# Patient Record
Sex: MALE | Race: BLACK OR AFRICAN AMERICAN | URBAN - METROPOLITAN AREA
[De-identification: names, ages, dates, MRNs, and addresses within clinical notes are randomized per-mention and may not be internally consistent; named-entity substitution may affect disease eponyms.]

---

## 2015-10-29 RX ORDER — METOPROLOL TARTRATE 50 MG
0.5 TABLET ORAL
Qty: 0 | Refills: 0 | DISCHARGE
Start: 2015-10-29

## 2015-10-29 RX ORDER — METOPROLOL TARTRATE 50 MG
1 TABLET ORAL
Qty: 0 | Refills: 0 | DISCHARGE
Start: 2015-10-29

## 2019-05-19 ENCOUNTER — ED HISTORICAL/CONVERTED ENCOUNTER (OUTPATIENT)
Dept: OTHER | Age: 79
End: 2019-05-19

## 2019-07-19 VITALS — BODY MASS INDEX: 26.22 KG/M2 | HEIGHT: 63 IN | WEIGHT: 148 LBS

## 2019-07-19 PROBLEM — Z86.73 HISTORY OF CVA (CEREBROVASCULAR ACCIDENT): Status: RESOLVED | Noted: 2019-07-19 | Resolved: 2019-07-19

## 2019-07-19 PROBLEM — Z87.891 FORMER SMOKER: Status: ACTIVE | Noted: 2019-07-19

## 2019-07-19 PROBLEM — Z86.39 HISTORY OF DIABETES MELLITUS: Status: RESOLVED | Noted: 2019-07-19 | Resolved: 2019-07-19

## 2019-07-19 PROBLEM — I25.10 CAD (CORONARY ARTERY DISEASE): Status: ACTIVE | Noted: 2019-07-19

## 2019-07-19 PROBLEM — Z86.69 HISTORY OF CATARACT: Status: RESOLVED | Noted: 2019-07-19 | Resolved: 2019-07-19

## 2019-07-19 PROBLEM — N18.3 CKD (CHRONIC KIDNEY DISEASE), STAGE III: Status: RESOLVED | Noted: 2019-07-19 | Resolved: 2019-07-19

## 2019-07-19 PROBLEM — Z86.39 HISTORY OF HYPERLIPIDEMIA: Status: RESOLVED | Noted: 2019-07-19 | Resolved: 2019-07-19

## 2019-07-19 PROBLEM — I25.2 HISTORY OF INFERIOR WALL MYOCARDIAL INFARCTION: Status: RESOLVED | Noted: 2019-07-19 | Resolved: 2019-07-19

## 2019-07-19 PROBLEM — I50.22 CHRONIC SYSTOLIC CONGESTIVE HEART FAILURE: Status: ACTIVE | Noted: 2019-07-19

## 2019-07-19 PROBLEM — Z87.438 HISTORY OF BPH: Status: RESOLVED | Noted: 2019-07-19 | Resolved: 2019-07-19

## 2019-07-19 PROBLEM — Z86.79 HISTORY OF HYPERTENSION: Status: RESOLVED | Noted: 2019-07-19 | Resolved: 2019-07-19

## 2019-07-19 RX ORDER — DORZOLAMIDE HYDROCHLORIDE AND TIMOLOL MALEATE 20; 5 MG/ML; MG/ML
22.3-6.8 SOLUTION/ DROPS OPHTHALMIC TWICE DAILY
Refills: 0 | Status: ACTIVE | COMMUNITY

## 2019-07-19 RX ORDER — OMEGA-3-ACID ETHYL ESTERS 1 G/1
1 CAPSULE, LIQUID FILLED ORAL
Qty: 360 | Refills: 0 | Status: ACTIVE | COMMUNITY

## 2019-07-19 RX ORDER — ASPIRIN 81 MG
81 TABLET, DELAYED RELEASE (ENTERIC COATED) ORAL DAILY
Qty: 1 | Refills: 5 | Status: ACTIVE | COMMUNITY

## 2019-07-19 RX ORDER — TAMSULOSIN HYDROCHLORIDE 0.4 MG/1
0.4 CAPSULE ORAL
Qty: 30 | Refills: 2 | Status: ACTIVE | COMMUNITY

## 2019-07-19 RX ORDER — CHOLECALCIFEROL (VITAMIN D3) 25 MCG
TABLET ORAL DAILY
Refills: 0 | Status: ACTIVE | COMMUNITY

## 2019-07-22 ENCOUNTER — FORM ENCOUNTER (OUTPATIENT)
Age: 79
End: 2019-07-22

## 2019-07-23 ENCOUNTER — OUTPATIENT (OUTPATIENT)
Dept: OUTPATIENT SERVICES | Facility: HOSPITAL | Age: 79
LOS: 1 days | End: 2019-07-23
Payer: MEDICARE

## 2019-07-23 ENCOUNTER — APPOINTMENT (OUTPATIENT)
Dept: CARDIOTHORACIC SURGERY | Facility: CLINIC | Age: 79
End: 2019-07-23
Payer: MEDICARE

## 2019-07-23 VITALS
HEART RATE: 72 BPM | DIASTOLIC BLOOD PRESSURE: 76 MMHG | OXYGEN SATURATION: 99 % | SYSTOLIC BLOOD PRESSURE: 170 MMHG | RESPIRATION RATE: 17 BRPM | BODY MASS INDEX: 26.93 KG/M2 | HEIGHT: 63 IN | WEIGHT: 152 LBS | TEMPERATURE: 96 F

## 2019-07-23 DIAGNOSIS — I25.10 ATHEROSCLEROTIC HEART DISEASE OF NATIVE CORONARY ARTERY W/OUT ANGINA PECTORIS: ICD-10-CM

## 2019-07-23 DIAGNOSIS — I25.10 ATHEROSCLEROTIC HEART DISEASE OF NATIVE CORONARY ARTERY WITHOUT ANGINA PECTORIS: ICD-10-CM

## 2019-07-23 DIAGNOSIS — Z86.69 PERSONAL HISTORY OF OTHER DISEASES OF THE NERVOUS SYSTEM AND SENSE ORGANS: ICD-10-CM

## 2019-07-23 DIAGNOSIS — Z86.39 PERSONAL HISTORY OF OTHER ENDOCRINE, NUTRITIONAL AND METABOLIC DISEASE: ICD-10-CM

## 2019-07-23 DIAGNOSIS — Z86.79 PERSONAL HISTORY OF OTHER DISEASES OF THE CIRCULATORY SYSTEM: ICD-10-CM

## 2019-07-23 DIAGNOSIS — Z86.73 PERSONAL HISTORY OF TRANSIENT ISCHEMIC ATTACK (TIA), AND CEREBRAL INFARCTION W/OUT RESIDUAL DEFICITS: ICD-10-CM

## 2019-07-23 DIAGNOSIS — Z87.891 PERSONAL HISTORY OF NICOTINE DEPENDENCE: ICD-10-CM

## 2019-07-23 DIAGNOSIS — I25.2 OLD MYOCARDIAL INFARCTION: ICD-10-CM

## 2019-07-23 DIAGNOSIS — Z95.818 PRESENCE OF OTHER CARDIAC IMPLANTS AND GRAFTS: Chronic | ICD-10-CM

## 2019-07-23 DIAGNOSIS — Z87.438 PERSONAL HISTORY OF OTHER DISEASES OF MALE GENITAL ORGANS: ICD-10-CM

## 2019-07-23 DIAGNOSIS — N18.3 CHRONIC KIDNEY DISEASE, STAGE 3 (MODERATE): ICD-10-CM

## 2019-07-23 DIAGNOSIS — I50.22 CHRONIC SYSTOLIC (CONGESTIVE) HEART FAILURE: ICD-10-CM

## 2019-07-23 LAB
ALBUMIN SERPL ELPH-MCNC: 4.7 G/DL — SIGNIFICANT CHANGE UP (ref 3.3–5)
ALP SERPL-CCNC: 71 U/L — SIGNIFICANT CHANGE UP (ref 40–120)
ALT FLD-CCNC: 47 U/L — HIGH (ref 10–45)
ANION GAP SERPL CALC-SCNC: 12 MMOL/L — SIGNIFICANT CHANGE UP (ref 5–17)
APPEARANCE UR: CLEAR — SIGNIFICANT CHANGE UP
APTT BLD: 30.5 SEC — SIGNIFICANT CHANGE UP (ref 27.5–36.3)
AST SERPL-CCNC: 32 U/L — SIGNIFICANT CHANGE UP (ref 10–40)
BACTERIA # UR AUTO: PRESENT /HPF
BASOPHILS # BLD AUTO: 0.04 K/UL — SIGNIFICANT CHANGE UP (ref 0–0.2)
BASOPHILS NFR BLD AUTO: 0.6 % — SIGNIFICANT CHANGE UP (ref 0–2)
BILIRUB SERPL-MCNC: 0.5 MG/DL — SIGNIFICANT CHANGE UP (ref 0.2–1.2)
BILIRUB UR-MCNC: NEGATIVE — SIGNIFICANT CHANGE UP
BLD GP AB SCN SERPL QL: NEGATIVE — SIGNIFICANT CHANGE UP
BUN SERPL-MCNC: 24 MG/DL — HIGH (ref 7–23)
CALCIUM SERPL-MCNC: 9.6 MG/DL — SIGNIFICANT CHANGE UP (ref 8.4–10.5)
CHLORIDE SERPL-SCNC: 104 MMOL/L — SIGNIFICANT CHANGE UP (ref 96–108)
CHOLEST SERPL-MCNC: 134 MG/DL — SIGNIFICANT CHANGE UP (ref 10–199)
CO2 SERPL-SCNC: 24 MMOL/L — SIGNIFICANT CHANGE UP (ref 22–31)
COLOR SPEC: YELLOW — SIGNIFICANT CHANGE UP
CREAT SERPL-MCNC: 1.74 MG/DL — HIGH (ref 0.5–1.3)
DIFF PNL FLD: ABNORMAL
EOSINOPHIL # BLD AUTO: 0.58 K/UL — HIGH (ref 0–0.5)
EOSINOPHIL NFR BLD AUTO: 9.3 % — HIGH (ref 0–6)
EPI CELLS # UR: SIGNIFICANT CHANGE UP /HPF (ref 0–5)
GLUCOSE SERPL-MCNC: 122 MG/DL — HIGH (ref 70–99)
GLUCOSE UR QL: 250
HBA1C BLD-MCNC: 8.2 % — HIGH (ref 4–5.6)
HBV SURFACE AG SER-ACNC: SIGNIFICANT CHANGE UP
HCT VFR BLD CALC: 39.9 % — SIGNIFICANT CHANGE UP (ref 39–50)
HDLC SERPL-MCNC: 65 MG/DL — SIGNIFICANT CHANGE UP
HGB BLD-MCNC: 13 G/DL — SIGNIFICANT CHANGE UP (ref 13–17)
IMM GRANULOCYTES NFR BLD AUTO: 0.3 % — SIGNIFICANT CHANGE UP (ref 0–1.5)
INR BLD: 1.01 — SIGNIFICANT CHANGE UP (ref 0.88–1.16)
KETONES UR-MCNC: NEGATIVE — SIGNIFICANT CHANGE UP
LEUKOCYTE ESTERASE UR-ACNC: NEGATIVE — SIGNIFICANT CHANGE UP
LIPID PNL WITH DIRECT LDL SERPL: 49 MG/DL — SIGNIFICANT CHANGE UP
LYMPHOCYTES # BLD AUTO: 1.61 K/UL — SIGNIFICANT CHANGE UP (ref 1–3.3)
LYMPHOCYTES # BLD AUTO: 25.9 % — SIGNIFICANT CHANGE UP (ref 13–44)
MCHC RBC-ENTMCNC: 29.8 PG — SIGNIFICANT CHANGE UP (ref 27–34)
MCHC RBC-ENTMCNC: 32.6 GM/DL — SIGNIFICANT CHANGE UP (ref 32–36)
MCV RBC AUTO: 91.5 FL — SIGNIFICANT CHANGE UP (ref 80–100)
MONOCYTES # BLD AUTO: 0.62 K/UL — SIGNIFICANT CHANGE UP (ref 0–0.9)
MONOCYTES NFR BLD AUTO: 10 % — SIGNIFICANT CHANGE UP (ref 2–14)
NEUTROPHILS # BLD AUTO: 3.34 K/UL — SIGNIFICANT CHANGE UP (ref 1.8–7.4)
NEUTROPHILS NFR BLD AUTO: 53.9 % — SIGNIFICANT CHANGE UP (ref 43–77)
NITRITE UR-MCNC: NEGATIVE — SIGNIFICANT CHANGE UP
NRBC # BLD: 0 /100 WBCS — SIGNIFICANT CHANGE UP (ref 0–0)
PH UR: 6 — SIGNIFICANT CHANGE UP (ref 5–8)
PLATELET # BLD AUTO: 208 K/UL — SIGNIFICANT CHANGE UP (ref 150–400)
POTASSIUM SERPL-MCNC: 4.4 MMOL/L — SIGNIFICANT CHANGE UP (ref 3.5–5.3)
POTASSIUM SERPL-SCNC: 4.4 MMOL/L — SIGNIFICANT CHANGE UP (ref 3.5–5.3)
PROT SERPL-MCNC: 7.8 G/DL — SIGNIFICANT CHANGE UP (ref 6–8.3)
PROT UR-MCNC: 30 MG/DL
PROTHROM AB SERPL-ACNC: 11.4 SEC — SIGNIFICANT CHANGE UP (ref 10–12.9)
RBC # BLD: 4.36 M/UL — SIGNIFICANT CHANGE UP (ref 4.2–5.8)
RBC # FLD: 12.1 % — SIGNIFICANT CHANGE UP (ref 10.3–14.5)
RBC CASTS # UR COMP ASSIST: < 5 /HPF — SIGNIFICANT CHANGE UP
RH IG SCN BLD-IMP: POSITIVE — SIGNIFICANT CHANGE UP
SODIUM SERPL-SCNC: 140 MMOL/L — SIGNIFICANT CHANGE UP (ref 135–145)
SP GR SPEC: 1.01 — SIGNIFICANT CHANGE UP (ref 1–1.03)
TOTAL CHOLESTEROL/HDL RATIO MEASUREMENT: 2.1 RATIO — LOW (ref 3.4–9.6)
TRIGL SERPL-MCNC: 99 MG/DL — SIGNIFICANT CHANGE UP (ref 10–149)
TSH SERPL-MCNC: 1.34 UIU/ML — SIGNIFICANT CHANGE UP (ref 0.35–4.94)
UROBILINOGEN FLD QL: 0.2 E.U./DL — SIGNIFICANT CHANGE UP
WBC # BLD: 6.21 K/UL — SIGNIFICANT CHANGE UP (ref 3.8–10.5)
WBC # FLD AUTO: 6.21 K/UL — SIGNIFICANT CHANGE UP (ref 3.8–10.5)
WBC UR QL: < 5 /HPF — SIGNIFICANT CHANGE UP

## 2019-07-23 PROCEDURE — 71046 X-RAY EXAM CHEST 2 VIEWS: CPT | Mod: 26

## 2019-07-23 PROCEDURE — 99205 OFFICE O/P NEW HI 60 MIN: CPT

## 2019-07-23 PROCEDURE — 71046 X-RAY EXAM CHEST 2 VIEWS: CPT

## 2019-07-23 PROCEDURE — 36415 COLL VENOUS BLD VENIPUNCTURE: CPT

## 2019-07-23 PROCEDURE — 93000 ELECTROCARDIOGRAM COMPLETE: CPT

## 2019-07-25 VITALS
WEIGHT: 152.12 LBS | HEIGHT: 63 IN | RESPIRATION RATE: 18 BRPM | HEART RATE: 72 BPM | OXYGEN SATURATION: 99 % | DIASTOLIC BLOOD PRESSURE: 76 MMHG | SYSTOLIC BLOOD PRESSURE: 170 MMHG | TEMPERATURE: 96 F

## 2019-07-25 NOTE — H&P ADULT - NSICDXPASTSURGICALHX_GEN_ALL_CORE_FT
PAST SURGICAL HISTORY:  H/O carpal tunnel repair     Status post placement of implantable loop recorder

## 2019-07-25 NOTE — H&P ADULT - NSICDXFAMILYHX_GEN_ALL_CORE_FT
FAMILY HISTORY:  Mother  Still living? Unknown  Family history of heart disease, Age at diagnosis: Age Unknown    Sibling  Still living? Unknown  Family history of uterine cancer, Age at diagnosis: Age Unknown

## 2019-07-25 NOTE — CONSULT LETTER
[Dear  ___] : Dear  [unfilled], [Please see my note below.] : Please see my note below. [Sincerely,] : Sincerely, [FreeTextEntry2] : Jasvir Faulkner M.D.\par [address] [facility]\[place]  [address] [FreeTextEntry1] : Please find attached our consultation on your patient, JOSEFINA KUMAR \par We take a multidisciplinary team approach to patient care and consider you, the referring physician, an extension of our team. We will maintain an open line of communication with you throughout your patient's treatment course.  \par It is our commitment to provide your patient with the highest quality of advanced therapeutic options. We thank you for allowing us to participate in the care of your patient.\par Please do not hesitate to contact our team with any questions or concerns at [phone].\par  [FreeTextEntry3] : Anjel Bhatti MD, FRCS\par \par [facility] \par Department of Cardiothoracic  Surgery \par Director of Robotic Cardiac Surgery\par Professor of Cardiovascular & Thoracic Surgery\par [employer] \par \par [facility]\par

## 2019-07-25 NOTE — HISTORY OF PRESENT ILLNESS
[FreeTextEntry1] : 80 yo male with a PMH of HTN, HLD, DM, inferior wall MI, CKD-stage 3, right CVA(residual left arm weakness), s/p loop recorder placement in 2015 and class II angina presents with single vessel CAD. In June 2014 while in [facility] he suffered an inferior wall MI and "small" stroke. Cath at that time revealed CAD that was treated medically. He presented to Dr. Faulkner for routine cardiology care. 6/3/19 s/p abnormal stress test. 6/3/19 Echo: EF 55%,  no significant valvular disease.  7/18/19 Cardiac cath at  revealed 2 vessel. He presents today for surgical consult accompanied by his daughter. Today he appears generally well, NAD, denies c/o chest pain, SOB, fever, lower extremity edema, syncope, dizziness, PND or palpitations. He reports dyspnea on exertion, that is relieved with rest and prn lower extremity edema. CCS II.

## 2019-07-25 NOTE — H&P ADULT - HISTORY OF PRESENT ILLNESS
80 yo male with a PMH of HTN, HLD, DM, inferior wall MI, CKD-stage 3, right CVA(residual left arm weakness), s/p loop recorder placement in 2015 and class II angina presents with single vessel CAD. In June 2014 while in [facility] he suffered an inferior wall MI and "small" stroke. Cath at that time revealed CAD that was treated medically. He presented to Dr. Faulkner for routine cardiology care. 6/3/19 s/p abnormal stress test. 6/3/19 Echo: EF 55%, no significant valvular disease. 7/18/19 Cardiac cath at  revealed 2 vessel. He appears generally well, NAD, denies c/o chest pain, SOB, fever, lower extremity edema, syncope, dizziness, PND or palpitations. He reports dyspnea on exertion, that is relieved with rest and prn lower extremity edema. He was seen in in the outpatient office by Dr. Bhatti and now presents for elective surgery. CCS II. 80 yo male with a PMH of HTN, HLD, DM, inferior wall MI, CKD-stage 3, right CVA(residual left arm weakness), s/p loop recorder placement in 2015 and class II angina presents with single vessel CAD. In June 2014 while in [facility] he suffered an inferior wall MI and "small" stroke. Cath at that time revealed CAD that was treated medically. He presented to Dr. Faulkner for routine cardiology care. 6/3/19 s/p abnormal stress test. 6/3/19 Echo: EF 55%, no significant valvular disease. 7/18/19 Cardiac cath at  revealed 2 vessel. He appears generally well, NAD, denies c/o chest pain, SOB, fever, lower extremity edema, syncope, dizziness, PND or palpitations. He reports dyspnea on exertion, that is relieved with rest and prn lower extremity edema. He was seen in in the outpatient office by Dr. Bhatti and now presents for elective surgery. CCS II.     Addendum 7/31/19 @ 9:20am: Patient seen in same day holding area; Reports no changes to PMHx or medications since last seen by our team. Denies acute or current SOB, chest pain, palpitation, N/V/D, fever/chills, recent illness, or any other concerning symptoms.

## 2019-07-25 NOTE — ASSESSMENT
[FreeTextEntry1] : 79 year old male  with a history of HTN, HLD, DM, inferior wall MI, CKD-stage 3, right CVA and class II angina presents with 2- vessel CAD. Dr. Bhatti reviewed the cardiac cath imaging and reports with the patient and  his daughter and discussed the case with Dr. Faulkner.  Dr. Bhatti discussed the risks, benefits and alternatives to surgery. Risks include but not limited to death, heart attack, bleeding, stroke, kidney problems and infection. He quoted a 1% operative mortality and complication risk.  He also discussed the various approaches, minimally invasive versus sternotomy. Dr. Bhatti feels the patient will benefit and is a candidate for a robotic assisted MIDCAB (LIMA->LAD). All questions were addressed and patient agrees to proceed with surgery. \par \par Plan: \par PST today--labs, u/a, EKG, chest xray\par SDA 7/31\par pt instructed to take metoprolol the morning of surgery\par instructions provided re antibacterial showers and pt given 3 sponges\par

## 2019-07-25 NOTE — DATA REVIEWED
[FreeTextEntry1] : 7/18/19 Cardiac Cath:  75% diffuse mid LAD, 100% RPL   diffuse with poor collateral blood supply to the distal myocardium,  mildly elevated LVEDP, LV function abnormal. \par \par 6/3/19 TTE: LVEF 55%, basal inferior wall hypokinetic, AV calcification, aortic root mildly dilated, no significant valvular disease.\par \par 6/3/19 GATED Stress Test: moderate-sized, severe, fixed myocardial perfusion defect of the inferior and inferolateral wall. LVEF 43%

## 2019-07-25 NOTE — PHYSICAL EXAM
[General Appearance - Alert] : alert [General Appearance - In No Acute Distress] : in no acute distress [Sclera] : the sclera and conjunctiva were normal [PERRL With Normal Accommodation] : pupils were equal in size, round, and reactive to light [Extraocular Movements] : extraocular movements were intact [Oropharynx] : the oropharynx was normal [Outer Ear] : the ears and nose were normal in appearance [Neck Appearance] : the appearance of the neck was normal [Neck Cervical Mass (___cm)] : no neck mass was observed [Thyroid Diffuse Enlargement] : the thyroid was not enlarged [Thyroid Nodule] : there were no palpable thyroid nodules [Jugular Venous Distention Increased] : there was no jugular-venous distention [Heart Rate And Rhythm] : heart rate was normal and rhythm regular [Auscultation Breath Sounds / Voice Sounds] : lungs were clear to auscultation bilaterally [Murmurs] : no murmurs [Heart Sounds Gallop] : no gallops [Heart Sounds] : normal S1 and S2 [Heart Sounds Pericardial Friction Rub] : no pericardial rub [Examination Of The Chest] : the chest was normal in appearance [Chest Visual Inspection Thoracic Asymmetry] : no chest asymmetry [Diminished Respiratory Excursion] : normal chest expansion [2+] : right 2+ [No Abnormalities] : the abdominal aorta was not enlarged and no bruit was heard [Abdomen Soft] : soft [Bowel Sounds] : normal bowel sounds [Abdomen Tenderness] : non-tender [Abdomen Mass (___ Cm)] : no abdominal mass palpated [No CVA Tenderness] : no ~M costovertebral angle tenderness [No Spinal Tenderness] : no spinal tenderness [Abnormal Walk] : normal gait [Nail Clubbing] : no clubbing  or cyanosis of the fingernails [Musculoskeletal - Swelling] : no joint swelling seen [Motor Tone] : muscle strength and tone were normal [Skin Turgor] : normal skin turgor [Skin Color & Pigmentation] : normal skin color and pigmentation [Sensation] : the sensory exam was normal to light touch and pinprick [Deep Tendon Reflexes (DTR)] : deep tendon reflexes were 2+ and symmetric [] : no rash [Impaired Insight] : insight and judgment were intact [No Focal Deficits] : no focal deficits [Oriented To Time, Place, And Person] : oriented to person, place, and time [Affect] : the affect was normal [Right Carotid Bruit] : no bruit heard over the right carotid [Left Carotid Bruit] : no bruit heard over the left carotid [Right Femoral Bruit] : no bruit heard over the right femoral artery [Left Femoral Bruit] : no bruit heard over the left femoral artery

## 2019-07-25 NOTE — H&P ADULT - NSICDXPASTMEDICALHX_GEN_ALL_CORE_FT
PAST MEDICAL HISTORY:  CAD (coronary artery disease)     CKD (chronic kidney disease), stage III     CVA (cerebral vascular accident)     HLD (hyperlipidemia)     HTN (hypertension)     Non-ST elevation (NSTEMI) myocardial infarction     Type 2 diabetes mellitus without complication

## 2019-07-25 NOTE — H&P ADULT - NSHPLABSRESULTS_GEN_ALL_CORE
Hgb A1C = 8.2  creat = 4.4  hct= 39.9  hgb= 13  plt= 208  WBC= 6.21  INR= 1.01  tot alb= 4.7  tot bili= 0.5    TSH= 1.337    7/23/19 Chest xray: clear lungs    7/23/19 EKG: SB w/ 1st degree AV block, 57 bpm    7/18/19 Cardiac Cath: 75% diffuse mid LAD, 100% RPL  diffuse with poor collateral blood supply to the distal myocardium, mildly elevated LVEDP, LV function abnormal.     6/3/19 TTE: LVEF 55%, basal inferior wall hypokinetic, AV calcification, aortic root mildly dilated, no significant valvular disease.    6/3/19 GATED Stress Test: moderate-sized, severe, fixed myocardial perfusion defect of the inferior and inferolateral wall. LVEF 43%.

## 2019-07-25 NOTE — H&P ADULT - ASSESSMENT
79 year old male with a history of HTN, HLD, DM, inferior wall MI, CKD-stage 3, right CVA and class II angina presents with 2- vessel CAD. Dr. Bhatti reviewed the cardiac cath imaging and reports with the patient and his daughter and discussed the case with Dr. Faulkner. Dr. Bhatti discussed the risks, benefits and alternatives to surgery. Risks include but not limited to death, heart attack, bleeding, stroke, kidney problems and infection. He quoted a 1% operative mortality and complication risk. He also discussed the various approaches, minimally invasive versus sternotomy. Dr. Bhatti feels the patient will benefit and is a candidate for a robotic assisted MIDCAB (LIMA->LAD). All questions were addressed and patient agrees to proceed with surgery.     Plan:   PST   SDA 7/31  pt instructed to take metoprolol the morning of surgery  instructions provided re antibacterial showers and pt given 3 sponges 79 year old male with a history of HTN, HLD, DM, inferior wall MI, CKD-stage 3, right CVA and class II angina presents with 2- vessel CAD. Dr. Bhatti reviewed the cardiac cath imaging and reports with the patient and his daughter and discussed the case with Dr. Faulkner. Dr. Bhatti discussed the risks, benefits and alternatives to surgery. Risks include but not limited to death, heart attack, bleeding, stroke, kidney problems and infection. He quoted a 1% operative mortality and complication risk. He also discussed the various approaches, minimally invasive versus sternotomy. Dr. Bhatti feels the patient will benefit and is a candidate for a robotic assisted MIDCAB (LIMA->LAD). All questions were addressed and patient agrees to proceed with surgery.     Plan:   PST   SDA 7/31  pt instructed to take metoprolol the morning of surgery  instructions provided re antibacterial showers and pt given 3 sponges    Addendum 7/31/19 9:20am:  Admit under Dr. Bhatti via same day surgery for MIDCAB today. Consent signed, placed on chart.  Risks/benefits reviewed, patient understands and agrees. T&S ordered and blood products placed on hold for OR.  To 9E post-op.  NPO since midnight confirmed.  Patient confirms he took his beta blocker this morning.

## 2019-07-30 NOTE — PRE-OP CHECKLIST - SELECT TESTS ORDERED
Type and Screen/Urinalysis/EKG/CXR/CMP/CBC/INR/PT/PTT CMP/Type and Cross/Type and Screen/CXR/PT/PTT/Urinalysis/CBC/INR/EKG

## 2019-07-31 ENCOUNTER — APPOINTMENT (OUTPATIENT)
Dept: CARDIOTHORACIC SURGERY | Facility: HOSPITAL | Age: 79
End: 2019-07-31

## 2019-07-31 ENCOUNTER — INPATIENT (INPATIENT)
Facility: HOSPITAL | Age: 79
LOS: 6 days | Discharge: NURSING, REHAB OR LONG-TERM CARE | DRG: 236 | End: 2019-08-07
Attending: THORACIC SURGERY (CARDIOTHORACIC VASCULAR SURGERY) | Admitting: THORACIC SURGERY (CARDIOTHORACIC VASCULAR SURGERY)
Payer: MEDICARE

## 2019-07-31 DIAGNOSIS — Z95.818 PRESENCE OF OTHER CARDIAC IMPLANTS AND GRAFTS: Chronic | ICD-10-CM

## 2019-07-31 DIAGNOSIS — Z98.890 OTHER SPECIFIED POSTPROCEDURAL STATES: Chronic | ICD-10-CM

## 2019-07-31 LAB
ALBUMIN SERPL ELPH-MCNC: 3.4 G/DL — SIGNIFICANT CHANGE UP (ref 3.3–5)
ALBUMIN SERPL ELPH-MCNC: 3.9 G/DL — SIGNIFICANT CHANGE UP (ref 3.3–5)
ALP SERPL-CCNC: 59 U/L — SIGNIFICANT CHANGE UP (ref 40–120)
ALP SERPL-CCNC: 70 U/L — SIGNIFICANT CHANGE UP (ref 40–120)
ALT FLD-CCNC: 29 U/L — SIGNIFICANT CHANGE UP (ref 10–45)
ALT FLD-CCNC: 33 U/L — SIGNIFICANT CHANGE UP (ref 10–45)
ANION GAP SERPL CALC-SCNC: 10 MMOL/L — SIGNIFICANT CHANGE UP (ref 5–17)
ANION GAP SERPL CALC-SCNC: 10 MMOL/L — SIGNIFICANT CHANGE UP (ref 5–17)
ANION GAP SERPL CALC-SCNC: 14 MMOL/L — SIGNIFICANT CHANGE UP (ref 5–17)
APTT BLD: 30.4 SEC — SIGNIFICANT CHANGE UP (ref 27.5–36.3)
APTT BLD: 39.6 SEC — HIGH (ref 27.5–36.3)
AST SERPL-CCNC: 20 U/L — SIGNIFICANT CHANGE UP (ref 10–40)
AST SERPL-CCNC: 24 U/L — SIGNIFICANT CHANGE UP (ref 10–40)
BASE EXCESS BLDA CALC-SCNC: -2.5 MMOL/L — LOW (ref -2–3)
BASE EXCESS BLDA CALC-SCNC: -3.2 MMOL/L — LOW (ref -2–3)
BILIRUB SERPL-MCNC: 0.3 MG/DL — SIGNIFICANT CHANGE UP (ref 0.2–1.2)
BILIRUB SERPL-MCNC: 0.4 MG/DL — SIGNIFICANT CHANGE UP (ref 0.2–1.2)
BLD GP AB SCN SERPL QL: NEGATIVE — SIGNIFICANT CHANGE UP
BUN SERPL-MCNC: 18 MG/DL — SIGNIFICANT CHANGE UP (ref 7–23)
BUN SERPL-MCNC: 19 MG/DL — SIGNIFICANT CHANGE UP (ref 7–23)
BUN SERPL-MCNC: 19 MG/DL — SIGNIFICANT CHANGE UP (ref 7–23)
CALCIUM SERPL-MCNC: 8.4 MG/DL — SIGNIFICANT CHANGE UP (ref 8.4–10.5)
CALCIUM SERPL-MCNC: 8.6 MG/DL — SIGNIFICANT CHANGE UP (ref 8.4–10.5)
CALCIUM SERPL-MCNC: 8.9 MG/DL — SIGNIFICANT CHANGE UP (ref 8.4–10.5)
CHLORIDE SERPL-SCNC: 103 MMOL/L — SIGNIFICANT CHANGE UP (ref 96–108)
CHLORIDE SERPL-SCNC: 105 MMOL/L — SIGNIFICANT CHANGE UP (ref 96–108)
CHLORIDE SERPL-SCNC: 108 MMOL/L — SIGNIFICANT CHANGE UP (ref 96–108)
CO2 SERPL-SCNC: 21 MMOL/L — LOW (ref 22–31)
CO2 SERPL-SCNC: 21 MMOL/L — LOW (ref 22–31)
CO2 SERPL-SCNC: 22 MMOL/L — SIGNIFICANT CHANGE UP (ref 22–31)
CREAT SERPL-MCNC: 1.4 MG/DL — HIGH (ref 0.5–1.3)
CREAT SERPL-MCNC: 1.43 MG/DL — HIGH (ref 0.5–1.3)
CREAT SERPL-MCNC: 1.48 MG/DL — HIGH (ref 0.5–1.3)
GAS PNL BLDA: SIGNIFICANT CHANGE UP
GLUCOSE BLDC GLUCOMTR-MCNC: 120 MG/DL — HIGH (ref 70–99)
GLUCOSE BLDC GLUCOMTR-MCNC: 125 MG/DL — HIGH (ref 70–99)
GLUCOSE BLDC GLUCOMTR-MCNC: 139 MG/DL — HIGH (ref 70–99)
GLUCOSE BLDC GLUCOMTR-MCNC: 154 MG/DL — HIGH (ref 70–99)
GLUCOSE BLDC GLUCOMTR-MCNC: 161 MG/DL — HIGH (ref 70–99)
GLUCOSE BLDC GLUCOMTR-MCNC: 161 MG/DL — HIGH (ref 70–99)
GLUCOSE BLDC GLUCOMTR-MCNC: 165 MG/DL — HIGH (ref 70–99)
GLUCOSE BLDC GLUCOMTR-MCNC: 166 MG/DL — HIGH (ref 70–99)
GLUCOSE BLDC GLUCOMTR-MCNC: 168 MG/DL — HIGH (ref 70–99)
GLUCOSE SERPL-MCNC: 145 MG/DL — HIGH (ref 70–99)
GLUCOSE SERPL-MCNC: 177 MG/DL — HIGH (ref 70–99)
GLUCOSE SERPL-MCNC: 181 MG/DL — HIGH (ref 70–99)
HCO3 BLDA-SCNC: 22 MMOL/L — SIGNIFICANT CHANGE UP (ref 21–28)
HCO3 BLDA-SCNC: 22 MMOL/L — SIGNIFICANT CHANGE UP (ref 21–28)
HCT VFR BLD CALC: 32.3 % — LOW (ref 39–50)
HCT VFR BLD CALC: 34.2 % — LOW (ref 39–50)
HGB BLD-MCNC: 10.9 G/DL — LOW (ref 13–17)
HGB BLD-MCNC: 11.6 G/DL — LOW (ref 13–17)
INR BLD: 1.1 — SIGNIFICANT CHANGE UP (ref 0.88–1.16)
INR BLD: 1.21 — HIGH (ref 0.88–1.16)
LACTATE SERPL-SCNC: 2.9 MMOL/L — HIGH (ref 0.5–2)
MAGNESIUM SERPL-MCNC: 1.4 MG/DL — LOW (ref 1.6–2.6)
MAGNESIUM SERPL-MCNC: 2.2 MG/DL — SIGNIFICANT CHANGE UP (ref 1.6–2.6)
MCHC RBC-ENTMCNC: 30.1 PG — SIGNIFICANT CHANGE UP (ref 27–34)
MCHC RBC-ENTMCNC: 30.1 PG — SIGNIFICANT CHANGE UP (ref 27–34)
MCHC RBC-ENTMCNC: 33.7 GM/DL — SIGNIFICANT CHANGE UP (ref 32–36)
MCHC RBC-ENTMCNC: 33.9 GM/DL — SIGNIFICANT CHANGE UP (ref 32–36)
MCV RBC AUTO: 88.6 FL — SIGNIFICANT CHANGE UP (ref 80–100)
MCV RBC AUTO: 89.2 FL — SIGNIFICANT CHANGE UP (ref 80–100)
NRBC # BLD: 0 /100 WBCS — SIGNIFICANT CHANGE UP (ref 0–0)
NRBC # BLD: 0 /100 WBCS — SIGNIFICANT CHANGE UP (ref 0–0)
PCO2 BLDA: 35 MMHG — SIGNIFICANT CHANGE UP (ref 35–48)
PCO2 BLDA: 39 MMHG — SIGNIFICANT CHANGE UP (ref 35–48)
PH BLDA: 7.36 — SIGNIFICANT CHANGE UP (ref 7.35–7.45)
PH BLDA: 7.41 — SIGNIFICANT CHANGE UP (ref 7.35–7.45)
PHOSPHATE SERPL-MCNC: 3.2 MG/DL — SIGNIFICANT CHANGE UP (ref 2.5–4.5)
PLATELET # BLD AUTO: 169 K/UL — SIGNIFICANT CHANGE UP (ref 150–400)
PLATELET # BLD AUTO: 197 K/UL — SIGNIFICANT CHANGE UP (ref 150–400)
PO2 BLDA: 105 MMHG — SIGNIFICANT CHANGE UP (ref 83–108)
PO2 BLDA: 108 MMHG — SIGNIFICANT CHANGE UP (ref 83–108)
POTASSIUM SERPL-MCNC: 4.2 MMOL/L — SIGNIFICANT CHANGE UP (ref 3.5–5.3)
POTASSIUM SERPL-MCNC: 4.7 MMOL/L — SIGNIFICANT CHANGE UP (ref 3.5–5.3)
POTASSIUM SERPL-MCNC: 5.5 MMOL/L — HIGH (ref 3.5–5.3)
POTASSIUM SERPL-SCNC: 4.2 MMOL/L — SIGNIFICANT CHANGE UP (ref 3.5–5.3)
POTASSIUM SERPL-SCNC: 4.7 MMOL/L — SIGNIFICANT CHANGE UP (ref 3.5–5.3)
POTASSIUM SERPL-SCNC: 5.5 MMOL/L — HIGH (ref 3.5–5.3)
PROT SERPL-MCNC: 5.9 G/DL — LOW (ref 6–8.3)
PROT SERPL-MCNC: 6.5 G/DL — SIGNIFICANT CHANGE UP (ref 6–8.3)
PROTHROM AB SERPL-ACNC: 12.5 SEC — SIGNIFICANT CHANGE UP (ref 10–12.9)
PROTHROM AB SERPL-ACNC: 13.7 SEC — HIGH (ref 10–12.9)
RBC # BLD: 3.62 M/UL — LOW (ref 4.2–5.8)
RBC # BLD: 3.86 M/UL — LOW (ref 4.2–5.8)
RBC # FLD: 11.9 % — SIGNIFICANT CHANGE UP (ref 10.3–14.5)
RBC # FLD: 12 % — SIGNIFICANT CHANGE UP (ref 10.3–14.5)
RH IG SCN BLD-IMP: POSITIVE — SIGNIFICANT CHANGE UP
SAO2 % BLDA: 98 % — SIGNIFICANT CHANGE UP (ref 95–100)
SAO2 % BLDA: 98 % — SIGNIFICANT CHANGE UP (ref 95–100)
SODIUM SERPL-SCNC: 136 MMOL/L — SIGNIFICANT CHANGE UP (ref 135–145)
SODIUM SERPL-SCNC: 138 MMOL/L — SIGNIFICANT CHANGE UP (ref 135–145)
SODIUM SERPL-SCNC: 140 MMOL/L — SIGNIFICANT CHANGE UP (ref 135–145)
WBC # BLD: 11.58 K/UL — HIGH (ref 3.8–10.5)
WBC # BLD: 9.66 K/UL — SIGNIFICANT CHANGE UP (ref 3.8–10.5)
WBC # FLD AUTO: 11.58 K/UL — HIGH (ref 3.8–10.5)
WBC # FLD AUTO: 9.66 K/UL — SIGNIFICANT CHANGE UP (ref 3.8–10.5)

## 2019-07-31 PROCEDURE — 80061 LIPID PANEL: CPT

## 2019-07-31 PROCEDURE — 83036 HEMOGLOBIN GLYCOSYLATED A1C: CPT

## 2019-07-31 PROCEDURE — 36415 COLL VENOUS BLD VENIPUNCTURE: CPT

## 2019-07-31 PROCEDURE — 99292 CRITICAL CARE ADDL 30 MIN: CPT

## 2019-07-31 PROCEDURE — 85025 COMPLETE CBC W/AUTO DIFF WBC: CPT

## 2019-07-31 PROCEDURE — 80053 COMPREHEN METABOLIC PANEL: CPT

## 2019-07-31 PROCEDURE — 84443 ASSAY THYROID STIM HORMONE: CPT

## 2019-07-31 PROCEDURE — 81001 URINALYSIS AUTO W/SCOPE: CPT

## 2019-07-31 PROCEDURE — 71045 X-RAY EXAM CHEST 1 VIEW: CPT | Mod: 26

## 2019-07-31 PROCEDURE — 87340 HEPATITIS B SURFACE AG IA: CPT

## 2019-07-31 PROCEDURE — 85610 PROTHROMBIN TIME: CPT

## 2019-07-31 PROCEDURE — 33533 CABG ARTERIAL SINGLE: CPT | Mod: AS

## 2019-07-31 PROCEDURE — 33533 CABG ARTERIAL SINGLE: CPT

## 2019-07-31 PROCEDURE — 76998 US GUIDE INTRAOP: CPT | Mod: 26,AS

## 2019-07-31 PROCEDURE — 86901 BLOOD TYPING SEROLOGIC RH(D): CPT

## 2019-07-31 PROCEDURE — 86900 BLOOD TYPING SEROLOGIC ABO: CPT

## 2019-07-31 PROCEDURE — 85730 THROMBOPLASTIN TIME PARTIAL: CPT

## 2019-07-31 PROCEDURE — 86850 RBC ANTIBODY SCREEN: CPT

## 2019-07-31 PROCEDURE — 99291 CRITICAL CARE FIRST HOUR: CPT

## 2019-07-31 PROCEDURE — 76998 US GUIDE INTRAOP: CPT | Mod: 26,59

## 2019-07-31 RX ORDER — DEXTROSE 50 % IN WATER 50 %
50 SYRINGE (ML) INTRAVENOUS
Refills: 0 | Status: DISCONTINUED | OUTPATIENT
Start: 2019-07-31 | End: 2019-08-07

## 2019-07-31 RX ORDER — CLOPIDOGREL BISULFATE 75 MG/1
75 TABLET, FILM COATED ORAL DAILY
Refills: 0 | Status: DISCONTINUED | OUTPATIENT
Start: 2019-07-31 | End: 2019-08-04

## 2019-07-31 RX ORDER — ALBUMIN HUMAN 25 %
250 VIAL (ML) INTRAVENOUS ONCE
Refills: 0 | Status: DISCONTINUED | OUTPATIENT
Start: 2019-07-31 | End: 2019-07-31

## 2019-07-31 RX ORDER — ASPIRIN/CALCIUM CARB/MAGNESIUM 324 MG
81 TABLET ORAL DAILY
Refills: 0 | Status: DISCONTINUED | OUTPATIENT
Start: 2019-07-31 | End: 2019-08-01

## 2019-07-31 RX ORDER — CHLORHEXIDINE GLUCONATE 213 G/1000ML
15 SOLUTION TOPICAL EVERY 12 HOURS
Refills: 0 | Status: DISCONTINUED | OUTPATIENT
Start: 2019-07-31 | End: 2019-08-01

## 2019-07-31 RX ORDER — IPRATROPIUM/ALBUTEROL SULFATE 18-103MCG
3 AEROSOL WITH ADAPTER (GRAM) INHALATION ONCE
Refills: 0 | Status: COMPLETED | OUTPATIENT
Start: 2019-07-31 | End: 2019-07-31

## 2019-07-31 RX ORDER — CEFAZOLIN SODIUM 1 G
2000 VIAL (EA) INJECTION EVERY 8 HOURS
Refills: 0 | Status: COMPLETED | OUTPATIENT
Start: 2019-07-31 | End: 2019-08-02

## 2019-07-31 RX ORDER — PANTOPRAZOLE SODIUM 20 MG/1
40 TABLET, DELAYED RELEASE ORAL DAILY
Refills: 0 | Status: DISCONTINUED | OUTPATIENT
Start: 2019-07-31 | End: 2019-08-01

## 2019-07-31 RX ORDER — BUPIVACAINE 13.3 MG/ML
20 INJECTION, SUSPENSION, LIPOSOMAL INFILTRATION ONCE
Refills: 0 | Status: DISCONTINUED | OUTPATIENT
Start: 2019-07-31 | End: 2019-08-01

## 2019-07-31 RX ORDER — ALBUMIN HUMAN 25 %
250 VIAL (ML) INTRAVENOUS ONCE
Refills: 0 | Status: COMPLETED | OUTPATIENT
Start: 2019-07-31 | End: 2019-07-31

## 2019-07-31 RX ORDER — INSULIN HUMAN 100 [IU]/ML
2 INJECTION, SOLUTION SUBCUTANEOUS
Qty: 100 | Refills: 0 | Status: DISCONTINUED | OUTPATIENT
Start: 2019-07-31 | End: 2019-08-01

## 2019-07-31 RX ORDER — NITROGLYCERIN 6.5 MG
5 CAPSULE, EXTENDED RELEASE ORAL
Qty: 50 | Refills: 0 | Status: DISCONTINUED | OUTPATIENT
Start: 2019-07-31 | End: 2019-08-01

## 2019-07-31 RX ORDER — CHLORHEXIDINE GLUCONATE 213 G/1000ML
1 SOLUTION TOPICAL DAILY
Refills: 0 | Status: DISCONTINUED | OUTPATIENT
Start: 2019-07-31 | End: 2019-08-07

## 2019-07-31 RX ORDER — ATORVASTATIN CALCIUM 80 MG/1
40 TABLET, FILM COATED ORAL AT BEDTIME
Refills: 0 | Status: DISCONTINUED | OUTPATIENT
Start: 2019-07-31 | End: 2019-08-07

## 2019-07-31 RX ORDER — ACETAMINOPHEN 500 MG
1000 TABLET ORAL ONCE
Refills: 0 | Status: COMPLETED | OUTPATIENT
Start: 2019-07-31 | End: 2019-07-31

## 2019-07-31 RX ORDER — SODIUM CHLORIDE 9 MG/ML
1000 INJECTION INTRAMUSCULAR; INTRAVENOUS; SUBCUTANEOUS
Refills: 0 | Status: DISCONTINUED | OUTPATIENT
Start: 2019-07-31 | End: 2019-08-01

## 2019-07-31 RX ORDER — HEPARIN SODIUM 5000 [USP'U]/ML
5000 INJECTION INTRAVENOUS; SUBCUTANEOUS EVERY 8 HOURS
Refills: 0 | Status: DISCONTINUED | OUTPATIENT
Start: 2019-07-31 | End: 2019-08-04

## 2019-07-31 RX ORDER — CHLORHEXIDINE GLUCONATE 213 G/1000ML
5 SOLUTION TOPICAL EVERY 4 HOURS
Refills: 0 | Status: DISCONTINUED | OUTPATIENT
Start: 2019-07-31 | End: 2019-07-31

## 2019-07-31 RX ORDER — CEFAZOLIN SODIUM 1 G
2000 VIAL (EA) INJECTION EVERY 8 HOURS
Refills: 0 | Status: DISCONTINUED | OUTPATIENT
Start: 2019-07-31 | End: 2019-07-31

## 2019-07-31 RX ORDER — MAGNESIUM SULFATE 500 MG/ML
2 VIAL (ML) INJECTION ONCE
Refills: 0 | Status: COMPLETED | OUTPATIENT
Start: 2019-07-31 | End: 2019-07-31

## 2019-07-31 RX ADMIN — Medication 50 GRAM(S): at 16:57

## 2019-07-31 RX ADMIN — CHLORHEXIDINE GLUCONATE 15 MILLILITER(S): 213 SOLUTION TOPICAL at 18:41

## 2019-07-31 RX ADMIN — Medication 3 MILLILITER(S): at 22:10

## 2019-07-31 RX ADMIN — INSULIN HUMAN 2 UNIT(S)/HR: 100 INJECTION, SOLUTION SUBCUTANEOUS at 16:59

## 2019-07-31 RX ADMIN — Medication 125 MILLILITER(S): at 23:37

## 2019-07-31 RX ADMIN — Medication 125 MILLILITER(S): at 23:04

## 2019-07-31 RX ADMIN — Medication 2000 MILLIGRAM(S): at 19:40

## 2019-07-31 RX ADMIN — Medication 400 MILLIGRAM(S): at 22:45

## 2019-07-31 RX ADMIN — Medication 1000 MILLIGRAM(S): at 23:00

## 2019-07-31 NOTE — PROGRESS NOTE ADULT - SUBJECTIVE AND OBJECTIVE BOX
INTERVAL HPI/OVERNIGHT EVENTS:    OpDay: Jamie Mid CAB  EF normal     80yo Male Hx HTN, HLD, DM, CAD/MI, CKD III, CVA (residual L arm weakness) found to have abnormal stress testing of routine cardiac followup.     NST 6/3/19  ECHO: EF 55%: no significant valvular disease  Cath 7/18/19: 2 vessel    To OR today: robotic midCAB    arrived to ICU on propofol/intubated    PMHx includes but is not limited to:   HLD  HTN   CKD III  CAD/NSTEMI  CVA (L hand residual weakness)  DM  Carpal tunnel repair    ICU Vital Signs Last 24 Hrs  T(F): -- Afebrile  HR: 54 (31 Jul 2019 15:45) (54 - 54) sinus  ABP: 170/68 (31 Jul 2019 15:45) (170/68 - 170/68)  ABP(mean): 108 (31 Jul 2019 15:45) (108 - 108)  SpO2: 100% (31 Jul 2019 15:45) (100% - 100%) Fi02 30%    Qtts: None    I&O's Summary    Vent settings: AC 12/500/30/5    Physical Exam    Heart - regular (-)rub/gallop  Lungs - BS appreciated bilaterally - no rhonchi/wheeze  Abd - (+) BS soft NTND (-)r/r/g  Ext - warm to touch; no cyanosis/clubbing  Chest - incision site clean and dry  Neuro - pupils reactive otherwise unable  Skin - no rash     LABS: pending    RADIOLOGY & ADDITIONAL STUDIES: pending    Patient with CAD now s/p Robotic MidCAB    1. CV  hypertensive on arrival   per anesthesia - given paralytic dosing at end of case - on propofol on arrival   CKD noted - nitro for hypertension - titrate to keep SBP less than 120  ASA/Plavix/statin  complete periop Abx prophylaxis    2. Pulm   serial ABG  anticipate wean to extubate in short post-op period    3. Endocrine   maintain glycemic control   insulin infusion per protocol   HgA1c 8.2    d/w anesthesia/staff and CTS    I have spent/provided stated minutes of critical care time to this patient: 60 min

## 2019-07-31 NOTE — PROGRESS NOTE ADULT - SUBJECTIVE AND OBJECTIVE BOX
CTICU  CRITICAL  CARE  attending     Hand off received 					   Pertinent clinical, laboratory, radiographic, hemodynamic, echocardiographic, respiratory data, microbiologic data and chart were reviewed and analyzed frequently throughout the course of the day and night  Patient seen and examined with CTS/ SH attending at bedside    78 yo male with a PMH of HTN, HLD, DM, inferior wall MI, CKD-stage 3, right CVA(residual left arm weakness), s/p loop recorder placement in 2015 and class II angina presents with single vessel CAD. In June 2014 while in [facility] he suffered an inferior wall MI and "small" stroke. Cath at that time revealed CAD that was treated medically. He presented to Dr. Faulkner for routine cardiology care. 6/3/19 s/p abnormal stress test. 6/3/19 Echo: EF 55%, no significant valvular disease. 7/18/19 Cardiac cath at  revealed 2 vessel. He appears generally well, NAD, denies c/o chest pain, SOB, fever, lower extremity edema, syncope, dizziness, PND or palpitations. He reports dyspnea on exertion, that is relieved with rest and prn lower extremity edema. He was seen in the outpatient office by Dr. Bhatti and now presents for elective CABG.      FAMILY HISTORY:  Family history of uterine cancer (Sibling)  Family history of heart disease (Mother)  PAST MEDICAL & SURGICAL HISTORY:  HLD (hyperlipidemia)  HTN (hypertension)  CKD (chronic kidney disease), stage III  CAD (coronary artery disease)  Non-ST elevation (NSTEMI) myocardial infarction  CVA (cerebral vascular accident)  Type 2 diabetes mellitus without complication  H/O carpal tunnel repair  Status post placement of implantable loop recorder    Patient is a 79y old  Male who presents with CAD   S/P CABG    14 system review was unremarkable  acute changes include acute respiratory failure  Vital signs, hemodynamic and respiratory parameters were reviewed from the bedside nursing flow sheet.  ICU Vital Signs Last 24 Hrs  T(C): 36.1 (31 Jul 2019 21:04), Max: 36.1 (31 Jul 2019 21:04)  T(F): 96.9 (31 Jul 2019 21:04), Max: 96.9 (31 Jul 2019 21:04)  HR: 84 (31 Jul 2019 23:00) (54 - 84)  BP: 135/70 (31 Jul 2019 23:00) (116/64 - 138/729)  BP(mean): 90 (31 Jul 2019 23:00) (81 - 90)  ABP: 147/65 (31 Jul 2019 23:00) (118/50 - 170/74)  ABP(mean): 86 (31 Jul 2019 23:00) (66 - 108)  RR: 14 (31 Jul 2019 23:00) (11 - 20)  SpO2: 98% (31 Jul 2019 23:00) (96% - 100%)    Adult Advanced Hemodynamics Last 24 Hrs  CVP(mm Hg): 3 (31 Jul 2019 23:00) (3 - 19)  CVP(cm H2O): --  CO: --  CI: --  PA: --  PA(mean): --  PCWP: --  SVR: --  SVRI: --  PVR: --  PVRI: --, ABG - ( 31 Jul 2019 22:51 )  pH, Arterial: 7.39  pH, Blood: x     /  pCO2: 38    /  pO2: 114   / HCO3: 22    / Base Excess: -2.4  /  SaO2: 98                Mode: AC/ CMV (Assist Control/ Continuous Mandatory Ventilation)  RR (machine): 12  TV (machine): 500  FiO2: 30  PEEP: 5  ITime: 1  MAP: 8  PIP: 20    Intake and output was reviewed and the fluid balance was calculated  Daily     Daily   I&O's Summary    31 Jul 2019 07:01  -  31 Jul 2019 23:33  --------------------------------------------------------  IN: 1020.5 mL / OUT: 1165 mL / NET: -144.5 mL        All lines and drain sites were assessed  Glycemic trend was reviewed CAPILLARY BLOOD GLUCOSE        POCT Blood Glucose.: 139 mg/dL (31 Jul 2019 22:11)      General: Patient is  EXTUBATED.  Neurological:  No acute change in mental status                  Cardiovascular:     RRR, S1S2, no S3, no murmur  Respiratory:  equal breath sounds, no rales, no rhonchi, no wheeze  Gastrointestinal:  soft, nontender, positive bowel sounds  Extremities:  are warm and well perfused  Vascular:    + DP/PT  SKIN: No abnormalities         labs  CBC Full  -  ( 31 Jul 2019 22:38 )  WBC Count : 11.58 K/uL  RBC Count : 3.86 M/uL  Hemoglobin : 11.6 g/dL  Hematocrit : 34.2 %  Platelet Count - Automated : 197 K/uL  Mean Cell Volume : 88.6 fl  Mean Cell Hemoglobin : 30.1 pg  Mean Cell Hemoglobin Concentration : 33.9 gm/dL  Auto Neutrophil # : x  Auto Lymphocyte # : x  Auto Monocyte # : x  Auto Eosinophil # : x  Auto Basophil # : x  Auto Neutrophil % : x  Auto Lymphocyte % : x  Auto Monocyte % : x  Auto Eosinophil % : x  Auto Basophil % : x    07-31    138  |  103  |  19  ----------------------------<  145<H>  4.2   |  21<L>  |  1.48<H>    Ca    8.9      31 Jul 2019 22:38  Phos  3.2     07-31  Mg     2.2     07-31    TPro  6.5  /  Alb  3.9  /  TBili  0.3  /  DBili  x   /  AST  24  /  ALT  33  /  AlkPhos  70  07-31    PT/INR - ( 31 Jul 2019 22:38 )   PT: 12.5 sec;   INR: 1.10          PTT - ( 31 Jul 2019 22:38 )  PTT:30.4 sec  The current medications were reviewed   MEDICATIONS  (STANDING):  albumin human  5% IVPB 250 milliLiter(s) IV Intermittent once  aspirin enteric coated 81 milliGRAM(s) Oral daily  atorvastatin 40 milliGRAM(s) Oral at bedtime  BUpivacaine liposome 1.3% Injectable (no eMAR) 20 milliLiter(s) Local Injection once  ceFAZolin  Injectable. 2000 milliGRAM(s) IV Push every 8 hours  chlorhexidine 0.12% Liquid 15 milliLiter(s) Oral Mucosa every 12 hours  chlorhexidine 2% Cloths 1 Application(s) Topical daily  clopidogrel Tablet 75 milliGRAM(s) Oral daily  dextrose 50% Injectable 50 milliLiter(s) IV Push every 15 minutes  heparin  Injectable 5000 Unit(s) SubCutaneous every 8 hours  insulin regular Infusion 2 Unit(s)/Hr (2 mL/Hr) IV Continuous <Continuous>  nitroglycerin  Infusion 5 MICROgram(s)/Min (1.5 mL/Hr) IV Continuous <Continuous>  pantoprazole  Injectable 40 milliGRAM(s) IV Push daily  sodium chloride 0.9%. 1000 milliLiter(s) (10 mL/Hr) IV Continuous <Continuous>    MEDICATIONS  (PRN):      PROBLEM LIST/ ASSESSMENT:  HEALTH ISSUES - PROBLEM Dx:        Patient is a 79y old  Male who presents with CAD   S/P CABG  Metabolic acidosis.  Low urine output.  Good oxygenation.  Stable hemodynamics.  Overall doing well.         My plan includes :  VOLUMIZE  the patient.  Close hemodynamic, ventilatory and drain monitoring and management.  Monitor for arrhythmias and monitor parameters for organ perfusion.  Beta Blocker and statin.  Monitor neurologic status  Head of the bed should remain elevated to 45 deg .   Chest PT and IS will be encouraged  Monitor adequacy of oxygenation and ventilation and attempt to wean oxygen  Nutritional goals will be met using po eventually , ensure adequate caloric intake and monitor  the same  Stress ulcer and VTE prophylaxis will be achieved    Glycemic control is satisfactory  Electrolytes have been repleted as necessary and wound care has been carried out. Pain control has been achieved.   Aggressive physical therapy and early mobility and ambulation goals will be met   The family was updated about the course and plan  CRITICAL CARE TIME SPENT in evaluation and management, reassessments, review and interpretation of labs and x-rays, ventilator and hemodynamic management, formulating a plan and coordinating care: ___30____ MIN.  Time does not include procedural time.  CTICU ATTENDING     					    Sloan Damico MD

## 2019-08-01 LAB
ALBUMIN SERPL ELPH-MCNC: 3.9 G/DL — SIGNIFICANT CHANGE UP (ref 3.3–5)
ALP SERPL-CCNC: 54 U/L — SIGNIFICANT CHANGE UP (ref 40–120)
ALT FLD-CCNC: 24 U/L — SIGNIFICANT CHANGE UP (ref 10–45)
ANION GAP SERPL CALC-SCNC: 10 MMOL/L — SIGNIFICANT CHANGE UP (ref 5–17)
ANION GAP SERPL CALC-SCNC: 12 MMOL/L — SIGNIFICANT CHANGE UP (ref 5–17)
ANION GAP SERPL CALC-SCNC: 9 MMOL/L — SIGNIFICANT CHANGE UP (ref 5–17)
APTT BLD: 28.7 SEC — SIGNIFICANT CHANGE UP (ref 27.5–36.3)
APTT BLD: 28.9 SEC — SIGNIFICANT CHANGE UP (ref 27.5–36.3)
AST SERPL-CCNC: 21 U/L — SIGNIFICANT CHANGE UP (ref 10–40)
BASOPHILS # BLD AUTO: 0.01 K/UL — SIGNIFICANT CHANGE UP (ref 0–0.2)
BASOPHILS NFR BLD AUTO: 0.1 % — SIGNIFICANT CHANGE UP (ref 0–2)
BILIRUB SERPL-MCNC: 0.4 MG/DL — SIGNIFICANT CHANGE UP (ref 0.2–1.2)
BUN SERPL-MCNC: 18 MG/DL — SIGNIFICANT CHANGE UP (ref 7–23)
BUN SERPL-MCNC: 18 MG/DL — SIGNIFICANT CHANGE UP (ref 7–23)
BUN SERPL-MCNC: 19 MG/DL — SIGNIFICANT CHANGE UP (ref 7–23)
CALCIUM SERPL-MCNC: 8.8 MG/DL — SIGNIFICANT CHANGE UP (ref 8.4–10.5)
CALCIUM SERPL-MCNC: 9.2 MG/DL — SIGNIFICANT CHANGE UP (ref 8.4–10.5)
CALCIUM SERPL-MCNC: 9.2 MG/DL — SIGNIFICANT CHANGE UP (ref 8.4–10.5)
CHLORIDE SERPL-SCNC: 102 MMOL/L — SIGNIFICANT CHANGE UP (ref 96–108)
CHLORIDE SERPL-SCNC: 103 MMOL/L — SIGNIFICANT CHANGE UP (ref 96–108)
CHLORIDE SERPL-SCNC: 104 MMOL/L — SIGNIFICANT CHANGE UP (ref 96–108)
CO2 SERPL-SCNC: 23 MMOL/L — SIGNIFICANT CHANGE UP (ref 22–31)
CO2 SERPL-SCNC: 25 MMOL/L — SIGNIFICANT CHANGE UP (ref 22–31)
CO2 SERPL-SCNC: 25 MMOL/L — SIGNIFICANT CHANGE UP (ref 22–31)
CREAT SERPL-MCNC: 1.48 MG/DL — HIGH (ref 0.5–1.3)
CREAT SERPL-MCNC: 1.62 MG/DL — HIGH (ref 0.5–1.3)
CREAT SERPL-MCNC: 1.64 MG/DL — HIGH (ref 0.5–1.3)
EOSINOPHIL # BLD AUTO: 0.01 K/UL — SIGNIFICANT CHANGE UP (ref 0–0.5)
EOSINOPHIL NFR BLD AUTO: 0.1 % — SIGNIFICANT CHANGE UP (ref 0–6)
GAS PNL BLDA: SIGNIFICANT CHANGE UP
GLUCOSE BLDC GLUCOMTR-MCNC: 116 MG/DL — HIGH (ref 70–99)
GLUCOSE BLDC GLUCOMTR-MCNC: 121 MG/DL — HIGH (ref 70–99)
GLUCOSE BLDC GLUCOMTR-MCNC: 122 MG/DL — HIGH (ref 70–99)
GLUCOSE BLDC GLUCOMTR-MCNC: 129 MG/DL — HIGH (ref 70–99)
GLUCOSE BLDC GLUCOMTR-MCNC: 164 MG/DL — HIGH (ref 70–99)
GLUCOSE BLDC GLUCOMTR-MCNC: 177 MG/DL — HIGH (ref 70–99)
GLUCOSE BLDC GLUCOMTR-MCNC: 180 MG/DL — HIGH (ref 70–99)
GLUCOSE BLDC GLUCOMTR-MCNC: 187 MG/DL — HIGH (ref 70–99)
GLUCOSE BLDC GLUCOMTR-MCNC: 93 MG/DL — SIGNIFICANT CHANGE UP (ref 70–99)
GLUCOSE BLDC GLUCOMTR-MCNC: 96 MG/DL — SIGNIFICANT CHANGE UP (ref 70–99)
GLUCOSE BLDC GLUCOMTR-MCNC: 97 MG/DL — SIGNIFICANT CHANGE UP (ref 70–99)
GLUCOSE SERPL-MCNC: 131 MG/DL — HIGH (ref 70–99)
GLUCOSE SERPL-MCNC: 179 MG/DL — HIGH (ref 70–99)
GLUCOSE SERPL-MCNC: 98 MG/DL — SIGNIFICANT CHANGE UP (ref 70–99)
HCT VFR BLD CALC: 29.6 % — LOW (ref 39–50)
HCT VFR BLD CALC: 34.1 % — LOW (ref 39–50)
HCT VFR BLD CALC: 34.1 % — LOW (ref 39–50)
HGB BLD-MCNC: 10 G/DL — LOW (ref 13–17)
HGB BLD-MCNC: 11.4 G/DL — LOW (ref 13–17)
HGB BLD-MCNC: 11.5 G/DL — LOW (ref 13–17)
IMM GRANULOCYTES NFR BLD AUTO: 0.5 % — SIGNIFICANT CHANGE UP (ref 0–1.5)
INR BLD: 1.15 — SIGNIFICANT CHANGE UP (ref 0.88–1.16)
INR BLD: 1.23 — HIGH (ref 0.88–1.16)
LACTATE SERPL-SCNC: 1 MMOL/L — SIGNIFICANT CHANGE UP (ref 0.5–2)
LACTATE SERPL-SCNC: 1.6 MMOL/L — SIGNIFICANT CHANGE UP (ref 0.5–2)
LYMPHOCYTES # BLD AUTO: 1.04 K/UL — SIGNIFICANT CHANGE UP (ref 1–3.3)
LYMPHOCYTES # BLD AUTO: 9.7 % — LOW (ref 13–44)
MAGNESIUM SERPL-MCNC: 2 MG/DL — SIGNIFICANT CHANGE UP (ref 1.6–2.6)
MCHC RBC-ENTMCNC: 29.9 PG — SIGNIFICANT CHANGE UP (ref 27–34)
MCHC RBC-ENTMCNC: 30.1 PG — SIGNIFICANT CHANGE UP (ref 27–34)
MCHC RBC-ENTMCNC: 30.2 PG — SIGNIFICANT CHANGE UP (ref 27–34)
MCHC RBC-ENTMCNC: 33.4 GM/DL — SIGNIFICANT CHANGE UP (ref 32–36)
MCHC RBC-ENTMCNC: 33.7 GM/DL — SIGNIFICANT CHANGE UP (ref 32–36)
MCHC RBC-ENTMCNC: 33.8 GM/DL — SIGNIFICANT CHANGE UP (ref 32–36)
MCV RBC AUTO: 88.6 FL — SIGNIFICANT CHANGE UP (ref 80–100)
MCV RBC AUTO: 89.5 FL — SIGNIFICANT CHANGE UP (ref 80–100)
MCV RBC AUTO: 90 FL — SIGNIFICANT CHANGE UP (ref 80–100)
MONOCYTES # BLD AUTO: 1.07 K/UL — HIGH (ref 0–0.9)
MONOCYTES NFR BLD AUTO: 10 % — SIGNIFICANT CHANGE UP (ref 2–14)
NEUTROPHILS # BLD AUTO: 8.52 K/UL — HIGH (ref 1.8–7.4)
NEUTROPHILS NFR BLD AUTO: 79.6 % — HIGH (ref 43–77)
NRBC # BLD: 0 /100 WBCS — SIGNIFICANT CHANGE UP (ref 0–0)
PHOSPHATE SERPL-MCNC: 2.8 MG/DL — SIGNIFICANT CHANGE UP (ref 2.5–4.5)
PLATELET # BLD AUTO: 167 K/UL — SIGNIFICANT CHANGE UP (ref 150–400)
PLATELET # BLD AUTO: 190 K/UL — SIGNIFICANT CHANGE UP (ref 150–400)
PLATELET # BLD AUTO: 191 K/UL — SIGNIFICANT CHANGE UP (ref 150–400)
POTASSIUM SERPL-MCNC: 4.4 MMOL/L — SIGNIFICANT CHANGE UP (ref 3.5–5.3)
POTASSIUM SERPL-MCNC: 4.4 MMOL/L — SIGNIFICANT CHANGE UP (ref 3.5–5.3)
POTASSIUM SERPL-MCNC: 4.6 MMOL/L — SIGNIFICANT CHANGE UP (ref 3.5–5.3)
POTASSIUM SERPL-SCNC: 4.4 MMOL/L — SIGNIFICANT CHANGE UP (ref 3.5–5.3)
POTASSIUM SERPL-SCNC: 4.4 MMOL/L — SIGNIFICANT CHANGE UP (ref 3.5–5.3)
POTASSIUM SERPL-SCNC: 4.6 MMOL/L — SIGNIFICANT CHANGE UP (ref 3.5–5.3)
PROT SERPL-MCNC: 6.2 G/DL — SIGNIFICANT CHANGE UP (ref 6–8.3)
PROTHROM AB SERPL-ACNC: 13 SEC — HIGH (ref 10–12.9)
PROTHROM AB SERPL-ACNC: 14 SEC — HIGH (ref 10–12.9)
RBC # BLD: 3.34 M/UL — LOW (ref 4.2–5.8)
RBC # BLD: 3.79 M/UL — LOW (ref 4.2–5.8)
RBC # BLD: 3.81 M/UL — LOW (ref 4.2–5.8)
RBC # FLD: 12 % — SIGNIFICANT CHANGE UP (ref 10.3–14.5)
RBC # FLD: 12.1 % — SIGNIFICANT CHANGE UP (ref 10.3–14.5)
RBC # FLD: 12.3 % — SIGNIFICANT CHANGE UP (ref 10.3–14.5)
SODIUM SERPL-SCNC: 137 MMOL/L — SIGNIFICANT CHANGE UP (ref 135–145)
SODIUM SERPL-SCNC: 137 MMOL/L — SIGNIFICANT CHANGE UP (ref 135–145)
SODIUM SERPL-SCNC: 139 MMOL/L — SIGNIFICANT CHANGE UP (ref 135–145)
WBC # BLD: 10.7 K/UL — HIGH (ref 3.8–10.5)
WBC # BLD: 11.99 K/UL — HIGH (ref 3.8–10.5)
WBC # BLD: 9.52 K/UL — SIGNIFICANT CHANGE UP (ref 3.8–10.5)
WBC # FLD AUTO: 10.7 K/UL — HIGH (ref 3.8–10.5)
WBC # FLD AUTO: 11.99 K/UL — HIGH (ref 3.8–10.5)
WBC # FLD AUTO: 9.52 K/UL — SIGNIFICANT CHANGE UP (ref 3.8–10.5)

## 2019-08-01 PROCEDURE — 71045 X-RAY EXAM CHEST 1 VIEW: CPT | Mod: 26,76

## 2019-08-01 PROCEDURE — 99291 CRITICAL CARE FIRST HOUR: CPT

## 2019-08-01 PROCEDURE — 99222 1ST HOSP IP/OBS MODERATE 55: CPT | Mod: GC

## 2019-08-01 RX ORDER — LIDOCAINE 4 G/100G
1 CREAM TOPICAL DAILY
Refills: 0 | Status: DISCONTINUED | OUTPATIENT
Start: 2019-08-01 | End: 2019-08-07

## 2019-08-01 RX ORDER — OXYCODONE AND ACETAMINOPHEN 5; 325 MG/1; MG/1
1 TABLET ORAL EVERY 6 HOURS
Refills: 0 | Status: DISCONTINUED | OUTPATIENT
Start: 2019-08-01 | End: 2019-08-07

## 2019-08-01 RX ORDER — METOPROLOL TARTRATE 50 MG
12.5 TABLET ORAL ONCE
Refills: 0 | Status: DISCONTINUED | OUTPATIENT
Start: 2019-08-01 | End: 2019-08-01

## 2019-08-01 RX ORDER — BRIMONIDINE TARTRATE 2 MG/MG
1 SOLUTION/ DROPS OPHTHALMIC
Refills: 0 | Status: DISCONTINUED | OUTPATIENT
Start: 2019-08-01 | End: 2019-08-07

## 2019-08-01 RX ORDER — ASPIRIN/CALCIUM CARB/MAGNESIUM 324 MG
81 TABLET ORAL DAILY
Refills: 0 | Status: DISCONTINUED | OUTPATIENT
Start: 2019-08-01 | End: 2019-08-07

## 2019-08-01 RX ORDER — INSULIN LISPRO 100/ML
4 VIAL (ML) SUBCUTANEOUS
Refills: 0 | Status: DISCONTINUED | OUTPATIENT
Start: 2019-08-01 | End: 2019-08-01

## 2019-08-01 RX ORDER — ACETAMINOPHEN 500 MG
650 TABLET ORAL EVERY 6 HOURS
Refills: 0 | Status: DISCONTINUED | OUTPATIENT
Start: 2019-08-01 | End: 2019-08-02

## 2019-08-01 RX ORDER — LISINOPRIL 2.5 MG/1
5 TABLET ORAL DAILY
Refills: 0 | Status: DISCONTINUED | OUTPATIENT
Start: 2019-08-01 | End: 2019-08-01

## 2019-08-01 RX ORDER — SENNA PLUS 8.6 MG/1
2 TABLET ORAL AT BEDTIME
Refills: 0 | Status: DISCONTINUED | OUTPATIENT
Start: 2019-08-01 | End: 2019-08-07

## 2019-08-01 RX ORDER — METOPROLOL TARTRATE 50 MG
12.5 TABLET ORAL EVERY 6 HOURS
Refills: 0 | Status: DISCONTINUED | OUTPATIENT
Start: 2019-08-01 | End: 2019-08-01

## 2019-08-01 RX ORDER — HYDRALAZINE HCL 50 MG
5 TABLET ORAL ONCE
Refills: 0 | Status: COMPLETED | OUTPATIENT
Start: 2019-08-01 | End: 2019-08-01

## 2019-08-01 RX ORDER — ACETAMINOPHEN 500 MG
1000 TABLET ORAL ONCE
Refills: 0 | Status: COMPLETED | OUTPATIENT
Start: 2019-08-01 | End: 2019-08-01

## 2019-08-01 RX ORDER — HYDRALAZINE HCL 50 MG
10 TABLET ORAL ONCE
Refills: 0 | Status: COMPLETED | OUTPATIENT
Start: 2019-08-01 | End: 2019-08-01

## 2019-08-01 RX ORDER — INSULIN GLARGINE 100 [IU]/ML
12 INJECTION, SOLUTION SUBCUTANEOUS AT BEDTIME
Refills: 0 | Status: DISCONTINUED | OUTPATIENT
Start: 2019-08-01 | End: 2019-08-05

## 2019-08-01 RX ORDER — SODIUM CHLORIDE 9 MG/ML
3 INJECTION INTRAMUSCULAR; INTRAVENOUS; SUBCUTANEOUS EVERY 8 HOURS
Refills: 0 | Status: DISCONTINUED | OUTPATIENT
Start: 2019-08-01 | End: 2019-08-07

## 2019-08-01 RX ORDER — METOPROLOL TARTRATE 50 MG
25 TABLET ORAL EVERY 6 HOURS
Refills: 0 | Status: DISCONTINUED | OUTPATIENT
Start: 2019-08-01 | End: 2019-08-06

## 2019-08-01 RX ORDER — DOCUSATE SODIUM 100 MG
100 CAPSULE ORAL THREE TIMES A DAY
Refills: 0 | Status: DISCONTINUED | OUTPATIENT
Start: 2019-08-01 | End: 2019-08-07

## 2019-08-01 RX ORDER — INSULIN LISPRO 100/ML
VIAL (ML) SUBCUTANEOUS
Refills: 0 | Status: DISCONTINUED | OUTPATIENT
Start: 2019-08-01 | End: 2019-08-07

## 2019-08-01 RX ORDER — LEVALBUTEROL 1.25 MG/.5ML
0.63 SOLUTION, CONCENTRATE RESPIRATORY (INHALATION) EVERY 6 HOURS
Refills: 0 | Status: DISCONTINUED | OUTPATIENT
Start: 2019-08-01 | End: 2019-08-07

## 2019-08-01 RX ORDER — HUMAN INSULIN 100 [IU]/ML
5 INJECTION, SUSPENSION SUBCUTANEOUS ONCE
Refills: 0 | Status: COMPLETED | OUTPATIENT
Start: 2019-08-01 | End: 2019-08-01

## 2019-08-01 RX ORDER — DEXTROSE 50 % IN WATER 50 %
15 SYRINGE (ML) INTRAVENOUS ONCE
Refills: 0 | Status: DISCONTINUED | OUTPATIENT
Start: 2019-08-01 | End: 2019-08-07

## 2019-08-01 RX ORDER — SODIUM CHLORIDE 9 MG/ML
1000 INJECTION, SOLUTION INTRAVENOUS
Refills: 0 | Status: DISCONTINUED | OUTPATIENT
Start: 2019-08-01 | End: 2019-08-07

## 2019-08-01 RX ORDER — INSULIN LISPRO 100/ML
3 VIAL (ML) SUBCUTANEOUS
Refills: 0 | Status: DISCONTINUED | OUTPATIENT
Start: 2019-08-01 | End: 2019-08-05

## 2019-08-01 RX ORDER — PANTOPRAZOLE SODIUM 20 MG/1
40 TABLET, DELAYED RELEASE ORAL
Refills: 0 | Status: DISCONTINUED | OUTPATIENT
Start: 2019-08-01 | End: 2019-08-07

## 2019-08-01 RX ORDER — GLUCAGON INJECTION, SOLUTION 0.5 MG/.1ML
1 INJECTION, SOLUTION SUBCUTANEOUS ONCE
Refills: 0 | Status: DISCONTINUED | OUTPATIENT
Start: 2019-08-01 | End: 2019-08-07

## 2019-08-01 RX ORDER — POLYETHYLENE GLYCOL 3350 17 G/17G
17 POWDER, FOR SOLUTION ORAL DAILY
Refills: 0 | Status: DISCONTINUED | OUTPATIENT
Start: 2019-08-01 | End: 2019-08-07

## 2019-08-01 RX ORDER — DONEPEZIL HYDROCHLORIDE 10 MG/1
5 TABLET, FILM COATED ORAL AT BEDTIME
Refills: 0 | Status: DISCONTINUED | OUTPATIENT
Start: 2019-08-01 | End: 2019-08-07

## 2019-08-01 RX ORDER — CLOPIDOGREL BISULFATE 75 MG/1
75 TABLET, FILM COATED ORAL DAILY
Refills: 0 | Status: DISCONTINUED | OUTPATIENT
Start: 2019-07-31 | End: 2019-08-01

## 2019-08-01 RX ADMIN — Medication 100 MILLIGRAM(S): at 21:51

## 2019-08-01 RX ADMIN — HEPARIN SODIUM 5000 UNIT(S): 5000 INJECTION INTRAVENOUS; SUBCUTANEOUS at 16:20

## 2019-08-01 RX ADMIN — ATORVASTATIN CALCIUM 40 MILLIGRAM(S): 80 TABLET, FILM COATED ORAL at 21:51

## 2019-08-01 RX ADMIN — SODIUM CHLORIDE 3 MILLILITER(S): 9 INJECTION INTRAMUSCULAR; INTRAVENOUS; SUBCUTANEOUS at 21:50

## 2019-08-01 RX ADMIN — Medication 81 MILLIGRAM(S): at 06:38

## 2019-08-01 RX ADMIN — Medication 2000 MILLIGRAM(S): at 13:38

## 2019-08-01 RX ADMIN — CLOPIDOGREL BISULFATE 75 MILLIGRAM(S): 75 TABLET, FILM COATED ORAL at 06:38

## 2019-08-01 RX ADMIN — Medication 5 MILLIGRAM(S): at 07:00

## 2019-08-01 RX ADMIN — Medication 2000 MILLIGRAM(S): at 03:00

## 2019-08-01 RX ADMIN — SENNA PLUS 2 TABLET(S): 8.6 TABLET ORAL at 21:52

## 2019-08-01 RX ADMIN — HEPARIN SODIUM 5000 UNIT(S): 5000 INJECTION INTRAVENOUS; SUBCUTANEOUS at 06:45

## 2019-08-01 RX ADMIN — Medication 2: at 15:03

## 2019-08-01 RX ADMIN — Medication 25 MILLIGRAM(S): at 13:38

## 2019-08-01 RX ADMIN — DONEPEZIL HYDROCHLORIDE 5 MILLIGRAM(S): 10 TABLET, FILM COATED ORAL at 21:52

## 2019-08-01 RX ADMIN — Medication 2: at 17:33

## 2019-08-01 RX ADMIN — Medication 2000 MILLIGRAM(S): at 19:46

## 2019-08-01 RX ADMIN — ATORVASTATIN CALCIUM 40 MILLIGRAM(S): 80 TABLET, FILM COATED ORAL at 06:37

## 2019-08-01 RX ADMIN — BRIMONIDINE TARTRATE 1 DROP(S): 2 SOLUTION/ DROPS OPHTHALMIC at 21:52

## 2019-08-01 RX ADMIN — Medication 2: at 22:31

## 2019-08-01 RX ADMIN — Medication 1.5 MICROGRAM(S)/MIN: at 01:59

## 2019-08-01 RX ADMIN — Medication 1000 MILLIGRAM(S): at 07:00

## 2019-08-01 RX ADMIN — Medication 400 MILLIGRAM(S): at 17:46

## 2019-08-01 RX ADMIN — INSULIN GLARGINE 12 UNIT(S): 100 INJECTION, SOLUTION SUBCUTANEOUS at 22:31

## 2019-08-01 RX ADMIN — Medication 1000 MILLIGRAM(S): at 19:36

## 2019-08-01 RX ADMIN — CHLORHEXIDINE GLUCONATE 15 MILLILITER(S): 213 SOLUTION TOPICAL at 06:45

## 2019-08-01 RX ADMIN — SODIUM CHLORIDE 3 MILLILITER(S): 9 INJECTION INTRAMUSCULAR; INTRAVENOUS; SUBCUTANEOUS at 16:03

## 2019-08-01 RX ADMIN — Medication 10 MILLIGRAM(S): at 04:30

## 2019-08-01 RX ADMIN — HUMAN INSULIN 5 UNIT(S): 100 INJECTION, SUSPENSION SUBCUTANEOUS at 09:09

## 2019-08-01 RX ADMIN — CHLORHEXIDINE GLUCONATE 1 APPLICATION(S): 213 SOLUTION TOPICAL at 13:54

## 2019-08-01 RX ADMIN — HEPARIN SODIUM 5000 UNIT(S): 5000 INJECTION INTRAVENOUS; SUBCUTANEOUS at 21:52

## 2019-08-01 RX ADMIN — Medication 100 MILLIGRAM(S): at 16:20

## 2019-08-01 RX ADMIN — Medication 12.5 MILLIGRAM(S): at 06:37

## 2019-08-01 RX ADMIN — Medication 400 MILLIGRAM(S): at 06:44

## 2019-08-01 RX ADMIN — Medication 25 MILLIGRAM(S): at 19:46

## 2019-08-01 NOTE — PROGRESS NOTE ADULT - ASSESSMENT
78 y/o male with a PMHx of HTN, HLD, DM, inferior wall MI, CKD-stage 3, right CVA(residual left arm weakness), s/p loop recorder placement in 2015 and class II angina presented to Dr. Anjel Bhatti, Cardiothoracic Surgery, for surgical evaluation after he originally presented to Dr. Faulkner for routine cardiology care and upon workup, on 6/3/19 had abnormal stress test and underwent Echo revealing EF 55%, no significant valvular disease. Patient presented to [facility] for Cardiac Cath on 7/18/19 which revealed 2 vessel CAD. Per Dr. Bhatti, patient deemed a good surgical candidate and presented to [facility] for elective surgery. On 7/31/19 patient underwent Minimally Invasive Coronary Artery Bypass Grafting (MIDCAB), EF normal, and patient transferred to CTICU in stable condition. On POD0 patient extubated. POD1 requiring nitro gtt which was later weaned, and patient started on a BB. Today is POD1, patient deemed stable for transfer to tele stepdown unit.     A/P:  Neurovascular: No delirium. Pain well controlled with current regimen.  -Tylenol PRN for mild pain  - Percocet PRN for mod-severe pain  - PMHx of CVA with residual left arm weakness.  - Continue home med: donepezil 5mg PO qhs    Cardiovascular: POD1 s/p MIDCAB, EF nml  - CAD s/p MIDCAB: Continue Aspirin 81mg PO qd, Plavix 75mg PO qd, atorvastatin 40mg PO qhs, and metoprolol 25mg PO q6hrs.     - Continue to titrate up BB as tolerated.     - Holding ACEi 2/2 to CKD.   - Continue to monitor HR/BP/Tele.     Respiratory: 02 Sat = 98% on RA.  -If on oxygen wean to RA from for O2 Sat > 93%.  -Encourage C+DB and Use of IS 10x / hr while awake.  -CXR this AM - no ptx s/p chest tube removal, no pleural effusions. Repeat CXR in AM.     GI: Stable.  -PPX with pantoprazole.   -PO Diet.    Renal / : PMHx of CKD   - BUN/Cr: 19/1.64. Continue to monitor on AM labs.      - Cr on admission was 1.74.  -Monitor renal function.  -Monitor I/O's.    Endocrine: PMHx of DM. Endocrine consulted, appreciate recs.  - Continue Lantus 12U qhs and Lispro 3U TID with meals, and ISS. Blood glucose goal <150.    -A1c: 8.2  -TSH: 1.337    Hematologic: H&H: 11.4/34.1  -CBC in AM    ID: afebrile  -WBC: 11.99 likely postop leukocytosis, repeat CBC in AM.   -Observe for SIRS/Sepsis Syndrome.    Prophylaxis:  -DVT prophylaxis with 5000 SubQ Heparin q8h.  -SCD's    Disposition:  - Home when medically stable. 78 y/o male with a PMHx of HTN, HLD, DM, inferior wall MI, CKD-stage 3, right CVA(residual left arm weakness), s/p loop recorder placement in 2015 and class II angina presented to Dr. Anjel Bhatti, Cardiothoracic Surgery, for surgical evaluation after he originally presented to Dr. Faulkner for routine cardiology care and upon workup, on 6/3/19 had abnormal stress test and underwent Echo revealing EF 55%, no significant valvular disease. Patient presented to [facility] for Cardiac Cath on 7/18/19 which revealed 2 vessel CAD. Per Dr. Bhatti, patient deemed a good surgical candidate and presented to [facility] for elective surgery. On 7/31/19 patient underwent Minimally Invasive Coronary Artery Bypass Grafting (MIDCAB), EF normal, and patient transferred to CTICU in stable condition. On POD0 patient extubated. POD1 requiring nitro gtt which was later weaned, and patient started on a BB. Today is POD1, patient deemed stable for transfer to tele stepdown unit.     A/P:  Neurovascular: No delirium. Pain well controlled with current regimen.  -Tylenol PRN for mild pain  - Percocet PRN for mod-severe pain  - PMHx of CVA with residual left arm weakness.  - Continue home med: donepezil 5mg PO qhs    Cardiovascular: POD1 s/p MIDCAB, EF nml  - CAD s/p MIDCAB: Continue Aspirin 81mg PO qd, Plavix 75mg PO qd, atorvastatin 40mg PO qhs, and metoprolol 25mg PO q6hrs.     - Continue to titrate up BB as tolerated.     - Secondary to CVA and CKD, ok with permissive HTN SBP 150s.    - Holding ACEi 2/2 to CKD.   - Continue to monitor HR/BP/Tele.     Respiratory: 02 Sat = 98% on RA.  -If on oxygen wean to RA from for O2 Sat > 93%.  -Encourage C+DB and Use of IS 10x / hr while awake.  -CXR this AM - no ptx s/p chest tube removal, no pleural effusions. Repeat CXR in AM.     GI: Stable.  -PPX with pantoprazole.   -PO Diet.    Renal / : PMHx of CKD   - BUN/Cr: 19/1.64. Continue to monitor on AM labs.      - Cr on admission was 1.74.  -Monitor renal function.  -Monitor I/O's.    Endocrine: PMHx of DM. Endocrine consulted, appreciate recs.  - Continue Lantus 12U qhs and Lispro 3U TID with meals, and ISS. Blood glucose goal <150.    -A1c: 8.2  -TSH: 1.337    Hematologic: H&H: 11.4/34.1  -CBC in AM    ID: afebrile  -WBC: 11.99 likely postop leukocytosis, repeat CBC in AM.   -Observe for SIRS/Sepsis Syndrome.    Prophylaxis:  -DVT prophylaxis with 5000 SubQ Heparin q8h.  -SCD's    Disposition:  - Home when medically stable.

## 2019-08-01 NOTE — PROGRESS NOTE ADULT - SUBJECTIVE AND OBJECTIVE BOX
ACCEPTANCE note from CTICU to [facility]    Operation / Date: 7/31/19 robotic MIDCAB, EF nml    SUBJECTIVE ASSESSMENT:  79y Male seen and examined bedside. Patient complains of pain at the left mini thoracotomy site. He is asking for pain medication. He also complains that it is difficult to take a deep breath and therefore refuses to use IS at this time. HOwever, patient agrees to use IS after he receives pain medication. He states he is passing flatus but no BM since surgery. Denies chest pain, SOB, palpitations, hemopytsis, N/V/D, abdominal pain, dizziness, fever or chills. Patient is ambulating and tolerating PO diet.         Vital Signs Last 24 Hrs  T(C): 37.1 (01 Aug 2019 14:00), Max: 37.1 (01 Aug 2019 14:00)  T(F): 98.7 (01 Aug 2019 14:00), Max: 98.7 (01 Aug 2019 14:00)  HR: 88 (01 Aug 2019 12:00) (54 - 94)  BP: 154/68 (01 Aug 2019 12:00) (116/64 - 156/73)  BP(mean): 103 (01 Aug 2019 12:00) (75 - 109)  RR: 19 (01 Aug 2019 09:00) (11 - 20)  SpO2: 93% (01 Aug 2019 12:00) (93% - 100%)  I&O's Detail    31 Jul 2019 07:01  -  01 Aug 2019 07:00  --------------------------------------------------------  IN:    Albumin 5%  - 250 mL: 500 mL    insulin regular Infusion: 7.5 mL    insulin regular Infusion: 18 mL    IV PiggyBack: 350 mL    nitroglycerin  Infusion: 300 mL    sodium chloride 0.9%: 160 mL  Total IN: 1335.5 mL    OUT:    Chest Tube: 160 mL    Drain: 175 mL    Indwelling Catheter - Urethral: 1555 mL  Total OUT: 1890 mL    Total NET: -554.5 mL      01 Aug 2019 07:01  -  01 Aug 2019 17:14  --------------------------------------------------------  IN:    insulin regular Infusion: 1.5 mL    nitroglycerin  Infusion: 15 mL    sodium chloride 0.9%: 10 mL  Total IN: 26.5 mL    OUT:    Chest Tube: 30 mL    Drain: 15 mL    Indwelling Catheter - Urethral: 470 mL  Total OUT: 515 mL    Total NET: -488.5 mL          CHEST TUBE:  No.   JASIEL DRAIN:  Yesx1  EPICARDIAL WIRES: No.  TIE DOWNS: Yes.  SINGH: Yes.    PHYSICAL EXAM:    General: Patient lying comfortably in bed, no acute distress     Neurological: Alert and oriented. No focal neurological deficits     Cardiovascular: S1S2, RRR, no murmurs appreciated on exam     Respiratory: Left sided wheeze heard on expiration, right lung clear, no crackles or rales.     Gastrointestinal: Abdomen soft, non tender, non distended     Extremities: Warm and well perfused. No edema or calf tenderness     Vascular: Peripheral pulses 2+ b/l.    Incision Sites: L thoracotomy site c/d/i, 1 jasiel remains in place to bulb suction. Chest tube removed, tiedown in place covered with clean dry occlusive dressing.     LABS:                        11.4   11.99 )-----------( 191      ( 01 Aug 2019 16:40 )             34.1       COUMADIN:  No.     PT/INR - ( 01 Aug 2019 09:19 )   PT: 14.0 sec;   INR: 1.23          PTT - ( 01 Aug 2019 09:19 )  PTT:28.9 sec    08-01    137  |  103  |  19  ----------------------------<  179<H>  4.4   |  25  |  1.64<H>    Ca    9.2      01 Aug 2019 16:40  Phos  2.8     08-01  Mg     2.0     08-01    TPro  6.2  /  Alb  3.9  /  TBili  0.4  /  DBili  x   /  AST  21  /  ALT  24  /  AlkPhos  54  08-01          MEDICATIONS  (STANDING):  acetaminophen  IVPB .. 1000 milliGRAM(s) IV Intermittent once  aspirin  chewable 81 milliGRAM(s) Oral daily  atorvastatin 40 milliGRAM(s) Oral at bedtime  brimonidine 0.2% Ophthalmic Solution 1 Drop(s) Left EYE two times a day  ceFAZolin  Injectable. 2000 milliGRAM(s) IV Push every 8 hours  chlorhexidine 2% Cloths 1 Application(s) Topical daily  clopidogrel Tablet 75 milliGRAM(s) Oral daily  dextrose 5%. 1000 milliLiter(s) (50 mL/Hr) IV Continuous <Continuous>  dextrose 50% Injectable 50 milliLiter(s) IV Push every 15 minutes  docusate sodium 100 milliGRAM(s) Oral three times a day  donepezil 5 milliGRAM(s) Oral at bedtime  heparin  Injectable 5000 Unit(s) SubCutaneous every 8 hours  insulin glargine Injectable (LANTUS) 12 Unit(s) SubCutaneous at bedtime  insulin lispro (HumaLOG) corrective regimen sliding scale   SubCutaneous Before meals and at bedtime  insulin lispro Injectable (HumaLOG) 3 Unit(s) SubCutaneous four times a day before meals  lidocaine   Patch 1 Patch Transdermal daily  metoprolol tartrate 25 milliGRAM(s) Oral every 6 hours  pantoprazole    Tablet 40 milliGRAM(s) Oral before breakfast  senna 2 Tablet(s) Oral at bedtime  sodium chloride 0.9% lock flush 3 milliLiter(s) IV Push every 8 hours    MEDICATIONS  (PRN):  acetaminophen   Tablet .. 650 milliGRAM(s) Oral every 6 hours PRN Mild Pain (1 - 3)  dextrose 40% Gel 15 Gram(s) Oral once PRN Blood Glucose LESS THAN 70 milliGRAM(s)/deciliter  glucagon  Injectable 1 milliGRAM(s) IntraMuscular once PRN Glucose LESS THAN 70 milligrams/deciliter  levalbuterol Inhalation 0.63 milliGRAM(s) Inhalation every 6 hours PRN SOB and/or wheezing  oxyCODONE    5 mG/acetaminophen 325 mG 1 Tablet(s) Oral every 6 hours PRN Severe Pain (7 - 10)  polyethylene glycol 3350 17 Gram(s) Oral daily PRN Constipation        RADIOLOGY & ADDITIONAL TESTS:  < from: Xray Chest 1 View-PORTABLE IMMEDIATE (08.01.19 @ 10:25) >    EXAM:  XR CHEST PORTABLE IMMED 1V                          PROCEDURE DATE:  08/01/2019          INTERPRETATION:  Portable chest    History: Chest tube removed follow-up exam    Impression:    Left chest tube has been removed since prior exam earlier same day. No   pneumothorax. No other change. No acute lung infiltrate or significant   pleural effusion.    < end of copied text >

## 2019-08-01 NOTE — CONSULT NOTE ADULT - SUBJECTIVE AND OBJECTIVE BOX
HPI: 79yMale    Age at Dx:  How dx:  Hx and duration of insulin:  Current Therapy:  Hx of hypoglycemia  Hx of DKA/HHS?    Home FSG:  Fasting  Lunch  Dinner  Bed    Hx of other regimens  Complications:  Outpatient Endo:    PMH & Surgical Hx:I25.10  Family history of uterine cancer (Sibling)  Family history of heart disease (Mother)  No pertinent family history in first degree relatives  Handoff  MEWS Score  HLD (hyperlipidemia)  HTN (hypertension)  CKD (chronic kidney disease), stage III  CAD (coronary artery disease)  Non-ST elevation (NSTEMI) myocardial infarction  CVA (cerebral vascular accident)  Type 2 diabetes mellitus without complication  CAD in native artery  CAD in native artery  CABG, with robot-assisted LIMA procurement  H/O carpal tunnel repair  Status post placement of implantable loop recorder  No significant past surgical history      FH:  DM:  Thyroid:  Autoimmune:  Other:    SH:  Smoking  Etoh:  Recreational Drugs:  Social Life:    Current Meds:  acetaminophen   Tablet .. 650 milliGRAM(s) Oral every 6 hours PRN  aspirin  chewable 81 milliGRAM(s) Oral daily  atorvastatin 40 milliGRAM(s) Oral at bedtime  brimonidine 0.2% Ophthalmic Solution 1 Drop(s) Left EYE two times a day  ceFAZolin  Injectable. 2000 milliGRAM(s) IV Push every 8 hours  chlorhexidine 2% Cloths 1 Application(s) Topical daily  clopidogrel Tablet 75 milliGRAM(s) Oral daily  dextrose 40% Gel 15 Gram(s) Oral once PRN  dextrose 5%. 1000 milliLiter(s) IV Continuous <Continuous>  dextrose 50% Injectable 50 milliLiter(s) IV Push every 15 minutes  docusate sodium 100 milliGRAM(s) Oral three times a day  donepezil 5 milliGRAM(s) Oral at bedtime  glucagon  Injectable 1 milliGRAM(s) IntraMuscular once PRN  heparin  Injectable 5000 Unit(s) SubCutaneous every 8 hours  insulin lispro (HumaLOG) corrective regimen sliding scale   SubCutaneous Before meals and at bedtime  insulin regular Infusion 2 Unit(s)/Hr IV Continuous <Continuous>  metoprolol tartrate 25 milliGRAM(s) Oral every 6 hours  nitroglycerin  Infusion 5 MICROgram(s)/Min IV Continuous <Continuous>  oxyCODONE    5 mG/acetaminophen 325 mG 1 Tablet(s) Oral every 6 hours PRN  pantoprazole    Tablet 40 milliGRAM(s) Oral before breakfast  polyethylene glycol 3350 17 Gram(s) Oral daily PRN  senna 2 Tablet(s) Oral at bedtime  sodium chloride 0.9% lock flush 3 milliLiter(s) IV Push every 8 hours      Allergies:  No Known Allergies      ROS:  Denies the following except as indicated.    General: weight loss/weight gain, decreased appetite, fatigue  Eyes: Blurry vision, double vision, visual changes  ENT: Throat pain, changes in voice,   CV: palpitations, SOB, CP, cough  GI: NVD, difficulty swallowing, abdominal pain  : polyuria, dysuria  Endo: abnormal menses, temperature intolerance, decreased libido  MSK: weakness, joint pain  Skin: rash, dryness, diaphoresis  Heme: Easy bruising,bleeding  Neuro: HA, dizziness, lightheadedness, numbness tingling  Psych: Anxiety, Depression    Vital Signs Last 24 Hrs  T(C): 36.8 (01 Aug 2019 05:00), Max: 36.8 (01 Aug 2019 05:00)  T(F): 98.3 (01 Aug 2019 05:00), Max: 98.3 (01 Aug 2019 05:00)  HR: 86 (01 Aug 2019 09:00) (54 - 94)  BP: 117/75 (01 Aug 2019 09:00) (116/64 - 156/73)  BP(mean): 103 (01 Aug 2019 09:00) (75 - 109)  RR: 19 (01 Aug 2019 09:00) (11 - 20)  SpO2: 94% (01 Aug 2019 09:00) (94% - 100%)  Height (cm): 157.48 (07-31 @ 10:39)  Weight (kg): 68.9 (07-31 @ 10:39)  BMI (kg/m2): 27.8 (07-31 @ 10:39)      Constitutional: wn/wd in NAD.   HEENT: NCAT, MMM, OP clear, EOMI, , no proptosis or lid retraction  Neck: no thyromegaly or palpable thyroid nodules   Respiratory: lungs CTAB.  Cardiovascular: regular rhythm, normal S1 and S2, no audible murmurs, no peripheral edema  GI: soft, NT/ND, no masses/HSM appreciated.  Neurology: no tremors, DTR 2+  Skin: no visible rashes/lesions  Psychiatric: AAO x 3, normal affect/mood.  Ext: radial pulses intact, DP pulses intact, extremities warm, no cyanosis, clubbing or edema.       LABS:                        11.5   10.70 )-----------( 190      ( 01 Aug 2019 09:19 )             34.1     08-01    137  |  102  |  18  ----------------------------<  131<H>  4.6   |  25  |  1.62<H>    Ca    9.2      01 Aug 2019 09:19  Phos  2.8     08-01  Mg     2.0     08-01    TPro  6.2  /  Alb  3.9  /  TBili  0.4  /  DBili  x   /  AST  21  /  ALT  24  /  AlkPhos  54  08-01    PT/INR - ( 01 Aug 2019 09:19 )   PT: 14.0 sec;   INR: 1.23          PTT - ( 01 Aug 2019 09:19 )  PTT:28.9 sec    Hemoglobin A1C, Whole Blood: 8.2 (07-23 @ 15:55)    Thyroid Stimulating Hormone, Serum: 1.337 (07-23 @ 15:54)      RADIOLOGY & ADDITIONAL STUDIES:  CAPILLARY BLOOD GLUCOSE      POCT Blood Glucose.: 122 mg/dL (01 Aug 2019 08:26)  POCT Blood Glucose.: 129 mg/dL (01 Aug 2019 07:16)  POCT Blood Glucose.: 121 mg/dL (01 Aug 2019 06:25)  POCT Blood Glucose.: 97 mg/dL (01 Aug 2019 02:52)  POCT Blood Glucose.: 93 mg/dL (01 Aug 2019 02:13)  POCT Blood Glucose.: 96 mg/dL (01 Aug 2019 01:26)  POCT Blood Glucose.: 116 mg/dL (01 Aug 2019 00:18)  POCT Blood Glucose.: 120 mg/dL (31 Jul 2019 23:22)  POCT Blood Glucose.: 139 mg/dL (31 Jul 2019 22:11)  POCT Blood Glucose.: 161 mg/dL (31 Jul 2019 21:06)  POCT Blood Glucose.: 166 mg/dL (31 Jul 2019 19:35)  POCT Blood Glucose.: 154 mg/dL (31 Jul 2019 18:50)  POCT Blood Glucose.: 165 mg/dL (31 Jul 2019 17:58)  POCT Blood Glucose.: 168 mg/dL (31 Jul 2019 17:16)  POCT Blood Glucose.: 161 mg/dL (31 Jul 2019 16:37)        A/P:79y Male    1.  DM  Please continue lantus       units at night / morning.  Please continue lispro      units before each meal.  Please continue lispro moderate / low dose sliding scale four times daily with meals and at bedtime    Pt's fingerstick glucose goal is     Will continue to monitor     For discharge, pt can continue    Pt can follow up at discharge with [facility] by calling  to make an appointment.   Will discuss case with     and update primary team HPI: 78 yo male with a PMH of HTN, HLD, DM, inferior wall MI, CKD-stage 3, right CVA(residual left arm weakness), s/p loop recorder placement in 2015 and class II angina presents with single vessel CAD. In June 2014 while in [facility] he suffered an inferior wall MI and "small" stroke. Cath at that time revealed CAD that was treated medically. He presented to Dr. Faulkner for routine cardiology care. 6/3/19 s/p abnormal stress test. 6/3/19 Echo: EF 55%, no significant valvular disease. 7/18/19 Cardiac cath at  revealed 2 vessel. He appears generally well, NAD, denies c/o chest pain, SOB, fever, lower extremity edema, syncope, dizziness, PND or palpitations. He reports dyspnea on exertion, that is relieved with rest and prn lower extremity edema. He was seen in in the outpatient office and got admitted for robotic MID CABG. The procedure was done yesterday.     Endocrinology was consulted for his DM management. During this hospital stay, e was started on insulin drip post procedure and was discontinued around midnight. He received his NPH insulin today morning. He was placed on his lispro MDSS and was started on carb consistent diet. At the time of admission, the patient was        Age at Dx:  How dx:  Hx and duration of insulin:  Current Therapy:  Hx of hypoglycemia  Hx of DKA/HHS?    Home FSG:  Fasting  Lunch  Dinner  Bed    Hx of other regimens  Complications:  Outpatient Endo:    PMH & Surgical Hx:I25.10  Family history of uterine cancer (Sibling)  Family history of heart disease (Mother)  No pertinent family history in first degree relatives  Handoff  MEWS Score  HLD (hyperlipidemia)  HTN (hypertension)  CKD (chronic kidney disease), stage III  CAD (coronary artery disease)  Non-ST elevation (NSTEMI) myocardial infarction  CVA (cerebral vascular accident)  Type 2 diabetes mellitus without complication  CAD in native artery  CAD in native artery  CABG, with robot-assisted LIMA procurement  H/O carpal tunnel repair  Status post placement of implantable loop recorder  No significant past surgical history      FH:  DM:  Thyroid:  Autoimmune:  Other:    SH:  Smoking  Etoh:  Recreational Drugs:  Social Life:    Current Meds:  acetaminophen   Tablet .. 650 milliGRAM(s) Oral every 6 hours PRN  aspirin  chewable 81 milliGRAM(s) Oral daily  atorvastatin 40 milliGRAM(s) Oral at bedtime  brimonidine 0.2% Ophthalmic Solution 1 Drop(s) Left EYE two times a day  ceFAZolin  Injectable. 2000 milliGRAM(s) IV Push every 8 hours  chlorhexidine 2% Cloths 1 Application(s) Topical daily  clopidogrel Tablet 75 milliGRAM(s) Oral daily  dextrose 40% Gel 15 Gram(s) Oral once PRN  dextrose 5%. 1000 milliLiter(s) IV Continuous <Continuous>  dextrose 50% Injectable 50 milliLiter(s) IV Push every 15 minutes  docusate sodium 100 milliGRAM(s) Oral three times a day  donepezil 5 milliGRAM(s) Oral at bedtime  glucagon  Injectable 1 milliGRAM(s) IntraMuscular once PRN  heparin  Injectable 5000 Unit(s) SubCutaneous every 8 hours  insulin lispro (HumaLOG) corrective regimen sliding scale   SubCutaneous Before meals and at bedtime  insulin regular Infusion 2 Unit(s)/Hr IV Continuous <Continuous>  metoprolol tartrate 25 milliGRAM(s) Oral every 6 hours  nitroglycerin  Infusion 5 MICROgram(s)/Min IV Continuous <Continuous>  oxyCODONE    5 mG/acetaminophen 325 mG 1 Tablet(s) Oral every 6 hours PRN  pantoprazole    Tablet 40 milliGRAM(s) Oral before breakfast  polyethylene glycol 3350 17 Gram(s) Oral daily PRN  senna 2 Tablet(s) Oral at bedtime  sodium chloride 0.9% lock flush 3 milliLiter(s) IV Push every 8 hours      Allergies:  No Known Allergies      ROS:  Denies the following except as indicated.    General: weight loss/weight gain, decreased appetite, fatigue  Eyes: Blurry vision, double vision, visual changes  ENT: Throat pain, changes in voice,   CV: palpitations, SOB, CP, cough  GI: NVD, difficulty swallowing, abdominal pain  : polyuria, dysuria  Endo: abnormal menses, temperature intolerance, decreased libido  MSK: weakness, joint pain  Skin: rash, dryness, diaphoresis  Heme: Easy bruising,bleeding  Neuro: HA, dizziness, lightheadedness, numbness tingling  Psych: Anxiety, Depression    Vital Signs Last 24 Hrs  T(C): 36.8 (01 Aug 2019 05:00), Max: 36.8 (01 Aug 2019 05:00)  T(F): 98.3 (01 Aug 2019 05:00), Max: 98.3 (01 Aug 2019 05:00)  HR: 86 (01 Aug 2019 09:00) (54 - 94)  BP: 117/75 (01 Aug 2019 09:00) (116/64 - 156/73)  BP(mean): 103 (01 Aug 2019 09:00) (75 - 109)  RR: 19 (01 Aug 2019 09:00) (11 - 20)  SpO2: 94% (01 Aug 2019 09:00) (94% - 100%)  Height (cm): 157.48 (07-31 @ 10:39)  Weight (kg): 68.9 (07-31 @ 10:39)  BMI (kg/m2): 27.8 (07-31 @ 10:39)      Constitutional: wn/wd in NAD.   HEENT: NCAT, MMM, OP clear, EOMI, , no proptosis or lid retraction  Neck: no thyromegaly or palpable thyroid nodules   Respiratory: lungs CTAB.  Cardiovascular: regular rhythm, normal S1 and S2, no audible murmurs, no peripheral edema  GI: soft, NT/ND, no masses/HSM appreciated.  Neurology: no tremors, DTR 2+  Skin: no visible rashes/lesions  Psychiatric: AAO x 3, normal affect/mood.  Ext: radial pulses intact, DP pulses intact, extremities warm, no cyanosis, clubbing or edema.       LABS:                        11.5   10.70 )-----------( 190      ( 01 Aug 2019 09:19 )             34.1     08-01    137  |  102  |  18  ----------------------------<  131<H>  4.6   |  25  |  1.62<H>    Ca    9.2      01 Aug 2019 09:19  Phos  2.8     08-01  Mg     2.0     08-01    TPro  6.2  /  Alb  3.9  /  TBili  0.4  /  DBili  x   /  AST  21  /  ALT  24  /  AlkPhos  54  08-01    PT/INR - ( 01 Aug 2019 09:19 )   PT: 14.0 sec;   INR: 1.23          PTT - ( 01 Aug 2019 09:19 )  PTT:28.9 sec    Hemoglobin A1C, Whole Blood: 8.2 (07-23 @ 15:55)    Thyroid Stimulating Hormone, Serum: 1.337 (07-23 @ 15:54)      RADIOLOGY & ADDITIONAL STUDIES:  CAPILLARY BLOOD GLUCOSE      POCT Blood Glucose.: 122 mg/dL (01 Aug 2019 08:26)  POCT Blood Glucose.: 129 mg/dL (01 Aug 2019 07:16)  POCT Blood Glucose.: 121 mg/dL (01 Aug 2019 06:25)  POCT Blood Glucose.: 97 mg/dL (01 Aug 2019 02:52)  POCT Blood Glucose.: 93 mg/dL (01 Aug 2019 02:13)  POCT Blood Glucose.: 96 mg/dL (01 Aug 2019 01:26)  POCT Blood Glucose.: 116 mg/dL (01 Aug 2019 00:18)  POCT Blood Glucose.: 120 mg/dL (31 Jul 2019 23:22)  POCT Blood Glucose.: 139 mg/dL (31 Jul 2019 22:11)  POCT Blood Glucose.: 161 mg/dL (31 Jul 2019 21:06)  POCT Blood Glucose.: 166 mg/dL (31 Jul 2019 19:35)  POCT Blood Glucose.: 154 mg/dL (31 Jul 2019 18:50)  POCT Blood Glucose.: 165 mg/dL (31 Jul 2019 17:58)  POCT Blood Glucose.: 168 mg/dL (31 Jul 2019 17:16)  POCT Blood Glucose.: 161 mg/dL (31 Jul 2019 16:37)        A/P:79y Male    1.  DM  Please continue lantus       units at night / morning.  Please continue lispro      units before each meal.  Please continue lispro moderate / low dose sliding scale four times daily with meals and at bedtime    Pt's fingerstick glucose goal is     Will continue to monitor     For discharge, pt can continue    Pt can follow up at discharge with [facility] by calling  to make an appointment.   Will discuss case with     and update primary team HPI: 78 yo male with a PMH of HTN, HLD, DM, inferior wall MI, CKD-stage 3, right CVA(residual left arm weakness), s/p loop recorder placement in 2015 and class II angina presents with single vessel CAD. In June 2014 while in [facility] he suffered an inferior wall MI and "small" stroke. Cath at that time revealed CAD that was treated medically. He presented to Dr. Faulkner for routine cardiology care. 6/3/19 s/p abnormal stress test. 6/3/19 Echo: EF 55%, no significant valvular disease. 7/18/19 Cardiac cath at  revealed 2 vessel. He appears generally well, NAD, denies c/o chest pain, SOB, fever, lower extremity edema, syncope, dizziness, PND or palpitations. He reports dyspnea on exertion, that is relieved with rest and prn lower extremity edema. He was seen in in the outpatient office and got admitted for robotic MID CABG. The procedure was done yesterday.     Endocrinology was consulted for his DM management. During this hospital stay, e was started on insulin drip post procedure and was discontinued around midnight. He received his NPH insulin today morning. He was placed on his lispro MDSS and was started on carb consistent diet. At the time of admission, blood glucose was 177, Hco3 22, K 5.5. His Hba1c was 8.2 and TSH 1.337, EF 45%.  Today, the patient was       Age at Dx:  How dx:  Hx and duration of insulin:  Current Therapy:  Hx of hypoglycemia  Hx of DKA/HHS?    Home FSG:  Fasting  Lunch  Dinner  Bed    Hx of other regimens  Complications:  Outpatient Endo:    PMH & Surgical Hx:I25.10  Family history of uterine cancer (Sibling)  Family history of heart disease (Mother)  No pertinent family history in first degree relatives  Handoff  MEWS Score  HLD (hyperlipidemia)  HTN (hypertension)  CKD (chronic kidney disease), stage III  CAD (coronary artery disease)  Non-ST elevation (NSTEMI) myocardial infarction  CVA (cerebral vascular accident)  Type 2 diabetes mellitus without complication  CAD in native artery  CAD in native artery  CABG, with robot-assisted LIMA procurement  H/O carpal tunnel repair  Status post placement of implantable loop recorder  No significant past surgical history      FH:  DM:  Thyroid:  Autoimmune:  Other:    SH:  Smoking  Etoh:  Recreational Drugs:  Social Life:    Current Meds:  acetaminophen   Tablet .. 650 milliGRAM(s) Oral every 6 hours PRN  aspirin  chewable 81 milliGRAM(s) Oral daily  atorvastatin 40 milliGRAM(s) Oral at bedtime  brimonidine 0.2% Ophthalmic Solution 1 Drop(s) Left EYE two times a day  ceFAZolin  Injectable. 2000 milliGRAM(s) IV Push every 8 hours  chlorhexidine 2% Cloths 1 Application(s) Topical daily  clopidogrel Tablet 75 milliGRAM(s) Oral daily  dextrose 40% Gel 15 Gram(s) Oral once PRN  dextrose 5%. 1000 milliLiter(s) IV Continuous <Continuous>  dextrose 50% Injectable 50 milliLiter(s) IV Push every 15 minutes  docusate sodium 100 milliGRAM(s) Oral three times a day  donepezil 5 milliGRAM(s) Oral at bedtime  glucagon  Injectable 1 milliGRAM(s) IntraMuscular once PRN  heparin  Injectable 5000 Unit(s) SubCutaneous every 8 hours  insulin lispro (HumaLOG) corrective regimen sliding scale   SubCutaneous Before meals and at bedtime  insulin regular Infusion 2 Unit(s)/Hr IV Continuous <Continuous>  metoprolol tartrate 25 milliGRAM(s) Oral every 6 hours  nitroglycerin  Infusion 5 MICROgram(s)/Min IV Continuous <Continuous>  oxyCODONE    5 mG/acetaminophen 325 mG 1 Tablet(s) Oral every 6 hours PRN  pantoprazole    Tablet 40 milliGRAM(s) Oral before breakfast  polyethylene glycol 3350 17 Gram(s) Oral daily PRN  senna 2 Tablet(s) Oral at bedtime  sodium chloride 0.9% lock flush 3 milliLiter(s) IV Push every 8 hours      Allergies:  No Known Allergies      ROS:  Denies the following except as indicated.    General: weight loss/weight gain, decreased appetite, fatigue  Eyes: Blurry vision, double vision, visual changes  ENT: Throat pain, changes in voice,   CV: palpitations, SOB, CP, cough  GI: NVD, difficulty swallowing, abdominal pain  : polyuria, dysuria  Endo: abnormal menses, temperature intolerance, decreased libido  MSK: weakness, joint pain  Skin: rash, dryness, diaphoresis  Heme: Easy bruising,bleeding  Neuro: HA, dizziness, lightheadedness, numbness tingling  Psych: Anxiety, Depression    Vital Signs Last 24 Hrs  T(C): 36.8 (01 Aug 2019 05:00), Max: 36.8 (01 Aug 2019 05:00)  T(F): 98.3 (01 Aug 2019 05:00), Max: 98.3 (01 Aug 2019 05:00)  HR: 86 (01 Aug 2019 09:00) (54 - 94)  BP: 117/75 (01 Aug 2019 09:00) (116/64 - 156/73)  BP(mean): 103 (01 Aug 2019 09:00) (75 - 109)  RR: 19 (01 Aug 2019 09:00) (11 - 20)  SpO2: 94% (01 Aug 2019 09:00) (94% - 100%)  Height (cm): 157.48 (07-31 @ 10:39)  Weight (kg): 68.9 (07-31 @ 10:39)  BMI (kg/m2): 27.8 (07-31 @ 10:39)      Constitutional: wn/wd in NAD.   HEENT: NCAT, MMM, OP clear, EOMI, , no proptosis or lid retraction  Neck: no thyromegaly or palpable thyroid nodules   Respiratory: lungs CTAB.  Cardiovascular: regular rhythm, normal S1 and S2, no audible murmurs, no peripheral edema  GI: soft, NT/ND, no masses/HSM appreciated.  Neurology: no tremors, DTR 2+  Skin: no visible rashes/lesions  Psychiatric: AAO x 3, normal affect/mood.  Ext: radial pulses intact, DP pulses intact, extremities warm, no cyanosis, clubbing or edema.       LABS:                        11.5   10.70 )-----------( 190      ( 01 Aug 2019 09:19 )             34.1     08-01    137  |  102  |  18  ----------------------------<  131<H>  4.6   |  25  |  1.62<H>    Ca    9.2      01 Aug 2019 09:19  Phos  2.8     08-01  Mg     2.0     08-01    TPro  6.2  /  Alb  3.9  /  TBili  0.4  /  DBili  x   /  AST  21  /  ALT  24  /  AlkPhos  54  08-01    PT/INR - ( 01 Aug 2019 09:19 )   PT: 14.0 sec;   INR: 1.23          PTT - ( 01 Aug 2019 09:19 )  PTT:28.9 sec    Hemoglobin A1C, Whole Blood: 8.2 (07-23 @ 15:55)    Thyroid Stimulating Hormone, Serum: 1.337 (07-23 @ 15:54)      RADIOLOGY & ADDITIONAL STUDIES:  CAPILLARY BLOOD GLUCOSE      POCT Blood Glucose.: 122 mg/dL (01 Aug 2019 08:26)  POCT Blood Glucose.: 129 mg/dL (01 Aug 2019 07:16)  POCT Blood Glucose.: 121 mg/dL (01 Aug 2019 06:25)  POCT Blood Glucose.: 97 mg/dL (01 Aug 2019 02:52)  POCT Blood Glucose.: 93 mg/dL (01 Aug 2019 02:13)  POCT Blood Glucose.: 96 mg/dL (01 Aug 2019 01:26)  POCT Blood Glucose.: 116 mg/dL (01 Aug 2019 00:18)  POCT Blood Glucose.: 120 mg/dL (31 Jul 2019 23:22)  POCT Blood Glucose.: 139 mg/dL (31 Jul 2019 22:11)  POCT Blood Glucose.: 161 mg/dL (31 Jul 2019 21:06)  POCT Blood Glucose.: 166 mg/dL (31 Jul 2019 19:35)  POCT Blood Glucose.: 154 mg/dL (31 Jul 2019 18:50)  POCT Blood Glucose.: 165 mg/dL (31 Jul 2019 17:58)  POCT Blood Glucose.: 168 mg/dL (31 Jul 2019 17:16)  POCT Blood Glucose.: 161 mg/dL (31 Jul 2019 16:37)        A/P:79y Male    1.  DM  Please continue lantus       units at night / morning.  Please continue lispro      units before each meal.  Please continue lispro moderate / low dose sliding scale four times daily with meals and at bedtime    Pt's fingerstick glucose goal is     Will continue to monitor     For discharge, pt can continue    Pt can follow up at discharge with [facility] by calling  to make an appointment.   Will discuss case with     and update primary team HPI: 78 yo male with a PMH of HTN, HLD, DM, inferior wall MI, CKD-stage 3, right CVA(residual left arm weakness), s/p loop recorder placement in 2015 and class II angina presents with single vessel CAD. In June 2014 while in [facility] he suffered an inferior wall MI and "small" stroke. Cath at that time revealed CAD that was treated medically. He presented to Dr. Faulkner for routine cardiology care. 6/3/19 s/p abnormal stress test. 6/3/19 Echo: EF 55%, no significant valvular disease. 7/18/19 Cardiac cath at  revealed 2 vessel. He appears generally well, NAD, denies c/o chest pain, SOB, fever, lower extremity edema, syncope, dizziness, PND or palpitations. He reports dyspnea on exertion, that is relieved with rest and prn lower extremity edema. He was seen in in the outpatient office and got admitted for robotic MID CABG. The procedure was done yesterday.     Endocrinology was consulted for his DM management. During this hospital stay, e was started on insulin drip post procedure and was discontinued around midnight. He received his NPH insulin today morning. He was placed on his lispro MDSS and was started on carb consistent diet. At the time of admission, blood glucose was 177, Hco3 22, K 5.5. His Hba1c was 8.2 and TSH 1.337, EF 45%.  Today, the patient was c/o pain around the drain site. Otherwise, patient denies any chest pain, SOB, nausea or vomiting. No abdominal pain. He said that he did not have dentures and that is preventing him from eating good.    Endocrine History  Age at Dx: 10 years ago  Hx and duration of insulin: Has never been on insulin. he has been on metformin 1000mg BID  Current Therapy: Metformin 1000mg BID  Hx of hypoglycemia - denies  Hx of DKA/HHS?- none    Home FSG: Patient checks his FSG once in a day  which will be a fasting FSG that will in 80-85. He said that his daughter will help him with his daily activities    Hx of other regimens  Complications: nephropathy, denies retinopathy or neuropathy  Outpatient Endo: None. f/u with PCP    PMH & Surgical Hx:I25.10  Family history of uterine cancer (Sibling)  Family history of heart disease (Mother)  No pertinent family history in first degree relatives  Handoff  MEWS Score  HLD (hyperlipidemia)  HTN (hypertension)  CKD (chronic kidney disease), stage III  CAD (coronary artery disease)  Non-ST elevation (NSTEMI) myocardial infarction  CVA (cerebral vascular accident)  Type 2 diabetes mellitus without complication  CAD in native artery  CAD in native artery  CABG, with robot-assisted LIMA procurement  H/O carpal tunnel repair  Status post placement of implantable loop recorder  No significant past surgical history      FH:  DM: denies  Thyroid: denies  Autoimmune: denies      SH:  Smoking- former smoker. quit years ago.  Etoh- denies  Recreational  Drugs:- denies      Current Meds:  acetaminophen   Tablet .. 650 milliGRAM(s) Oral every 6 hours PRN  aspirin  chewable 81 milliGRAM(s) Oral daily  atorvastatin 40 milliGRAM(s) Oral at bedtime  brimonidine 0.2% Ophthalmic Solution 1 Drop(s) Left EYE two times a day  ceFAZolin  Injectable. 2000 milliGRAM(s) IV Push every 8 hours  chlorhexidine 2% Cloths 1 Application(s) Topical daily  clopidogrel Tablet 75 milliGRAM(s) Oral daily  dextrose 40% Gel 15 Gram(s) Oral once PRN  dextrose 5%. 1000 milliLiter(s) IV Continuous <Continuous>  dextrose 50% Injectable 50 milliLiter(s) IV Push every 15 minutes  docusate sodium 100 milliGRAM(s) Oral three times a day  donepezil 5 milliGRAM(s) Oral at bedtime  glucagon  Injectable 1 milliGRAM(s) IntraMuscular once PRN  heparin  Injectable 5000 Unit(s) SubCutaneous every 8 hours  insulin lispro (HumaLOG) corrective regimen sliding scale   SubCutaneous Before meals and at bedtime  insulin regular Infusion 2 Unit(s)/Hr IV Continuous <Continuous>  metoprolol tartrate 25 milliGRAM(s) Oral every 6 hours  nitroglycerin  Infusion 5 MICROgram(s)/Min IV Continuous <Continuous>  oxyCODONE    5 mG/acetaminophen 325 mG 1 Tablet(s) Oral every 6 hours PRN  pantoprazole    Tablet 40 milliGRAM(s) Oral before breakfast  polyethylene glycol 3350 17 Gram(s) Oral daily PRN  senna 2 Tablet(s) Oral at bedtime  sodium chloride 0.9% lock flush 3 milliLiter(s) IV Push every 8 hours      Allergies:  No Known Allergies      ROS:  Denies the following except as indicated.    General: weight loss/weight gain, decreased appetite, fatigue  Eyes: Blurry vision, double vision, visual changes  ENT: Throat pain, changes in voice,   CV: palpitations, SOB, CP, cough  GI: NVD, difficulty swallowing, abdominal pain  : polyuria, dysuria  Endo: abnormal menses, temperature intolerance, decreased libido  MSK: weakness, joint pain  Skin: rash, dryness, diaphoresis  Heme: Easy bruising,bleeding  Neuro: HA, dizziness, lightheadedness, numbness tingling  Psych: Anxiety, Depression    Vital Signs Last 24 Hrs  T(C): 36.8 (01 Aug 2019 05:00), Max: 36.8 (01 Aug 2019 05:00)  T(F): 98.3 (01 Aug 2019 05:00), Max: 98.3 (01 Aug 2019 05:00)  HR: 86 (01 Aug 2019 09:00) (54 - 94)  BP: 117/75 (01 Aug 2019 09:00) (116/64 - 156/73)  BP(mean): 103 (01 Aug 2019 09:00) (75 - 109)  RR: 19 (01 Aug 2019 09:00) (11 - 20)  SpO2: 94% (01 Aug 2019 09:00) (94% - 100%)  Height (cm): 157.48 (07-31 @ 10:39)  Weight (kg): 68.9 (07-31 @ 10:39)  BMI (kg/m2): 27.8 (07-31 @ 10:39)      Constitutional: wn/wd in NAD.   HEENT: NCAT, MMM, OP clear, EOMI, , no proptosis or lid retraction  Neck: no thyromegaly or palpable thyroid nodules   Respiratory: minimal crackles heard bibasilar  Cardiovascular: regular rhythm, normal S1 and S2, systolic murmur heard in the mitral area.  drain seen - bandged  GI: soft, NT/ND, no masses/HSM appreciated.  Neurology: no tremors, no focal neurological deficits  Psychiatric: AAO x 3, normal affect/mood.  Ext: radial pulses intact, peripheral pulses in lower extremities were feeble, no cyanosis, clubbing or edema.       LABS:                        11.5   10.70 )-----------( 190      ( 01 Aug 2019 09:19 )             34.1     08-01    137  |  102  |  18  ----------------------------<  131<H>  4.6   |  25  |  1.62<H>    Ca    9.2      01 Aug 2019 09:19  Phos  2.8     08-01  Mg     2.0     08-01    TPro  6.2  /  Alb  3.9  /  TBili  0.4  /  DBili  x   /  AST  21  /  ALT  24  /  AlkPhos  54  08-01    PT/INR - ( 01 Aug 2019 09:19 )   PT: 14.0 sec;   INR: 1.23          PTT - ( 01 Aug 2019 09:19 )  PTT:28.9 sec    Hemoglobin A1C, Whole Blood: 8.2 (07-23 @ 15:55)    Thyroid Stimulating Hormone, Serum: 1.337 (07-23 @ 15:54)      RADIOLOGY & ADDITIONAL STUDIES:  CAPILLARY BLOOD GLUCOSE      POCT Blood Glucose.: 122 mg/dL (01 Aug 2019 08:26)  POCT Blood Glucose.: 129 mg/dL (01 Aug 2019 07:16)  POCT Blood Glucose.: 121 mg/dL (01 Aug 2019 06:25)  POCT Blood Glucose.: 97 mg/dL (01 Aug 2019 02:52)  POCT Blood Glucose.: 93 mg/dL (01 Aug 2019 02:13)  POCT Blood Glucose.: 96 mg/dL (01 Aug 2019 01:26)  POCT Blood Glucose.: 116 mg/dL (01 Aug 2019 00:18)  POCT Blood Glucose.: 120 mg/dL (31 Jul 2019 23:22)  POCT Blood Glucose.: 139 mg/dL (31 Jul 2019 22:11)  POCT Blood Glucose.: 161 mg/dL (31 Jul 2019 21:06)  POCT Blood Glucose.: 166 mg/dL (31 Jul 2019 19:35)  POCT Blood Glucose.: 154 mg/dL (31 Jul 2019 18:50)  POCT Blood Glucose.: 165 mg/dL (31 Jul 2019 17:58)  POCT Blood Glucose.: 168 mg/dL (31 Jul 2019 17:16)  POCT Blood Glucose.: 161 mg/dL (31 Jul 2019 16:37)        A/P: 78 yo male with a PMH of HTN, HLD, DM, inferior wall MI, CKD-stage 3, right CVA(residual left arm weakness), s/p loop recorder placement in 2015 currently is s/p robotic mid CABG POD #1. he is currently being managed for diabetes    1.  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   Please continue lispro      units before each meal.    Please continue lispro moderate / low dose sliding scale four times daily with meals and at bedtime    Pt's fingerstick glucose goal is 140-180    Will continue to monitor     For discharge, TBD    Pt can follow up at discharge with [facility] by calling  to make an appointment.   discussed case with    and updated primary team HPI: 80 yo male with a PMH of HTN, HLD, DM, inferior wall MI, CKD-stage 3, right CVA(residual left arm weakness), s/p loop recorder placement in 2015 and class II angina presents with single vessel CAD. In June 2014 while in [facility] he suffered an inferior wall MI and "small" stroke. Cath at that time revealed CAD that was treated medically. He presented to Dr. Faulkner for routine cardiology care. 6/3/19 s/p abnormal stress test. 6/3/19 Echo: EF 55%, no significant valvular disease. 7/18/19 Cardiac cath at  revealed 2 vessel. He appears generally well, NAD, denies c/o chest pain, SOB, fever, lower extremity edema, syncope, dizziness, PND or palpitations. He reports dyspnea on exertion, that is relieved with rest and prn lower extremity edema. He was seen in in the outpatient office and got admitted for robotic MID CABG. The procedure was done yesterday.     Endocrinology was consulted for his DM management. During this hospital stay, e was started on insulin drip post procedure and was discontinued around midnight. He received his NPH insulin today morning. He was placed on his lispro MDSS and was started on carb consistent diet. At the time of admission, blood glucose was 177, Hco3 22, K 5.5. His Hba1c was 8.2 and TSH 1.337, EF 45%.  Today, the patient was c/o pain around the drain site. Otherwise, patient denies any chest pain, SOB, nausea or vomiting. No abdominal pain. He said that he did not have dentures and that is preventing him from eating good.    Endocrine History  Age at Dx: 10 years ago  Hx and duration of insulin: Has never been on insulin. he has been on metformin 1000mg BID  Current Therapy: Metformin 1000mg BID  Hx of hypoglycemia - denies  Hx of DKA/HHS?- none    Home FSG: Patient checks his FSG once in a day  which will be a fasting FSG that will in 80-85. He said that his daughter will help him with his daily activities    Hx of other regimens  Complications: nephropathy, denies retinopathy or neuropathy  Outpatient Endo: None. f/u with PCP    PMH & Surgical Hx:I25.10  Family history of uterine cancer (Sibling)  Family history of heart disease (Mother)  No pertinent family history in first degree relatives  Handoff  MEWS Score  HLD (hyperlipidemia)  HTN (hypertension)  CKD (chronic kidney disease), stage III  CAD (coronary artery disease)  Non-ST elevation (NSTEMI) myocardial infarction  CVA (cerebral vascular accident)  Type 2 diabetes mellitus without complication  CAD in native artery  CAD in native artery  CABG, with robot-assisted LIMA procurement  H/O carpal tunnel repair  Status post placement of implantable loop recorder  No significant past surgical history      FH:  DM: denies  Thyroid: denies  Autoimmune: denies      SH:  Smoking- former smoker. quit years ago.  Etoh- denies  Recreational  Drugs:- denies      Current Meds:  acetaminophen   Tablet .. 650 milliGRAM(s) Oral every 6 hours PRN  aspirin  chewable 81 milliGRAM(s) Oral daily  atorvastatin 40 milliGRAM(s) Oral at bedtime  brimonidine 0.2% Ophthalmic Solution 1 Drop(s) Left EYE two times a day  ceFAZolin  Injectable. 2000 milliGRAM(s) IV Push every 8 hours  chlorhexidine 2% Cloths 1 Application(s) Topical daily  clopidogrel Tablet 75 milliGRAM(s) Oral daily  dextrose 40% Gel 15 Gram(s) Oral once PRN  dextrose 5%. 1000 milliLiter(s) IV Continuous <Continuous>  dextrose 50% Injectable 50 milliLiter(s) IV Push every 15 minutes  docusate sodium 100 milliGRAM(s) Oral three times a day  donepezil 5 milliGRAM(s) Oral at bedtime  glucagon  Injectable 1 milliGRAM(s) IntraMuscular once PRN  heparin  Injectable 5000 Unit(s) SubCutaneous every 8 hours  insulin lispro (HumaLOG) corrective regimen sliding scale   SubCutaneous Before meals and at bedtime  insulin regular Infusion 2 Unit(s)/Hr IV Continuous <Continuous>  metoprolol tartrate 25 milliGRAM(s) Oral every 6 hours  nitroglycerin  Infusion 5 MICROgram(s)/Min IV Continuous <Continuous>  oxyCODONE    5 mG/acetaminophen 325 mG 1 Tablet(s) Oral every 6 hours PRN  pantoprazole    Tablet 40 milliGRAM(s) Oral before breakfast  polyethylene glycol 3350 17 Gram(s) Oral daily PRN  senna 2 Tablet(s) Oral at bedtime  sodium chloride 0.9% lock flush 3 milliLiter(s) IV Push every 8 hours      Allergies:  No Known Allergies      ROS:  Denies the following except as indicated.    General: weight loss/weight gain, decreased appetite, fatigue  Eyes: Blurry vision, double vision, visual changes  ENT: Throat pain, changes in voice,   CV: palpitations, SOB, CP, cough  GI: NVD, difficulty swallowing, abdominal pain  : polyuria, dysuria  Endo: abnormal menses, temperature intolerance, decreased libido  MSK: weakness, joint pain  Skin: rash, dryness, diaphoresis  Heme: Easy bruising,bleeding  Neuro: HA, dizziness, lightheadedness, numbness tingling  Psych: Anxiety, Depression    Vital Signs Last 24 Hrs  T(C): 36.8 (01 Aug 2019 05:00), Max: 36.8 (01 Aug 2019 05:00)  T(F): 98.3 (01 Aug 2019 05:00), Max: 98.3 (01 Aug 2019 05:00)  HR: 86 (01 Aug 2019 09:00) (54 - 94)  BP: 117/75 (01 Aug 2019 09:00) (116/64 - 156/73)  BP(mean): 103 (01 Aug 2019 09:00) (75 - 109)  RR: 19 (01 Aug 2019 09:00) (11 - 20)  SpO2: 94% (01 Aug 2019 09:00) (94% - 100%)  Height (cm): 157.48 (07-31 @ 10:39)  Weight (kg): 68.9 (07-31 @ 10:39)  BMI (kg/m2): 27.8 (07-31 @ 10:39)      Constitutional: wn/wd in NAD.   HEENT: NCAT, MMM, OP clear, EOMI, , no proptosis or lid retraction  Neck: no thyromegaly or palpable thyroid nodules   Respiratory: minimal crackles heard bibasilar  Cardiovascular: regular rhythm, normal S1 and S2, systolic murmur heard in the mitral area.  drain seen - bandged  GI: soft, NT/ND, no masses/HSM appreciated.  Neurology: no tremors, no focal neurological deficits  Psychiatric: AAO x 3, normal affect/mood.  Ext: radial pulses intact, peripheral pulses in lower extremities were feeble, no cyanosis, clubbing or edema.       LABS:                        11.5   10.70 )-----------( 190      ( 01 Aug 2019 09:19 )             34.1     08-01    137  |  102  |  18  ----------------------------<  131<H>  4.6   |  25  |  1.62<H>    Ca    9.2      01 Aug 2019 09:19  Phos  2.8     08-01  Mg     2.0     08-01    TPro  6.2  /  Alb  3.9  /  TBili  0.4  /  DBili  x   /  AST  21  /  ALT  24  /  AlkPhos  54  08-01    PT/INR - ( 01 Aug 2019 09:19 )   PT: 14.0 sec;   INR: 1.23          PTT - ( 01 Aug 2019 09:19 )  PTT:28.9 sec    Hemoglobin A1C, Whole Blood: 8.2 (07-23 @ 15:55)    Thyroid Stimulating Hormone, Serum: 1.337 (07-23 @ 15:54)      RADIOLOGY & ADDITIONAL STUDIES:  CAPILLARY BLOOD GLUCOSE      POCT Blood Glucose.: 122 mg/dL (01 Aug 2019 08:26)  POCT Blood Glucose.: 129 mg/dL (01 Aug 2019 07:16)  POCT Blood Glucose.: 121 mg/dL (01 Aug 2019 06:25)  POCT Blood Glucose.: 97 mg/dL (01 Aug 2019 02:52)  POCT Blood Glucose.: 93 mg/dL (01 Aug 2019 02:13)  POCT Blood Glucose.: 96 mg/dL (01 Aug 2019 01:26)  POCT Blood Glucose.: 116 mg/dL (01 Aug 2019 00:18)  POCT Blood Glucose.: 120 mg/dL (31 Jul 2019 23:22)  POCT Blood Glucose.: 139 mg/dL (31 Jul 2019 22:11)  POCT Blood Glucose.: 161 mg/dL (31 Jul 2019 21:06)  POCT Blood Glucose.: 166 mg/dL (31 Jul 2019 19:35)  POCT Blood Glucose.: 154 mg/dL (31 Jul 2019 18:50)  POCT Blood Glucose.: 165 mg/dL (31 Jul 2019 17:58)  POCT Blood Glucose.: 168 mg/dL (31 Jul 2019 17:16)  POCT Blood Glucose.: 161 mg/dL (31 Jul 2019 16:37)        A/P: 80 yo male with a PMH of HTN, HLD, DM, inferior wall MI, CKD-stage 3, right CVA(residual left arm weakness), s/p loop recorder placement in 2015 currently is s/p robotic mid CABG POD #1. he is currently being managed for diabetes    1.  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   Please start on lispro   3   units before each meal.  Please hold the premeal if the patient is not eating  Please continue lispro moderate dose sliding scale four times daily with meals and at bedtime    Pt's fingerstick glucose goal is 140-180    Will continue to monitor     For discharge, TBD    Pt can follow up at discharge with [facility] by calling  to make an appointment.   discussed case with    and updated primary team

## 2019-08-01 NOTE — PHYSICAL THERAPY INITIAL EVALUATION ADULT - PHYSICAL ASSIST/NONPHYSICAL ASSIST: SIT/STAND, REHAB EVAL
Verbal cues provided to push from chair,/verbal cues verbal cues/Verbal cues provided to push from bed

## 2019-08-01 NOTE — PHYSICAL THERAPY INITIAL EVALUATION ADULT - GENERAL OBSERVATIONS, REHAB EVAL
Patient received seated in chair out of bed, No apparent distress, +tele, +reynolds, +heplock, +TLC, +bilat SCD, RN cleared patient for PT. c Patient received semi-supine in bed, No apparent distress, +tele, +reynolds, +heplock, +TLC, +bilat SCD, RN cleared patient for PT.

## 2019-08-01 NOTE — PHYSICAL THERAPY INITIAL EVALUATION ADULT - COORDINATION ASSESSED, REHAB EVAL
finger to nose finger to nose/Intact R, Unable to test r limited by pain. finger to nose/R side- 3 reps: 2 indirect, 1 direct. Unable to test L limited by pain.

## 2019-08-01 NOTE — CONSULT NOTE ADULT - ATTENDING COMMENTS
Pt seen on rounds this afternoon.   Is now one day post mid-CAB procedure.  Was in considerable pain at the time of our visit.  Has been on monotherapy with metformin for several years as outpatient.   Pre-breakfast fingersticks at home have apparently been in the  range but the admitting A1c level of 8.2% suggests frequent post-prandial hyperglycemia.  Was treated with an insulin drip post-op but doses were relatively modest and drip was stopped in the early morning.  Glucose trina only mildly (to 187) after breakfast without any pre-breakfast insulin except the bridge dose of NPH  To start 12 units Lantus qhs, pre-meal lispro of only 3 units (given poor PO intake).  Would expect that he will return to oral agents post discharge

## 2019-08-01 NOTE — PHYSICAL THERAPY INITIAL EVALUATION ADULT - PHYSICAL ASSIST/NONPHYSICAL ASSIST: SIT/SUPINE, REHAB EVAL
verbal cues/1 person assist/Patient required verbal cues to lift legs verbal cues/Patient required verbal cues to lift legs one at a time/1 person assist

## 2019-08-01 NOTE — PHYSICAL THERAPY INITIAL EVALUATION ADULT - MODALITIES TREATMENT COMMENTS
FIM- 2, Patient pulled 500 cc x1 on incentive spirometer limited by incision site pain. FIM- 2, Patient pulled 500 cc x1 on incentive spirometer limited by incision site pain. Patient eye tracking intact.

## 2019-08-01 NOTE — PHYSICAL THERAPY INITIAL EVALUATION ADULT - IMPAIRMENTS CONTRIBUTING TO GAIT DEVIATIONS, PT EVAL
narrow base of support/Patient tendency to step on back of heels secondary to severe narrow GELY./impaired balance/decreased strength

## 2019-08-01 NOTE — PROGRESS NOTE ADULT - SUBJECTIVE AND OBJECTIVE BOX
CTICU  CRITICAL  CARE  attending     Hand off received 					   Pertinent clinical, laboratory, radiographic, hemodynamic, echocardiographic, respiratory data, microbiologic data and chart were reviewed and analyzed frequently throughout the course of the day and night  Patient seen and examined with CTS/ SH attending at bedside  Pt is a 79y , Male, HEALTH ISSUES - PROBLEM Dx:      , FAMILY HISTORY:  Family history of uterine cancer (Sibling)  Family history of heart disease (Mother)  PAST MEDICAL & SURGICAL HISTORY:  HLD (hyperlipidemia)  HTN (hypertension)  CKD (chronic kidney disease), stage III  CAD (coronary artery disease)  Non-ST elevation (NSTEMI) myocardial infarction  CVA (cerebral vascular accident)  Type 2 diabetes mellitus without complication  H/O carpal tunnel repair  Status post placement of implantable loop recorder    Patient is a 79y old  Male who presents with a chief complaint of CAD (02 Aug 2019 14:00)      14 system review was unremarkable    Vital signs, hemodynamic and respiratory parameters were reviewed from the bedside nursing flowsheet.  ICU Vital Signs Last 24 Hrs  T(C): 37.1 (02 Aug 2019 14:00), Max: 37.3 (02 Aug 2019 04:00)  T(F): 98.7 (02 Aug 2019 14:00), Max: 99.2 (02 Aug 2019 04:00)  HR: 84 (02 Aug 2019 12:08) (74 - 86)  BP: 160/68 (02 Aug 2019 12:08) (140/72 - 160/74)  BP(mean): 97 (02 Aug 2019 12:08) (91 - 117)  ABP: --  ABP(mean): --  RR: 16 (02 Aug 2019 12:08) (16 - 18)  SpO2: 97% (02 Aug 2019 12:08) (96% - 99%)    Adult Advanced Hemodynamics Last 24 Hrs  CVP(mm Hg): --  CVP(cm H2O): --  CO: --  CI: --  PA: --  PA(mean): --  PCWP: --  SVR: --  SVRI: --  PVR: --  PVRI: --, ABG - ( 01 Aug 2019 09:18 )  pH, Arterial: 7.41  pH, Blood: x     /  pCO2: 39    /  pO2: 38    / HCO3: 24    / Base Excess: -0.4  /  SaO2: 73                  Intake and output was reviewed and the fluid balance was calculated  Daily     Daily   I&O's Summary    01 Aug 2019 07:01  -  02 Aug 2019 07:00  --------------------------------------------------------  IN: 376.5 mL / OUT: 2300 mL / NET: -1923.5 mL    02 Aug 2019 07:01  -  02 Aug 2019 15:35  --------------------------------------------------------  IN: 0 mL / OUT: 705 mL / NET: -705 mL        All lines and drain sites were assessed  [facility]      POCT Blood Glucose.: 90 mg/dL (02 Aug 2019 11:45)    No acute change in mental status  Auscultation of the chest reveals equal bs  Abdomen is soft  Extremities are warm and well perfused  Wounds appear clean and unremarkable  Antibiotics are periop    labs  CBC Full  -  ( 02 Aug 2019 06:25 )  WBC Count : 12.16 K/uL  RBC Count : 3.93 M/uL  Hemoglobin : 11.8 g/dL  Hematocrit : 35.2 %  Platelet Count - Automated : 171 K/uL  Mean Cell Volume : 89.6 fl  Mean Cell Hemoglobin : 30.0 pg  Mean Cell Hemoglobin Concentration : 33.5 gm/dL  Auto Neutrophil # : x  Auto Lymphocyte # : x  Auto Monocyte # : x  Auto Eosinophil # : x  Auto Basophil # : x  Auto Neutrophil % : x  Auto Lymphocyte % : x  Auto Monocyte % : x  Auto Eosinophil % : x  Auto Basophil % : x    08-02    137  |  103  |  17  ----------------------------<  139<H>  4.2   |  24  |  1.63<H>    Ca    9.4      02 Aug 2019 06:25  Phos  2.8     08-01  Mg     1.9     08-02    TPro  6.2  /  Alb  3.9  /  TBili  0.4  /  DBili  x   /  AST  21  /  ALT  24  /  AlkPhos  54  08-01    PT/INR - ( 01 Aug 2019 09:19 )   PT: 14.0 sec;   INR: 1.23          PTT - ( 01 Aug 2019 09:19 )  PTT:28.9 sec  The current medications were reviewed   MEDICATIONS  (STANDING):  aspirin  chewable 81 milliGRAM(s) Oral daily  atorvastatin 40 milliGRAM(s) Oral at bedtime  brimonidine 0.2% Ophthalmic Solution 1 Drop(s) Left EYE two times a day  chlorhexidine 2% Cloths 1 Application(s) Topical daily  clopidogrel Tablet 75 milliGRAM(s) Oral daily  dextrose 5%. 1000 milliLiter(s) (50 mL/Hr) IV Continuous <Continuous>  dextrose 50% Injectable 50 milliLiter(s) IV Push every 15 minutes  docusate sodium 100 milliGRAM(s) Oral three times a day  donepezil 5 milliGRAM(s) Oral at bedtime  heparin  Injectable 5000 Unit(s) SubCutaneous every 8 hours  insulin glargine Injectable (LANTUS) 12 Unit(s) SubCutaneous at bedtime  insulin lispro (HumaLOG) corrective regimen sliding scale   SubCutaneous Before meals and at bedtime  insulin lispro Injectable (HumaLOG) 3 Unit(s) SubCutaneous four times a day before meals  lactated ringers. 1000 milliLiter(s) (75 mL/Hr) IV Continuous <Continuous>  lidocaine   Patch 1 Patch Transdermal daily  metoprolol tartrate 25 milliGRAM(s) Oral every 6 hours  pantoprazole    Tablet 40 milliGRAM(s) Oral before breakfast  senna 2 Tablet(s) Oral at bedtime  sodium chloride 0.9% lock flush 3 milliLiter(s) IV Push every 8 hours    MEDICATIONS  (PRN):  acetaminophen   Tablet .. 650 milliGRAM(s) Oral every 6 hours PRN Mild Pain (1 - 3)  dextrose 40% Gel 15 Gram(s) Oral once PRN Blood Glucose LESS THAN 70 milliGRAM(s)/deciliter  glucagon  Injectable 1 milliGRAM(s) IntraMuscular once PRN Glucose LESS THAN 70 milligrams/deciliter  levalbuterol Inhalation 0.63 milliGRAM(s) Inhalation every 6 hours PRN SOB and/or wheezing  oxyCODONE    5 mG/acetaminophen 325 mG 1 Tablet(s) Oral every 6 hours PRN Severe Pain (7 - 10)  polyethylene glycol 3350 17 Gram(s) Oral daily PRN Constipation       PROBLEM LIST/ ASSESSMENT:  HEALTH ISSUES - PROBLEM Dx:      ,   Patient is a 79y old  Male who presents with a chief complaint of CAD (02 Aug 2019 14:00)     s/p cardiac surgery          My plan includes :  close hemodynamic, ventilatory and drain monitoring and management per post op routine    Monitor for arrhythmias and monitor parameters for organ perfusion  beta blockade not administered due to hemodynamic instability and bradycardia  monitor neurologic status  Head of the bed should remain elevated to 45 deg .   chest PT and IS will be encouraged  monitor adequacy of oxygenation and ventilation and attempt to wean oxygen  antibiotic regimen will be tailored to the clinical, laboratory and microbiologic data  Nutritional goals will be met using po eventually , ensure adequate caloric intake and montior the same  Stress ulcer and VTE prophylaxis will be achieved    Glycemic control is satisfactory  Electrolytes have been repleted as necessary and wound care has been carried out. Pain control has been achieved.   agressive physical therapy and early mobility and ambulation goals will be met   The family was updated about the course and plan  CRITICAL CARE TIME personally provided by me  in evaluation and management, reassessments, review and interpretation of labs and x-rays, ventilator and hemodynamic management, formulating a plan and coordinating care: ___90____ MIN.  Time does not include procedural time.  CTICU ATTENDING     					    Jordin Ramos MD

## 2019-08-01 NOTE — PHYSICAL THERAPY INITIAL EVALUATION ADULT - CRITERIA FOR SKILLED THERAPEUTIC INTERVENTIONS
anticipated discharge recommendation/impairments found/functional limitations in following categories/rehab potential/anticipated equipment needs at discharge/therapy frequency/predicted duration of therapy intervention

## 2019-08-01 NOTE — PHYSICAL THERAPY INITIAL EVALUATION ADULT - ADDITIONAL COMMENTS
Patient reports they live [place]  two-level home with sister, bedroom and all essential living space is on the first floor with no need to go upstairs. Patient reports 3 steps to enter home and no railing. Patient states they have cane at home but did not use it. Patient reports one fall where they state they "placed themselves on the ground" secondary to feeling dizzy. Patient reports he lives in a  two-level home with sister, bedroom and all essential living space is on the first floor with no need to go upstairs. Patient reports 3 steps to enter home and no railing. Patient states he has a cane at home but does not use it. Patient reports one fall where he states he "placed himself on the ground" secondary to feeling dizzy.

## 2019-08-01 NOTE — PHYSICAL THERAPY INITIAL EVALUATION ADULT - PERTINENT HX OF CURRENT PROBLEM, REHAB EVAL
78 yo M experiencing TERAN self-admitted to Dr. Faulkner in June 2019 resulting in revealing 2 vessel CAD and current admission for resultant elective surgical procedure.

## 2019-08-01 NOTE — PHYSICAL THERAPY INITIAL EVALUATION ADULT - PHYSICAL ASSIST/NONPHYSICAL ASSIST: GAIT, REHAB EVAL
Verbal cues provided for increased step length and pursed lip breathing./verbal cues/1 person assist

## 2019-08-02 LAB
ANION GAP SERPL CALC-SCNC: 10 MMOL/L — SIGNIFICANT CHANGE UP (ref 5–17)
ANION GAP SERPL CALC-SCNC: 14 MMOL/L — SIGNIFICANT CHANGE UP (ref 5–17)
BUN SERPL-MCNC: 17 MG/DL — SIGNIFICANT CHANGE UP (ref 7–23)
BUN SERPL-MCNC: 19 MG/DL — SIGNIFICANT CHANGE UP (ref 7–23)
CALCIUM SERPL-MCNC: 9 MG/DL — SIGNIFICANT CHANGE UP (ref 8.4–10.5)
CALCIUM SERPL-MCNC: 9.4 MG/DL — SIGNIFICANT CHANGE UP (ref 8.4–10.5)
CHLORIDE SERPL-SCNC: 103 MMOL/L — SIGNIFICANT CHANGE UP (ref 96–108)
CHLORIDE SERPL-SCNC: 103 MMOL/L — SIGNIFICANT CHANGE UP (ref 96–108)
CO2 SERPL-SCNC: 23 MMOL/L — SIGNIFICANT CHANGE UP (ref 22–31)
CO2 SERPL-SCNC: 24 MMOL/L — SIGNIFICANT CHANGE UP (ref 22–31)
CREAT SERPL-MCNC: 1.63 MG/DL — HIGH (ref 0.5–1.3)
CREAT SERPL-MCNC: 1.69 MG/DL — HIGH (ref 0.5–1.3)
GLUCOSE BLDC GLUCOMTR-MCNC: 126 MG/DL — HIGH (ref 70–99)
GLUCOSE BLDC GLUCOMTR-MCNC: 148 MG/DL — HIGH (ref 70–99)
GLUCOSE BLDC GLUCOMTR-MCNC: 157 MG/DL — HIGH (ref 70–99)
GLUCOSE BLDC GLUCOMTR-MCNC: 164 MG/DL — HIGH (ref 70–99)
GLUCOSE BLDC GLUCOMTR-MCNC: 90 MG/DL — SIGNIFICANT CHANGE UP (ref 70–99)
GLUCOSE SERPL-MCNC: 139 MG/DL — HIGH (ref 70–99)
GLUCOSE SERPL-MCNC: 156 MG/DL — HIGH (ref 70–99)
HCT VFR BLD CALC: 35.2 % — LOW (ref 39–50)
HCT VFR BLD CALC: 37.5 % — LOW (ref 39–50)
HGB BLD-MCNC: 11.8 G/DL — LOW (ref 13–17)
HGB BLD-MCNC: 12.3 G/DL — LOW (ref 13–17)
MAGNESIUM SERPL-MCNC: 1.8 MG/DL — SIGNIFICANT CHANGE UP (ref 1.6–2.6)
MAGNESIUM SERPL-MCNC: 1.9 MG/DL — SIGNIFICANT CHANGE UP (ref 1.6–2.6)
MCHC RBC-ENTMCNC: 29.5 PG — SIGNIFICANT CHANGE UP (ref 27–34)
MCHC RBC-ENTMCNC: 30 PG — SIGNIFICANT CHANGE UP (ref 27–34)
MCHC RBC-ENTMCNC: 32.8 GM/DL — SIGNIFICANT CHANGE UP (ref 32–36)
MCHC RBC-ENTMCNC: 33.5 GM/DL — SIGNIFICANT CHANGE UP (ref 32–36)
MCV RBC AUTO: 89.6 FL — SIGNIFICANT CHANGE UP (ref 80–100)
MCV RBC AUTO: 89.9 FL — SIGNIFICANT CHANGE UP (ref 80–100)
NRBC # BLD: 0 /100 WBCS — SIGNIFICANT CHANGE UP (ref 0–0)
NRBC # BLD: 0 /100 WBCS — SIGNIFICANT CHANGE UP (ref 0–0)
PLATELET # BLD AUTO: 171 K/UL — SIGNIFICANT CHANGE UP (ref 150–400)
PLATELET # BLD AUTO: 185 K/UL — SIGNIFICANT CHANGE UP (ref 150–400)
POTASSIUM SERPL-MCNC: 4.2 MMOL/L — SIGNIFICANT CHANGE UP (ref 3.5–5.3)
POTASSIUM SERPL-MCNC: 4.4 MMOL/L — SIGNIFICANT CHANGE UP (ref 3.5–5.3)
POTASSIUM SERPL-SCNC: 4.2 MMOL/L — SIGNIFICANT CHANGE UP (ref 3.5–5.3)
POTASSIUM SERPL-SCNC: 4.4 MMOL/L — SIGNIFICANT CHANGE UP (ref 3.5–5.3)
RBC # BLD: 3.93 M/UL — LOW (ref 4.2–5.8)
RBC # BLD: 4.17 M/UL — LOW (ref 4.2–5.8)
RBC # FLD: 12.4 % — SIGNIFICANT CHANGE UP (ref 10.3–14.5)
RBC # FLD: 12.5 % — SIGNIFICANT CHANGE UP (ref 10.3–14.5)
SODIUM SERPL-SCNC: 137 MMOL/L — SIGNIFICANT CHANGE UP (ref 135–145)
SODIUM SERPL-SCNC: 140 MMOL/L — SIGNIFICANT CHANGE UP (ref 135–145)
WBC # BLD: 12.16 K/UL — HIGH (ref 3.8–10.5)
WBC # BLD: 12.18 K/UL — HIGH (ref 3.8–10.5)
WBC # FLD AUTO: 12.16 K/UL — HIGH (ref 3.8–10.5)
WBC # FLD AUTO: 12.18 K/UL — HIGH (ref 3.8–10.5)

## 2019-08-02 PROCEDURE — 71045 X-RAY EXAM CHEST 1 VIEW: CPT | Mod: 26,77

## 2019-08-02 PROCEDURE — 99232 SBSQ HOSP IP/OBS MODERATE 35: CPT | Mod: GC

## 2019-08-02 PROCEDURE — 71045 X-RAY EXAM CHEST 1 VIEW: CPT | Mod: 26

## 2019-08-02 RX ORDER — AMIODARONE HYDROCHLORIDE 400 MG/1
150 TABLET ORAL ONCE
Refills: 0 | Status: COMPLETED | OUTPATIENT
Start: 2019-08-02 | End: 2019-08-02

## 2019-08-02 RX ORDER — AMIODARONE HYDROCHLORIDE 400 MG/1
150 TABLET ORAL ONCE
Refills: 0 | Status: DISCONTINUED | OUTPATIENT
Start: 2019-08-02 | End: 2019-08-02

## 2019-08-02 RX ORDER — MAGNESIUM OXIDE 400 MG ORAL TABLET 241.3 MG
800 TABLET ORAL ONCE
Refills: 0 | Status: COMPLETED | OUTPATIENT
Start: 2019-08-02 | End: 2019-08-02

## 2019-08-02 RX ORDER — SODIUM CHLORIDE 9 MG/ML
1000 INJECTION, SOLUTION INTRAVENOUS
Refills: 0 | Status: DISCONTINUED | OUTPATIENT
Start: 2019-08-02 | End: 2019-08-06

## 2019-08-02 RX ADMIN — Medication 2000 MILLIGRAM(S): at 07:00

## 2019-08-02 RX ADMIN — SODIUM CHLORIDE 75 MILLILITER(S): 9 INJECTION, SOLUTION INTRAVENOUS at 11:10

## 2019-08-02 RX ADMIN — Medication 25 MILLIGRAM(S): at 12:18

## 2019-08-02 RX ADMIN — SODIUM CHLORIDE 3 MILLILITER(S): 9 INJECTION INTRAMUSCULAR; INTRAVENOUS; SUBCUTANEOUS at 22:34

## 2019-08-02 RX ADMIN — SODIUM CHLORIDE 3 MILLILITER(S): 9 INJECTION INTRAMUSCULAR; INTRAVENOUS; SUBCUTANEOUS at 12:23

## 2019-08-02 RX ADMIN — HEPARIN SODIUM 5000 UNIT(S): 5000 INJECTION INTRAVENOUS; SUBCUTANEOUS at 22:46

## 2019-08-02 RX ADMIN — HEPARIN SODIUM 5000 UNIT(S): 5000 INJECTION INTRAVENOUS; SUBCUTANEOUS at 16:04

## 2019-08-02 RX ADMIN — Medication 81 MILLIGRAM(S): at 12:18

## 2019-08-02 RX ADMIN — BRIMONIDINE TARTRATE 1 DROP(S): 2 SOLUTION/ DROPS OPHTHALMIC at 17:40

## 2019-08-02 RX ADMIN — HEPARIN SODIUM 5000 UNIT(S): 5000 INJECTION INTRAVENOUS; SUBCUTANEOUS at 06:59

## 2019-08-02 RX ADMIN — Medication 25 MILLIGRAM(S): at 01:08

## 2019-08-02 RX ADMIN — DONEPEZIL HYDROCHLORIDE 5 MILLIGRAM(S): 10 TABLET, FILM COATED ORAL at 22:47

## 2019-08-02 RX ADMIN — AMIODARONE HYDROCHLORIDE 200 MILLIGRAM(S): 400 TABLET ORAL at 21:20

## 2019-08-02 RX ADMIN — Medication 100 MILLIGRAM(S): at 06:59

## 2019-08-02 RX ADMIN — MAGNESIUM OXIDE 400 MG ORAL TABLET 800 MILLIGRAM(S): 241.3 TABLET ORAL at 22:46

## 2019-08-02 RX ADMIN — INSULIN GLARGINE 12 UNIT(S): 100 INJECTION, SOLUTION SUBCUTANEOUS at 22:46

## 2019-08-02 RX ADMIN — Medication 25 MILLIGRAM(S): at 06:59

## 2019-08-02 RX ADMIN — LIDOCAINE 1 PATCH: 4 CREAM TOPICAL at 19:01

## 2019-08-02 RX ADMIN — LIDOCAINE 1 PATCH: 4 CREAM TOPICAL at 12:19

## 2019-08-02 RX ADMIN — Medication 25 MILLIGRAM(S): at 17:40

## 2019-08-02 RX ADMIN — Medication 3 UNIT(S): at 17:39

## 2019-08-02 RX ADMIN — AMIODARONE HYDROCHLORIDE 133.33 MILLIGRAM(S): 400 TABLET ORAL at 19:59

## 2019-08-02 RX ADMIN — CLOPIDOGREL BISULFATE 75 MILLIGRAM(S): 75 TABLET, FILM COATED ORAL at 12:18

## 2019-08-02 RX ADMIN — MAGNESIUM OXIDE 400 MG ORAL TABLET 800 MILLIGRAM(S): 241.3 TABLET ORAL at 19:00

## 2019-08-02 RX ADMIN — Medication 2: at 17:39

## 2019-08-02 RX ADMIN — Medication 3 UNIT(S): at 12:18

## 2019-08-02 RX ADMIN — PANTOPRAZOLE SODIUM 40 MILLIGRAM(S): 20 TABLET, DELAYED RELEASE ORAL at 06:59

## 2019-08-02 RX ADMIN — CHLORHEXIDINE GLUCONATE 1 APPLICATION(S): 213 SOLUTION TOPICAL at 12:25

## 2019-08-02 RX ADMIN — SODIUM CHLORIDE 3 MILLILITER(S): 9 INJECTION INTRAMUSCULAR; INTRAVENOUS; SUBCUTANEOUS at 06:46

## 2019-08-02 RX ADMIN — BRIMONIDINE TARTRATE 1 DROP(S): 2 SOLUTION/ DROPS OPHTHALMIC at 07:18

## 2019-08-02 RX ADMIN — Medication 100 MILLIGRAM(S): at 12:20

## 2019-08-02 RX ADMIN — ATORVASTATIN CALCIUM 40 MILLIGRAM(S): 80 TABLET, FILM COATED ORAL at 22:47

## 2019-08-02 RX ADMIN — Medication 3 UNIT(S): at 06:59

## 2019-08-02 NOTE — CHART NOTE - NSCHARTNOTEFT_GEN_A_CORE
As per Dr. Bhatti, Mediastinal jasiel removed at bedside without incident. U stitch tied down in place and occlusive dressing applied. Patient tolerated procedure well. Follow up CXR ordered, will follow up closely.

## 2019-08-02 NOTE — PROGRESS NOTE ADULT - ASSESSMENT
80 y/o male with a PMHx of HTN, HLD, DM, inferior wall MI, CKD-stage 3, right CVA(residual left arm weakness), s/p loop recorder placement in 2015 and class II angina presented to Dr. Anjel Bhatti, Cardiothoracic Surgery, for surgical evaluation after he originally presented to Dr. Faulkner for routine cardiology care and upon workup, on 6/3/19 had abnormal stress test and underwent Echo revealing EF 55%, no significant valvular disease. Patient presented to [facility] for Cardiac Cath on 7/18/19 which revealed 2 vessel CAD. Per Dr. Bhatti, patient deemed a good surgical candidate and presented to [facility] for elective surgery. On 7/31/19 patient underwent Minimally Invasive Coronary Artery Bypass Grafting (MIDCAB), EF normal, and patient transferred to CTICU in stable condition. On POD0 patient extubated. POD1 requiring nitro gtt which was later weaned, and patient started on a BB.  POD1, patient deemed stable for transfer to tele stepdown unit. Today is POD2, jasiel will be removed, continue pushing from a pulmonary standpoint.     A/P:  Neurovascular: No delirium. Pain well controlled with current regimen.  -Tylenol PRN for mild pain  - Percocet PRN for mod-severe pain  - PMHx of CVA with residual left arm weakness.  - Continue home med: donepezil 5mg PO qhs    Cardiovascular: POD2 s/p MIDCAB, EF nml  - CAD s/p MIDCAB: Continue Aspirin 81mg PO qd, Plavix 75mg PO qd, atorvastatin 40mg PO qhs, and metoprolol 25mg PO q6hrs.     - Continue to titrate up BB as tolerated.     - Secondary to CVA and CKD, ok with permissive HTN SBP 150s.    - Holding ACEi 2/2 to CKD.   - Continue to monitor HR/BP/Tele.     Respiratory: 02 Sat = 98% on RA.  -If on oxygen wean to RA from for O2 Sat > 93%.  -Encourage C+DB and Use of IS 10x / hr while awake.  -CXR this AM - no ptx s/p chest tube removal, no pleural effusions. Repeat CXR after jasiel removal.      GI: Stable.  -PPX with pantoprazole.   -PO Diet.    Renal / : PMHx of CKD   - BUN/Cr: 17/1.63. Giving IVF, f/u repeat BMP.      - Cr on admission was 1.74. Baseline 1.4-1.5  -Monitor renal function.  -Monitor I/O's.    Endocrine: PMHx of DM. Endocrine consulted, appreciate recs.  - Continue Lantus 12U qhs and Lispro 3U TID with meals, and ISS. Blood glucose goal <150.    -A1c: 8.2  -TSH: 1.337    Hematologic: H&H: 11.8/35.2  -CBC in AM    ID: afebrile  -WBC: 12.16 likely postop leukocytosis, repeat CBC in AM.   -Observe for SIRS/Sepsis Syndrome.    Prophylaxis:  -DVT prophylaxis with 5000 SubQ Heparin q8h.  -SCD's    Disposition:  - Home when medically stable.

## 2019-08-02 NOTE — PROGRESS NOTE ADULT - SUBJECTIVE AND OBJECTIVE BOX
Patient discussed on morning rounds with Dr. Bhatti    Operation / Date: 7/31/19 robotic MIDCAB, EF nml    SUBJECTIVE ASSESSMENT:  79y Male seen and examined bedside. Patient states, "I feel great." He is using IS, barely pulling 500cc, and has been educated on use. He states he is passing flatus but not BM yet. Denies chest pain, SOB, palpitations, hemopytsis, N/V/D, abdominal pain, dizziness, fever or chills. Patient is ambulating, tolerating PO diet, and voiding spontaneously.         Vital Signs Last 24 Hrs  T(C): 37.2 (02 Aug 2019 09:00), Max: 37.3 (02 Aug 2019 04:00)  T(F): 98.9 (02 Aug 2019 09:00), Max: 99.2 (02 Aug 2019 04:00)  HR: 84 (02 Aug 2019 12:08) (74 - 86)  BP: 160/68 (02 Aug 2019 12:08) (140/72 - 160/74)  BP(mean): 97 (02 Aug 2019 12:08) (91 - 117)  RR: 16 (02 Aug 2019 12:08) (16 - 18)  SpO2: 97% (02 Aug 2019 12:08) (96% - 99%)  I&O's Detail    01 Aug 2019 07:01  -  02 Aug 2019 07:00  --------------------------------------------------------  IN:    insulin regular Infusion: 1.5 mL    IV PiggyBack: 50 mL    nitroglycerin  Infusion: 15 mL    Oral Fluid: 300 mL    sodium chloride 0.9%: 10 mL  Total IN: 376.5 mL    OUT:    Chest Tube: 30 mL    Drain: 125 mL    Indwelling Catheter - Urethral: 2145 mL  Total OUT: 2300 mL    Total NET: -1923.5 mL      02 Aug 2019 07:01  -  02 Aug 2019 14:01  --------------------------------------------------------  IN:  Total IN: 0 mL    OUT:    Drain: 30 mL    Indwelling Catheter - Urethral: 125 mL    Voided: 300 mL  Total OUT: 455 mL    Total NET: -455 mL          CHEST TUBE:  No.   JASIEL DRAIN:  Yesx1  EPICARDIAL WIRES: No.  TIE DOWNS: Yes.  SINGH: No.     PHYSICAL EXAM:    General: Patient lying comfortably in bed, no acute distress     Neurological: Alert and oriented. No focal neurological deficits     Cardiovascular: S1S2, RRR, no murmurs appreciated on exam     Respiratory: Clear to auscultation bilaterally, no w/r/r     Gastrointestinal: Abdomen soft, non tender, non distended     Extremities: Warm and well perfused. No edema or calf tenderness     Vascular: Peripheral pulses 2+ b/l.    Incision Sites: L thoracotomy site c/d/i, 1 jasiel remains in place to bulb suction. Chest tube removed, tiedown in place covered with clean dry occlusive dressing.     LABS:                        11.8   12.16 )-----------( 171      ( 02 Aug 2019 06:25 )             35.2       COUMADIN:  No.   PT/INR - ( 01 Aug 2019 09:19 )   PT: 14.0 sec;   INR: 1.23          PTT - ( 01 Aug 2019 09:19 )  PTT:28.9 sec    08-02    137  |  103  |  17  ----------------------------<  139<H>  4.2   |  24  |  1.63<H>    Ca    9.4      02 Aug 2019 06:25  Phos  2.8     08-01  Mg     1.9     08-02    TPro  6.2  /  Alb  3.9  /  TBili  0.4  /  DBili  x   /  AST  21  /  ALT  24  /  AlkPhos  54  08-01          MEDICATIONS  (STANDING):  aspirin  chewable 81 milliGRAM(s) Oral daily  atorvastatin 40 milliGRAM(s) Oral at bedtime  brimonidine 0.2% Ophthalmic Solution 1 Drop(s) Left EYE two times a day  chlorhexidine 2% Cloths 1 Application(s) Topical daily  clopidogrel Tablet 75 milliGRAM(s) Oral daily  dextrose 5%. 1000 milliLiter(s) (50 mL/Hr) IV Continuous <Continuous>  dextrose 50% Injectable 50 milliLiter(s) IV Push every 15 minutes  docusate sodium 100 milliGRAM(s) Oral three times a day  donepezil 5 milliGRAM(s) Oral at bedtime  heparin  Injectable 5000 Unit(s) SubCutaneous every 8 hours  insulin glargine Injectable (LANTUS) 12 Unit(s) SubCutaneous at bedtime  insulin lispro (HumaLOG) corrective regimen sliding scale   SubCutaneous Before meals and at bedtime  insulin lispro Injectable (HumaLOG) 3 Unit(s) SubCutaneous four times a day before meals  lactated ringers. 1000 milliLiter(s) (75 mL/Hr) IV Continuous <Continuous>  lidocaine   Patch 1 Patch Transdermal daily  metoprolol tartrate 25 milliGRAM(s) Oral every 6 hours  pantoprazole    Tablet 40 milliGRAM(s) Oral before breakfast  senna 2 Tablet(s) Oral at bedtime  sodium chloride 0.9% lock flush 3 milliLiter(s) IV Push every 8 hours    MEDICATIONS  (PRN):  acetaminophen   Tablet .. 650 milliGRAM(s) Oral every 6 hours PRN Mild Pain (1 - 3)  dextrose 40% Gel 15 Gram(s) Oral once PRN Blood Glucose LESS THAN 70 milliGRAM(s)/deciliter  glucagon  Injectable 1 milliGRAM(s) IntraMuscular once PRN Glucose LESS THAN 70 milligrams/deciliter  levalbuterol Inhalation 0.63 milliGRAM(s) Inhalation every 6 hours PRN SOB and/or wheezing  oxyCODONE    5 mG/acetaminophen 325 mG 1 Tablet(s) Oral every 6 hours PRN Severe Pain (7 - 10)  polyethylene glycol 3350 17 Gram(s) Oral daily PRN Constipation        RADIOLOGY & ADDITIONAL TESTS:     < from: Xray Chest 1 View AP/PA (08.02.19 @ 05:42) >    EXAM:  XR CHEST AP OR PA 1V                          PROCEDURE DATE:  08/02/2019          INTERPRETATION:  Chest single frontal view exam    History: Postop cardiothoracic surgery follow-up exam    Impression:    Possible left lung base small pleural effusion and/or focal atelectasis   appearing since previous previous exam 8/1/2019. No other change.   Hypoinflation. No pneumothorax. Right venous catheter tip in region of   right atrium again noted.    < end of copied text >

## 2019-08-02 NOTE — PROGRESS NOTE ADULT - SUBJECTIVE AND OBJECTIVE BOX
INTERVAL HPI/OVERNIGHT EVENTS:    Patient is a 79y old  Male who presents with a chief complaint of CAD (01 Aug 2019 17:13)      Pt reports the following symptoms:    CONSTITUTIONAL:  Negative fever or chills, feels well, good appetite  EYES:  Negative  blurry vision or double vision  CARDIOVASCULAR:  Negative for chest pain or palpitations  RESPIRATORY:  Negative for cough, wheezing, or SOB   GASTROINTESTINAL:  Negative for nausea, vomiting, diarrhea, constipation, or abdominal pain  GENITOURINARY:  Negative frequency, urgency or dysuria  NEUROLOGIC:  No headache, confusion, dizziness, lightheadedness    MEDICATIONS  (STANDING):  aspirin  chewable 81 milliGRAM(s) Oral daily  atorvastatin 40 milliGRAM(s) Oral at bedtime  brimonidine 0.2% Ophthalmic Solution 1 Drop(s) Left EYE two times a day  chlorhexidine 2% Cloths 1 Application(s) Topical daily  clopidogrel Tablet 75 milliGRAM(s) Oral daily  dextrose 5%. 1000 milliLiter(s) (50 mL/Hr) IV Continuous <Continuous>  dextrose 50% Injectable 50 milliLiter(s) IV Push every 15 minutes  docusate sodium 100 milliGRAM(s) Oral three times a day  donepezil 5 milliGRAM(s) Oral at bedtime  heparin  Injectable 5000 Unit(s) SubCutaneous every 8 hours  insulin glargine Injectable (LANTUS) 12 Unit(s) SubCutaneous at bedtime  insulin lispro (HumaLOG) corrective regimen sliding scale   SubCutaneous Before meals and at bedtime  insulin lispro Injectable (HumaLOG) 3 Unit(s) SubCutaneous four times a day before meals  lactated ringers. 1000 milliLiter(s) (75 mL/Hr) IV Continuous <Continuous>  lidocaine   Patch 1 Patch Transdermal daily  metoprolol tartrate 25 milliGRAM(s) Oral every 6 hours  pantoprazole    Tablet 40 milliGRAM(s) Oral before breakfast  senna 2 Tablet(s) Oral at bedtime  sodium chloride 0.9% lock flush 3 milliLiter(s) IV Push every 8 hours    MEDICATIONS  (PRN):  acetaminophen   Tablet .. 650 milliGRAM(s) Oral every 6 hours PRN Mild Pain (1 - 3)  dextrose 40% Gel 15 Gram(s) Oral once PRN Blood Glucose LESS THAN 70 milliGRAM(s)/deciliter  glucagon  Injectable 1 milliGRAM(s) IntraMuscular once PRN Glucose LESS THAN 70 milligrams/deciliter  levalbuterol Inhalation 0.63 milliGRAM(s) Inhalation every 6 hours PRN SOB and/or wheezing  oxyCODONE    5 mG/acetaminophen 325 mG 1 Tablet(s) Oral every 6 hours PRN Severe Pain (7 - 10)  polyethylene glycol 3350 17 Gram(s) Oral daily PRN Constipation      PHYSICAL EXAM  Vital Signs Last 24 Hrs  T(C): 37.2 (02 Aug 2019 09:00), Max: 37.3 (02 Aug 2019 04:00)  T(F): 98.9 (02 Aug 2019 09:00), Max: 99.2 (02 Aug 2019 04:00)  HR: 86 (02 Aug 2019 05:00) (74 - 88)  BP: 160/74 (02 Aug 2019 05:00) (122/63 - 160/74)  BP(mean): 108 (02 Aug 2019 05:00) (91 - 108)  RR: 18 (02 Aug 2019 05:00) (16 - 18)  SpO2: 96% (02 Aug 2019 05:00) (93% - 99%)    Constitutional: wn/wd in NAD.   HEENT: NCAT, MMM, OP clear, EOMI, no proptosis or lid retraction  Neck: no thyromegaly or palpable thyroid nodules   Respiratory: lungs CTAB.  Cardiovascular: regular rhythm, normal S1 and S2, no audible murmurs, no peripheral edema  GI: soft, NT/ND, no masses/HSM appreciated.  Neurology: no tremors, DTR 2+  Skin: no visible rashes/lesions  Psychiatric: AAO x 3, normal affect/mood.    LABS:                        11.8   12.16 )-----------( 171      ( 02 Aug 2019 06:25 )             35.2     08-02    137  |  103  |  17  ----------------------------<  139<H>  4.2   |  24  |  1.63<H>    Ca    9.4      02 Aug 2019 06:25  Phos  2.8     08-01  Mg     1.9     08-02    TPro  6.2  /  Alb  3.9  /  TBili  0.4  /  DBili  x   /  AST  21  /  ALT  24  /  AlkPhos  54  08-01    PT/INR - ( 01 Aug 2019 09:19 )   PT: 14.0 sec;   INR: 1.23          PTT - ( 01 Aug 2019 09:19 )  PTT:28.9 sec    Thyroid Stimulating Hormone, Serum: 1.337 uIU/mL (07-23 @ 15:54)      HbA1C: 8.2 % (07-23 @ 15:55)    CAPILLARY BLOOD GLUCOSE      POCT Blood Glucose.: 126 mg/dL (02 Aug 2019 06:46)  POCT Blood Glucose.: 177 mg/dL (01 Aug 2019 22:16)  POCT Blood Glucose.: 164 mg/dL (01 Aug 2019 16:48)  POCT Blood Glucose.: 180 mg/dL (01 Aug 2019 14:16)  POCT Blood Glucose.: 187 mg/dL (01 Aug 2019 11:41)      Insulin Sliding Scale requirements X 24 Hours:    RADIOLOGY & ADDITIONAL TESTS:    A/P: 79y Male with history of DM type II presenting for       1.  DM -     Please continue           units lantus at bedtime  / in the morning and        units lispro with meals and lispro moderate / low dose sliding scale 4 times daily with meals and at bedtime.  Please continue consistent carbohydrate diet.      Goal FSG is   Will continue to monitor   For discharge, pt can continue    Pt can follow up at discharge with [facility] by calling  to make an appointment.   Will discuss case with     and update primary team INTERVAL HPI/OVERNIGHT EVENTS:    Patient seen and examined at the bedside. No acute events overnight. his appetite is little better than yesterday. He was getting physical therapy when we saw the patient. Still c/o pain over the surgery site but little better.    FSG & Insulin received:    Yesterday:  pre-dinner fs, 2 units lispro SS, did not know what he had for dinner yesterday  bedtime fs, 12 lantus   units + 2 units lispro SS    Today:  pre-breakfast fs. 3 nutritional lispro   units, does not remember but had some soup  pre-lunch fs, 3 nutritional lispro   units, had some potatoes and small fruit cup      Pt reports the following symptoms:    CONSTITUTIONAL:  Negative fever or chills, feels well, good appetite  EYES:  Negative  blurry vision or double vision  CARDIOVASCULAR:  Negative for palpitations  RESPIRATORY:  Negative for cough, wheezing,   GASTROINTESTINAL:  Negative for nausea, vomiting, diarrhea, constipation, or abdominal pain  GENITOURINARY:  Negative frequency, urgency or dysuria  NEUROLOGIC:  No headache, confusion, dizziness, lightheadedness    MEDICATIONS  (STANDING):  aspirin  chewable 81 milliGRAM(s) Oral daily  atorvastatin 40 milliGRAM(s) Oral at bedtime  brimonidine 0.2% Ophthalmic Solution 1 Drop(s) Left EYE two times a day  chlorhexidine 2% Cloths 1 Application(s) Topical daily  clopidogrel Tablet 75 milliGRAM(s) Oral daily  dextrose 5%. 1000 milliLiter(s) (50 mL/Hr) IV Continuous <Continuous>  dextrose 50% Injectable 50 milliLiter(s) IV Push every 15 minutes  docusate sodium 100 milliGRAM(s) Oral three times a day  donepezil 5 milliGRAM(s) Oral at bedtime  heparin  Injectable 5000 Unit(s) SubCutaneous every 8 hours  insulin glargine Injectable (LANTUS) 12 Unit(s) SubCutaneous at bedtime  insulin lispro (HumaLOG) corrective regimen sliding scale   SubCutaneous Before meals and at bedtime  insulin lispro Injectable (HumaLOG) 3 Unit(s) SubCutaneous four times a day before meals  lactated ringers. 1000 milliLiter(s) (75 mL/Hr) IV Continuous <Continuous>  lidocaine   Patch 1 Patch Transdermal daily  metoprolol tartrate 25 milliGRAM(s) Oral every 6 hours  pantoprazole    Tablet 40 milliGRAM(s) Oral before breakfast  senna 2 Tablet(s) Oral at bedtime  sodium chloride 0.9% lock flush 3 milliLiter(s) IV Push every 8 hours    MEDICATIONS  (PRN):  acetaminophen   Tablet .. 650 milliGRAM(s) Oral every 6 hours PRN Mild Pain (1 - 3)  dextrose 40% Gel 15 Gram(s) Oral once PRN Blood Glucose LESS THAN 70 milliGRAM(s)/deciliter  glucagon  Injectable 1 milliGRAM(s) IntraMuscular once PRN Glucose LESS THAN 70 milligrams/deciliter  levalbuterol Inhalation 0.63 milliGRAM(s) Inhalation every 6 hours PRN SOB and/or wheezing  oxyCODONE    5 mG/acetaminophen 325 mG 1 Tablet(s) Oral every 6 hours PRN Severe Pain (7 - 10)  polyethylene glycol 3350 17 Gram(s) Oral daily PRN Constipation      PHYSICAL EXAM  Vital Signs Last 24 Hrs  T(C): 37.2 (02 Aug 2019 09:00), Max: 37.3 (02 Aug 2019 04:00)  T(F): 98.9 (02 Aug 2019 09:00), Max: 99.2 (02 Aug 2019 04:00)  HR: 86 (02 Aug 2019 05:00) (74 - 88)  BP: 160/74 (02 Aug 2019 05:00) (122/63 - 160/74)  BP(mean): 108 (02 Aug 2019 05:00) (91 - 108)  RR: 18 (02 Aug 2019 05:00) (16 - 18)  SpO2: 96% (02 Aug 2019 05:00) (93% - 99%)    Constitutional: wn/wd in NAD.   Respiratory: lungs - decreased breath sounds on right basilar area  Cardiovascular: regular rhythm, normal S1 and S2, no audible murmurs, no peripheral edema  GI: soft, NT/ND, no masses/HSM appreciated.  Neurology: no tremors, follows commands.  Psychiatric: AAO, normal affect/mood.    LABS:                        11.8   12.16 )-----------( 171      ( 02 Aug 2019 06:25 )             35.2     08-02    137  |  103  |  17  ----------------------------<  139<H>  4.2   |  24  |  1.63<H>    Ca    9.4      02 Aug 2019 06:25  Phos  2.8     08  Mg     1.9     08-    TPro  6.2  /  Alb  3.9  /  TBili  0.4  /  DBili  x   /  AST  21  /  ALT  24  /  AlkPhos  54  08-    PT/INR - ( 01 Aug 2019 09:19 )   PT: 14.0 sec;   INR: 1.23          PTT - ( 01 Aug 2019 09:19 )  PTT:28.9 sec    Thyroid Stimulating Hormone, Serum: 1.337 uIU/mL ( @ 15:54)      HbA1C: 8.2 % ( @ 15:55)    CAPILLARY BLOOD GLUCOSE      POCT Blood Glucose.: 126 mg/dL (02 Aug 2019 06:46)  POCT Blood Glucose.: 177 mg/dL (01 Aug 2019 22:16)  POCT Blood Glucose.: 164 mg/dL (01 Aug 2019 16:48)  POCT Blood Glucose.: 180 mg/dL (01 Aug 2019 14:16)  POCT Blood Glucose.: 187 mg/dL (01 Aug 2019 11:41)      Insulin Sliding Scale requirements X 24 Hours:    RADIOLOGY & ADDITIONAL TESTS:    A/P: 80 yo male with a PMH of HTN, HLD, DM, inferior wall MI, CKD-stage 3, right CVA(residual left arm weakness), s/p loop recorder placement in 2015 currently is s/p robotic mid CABG POD #1. he is currently being managed for diabetes    1.  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  Please hold the premeal if the patient is not eating  Please continue lispro moderate dose sliding scale four times daily with meals and at bedtime    Pt's fingerstick glucose goal is 140-180    Will continue to monitor     For discharge, TBD    Pt can follow up at discharge with [facility] by calling  to make an appointment.   discussed case with    and updated primary team

## 2019-08-03 PROBLEM — E78.5 HYPERLIPIDEMIA, UNSPECIFIED: Chronic | Status: ACTIVE | Noted: 2019-07-25

## 2019-08-03 PROBLEM — N18.3 CHRONIC KIDNEY DISEASE, STAGE 3 (MODERATE): Chronic | Status: ACTIVE | Noted: 2019-07-25

## 2019-08-03 PROBLEM — I10 ESSENTIAL (PRIMARY) HYPERTENSION: Chronic | Status: ACTIVE | Noted: 2019-07-25

## 2019-08-03 LAB
ANION GAP SERPL CALC-SCNC: 12 MMOL/L — SIGNIFICANT CHANGE UP (ref 5–17)
BUN SERPL-MCNC: 22 MG/DL — SIGNIFICANT CHANGE UP (ref 7–23)
CALCIUM SERPL-MCNC: 9.1 MG/DL — SIGNIFICANT CHANGE UP (ref 8.4–10.5)
CHLORIDE SERPL-SCNC: 99 MMOL/L — SIGNIFICANT CHANGE UP (ref 96–108)
CO2 SERPL-SCNC: 21 MMOL/L — LOW (ref 22–31)
CREAT SERPL-MCNC: 1.72 MG/DL — HIGH (ref 0.5–1.3)
GLUCOSE BLDC GLUCOMTR-MCNC: 130 MG/DL — HIGH (ref 70–99)
GLUCOSE BLDC GLUCOMTR-MCNC: 143 MG/DL — HIGH (ref 70–99)
GLUCOSE BLDC GLUCOMTR-MCNC: 175 MG/DL — HIGH (ref 70–99)
GLUCOSE BLDC GLUCOMTR-MCNC: 178 MG/DL — HIGH (ref 70–99)
GLUCOSE SERPL-MCNC: 170 MG/DL — HIGH (ref 70–99)
MAGNESIUM SERPL-MCNC: 1.8 MG/DL — SIGNIFICANT CHANGE UP (ref 1.6–2.6)
POTASSIUM SERPL-MCNC: 3.9 MMOL/L — SIGNIFICANT CHANGE UP (ref 3.5–5.3)
POTASSIUM SERPL-SCNC: 3.9 MMOL/L — SIGNIFICANT CHANGE UP (ref 3.5–5.3)
SODIUM SERPL-SCNC: 132 MMOL/L — LOW (ref 135–145)

## 2019-08-03 PROCEDURE — 71046 X-RAY EXAM CHEST 2 VIEWS: CPT | Mod: 26

## 2019-08-03 PROCEDURE — 99232 SBSQ HOSP IP/OBS MODERATE 35: CPT | Mod: GC

## 2019-08-03 RX ORDER — ACETAMINOPHEN 500 MG
650 TABLET ORAL EVERY 6 HOURS
Refills: 0 | Status: DISCONTINUED | OUTPATIENT
Start: 2019-08-03 | End: 2019-08-03

## 2019-08-03 RX ORDER — AMIODARONE HYDROCHLORIDE 400 MG/1
TABLET ORAL
Refills: 0 | Status: DISCONTINUED | OUTPATIENT
Start: 2019-08-03 | End: 2019-08-06

## 2019-08-03 RX ORDER — SODIUM CHLORIDE 9 MG/ML
1000 INJECTION, SOLUTION INTRAVENOUS
Refills: 0 | Status: DISCONTINUED | OUTPATIENT
Start: 2019-08-03 | End: 2019-08-06

## 2019-08-03 RX ORDER — MAGNESIUM SULFATE 500 MG/ML
2 VIAL (ML) INJECTION ONCE
Refills: 0 | Status: COMPLETED | OUTPATIENT
Start: 2019-08-03 | End: 2019-08-03

## 2019-08-03 RX ORDER — AMIODARONE HYDROCHLORIDE 400 MG/1
150 TABLET ORAL ONCE
Refills: 0 | Status: COMPLETED | OUTPATIENT
Start: 2019-08-03 | End: 2019-08-03

## 2019-08-03 RX ORDER — ACETAMINOPHEN 500 MG
650 TABLET ORAL EVERY 6 HOURS
Refills: 0 | Status: DISCONTINUED | OUTPATIENT
Start: 2019-08-03 | End: 2019-08-04

## 2019-08-03 RX ORDER — AMIODARONE HYDROCHLORIDE 400 MG/1
400 TABLET ORAL EVERY 8 HOURS
Refills: 0 | Status: DISCONTINUED | OUTPATIENT
Start: 2019-08-03 | End: 2019-08-06

## 2019-08-03 RX ORDER — POTASSIUM CHLORIDE 20 MEQ
20 PACKET (EA) ORAL ONCE
Refills: 0 | Status: COMPLETED | OUTPATIENT
Start: 2019-08-03 | End: 2019-08-03

## 2019-08-03 RX ADMIN — HEPARIN SODIUM 5000 UNIT(S): 5000 INJECTION INTRAVENOUS; SUBCUTANEOUS at 14:11

## 2019-08-03 RX ADMIN — SODIUM CHLORIDE 3 MILLILITER(S): 9 INJECTION INTRAMUSCULAR; INTRAVENOUS; SUBCUTANEOUS at 05:18

## 2019-08-03 RX ADMIN — Medication 2: at 17:10

## 2019-08-03 RX ADMIN — Medication 650 MILLIGRAM(S): at 12:04

## 2019-08-03 RX ADMIN — Medication 650 MILLIGRAM(S): at 08:34

## 2019-08-03 RX ADMIN — AMIODARONE HYDROCHLORIDE 133.33 MILLIGRAM(S): 400 TABLET ORAL at 15:08

## 2019-08-03 RX ADMIN — AMIODARONE HYDROCHLORIDE 600 MILLIGRAM(S): 400 TABLET ORAL at 09:30

## 2019-08-03 RX ADMIN — BRIMONIDINE TARTRATE 1 DROP(S): 2 SOLUTION/ DROPS OPHTHALMIC at 05:17

## 2019-08-03 RX ADMIN — DONEPEZIL HYDROCHLORIDE 5 MILLIGRAM(S): 10 TABLET, FILM COATED ORAL at 22:32

## 2019-08-03 RX ADMIN — Medication 3 UNIT(S): at 08:02

## 2019-08-03 RX ADMIN — PANTOPRAZOLE SODIUM 40 MILLIGRAM(S): 20 TABLET, DELAYED RELEASE ORAL at 05:18

## 2019-08-03 RX ADMIN — Medication 650 MILLIGRAM(S): at 20:05

## 2019-08-03 RX ADMIN — AMIODARONE HYDROCHLORIDE 400 MILLIGRAM(S): 400 TABLET ORAL at 20:05

## 2019-08-03 RX ADMIN — LIDOCAINE 1 PATCH: 4 CREAM TOPICAL at 20:05

## 2019-08-03 RX ADMIN — SODIUM CHLORIDE 3 MILLILITER(S): 9 INJECTION INTRAMUSCULAR; INTRAVENOUS; SUBCUTANEOUS at 14:00

## 2019-08-03 RX ADMIN — Medication 25 MILLIGRAM(S): at 00:57

## 2019-08-03 RX ADMIN — HEPARIN SODIUM 5000 UNIT(S): 5000 INJECTION INTRAVENOUS; SUBCUTANEOUS at 22:30

## 2019-08-03 RX ADMIN — LIDOCAINE 1 PATCH: 4 CREAM TOPICAL at 00:57

## 2019-08-03 RX ADMIN — SODIUM CHLORIDE 3 MILLILITER(S): 9 INJECTION INTRAMUSCULAR; INTRAVENOUS; SUBCUTANEOUS at 21:15

## 2019-08-03 RX ADMIN — Medication 650 MILLIGRAM(S): at 19:03

## 2019-08-03 RX ADMIN — Medication 4 UNIT(S): at 16:30

## 2019-08-03 RX ADMIN — Medication 2: at 22:31

## 2019-08-03 RX ADMIN — CLOPIDOGREL BISULFATE 75 MILLIGRAM(S): 75 TABLET, FILM COATED ORAL at 12:04

## 2019-08-03 RX ADMIN — Medication 3 UNIT(S): at 17:10

## 2019-08-03 RX ADMIN — Medication 100 MILLIGRAM(S): at 22:30

## 2019-08-03 RX ADMIN — ATORVASTATIN CALCIUM 40 MILLIGRAM(S): 80 TABLET, FILM COATED ORAL at 22:30

## 2019-08-03 RX ADMIN — Medication 20 MILLIEQUIVALENT(S): at 17:10

## 2019-08-03 RX ADMIN — Medication 650 MILLIGRAM(S): at 14:09

## 2019-08-03 RX ADMIN — Medication 25 MILLIGRAM(S): at 19:04

## 2019-08-03 RX ADMIN — LIDOCAINE 1 PATCH: 4 CREAM TOPICAL at 12:05

## 2019-08-03 RX ADMIN — HEPARIN SODIUM 5000 UNIT(S): 5000 INJECTION INTRAVENOUS; SUBCUTANEOUS at 05:17

## 2019-08-03 RX ADMIN — SODIUM CHLORIDE 75 MILLILITER(S): 9 INJECTION, SOLUTION INTRAVENOUS at 13:48

## 2019-08-03 RX ADMIN — INSULIN GLARGINE 12 UNIT(S): 100 INJECTION, SOLUTION SUBCUTANEOUS at 22:29

## 2019-08-03 RX ADMIN — Medication 25 MILLIGRAM(S): at 12:05

## 2019-08-03 RX ADMIN — Medication 81 MILLIGRAM(S): at 12:04

## 2019-08-03 RX ADMIN — BRIMONIDINE TARTRATE 1 DROP(S): 2 SOLUTION/ DROPS OPHTHALMIC at 19:04

## 2019-08-03 RX ADMIN — Medication 50 GRAM(S): at 17:10

## 2019-08-03 RX ADMIN — Medication 100 MILLIGRAM(S): at 14:11

## 2019-08-03 RX ADMIN — Medication 25 MILLIGRAM(S): at 05:17

## 2019-08-03 RX ADMIN — Medication 3 UNIT(S): at 12:03

## 2019-08-03 RX ADMIN — Medication 650 MILLIGRAM(S): at 08:04

## 2019-08-03 NOTE — PROGRESS NOTE ADULT - ASSESSMENT
80 y/o male with a PMHx of HTN, HLD, DM, inferior wall MI, CKD-stage 3, right CVA(residual left arm weakness), s/p loop recorder placement in 2015 and class II angina presented to Dr. Anjel Bhatti, Cardiothoracic Surgery, for surgical evaluation after he originally presented to Dr. Faulkner for routine cardiology care and upon workup, on 6/3/19 had abnormal stress test and underwent Echo revealing EF 55%, no significant valvular disease. Patient presented to [facility] for Cardiac Cath on 7/18/19 which revealed 2 vessel CAD. Per Dr. Bhatti, patient deemed a good surgical candidate and presented to [facility] for elective surgery. On 7/31/19 patient underwent Minimally Invasive Coronary Artery Bypass Grafting (MIDCAB), EF normal, and patient transferred to CTICU in stable condition. On POD0 patient extubated. POD1 requiring nitro gtt which was later weaned, and patient started on a BB.  POD1, patient deemed stable for transfer to tele stepdown unit. POD2, jasiel removed, f/u CXR without ptx. Today is POD3, overnight and this AM, postop paroxysmal afib/flutter rate controlled, Amio bolus x3 and lytes repleted, now in NSR. Also febrile OVN, blood cx sent. Continue pushing from a pulmonary standpoint and continue to monitor Tele/BP/HR.     A/P:  Neurovascular: No delirium. Pain well controlled with current regimen.  -Tylenol PRN for mild pain  - Percocet PRN for mod-severe pain  - PMHx of CVA with residual left arm weakness.  - Continue home med: donepezil 5mg PO qhs    Cardiovascular: POD3 s/p MIDCAB, EF nml  - CAD s/p MIDCAB: Continue Aspirin 81mg PO qd, Plavix 75mg PO qd, atorvastatin 40mg PO qhs, and metoprolol 25mg PO q6hrs.     - Continue to titrate up BB as tolerated.     - Secondary to CVA and CKD, ok with permissive HTN SBP 150s.    - Holding ACEi 2/2 to CKD.   - Postop pAfib: continue BB. Patient now in NSR. Continue to monitor tele, if Afib again, start PO amio load. Dr. Bhatti aware.   - Continue to monitor HR/BP/Tele.     Respiratory: 02 Sat = 98% on RA.  -If on oxygen wean to RA from for O2 Sat > 93%.  -Encourage C+DB and Use of IS 10x / hr while awake.  -CXR PA/Lat revealed small left pleural effusion and atelectasis. Repeat CXR PA/Lat in AM. Continuing to push from a pulmonary standpoint, practiced cough and deep breathing exercises, and continuing to educate on IS.      GI: Stable.  -PPX with pantoprazole.   -PO Diet.    Renal / : PMHx of CKD   - BUN/Cr: 22/1.72. Hydrated with IVF, however continues to trend up. Monitor closely.       - Cr on admission was 1.74. Baseline 1.4-1.5.  - Patient is incontinent, unable to measure UOP, however, per RN, patient is urinating.   -Monitor renal function.    Endocrine: PMHx of DM. Endocrine consulted, appreciate recs.  - Continue Lantus 12U qhs and Lispro 3U TID with meals, and ISS. Blood glucose goal <150.    -A1c: 8.2  -TSH: 1.337    Hematologic: H&H: 12.3/37.5  -CBC in AM    ID: afebrile today. Patient was febrile OVN. Blood cultures were sent, unable to obtain UA 2/2 incontinent.  - Above discussed with Dr. Bhatti, no need for UA at this time, patient denies dysuria or urinary complaints.   - Continue tylenol for fever.   -WBC: 12.18, repeat CBC in AM.   -Observe for SIRS/Sepsis Syndrome.    Prophylaxis:  -DVT prophylaxis with 5000 SubQ Heparin q8h.  -SCD's    Disposition:  - Home when medically stable.

## 2019-08-03 NOTE — PROGRESS NOTE ADULT - SUBJECTIVE AND OBJECTIVE BOX
Patient discussed on morning rounds with Dr. Bhatti    Operation / Date: 7/31/19 robotic MIDCAB, EF nml    SUBJECTIVE ASSESSMENT:  79y Male seen and examined bedside. Patient states, "I feel ok." He states he had a BM yesterday, and is passing flatus. He states he ambulated 2x in the halls yesterday. He is using IS, pulling less than 500cc. Denies chest pain, SOB, palpitations, hemopytsis, N/V/D, abdominal pain, dizziness, fever or chills. Patient is ambulating, tolerating PO diet, and voiding spontaneously.         Vital Signs Last 24 Hrs  T(C): 37.1 (03 Aug 2019 10:12), Max: 38.7 (02 Aug 2019 21:34)  T(F): 98.7 (03 Aug 2019 10:12), Max: 101.6 (02 Aug 2019 21:34)  HR: 80 (03 Aug 2019 09:37) (76 - 102)  BP: 134/58 (03 Aug 2019 09:37) (112/64 - 164/71)  BP(mean): 87 (03 Aug 2019 09:37) (75 - 101)  RR: 16 (03 Aug 2019 08:55) (15 - 16)  SpO2: 95% (03 Aug 2019 09:37) (91% - 97%)  I&O's Detail    02 Aug 2019 07:01  -  03 Aug 2019 07:00  --------------------------------------------------------  IN:    IV PiggyBack: 200 mL    lactated ringers.: 450 mL    Oral Fluid: 120 mL  Total IN: 770 mL    OUT:    Drain: 30 mL    Indwelling Catheter - Urethral: 125 mL    Voided: 750 mL  Total OUT: 905 mL    Total NET: -135 mL      03 Aug 2019 07:01  -  03 Aug 2019 12:31  --------------------------------------------------------  IN:    IV PiggyBack: 100 mL  Total IN: 100 mL    OUT:  Total OUT: 0 mL    Total NET: 100 mL        CHEST TUBE:  No.   HENRY DRAIN:  No.  EPICARDIAL WIRES: No.  TIE DOWNS: Yes.  SINGH: No.     PHYSICAL EXAM:    General: Patient OOBTC, no acute distress     Neurological: Alert and oriented. No focal neurological deficits     Cardiovascular: S1S2, RRR, no murmurs appreciated on exam     Respiratory: Clear to auscultation bilaterally, no w/r/r, very poor inspiratory effort.     Gastrointestinal: Abdomen soft, non tender, non distended     Extremities: Warm and well perfused. No edema or calf tenderness     Vascular: Peripheral pulses 2+ b/l.    Incision Sites: L thoracotomy site c/d/i, chest tube site, tiedown in place covered with clean dry occlusive dressing. Henry site with tiedown and covered with clean occlusive dressing.       LABS:                        12.3   12.18 )-----------( 185      ( 02 Aug 2019 16:56 )             37.5       COUMADIN:  No.       08-03    132<L>  |  99  |  22  ----------------------------<  170<H>  3.9   |  21<L>  |  1.72<H>    Ca    9.1      03 Aug 2019 11:07  Mg     1.8     08-03            MEDICATIONS  (STANDING):  acetaminophen   Tablet .. 650 milliGRAM(s) Oral every 6 hours  aspirin  chewable 81 milliGRAM(s) Oral daily  atorvastatin 40 milliGRAM(s) Oral at bedtime  brimonidine 0.2% Ophthalmic Solution 1 Drop(s) Left EYE two times a day  chlorhexidine 2% Cloths 1 Application(s) Topical daily  clopidogrel Tablet 75 milliGRAM(s) Oral daily  dextrose 5%. 1000 milliLiter(s) (50 mL/Hr) IV Continuous <Continuous>  dextrose 50% Injectable 50 milliLiter(s) IV Push every 15 minutes  docusate sodium 100 milliGRAM(s) Oral three times a day  donepezil 5 milliGRAM(s) Oral at bedtime  heparin  Injectable 5000 Unit(s) SubCutaneous every 8 hours  insulin glargine Injectable (LANTUS) 12 Unit(s) SubCutaneous at bedtime  insulin lispro (HumaLOG) corrective regimen sliding scale   SubCutaneous Before meals and at bedtime  insulin lispro Injectable (HumaLOG) 3 Unit(s) SubCutaneous four times a day before meals  lactated ringers. 1000 milliLiter(s) (75 mL/Hr) IV Continuous <Continuous>  lidocaine   Patch 1 Patch Transdermal daily  metoprolol tartrate 25 milliGRAM(s) Oral every 6 hours  pantoprazole    Tablet 40 milliGRAM(s) Oral before breakfast  senna 2 Tablet(s) Oral at bedtime  sodium chloride 0.9% lock flush 3 milliLiter(s) IV Push every 8 hours    MEDICATIONS  (PRN):  dextrose 40% Gel 15 Gram(s) Oral once PRN Blood Glucose LESS THAN 70 milliGRAM(s)/deciliter  glucagon  Injectable 1 milliGRAM(s) IntraMuscular once PRN Glucose LESS THAN 70 milligrams/deciliter  levalbuterol Inhalation 0.63 milliGRAM(s) Inhalation every 6 hours PRN SOB and/or wheezing  oxyCODONE    5 mG/acetaminophen 325 mG 1 Tablet(s) Oral every 6 hours PRN Severe Pain (7 - 10)  polyethylene glycol 3350 17 Gram(s) Oral daily PRN Constipation        RADIOLOGY & ADDITIONAL TESTS:  < from: Xray Chest 1 View AP/PA (08.02.19 @ 05:42) >    EXAM:  XR CHEST AP OR PA 1V                          PROCEDURE DATE:  08/02/2019          INTERPRETATION:  Chest single frontal view exam    History: Postop cardiothoracic surgery follow-up exam    Impression:    Possible left lung base small pleural effusion and/or focal atelectasis   appearing since previous previous exam 8/1/2019. No other change.   Hypoinflation. No pneumothorax. Right venous catheter tip in region of   right atrium again noted.    < end of copied text >

## 2019-08-03 NOTE — PROGRESS NOTE ADULT - SUBJECTIVE AND OBJECTIVE BOX
INTERVAL HPI/OVERNIGHT EVENTS:    Patient seen and examined at the bedside. He cannot recall what he eats. He did have soup for lunch but was not hungry for more food.   FSG & Insulin received:    Yesterday:  pre-dinner fs  nutritional lispro  3 units +   units lispro SS  bedtime fs  lantus 12  units + 0   units lispro SS    Today:  pre-breakfast fs  nutritional lispro 3  units+  0  units lispro SS  pre-lunch fs  nutritional lispro 3  units+ 0  units lispro SS      Pt reports the following symptoms:    CONSTITUTIONAL:  Negative fever or chills, feels well, good appetite  EYES:  Negative  blurry vision or double vision  CARDIOVASCULAR:  Negative for chest pain or palpitations  RESPIRATORY:  Negative for cough, wheezing, or SOB   GASTROINTESTINAL:  Negative for nausea, vomiting, diarrhea, constipation, or abdominal pain  GENITOURINARY:  Negative frequency, urgency or dysuria  NEUROLOGIC:  No headache, confusion, dizziness, lightheadedness    MEDICATIONS  (STANDING):  acetaminophen   Tablet .. 650 milliGRAM(s) Oral every 6 hours  amiodarone    Tablet   Oral   amiodarone    Tablet 400 milliGRAM(s) Oral every 8 hours  aspirin  chewable 81 milliGRAM(s) Oral daily  atorvastatin 40 milliGRAM(s) Oral at bedtime  brimonidine 0.2% Ophthalmic Solution 1 Drop(s) Left EYE two times a day  chlorhexidine 2% Cloths 1 Application(s) Topical daily  clopidogrel Tablet 75 milliGRAM(s) Oral daily  dextrose 5%. 1000 milliLiter(s) (50 mL/Hr) IV Continuous <Continuous>  dextrose 50% Injectable 50 milliLiter(s) IV Push every 15 minutes  docusate sodium 100 milliGRAM(s) Oral three times a day  donepezil 5 milliGRAM(s) Oral at bedtime  heparin  Injectable 5000 Unit(s) SubCutaneous every 8 hours  insulin glargine Injectable (LANTUS) 12 Unit(s) SubCutaneous at bedtime  insulin lispro (HumaLOG) corrective regimen sliding scale   SubCutaneous Before meals and at bedtime  insulin lispro Injectable (HumaLOG) 3 Unit(s) SubCutaneous four times a day before meals  lactated ringers. 1000 milliLiter(s) (75 mL/Hr) IV Continuous <Continuous>  lactated ringers. 1000 milliLiter(s) (75 mL/Hr) IV Continuous <Continuous>  lidocaine   Patch 1 Patch Transdermal daily  metoprolol tartrate 25 milliGRAM(s) Oral every 6 hours  pantoprazole    Tablet 40 milliGRAM(s) Oral before breakfast  senna 2 Tablet(s) Oral at bedtime  sodium chloride 0.9% lock flush 3 milliLiter(s) IV Push every 8 hours    MEDICATIONS  (PRN):  dextrose 40% Gel 15 Gram(s) Oral once PRN Blood Glucose LESS THAN 70 milliGRAM(s)/deciliter  glucagon  Injectable 1 milliGRAM(s) IntraMuscular once PRN Glucose LESS THAN 70 milligrams/deciliter  levalbuterol Inhalation 0.63 milliGRAM(s) Inhalation every 6 hours PRN SOB and/or wheezing  oxyCODONE    5 mG/acetaminophen 325 mG 1 Tablet(s) Oral every 6 hours PRN Severe Pain (7 - 10)  polyethylene glycol 3350 17 Gram(s) Oral daily PRN Constipation      PHYSICAL EXAM  Vital Signs Last 24 Hrs  T(C): 36.8 (03 Aug 2019 14:11), Max: 38.7 (02 Aug 2019 21:34)  T(F): 98.3 (03 Aug 2019 14:11), Max: 101.6 (02 Aug 2019 21:34)  HR: 76 (03 Aug 2019 17:24) (76 - 92)  BP: 141/61 (03 Aug 2019 17:24) (112/64 - 153/70)  BP(mean): 90 (03 Aug 2019 17:24) (75 - 101)  RR: 16 (03 Aug 2019 17:24) (15 - 16)  SpO2: 95% (03 Aug 2019 17:24) (91% - 96%)    Constitutional: wn/wd in NAD.   HEENT: NCAT, MMM, OP clear, EOMI, no proptosis or lid retraction  Neck: no thyromegaly or palpable thyroid nodules   Respiratory: lungs CTAB.  Cardiovascular: regular rhythm, normal S1 and S2, no audible murmurs, no peripheral edema  GI: soft, NT/ND, no masses/HSM appreciated.  Neurology: no tremors, DTR 2+  Skin: no visible rashes/lesions  Psychiatric: AAO x 3, normal affect/mood.    LABS:                        12.3   12.18 )-----------( 185      ( 02 Aug 2019 16:56 )             37.5     08-03    132<L>  |  99  |  22  ----------------------------<  170<H>  3.9   |  21<L>  |  1.72<H>    Ca    9.1      03 Aug 2019 11:07  Mg     1.8               Thyroid Stimulating Hormone, Serum: 1.337 uIU/mL ( @ 15:54)      HbA1C: 8.2 % ( @ 15:55)    CAPILLARY BLOOD GLUCOSE      POCT Blood Glucose.: 178 mg/dL (03 Aug 2019 16:28)  POCT Blood Glucose.: 143 mg/dL (03 Aug 2019 11:26)  POCT Blood Glucose.: 130 mg/dL (03 Aug 2019 07:08)  POCT Blood Glucose.: 148 mg/dL (02 Aug 2019 22:42)  POCT Blood Glucose.: 157 mg/dL (02 Aug 2019 21:29)      A/P: 78 yo male with a PMH of HTN, HLD, DM, inferior wall MI, CKD-stage 3, right CVA(residual left arm weakness), s/p loop recorder placement in  currently is s/p robotic mid CABG . he is currently being managed for diabetes    1.  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  Please hold the premeal if the patient is not eating  Please continue lispro moderate dose sliding scale four times daily with meals and at bedtime    Pt's fingerstick glucose goal is 140-180    Will continue to monitor     For discharge, TBD    Pt can follow up at discharge with [facility] by calling  to make an appointment.   discussed case with    and updated primary team

## 2019-08-04 LAB
ANION GAP SERPL CALC-SCNC: 11 MMOL/L — SIGNIFICANT CHANGE UP (ref 5–17)
ANION GAP SERPL CALC-SCNC: 14 MMOL/L — SIGNIFICANT CHANGE UP (ref 5–17)
APPEARANCE UR: CLEAR — SIGNIFICANT CHANGE UP
APTT BLD: 107.5 SEC — HIGH (ref 27.5–36.3)
BACTERIA # UR AUTO: PRESENT /HPF
BASOPHILS # BLD AUTO: 0.03 K/UL — SIGNIFICANT CHANGE UP (ref 0–0.2)
BASOPHILS NFR BLD AUTO: 0.3 % — SIGNIFICANT CHANGE UP (ref 0–2)
BILIRUB UR-MCNC: NEGATIVE — SIGNIFICANT CHANGE UP
BUN SERPL-MCNC: 25 MG/DL — HIGH (ref 7–23)
BUN SERPL-MCNC: 28 MG/DL — HIGH (ref 7–23)
CALCIUM SERPL-MCNC: 8.8 MG/DL — SIGNIFICANT CHANGE UP (ref 8.4–10.5)
CALCIUM SERPL-MCNC: 9.1 MG/DL — SIGNIFICANT CHANGE UP (ref 8.4–10.5)
CHLORIDE SERPL-SCNC: 103 MMOL/L — SIGNIFICANT CHANGE UP (ref 96–108)
CHLORIDE SERPL-SCNC: 98 MMOL/L — SIGNIFICANT CHANGE UP (ref 96–108)
CO2 SERPL-SCNC: 20 MMOL/L — LOW (ref 22–31)
CO2 SERPL-SCNC: 24 MMOL/L — SIGNIFICANT CHANGE UP (ref 22–31)
COLOR SPEC: YELLOW — SIGNIFICANT CHANGE UP
CREAT SERPL-MCNC: 1.9 MG/DL — HIGH (ref 0.5–1.3)
CREAT SERPL-MCNC: 1.99 MG/DL — HIGH (ref 0.5–1.3)
DIFF PNL FLD: ABNORMAL
EOSINOPHIL # BLD AUTO: 0.16 K/UL — SIGNIFICANT CHANGE UP (ref 0–0.5)
EOSINOPHIL NFR BLD AUTO: 1.4 % — SIGNIFICANT CHANGE UP (ref 0–6)
EPI CELLS # UR: SIGNIFICANT CHANGE UP /HPF (ref 0–5)
GLUCOSE BLDC GLUCOMTR-MCNC: 189 MG/DL — HIGH (ref 70–99)
GLUCOSE BLDC GLUCOMTR-MCNC: 214 MG/DL — HIGH (ref 70–99)
GLUCOSE BLDC GLUCOMTR-MCNC: 218 MG/DL — HIGH (ref 70–99)
GLUCOSE BLDC GLUCOMTR-MCNC: 242 MG/DL — HIGH (ref 70–99)
GLUCOSE BLDC GLUCOMTR-MCNC: 67 MG/DL — LOW (ref 70–99)
GLUCOSE BLDC GLUCOMTR-MCNC: 86 MG/DL — SIGNIFICANT CHANGE UP (ref 70–99)
GLUCOSE SERPL-MCNC: 191 MG/DL — HIGH (ref 70–99)
GLUCOSE SERPL-MCNC: 86 MG/DL — SIGNIFICANT CHANGE UP (ref 70–99)
GLUCOSE UR QL: 250
GRAN CASTS # UR COMP ASSIST: ABNORMAL /LPF
HCT VFR BLD CALC: 35.3 % — LOW (ref 39–50)
HGB BLD-MCNC: 11.7 G/DL — LOW (ref 13–17)
HYALINE CASTS # UR AUTO: ABNORMAL /LPF (ref 0–2)
IMM GRANULOCYTES NFR BLD AUTO: 0.5 % — SIGNIFICANT CHANGE UP (ref 0–1.5)
KETONES UR-MCNC: NEGATIVE — SIGNIFICANT CHANGE UP
LEUKOCYTE ESTERASE UR-ACNC: NEGATIVE — SIGNIFICANT CHANGE UP
LYMPHOCYTES # BLD AUTO: 1.63 K/UL — SIGNIFICANT CHANGE UP (ref 1–3.3)
LYMPHOCYTES # BLD AUTO: 14.5 % — SIGNIFICANT CHANGE UP (ref 13–44)
MAGNESIUM SERPL-MCNC: 2.2 MG/DL — SIGNIFICANT CHANGE UP (ref 1.6–2.6)
MCHC RBC-ENTMCNC: 29.3 PG — SIGNIFICANT CHANGE UP (ref 27–34)
MCHC RBC-ENTMCNC: 33.1 GM/DL — SIGNIFICANT CHANGE UP (ref 32–36)
MCV RBC AUTO: 88.5 FL — SIGNIFICANT CHANGE UP (ref 80–100)
MONOCYTES # BLD AUTO: 1.26 K/UL — HIGH (ref 0–0.9)
MONOCYTES NFR BLD AUTO: 11.2 % — SIGNIFICANT CHANGE UP (ref 2–14)
NEUTROPHILS # BLD AUTO: 8.13 K/UL — HIGH (ref 1.8–7.4)
NEUTROPHILS NFR BLD AUTO: 72.1 % — SIGNIFICANT CHANGE UP (ref 43–77)
NITRITE UR-MCNC: NEGATIVE — SIGNIFICANT CHANGE UP
NRBC # BLD: 0 /100 WBCS — SIGNIFICANT CHANGE UP (ref 0–0)
PH UR: 5.5 — SIGNIFICANT CHANGE UP (ref 5–8)
PLATELET # BLD AUTO: 200 K/UL — SIGNIFICANT CHANGE UP (ref 150–400)
POTASSIUM SERPL-MCNC: 3.8 MMOL/L — SIGNIFICANT CHANGE UP (ref 3.5–5.3)
POTASSIUM SERPL-MCNC: 4.5 MMOL/L — SIGNIFICANT CHANGE UP (ref 3.5–5.3)
POTASSIUM SERPL-SCNC: 3.8 MMOL/L — SIGNIFICANT CHANGE UP (ref 3.5–5.3)
POTASSIUM SERPL-SCNC: 4.5 MMOL/L — SIGNIFICANT CHANGE UP (ref 3.5–5.3)
PROT UR-MCNC: 100 MG/DL
RBC # BLD: 3.99 M/UL — LOW (ref 4.2–5.8)
RBC # FLD: 12.1 % — SIGNIFICANT CHANGE UP (ref 10.3–14.5)
RBC CASTS # UR COMP ASSIST: < 5 /HPF — SIGNIFICANT CHANGE UP
SODIUM SERPL-SCNC: 132 MMOL/L — LOW (ref 135–145)
SODIUM SERPL-SCNC: 138 MMOL/L — SIGNIFICANT CHANGE UP (ref 135–145)
SP GR SPEC: 1.02 — SIGNIFICANT CHANGE UP (ref 1–1.03)
UROBILINOGEN FLD QL: 0.2 E.U./DL — SIGNIFICANT CHANGE UP
WBC # BLD: 11.27 K/UL — HIGH (ref 3.8–10.5)
WBC # FLD AUTO: 11.27 K/UL — HIGH (ref 3.8–10.5)
WBC UR QL: < 5 /HPF — SIGNIFICANT CHANGE UP

## 2019-08-04 PROCEDURE — 71045 X-RAY EXAM CHEST 1 VIEW: CPT | Mod: 26

## 2019-08-04 PROCEDURE — 99232 SBSQ HOSP IP/OBS MODERATE 35: CPT | Mod: GC

## 2019-08-04 RX ORDER — ALBUMIN HUMAN 25 %
250 VIAL (ML) INTRAVENOUS ONCE
Refills: 0 | Status: COMPLETED | OUTPATIENT
Start: 2019-08-04 | End: 2019-08-04

## 2019-08-04 RX ORDER — TAMSULOSIN HYDROCHLORIDE 0.4 MG/1
0.4 CAPSULE ORAL AT BEDTIME
Refills: 0 | Status: DISCONTINUED | OUTPATIENT
Start: 2019-08-04 | End: 2019-08-04

## 2019-08-04 RX ORDER — SODIUM CHLORIDE 9 MG/ML
1000 INJECTION INTRAMUSCULAR; INTRAVENOUS; SUBCUTANEOUS
Refills: 0 | Status: DISCONTINUED | OUTPATIENT
Start: 2019-08-04 | End: 2019-08-06

## 2019-08-04 RX ORDER — HEPARIN SODIUM 5000 [USP'U]/ML
900 INJECTION INTRAVENOUS; SUBCUTANEOUS
Qty: 25000 | Refills: 0 | Status: DISCONTINUED | OUTPATIENT
Start: 2019-08-04 | End: 2019-08-04

## 2019-08-04 RX ORDER — POTASSIUM CHLORIDE 20 MEQ
20 PACKET (EA) ORAL ONCE
Refills: 0 | Status: COMPLETED | OUTPATIENT
Start: 2019-08-04 | End: 2019-08-04

## 2019-08-04 RX ORDER — HEPARIN SODIUM 5000 [USP'U]/ML
900 INJECTION INTRAVENOUS; SUBCUTANEOUS
Qty: 25000 | Refills: 0 | Status: DISCONTINUED | OUTPATIENT
Start: 2019-08-04 | End: 2019-08-05

## 2019-08-04 RX ORDER — ACETAMINOPHEN 500 MG
650 TABLET ORAL EVERY 6 HOURS
Refills: 0 | Status: DISCONTINUED | OUTPATIENT
Start: 2019-08-04 | End: 2019-08-07

## 2019-08-04 RX ORDER — AMIODARONE HYDROCHLORIDE 400 MG/1
150 TABLET ORAL ONCE
Refills: 0 | Status: COMPLETED | OUTPATIENT
Start: 2019-08-04 | End: 2019-08-04

## 2019-08-04 RX ADMIN — LIDOCAINE 1 PATCH: 4 CREAM TOPICAL at 11:29

## 2019-08-04 RX ADMIN — DONEPEZIL HYDROCHLORIDE 5 MILLIGRAM(S): 10 TABLET, FILM COATED ORAL at 22:31

## 2019-08-04 RX ADMIN — BRIMONIDINE TARTRATE 1 DROP(S): 2 SOLUTION/ DROPS OPHTHALMIC at 06:06

## 2019-08-04 RX ADMIN — SODIUM CHLORIDE 3 MILLILITER(S): 9 INJECTION INTRAMUSCULAR; INTRAVENOUS; SUBCUTANEOUS at 14:29

## 2019-08-04 RX ADMIN — Medication 650 MILLIGRAM(S): at 00:30

## 2019-08-04 RX ADMIN — Medication 25 MILLIGRAM(S): at 00:30

## 2019-08-04 RX ADMIN — OXYCODONE AND ACETAMINOPHEN 1 TABLET(S): 5; 325 TABLET ORAL at 11:18

## 2019-08-04 RX ADMIN — SODIUM CHLORIDE 3 MILLILITER(S): 9 INJECTION INTRAMUSCULAR; INTRAVENOUS; SUBCUTANEOUS at 21:11

## 2019-08-04 RX ADMIN — Medication 3 UNIT(S): at 17:11

## 2019-08-04 RX ADMIN — INSULIN GLARGINE 12 UNIT(S): 100 INJECTION, SOLUTION SUBCUTANEOUS at 22:23

## 2019-08-04 RX ADMIN — Medication 25 MILLIGRAM(S): at 06:06

## 2019-08-04 RX ADMIN — Medication 3 UNIT(S): at 08:41

## 2019-08-04 RX ADMIN — Medication 125 MILLILITER(S): at 08:49

## 2019-08-04 RX ADMIN — Medication 4: at 12:30

## 2019-08-04 RX ADMIN — OXYCODONE AND ACETAMINOPHEN 1 TABLET(S): 5; 325 TABLET ORAL at 23:31

## 2019-08-04 RX ADMIN — HEPARIN SODIUM 5000 UNIT(S): 5000 INJECTION INTRAVENOUS; SUBCUTANEOUS at 06:06

## 2019-08-04 RX ADMIN — PANTOPRAZOLE SODIUM 40 MILLIGRAM(S): 20 TABLET, DELAYED RELEASE ORAL at 06:06

## 2019-08-04 RX ADMIN — Medication 81 MILLIGRAM(S): at 11:29

## 2019-08-04 RX ADMIN — OXYCODONE AND ACETAMINOPHEN 1 TABLET(S): 5; 325 TABLET ORAL at 18:15

## 2019-08-04 RX ADMIN — AMIODARONE HYDROCHLORIDE 200 MILLIGRAM(S): 400 TABLET ORAL at 12:30

## 2019-08-04 RX ADMIN — AMIODARONE HYDROCHLORIDE 400 MILLIGRAM(S): 400 TABLET ORAL at 22:31

## 2019-08-04 RX ADMIN — Medication 2: at 17:11

## 2019-08-04 RX ADMIN — LIDOCAINE 1 PATCH: 4 CREAM TOPICAL at 00:23

## 2019-08-04 RX ADMIN — CLOPIDOGREL BISULFATE 75 MILLIGRAM(S): 75 TABLET, FILM COATED ORAL at 11:29

## 2019-08-04 RX ADMIN — Medication 4: at 23:05

## 2019-08-04 RX ADMIN — SODIUM CHLORIDE 3 MILLILITER(S): 9 INJECTION INTRAMUSCULAR; INTRAVENOUS; SUBCUTANEOUS at 05:16

## 2019-08-04 RX ADMIN — ATORVASTATIN CALCIUM 40 MILLIGRAM(S): 80 TABLET, FILM COATED ORAL at 22:31

## 2019-08-04 RX ADMIN — Medication 25 MILLIGRAM(S): at 11:29

## 2019-08-04 RX ADMIN — OXYCODONE AND ACETAMINOPHEN 1 TABLET(S): 5; 325 TABLET ORAL at 12:15

## 2019-08-04 RX ADMIN — Medication 650 MILLIGRAM(S): at 01:00

## 2019-08-04 RX ADMIN — LIDOCAINE 1 PATCH: 4 CREAM TOPICAL at 22:29

## 2019-08-04 RX ADMIN — LIDOCAINE 1 PATCH: 4 CREAM TOPICAL at 20:32

## 2019-08-04 RX ADMIN — BRIMONIDINE TARTRATE 1 DROP(S): 2 SOLUTION/ DROPS OPHTHALMIC at 17:48

## 2019-08-04 RX ADMIN — Medication 25 MILLIGRAM(S): at 23:31

## 2019-08-04 RX ADMIN — HEPARIN SODIUM 5000 UNIT(S): 5000 INJECTION INTRAVENOUS; SUBCUTANEOUS at 14:47

## 2019-08-04 RX ADMIN — OXYCODONE AND ACETAMINOPHEN 1 TABLET(S): 5; 325 TABLET ORAL at 17:16

## 2019-08-04 RX ADMIN — Medication 20 MILLIEQUIVALENT(S): at 12:23

## 2019-08-04 RX ADMIN — Medication 3 UNIT(S): at 12:30

## 2019-08-04 RX ADMIN — SODIUM CHLORIDE 50 MILLILITER(S): 9 INJECTION INTRAMUSCULAR; INTRAVENOUS; SUBCUTANEOUS at 09:00

## 2019-08-04 RX ADMIN — HEPARIN SODIUM 9 UNIT(S)/HR: 5000 INJECTION INTRAVENOUS; SUBCUTANEOUS at 16:00

## 2019-08-04 RX ADMIN — AMIODARONE HYDROCHLORIDE 400 MILLIGRAM(S): 400 TABLET ORAL at 14:47

## 2019-08-04 RX ADMIN — AMIODARONE HYDROCHLORIDE 400 MILLIGRAM(S): 400 TABLET ORAL at 06:06

## 2019-08-04 NOTE — PROGRESS NOTE ADULT - ASSESSMENT
80 y/o male with a PMHx of HTN, HLD, DM, inferior wall MI, CKD-stage 3, right CVA(residual left arm weakness), s/p loop recorder placement in 2015 and class II angina presented to Dr. Anjel Bhatti, Cardiothoracic Surgery, for surgical evaluation after he originally presented to Dr. Faulkner for routine cardiology care and upon workup, on 6/3/19 had abnormal stress test and underwent Echo revealing EF 55%, no significant valvular disease. Patient presented to [facility] for Cardiac Cath on 7/18/19 which revealed 2 vessel CAD. Per Dr. Bhatti, patient deemed a good surgical candidate and presented to [facility] for elective surgery. On 7/31/19 patient underwent Minimally Invasive Coronary Artery Bypass Grafting (MIDCAB), EF normal, and patient transferred to CTICU in stable condition. On POD0 patient extubated. POD1 requiring nitro gtt which was later weaned, and patient started on a BB.  POD1, patient deemed stable for transfer to tele stepdown unit. POD2, jasiel removed, f/u CXR without ptx. Febrile, started on standing tylenol. POD3, postop paroxysmal afib/flutter rate controlled, Amio bolus x3 and lytes repleted, to NSR. Today POD4, NSR, afebrile x 24 hours.    A/P:  Neurovascular: No delirium. Pain well controlled with current regimen.  -Tylenol PRN for mild pain  - Percocet PRN for mod-severe pain  - PMHx of CVA - monitor neuro status  - Continue home med: donepezil 5mg PO qhs    Cardiovascular: POD4 s/p MIDCAB, EF nml  - CAD s/p MIDCAB: Continue Aspirin 81mg PO qd, Plavix 75mg PO qd, atorvastatin 40mg PO qhs, and metoprolol 25mg PO q6hrs.     - Continue to titrate up BB as tolerated.     - Secondary to CVA and CKD, ok with permissive HTN SBP 150s.    - Holding ACEi 2/2 to CKD.   - Postop pAfib: continue BB. Patient now in NSR. Continue BB and amio PO load, no AC at this time per Dr. Bhatti.  -monitor hr/bp/tele    Respiratory: 02 Sat = 98% on RA.  -Encourage C+DB and Use of IS 10x / hr while awake.  -CXR b/l atelecatsis, f/u PA/lAT in AM.      GI: Stable.  -PPX with pantoprazole.   -Continue bowel regimen  -PO Diet.    Renal / : PMHx of CKD   - BUN/Cr: 25/1.90, trending up. Cr on admission was 1.74. Baseline 1.4-1.5.  -IVF 50cc/hr, f/u BMP 4pm  - Patient is incontinent, unable to measure UOP, however, per RN, patient is urinating.   -Monitor renal function.    Endocrine: PMHx of DM. Endocrine consulted, appreciate recs.  - Continue Lantus 12U qhs and Lispro 3U TID with meals, and ISS. Blood glucose goal <150.    -A1c: 8.2  -TSH: 1.337    Hematologic: H&H: 11/35  -CBC in AM    ID: afebrile for 24 hours.   - Tylenol PRN  -WBC: 11, trending down    -Observe for SIRS/Sepsis Syndrome.    Prophylaxis:  -DVT prophylaxis with 5000 SubQ Heparin q8h.  -SCD's    Disposition:  - Home when medically stable.

## 2019-08-04 NOTE — PROGRESS NOTE ADULT - SUBJECTIVE AND OBJECTIVE BOX
Patient discussed on morning rounds with Dr. Bhatti    Operation / Date: 19 robotic MIDCAB, EF nml    SUBJECTIVE ASSESSMENT:  79y Male seen and exmained. Feels well, denies fever, chest pain, palpitations, SOB, abdominal pain, n/v, dizziness. No acute complaints. reports did not ambulate in hallway yesterday, using IS pulling 500cc ,tolerating PO diet +BM yesterday. Knows he has to work on IS and ambulate 3-4x today.       Vital Signs Last 24 Hrs  T(C): 36.7 (04 Aug 2019 09:26), Max: 37.1 (03 Aug 2019 10:12)  T(F): 98 (04 Aug 2019 09:26), Max: 98.7 (03 Aug 2019 10:12)  HR: 74 (04 Aug 2019 04:30) (68 - 80)  BP: 161/78 (04 Aug 2019 04:30) (115/61 - 161/78)  BP(mean): 84 (04 Aug 2019 00:30) (76 - 90)  RR: 16 (04 Aug 2019 04:30) (16 - 17)  SpO2: 96% (04 Aug 2019 04:30) (95% - 96%)  I&O's Detail    03 Aug 2019 07:01  -  04 Aug 2019 07:00  --------------------------------------------------------  IN:    IV PiggyBack: 100 mL    lactated ringers.: 450 mL    Oral Fluid: 450 mL  Total IN: 1000 mL    OUT:    Voided: 320 mL  Total OUT: 320 mL    Total NET: 680 mL      04 Aug 2019 07:01  -  04 Aug 2019 09:45  --------------------------------------------------------  IN:    Oral Fluid: 298 mL  Total IN: 298 mL    OUT:  Total OUT: 0 mL    Total NET: 298 mL    CHEST TUBE:  No.   JASIEL DRAIN:  No.  EPICARDIAL WIRES: No.  TIE DOWNS: Yes.  SINGH: No.     HYSICAL EXAM:    General: Patient lying comfortably in bed, no acute distress     Neurological: Alert and oriented. No focal neurological deficits     Cardiovascular: S1S2, RRR, no murmurs appreciated on exam     Respiratory: Clear to ausculation bilaterally, no wheeze/rhonchi/rales    Gastrointestinal: + BS, soft, non tender, non distended     Extremities: Warm and well perfused. No edema, no calf tenderness     Vascular: 2+ Peripheral pulses b/l     Incision Sites: L thoracotomy: CDI, no signs of infection/dehiscence/drainage. CT/jasiel site: + tie down, CDI.     LABS:                        11.7   11.27 )-----------( 200      ( 04 Aug 2019 06:32 )             35.3       COUMADIN:  NO          138  |  103  |  25<H>  ----------------------------<  86  3.8   |  24  |  1.90<H>    Ca    9.1      04 Aug 2019 06:32  Mg     2.2     08        Urinalysis Basic - ( 04 Aug 2019 01:15 )    Color: Yellow / Appearance: Clear / S.020 / pH: x  Gluc: x / Ketone: NEGATIVE  / Bili: Negative / Urobili: 0.2 E.U./dL   Blood: x / Protein: 100 mg/dL / Nitrite: NEGATIVE   Leuk Esterase: NEGATIVE / RBC: < 5 /HPF / WBC < 5 /HPF   Sq Epi: x / Non Sq Epi: 0-5 /HPF / Bacteria: Present /HPF        MEDICATIONS  (STANDING):  amiodarone    Tablet   Oral   amiodarone    Tablet 400 milliGRAM(s) Oral every 8 hours  aspirin  chewable 81 milliGRAM(s) Oral daily  atorvastatin 40 milliGRAM(s) Oral at bedtime  brimonidine 0.2% Ophthalmic Solution 1 Drop(s) Left EYE two times a day  chlorhexidine 2% Cloths 1 Application(s) Topical daily  clopidogrel Tablet 75 milliGRAM(s) Oral daily  dextrose 5%. 1000 milliLiter(s) (50 mL/Hr) IV Continuous <Continuous>  dextrose 50% Injectable 50 milliLiter(s) IV Push every 15 minutes  docusate sodium 100 milliGRAM(s) Oral three times a day  donepezil 5 milliGRAM(s) Oral at bedtime  heparin  Injectable 5000 Unit(s) SubCutaneous every 8 hours  insulin glargine Injectable (LANTUS) 12 Unit(s) SubCutaneous at bedtime  insulin lispro (HumaLOG) corrective regimen sliding scale   SubCutaneous Before meals and at bedtime  insulin lispro Injectable (HumaLOG) 3 Unit(s) SubCutaneous four times a day before meals  lactated ringers. 1000 milliLiter(s) (75 mL/Hr) IV Continuous <Continuous>  lactated ringers. 1000 milliLiter(s) (75 mL/Hr) IV Continuous <Continuous>  lidocaine   Patch 1 Patch Transdermal daily  metoprolol tartrate 25 milliGRAM(s) Oral every 6 hours  pantoprazole    Tablet 40 milliGRAM(s) Oral before breakfast  senna 2 Tablet(s) Oral at bedtime  sodium chloride 0.9% lock flush 3 milliLiter(s) IV Push every 8 hours  sodium chloride 0.9%. 1000 milliLiter(s) (50 mL/Hr) IV Continuous <Continuous>    MEDICATIONS  (PRN):  acetaminophen   Tablet .. 650 milliGRAM(s) Oral every 6 hours PRN Temp greater or equal to 38C (100.4F)  dextrose 40% Gel 15 Gram(s) Oral once PRN Blood Glucose LESS THAN 70 milliGRAM(s)/deciliter  glucagon  Injectable 1 milliGRAM(s) IntraMuscular once PRN Glucose LESS THAN 70 milligrams/deciliter  levalbuterol Inhalation 0.63 milliGRAM(s) Inhalation every 6 hours PRN SOB and/or wheezing  oxyCODONE    5 mG/acetaminophen 325 mG 1 Tablet(s) Oral every 6 hours PRN Severe Pain (7 - 10)  polyethylene glycol 3350 17 Gram(s) Oral daily PRN Constipation        RADIOLOGY & ADDITIONAL TESTS:

## 2019-08-04 NOTE — PROGRESS NOTE ADULT - SUBJECTIVE AND OBJECTIVE BOX
INTERVAL HPI/OVERNIGHT EVENTS:    Patient seen and examined at the bedside. Patient eating better today. He was hypoglycemic this morning.    FSG & Insulin received:    Yesterday:  pre-dinner fs  nutritional lispro  3 units +   units lispro SS  bedtime fs  lantus 12  units + 2   units lispro SS    Today:  pre-breakfast fs, 86  nutritional lispro 3  units+  0  units lispro SS  pre-lunch fs  nutritional lispro 3  units+ 4  units lispro SS      Pt reports the following symptoms:    CONSTITUTIONAL:  Negative fever or chills, feels well, good appetite  EYES:  Negative  blurry vision or double vision  CARDIOVASCULAR:  Negative for chest pain or palpitations  RESPIRATORY:  Negative for cough, wheezing, or SOB   GASTROINTESTINAL:  Negative for nausea, vomiting, diarrhea, constipation, or abdominal pain  GENITOURINARY:  Negative frequency, urgency or dysuria  NEUROLOGIC:  No headache, confusion, dizziness, lightheadedness    MEDICATIONS  (STANDING):  amiodarone    Tablet   Oral   amiodarone    Tablet 400 milliGRAM(s) Oral every 8 hours  aspirin  chewable 81 milliGRAM(s) Oral daily  atorvastatin 40 milliGRAM(s) Oral at bedtime  brimonidine 0.2% Ophthalmic Solution 1 Drop(s) Left EYE two times a day  chlorhexidine 2% Cloths 1 Application(s) Topical daily  dextrose 5%. 1000 milliLiter(s) (50 mL/Hr) IV Continuous <Continuous>  dextrose 50% Injectable 50 milliLiter(s) IV Push every 15 minutes  docusate sodium 100 milliGRAM(s) Oral three times a day  donepezil 5 milliGRAM(s) Oral at bedtime  heparin  Infusion 900 Unit(s)/Hr (9 mL/Hr) IV Continuous <Continuous>  insulin glargine Injectable (LANTUS) 12 Unit(s) SubCutaneous at bedtime  insulin lispro (HumaLOG) corrective regimen sliding scale   SubCutaneous Before meals and at bedtime  insulin lispro Injectable (HumaLOG) 3 Unit(s) SubCutaneous four times a day before meals  lactated ringers. 1000 milliLiter(s) (75 mL/Hr) IV Continuous <Continuous>  lactated ringers. 1000 milliLiter(s) (75 mL/Hr) IV Continuous <Continuous>  lidocaine   Patch 1 Patch Transdermal daily  metoprolol tartrate 25 milliGRAM(s) Oral every 6 hours  pantoprazole    Tablet 40 milliGRAM(s) Oral before breakfast  senna 2 Tablet(s) Oral at bedtime  sodium chloride 0.9% lock flush 3 milliLiter(s) IV Push every 8 hours  sodium chloride 0.9%. 1000 milliLiter(s) (50 mL/Hr) IV Continuous <Continuous>    MEDICATIONS  (PRN):  acetaminophen   Tablet .. 650 milliGRAM(s) Oral every 6 hours PRN Temp greater or equal to 38C (100.4F)  dextrose 40% Gel 15 Gram(s) Oral once PRN Blood Glucose LESS THAN 70 milliGRAM(s)/deciliter  glucagon  Injectable 1 milliGRAM(s) IntraMuscular once PRN Glucose LESS THAN 70 milligrams/deciliter  levalbuterol Inhalation 0.63 milliGRAM(s) Inhalation every 6 hours PRN SOB and/or wheezing  oxyCODONE    5 mG/acetaminophen 325 mG 1 Tablet(s) Oral every 6 hours PRN Severe Pain (7 - 10)  polyethylene glycol 3350 17 Gram(s) Oral daily PRN Constipation      PHYSICAL EXAM  Vital Signs Last 24 Hrs  T(C): 37.2 (04 Aug 2019 13:46), Max: 37.2 (04 Aug 2019 13:46)  T(F): 98.9 (04 Aug 2019 13:46), Max: 98.9 (04 Aug 2019 13:46)  HR: 66 (04 Aug 2019 13:00) (66 - 79)  BP: 103/58 (04 Aug 2019 13:00) (103/58 - 161/78)  BP(mean): 70 (04 Aug 2019 13:00) (70 - 90)  RR: 17 (04 Aug 2019 13:00) (16 - 20)  SpO2: 96% (04 Aug 2019 13:00) (95% - 97%)    Constitutional: wn/wd in NAD.   HEENT: NCAT, MMM, OP clear, EOMI, no proptosis or lid retraction  Neck: no thyromegaly or palpable thyroid nodules   Respiratory: lungs CTAB.  Cardiovascular: regular rhythm, normal S1 and S2, no audible murmurs, no peripheral edema  GI: soft, NT/ND, no masses/HSM appreciated.  Neurology: no tremors, DTR 2+  Skin: no visible rashes/lesions  Psychiatric: AAO x 3, normal affect/mood.    LABS:                        11.7   11.27 )-----------( 200      ( 04 Aug 2019 06:32 )             35.3     08-04    138  |  103  |  25<H>  ----------------------------<  86  3.8   |  24  |  1.90<H>    Ca    9.1      04 Aug 2019 06:32  Mg     2.2     08-04        Urinalysis Basic - ( 04 Aug 2019 01:15 )    Color: Yellow / Appearance: Clear / S.020 / pH: x  Gluc: x / Ketone: NEGATIVE  / Bili: Negative / Urobili: 0.2 E.U./dL   Blood: x / Protein: 100 mg/dL / Nitrite: NEGATIVE   Leuk Esterase: NEGATIVE / RBC: < 5 /HPF / WBC < 5 /HPF   Sq Epi: x / Non Sq Epi: 0-5 /HPF / Bacteria: Present /HPF      Thyroid Stimulating Hormone, Serum: 1.337 uIU/mL ( @ 15:54)      HbA1C: 8.2 % ( @ 15:55)    CAPILLARY BLOOD GLUCOSE      POCT Blood Glucose.: 242 mg/dL (04 Aug 2019 11:13)  POCT Blood Glucose.: 86 mg/dL (04 Aug 2019 06:31)  POCT Blood Glucose.: 67 mg/dL (04 Aug 2019 05:47)  POCT Blood Glucose.: 175 mg/dL (03 Aug 2019 21:38)  POCT Blood Glucose.: 178 mg/dL (03 Aug 2019 16:28)      A/P: 80 yo male with a PMH of HTN, HLD, DM, inferior wall MI, CKD-stage 3, right CVA(residual left arm weakness), s/p loop recorder placement in  currently is s/p robotic mid CABG . he is currently being managed for diabetes    1.  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  Please hold the premeal if the patient is not eating  Please continue lispro moderate dose sliding scale four times daily with meals and at bedtime    Pt's fingerstick glucose goal is 140-180    Will continue to monitor     For discharge, TBD    Pt can follow up at discharge with [facility] by calling  to make an appointment.   discussed case with    and updated primary team

## 2019-08-05 LAB
ANION GAP SERPL CALC-SCNC: 10 MMOL/L — SIGNIFICANT CHANGE UP (ref 5–17)
ANION GAP SERPL CALC-SCNC: 12 MMOL/L — SIGNIFICANT CHANGE UP (ref 5–17)
APTT BLD: 99.5 SEC — HIGH (ref 27.5–36.3)
BUN SERPL-MCNC: 36 MG/DL — HIGH (ref 7–23)
BUN SERPL-MCNC: 37 MG/DL — HIGH (ref 7–23)
CALCIUM SERPL-MCNC: 8.8 MG/DL — SIGNIFICANT CHANGE UP (ref 8.4–10.5)
CALCIUM SERPL-MCNC: 8.8 MG/DL — SIGNIFICANT CHANGE UP (ref 8.4–10.5)
CHLORIDE SERPL-SCNC: 104 MMOL/L — SIGNIFICANT CHANGE UP (ref 96–108)
CHLORIDE SERPL-SCNC: 105 MMOL/L — SIGNIFICANT CHANGE UP (ref 96–108)
CO2 SERPL-SCNC: 20 MMOL/L — LOW (ref 22–31)
CO2 SERPL-SCNC: 22 MMOL/L — SIGNIFICANT CHANGE UP (ref 22–31)
CREAT SERPL-MCNC: 2.37 MG/DL — HIGH (ref 0.5–1.3)
CREAT SERPL-MCNC: 2.44 MG/DL — HIGH (ref 0.5–1.3)
GLUCOSE BLDC GLUCOMTR-MCNC: 126 MG/DL — HIGH (ref 70–99)
GLUCOSE BLDC GLUCOMTR-MCNC: 172 MG/DL — HIGH (ref 70–99)
GLUCOSE BLDC GLUCOMTR-MCNC: 206 MG/DL — HIGH (ref 70–99)
GLUCOSE BLDC GLUCOMTR-MCNC: 96 MG/DL — SIGNIFICANT CHANGE UP (ref 70–99)
GLUCOSE SERPL-MCNC: 122 MG/DL — HIGH (ref 70–99)
GLUCOSE SERPL-MCNC: 199 MG/DL — HIGH (ref 70–99)
HCT VFR BLD CALC: 31.4 % — LOW (ref 39–50)
HGB BLD-MCNC: 10.4 G/DL — LOW (ref 13–17)
INR BLD: 1.15 — SIGNIFICANT CHANGE UP (ref 0.88–1.16)
MAGNESIUM SERPL-MCNC: 1.9 MG/DL — SIGNIFICANT CHANGE UP (ref 1.6–2.6)
MCHC RBC-ENTMCNC: 29.5 PG — SIGNIFICANT CHANGE UP (ref 27–34)
MCHC RBC-ENTMCNC: 33.1 GM/DL — SIGNIFICANT CHANGE UP (ref 32–36)
MCV RBC AUTO: 89.2 FL — SIGNIFICANT CHANGE UP (ref 80–100)
NRBC # BLD: 0 /100 WBCS — SIGNIFICANT CHANGE UP (ref 0–0)
PLATELET # BLD AUTO: 220 K/UL — SIGNIFICANT CHANGE UP (ref 150–400)
POTASSIUM SERPL-MCNC: 4.1 MMOL/L — SIGNIFICANT CHANGE UP (ref 3.5–5.3)
POTASSIUM SERPL-MCNC: 4.2 MMOL/L — SIGNIFICANT CHANGE UP (ref 3.5–5.3)
POTASSIUM SERPL-SCNC: 4.1 MMOL/L — SIGNIFICANT CHANGE UP (ref 3.5–5.3)
POTASSIUM SERPL-SCNC: 4.2 MMOL/L — SIGNIFICANT CHANGE UP (ref 3.5–5.3)
PROTHROM AB SERPL-ACNC: 13 SEC — HIGH (ref 10–12.9)
RBC # BLD: 3.52 M/UL — LOW (ref 4.2–5.8)
RBC # FLD: 12.3 % — SIGNIFICANT CHANGE UP (ref 10.3–14.5)
SODIUM SERPL-SCNC: 136 MMOL/L — SIGNIFICANT CHANGE UP (ref 135–145)
SODIUM SERPL-SCNC: 137 MMOL/L — SIGNIFICANT CHANGE UP (ref 135–145)
WBC # BLD: 9.38 K/UL — SIGNIFICANT CHANGE UP (ref 3.8–10.5)
WBC # FLD AUTO: 9.38 K/UL — SIGNIFICANT CHANGE UP (ref 3.8–10.5)

## 2019-08-05 PROCEDURE — 71046 X-RAY EXAM CHEST 2 VIEWS: CPT | Mod: 26

## 2019-08-05 PROCEDURE — 99232 SBSQ HOSP IP/OBS MODERATE 35: CPT | Mod: GC

## 2019-08-05 RX ORDER — SODIUM CHLORIDE 9 MG/ML
500 INJECTION INTRAMUSCULAR; INTRAVENOUS; SUBCUTANEOUS ONCE
Refills: 0 | Status: COMPLETED | OUTPATIENT
Start: 2019-08-05 | End: 2019-08-05

## 2019-08-05 RX ORDER — APIXABAN 2.5 MG/1
2.5 TABLET, FILM COATED ORAL EVERY 12 HOURS
Refills: 0 | Status: DISCONTINUED | OUTPATIENT
Start: 2019-08-05 | End: 2019-08-06

## 2019-08-05 RX ORDER — INSULIN LISPRO 100/ML
5 VIAL (ML) SUBCUTANEOUS
Refills: 0 | Status: DISCONTINUED | OUTPATIENT
Start: 2019-08-05 | End: 2019-08-07

## 2019-08-05 RX ORDER — INSULIN GLARGINE 100 [IU]/ML
10 INJECTION, SOLUTION SUBCUTANEOUS AT BEDTIME
Refills: 0 | Status: DISCONTINUED | OUTPATIENT
Start: 2019-08-05 | End: 2019-08-07

## 2019-08-05 RX ORDER — INSULIN GLARGINE 100 [IU]/ML
8 INJECTION, SOLUTION SUBCUTANEOUS AT BEDTIME
Refills: 0 | Status: DISCONTINUED | OUTPATIENT
Start: 2019-08-05 | End: 2019-08-05

## 2019-08-05 RX ADMIN — Medication 81 MILLIGRAM(S): at 11:25

## 2019-08-05 RX ADMIN — OXYCODONE AND ACETAMINOPHEN 1 TABLET(S): 5; 325 TABLET ORAL at 23:44

## 2019-08-05 RX ADMIN — AMIODARONE HYDROCHLORIDE 400 MILLIGRAM(S): 400 TABLET ORAL at 05:41

## 2019-08-05 RX ADMIN — ATORVASTATIN CALCIUM 40 MILLIGRAM(S): 80 TABLET, FILM COATED ORAL at 22:31

## 2019-08-05 RX ADMIN — SODIUM CHLORIDE 3 MILLILITER(S): 9 INJECTION INTRAMUSCULAR; INTRAVENOUS; SUBCUTANEOUS at 14:00

## 2019-08-05 RX ADMIN — Medication 100 MILLIGRAM(S): at 05:41

## 2019-08-05 RX ADMIN — OXYCODONE AND ACETAMINOPHEN 1 TABLET(S): 5; 325 TABLET ORAL at 06:52

## 2019-08-05 RX ADMIN — APIXABAN 2.5 MILLIGRAM(S): 2.5 TABLET, FILM COATED ORAL at 22:31

## 2019-08-05 RX ADMIN — SENNA PLUS 2 TABLET(S): 8.6 TABLET ORAL at 22:34

## 2019-08-05 RX ADMIN — PANTOPRAZOLE SODIUM 40 MILLIGRAM(S): 20 TABLET, DELAYED RELEASE ORAL at 07:02

## 2019-08-05 RX ADMIN — Medication 5 UNIT(S): at 11:22

## 2019-08-05 RX ADMIN — SODIUM CHLORIDE 3 MILLILITER(S): 9 INJECTION INTRAMUSCULAR; INTRAVENOUS; SUBCUTANEOUS at 22:34

## 2019-08-05 RX ADMIN — Medication 25 MILLIGRAM(S): at 19:15

## 2019-08-05 RX ADMIN — Medication 25 MILLIGRAM(S): at 11:23

## 2019-08-05 RX ADMIN — BRIMONIDINE TARTRATE 1 DROP(S): 2 SOLUTION/ DROPS OPHTHALMIC at 18:00

## 2019-08-05 RX ADMIN — SODIUM CHLORIDE 3 MILLILITER(S): 9 INJECTION INTRAMUSCULAR; INTRAVENOUS; SUBCUTANEOUS at 05:13

## 2019-08-05 RX ADMIN — OXYCODONE AND ACETAMINOPHEN 1 TABLET(S): 5; 325 TABLET ORAL at 05:41

## 2019-08-05 RX ADMIN — OXYCODONE AND ACETAMINOPHEN 1 TABLET(S): 5; 325 TABLET ORAL at 10:00

## 2019-08-05 RX ADMIN — Medication 4: at 11:20

## 2019-08-05 RX ADMIN — AMIODARONE HYDROCHLORIDE 400 MILLIGRAM(S): 400 TABLET ORAL at 22:31

## 2019-08-05 RX ADMIN — LEVALBUTEROL 0.63 MILLIGRAM(S): 1.25 SOLUTION, CONCENTRATE RESPIRATORY (INHALATION) at 11:26

## 2019-08-05 RX ADMIN — SODIUM CHLORIDE 250 MILLILITER(S): 9 INJECTION INTRAMUSCULAR; INTRAVENOUS; SUBCUTANEOUS at 10:04

## 2019-08-05 RX ADMIN — Medication 5 UNIT(S): at 16:00

## 2019-08-05 RX ADMIN — LIDOCAINE 1 PATCH: 4 CREAM TOPICAL at 23:00

## 2019-08-05 RX ADMIN — AMIODARONE HYDROCHLORIDE 400 MILLIGRAM(S): 400 TABLET ORAL at 14:00

## 2019-08-05 RX ADMIN — LIDOCAINE 1 PATCH: 4 CREAM TOPICAL at 19:07

## 2019-08-05 RX ADMIN — Medication 2: at 22:32

## 2019-08-05 RX ADMIN — DONEPEZIL HYDROCHLORIDE 5 MILLIGRAM(S): 10 TABLET, FILM COATED ORAL at 22:32

## 2019-08-05 RX ADMIN — BRIMONIDINE TARTRATE 1 DROP(S): 2 SOLUTION/ DROPS OPHTHALMIC at 05:40

## 2019-08-05 RX ADMIN — INSULIN GLARGINE 10 UNIT(S): 100 INJECTION, SOLUTION SUBCUTANEOUS at 22:32

## 2019-08-05 RX ADMIN — OXYCODONE AND ACETAMINOPHEN 1 TABLET(S): 5; 325 TABLET ORAL at 11:02

## 2019-08-05 RX ADMIN — Medication 100 MILLIGRAM(S): at 22:32

## 2019-08-05 RX ADMIN — OXYCODONE AND ACETAMINOPHEN 1 TABLET(S): 5; 325 TABLET ORAL at 22:44

## 2019-08-05 RX ADMIN — Medication 3 UNIT(S): at 07:00

## 2019-08-05 RX ADMIN — Medication 100 MILLIGRAM(S): at 14:00

## 2019-08-05 RX ADMIN — Medication 25 MILLIGRAM(S): at 23:41

## 2019-08-05 RX ADMIN — LIDOCAINE 1 PATCH: 4 CREAM TOPICAL at 11:22

## 2019-08-05 RX ADMIN — LEVALBUTEROL 0.63 MILLIGRAM(S): 1.25 SOLUTION, CONCENTRATE RESPIRATORY (INHALATION) at 05:40

## 2019-08-05 RX ADMIN — OXYCODONE AND ACETAMINOPHEN 1 TABLET(S): 5; 325 TABLET ORAL at 00:30

## 2019-08-05 NOTE — DIETITIAN INITIAL EVALUATION ADULT. - ENERGY NEEDS
Height: 5'2" Weight: 68.9 kg, IBW 54 kg+/-10%, %%, BMI 27.8,  IBW used to calculate EER as per pt's current body weight >120% of IBW   Estimated nutrient needs based on [facility] SOC for maintenance in older adults adjusted for post-op MIDCAB

## 2019-08-05 NOTE — PROGRESS NOTE ADULT - SUBJECTIVE AND OBJECTIVE BOX
Patient discussed on morning rounds with Dr. Bhatti    Operation / Date: 19 robotic MIDCAB, EF nml    SUBJECTIVE ASSESSMENT:  79y Male seen and examined bedside. Patient states he feels well. He is not offering any acute complaints. He states he walked one time yesterday and is preparing to walk with physical therapy this morning. He is using IS, pulling 750cc. He is moving his bowels postoperatively and he is tolerating PO diet. Denies chest pain, SOB, palpitations, hemopytsis, N/V/D, abdominal pain, dizziness, fever or chills.      Vital Signs Last 24 Hrs  T(C): 37.2 (05 Aug 2019 10:00), Max: 37.6 (04 Aug 2019 22:00)  T(F): 99 (05 Aug 2019 10:00), Max: 99.6 (04 Aug 2019 22:00)  HR: 66 (05 Aug 2019 12:00) (66 - 80)  BP: 151/70 (05 Aug 2019 12:00) (97/42 - 152/64)  BP(mean): 110 (05 Aug 2019 12:00) (61 - 112)  RR: 19 (05 Aug 2019 12:00) (15 - 19)  SpO2: 95% (05 Aug 2019 12:00) (94% - 98%)  I&O's Detail    04 Aug 2019 07:01  -  05 Aug 2019 07:00  --------------------------------------------------------  IN:    Albumin 5%  - 250 mL: 250 mL    heparin Infusion: 119 mL    IV PiggyBack: 100 mL    Oral Fluid: 976 mL    sodium chloride 0.9%.: 450 mL  Total IN: 1895 mL    OUT:    Voided: 420 mL  Total OUT: 420 mL    Total NET: 1475 mL      05 Aug 2019 07:01  -  05 Aug 2019 14:44  --------------------------------------------------------  IN:    Oral Fluid: 515 mL    Sodium Chloride 0.9% IV Bolus: 500 mL  Total IN: 1015 mL    OUT:  Total OUT: 0 mL    Total NET: 1015 mL          CHEST TUBE:  No.   HENRY DRAIN:  No.  EPICARDIAL WIRES: No.  TIE DOWNS: Yesx2.  SINGH: No.     PHYSICAL EXAM:    General: Patient OOBTC, no acute distress     Neurological: Alert and oriented. No focal neurological deficits     Cardiovascular: S1S2, RRR, no murmurs appreciated on exam     Respiratory: Clear to auscultation bilaterally, no w/r/r.    Gastrointestinal: Abdomen soft, non tender, non distended     Extremities: Warm and well perfused. No peripheral edema or calf tenderness     Vascular: Peripheral pulses 2+ b/l.    Incision Sites: L thoracotomy site c/d/i, chest tube site, tiedown in place. Henyr site with tiedown.          LABS:                        10.4   9.38  )-----------( 220      ( 05 Aug 2019 06:18 )             31.4       COUMADIN:  No.   PT/INR - ( 05 Aug 2019 06:18 )   PT: 13.0 sec;   INR: 1.15          PTT - ( 05 Aug 2019 06:18 )  PTT:99.5 sec        137  |  105  |  36<H>  ----------------------------<  199<H>  4.1   |  20<L>  |  2.37<H>    Ca    8.8      05 Aug 2019 12:28  Mg     1.9     08-        Urinalysis Basic - ( 04 Aug 2019 01:15 )    Color: Yellow / Appearance: Clear / S.020 / pH: x  Gluc: x / Ketone: NEGATIVE  / Bili: Negative / Urobili: 0.2 E.U./dL   Blood: x / Protein: 100 mg/dL / Nitrite: NEGATIVE   Leuk Esterase: NEGATIVE / RBC: < 5 /HPF / WBC < 5 /HPF   Sq Epi: x / Non Sq Epi: 0-5 /HPF / Bacteria: Present /HPF        MEDICATIONS  (STANDING):  amiodarone    Tablet   Oral   amiodarone    Tablet 400 milliGRAM(s) Oral every 8 hours  apixaban 2.5 milliGRAM(s) Oral every 12 hours  aspirin  chewable 81 milliGRAM(s) Oral daily  atorvastatin 40 milliGRAM(s) Oral at bedtime  brimonidine 0.2% Ophthalmic Solution 1 Drop(s) Left EYE two times a day  chlorhexidine 2% Cloths 1 Application(s) Topical daily  dextrose 5%. 1000 milliLiter(s) (50 mL/Hr) IV Continuous <Continuous>  dextrose 50% Injectable 50 milliLiter(s) IV Push every 15 minutes  docusate sodium 100 milliGRAM(s) Oral three times a day  donepezil 5 milliGRAM(s) Oral at bedtime  insulin glargine Injectable (LANTUS) 8 Unit(s) SubCutaneous at bedtime  insulin lispro (HumaLOG) corrective regimen sliding scale   SubCutaneous Before meals and at bedtime  insulin lispro Injectable (HumaLOG) 5 Unit(s) SubCutaneous three times a day before meals  lactated ringers. 1000 milliLiter(s) (75 mL/Hr) IV Continuous <Continuous>  lactated ringers. 1000 milliLiter(s) (75 mL/Hr) IV Continuous <Continuous>  lidocaine   Patch 1 Patch Transdermal daily  metoprolol tartrate 25 milliGRAM(s) Oral every 6 hours  pantoprazole    Tablet 40 milliGRAM(s) Oral before breakfast  senna 2 Tablet(s) Oral at bedtime  sodium chloride 0.9% lock flush 3 milliLiter(s) IV Push every 8 hours  sodium chloride 0.9%. 1000 milliLiter(s) (50 mL/Hr) IV Continuous <Continuous>    MEDICATIONS  (PRN):  acetaminophen   Tablet .. 650 milliGRAM(s) Oral every 6 hours PRN Temp greater or equal to 38C (100.4F)  dextrose 40% Gel 15 Gram(s) Oral once PRN Blood Glucose LESS THAN 70 milliGRAM(s)/deciliter  glucagon  Injectable 1 milliGRAM(s) IntraMuscular once PRN Glucose LESS THAN 70 milligrams/deciliter  levalbuterol Inhalation 0.63 milliGRAM(s) Inhalation every 6 hours PRN SOB and/or wheezing  oxyCODONE    5 mG/acetaminophen 325 mG 1 Tablet(s) Oral every 6 hours PRN Severe Pain (7 - 10)  polyethylene glycol 3350 17 Gram(s) Oral daily PRN Constipation        RADIOLOGY & ADDITIONAL TESTS:    < from: Xray Chest 2 Views PA/Lat (19 @ 09:36) >    EXAM:  XR CHEST PA LAT 2V                          PROCEDURE DATE:  2019          INTERPRETATION:  Clinical history/reason for exam: Postop ohs.    PA and lateral.    Comparison: 2019.    Findings/  impression: Left basilar opacity/pleural effusion, decreased. Right   basilar opacity/pleural effusion, stable. Stable heart size. Stable bony   structures.    < end of copied text >

## 2019-08-05 NOTE — DIETITIAN INITIAL EVALUATION ADULT. - OTHER INFO
79 y.o M from home with PMHx of HTN, HLD, DM, interior wall MI, CKD stage 3, R VA with R residual deficits, s/p loop recorder placement 2015 and class II angina. Pt underwent cardiac cath 7/18/19 revealed 2V. Pt admitted for CAD, s/p MIDCAB with robot-assisted LIMA procurement 7/31, stepped down to [facility], hospital course c/b PAF/Aflutter now NSR. Daughter cooks at home, follows regular diet without any dietary restrictions, good appetite, NKFA. Pt reports self monitor BG 2x/d at home. Pt unsure UBW but denies weight loss. Pt takes Metformin, Atorvastatin per home meds. Insulin regimen per Endo. Pt tolerating DASH/TLC, CSTCHO diet well with good >75% PO intake of meals. Denies N/V/D/C. Pt c/o pain, 8 out of 10, RN is aware. +BM 8/4, on bowel regimen (Colace, Senna, Miralax). Skin intact with L anterior chest surgical incisions. Diet edu provided to pt on DASH/TLC, T2DM diet management with handouts. Need further teaching and reinforcement, pt verbalized understanding, fair compliance expected. RD will f/u per moderate risk protocol.

## 2019-08-05 NOTE — DIETITIAN INITIAL EVALUATION ADULT. - PERTINENT MEDS FT
Aspirin, NS IVF, LR IVF, Glargine, Lispro, Amiodarone, Acetaminophen, Amiodarone, Docusate sodium, Metoprolol tartrate, Oxycodone, Pantoprazole, Miralax, Senna, Atorvastatin

## 2019-08-05 NOTE — PROGRESS NOTE ADULT - SUBJECTIVE AND OBJECTIVE BOX
INTERVAL HPI/OVERNIGHT EVENTS:    Patient is a 79y old  Male who presents with a chief complaint of CAD (04 Aug 2019 15:47)      Pt reports the following symptoms:    CONSTITUTIONAL:  Negative fever or chills, feels well, good appetite  EYES:  Negative  blurry vision or double vision  CARDIOVASCULAR:  Negative for chest pain or palpitations  RESPIRATORY:  Negative for cough, wheezing, or SOB   GASTROINTESTINAL:  Negative for nausea, vomiting, diarrhea, constipation, or abdominal pain  GENITOURINARY:  Negative frequency, urgency or dysuria  NEUROLOGIC:  No headache, confusion, dizziness, lightheadedness    MEDICATIONS  (STANDING):  amiodarone    Tablet   Oral   amiodarone    Tablet 400 milliGRAM(s) Oral every 8 hours  apixaban 2.5 milliGRAM(s) Oral every 12 hours  aspirin  chewable 81 milliGRAM(s) Oral daily  atorvastatin 40 milliGRAM(s) Oral at bedtime  brimonidine 0.2% Ophthalmic Solution 1 Drop(s) Left EYE two times a day  chlorhexidine 2% Cloths 1 Application(s) Topical daily  dextrose 5%. 1000 milliLiter(s) (50 mL/Hr) IV Continuous <Continuous>  dextrose 50% Injectable 50 milliLiter(s) IV Push every 15 minutes  docusate sodium 100 milliGRAM(s) Oral three times a day  donepezil 5 milliGRAM(s) Oral at bedtime  insulin glargine Injectable (LANTUS) 8 Unit(s) SubCutaneous at bedtime  insulin lispro (HumaLOG) corrective regimen sliding scale   SubCutaneous Before meals and at bedtime  insulin lispro Injectable (HumaLOG) 5 Unit(s) SubCutaneous three times a day before meals  lactated ringers. 1000 milliLiter(s) (75 mL/Hr) IV Continuous <Continuous>  lactated ringers. 1000 milliLiter(s) (75 mL/Hr) IV Continuous <Continuous>  lidocaine   Patch 1 Patch Transdermal daily  metoprolol tartrate 25 milliGRAM(s) Oral every 6 hours  pantoprazole    Tablet 40 milliGRAM(s) Oral before breakfast  senna 2 Tablet(s) Oral at bedtime  sodium chloride 0.9% lock flush 3 milliLiter(s) IV Push every 8 hours  sodium chloride 0.9%. 1000 milliLiter(s) (50 mL/Hr) IV Continuous <Continuous>    MEDICATIONS  (PRN):  acetaminophen   Tablet .. 650 milliGRAM(s) Oral every 6 hours PRN Temp greater or equal to 38C (100.4F)  dextrose 40% Gel 15 Gram(s) Oral once PRN Blood Glucose LESS THAN 70 milliGRAM(s)/deciliter  glucagon  Injectable 1 milliGRAM(s) IntraMuscular once PRN Glucose LESS THAN 70 milligrams/deciliter  levalbuterol Inhalation 0.63 milliGRAM(s) Inhalation every 6 hours PRN SOB and/or wheezing  oxyCODONE    5 mG/acetaminophen 325 mG 1 Tablet(s) Oral every 6 hours PRN Severe Pain (7 - 10)  polyethylene glycol 3350 17 Gram(s) Oral daily PRN Constipation      PHYSICAL EXAM  Vital Signs Last 24 Hrs  T(C): 37.2 (05 Aug 2019 10:00), Max: 37.6 (04 Aug 2019 22:00)  T(F): 99 (05 Aug 2019 10:00), Max: 99.6 (04 Aug 2019 22:00)  HR: 66 (05 Aug 2019 08:32) (66 - 80)  BP: 122/55 (05 Aug 2019 08:32) (97/42 - 152/64)  BP(mean): 77 (05 Aug 2019 08:32) (61 - 83)  RR: 18 (05 Aug 2019 08:32) (15 - 18)  SpO2: 94% (05 Aug 2019 08:32) (94% - 98%)    Constitutional: wn/wd in NAD.   HEENT: NCAT, MMM, OP clear, EOMI, no proptosis or lid retraction  Neck: no thyromegaly or palpable thyroid nodules   Respiratory: lungs CTAB.  Cardiovascular: regular rhythm, normal S1 and S2, no audible murmurs, no peripheral edema  GI: soft, NT/ND, no masses/HSM appreciated.  Neurology: no tremors, DTR 2+  Skin: no visible rashes/lesions  Psychiatric: AAO x 3, normal affect/mood.    LABS:                        10.4   9.38  )-----------( 220      ( 05 Aug 2019 06:18 )             31.4     08-05    136  |  104  |  37<H>  ----------------------------<  122<H>  4.2   |  22  |  2.44<H>    Ca    8.8      05 Aug 2019 06:18  Mg     1.9     08-05      PT/INR - ( 05 Aug 2019 06:18 )   PT: 13.0 sec;   INR: 1.15          PTT - ( 05 Aug 2019 06:18 )  PTT:99.5 sec  Urinalysis Basic - ( 04 Aug 2019 01:15 )    Color: Yellow / Appearance: Clear / S.020 / pH: x  Gluc: x / Ketone: NEGATIVE  / Bili: Negative / Urobili: 0.2 E.U./dL   Blood: x / Protein: 100 mg/dL / Nitrite: NEGATIVE   Leuk Esterase: NEGATIVE / RBC: < 5 /HPF / WBC < 5 /HPF   Sq Epi: x / Non Sq Epi: 0-5 /HPF / Bacteria: Present /HPF      Thyroid Stimulating Hormone, Serum: 1.337 uIU/mL ( @ 15:54)      HbA1C: 8.2 % ( @ 15:55)    CAPILLARY BLOOD GLUCOSE      POCT Blood Glucose.: 206 mg/dL (05 Aug 2019 11:16)  POCT Blood Glucose.: 126 mg/dL (05 Aug 2019 06:34)  POCT Blood Glucose.: 218 mg/dL (04 Aug 2019 23:00)  POCT Blood Glucose.: 214 mg/dL (04 Aug 2019 21:22)  POCT Blood Glucose.: 189 mg/dL (04 Aug 2019 16:43)      Insulin Sliding Scale requirements X 24 Hours:    RADIOLOGY & ADDITIONAL TESTS:    A/P: 79y Male with history of DM type II presenting for       1.  DM -     Please continue           units lantus at bedtime  / in the morning and        units lispro with meals and lispro moderate / low dose sliding scale 4 times daily with meals and at bedtime.  Please continue consistent carbohydrate diet.      Goal FSG is   Will continue to monitor   For discharge, pt can continue    Pt can follow up at discharge with [facility] by calling  to make an appointment.   Will discuss case with     and update primary team INTERVAL HPI/OVERNIGHT EVENTS:    Patient is a 79y old  Male who presents with a chief complaint of CAD (04 Aug 2019 15:47)      Pt reports the following symptoms:    CONSTITUTIONAL:  Negative fever or chills, feels well, good appetite  EYES:  Negative  blurry vision or double vision  CARDIOVASCULAR:  Negative for chest pain or palpitations  RESPIRATORY:  Negative for cough, wheezing, or SOB   GASTROINTESTINAL:  Negative for nausea, vomiting, diarrhea, constipation, or abdominal pain  GENITOURINARY:  Negative frequency, urgency or dysuria  NEUROLOGIC:  No headache, confusion, dizziness, lightheadedness    MEDICATIONS  (STANDING):  amiodarone    Tablet   Oral   amiodarone    Tablet 400 milliGRAM(s) Oral every 8 hours  apixaban 2.5 milliGRAM(s) Oral every 12 hours  aspirin  chewable 81 milliGRAM(s) Oral daily  atorvastatin 40 milliGRAM(s) Oral at bedtime  brimonidine 0.2% Ophthalmic Solution 1 Drop(s) Left EYE two times a day  chlorhexidine 2% Cloths 1 Application(s) Topical daily  dextrose 5%. 1000 milliLiter(s) (50 mL/Hr) IV Continuous <Continuous>  dextrose 50% Injectable 50 milliLiter(s) IV Push every 15 minutes  docusate sodium 100 milliGRAM(s) Oral three times a day  donepezil 5 milliGRAM(s) Oral at bedtime  insulin glargine Injectable (LANTUS) 8 Unit(s) SubCutaneous at bedtime  insulin lispro (HumaLOG) corrective regimen sliding scale   SubCutaneous Before meals and at bedtime  insulin lispro Injectable (HumaLOG) 5 Unit(s) SubCutaneous three times a day before meals  lactated ringers. 1000 milliLiter(s) (75 mL/Hr) IV Continuous <Continuous>  lactated ringers. 1000 milliLiter(s) (75 mL/Hr) IV Continuous <Continuous>  lidocaine   Patch 1 Patch Transdermal daily  metoprolol tartrate 25 milliGRAM(s) Oral every 6 hours  pantoprazole    Tablet 40 milliGRAM(s) Oral before breakfast  senna 2 Tablet(s) Oral at bedtime  sodium chloride 0.9% lock flush 3 milliLiter(s) IV Push every 8 hours  sodium chloride 0.9%. 1000 milliLiter(s) (50 mL/Hr) IV Continuous <Continuous>    MEDICATIONS  (PRN):  acetaminophen   Tablet .. 650 milliGRAM(s) Oral every 6 hours PRN Temp greater or equal to 38C (100.4F)  dextrose 40% Gel 15 Gram(s) Oral once PRN Blood Glucose LESS THAN 70 milliGRAM(s)/deciliter  glucagon  Injectable 1 milliGRAM(s) IntraMuscular once PRN Glucose LESS THAN 70 milligrams/deciliter  levalbuterol Inhalation 0.63 milliGRAM(s) Inhalation every 6 hours PRN SOB and/or wheezing  oxyCODONE    5 mG/acetaminophen 325 mG 1 Tablet(s) Oral every 6 hours PRN Severe Pain (7 - 10)  polyethylene glycol 3350 17 Gram(s) Oral daily PRN Constipation      PHYSICAL EXAM  Vital Signs Last 24 Hrs  T(C): 37.2 (05 Aug 2019 10:00), Max: 37.6 (04 Aug 2019 22:00)  T(F): 99 (05 Aug 2019 10:00), Max: 99.6 (04 Aug 2019 22:00)  HR: 66 (05 Aug 2019 08:32) (66 - 80)  BP: 122/55 (05 Aug 2019 08:32) (97/42 - 152/64)  BP(mean): 77 (05 Aug 2019 08:32) (61 - 83)  RR: 18 (05 Aug 2019 08:32) (15 - 18)  SpO2: 94% (05 Aug 2019 08:32) (94% - 98%)    Constitutional: wn/wd in NAD.   HEENT: NCAT, MMM, OP clear, EOMI, no proptosis or lid retraction  Neck: no thyromegaly or palpable thyroid nodules   Respiratory: lungs CTAB.  Cardiovascular: regular rhythm, normal S1 and S2, no audible murmurs, no peripheral edema  GI: soft, NT/ND, no masses/HSM appreciated.  Neurology: no tremors, DTR 2+  Skin: no visible rashes/lesions  Psychiatric: AAO x 3, normal affect/mood.    LABS:                        10.4   9.38  )-----------( 220      ( 05 Aug 2019 06:18 )             31.4     08-05    136  |  104  |  37<H>  ----------------------------<  122<H>  4.2   |  22  |  2.44<H>    Ca    8.8      05 Aug 2019 06:18  Mg     1.9     08-05      PT/INR - ( 05 Aug 2019 06:18 )   PT: 13.0 sec;   INR: 1.15          PTT - ( 05 Aug 2019 06:18 )  PTT:99.5 sec  Urinalysis Basic - ( 04 Aug 2019 01:15 )    Color: Yellow / Appearance: Clear / S.020 / pH: x  Gluc: x / Ketone: NEGATIVE  / Bili: Negative / Urobili: 0.2 E.U./dL   Blood: x / Protein: 100 mg/dL / Nitrite: NEGATIVE   Leuk Esterase: NEGATIVE / RBC: < 5 /HPF / WBC < 5 /HPF   Sq Epi: x / Non Sq Epi: 0-5 /HPF / Bacteria: Present /HPF      Thyroid Stimulating Hormone, Serum: 1.337 uIU/mL ( @ 15:54)      HbA1C: 8.2 % ( @ 15:55)    CAPILLARY BLOOD GLUCOSE      POCT Blood Glucose.: 206 mg/dL (05 Aug 2019 11:16)  POCT Blood Glucose.: 126 mg/dL (05 Aug 2019 06:34)  POCT Blood Glucose.: 218 mg/dL (04 Aug 2019 23:00)  POCT Blood Glucose.: 214 mg/dL (04 Aug 2019 21:22)  POCT Blood Glucose.: 189 mg/dL (04 Aug 2019 16:43)      Insulin Sliding Scale requirements X 24 Hours:    RADIOLOGY & ADDITIONAL TESTS:    A/P: 78 yo male with a PMH of HTN, HLD, DM, inferior wall MI, CKD-stage 3, right CVA(residual left arm weakness), s/p loop recorder placement in  currently is s/p robotic mid CABG . he is currently being managed for diabetes    1.  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  Please hold the premeal if the patient is not eating  Please continue lispro moderate dose sliding scale four times daily with meals and at bedtime    Pt's fingerstick glucose goal is 140-180    Will continue to monitor     For discharge, TBD    Pt can follow up at discharge with [facility] by calling  to make an appointment.   discussed case with    and updated primary team INTERVAL HPI/OVERNIGHT EVENTS:    Patient seen and examined at the bedside. No acute events overnight. he still c/o pain around the surgical site but is getting better. He had some TAYLOR today with creatinine bump from 1.9 to 2.4 today. He is receiving 0.9% nacl @ 50cc/hr for now. He is being planned for discharge to [facility] tomorrow. His appetite is little better today.    FSG & Insulin received:    Yesterday:  pre-dinner fs, 3 nutritional lispro   units + 2  units lispro SS. does not remember - may be some pasta  bedtime fs, 12 lantus   units +   4 units lispro SS    Today:  pre-breakfast fs, 3 nutritional lispro   units, pancakes, coffee  pre-lunch fs, 5 nutritional lispro   units+ 4  units lispro SS, beans, chicken, potatoes      Pt reports the following symptoms:    CONSTITUTIONAL:  Negative fever or chills, feels well, good appetite  EYES:  Negative  blurry vision or double vision  CARDIOVASCULAR:  Negative for chest pain or palpitations  RESPIRATORY:  Negative for cough, wheezing, or SOB   GASTROINTESTINAL:  Negative for nausea, vomiting, diarrhea, constipation, or abdominal pain  GENITOURINARY:  Negative frequency, urgency or dysuria  NEUROLOGIC:  No headache, dizziness, lightheadedness    MEDICATIONS  (STANDING):  amiodarone    Tablet   Oral   amiodarone    Tablet 400 milliGRAM(s) Oral every 8 hours  apixaban 2.5 milliGRAM(s) Oral every 12 hours  aspirin  chewable 81 milliGRAM(s) Oral daily  atorvastatin 40 milliGRAM(s) Oral at bedtime  brimonidine 0.2% Ophthalmic Solution 1 Drop(s) Left EYE two times a day  chlorhexidine 2% Cloths 1 Application(s) Topical daily  dextrose 5%. 1000 milliLiter(s) (50 mL/Hr) IV Continuous <Continuous>  dextrose 50% Injectable 50 milliLiter(s) IV Push every 15 minutes  docusate sodium 100 milliGRAM(s) Oral three times a day  donepezil 5 milliGRAM(s) Oral at bedtime  insulin glargine Injectable (LANTUS) 8 Unit(s) SubCutaneous at bedtime  insulin lispro (HumaLOG) corrective regimen sliding scale   SubCutaneous Before meals and at bedtime  insulin lispro Injectable (HumaLOG) 5 Unit(s) SubCutaneous three times a day before meals  lactated ringers. 1000 milliLiter(s) (75 mL/Hr) IV Continuous <Continuous>  lactated ringers. 1000 milliLiter(s) (75 mL/Hr) IV Continuous <Continuous>  lidocaine   Patch 1 Patch Transdermal daily  metoprolol tartrate 25 milliGRAM(s) Oral every 6 hours  pantoprazole    Tablet 40 milliGRAM(s) Oral before breakfast  senna 2 Tablet(s) Oral at bedtime  sodium chloride 0.9% lock flush 3 milliLiter(s) IV Push every 8 hours  sodium chloride 0.9%. 1000 milliLiter(s) (50 mL/Hr) IV Continuous <Continuous>    MEDICATIONS  (PRN):  acetaminophen   Tablet .. 650 milliGRAM(s) Oral every 6 hours PRN Temp greater or equal to 38C (100.4F)  dextrose 40% Gel 15 Gram(s) Oral once PRN Blood Glucose LESS THAN 70 milliGRAM(s)/deciliter  glucagon  Injectable 1 milliGRAM(s) IntraMuscular once PRN Glucose LESS THAN 70 milligrams/deciliter  levalbuterol Inhalation 0.63 milliGRAM(s) Inhalation every 6 hours PRN SOB and/or wheezing  oxyCODONE    5 mG/acetaminophen 325 mG 1 Tablet(s) Oral every 6 hours PRN Severe Pain (7 - 10)  polyethylene glycol 3350 17 Gram(s) Oral daily PRN Constipation      PHYSICAL EXAM  Vital Signs Last 24 Hrs  T(C): 37.2 (05 Aug 2019 10:00), Max: 37.6 (04 Aug 2019 22:00)  T(F): 99 (05 Aug 2019 10:00), Max: 99.6 (04 Aug 2019 22:00)  HR: 66 (05 Aug 2019 08:32) (66 - 80)  BP: 122/55 (05 Aug 2019 08:32) (97/42 - 152/64)  BP(mean): 77 (05 Aug 2019 08:32) (61 - 83)  RR: 18 (05 Aug 2019 08:32) (15 - 18)  SpO2: 94% (05 Aug 2019 08:32) (94% - 98%)    Constitutional: wn/wd in NAD.   Respiratory: lungs CTAB.  Cardiovascular: regular rhythm, normal S1 and S2, no peripheral edema  GI: soft, NT/ND, bowel sounds heard  Neurology: no tremors, DTR 2+, follows commands. cranial nerves intact      LABS:                        10.4   9.38  )-----------( 220      ( 05 Aug 2019 06:18 )             31.4     08-    136  |  104  |  37<H>  ----------------------------<  122<H>  4.2   |  22  |  2.44<H>    Ca    8.8      05 Aug 2019 06:18  Mg     1.9     08-05      PT/INR - ( 05 Aug 2019 06:18 )   PT: 13.0 sec;   INR: 1.15          PTT - ( 05 Aug 2019 06:18 )  PTT:99.5 sec  Urinalysis Basic - ( 04 Aug 2019 01:15 )    Color: Yellow / Appearance: Clear / S.020 / pH: x  Gluc: x / Ketone: NEGATIVE  / Bili: Negative / Urobili: 0.2 E.U./dL   Blood: x / Protein: 100 mg/dL / Nitrite: NEGATIVE   Leuk Esterase: NEGATIVE / RBC: < 5 /HPF / WBC < 5 /HPF   Sq Epi: x / Non Sq Epi: 0-5 /HPF / Bacteria: Present /HPF      Thyroid Stimulating Hormone, Serum: 1.337 uIU/mL ( @ 15:54)      HbA1C: 8.2 % ( @ 15:55)    CAPILLARY BLOOD GLUCOSE      POCT Blood Glucose.: 206 mg/dL (05 Aug 2019 11:16)  POCT Blood Glucose.: 126 mg/dL (05 Aug 2019 06:34)  POCT Blood Glucose.: 218 mg/dL (04 Aug 2019 23:00)  POCT Blood Glucose.: 214 mg/dL (04 Aug 2019 21:22)  POCT Blood Glucose.: 189 mg/dL (04 Aug 2019 16:43)      Insulin Sliding Scale requirements X 24 Hours:    RADIOLOGY & ADDITIONAL TESTS:    A/P: 78 yo male with a PMH of HTN, HLD, DM, inferior wall MI, CKD-stage 3, right CVA(residual left arm weakness), s/p loop recorder placement in  currently is s/p robotic mid CABG . he is currently being managed for diabetes    1.  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  Please hold the premeal if the patient is not eating  Please continue lispro moderate dose sliding scale four times daily with meals and at bedtime  Monitor his TAYLOR - This will determine his discharge plan.     Pt's fingerstick glucose goal is 140-180    Will continue to monitor     For discharge, TBD    Pt can follow up at discharge with [facility] by calling  to make an appointment.   discussed case with   and updated primary team

## 2019-08-05 NOTE — PROGRESS NOTE ADULT - ASSESSMENT
80 y/o male with a PMHx of HTN, HLD, DM, inferior wall MI, CKD-stage 3, right CVA(residual left arm weakness), s/p loop recorder placement in 2015 and class II angina presented to Dr. Anjel Bhatti, Cardiothoracic Surgery, for surgical evaluation after he originally presented to Dr. Faulkner for routine cardiology care and upon workup, on 6/3/19 had abnormal stress test and underwent Echo revealing EF 55%, no significant valvular disease. Patient presented to [facility] for Cardiac Cath on 7/18/19 which revealed 2 vessel CAD. Per Dr. Bhatti, patient deemed a good surgical candidate and presented to [facility] for elective surgery. On 7/31/19 patient underwent Minimally Invasive Coronary Artery Bypass Grafting (MIDCAB), EF normal, and patient transferred to CTICU in stable condition. On POD0 patient extubated. POD1 requiring nitro gtt which was later weaned, and patient started on a BB.  POD1, patient deemed stable for transfer to tele stepdown unit. POD2, jasiel removed, f/u CXR without ptx. POD3, postop paroxysmal afib/flutter rate controlled, Amio bolus x3 and lytes repleted, back in NSR. Also febrile OVN, blood cx sent, NGTD. POD4-5 continued pAfib/Flutter, continue Amiodarone PO load and started on anticoagulation with Eliquis. Patient with TAYLOR on CKD, hydrating, repeat Cr appears to be improving today.     A/P:  Neurovascular: No delirium. Pain well controlled with current regimen.  -Tylenol PRN for mild pain  - Percocet PRN for mod-severe pain  - PMHx of CVA with residual left arm weakness.  - Continue home med: donepezil 5mg PO qhs    Cardiovascular: POD6 s/p MIDCAB, EF nml  - CAD s/p MIDCAB: Continue Aspirin 81mg PO qd, Plavix 75mg PO qd, atorvastatin 40mg PO qhs, and metoprolol 25mg PO q6hrs.     - Continue to titrate up BB as tolerated.     - Secondary to CVA and CKD, ok with permissive HTN SBP 150s.    - Holding ACEi 2/2 to CKD.   - Postop pAfib: continue BB, Amiodarone PO load and Eliquis PO BID - currently renally dosed, consider adjusting dose as TAYLOR improves.  - Continue to monitor HR/BP/Tele.     Respiratory: 02 Sat = 98% on RA.  -If on oxygen wean to RA from for O2 Sat > 93%.  -Encourage C+DB and Use of IS 10x / hr while awake.  -CXR PA/Lat results above, atelectasis at the left bases with possible effusion, decreased in size. Continue to push from a pulmonary standpoint.   - Pulling 750cc on IS, which is improved from 2 days ago which was 250cc.     GI: Stable.  -PPX with pantoprazole.   -PO Diet.    Renal / : PMHx of CKD. TAYLOR on CKD.   - BUN/Cr: 37/2.44. Hydrated with IVF bolus, as discussed with Dr. Hobbs. Also, placed condom cath for accurate UOP.       - Repeat BUN/Cr improving, now 36/2.36. Continue to trend.      - Cr on admission was 1.74. Baseline 1.4-1.5.  - Avoid nephrotoxic agents.   -Monitor renal function.    Endocrine: PMHx of DM. Endocrine consulted, appreciate recs.  - Continue Lantus 8U qhs and Lispro 5U TID with meals, and ISS. Blood glucose goal <150.    -A1c: 8.2  -TSH: 1.337    Hematologic: H&H: 10.4/31.4  -CBC in AM    ID: afebrile  -WBC: 9.38.  - Blood cx on 8/3/19 NGTD  - repeat CBC in AM.   -Observe for SIRS/Sepsis Syndrome.    Prophylaxis:  -DVT prophylaxis with eliquis  -SCD's    Disposition:  - Home when medically stable.

## 2019-08-06 LAB
ANION GAP SERPL CALC-SCNC: 10 MMOL/L — SIGNIFICANT CHANGE UP (ref 5–17)
ANION GAP SERPL CALC-SCNC: 11 MMOL/L — SIGNIFICANT CHANGE UP (ref 5–17)
APTT BLD: 34 SEC — SIGNIFICANT CHANGE UP (ref 27.5–36.3)
BUN SERPL-MCNC: 37 MG/DL — HIGH (ref 7–23)
BUN SERPL-MCNC: 40 MG/DL — HIGH (ref 7–23)
CALCIUM SERPL-MCNC: 8.8 MG/DL — SIGNIFICANT CHANGE UP (ref 8.4–10.5)
CALCIUM SERPL-MCNC: 9 MG/DL — SIGNIFICANT CHANGE UP (ref 8.4–10.5)
CHLORIDE SERPL-SCNC: 104 MMOL/L — SIGNIFICANT CHANGE UP (ref 96–108)
CHLORIDE SERPL-SCNC: 106 MMOL/L — SIGNIFICANT CHANGE UP (ref 96–108)
CO2 SERPL-SCNC: 22 MMOL/L — SIGNIFICANT CHANGE UP (ref 22–31)
CO2 SERPL-SCNC: 22 MMOL/L — SIGNIFICANT CHANGE UP (ref 22–31)
CREAT SERPL-MCNC: 2.21 MG/DL — HIGH (ref 0.5–1.3)
CREAT SERPL-MCNC: 2.42 MG/DL — HIGH (ref 0.5–1.3)
GLUCOSE BLDC GLUCOMTR-MCNC: 134 MG/DL — HIGH (ref 70–99)
GLUCOSE BLDC GLUCOMTR-MCNC: 140 MG/DL — HIGH (ref 70–99)
GLUCOSE BLDC GLUCOMTR-MCNC: 169 MG/DL — HIGH (ref 70–99)
GLUCOSE BLDC GLUCOMTR-MCNC: 54 MG/DL — LOW (ref 70–99)
GLUCOSE BLDC GLUCOMTR-MCNC: 93 MG/DL — SIGNIFICANT CHANGE UP (ref 70–99)
GLUCOSE SERPL-MCNC: 139 MG/DL — HIGH (ref 70–99)
GLUCOSE SERPL-MCNC: 90 MG/DL — SIGNIFICANT CHANGE UP (ref 70–99)
HCT VFR BLD CALC: 31 % — LOW (ref 39–50)
HGB BLD-MCNC: 10.2 G/DL — LOW (ref 13–17)
INR BLD: 1.39 — HIGH (ref 0.88–1.16)
MAGNESIUM SERPL-MCNC: 1.9 MG/DL — SIGNIFICANT CHANGE UP (ref 1.6–2.6)
MCHC RBC-ENTMCNC: 30 PG — SIGNIFICANT CHANGE UP (ref 27–34)
MCHC RBC-ENTMCNC: 32.9 GM/DL — SIGNIFICANT CHANGE UP (ref 32–36)
MCV RBC AUTO: 91.2 FL — SIGNIFICANT CHANGE UP (ref 80–100)
NRBC # BLD: 0 /100 WBCS — SIGNIFICANT CHANGE UP (ref 0–0)
PLATELET # BLD AUTO: 238 K/UL — SIGNIFICANT CHANGE UP (ref 150–400)
POTASSIUM SERPL-MCNC: 4.4 MMOL/L — SIGNIFICANT CHANGE UP (ref 3.5–5.3)
POTASSIUM SERPL-MCNC: 4.6 MMOL/L — SIGNIFICANT CHANGE UP (ref 3.5–5.3)
POTASSIUM SERPL-SCNC: 4.4 MMOL/L — SIGNIFICANT CHANGE UP (ref 3.5–5.3)
POTASSIUM SERPL-SCNC: 4.6 MMOL/L — SIGNIFICANT CHANGE UP (ref 3.5–5.3)
PROTHROM AB SERPL-ACNC: 15.9 SEC — HIGH (ref 10–12.9)
RBC # BLD: 3.4 M/UL — LOW (ref 4.2–5.8)
RBC # FLD: 12.3 % — SIGNIFICANT CHANGE UP (ref 10.3–14.5)
SODIUM SERPL-SCNC: 136 MMOL/L — SIGNIFICANT CHANGE UP (ref 135–145)
SODIUM SERPL-SCNC: 139 MMOL/L — SIGNIFICANT CHANGE UP (ref 135–145)
WBC # BLD: 9.76 K/UL — SIGNIFICANT CHANGE UP (ref 3.8–10.5)
WBC # FLD AUTO: 9.76 K/UL — SIGNIFICANT CHANGE UP (ref 3.8–10.5)

## 2019-08-06 PROCEDURE — 99232 SBSQ HOSP IP/OBS MODERATE 35: CPT

## 2019-08-06 PROCEDURE — 71045 X-RAY EXAM CHEST 1 VIEW: CPT | Mod: 26

## 2019-08-06 PROCEDURE — 99232 SBSQ HOSP IP/OBS MODERATE 35: CPT | Mod: GC

## 2019-08-06 RX ORDER — HYDRALAZINE HCL 50 MG
5 TABLET ORAL ONCE
Refills: 0 | Status: COMPLETED | OUTPATIENT
Start: 2019-08-06 | End: 2019-08-06

## 2019-08-06 RX ORDER — AMIODARONE HYDROCHLORIDE 400 MG/1
200 TABLET ORAL DAILY
Refills: 0 | Status: DISCONTINUED | OUTPATIENT
Start: 2019-08-07 | End: 2019-08-07

## 2019-08-06 RX ORDER — AMLODIPINE BESYLATE 2.5 MG/1
2.5 TABLET ORAL DAILY
Refills: 0 | Status: DISCONTINUED | OUTPATIENT
Start: 2019-08-06 | End: 2019-08-07

## 2019-08-06 RX ORDER — APIXABAN 2.5 MG/1
5 TABLET, FILM COATED ORAL EVERY 12 HOURS
Refills: 0 | Status: DISCONTINUED | OUTPATIENT
Start: 2019-08-06 | End: 2019-08-07

## 2019-08-06 RX ORDER — SODIUM CHLORIDE 9 MG/ML
500 INJECTION INTRAMUSCULAR; INTRAVENOUS; SUBCUTANEOUS ONCE
Refills: 0 | Status: COMPLETED | OUTPATIENT
Start: 2019-08-06 | End: 2019-08-06

## 2019-08-06 RX ORDER — MAGNESIUM OXIDE 400 MG ORAL TABLET 241.3 MG
800 TABLET ORAL ONCE
Refills: 0 | Status: COMPLETED | OUTPATIENT
Start: 2019-08-06 | End: 2019-08-06

## 2019-08-06 RX ADMIN — LIDOCAINE 1 PATCH: 4 CREAM TOPICAL at 23:00

## 2019-08-06 RX ADMIN — AMIODARONE HYDROCHLORIDE 400 MILLIGRAM(S): 400 TABLET ORAL at 09:32

## 2019-08-06 RX ADMIN — MAGNESIUM OXIDE 400 MG ORAL TABLET 800 MILLIGRAM(S): 241.3 TABLET ORAL at 09:32

## 2019-08-06 RX ADMIN — AMLODIPINE BESYLATE 2.5 MILLIGRAM(S): 2.5 TABLET ORAL at 18:11

## 2019-08-06 RX ADMIN — Medication 2: at 11:31

## 2019-08-06 RX ADMIN — LIDOCAINE 1 PATCH: 4 CREAM TOPICAL at 18:12

## 2019-08-06 RX ADMIN — ATORVASTATIN CALCIUM 40 MILLIGRAM(S): 80 TABLET, FILM COATED ORAL at 21:55

## 2019-08-06 RX ADMIN — SODIUM CHLORIDE 3 MILLILITER(S): 9 INJECTION INTRAMUSCULAR; INTRAVENOUS; SUBCUTANEOUS at 06:10

## 2019-08-06 RX ADMIN — SODIUM CHLORIDE 3 MILLILITER(S): 9 INJECTION INTRAMUSCULAR; INTRAVENOUS; SUBCUTANEOUS at 14:15

## 2019-08-06 RX ADMIN — Medication 100 MILLIGRAM(S): at 14:15

## 2019-08-06 RX ADMIN — SODIUM CHLORIDE 250 MILLILITER(S): 9 INJECTION INTRAMUSCULAR; INTRAVENOUS; SUBCUTANEOUS at 20:44

## 2019-08-06 RX ADMIN — OXYCODONE AND ACETAMINOPHEN 1 TABLET(S): 5; 325 TABLET ORAL at 18:15

## 2019-08-06 RX ADMIN — LIDOCAINE 1 PATCH: 4 CREAM TOPICAL at 11:29

## 2019-08-06 RX ADMIN — Medication 81 MILLIGRAM(S): at 11:29

## 2019-08-06 RX ADMIN — PANTOPRAZOLE SODIUM 40 MILLIGRAM(S): 20 TABLET, DELAYED RELEASE ORAL at 06:21

## 2019-08-06 RX ADMIN — OXYCODONE AND ACETAMINOPHEN 1 TABLET(S): 5; 325 TABLET ORAL at 19:34

## 2019-08-06 RX ADMIN — APIXABAN 2.5 MILLIGRAM(S): 2.5 TABLET, FILM COATED ORAL at 06:20

## 2019-08-06 RX ADMIN — SODIUM CHLORIDE 4.17 MILLILITER(S): 9 INJECTION INTRAMUSCULAR; INTRAVENOUS; SUBCUTANEOUS at 11:34

## 2019-08-06 RX ADMIN — SODIUM CHLORIDE 250 MILLILITER(S): 9 INJECTION INTRAMUSCULAR; INTRAVENOUS; SUBCUTANEOUS at 08:29

## 2019-08-06 RX ADMIN — Medication 25 MILLIGRAM(S): at 11:29

## 2019-08-06 RX ADMIN — CHLORHEXIDINE GLUCONATE 1 APPLICATION(S): 213 SOLUTION TOPICAL at 06:21

## 2019-08-06 RX ADMIN — SODIUM CHLORIDE 3 MILLILITER(S): 9 INJECTION INTRAMUSCULAR; INTRAVENOUS; SUBCUTANEOUS at 21:43

## 2019-08-06 RX ADMIN — Medication 5 UNIT(S): at 11:34

## 2019-08-06 RX ADMIN — APIXABAN 5 MILLIGRAM(S): 2.5 TABLET, FILM COATED ORAL at 18:11

## 2019-08-06 RX ADMIN — Medication 5 UNIT(S): at 07:39

## 2019-08-06 RX ADMIN — DONEPEZIL HYDROCHLORIDE 5 MILLIGRAM(S): 10 TABLET, FILM COATED ORAL at 21:55

## 2019-08-06 RX ADMIN — INSULIN GLARGINE 10 UNIT(S): 100 INJECTION, SOLUTION SUBCUTANEOUS at 21:55

## 2019-08-06 RX ADMIN — OXYCODONE AND ACETAMINOPHEN 1 TABLET(S): 5; 325 TABLET ORAL at 11:42

## 2019-08-06 RX ADMIN — BRIMONIDINE TARTRATE 1 DROP(S): 2 SOLUTION/ DROPS OPHTHALMIC at 06:20

## 2019-08-06 RX ADMIN — Medication 25 MILLIGRAM(S): at 06:20

## 2019-08-06 RX ADMIN — Medication 100 MILLIGRAM(S): at 06:20

## 2019-08-06 RX ADMIN — BRIMONIDINE TARTRATE 1 DROP(S): 2 SOLUTION/ DROPS OPHTHALMIC at 18:12

## 2019-08-06 RX ADMIN — OXYCODONE AND ACETAMINOPHEN 1 TABLET(S): 5; 325 TABLET ORAL at 12:00

## 2019-08-06 NOTE — CONSULT NOTE ADULT - SUBJECTIVE AND OBJECTIVE BOX
Electrophysiology Consult Note:     CHIEF COMPLAINT:  Patient is a 79y old  Male who presents with a chief complaint of CAD (06 Aug 2019 10:18)        HISTORY OF PRESENT ILLNESS:   HPI:  80 y/o male with history  of HTN, HLD, DM, inferior wall MI, CKD-stage 3, right CVA(residual left arm weakness), s/p loop recorder placement in 2015 and class II angina presented to Dr. Anjel Bhatti, Cardiothoracic Surgery, for surgical evaluation after he originally presented to Dr. Faulkner for routine cardiology care and upon workup, on 6/3/19 had abnormal stress test and underwent Echo revealing EF 55%, no significant valvular disease. Patient presented to [facility] for Cardiac Cath on 7/18/19 which revealed 2 vessel CAD.  On 7/31/19 patient underwent Minimally Invasive Coronary Artery Bypass Grafting (MIDCAB), EF normal, and patient transferred to CTICU in stable condition. On POD0 patient extubated. POD1 requiring nitro gtt which was later weaned, and patient started on a BB.  POD1, patient deemed stable for transfer to tele stepdown unit. POD3, postop paroxysmal afib/flutter rate controlled, Amio bolus x3 and lytes repleted, back in NSR. Also febrile OVN, blood cx sent, NGTD. POD4-5 continued pAfib/Flutter, on Amiodarone PO load and started on anticoagulation with Eliquis.   Patient was noted to be bradycardic with HR in mid 30s, EPS called to evaluate.                        PAST MEDICAL & SURGICAL HISTORY:  HLD (hyperlipidemia)  HTN (hypertension)  CKD (chronic kidney disease), stage III  CAD (coronary artery disease)  Non-ST elevation (NSTEMI) myocardial infarction  CVA (cerebral vascular accident)  Type 2 diabetes mellitus without complication  H/O carpal tunnel repair  Status post placement of implantable loop recorder      FAMILY HISTORY:  Family history of uterine cancer (Sibling)  Family history of heart disease (Mother)      SOCIAL HISTORY:    [x ] Non-smoker      Allergies    No Known Allergies    Intolerances    	    MEDICATIONS:  MEDICATIONS  (STANDING):  apixaban 2.5 milliGRAM(s) Oral every 12 hours  aspirin  chewable 81 milliGRAM(s) Oral daily  atorvastatin 40 milliGRAM(s) Oral at bedtime  brimonidine 0.2% Ophthalmic Solution 1 Drop(s) Left EYE two times a day  chlorhexidine 2% Cloths 1 Application(s) Topical daily  dextrose 5%. 1000 milliLiter(s) (50 mL/Hr) IV Continuous <Continuous>  dextrose 50% Injectable 50 milliLiter(s) IV Push every 15 minutes  docusate sodium 100 milliGRAM(s) Oral three times a day  donepezil 5 milliGRAM(s) Oral at bedtime  insulin glargine Injectable (LANTUS) 10 Unit(s) SubCutaneous at bedtime  insulin lispro (HumaLOG) corrective regimen sliding scale   SubCutaneous Before meals and at bedtime  insulin lispro Injectable (HumaLOG) 5 Unit(s) SubCutaneous three times a day before meals  lactated ringers. 1000 milliLiter(s) (75 mL/Hr) IV Continuous <Continuous>  lactated ringers. 1000 milliLiter(s) (75 mL/Hr) IV Continuous <Continuous>  lidocaine   Patch 1 Patch Transdermal daily  pantoprazole    Tablet 40 milliGRAM(s) Oral before breakfast  senna 2 Tablet(s) Oral at bedtime  sodium chloride 0.9% lock flush 3 milliLiter(s) IV Push every 8 hours  sodium chloride 0.9%. 1000 milliLiter(s) (50 mL/Hr) IV Continuous <Continuous>    MEDICATIONS  (PRN):  acetaminophen   Tablet .. 650 milliGRAM(s) Oral every 6 hours PRN Temp greater or equal to 38C (100.4F)  dextrose 40% Gel 15 Gram(s) Oral once PRN Blood Glucose LESS THAN 70 milliGRAM(s)/deciliter  glucagon  Injectable 1 milliGRAM(s) IntraMuscular once PRN Glucose LESS THAN 70 milligrams/deciliter  levalbuterol Inhalation 0.63 milliGRAM(s) Inhalation every 6 hours PRN SOB and/or wheezing  oxyCODONE    5 mG/acetaminophen 325 mG 1 Tablet(s) Oral every 6 hours PRN Severe Pain (7 - 10)  polyethylene glycol 3350 17 Gram(s) Oral daily PRN Constipation        REVIEW OF SYSTEMS:  CONSTITUTIONAL: No fever, weight loss, or fatigue  EYES: No eye pain, visual disturbances, or discharge  ENMT:  No difficulty hearing, tinnitus, vertigo; No sinus or throat pain  NECK: No pain or stiffness  RESPIRATORY: No cough, wheezing, chills or hemoptysis; No Shortness of Breath  CARDIOVASCULAR: No chest pain, palpitations, dizziness, or leg swelling  GASTROINTESTINAL: No abdominal or epigastric pain. No nausea, vomiting, or hematemesis; No diarrhea or constipation. No melena or hematochezia.  GENITOURINARY: No dysuria, frequency, hematuria, or incontinence  NEUROLOGICAL: No headaches, memory loss, loss of strength, numbness, or tremors  SKIN: No itching, burning, rashes, or lesions   LYMPH Nodes: No enlarged glands  ENDOCRINE: No heat or cold intolerance; No hair loss  MUSCULOSKELETAL: No joint pain or swelling; No muscle, back, or extremity pain    PHYSICAL EXAM:  Vital Signs Last 24 Hrs  T(C): 36.8 (06 Aug 2019 14:00), Max: 37.5 (05 Aug 2019 22:38)  T(F): 98.3 (06 Aug 2019 14:00), Max: 99.5 (05 Aug 2019 22:38)  HR: 70 (06 Aug 2019 13:29) (54 - 82)  BP: 161/73 (06 Aug 2019 13:29) (105/53 - 161/73)  BP(mean): 96 (06 Aug 2019 13:29) (73 - 113)  RR: 17 (06 Aug 2019 13:29) (17 - 20)  SpO2: 93% (06 Aug 2019 13:29) (93% - 96%)  Daily     Daily     HEENT:  PERRL, EOMI	  CVS:  S1 S2, No JVD, No edema  Pulm: Lungs clear to auscultation	  GI:  Soft, Non-tender, + BS	  Ext: No LE edema  Vascular: Peripheral pulses palpable 2+ bilaterally  MS: full range of motion in all joints  Neurologic: A&O x 3  Skin: No rash or lesion       	  LABS:	                         10.2   9.76  )-----------( 238      ( 06 Aug 2019 07:55 )             31.0     08-06    136  |  104  |  40<H>  ----------------------------<  139<H>  4.4   |  22  |  2.42<H>    Ca    9.0      06 Aug 2019 07:55  Mg     1.9     08-06      proBNP:   Lipid Profile:   HgA1c:   TSH: 	    echo: EF 55% as per chart.  Telemetry: sinus rhythm

## 2019-08-06 NOTE — PROGRESS NOTE ADULT - SUBJECTIVE AND OBJECTIVE BOX
Patient discussed on morning rounds with Dr. Bhatti    Operation / Date:  7/31/19 robotic MIDCAB, EF nml    SUBJECTIVE ASSESSMENT:  79y Male seen and examined bedside. Patient states, "I feel terrific." He states his last BM was 2 days ago, he is using IS, pulling close to 1000cc (improved from yesterday's 750cc). He states he ambulated 1 time yesterday and will try to ambulate 2-3x today. Denies chest pain, SOB, palpitations, hemopytsis, N/V/D, abdominal pain, dizziness, fever or chills.     Of note, patient refuses to have condom cath placed for accurate UOP.       Vital Signs Last 24 Hrs  T(C): 36.8 (06 Aug 2019 05:02), Max: 37.5 (05 Aug 2019 22:38)  T(F): 98.2 (06 Aug 2019 05:02), Max: 99.5 (05 Aug 2019 22:38)  HR: 54 (06 Aug 2019 05:01) (54 - 82)  BP: 105/53 (06 Aug 2019 05:01) (105/53 - 158/76)  BP(mean): 73 (06 Aug 2019 05:01) (73 - 112)  RR: 18 (06 Aug 2019 05:01) (18 - 20)  SpO2: 96% (06 Aug 2019 05:01) (95% - 96%)  I&O's Detail    05 Aug 2019 07:01  -  06 Aug 2019 07:00  --------------------------------------------------------  IN:    Oral Fluid: 1495 mL    Sodium Chloride 0.9% IV Bolus: 500 mL  Total IN: 1995 mL    OUT:    Voided: 900 mL  Total OUT: 900 mL    Total NET: 1095 mL      06 Aug 2019 07:01  -  06 Aug 2019 08:56  --------------------------------------------------------  IN:    Sodium Chloride 0.9% IV Bolus: 250 mL  Total IN: 250 mL    OUT:    Voided: 250 mL  Total OUT: 250 mL    Total NET: 0 mL        CHEST TUBE:  No.   HENRY DRAIN:  No.  EPICARDIAL WIRES: No.  TIE DOWNS: Yesx2.  SINGH: No.     PHYSICAL EXAM:    General: Patient OOBTC, no acute distress     Neurological: Alert and oriented. No focal neurological deficits     Cardiovascular: S1S2, RRR, no murmurs appreciated on exam     Respiratory: Clear to auscultation bilaterally, no w/r/r, improved inspiratory effort.     Gastrointestinal: Abdomen soft, non tender, non distended     Extremities: Warm and well perfused. No peripheral edema or calf tenderness     Vascular: Peripheral pulses 2+ b/l.    Incision Sites: L thoracotomy site c/d/i, chest tube site, tiedown in place. Henry site with tiedown.            LABS:                        10.2   9.76  )-----------( 238      ( 06 Aug 2019 07:55 )             31.0       COUMADIN:  No.     PT/INR - ( 06 Aug 2019 07:55 )   PT: 15.9 sec;   INR: 1.39          PTT - ( 06 Aug 2019 07:55 )  PTT:34.0 sec    08-06    136  |  104  |  40<H>  ----------------------------<  139<H>  4.4   |  22  |  2.42<H>    Ca    9.0      06 Aug 2019 07:55  Mg     1.9     08-06            MEDICATIONS  (STANDING):  amiodarone    Tablet   Oral   amiodarone    Tablet 400 milliGRAM(s) Oral every 8 hours  apixaban 2.5 milliGRAM(s) Oral every 12 hours  aspirin  chewable 81 milliGRAM(s) Oral daily  atorvastatin 40 milliGRAM(s) Oral at bedtime  brimonidine 0.2% Ophthalmic Solution 1 Drop(s) Left EYE two times a day  chlorhexidine 2% Cloths 1 Application(s) Topical daily  dextrose 5%. 1000 milliLiter(s) (50 mL/Hr) IV Continuous <Continuous>  dextrose 50% Injectable 50 milliLiter(s) IV Push every 15 minutes  docusate sodium 100 milliGRAM(s) Oral three times a day  donepezil 5 milliGRAM(s) Oral at bedtime  insulin glargine Injectable (LANTUS) 10 Unit(s) SubCutaneous at bedtime  insulin lispro (HumaLOG) corrective regimen sliding scale   SubCutaneous Before meals and at bedtime  insulin lispro Injectable (HumaLOG) 5 Unit(s) SubCutaneous three times a day before meals  lactated ringers. 1000 milliLiter(s) (75 mL/Hr) IV Continuous <Continuous>  lactated ringers. 1000 milliLiter(s) (75 mL/Hr) IV Continuous <Continuous>  lidocaine   Patch 1 Patch Transdermal daily  magnesium oxide 800 milliGRAM(s) Oral once  metoprolol tartrate 25 milliGRAM(s) Oral every 6 hours  pantoprazole    Tablet 40 milliGRAM(s) Oral before breakfast  senna 2 Tablet(s) Oral at bedtime  sodium chloride 0.9% lock flush 3 milliLiter(s) IV Push every 8 hours  sodium chloride 0.9%. 1000 milliLiter(s) (50 mL/Hr) IV Continuous <Continuous>    MEDICATIONS  (PRN):  acetaminophen   Tablet .. 650 milliGRAM(s) Oral every 6 hours PRN Temp greater or equal to 38C (100.4F)  dextrose 40% Gel 15 Gram(s) Oral once PRN Blood Glucose LESS THAN 70 milliGRAM(s)/deciliter  glucagon  Injectable 1 milliGRAM(s) IntraMuscular once PRN Glucose LESS THAN 70 milligrams/deciliter  levalbuterol Inhalation 0.63 milliGRAM(s) Inhalation every 6 hours PRN SOB and/or wheezing  oxyCODONE    5 mG/acetaminophen 325 mG 1 Tablet(s) Oral every 6 hours PRN Severe Pain (7 - 10)  polyethylene glycol 3350 17 Gram(s) Oral daily PRN Constipation        RADIOLOGY & ADDITIONAL TESTS:  < from: Xray Chest 1 View- PORTABLE-Routine (08.06.19 @ 06:18) >    EXAM:  XR CHEST PORTABLE ROUTINE 1V                          PROCEDURE DATE:  08/06/2019          INTERPRETATION:  Clinical history/reason for exam: Postop.    Single frontal view.    Comparison: August 5, 2019.    Findings/  impression: Left basilar opacity/pleural effusion, stable. Right basilar   opacity/pleural effusion, decreased.. Stable heart size. Left loop   recorder. Stable bony structures.    < end of copied text >

## 2019-08-06 NOTE — PROGRESS NOTE ADULT - ASSESSMENT
80 y/o male with a PMHx of HTN, HLD, DM, inferior wall MI, CKD-stage 3, right CVA(residual left arm weakness), s/p loop recorder placement in 2015 and class II angina presented to Dr. Anjel Bhatti, Cardiothoracic Surgery, for surgical evaluation after he originally presented to Dr. Faulkner for routine cardiology care and upon workup, on 6/3/19 had abnormal stress test and underwent Echo revealing EF 55%, no significant valvular disease. Patient presented to [facility] for Cardiac Cath on 7/18/19 which revealed 2 vessel CAD. Per Dr. Bhatti, patient deemed a good surgical candidate and presented to [facility] for elective surgery. On 7/31/19 patient underwent Minimally Invasive Coronary Artery Bypass Grafting (MIDCAB), EF normal, and patient transferred to CTICU in stable condition. On POD0 patient extubated. POD1 requiring nitro gtt which was later weaned, and patient started on a BB.  POD1, patient deemed stable for transfer to tele stepdown unit. POD2, jasiel removed, f/u CXR without ptx. POD3, postop paroxysmal afib/flutter rate controlled, Amio bolus x3 and lytes repleted, back in NSR. Also febrile OVN, blood cx sent, NGTD. POD4-5 continued pAfib/Flutter, on Amiodarone PO load and started on anticoagulation with Eliquis. Patient with TAYLOR on CKD, hydrating, will repeat Cr this PM.     A/P:  Neurovascular: No delirium. Pain well controlled with current regimen.  -Tylenol PRN for mild pain  - Percocet PRN for mod-severe pain  - PMHx of CVA with residual left arm weakness.  - Continue home med: donepezil 5mg PO qhs    Cardiovascular: POD7 s/p MIDCAB, EF nml  - CAD s/p MIDCAB: Continue Aspirin 81mg PO qd, Plavix 75mg PO qd, atorvastatin 40mg PO qhs, and metoprolol 25mg PO q6hrs.     - Continue to titrate up BB as tolerated.     - Secondary to CVA and CKD, ok with permissive HTN SBP 150s.    - Holding ACEi 2/2 to CKD.   - Postop pAfib: continue BB, Amiodarone PO load and Eliquis PO BID - currently renally dosed, consider adjusting dose as TAYLOR improves.  - Continue to monitor HR/BP/Tele.     Respiratory: 02 Sat = 98% on RA.  -If on oxygen wean to RA from for O2 Sat > 93%.  -Encourage C+DB and Use of IS 10x / hr while awake.  -CXR PA/Lat results above, atelectasis at the left bases with possible effusion, decreased in size. Continue to push from a pulmonary standpoint.   - Pulling close to 1000cc on IS, which is improved from yesterday which was 750cc.     GI: Stable.  -PPX with pantoprazole.   -PO Diet.    Renal / : PMHx of CKD. TAYLOR on CKD.   - BUN/Cr: 40/2.42. Per Dr. Bhatti, will keep patient +1L today. Giving IVF bolus now.       - Repeat BUN/Cr this PM. Continue to trend.      - Cr on admission was 1.74. Baseline 1.4-1.5.  - Avoid nephrotoxic agents.   -Monitor renal function.    Endocrine: PMHx of DM. Endocrine consulted, appreciate recs.  - Continue Lantus 10U qhs and Lispro 5U TID with meals, and ISS. Blood glucose goal <150.    -A1c: 8.2  -TSH: 1.337    Hematologic: H&H: 10.2/31.0  -CBC in AM    ID: afebrile  -WBC: 9.76.  - Blood cx on 8/3/19 NGTD  - repeat CBC in AM.   -Observe for SIRS/Sepsis Syndrome.    Prophylaxis:  -DVT prophylaxis with eliquis  -SCD's    Disposition:  - Home when medically stable.

## 2019-08-06 NOTE — CONSULT NOTE ADULT - ASSESSMENT
78 y/o male with history  of HTN, HLD, DM, inferior wall MI, CKD-stage 3, right CVA(residual left arm weakness), s/p loop recorder placement in 2015 and class II angina , 2 vessel CAD, s/p MIDCAB on 7/31/19 post op atrial fibrillation, treated with amiodarone, noted to be bradycardic with HR in mid 30s. Patient remained asymptomatic and denied any complains. Agree with holding metoprolol, would continue amiodarone 200 mg daily, would change Eliquis to 5 mg BID (Wt > 60 kg, age 78 yo). Will continue to monitor telemetry.

## 2019-08-06 NOTE — PROGRESS NOTE ADULT - SUBJECTIVE AND OBJECTIVE BOX
INTERVAL HPI/OVERNIGHT EVENTS:    Patient is a 79y old  Male who presents with a chief complaint of CAD (06 Aug 2019 08:56)      Pt reports the following symptoms:    CONSTITUTIONAL:  Negative fever or chills, feels well, good appetite  EYES:  Negative  blurry vision or double vision  CARDIOVASCULAR:  Negative for chest pain or palpitations  RESPIRATORY:  Negative for cough, wheezing, or SOB   GASTROINTESTINAL:  Negative for nausea, vomiting, diarrhea, constipation, or abdominal pain  GENITOURINARY:  Negative frequency, urgency or dysuria  NEUROLOGIC:  No headache, confusion, dizziness, lightheadedness    MEDICATIONS  (STANDING):  amiodarone    Tablet   Oral   amiodarone    Tablet 400 milliGRAM(s) Oral every 8 hours  apixaban 2.5 milliGRAM(s) Oral every 12 hours  aspirin  chewable 81 milliGRAM(s) Oral daily  atorvastatin 40 milliGRAM(s) Oral at bedtime  brimonidine 0.2% Ophthalmic Solution 1 Drop(s) Left EYE two times a day  chlorhexidine 2% Cloths 1 Application(s) Topical daily  dextrose 5%. 1000 milliLiter(s) (50 mL/Hr) IV Continuous <Continuous>  dextrose 50% Injectable 50 milliLiter(s) IV Push every 15 minutes  docusate sodium 100 milliGRAM(s) Oral three times a day  donepezil 5 milliGRAM(s) Oral at bedtime  insulin glargine Injectable (LANTUS) 10 Unit(s) SubCutaneous at bedtime  insulin lispro (HumaLOG) corrective regimen sliding scale   SubCutaneous Before meals and at bedtime  insulin lispro Injectable (HumaLOG) 5 Unit(s) SubCutaneous three times a day before meals  lactated ringers. 1000 milliLiter(s) (75 mL/Hr) IV Continuous <Continuous>  lactated ringers. 1000 milliLiter(s) (75 mL/Hr) IV Continuous <Continuous>  lidocaine   Patch 1 Patch Transdermal daily  metoprolol tartrate 25 milliGRAM(s) Oral every 6 hours  pantoprazole    Tablet 40 milliGRAM(s) Oral before breakfast  senna 2 Tablet(s) Oral at bedtime  sodium chloride 0.9% Bolus 500 milliLiter(s) IV Bolus once  sodium chloride 0.9% lock flush 3 milliLiter(s) IV Push every 8 hours  sodium chloride 0.9%. 1000 milliLiter(s) (50 mL/Hr) IV Continuous <Continuous>    MEDICATIONS  (PRN):  acetaminophen   Tablet .. 650 milliGRAM(s) Oral every 6 hours PRN Temp greater or equal to 38C (100.4F)  dextrose 40% Gel 15 Gram(s) Oral once PRN Blood Glucose LESS THAN 70 milliGRAM(s)/deciliter  glucagon  Injectable 1 milliGRAM(s) IntraMuscular once PRN Glucose LESS THAN 70 milligrams/deciliter  levalbuterol Inhalation 0.63 milliGRAM(s) Inhalation every 6 hours PRN SOB and/or wheezing  oxyCODONE    5 mG/acetaminophen 325 mG 1 Tablet(s) Oral every 6 hours PRN Severe Pain (7 - 10)  polyethylene glycol 3350 17 Gram(s) Oral daily PRN Constipation      PHYSICAL EXAM  Vital Signs Last 24 Hrs  T(C): 36.8 (06 Aug 2019 05:02), Max: 37.5 (05 Aug 2019 22:38)  T(F): 98.2 (06 Aug 2019 05:02), Max: 99.5 (05 Aug 2019 22:38)  HR: 64 (06 Aug 2019 09:00) (54 - 82)  BP: 138/60 (06 Aug 2019 09:00) (105/53 - 158/76)  BP(mean): 94 (06 Aug 2019 09:00) (73 - 112)  RR: 17 (06 Aug 2019 09:00) (17 - 20)  SpO2: 93% (06 Aug 2019 09:00) (93% - 96%)    Constitutional: wn/wd in NAD.   HEENT: NCAT, MMM, OP clear, EOMI, no proptosis or lid retraction  Neck: no thyromegaly or palpable thyroid nodules   Respiratory: lungs CTAB.  Cardiovascular: regular rhythm, normal S1 and S2, no audible murmurs, no peripheral edema  GI: soft, NT/ND, no masses/HSM appreciated.  Neurology: no tremors, DTR 2+  Skin: no visible rashes/lesions  Psychiatric: AAO x 3, normal affect/mood.    LABS:                        10.2   9.76  )-----------( 238      ( 06 Aug 2019 07:55 )             31.0     08-06    136  |  104  |  40<H>  ----------------------------<  139<H>  4.4   |  22  |  2.42<H>    Ca    9.0      06 Aug 2019 07:55  Mg     1.9     08-06      PT/INR - ( 06 Aug 2019 07:55 )   PT: 15.9 sec;   INR: 1.39          PTT - ( 06 Aug 2019 07:55 )  PTT:34.0 sec    Thyroid Stimulating Hormone, Serum: 1.337 uIU/mL (07-23 @ 15:54)      HbA1C: 8.2 % (07-23 @ 15:55)    CAPILLARY BLOOD GLUCOSE      POCT Blood Glucose.: 140 mg/dL (06 Aug 2019 07:01)  POCT Blood Glucose.: 172 mg/dL (05 Aug 2019 21:38)  POCT Blood Glucose.: 96 mg/dL (05 Aug 2019 16:54)  POCT Blood Glucose.: 206 mg/dL (05 Aug 2019 11:16)      Insulin Sliding Scale requirements X 24 Hours:    RADIOLOGY & ADDITIONAL TESTS:    A/P: 79y Male with history of DM type II presenting for       1.  DM -     Please continue           units lantus at bedtime  / in the morning and        units lispro with meals and lispro moderate / low dose sliding scale 4 times daily with meals and at bedtime.  Please continue consistent carbohydrate diet.      Goal FSG is   Will continue to monitor   For discharge, pt can continue    Pt can follow up at discharge with [facility] by calling  to make an appointment.   Will discuss case with     and update primary team INTERVAL HPI/OVERNIGHT EVENTS:    Patient is a 79y old  Male who presents with a chief complaint of CAD (06 Aug 2019 08:56)      Pt reports the following symptoms:    CONSTITUTIONAL:  Negative fever or chills, feels well, good appetite  EYES:  Negative  blurry vision or double vision  CARDIOVASCULAR:  Negative for chest pain or palpitations  RESPIRATORY:  Negative for cough, wheezing, or SOB   GASTROINTESTINAL:  Negative for nausea, vomiting, diarrhea, constipation, or abdominal pain  GENITOURINARY:  Negative frequency, urgency or dysuria  NEUROLOGIC:  No headache, confusion, dizziness, lightheadedness    MEDICATIONS  (STANDING):  amiodarone    Tablet   Oral   amiodarone    Tablet 400 milliGRAM(s) Oral every 8 hours  apixaban 2.5 milliGRAM(s) Oral every 12 hours  aspirin  chewable 81 milliGRAM(s) Oral daily  atorvastatin 40 milliGRAM(s) Oral at bedtime  brimonidine 0.2% Ophthalmic Solution 1 Drop(s) Left EYE two times a day  chlorhexidine 2% Cloths 1 Application(s) Topical daily  dextrose 5%. 1000 milliLiter(s) (50 mL/Hr) IV Continuous <Continuous>  dextrose 50% Injectable 50 milliLiter(s) IV Push every 15 minutes  docusate sodium 100 milliGRAM(s) Oral three times a day  donepezil 5 milliGRAM(s) Oral at bedtime  insulin glargine Injectable (LANTUS) 10 Unit(s) SubCutaneous at bedtime  insulin lispro (HumaLOG) corrective regimen sliding scale   SubCutaneous Before meals and at bedtime  insulin lispro Injectable (HumaLOG) 5 Unit(s) SubCutaneous three times a day before meals  lactated ringers. 1000 milliLiter(s) (75 mL/Hr) IV Continuous <Continuous>  lactated ringers. 1000 milliLiter(s) (75 mL/Hr) IV Continuous <Continuous>  lidocaine   Patch 1 Patch Transdermal daily  metoprolol tartrate 25 milliGRAM(s) Oral every 6 hours  pantoprazole    Tablet 40 milliGRAM(s) Oral before breakfast  senna 2 Tablet(s) Oral at bedtime  sodium chloride 0.9% Bolus 500 milliLiter(s) IV Bolus once  sodium chloride 0.9% lock flush 3 milliLiter(s) IV Push every 8 hours  sodium chloride 0.9%. 1000 milliLiter(s) (50 mL/Hr) IV Continuous <Continuous>    MEDICATIONS  (PRN):  acetaminophen   Tablet .. 650 milliGRAM(s) Oral every 6 hours PRN Temp greater or equal to 38C (100.4F)  dextrose 40% Gel 15 Gram(s) Oral once PRN Blood Glucose LESS THAN 70 milliGRAM(s)/deciliter  glucagon  Injectable 1 milliGRAM(s) IntraMuscular once PRN Glucose LESS THAN 70 milligrams/deciliter  levalbuterol Inhalation 0.63 milliGRAM(s) Inhalation every 6 hours PRN SOB and/or wheezing  oxyCODONE    5 mG/acetaminophen 325 mG 1 Tablet(s) Oral every 6 hours PRN Severe Pain (7 - 10)  polyethylene glycol 3350 17 Gram(s) Oral daily PRN Constipation      PHYSICAL EXAM  Vital Signs Last 24 Hrs  T(C): 36.8 (06 Aug 2019 05:02), Max: 37.5 (05 Aug 2019 22:38)  T(F): 98.2 (06 Aug 2019 05:02), Max: 99.5 (05 Aug 2019 22:38)  HR: 64 (06 Aug 2019 09:00) (54 - 82)  BP: 138/60 (06 Aug 2019 09:00) (105/53 - 158/76)  BP(mean): 94 (06 Aug 2019 09:00) (73 - 112)  RR: 17 (06 Aug 2019 09:00) (17 - 20)  SpO2: 93% (06 Aug 2019 09:00) (93% - 96%)    Constitutional: wn/wd in NAD.   HEENT: NCAT, MMM, OP clear, EOMI, no proptosis or lid retraction  Neck: no thyromegaly or palpable thyroid nodules   Respiratory: lungs CTAB.  Cardiovascular: regular rhythm, normal S1 and S2, no audible murmurs, no peripheral edema  GI: soft, NT/ND, no masses/HSM appreciated.  Neurology: no tremors, DTR 2+  Skin: no visible rashes/lesions  Psychiatric: AAO x 3, normal affect/mood.    LABS:                        10.2   9.76  )-----------( 238      ( 06 Aug 2019 07:55 )             31.0     08-06    136  |  104  |  40<H>  ----------------------------<  139<H>  4.4   |  22  |  2.42<H>    Ca    9.0      06 Aug 2019 07:55  Mg     1.9     08-06      PT/INR - ( 06 Aug 2019 07:55 )   PT: 15.9 sec;   INR: 1.39          PTT - ( 06 Aug 2019 07:55 )  PTT:34.0 sec    Thyroid Stimulating Hormone, Serum: 1.337 uIU/mL (07-23 @ 15:54)      HbA1C: 8.2 % (07-23 @ 15:55)    CAPILLARY BLOOD GLUCOSE      POCT Blood Glucose.: 140 mg/dL (06 Aug 2019 07:01)  POCT Blood Glucose.: 172 mg/dL (05 Aug 2019 21:38)  POCT Blood Glucose.: 96 mg/dL (05 Aug 2019 16:54)  POCT Blood Glucose.: 206 mg/dL (05 Aug 2019 11:16)      Insulin Sliding Scale requirements X 24 Hours:    RADIOLOGY & ADDITIONAL TESTS:    A/P: 78 yo male with a PMH of HTN, HLD, DM, inferior wall MI, CKD-stage 3, right CVA(residual left arm weakness), s/p loop recorder placement in 2015 currently is s/p robotic mid CABG . he is currently being managed for diabetes    1.  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  Please hold the premeal if the patient is not eating  Please continue lispro moderate dose sliding scale four times daily with meals and at bedtime  Monitor his TAYLOR - This will determine his discharge plan.     Pt's fingerstick glucose goal is 140-180    Will continue to monitor     For discharge, TBD    Pt can follow up at discharge with [facility] by calling  to make an appointment.   discussed case with   and updated primary team INTERVAL HPI/OVERNIGHT EVENTS:    Patient seen and examined at the bedside. His TAYLOR is still persisting,. He did receive 0.9% nacl @ 50cc/hr for 12 hours yesterday. His creatinine was still in 2.4 today morning. He did receive another 1 liter bolus today in the morning. His repeat creatinine is still pending. He did not have a good appetite today. Still the patient is eating less.         Pt reports the following symptoms:    CONSTITUTIONAL:  Negative fever or chills, feels well, good appetite  EYES:  Negative  blurry vision or double vision  CARDIOVASCULAR:  Negative for chest pain or palpitations  RESPIRATORY:  Negative for cough, wheezing, or SOB   GASTROINTESTINAL:  Negative for nausea, vomiting, diarrhea, constipation, or abdominal pain  GENITOURINARY:  Negative frequency, urgency or dysuria  NEUROLOGIC:  No headache, confusion, dizziness, lightheadedness    MEDICATIONS  (STANDING):  amiodarone    Tablet   Oral   amiodarone    Tablet 400 milliGRAM(s) Oral every 8 hours  apixaban 2.5 milliGRAM(s) Oral every 12 hours  aspirin  chewable 81 milliGRAM(s) Oral daily  atorvastatin 40 milliGRAM(s) Oral at bedtime  brimonidine 0.2% Ophthalmic Solution 1 Drop(s) Left EYE two times a day  chlorhexidine 2% Cloths 1 Application(s) Topical daily  dextrose 5%. 1000 milliLiter(s) (50 mL/Hr) IV Continuous <Continuous>  dextrose 50% Injectable 50 milliLiter(s) IV Push every 15 minutes  docusate sodium 100 milliGRAM(s) Oral three times a day  donepezil 5 milliGRAM(s) Oral at bedtime  insulin glargine Injectable (LANTUS) 10 Unit(s) SubCutaneous at bedtime  insulin lispro (HumaLOG) corrective regimen sliding scale   SubCutaneous Before meals and at bedtime  insulin lispro Injectable (HumaLOG) 5 Unit(s) SubCutaneous three times a day before meals  lactated ringers. 1000 milliLiter(s) (75 mL/Hr) IV Continuous <Continuous>  lactated ringers. 1000 milliLiter(s) (75 mL/Hr) IV Continuous <Continuous>  lidocaine   Patch 1 Patch Transdermal daily  metoprolol tartrate 25 milliGRAM(s) Oral every 6 hours  pantoprazole    Tablet 40 milliGRAM(s) Oral before breakfast  senna 2 Tablet(s) Oral at bedtime  sodium chloride 0.9% Bolus 500 milliLiter(s) IV Bolus once  sodium chloride 0.9% lock flush 3 milliLiter(s) IV Push every 8 hours  sodium chloride 0.9%. 1000 milliLiter(s) (50 mL/Hr) IV Continuous <Continuous>    MEDICATIONS  (PRN):  acetaminophen   Tablet .. 650 milliGRAM(s) Oral every 6 hours PRN Temp greater or equal to 38C (100.4F)  dextrose 40% Gel 15 Gram(s) Oral once PRN Blood Glucose LESS THAN 70 milliGRAM(s)/deciliter  glucagon  Injectable 1 milliGRAM(s) IntraMuscular once PRN Glucose LESS THAN 70 milligrams/deciliter  levalbuterol Inhalation 0.63 milliGRAM(s) Inhalation every 6 hours PRN SOB and/or wheezing  oxyCODONE    5 mG/acetaminophen 325 mG 1 Tablet(s) Oral every 6 hours PRN Severe Pain (7 - 10)  polyethylene glycol 3350 17 Gram(s) Oral daily PRN Constipation      PHYSICAL EXAM  Vital Signs Last 24 Hrs  T(C): 36.8 (06 Aug 2019 05:02), Max: 37.5 (05 Aug 2019 22:38)  T(F): 98.2 (06 Aug 2019 05:02), Max: 99.5 (05 Aug 2019 22:38)  HR: 64 (06 Aug 2019 09:00) (54 - 82)  BP: 138/60 (06 Aug 2019 09:00) (105/53 - 158/76)  BP(mean): 94 (06 Aug 2019 09:00) (73 - 112)  RR: 17 (06 Aug 2019 09:00) (17 - 20)  SpO2: 93% (06 Aug 2019 09:00) (93% - 96%)    Constitutional: wn/wd in NAD.   HEENT: NCAT, MMM, OP clear, EOMI, no proptosis or lid retraction  Neck: no thyromegaly or palpable thyroid nodules   Respiratory: lungs CTAB.  Cardiovascular: regular rhythm, normal S1 and S2, no audible murmurs, no peripheral edema  GI: soft, NT/ND, no masses/HSM appreciated.  Neurology: no tremors, DTR 2+  Skin: no visible rashes/lesions  Psychiatric: AAO x 3, normal affect/mood.    LABS:                        10.2   9.76  )-----------( 238      ( 06 Aug 2019 07:55 )             31.0     08-06    136  |  104  |  40<H>  ----------------------------<  139<H>  4.4   |  22  |  2.42<H>    Ca    9.0      06 Aug 2019 07:55  Mg     1.9     08-06      PT/INR - ( 06 Aug 2019 07:55 )   PT: 15.9 sec;   INR: 1.39          PTT - ( 06 Aug 2019 07:55 )  PTT:34.0 sec    Thyroid Stimulating Hormone, Serum: 1.337 uIU/mL (07-23 @ 15:54)      HbA1C: 8.2 % (07-23 @ 15:55)    CAPILLARY BLOOD GLUCOSE      POCT Blood Glucose.: 140 mg/dL (06 Aug 2019 07:01)  POCT Blood Glucose.: 172 mg/dL (05 Aug 2019 21:38)  POCT Blood Glucose.: 96 mg/dL (05 Aug 2019 16:54)  POCT Blood Glucose.: 206 mg/dL (05 Aug 2019 11:16)      Insulin Sliding Scale requirements X 24 Hours:    RADIOLOGY & ADDITIONAL TESTS:    A/P: 78 yo male with a PMH of HTN, HLD, DM, inferior wall MI, CKD-stage 3, right CVA(residual left arm weakness), s/p loop recorder placement in 2015 currently is s/p robotic mid CABG . he is currently being managed for diabetes    1.  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  Please hold the premeal if the patient is not eating  Please continue lispro moderate dose sliding scale four times daily with meals and at bedtime  Monitor his TAYLOR - This will determine his discharge plan.     Pt's fingerstick glucose goal is 140-180    Will continue to monitor     For discharge, TBD    Pt can follow up at discharge with [facility] by calling  to make an appointment.   discussed case with   and updated primary team INTERVAL HPI/OVERNIGHT EVENTS:    Patient seen and examined at the bedside. His TAYLOR is still persisting,. He did receive 0.9% nacl @ 50cc/hr for 12 hours yesterday. His creatinine was still in 2.4 today morning. He did receive another 1 liter bolus today in the morning. His repeat creatinine is still pending. He did not have a good appetite today. Still the patient is eating less. He is urinating well and denies any dysuria or increased frequency    FSG & Insulin received:    Yesterday:  pre-dinner fs, 5 nutritional lispro   units, had some wrap  bedtime fs, 10 lantus   units + 2   units lispro SS    Today:  pre-breakfast fs, 5 nutritional lispro   units  pre-lunch fs, 5 nutritional lispro   units+ 2  units lispro SS        Pt reports the following symptoms:    CONSTITUTIONAL:  Negative fever or chills, feels well, good appetite  EYES:  Negative  blurry vision or double vision  CARDIOVASCULAR:  Negative for chest pain or palpitations  RESPIRATORY:  Negative for cough, wheezing, or SOB   GASTROINTESTINAL:  Negative for nausea, vomiting, diarrhea, constipation, or abdominal pain  GENITOURINARY:  Negative frequency, urgency or dysuria  NEUROLOGIC:  No headache, confusion, dizziness, lightheadedness    MEDICATIONS  (STANDING):  amiodarone    Tablet   Oral   amiodarone    Tablet 400 milliGRAM(s) Oral every 8 hours  apixaban 2.5 milliGRAM(s) Oral every 12 hours  aspirin  chewable 81 milliGRAM(s) Oral daily  atorvastatin 40 milliGRAM(s) Oral at bedtime  brimonidine 0.2% Ophthalmic Solution 1 Drop(s) Left EYE two times a day  chlorhexidine 2% Cloths 1 Application(s) Topical daily  dextrose 5%. 1000 milliLiter(s) (50 mL/Hr) IV Continuous <Continuous>  dextrose 50% Injectable 50 milliLiter(s) IV Push every 15 minutes  docusate sodium 100 milliGRAM(s) Oral three times a day  donepezil 5 milliGRAM(s) Oral at bedtime  insulin glargine Injectable (LANTUS) 10 Unit(s) SubCutaneous at bedtime  insulin lispro (HumaLOG) corrective regimen sliding scale   SubCutaneous Before meals and at bedtime  insulin lispro Injectable (HumaLOG) 5 Unit(s) SubCutaneous three times a day before meals  lactated ringers. 1000 milliLiter(s) (75 mL/Hr) IV Continuous <Continuous>  lactated ringers. 1000 milliLiter(s) (75 mL/Hr) IV Continuous <Continuous>  lidocaine   Patch 1 Patch Transdermal daily  metoprolol tartrate 25 milliGRAM(s) Oral every 6 hours  pantoprazole    Tablet 40 milliGRAM(s) Oral before breakfast  senna 2 Tablet(s) Oral at bedtime  sodium chloride 0.9% Bolus 500 milliLiter(s) IV Bolus once  sodium chloride 0.9% lock flush 3 milliLiter(s) IV Push every 8 hours  sodium chloride 0.9%. 1000 milliLiter(s) (50 mL/Hr) IV Continuous <Continuous>    MEDICATIONS  (PRN):  acetaminophen   Tablet .. 650 milliGRAM(s) Oral every 6 hours PRN Temp greater or equal to 38C (100.4F)  dextrose 40% Gel 15 Gram(s) Oral once PRN Blood Glucose LESS THAN 70 milliGRAM(s)/deciliter  glucagon  Injectable 1 milliGRAM(s) IntraMuscular once PRN Glucose LESS THAN 70 milligrams/deciliter  levalbuterol Inhalation 0.63 milliGRAM(s) Inhalation every 6 hours PRN SOB and/or wheezing  oxyCODONE    5 mG/acetaminophen 325 mG 1 Tablet(s) Oral every 6 hours PRN Severe Pain (7 - 10)  polyethylene glycol 3350 17 Gram(s) Oral daily PRN Constipation      PHYSICAL EXAM  Vital Signs Last 24 Hrs  T(C): 36.8 (06 Aug 2019 05:02), Max: 37.5 (05 Aug 2019 22:38)  T(F): 98.2 (06 Aug 2019 05:02), Max: 99.5 (05 Aug 2019 22:38)  HR: 64 (06 Aug 2019 09:00) (54 - 82)  BP: 138/60 (06 Aug 2019 09:00) (105/53 - 158/76)  BP(mean): 94 (06 Aug 2019 09:00) (73 - 112)  RR: 17 (06 Aug 2019 09:00) (17 - 20)  SpO2: 93% (06 Aug 2019 09:00) (93% - 96%)    Constitutional: wn/wd in NAD.   Respiratory: lungs CTAB.  Cardiovascular: regular rhythm, normal S1 and S2, mild peripheral edema  GI: soft, NT, mild suprapubic pressure +. possible palpable bladder with dullness on percussion  Neurology: no tremors, DTR 2+, no focal neurological deficits    LABS:                        10.2   9.76  )-----------( 238      ( 06 Aug 2019 07:55 )             31.0     08-    136  |  104  |  40<H>  ----------------------------<  139<H>  4.4   |  22  |  2.42<H>    Ca    9.0      06 Aug 2019 07:55  Mg     1.9     08-      PT/INR - ( 06 Aug 2019 07:55 )   PT: 15.9 sec;   INR: 1.39          PTT - ( 06 Aug 2019 07:55 )  PTT:34.0 sec    Thyroid Stimulating Hormone, Serum: 1.337 uIU/mL ( @ 15:54)      HbA1C: 8.2 % ( @ 15:55)    CAPILLARY BLOOD GLUCOSE      POCT Blood Glucose.: 140 mg/dL (06 Aug 2019 07:01)  POCT Blood Glucose.: 172 mg/dL (05 Aug 2019 21:38)  POCT Blood Glucose.: 96 mg/dL (05 Aug 2019 16:54)  POCT Blood Glucose.: 206 mg/dL (05 Aug 2019 11:16)      Insulin Sliding Scale requirements X 24 Hours:    RADIOLOGY & ADDITIONAL TESTS:    A/P: 80 yo male with a PMH of HTN, HLD, DM, inferior wall MI, CKD-stage 3, right CVA(residual left arm weakness), s/p loop recorder placement in  currently is s/p robotic mid CABG . he is currently being managed for diabetes    1.  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  Please hold the premeal if the patient is not eating  Please continue lispro moderate dose sliding scale four times daily with meals and at bedtime  Monitor his TAYLOR - This will determine his discharge plan. Consider obtaining bladder scan to rule out any obstruction. Albumin level was 3.9    Pt's fingerstick glucose goal is 140-180    Will continue to monitor     For discharge, TBD    [facility] by calling  to make an appointment.   discussed case with   and updated primary team

## 2019-08-07 ENCOUNTER — TRANSCRIPTION ENCOUNTER (OUTPATIENT)
Age: 79
End: 2019-08-07

## 2019-08-07 VITALS — TEMPERATURE: 99 F

## 2019-08-07 LAB
ALBUMIN SERPL ELPH-MCNC: 3.2 G/DL — LOW (ref 3.3–5)
ALP SERPL-CCNC: 135 U/L — HIGH (ref 40–120)
ALT FLD-CCNC: 92 U/L — HIGH (ref 10–45)
ANION GAP SERPL CALC-SCNC: 11 MMOL/L — SIGNIFICANT CHANGE UP (ref 5–17)
ANION GAP SERPL CALC-SCNC: 14 MMOL/L — SIGNIFICANT CHANGE UP (ref 5–17)
AST SERPL-CCNC: 99 U/L — HIGH (ref 10–40)
BASOPHILS # BLD AUTO: 0.05 K/UL — SIGNIFICANT CHANGE UP (ref 0–0.2)
BASOPHILS NFR BLD AUTO: 0.5 % — SIGNIFICANT CHANGE UP (ref 0–2)
BILIRUB SERPL-MCNC: 0.6 MG/DL — SIGNIFICANT CHANGE UP (ref 0.2–1.2)
BUN SERPL-MCNC: 26 MG/DL — HIGH (ref 7–23)
BUN SERPL-MCNC: 29 MG/DL — HIGH (ref 7–23)
CALCIUM SERPL-MCNC: 9.4 MG/DL — SIGNIFICANT CHANGE UP (ref 8.4–10.5)
CALCIUM SERPL-MCNC: 9.7 MG/DL — SIGNIFICANT CHANGE UP (ref 8.4–10.5)
CHLORIDE SERPL-SCNC: 105 MMOL/L — SIGNIFICANT CHANGE UP (ref 96–108)
CHLORIDE SERPL-SCNC: 106 MMOL/L — SIGNIFICANT CHANGE UP (ref 96–108)
CO2 SERPL-SCNC: 23 MMOL/L — SIGNIFICANT CHANGE UP (ref 22–31)
CO2 SERPL-SCNC: 24 MMOL/L — SIGNIFICANT CHANGE UP (ref 22–31)
CREAT SERPL-MCNC: 1.83 MG/DL — HIGH (ref 0.5–1.3)
CREAT SERPL-MCNC: 1.86 MG/DL — HIGH (ref 0.5–1.3)
EOSINOPHIL # BLD AUTO: 0.39 K/UL — SIGNIFICANT CHANGE UP (ref 0–0.5)
EOSINOPHIL NFR BLD AUTO: 3.6 % — SIGNIFICANT CHANGE UP (ref 0–6)
GLUCOSE BLDC GLUCOMTR-MCNC: 116 MG/DL — HIGH (ref 70–99)
GLUCOSE BLDC GLUCOMTR-MCNC: 150 MG/DL — HIGH (ref 70–99)
GLUCOSE BLDC GLUCOMTR-MCNC: 157 MG/DL — HIGH (ref 70–99)
GLUCOSE SERPL-MCNC: 112 MG/DL — HIGH (ref 70–99)
GLUCOSE SERPL-MCNC: 146 MG/DL — HIGH (ref 70–99)
HCT VFR BLD CALC: 33.5 % — LOW (ref 39–50)
HGB BLD-MCNC: 11 G/DL — LOW (ref 13–17)
IMM GRANULOCYTES NFR BLD AUTO: 1.1 % — SIGNIFICANT CHANGE UP (ref 0–1.5)
LYMPHOCYTES # BLD AUTO: 1.7 K/UL — SIGNIFICANT CHANGE UP (ref 1–3.3)
LYMPHOCYTES # BLD AUTO: 15.9 % — SIGNIFICANT CHANGE UP (ref 13–44)
MAGNESIUM SERPL-MCNC: 1.7 MG/DL — SIGNIFICANT CHANGE UP (ref 1.6–2.6)
MCHC RBC-ENTMCNC: 30 PG — SIGNIFICANT CHANGE UP (ref 27–34)
MCHC RBC-ENTMCNC: 32.8 GM/DL — SIGNIFICANT CHANGE UP (ref 32–36)
MCV RBC AUTO: 91.3 FL — SIGNIFICANT CHANGE UP (ref 80–100)
MONOCYTES # BLD AUTO: 1.34 K/UL — HIGH (ref 0–0.9)
MONOCYTES NFR BLD AUTO: 12.5 % — SIGNIFICANT CHANGE UP (ref 2–14)
NEUTROPHILS # BLD AUTO: 7.1 K/UL — SIGNIFICANT CHANGE UP (ref 1.8–7.4)
NEUTROPHILS NFR BLD AUTO: 66.4 % — SIGNIFICANT CHANGE UP (ref 43–77)
NRBC # BLD: 0 /100 WBCS — SIGNIFICANT CHANGE UP (ref 0–0)
PLATELET # BLD AUTO: 295 K/UL — SIGNIFICANT CHANGE UP (ref 150–400)
POTASSIUM SERPL-MCNC: 4 MMOL/L — SIGNIFICANT CHANGE UP (ref 3.5–5.3)
POTASSIUM SERPL-MCNC: 5.3 MMOL/L — SIGNIFICANT CHANGE UP (ref 3.5–5.3)
POTASSIUM SERPL-SCNC: 4 MMOL/L — SIGNIFICANT CHANGE UP (ref 3.5–5.3)
POTASSIUM SERPL-SCNC: 5.3 MMOL/L — SIGNIFICANT CHANGE UP (ref 3.5–5.3)
PROT SERPL-MCNC: 6.7 G/DL — SIGNIFICANT CHANGE UP (ref 6–8.3)
RBC # BLD: 3.67 M/UL — LOW (ref 4.2–5.8)
RBC # FLD: 12.4 % — SIGNIFICANT CHANGE UP (ref 10.3–14.5)
SODIUM SERPL-SCNC: 140 MMOL/L — SIGNIFICANT CHANGE UP (ref 135–145)
SODIUM SERPL-SCNC: 143 MMOL/L — SIGNIFICANT CHANGE UP (ref 135–145)
WBC # BLD: 10.7 K/UL — HIGH (ref 3.8–10.5)
WBC # FLD AUTO: 10.7 K/UL — HIGH (ref 3.8–10.5)

## 2019-08-07 PROCEDURE — 97530 THERAPEUTIC ACTIVITIES: CPT

## 2019-08-07 PROCEDURE — 83735 ASSAY OF MAGNESIUM: CPT

## 2019-08-07 PROCEDURE — 94002 VENT MGMT INPAT INIT DAY: CPT

## 2019-08-07 PROCEDURE — 86850 RBC ANTIBODY SCREEN: CPT

## 2019-08-07 PROCEDURE — 86900 BLOOD TYPING SEROLOGIC ABO: CPT

## 2019-08-07 PROCEDURE — P9045: CPT

## 2019-08-07 PROCEDURE — 71045 X-RAY EXAM CHEST 1 VIEW: CPT

## 2019-08-07 PROCEDURE — 81001 URINALYSIS AUTO W/SCOPE: CPT

## 2019-08-07 PROCEDURE — 87040 BLOOD CULTURE FOR BACTERIA: CPT

## 2019-08-07 PROCEDURE — 80048 BASIC METABOLIC PNL TOTAL CA: CPT

## 2019-08-07 PROCEDURE — 71046 X-RAY EXAM CHEST 2 VIEWS: CPT | Mod: 26

## 2019-08-07 PROCEDURE — 86923 COMPATIBILITY TEST ELECTRIC: CPT

## 2019-08-07 PROCEDURE — 71045 X-RAY EXAM CHEST 1 VIEW: CPT | Mod: 26,59

## 2019-08-07 PROCEDURE — 97116 GAIT TRAINING THERAPY: CPT

## 2019-08-07 PROCEDURE — 71046 X-RAY EXAM CHEST 2 VIEWS: CPT

## 2019-08-07 PROCEDURE — 99231 SBSQ HOSP IP/OBS SF/LOW 25: CPT | Mod: GC

## 2019-08-07 PROCEDURE — 36415 COLL VENOUS BLD VENIPUNCTURE: CPT

## 2019-08-07 PROCEDURE — 82803 BLOOD GASES ANY COMBINATION: CPT

## 2019-08-07 PROCEDURE — 83605 ASSAY OF LACTIC ACID: CPT

## 2019-08-07 PROCEDURE — 84295 ASSAY OF SERUM SODIUM: CPT

## 2019-08-07 PROCEDURE — 82962 GLUCOSE BLOOD TEST: CPT

## 2019-08-07 PROCEDURE — C1889: CPT

## 2019-08-07 PROCEDURE — 94640 AIRWAY INHALATION TREATMENT: CPT

## 2019-08-07 PROCEDURE — 82330 ASSAY OF CALCIUM: CPT

## 2019-08-07 PROCEDURE — 85610 PROTHROMBIN TIME: CPT

## 2019-08-07 PROCEDURE — 84132 ASSAY OF SERUM POTASSIUM: CPT

## 2019-08-07 PROCEDURE — 85730 THROMBOPLASTIN TIME PARTIAL: CPT

## 2019-08-07 PROCEDURE — 84100 ASSAY OF PHOSPHORUS: CPT

## 2019-08-07 PROCEDURE — S2900: CPT

## 2019-08-07 PROCEDURE — 80053 COMPREHEN METABOLIC PANEL: CPT

## 2019-08-07 PROCEDURE — 85025 COMPLETE CBC W/AUTO DIFF WBC: CPT

## 2019-08-07 PROCEDURE — 86901 BLOOD TYPING SEROLOGIC RH(D): CPT

## 2019-08-07 PROCEDURE — 85027 COMPLETE CBC AUTOMATED: CPT

## 2019-08-07 RX ORDER — APIXABAN 2.5 MG/1
2.5 TABLET, FILM COATED ORAL EVERY 12 HOURS
Refills: 0 | Status: DISCONTINUED | OUTPATIENT
Start: 2019-08-07 | End: 2019-08-07

## 2019-08-07 RX ORDER — PANTOPRAZOLE SODIUM 20 MG/1
1 TABLET, DELAYED RELEASE ORAL
Qty: 0 | Refills: 0 | DISCHARGE
Start: 2019-08-07

## 2019-08-07 RX ORDER — AMIODARONE HYDROCHLORIDE 400 MG/1
1 TABLET ORAL
Qty: 0 | Refills: 0 | DISCHARGE
Start: 2019-08-07

## 2019-08-07 RX ORDER — AMLODIPINE BESYLATE 2.5 MG/1
1 TABLET ORAL
Qty: 0 | Refills: 0 | DISCHARGE
Start: 2019-08-07

## 2019-08-07 RX ORDER — APIXABAN 2.5 MG/1
1 TABLET, FILM COATED ORAL
Qty: 0 | Refills: 0 | DISCHARGE
Start: 2019-08-07

## 2019-08-07 RX ORDER — AMLODIPINE BESYLATE 2.5 MG/1
5 TABLET ORAL DAILY
Refills: 0 | Status: DISCONTINUED | OUTPATIENT
Start: 2019-08-08 | End: 2019-08-07

## 2019-08-07 RX ORDER — OXYCODONE HYDROCHLORIDE 5 MG/1
5 TABLET ORAL EVERY 6 HOURS
Refills: 0 | Status: DISCONTINUED | OUTPATIENT
Start: 2019-08-07 | End: 2019-08-07

## 2019-08-07 RX ORDER — MAGNESIUM OXIDE 400 MG ORAL TABLET 241.3 MG
800 TABLET ORAL ONCE
Refills: 0 | Status: COMPLETED | OUTPATIENT
Start: 2019-08-07 | End: 2019-08-07

## 2019-08-07 RX ORDER — BRIMONIDINE TARTRATE 2 MG/MG
1 SOLUTION/ DROPS OPHTHALMIC
Qty: 0 | Refills: 0 | DISCHARGE
Start: 2019-08-07

## 2019-08-07 RX ORDER — AMLODIPINE BESYLATE 2.5 MG/1
2.5 TABLET ORAL ONCE
Refills: 0 | Status: COMPLETED | OUTPATIENT
Start: 2019-08-07 | End: 2019-08-07

## 2019-08-07 RX ORDER — OXYCODONE HYDROCHLORIDE 5 MG/1
1 TABLET ORAL
Qty: 0 | Refills: 0 | DISCHARGE
Start: 2019-08-07

## 2019-08-07 RX ORDER — METOPROLOL TARTRATE 50 MG
12.5 TABLET ORAL EVERY 12 HOURS
Refills: 0 | Status: DISCONTINUED | OUTPATIENT
Start: 2019-08-07 | End: 2019-08-07

## 2019-08-07 RX ORDER — ATORVASTATIN CALCIUM 80 MG/1
1 TABLET, FILM COATED ORAL
Qty: 0 | Refills: 0 | DISCHARGE
Start: 2019-08-07

## 2019-08-07 RX ORDER — DONEPEZIL HYDROCHLORIDE 10 MG/1
1 TABLET, FILM COATED ORAL
Qty: 0 | Refills: 0 | DISCHARGE
Start: 2019-08-07

## 2019-08-07 RX ORDER — ASPIRIN/CALCIUM CARB/MAGNESIUM 324 MG
1 TABLET ORAL
Qty: 0 | Refills: 0 | DISCHARGE
Start: 2019-08-07

## 2019-08-07 RX ORDER — DOCUSATE SODIUM 100 MG
1 CAPSULE ORAL
Qty: 0 | Refills: 0 | DISCHARGE
Start: 2019-08-07

## 2019-08-07 RX ORDER — SODIUM POLYSTYRENE SULFONATE 4.1 MEQ/G
30 POWDER, FOR SUSPENSION ORAL ONCE
Refills: 0 | Status: COMPLETED | OUTPATIENT
Start: 2019-08-07 | End: 2019-08-07

## 2019-08-07 RX ORDER — SENNA PLUS 8.6 MG/1
2 TABLET ORAL
Qty: 0 | Refills: 0 | DISCHARGE
Start: 2019-08-07

## 2019-08-07 RX ADMIN — AMIODARONE HYDROCHLORIDE 200 MILLIGRAM(S): 400 TABLET ORAL at 05:51

## 2019-08-07 RX ADMIN — MAGNESIUM OXIDE 400 MG ORAL TABLET 800 MILLIGRAM(S): 241.3 TABLET ORAL at 12:14

## 2019-08-07 RX ADMIN — APIXABAN 5 MILLIGRAM(S): 2.5 TABLET, FILM COATED ORAL at 05:51

## 2019-08-07 RX ADMIN — SODIUM CHLORIDE 3 MILLILITER(S): 9 INJECTION INTRAMUSCULAR; INTRAVENOUS; SUBCUTANEOUS at 05:52

## 2019-08-07 RX ADMIN — Medication 81 MILLIGRAM(S): at 12:14

## 2019-08-07 RX ADMIN — OXYCODONE HYDROCHLORIDE 5 MILLIGRAM(S): 5 TABLET ORAL at 10:51

## 2019-08-07 RX ADMIN — Medication 2: at 12:13

## 2019-08-07 RX ADMIN — CHLORHEXIDINE GLUCONATE 1 APPLICATION(S): 213 SOLUTION TOPICAL at 05:51

## 2019-08-07 RX ADMIN — Medication 100 MILLIGRAM(S): at 05:51

## 2019-08-07 RX ADMIN — SODIUM POLYSTYRENE SULFONATE 30 GRAM(S): 4.1 POWDER, FOR SUSPENSION ORAL at 12:14

## 2019-08-07 RX ADMIN — BRIMONIDINE TARTRATE 1 DROP(S): 2 SOLUTION/ DROPS OPHTHALMIC at 05:51

## 2019-08-07 RX ADMIN — Medication 5 UNIT(S): at 12:13

## 2019-08-07 RX ADMIN — LIDOCAINE 1 PATCH: 4 CREAM TOPICAL at 17:51

## 2019-08-07 RX ADMIN — Medication 12.5 MILLIGRAM(S): at 16:50

## 2019-08-07 RX ADMIN — Medication 5 UNIT(S): at 17:01

## 2019-08-07 RX ADMIN — Medication 5 UNIT(S): at 07:38

## 2019-08-07 RX ADMIN — APIXABAN 2.5 MILLIGRAM(S): 2.5 TABLET, FILM COATED ORAL at 17:01

## 2019-08-07 RX ADMIN — AMLODIPINE BESYLATE 2.5 MILLIGRAM(S): 2.5 TABLET ORAL at 08:45

## 2019-08-07 RX ADMIN — SODIUM CHLORIDE 3 MILLILITER(S): 9 INJECTION INTRAMUSCULAR; INTRAVENOUS; SUBCUTANEOUS at 16:31

## 2019-08-07 RX ADMIN — AMLODIPINE BESYLATE 2.5 MILLIGRAM(S): 2.5 TABLET ORAL at 05:51

## 2019-08-07 RX ADMIN — PANTOPRAZOLE SODIUM 40 MILLIGRAM(S): 20 TABLET, DELAYED RELEASE ORAL at 07:38

## 2019-08-07 RX ADMIN — BRIMONIDINE TARTRATE 1 DROP(S): 2 SOLUTION/ DROPS OPHTHALMIC at 17:01

## 2019-08-07 RX ADMIN — OXYCODONE HYDROCHLORIDE 5 MILLIGRAM(S): 5 TABLET ORAL at 09:53

## 2019-08-07 RX ADMIN — LIDOCAINE 1 PATCH: 4 CREAM TOPICAL at 11:31

## 2019-08-07 NOTE — PROGRESS NOTE ADULT - ATTENDING COMMENTS
Pt seen on rounds this afternoon.  Reports a decrease in his pain.  PO intake is slightly better.  Glucoses are in target range. likely "helped" by his limited PO intake.  Cognitive deficits are evident--he could not remember what he ate for any of his meals.  Will continue the current insulin regimen, but still plan to have him resume oral agents post discharge
Pt seen on rounds this afternoon.  Was OOB in a chair, looked distinctly better, and his PO intake has improved.  Glucoses were in the 170s late yesterday, but dropped to 67 this morning with the 12 unit dose of Lantus.  Will decrease the Lantus to 8 units as a precaution, but increase the pre-meal Humalog to 5 units.  Plan is still to discharge on oral agents--prob metformin plus Januvia
Pt seen on rounds this afternoon.  Glucoses satisfactorily controlled on modest doses of insulin, and will follow through with plan to transfer to [facility] on oral medication.  (His cognitive function is significantly impaired, and he clearly could not manage insulin at home).   His creatinine level has decreased to 1.8, so can use metformin, but will limit dose to 500 mg BID given his excellent AM fingerstick with only 10 units of Lantus.  Will add Januvia, with dose limited to 50 mg/day given his CKD
Pt seen on rounds this afternoon.  PO intake only slightly improved.  Glucoses remain mostly in target range, with somewhat lower values of 126/90 today.  Will continue the current regimen, and still plan to have him return to oral agents post discharge
Pt seen on rounds this afternoon.  PO intake continues to improve.  Glucose higher, but suggestions regarding increased insulin were not implemented until lunchtime today.    Although he did not become hypoglycemic once again with the 12 units of Lantus, would still decrease to 10 units as a precaution.  Cannot yet assess the response to the increased pre-meal Humalog of 5 units  (just started at lunch today).  His creatinine has risen sharply in one day.  We have ordered a repeat for tomorrow.  Etiology Is unclear, but at the current level we may not be able to use metformin
Pt seen on rounds this afternoon.  Creatinine level slightly lower, likely reflects the IV hydration.   Glucoses 100-170 on his current insulin regimen, but PO intake remains inconsistent--seems to eat breakfast well, but very little lunch.  Etiology of renal insufficiency unclear, but need to rule out urinary retention.  Pt clearly is voiding, but cannot tell in what amounts.  His abdomen is somewhat distended overall, but he reports an urge to urinate with pressure over the suprapubic area, and he may have a distended bladder to percussion.  Would check a PVR and/or bladder scan

## 2019-08-07 NOTE — DISCHARGE NOTE NURSING/CASE MANAGEMENT/SOCIAL WORK - NSDCPEEMAIL_GEN_ALL_CORE
[facility] for Tobacco Control email [email or web address]

## 2019-08-07 NOTE — DISCHARGE NOTE PROVIDER - NSDCACTIVITY_GEN_ALL_CORE
Do not drive or operate machinery/Stairs allowed/Showering allowed/No heavy lifting/straining/Walking - Outdoors allowed/Walking - Indoors allowed

## 2019-08-07 NOTE — DISCHARGE NOTE PROVIDER - CARE PROVIDER_API CALL
Anjel Bhatti)  Cardiovascular Surgery  [address], [address] [place] [address]  Phone: [phone]  Fax: [phone]  Follow Up Time:     Jasvir Faulkner)  Cardiovascular Disease; Interventional Cardiology  [address], [address] [place] [address]  Phone: [phone]  Fax: [phone]  Follow Up Time:

## 2019-08-07 NOTE — PROGRESS NOTE ADULT - SUBJECTIVE AND OBJECTIVE BOX
INTERVAL HPI/OVERNIGHT EVENTS:    Patient is a 79y old  Male who presents with a chief complaint of CAD (06 Aug 2019 15:52)      Pt reports the following symptoms:    CONSTITUTIONAL:  Negative fever or chills, feels well, good appetite  EYES:  Negative  blurry vision or double vision  CARDIOVASCULAR:  Negative for chest pain or palpitations  RESPIRATORY:  Negative for cough, wheezing, or SOB   GASTROINTESTINAL:  Negative for nausea, vomiting, diarrhea, constipation, or abdominal pain  GENITOURINARY:  Negative frequency, urgency or dysuria  NEUROLOGIC:  No headache, confusion, dizziness, lightheadedness    MEDICATIONS  (STANDING):  amiodarone    Tablet 200 milliGRAM(s) Oral daily  apixaban 5 milliGRAM(s) Oral every 12 hours  aspirin  chewable 81 milliGRAM(s) Oral daily  atorvastatin 40 milliGRAM(s) Oral at bedtime  brimonidine 0.2% Ophthalmic Solution 1 Drop(s) Left EYE two times a day  chlorhexidine 2% Cloths 1 Application(s) Topical daily  dextrose 5%. 1000 milliLiter(s) (50 mL/Hr) IV Continuous <Continuous>  dextrose 50% Injectable 50 milliLiter(s) IV Push every 15 minutes  docusate sodium 100 milliGRAM(s) Oral three times a day  donepezil 5 milliGRAM(s) Oral at bedtime  insulin glargine Injectable (LANTUS) 10 Unit(s) SubCutaneous at bedtime  insulin lispro (HumaLOG) corrective regimen sliding scale   SubCutaneous Before meals and at bedtime  insulin lispro Injectable (HumaLOG) 5 Unit(s) SubCutaneous three times a day before meals  lidocaine   Patch 1 Patch Transdermal daily  pantoprazole    Tablet 40 milliGRAM(s) Oral before breakfast  senna 2 Tablet(s) Oral at bedtime  sodium chloride 0.9% lock flush 3 milliLiter(s) IV Push every 8 hours    MEDICATIONS  (PRN):  dextrose 40% Gel 15 Gram(s) Oral once PRN Blood Glucose LESS THAN 70 milliGRAM(s)/deciliter  glucagon  Injectable 1 milliGRAM(s) IntraMuscular once PRN Glucose LESS THAN 70 milligrams/deciliter  levalbuterol Inhalation 0.63 milliGRAM(s) Inhalation every 6 hours PRN SOB and/or wheezing  oxyCODONE    IR 5 milliGRAM(s) Oral every 6 hours PRN Severe Pain (7 - 10)  polyethylene glycol 3350 17 Gram(s) Oral daily PRN Constipation      PHYSICAL EXAM  Vital Signs Last 24 Hrs  T(C): 36.7 (07 Aug 2019 05:01), Max: 37.2 (06 Aug 2019 17:48)  T(F): 98 (07 Aug 2019 05:01), Max: 99 (06 Aug 2019 17:48)  HR: 88 (07 Aug 2019 08:26) (64 - 88)  BP: 172/73 (07 Aug 2019 08:26) (151/64 - 172/73)  BP(mean): 108 (07 Aug 2019 08:26) (89 - 109)  RR: 19 (07 Aug 2019 08:26) (16 - 19)  SpO2: 99% (07 Aug 2019 08:26) (93% - 99%)    Constitutional: wn/wd in NAD.   HEENT: NCAT, MMM, OP clear, EOMI, no proptosis or lid retraction  Neck: no thyromegaly or palpable thyroid nodules   Respiratory: lungs CTAB.  Cardiovascular: regular rhythm, normal S1 and S2, no audible murmurs, no peripheral edema  GI: soft, NT/ND, no masses/HSM appreciated.  Neurology: no tremors, DTR 2+  Skin: no visible rashes/lesions  Psychiatric: AAO x 3, normal affect/mood.    LABS:                        11.0   10.70 )-----------( 295      ( 07 Aug 2019 05:50 )             33.5     08-07    140  |  106  |  29<H>  ----------------------------<  112<H>  5.3   |  23  |  1.86<H>    Ca    9.4      07 Aug 2019 05:50  Mg     1.7     08-07    TPro  6.7  /  Alb  3.2<L>  /  TBili  0.6  /  DBili  x   /  AST  99<H>  /  ALT  92<H>  /  AlkPhos  135<H>  08-07    PT/INR - ( 06 Aug 2019 07:55 )   PT: 15.9 sec;   INR: 1.39          PTT - ( 06 Aug 2019 07:55 )  PTT:34.0 sec    Thyroid Stimulating Hormone, Serum: 1.337 uIU/mL (07-23 @ 15:54)      HbA1C: 8.2 % (07-23 @ 15:55)    CAPILLARY BLOOD GLUCOSE      POCT Blood Glucose.: 116 mg/dL (07 Aug 2019 06:51)  POCT Blood Glucose.: 134 mg/dL (06 Aug 2019 21:44)  POCT Blood Glucose.: 93 mg/dL (06 Aug 2019 17:36)  POCT Blood Glucose.: 54 mg/dL (06 Aug 2019 16:58)  POCT Blood Glucose.: 169 mg/dL (06 Aug 2019 11:19)      Insulin Sliding Scale requirements X 24 Hours:    RADIOLOGY & ADDITIONAL TESTS:    A/P: 79y Male with history of DM type II presenting for       1.  DM -     Please continue           units lantus at bedtime  / in the morning and        units lispro with meals and lispro moderate / low dose sliding scale 4 times daily with meals and at bedtime.  Please continue consistent carbohydrate diet.      Goal FSG is   Will continue to monitor   For discharge, pt can continue    Pt can follow up at discharge with [facility] by calling  to make an appointment.   Will discuss case with     and update primary team INTERVAL HPI/OVERNIGHT EVENTS:    Patient seen and examined at the bedside. No acute events overnight. His creatinine decreased to 1.86 from 2.21 after he received after 1.6 liter fluids today. His appetite is low for his lunch. His pre-dinner dropped to 54 since he did not have his lunch but got his premeal insulin. He is being planned for discharge to [facility].    FSG & Insulin received:    Yesterday:  pre-dinner fs, does not remember what he had for dinner  bedtime fs, 10 lantus   units    Today:  pre-breakfast fs, 5 nutritional lispro   units, does not remember  pre-lunch fs, 5 nutritional lispro   units+ 2  units lispro SS, ate very little potatoes      Pt reports the following symptoms:    CONSTITUTIONAL:  Negative fever or chills, feels well, good appetite  EYES:  Negative  blurry vision or double vision  CARDIOVASCULAR:  Negative for chest pain or palpitations  RESPIRATORY:  Negative for cough, wheezing, or SOB   GASTROINTESTINAL:  Negative for nausea, vomiting, diarrhea, constipation, or abdominal pain  GENITOURINARY:  Negative frequency, urgency or dysuria  NEUROLOGIC:  No headache, confusion, dizziness, lightheadedness    MEDICATIONS  (STANDING):  amiodarone    Tablet 200 milliGRAM(s) Oral daily  apixaban 5 milliGRAM(s) Oral every 12 hours  aspirin  chewable 81 milliGRAM(s) Oral daily  atorvastatin 40 milliGRAM(s) Oral at bedtime  brimonidine 0.2% Ophthalmic Solution 1 Drop(s) Left EYE two times a day  chlorhexidine 2% Cloths 1 Application(s) Topical daily  dextrose 5%. 1000 milliLiter(s) (50 mL/Hr) IV Continuous <Continuous>  dextrose 50% Injectable 50 milliLiter(s) IV Push every 15 minutes  docusate sodium 100 milliGRAM(s) Oral three times a day  donepezil 5 milliGRAM(s) Oral at bedtime  insulin glargine Injectable (LANTUS) 10 Unit(s) SubCutaneous at bedtime  insulin lispro (HumaLOG) corrective regimen sliding scale   SubCutaneous Before meals and at bedtime  insulin lispro Injectable (HumaLOG) 5 Unit(s) SubCutaneous three times a day before meals  lidocaine   Patch 1 Patch Transdermal daily  pantoprazole    Tablet 40 milliGRAM(s) Oral before breakfast  senna 2 Tablet(s) Oral at bedtime  sodium chloride 0.9% lock flush 3 milliLiter(s) IV Push every 8 hours    MEDICATIONS  (PRN):  dextrose 40% Gel 15 Gram(s) Oral once PRN Blood Glucose LESS THAN 70 milliGRAM(s)/deciliter  glucagon  Injectable 1 milliGRAM(s) IntraMuscular once PRN Glucose LESS THAN 70 milligrams/deciliter  levalbuterol Inhalation 0.63 milliGRAM(s) Inhalation every 6 hours PRN SOB and/or wheezing  oxyCODONE    IR 5 milliGRAM(s) Oral every 6 hours PRN Severe Pain (7 - 10)  polyethylene glycol 3350 17 Gram(s) Oral daily PRN Constipation      PHYSICAL EXAM  Vital Signs Last 24 Hrs  T(C): 36.7 (07 Aug 2019 05:01), Max: 37.2 (06 Aug 2019 17:48)  T(F): 98 (07 Aug 2019 05:01), Max: 99 (06 Aug 2019 17:48)  HR: 88 (07 Aug 2019 08:26) (64 - 88)  BP: 172/73 (07 Aug 2019 08:26) (151/64 - 172/73)  BP(mean): 108 (07 Aug 2019 08:26) (89 - 109)  RR: 19 (07 Aug 2019 08:26) (16 - 19)  SpO2: 99% (07 Aug 2019 08:26) (93% - 99%)    Constitutional: wn/wd in NAD.   Respiratory: lungs CTAB.  Cardiovascular: regular rhythm, normal S1 and S2,   GI: soft, NT/ND, bowel sounds heard  Neurology: no tremors, no focal neurological deficits      LABS:                        11.0   10.70 )-----------( 295      ( 07 Aug 2019 05:50 )             33.5         140  |  106  |  29<H>  ----------------------------<  112<H>  5.3   |  23  |  1.86<H>    Ca    9.4      07 Aug 2019 05:50  Mg     1.7         TPro  6.7  /  Alb  3.2<L>  /  TBili  0.6  /  DBili  x   /  AST  99<H>  /  ALT  92<H>  /  AlkPhos  135<H>      PT/INR - ( 06 Aug 2019 07:55 )   PT: 15.9 sec;   INR: 1.39          PTT - ( 06 Aug 2019 07:55 )  PTT:34.0 sec    Thyroid Stimulating Hormone, Serum: 1.337 uIU/mL ( @ 15:54)      HbA1C: 8.2 % ( @ 15:55)    CAPILLARY BLOOD GLUCOSE      POCT Blood Glucose.: 116 mg/dL (07 Aug 2019 06:51)  POCT Blood Glucose.: 134 mg/dL (06 Aug 2019 21:44)  POCT Blood Glucose.: 93 mg/dL (06 Aug 2019 17:36)  POCT Blood Glucose.: 54 mg/dL (06 Aug 2019 16:58)  POCT Blood Glucose.: 169 mg/dL (06 Aug 2019 11:19)      Insulin Sliding Scale requirements X 24 Hours:    RADIOLOGY & ADDITIONAL TESTS:    A/P: 78 yo male with a PMH of HTN, HLD, DM, inferior wall MI, CKD-stage 3, right CVA(residual left arm weakness), s/p loop recorder placement in  currently is s/p robotic mid CABG . he is currently being managed for diabetes    1.  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  Please hold the premeal if the patient is not eating  Please continue lispro moderate dose sliding scale four times daily with meals and at bedtime  His TAYLOR has improved - Cr decreased to 1.8, GFR 40, EF 45%.  Patient is being planned for discharge to [facility]  Pt's fingerstick glucose goal is 140-180    Will continue to monitor     For discharge, Please discharge the patient on metformin 500mg BID and Januvia 50mg once daily.    Pt can follow up at discharge with [facility] by calling  to make an appointment.   discussed case with   and updated primary team

## 2019-08-07 NOTE — DISCHARGE NOTE PROVIDER - NSDCHHNEEDSERVICE_GEN_ALL_CORE
Rehabilitation services/Medication teaching and assessment/Teaching and training/Wound care and assessment/Observation and assessment

## 2019-08-07 NOTE — DISCHARGE NOTE PROVIDER - NSDCFUADDINST_GEN_ALL_CORE_FT
-Walk daily as tolerated and use your incentive spirometer every hour.    -No driving or strenuous activity/exercise for 6 weeks, or until cleared by your surgeon.    -You may shower.  Be sure to gently clean your incisions with anti-bacterial soap and water daily, pat dry.  You may leave them open to air.    -Call your doctor if you have shortness of breath, chest pain not relieved by pain medication, dizziness, fever >101.5, or increased redness or drainage from incisions.

## 2019-08-07 NOTE — DISCHARGE NOTE PROVIDER - CARE PROVIDERS DIRECT ADDRESSES
[email or web address]

## 2019-08-07 NOTE — PROGRESS NOTE ADULT - SUBJECTIVE AND OBJECTIVE BOX
Patient discussed on morning rounds with Dr. Bhatti    Operation / Date: 7/31/19 robotic MIDCAB, EF nml    Surgeon: Dr. Bhatti     Referring Physician: Dr. Faulkner    SUBJECTIVE ASSESSMENT:  79y Male seen and examined. Feels well, offers no acute complaints. IS ambulating on RA with walker, using IS pulling 750cc, tolerating PO Diet, + BM. Denies fever, chest pain, palpitations, SBO, abdominal pain, n/v.     Hospital Course:    Vital Signs Last 24 Hrs  T(C): 36.8 (07 Aug 2019 13:47), Max: 37.2 (06 Aug 2019 17:48)  T(F): 98.2 (07 Aug 2019 13:47), Max: 99 (06 Aug 2019 17:48)  HR: 94 (07 Aug 2019 12:22) (70 - 94)  BP: 158/75 (07 Aug 2019 12:22) (151/64 - 172/73)  BP(mean): 107 (07 Aug 2019 12:22) (89 - 109)  RR: 18 (07 Aug 2019 12:22) (16 - 19)  SpO2: 96% (07 Aug 2019 12:22) (94% - 99%)  I&O's Detail    06 Aug 2019 07:01  -  07 Aug 2019 07:00  --------------------------------------------------------  IN:    IV PiggyBack: 500 mL    Oral Fluid: 600 mL    Sodium Chloride 0.9% IV Bolus: 1000 mL  Total IN: 2100 mL    OUT:    Voided: 2450 mL  Total OUT: 2450 mL    Total NET: -350 mL      TIE DOWNS REMOVED: Yes    PHYSICAL EXAM:     General: Patient lying comfortably in bed, no acute distress     Neurological: Alert and oriented. No focal neurological deficits     Cardiovascular: S1S2, RRR, no murmurs appreciated on exam     Respiratory: Clear to ausculation bilaterally, no wheeze/rhonchi/rales    Gastrointestinal: + BS, soft, non tender, non distended     Extremities: Warm and well perfused. No edema, no calf tenderness     Vascular: 2+ Peripheral pulses b/l     Incision Sites:  L thoracotomy: CDI. Henry site: CDI.     LABS:                        11.0   10.70 )-----------( 295      ( 07 Aug 2019 05:50 )             33.5       COUMADIN:  No    PT/INR - ( 06 Aug 2019 07:55 )   PT: 15.9 sec;   INR: 1.39          PTT - ( 06 Aug 2019 07:55 )  PTT:34.0 sec    08-07    143  |  105  |  26<H>  ----------------------------<  146<H>  4.0   |  24  |  1.83<H>    Ca    9.7      07 Aug 2019 15:25  Mg     1.7     08-07    TPro  6.7  /  Alb  3.2<L>  /  TBili  0.6  /  DBili  x   /  AST  99<H>  /  ALT  92<H>  /  AlkPhos  135<H>  08-07          MEDICATIONS  (STANDING):  SEE MED REC      Discharge CXR:  CXR PA/LAT pending official read: left lower atelectasis, small pleural effusion Patient discussed on morning rounds with Dr. Bhatti    Operation / Date: 7/31/19 robotic MIDCAB, EF nml    Surgeon: Dr. Bhatti     Referring Physician: Dr. Faulkner    SUBJECTIVE ASSESSMENT:  79y Male seen and examined. Feels well, offers no acute complaints. IS ambulating on RA with walker, using IS pulling 750cc, tolerating PO Diet, + BM. Denies fever, chest pain, palpitations, SBO, abdominal pain, n/v.     Hospital Course:  80 y/o male with a PMHx of HTN, HLD, DM, inferior wall MI, CKD-stage 3, right CVA(residual left arm weakness), s/p loop recorder placement in 2015 and class II angina presented to Dr. Anjel Bhatti, Cardiothoracic Surgery, for surgical evaluation after he originally presented to Dr. Faulkner for routine cardiology care and upon workup, on 6/3/19 had abnormal stress test and underwent Echo revealing EF 55%, no significant valvular disease. Patient presented to [facility] for Cardiac Cath on 7/18/19 which revealed 2 vessel CAD. Per Dr. Bhatti, patient deemed a good surgical candidate and presented to [facility] for elective surgery. On 7/31/19 patient underwent Minimally Invasive Coronary Artery Bypass Grafting (MIDCAB), EF normal, and patient transferred to CTICU in stable condition. On POD0 patient extubated. POD1 requiring nitro gtt which was later weaned, and patient started on a BB.  POD1, patient deemed stable for transfer to tele stepdown unit. POD2, jasiel removed, f/u CXR without ptx. POD3, postop paroxysmal afib/flutter rate controlled, Amio bolus x3 and lytes repleted, back in NSR. Also febrile OVN, blood cx sent, NGTD. POD4-5 continued pAfib/Flutter, on Amiodarone PO load and started on anticoagulation with Eliquis. Patient with TAYLOR on CKD, hydrating. POD6, Cr improved, transient bradycardia to 30s (on BB 25mg q6), EP consulted, BB held, modified amio to 200mg QD.  Today POD7, no bradycardia overnight, discussed with Dr. Bhatti and Dr. Ramos, OK to start low dose BB 12.5mg q12 and continue amio 200mg qd. In light of TAYLOR on CKD, d/c on eliquis 2.5mg BID . Patient is ambulating on RA with walker, using IS pulling 750cc, tolerating PO diet, + BMs.  Per Dr. Bhatti, patient is ready for dischare to [facility].      35 minutes was spent with the patient reviewing the discharge material including medications, follow up appointments, recovery, concerning symptoms, and how to contact their health care providers if they have questions.     CABG  Aspirin               [ X ] Yes  [  ] Contraindicated, Reason_______________________________  Beta-Blocker     [  X] Yes  [  ]Contraindicated, Reason_______________________________  Statin                 [ X ] Yes  [  ] Contraindicated, Reason_______________________________         Vital Signs Last 24 Hrs  T(C): 36.8 (07 Aug 2019 13:47), Max: 37.2 (06 Aug 2019 17:48)  T(F): 98.2 (07 Aug 2019 13:47), Max: 99 (06 Aug 2019 17:48)  HR: 94 (07 Aug 2019 12:22) (70 - 94)  BP: 158/75 (07 Aug 2019 12:22) (151/64 - 172/73)  BP(mean): 107 (07 Aug 2019 12:22) (89 - 109)  RR: 18 (07 Aug 2019 12:22) (16 - 19)  SpO2: 96% (07 Aug 2019 12:22) (94% - 99%)  I&O's Detail    06 Aug 2019 07:01  -  07 Aug 2019 07:00  --------------------------------------------------------  IN:    IV PiggyBack: 500 mL    Oral Fluid: 600 mL    Sodium Chloride 0.9% IV Bolus: 1000 mL  Total IN: 2100 mL    OUT:    Voided: 2450 mL  Total OUT: 2450 mL    Total NET: -350 mL      TIE DOWNS REMOVED: Yes    PHYSICAL EXAM:     General: Patient lying comfortably in bed, no acute distress     Neurological: Alert and oriented. No focal neurological deficits     Cardiovascular: S1S2, RRR, no murmurs appreciated on exam     Respiratory: Clear to ausculation bilaterally, no wheeze/rhonchi/rales    Gastrointestinal: + BS, soft, non tender, non distended     Extremities: Warm and well perfused. No edema, no calf tenderness     Vascular: 2+ Peripheral pulses b/l     Incision Sites:  L thoracotomy: CDI. Jasiel site: CDI.     LABS:                        11.0   10.70 )-----------( 295      ( 07 Aug 2019 05:50 )             33.5       COUMADIN:  No    PT/INR - ( 06 Aug 2019 07:55 )   PT: 15.9 sec;   INR: 1.39          PTT - ( 06 Aug 2019 07:55 )  PTT:34.0 sec    08-07    143  |  105  |  26<H>  ----------------------------<  146<H>  4.0   |  24  |  1.83<H>    Ca    9.7      07 Aug 2019 15:25  Mg     1.7     08-07    TPro  6.7  /  Alb  3.2<L>  /  TBili  0.6  /  DBili  x   /  AST  99<H>  /  ALT  92<H>  /  AlkPhos  135<H>  08-07          MEDICATIONS  (STANDING):  SEE MED REC      Discharge CXR:  CXR PA/LAT pending official read: left lower atelectasis, small pleural effusion

## 2019-08-07 NOTE — DISCHARGE NOTE NURSING/CASE MANAGEMENT/SOCIAL WORK - NSDCPEWEB_GEN_ALL_CORE
[facility] for Tobacco Control website --- [email or web address] website --- [email or web address]

## 2019-08-07 NOTE — PROGRESS NOTE ADULT - NSHPATTENDINGPLANDISCUSS_GEN_ALL_CORE
DR. Melendez and CT Surgery
Dr. Melendez and CT Surgery
Dr. Perez and [facility] staff
Dr. Perez, Ms. Veloz and [facility] staff
Dr. Perez and [facility] staff
Dr. Perez and CT Surgery

## 2019-08-07 NOTE — DISCHARGE NOTE NURSING/CASE MANAGEMENT/SOCIAL WORK - NSDCDPATPORTLINK_GEN_ALL_CORE
You can access the [facility] Patient Portal, offered by [facility], by registering with the following website: [email or web address]

## 2019-08-07 NOTE — DISCHARGE NOTE PROVIDER - HOSPITAL COURSE
78 y/o male with a PMHx of HTN, HLD, DM, inferior wall MI, CKD-stage 3, right CVA(residual left arm weakness), s/p loop recorder placement in 2015 and class II angina presented to Dr. Anjel Bhatti, Cardiothoracic Surgery, for surgical evaluation after he originally presented to Dr. Faulkner for routine cardiology care and upon workup, on 6/3/19 had abnormal stress test and underwent Echo revealing EF 55%, no significant valvular disease. Patient presented to [facility] for Cardiac Cath on 7/18/19 which revealed 2 vessel CAD. Per Dr. Bhatti, patient deemed a good surgical candidate and presented to [facility] for elective surgery. On 7/31/19 patient underwent Minimally Invasive Coronary Artery Bypass Grafting (MIDCAB), EF normal, and patient transferred to CTICU in stable condition. On POD0 patient extubated. POD1 requiring nitro gtt which was later weaned, and patient started on a BB.  POD1, patient deemed stable for transfer to tele stepdown unit. POD2, jasiel removed, f/u CXR without ptx. POD3, postop paroxysmal afib/flutter rate controlled, Amio bolus x3 and lytes repleted, back in NSR. Also febrile OVN, blood cx sent, NGTD. POD4-5 continued pAfib/Flutter, on Amiodarone PO load and started on anticoagulation with Eliquis. Patient with TAYLOR on CKD, hydrating. POD6, Cr improved, transient bradycardia to 30s, EP consulted, BB held, modified amio to 200mg QD.  Today POD7, no bradycardia overnight, per EP OK to continue _________________________. Patient is ambulating on RA with walker, using IS pulling 750cc, tolerating PO diet, + BMs.  Per Dr. Bhatti, patient is ready for dischare to [facility]. 80 y/o male with a PMHx of HTN, HLD, DM, inferior wall MI, CKD-stage 3, right CVA(residual left arm weakness), s/p loop recorder placement in 2015 and class II angina presented to Dr. Anjel Bhatti, Cardiothoracic Surgery, for surgical evaluation after he originally presented to Dr. Faulkner for routine cardiology care and upon workup, on 6/3/19 had abnormal stress test and underwent Echo revealing EF 55%, no significant valvular disease. Patient presented to [facility] for Cardiac Cath on 7/18/19 which revealed 2 vessel CAD. Per Dr. Bhatti, patient deemed a good surgical candidate and presented to [facility] for elective surgery. On 7/31/19 patient underwent Minimally Invasive Coronary Artery Bypass Grafting (MIDCAB), EF normal, and patient transferred to CTICU in stable condition. On POD0 patient extubated. POD1 requiring nitro gtt which was later weaned, and patient started on a BB.  POD1, patient deemed stable for transfer to tele stepdown unit. POD2, jasiel removed, f/u CXR without ptx. POD3, postop paroxysmal afib/flutter rate controlled, Amio bolus x3 and lytes repleted, back in NSR. Also febrile OVN, blood cx sent, NGTD. POD4-5 continued pAfib/Flutter, on Amiodarone PO load and started on anticoagulation with Eliquis. Patient with TAYLOR on CKD, hydrating. POD6, Cr improved, transient bradycardia to 30s, EP consulted, BB held, modified amio to 200mg QD.  Today POD7, no bradycardia overnight, per EP OK to continue _________________________. Patient is ambulating on RA with walker, using IS pulling 750cc, tolerating PO diet, + BMs.  Per Dr. Bhatti, patient is ready for dischare to [facility].          35 minutes was spent with the patient reviewing the discharge material including medications, follow up appointments, recovery, concerning symptoms, and how to contact their health care providers if they have questions.         CABG    Aspirin               [ X ] Yes  [  ] Contraindicated, Reason_______________________________    Beta-Blocker     [  ] Yes  [  ]Contraindicated, Reason_______________________________    Statin                 [ X ] Yes  [  ] Contraindicated, Reason_______________________________ 80 y/o male with a PMHx of HTN, HLD, DM, inferior wall MI, CKD-stage 3, right CVA(residual left arm weakness), s/p loop recorder placement in 2015 and class II angina presented to Dr. Anjel Bhatti, Cardiothoracic Surgery, for surgical evaluation after he originally presented to Dr. Faulkner for routine cardiology care and upon workup, on 6/3/19 had abnormal stress test and underwent Echo revealing EF 55%, no significant valvular disease. Patient presented to [facility] for Cardiac Cath on 7/18/19 which revealed 2 vessel CAD. Per Dr. Bhatti, patient deemed a good surgical candidate and presented to [facility] for elective surgery. On 7/31/19 patient underwent Minimally Invasive Coronary Artery Bypass Grafting (MIDCAB), EF normal, and patient transferred to CTICU in stable condition. On POD0 patient extubated. POD1 requiring nitro gtt which was later weaned, and patient started on a BB.  POD1, patient deemed stable for transfer to tele stepdown unit. POD2, jasiel removed, f/u CXR without ptx. POD3, postop paroxysmal afib/flutter rate controlled, Amio bolus x3 and lytes repleted, back in NSR. Also febrile OVN, blood cx sent, NGTD. POD4-5 continued pAfib/Flutter, on Amiodarone PO load and started on anticoagulation with Eliquis. Patient with TAYLOR on CKD, hydrating. POD6, Cr improved, transient bradycardia to 30s (on BB 25mg q6), EP consulted, BB held, modified amio to 200mg QD.  Today POD7, no bradycardia overnight, discussed with Dr. Bhatti and Dr. Ramos, OK to start low dose BB 12.5mg q12 and continue amio 200mg qd. In light of TAYLOR on CKD, d/c on eliquis 2.5mg BID . Patient is ambulating on RA with walker, using IS pulling 750cc, tolerating PO diet, + BMs.  Per Dr. Bhatti, patient is ready for dischare to [facility].          35 minutes was spent with the patient reviewing the discharge material including medications, follow up appointments, recovery, concerning symptoms, and how to contact their health care providers if they have questions.         CABG    Aspirin               [ X ] Yes  [  ] Contraindicated, Reason_______________________________    Beta-Blocker     [  X] Yes  [  ]Contraindicated, Reason_______________________________    Statin                 [ X ] Yes  [  ] Contraindicated, Reason_______________________________

## 2019-08-07 NOTE — PROGRESS NOTE ADULT - ASSESSMENT
-Please follow up with Dr. Bhatti on 8/27/19 at 11:15am.  The office is located at [facility], [facility], 4th floor. Call us with any questions, #[phone].  -Please follow up with Dr. Jasvir Faulkner (referring MD) on 8/26/19 at 10:00am.   -Please follow up with our [facility] for your diabetes, within 1 week of you leaving [facility]. You can schedule an appointment by calling [phone]. Address: [address] [place] [address]. Phone: [phone].   -Walk daily as tolerated and use your incentive spirometer every hour.  -No driving or strenuous activity/exercise for 6 weeks, or until cleared by your surgeon.  -You may shower.  Be sure to gently clean your incisions with anti-bacterial soap and water daily, pat dry.  You may leave them open to air.  -Call your doctor if you have shortness of breath, chest pain not relieved by pain medication, dizziness, fever >101.5, or increased redness or drainage from incisions.

## 2019-08-08 LAB
CULTURE RESULTS: SIGNIFICANT CHANGE UP
SPECIMEN SOURCE: SIGNIFICANT CHANGE UP

## 2019-08-13 RX ORDER — PANTOPRAZOLE 40 MG/1
40 TABLET, DELAYED RELEASE ORAL DAILY
Refills: 5 | Status: ACTIVE | COMMUNITY

## 2019-08-13 RX ORDER — DONEPEZIL HYDROCHLORIDE 5 MG/1
5 TABLET ORAL
Refills: 0 | Status: ACTIVE | COMMUNITY

## 2019-08-13 RX ORDER — LISINOPRIL 2.5 MG/1
2.5 TABLET ORAL DAILY
Qty: 90 | Refills: 1 | Status: COMPLETED | COMMUNITY
End: 2019-08-13

## 2019-08-13 RX ORDER — APIXABAN 2.5 MG/1
2.5 TABLET, FILM COATED ORAL
Qty: 60 | Refills: 0 | Status: ACTIVE | COMMUNITY

## 2019-08-13 RX ORDER — AMLODIPINE BESYLATE 5 MG/1
5 TABLET ORAL DAILY
Qty: 30 | Refills: 2 | Status: ACTIVE | COMMUNITY

## 2019-08-13 RX ORDER — ATORVASTATIN CALCIUM 40 MG/1
40 TABLET, FILM COATED ORAL
Qty: 30 | Refills: 6 | Status: ACTIVE | COMMUNITY

## 2019-08-13 RX ORDER — AMIODARONE HYDROCHLORIDE 200 MG/1
200 TABLET ORAL DAILY
Qty: 30 | Refills: 0 | Status: ACTIVE | COMMUNITY

## 2019-08-15 ENCOUNTER — APPOINTMENT (OUTPATIENT)
Dept: CARE COORDINATION | Facility: HOME HEALTH | Age: 79
End: 2019-08-15

## 2019-08-16 DIAGNOSIS — I25.10 ATHEROSCLEROTIC HEART DISEASE OF NATIVE CORONARY ARTERY WITHOUT ANGINA PECTORIS: ICD-10-CM

## 2019-08-16 DIAGNOSIS — Z87.891 PERSONAL HISTORY OF NICOTINE DEPENDENCE: ICD-10-CM

## 2019-08-16 DIAGNOSIS — I12.9 HYPERTENSIVE CHRONIC KIDNEY DISEASE WITH STAGE 1 THROUGH STAGE 4 CHRONIC KIDNEY DISEASE, OR UNSPECIFIED CHRONIC KIDNEY DISEASE: ICD-10-CM

## 2019-08-16 DIAGNOSIS — Z79.82 LONG TERM (CURRENT) USE OF ASPIRIN: ICD-10-CM

## 2019-08-16 DIAGNOSIS — Z98.890 OTHER SPECIFIED POSTPROCEDURAL STATES: ICD-10-CM

## 2019-08-16 DIAGNOSIS — D72.829 ELEVATED WHITE BLOOD CELL COUNT, UNSPECIFIED: ICD-10-CM

## 2019-08-16 DIAGNOSIS — E78.5 HYPERLIPIDEMIA, UNSPECIFIED: ICD-10-CM

## 2019-08-16 DIAGNOSIS — N18.3 CHRONIC KIDNEY DISEASE, STAGE 3 (MODERATE): ICD-10-CM

## 2019-08-16 DIAGNOSIS — I97.89 OTHER POSTPROCEDURAL COMPLICATIONS AND DISORDERS OF THE CIRCULATORY SYSTEM, NOT ELSEWHERE CLASSIFIED: ICD-10-CM

## 2019-08-16 DIAGNOSIS — E11.65 TYPE 2 DIABETES MELLITUS WITH HYPERGLYCEMIA: ICD-10-CM

## 2019-08-16 DIAGNOSIS — Z79.84 LONG TERM (CURRENT) USE OF ORAL HYPOGLYCEMIC DRUGS: ICD-10-CM

## 2019-08-16 DIAGNOSIS — R50.9 FEVER, UNSPECIFIED: ICD-10-CM

## 2019-08-16 DIAGNOSIS — I25.2 OLD MYOCARDIAL INFARCTION: ICD-10-CM

## 2019-08-16 DIAGNOSIS — I48.91 UNSPECIFIED ATRIAL FIBRILLATION: ICD-10-CM

## 2019-08-16 DIAGNOSIS — E87.2 ACIDOSIS: ICD-10-CM

## 2019-08-16 DIAGNOSIS — I48.92 UNSPECIFIED ATRIAL FLUTTER: ICD-10-CM

## 2019-08-16 DIAGNOSIS — J98.11 ATELECTASIS: ICD-10-CM

## 2019-08-16 DIAGNOSIS — I69.334 MONOPLEGIA OF UPPER LIMB FOLLOWING CEREBRAL INFARCTION AFFECTING LEFT NON-DOMINANT SIDE: ICD-10-CM

## 2019-08-16 DIAGNOSIS — I25.82 CHRONIC TOTAL OCCLUSION OF CORONARY ARTERY: ICD-10-CM

## 2019-08-16 DIAGNOSIS — N17.9 ACUTE KIDNEY FAILURE, UNSPECIFIED: ICD-10-CM

## 2019-08-16 DIAGNOSIS — E11.21 TYPE 2 DIABETES MELLITUS WITH DIABETIC NEPHROPATHY: ICD-10-CM

## 2019-08-16 DIAGNOSIS — E11.22 TYPE 2 DIABETES MELLITUS WITH DIABETIC CHRONIC KIDNEY DISEASE: ICD-10-CM

## 2019-08-16 DIAGNOSIS — R00.1 BRADYCARDIA, UNSPECIFIED: ICD-10-CM

## 2019-08-26 ENCOUNTER — FORM ENCOUNTER (OUTPATIENT)
Age: 79
End: 2019-08-26

## 2019-08-27 ENCOUNTER — APPOINTMENT (OUTPATIENT)
Dept: CARDIOTHORACIC SURGERY | Facility: CLINIC | Age: 79
End: 2019-08-27
Payer: MEDICARE

## 2019-08-27 ENCOUNTER — OUTPATIENT (OUTPATIENT)
Dept: OUTPATIENT SERVICES | Facility: HOSPITAL | Age: 79
LOS: 1 days | End: 2019-08-27
Payer: MEDICARE

## 2019-08-27 VITALS
BODY MASS INDEX: 25.87 KG/M2 | WEIGHT: 146 LBS | HEIGHT: 63 IN | OXYGEN SATURATION: 98 % | RESPIRATION RATE: 19 BRPM | DIASTOLIC BLOOD PRESSURE: 69 MMHG | SYSTOLIC BLOOD PRESSURE: 153 MMHG | HEART RATE: 60 BPM

## 2019-08-27 DIAGNOSIS — Z00.00 ENCOUNTER FOR GENERAL ADULT MEDICAL EXAMINATION W/OUT ABNORMAL FINDINGS: ICD-10-CM

## 2019-08-27 DIAGNOSIS — Z98.890 OTHER SPECIFIED POSTPROCEDURAL STATES: Chronic | ICD-10-CM

## 2019-08-27 DIAGNOSIS — Z09 ENCOUNTER FOR FOLLOW-UP EXAMINATION AFTER COMPLETED TREATMENT FOR CONDITIONS OTHER THAN MALIGNANT NEOPLASM: ICD-10-CM

## 2019-08-27 DIAGNOSIS — Z95.1 PRESENCE OF AORTOCORONARY BYPASS GRAFT: ICD-10-CM

## 2019-08-27 DIAGNOSIS — Z95.818 PRESENCE OF OTHER CARDIAC IMPLANTS AND GRAFTS: Chronic | ICD-10-CM

## 2019-08-27 PROCEDURE — 71046 X-RAY EXAM CHEST 2 VIEWS: CPT

## 2019-08-27 PROCEDURE — 99024 POSTOP FOLLOW-UP VISIT: CPT

## 2019-08-27 PROCEDURE — 71046 X-RAY EXAM CHEST 2 VIEWS: CPT | Mod: 26

## 2019-08-27 RX ORDER — METOPROLOL TARTRATE 25 MG/1
25 TABLET, FILM COATED ORAL
Qty: 90 | Refills: 0 | Status: ACTIVE | COMMUNITY

## 2019-08-27 RX ORDER — METFORMIN HYDROCHLORIDE 500 MG/1
500 TABLET, FILM COATED, EXTENDED RELEASE ORAL TWICE DAILY
Refills: 0 | Status: DISCONTINUED | COMMUNITY
End: 2019-08-27

## 2019-08-27 RX ORDER — OXYCODONE 5 MG/1
5 TABLET ORAL
Refills: 0 | Status: DISCONTINUED | COMMUNITY
End: 2019-08-27

## 2019-08-27 RX ORDER — GLIPIZIDE 5 MG/1
5 TABLET ORAL
Refills: 0 | Status: ACTIVE | COMMUNITY
Start: 2019-08-27

## 2019-08-27 RX ORDER — SITAGLIPTIN 50 MG/1
50 TABLET, FILM COATED ORAL DAILY
Qty: 30 | Refills: 5 | Status: DISCONTINUED | COMMUNITY
End: 2019-08-27

## 2019-09-17 ENCOUNTER — INPATIENT (INPATIENT)
Facility: HOSPITAL | Age: 79
LOS: 1 days | Discharge: HOME | End: 2019-09-19
Attending: HOSPITALIST | Admitting: HOSPITALIST
Payer: MEDICARE

## 2019-09-17 VITALS
HEART RATE: 94 BPM | DIASTOLIC BLOOD PRESSURE: 71 MMHG | OXYGEN SATURATION: 99 % | TEMPERATURE: 100 F | SYSTOLIC BLOOD PRESSURE: 150 MMHG | RESPIRATION RATE: 18 BRPM

## 2019-09-17 DIAGNOSIS — R74.0 NONSPECIFIC ELEVATION OF LEVELS OF TRANSAMINASE AND LACTIC ACID DEHYDROGENASE [LDH]: ICD-10-CM

## 2019-09-17 DIAGNOSIS — Z98.890 OTHER SPECIFIED POSTPROCEDURAL STATES: Chronic | ICD-10-CM

## 2019-09-17 DIAGNOSIS — Z29.9 ENCOUNTER FOR PROPHYLACTIC MEASURES, UNSPECIFIED: ICD-10-CM

## 2019-09-17 DIAGNOSIS — Z95.818 PRESENCE OF OTHER CARDIAC IMPLANTS AND GRAFTS: Chronic | ICD-10-CM

## 2019-09-17 DIAGNOSIS — Z95.1 PRESENCE OF AORTOCORONARY BYPASS GRAFT: Chronic | ICD-10-CM

## 2019-09-17 DIAGNOSIS — I25.10 ATHEROSCLEROTIC HEART DISEASE OF NATIVE CORONARY ARTERY WITHOUT ANGINA PECTORIS: ICD-10-CM

## 2019-09-17 DIAGNOSIS — N30.00 ACUTE CYSTITIS WITHOUT HEMATURIA: ICD-10-CM

## 2019-09-17 DIAGNOSIS — A41.9 SEPSIS, UNSPECIFIED ORGANISM: ICD-10-CM

## 2019-09-17 DIAGNOSIS — N17.9 ACUTE KIDNEY FAILURE, UNSPECIFIED: ICD-10-CM

## 2019-09-17 DIAGNOSIS — E11.9 TYPE 2 DIABETES MELLITUS WITHOUT COMPLICATIONS: ICD-10-CM
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
## 2019-09-17 DIAGNOSIS — I10 ESSENTIAL (PRIMARY) HYPERTENSION: ICD-10-CM

## 2019-09-17 DIAGNOSIS — Z79.899 OTHER LONG TERM (CURRENT) DRUG THERAPY: ICD-10-CM

## 2019-09-17 LAB
ALBUMIN SERPL ELPH-MCNC: 4.2 G/DL — SIGNIFICANT CHANGE UP (ref 3.3–5)
ALP SERPL-CCNC: 93 U/L — SIGNIFICANT CHANGE UP (ref 40–120)
ALT FLD-CCNC: 54 U/L — HIGH (ref 4–41)
ANION GAP SERPL CALC-SCNC: 13 MMO/L — SIGNIFICANT CHANGE UP (ref 7–14)
ANISOCYTOSIS BLD QL: SLIGHT — SIGNIFICANT CHANGE UP
APPEARANCE UR: CLEAR — SIGNIFICANT CHANGE UP
AST SERPL-CCNC: 65 U/L — HIGH (ref 4–40)
B PERT DNA SPEC QL NAA+PROBE: NOT DETECTED — SIGNIFICANT CHANGE UP
BACTERIA # UR AUTO: HIGH
BASE EXCESS BLDV CALC-SCNC: 0.6 MMOL/L — SIGNIFICANT CHANGE UP
BASE EXCESS BLDV CALC-SCNC: 1.1 MMOL/L — SIGNIFICANT CHANGE UP
BASOPHILS # BLD AUTO: 0.06 K/UL — SIGNIFICANT CHANGE UP (ref 0–0.2)
BASOPHILS NFR BLD AUTO: 0.3 % — SIGNIFICANT CHANGE UP (ref 0–2)
BASOPHILS NFR SPEC: 0 % — SIGNIFICANT CHANGE UP (ref 0–2)
BILIRUB SERPL-MCNC: 0.9 MG/DL — SIGNIFICANT CHANGE UP (ref 0.2–1.2)
BILIRUB UR-MCNC: NEGATIVE — SIGNIFICANT CHANGE UP
BLASTS # FLD: 0 % — SIGNIFICANT CHANGE UP (ref 0–0)
BLOOD GAS VENOUS - CREATININE: 2.1 MG/DL — HIGH (ref 0.5–1.3)
BLOOD GAS VENOUS - CREATININE: 2.58 MG/DL — HIGH (ref 0.5–1.3)
BLOOD GAS VENOUS - FIO2: 21 — SIGNIFICANT CHANGE UP
BLOOD GAS VENOUS - FIO2: 21 — SIGNIFICANT CHANGE UP
BLOOD UR QL VISUAL: HIGH
BUN SERPL-MCNC: 32 MG/DL — HIGH (ref 7–23)
C PNEUM DNA SPEC QL NAA+PROBE: NOT DETECTED — SIGNIFICANT CHANGE UP
CALCIUM SERPL-MCNC: 10.2 MG/DL — SIGNIFICANT CHANGE UP (ref 8.4–10.5)
CHLORIDE BLDV-SCNC: 103 MMOL/L — SIGNIFICANT CHANGE UP (ref 96–108)
CHLORIDE BLDV-SCNC: 106 MMOL/L — SIGNIFICANT CHANGE UP (ref 96–108)
CHLORIDE SERPL-SCNC: 101 MMOL/L — SIGNIFICANT CHANGE UP (ref 98–107)
CO2 SERPL-SCNC: 26 MMOL/L — SIGNIFICANT CHANGE UP (ref 22–31)
COLOR SPEC: SIGNIFICANT CHANGE UP
CREAT SERPL-MCNC: 2.45 MG/DL — HIGH (ref 0.5–1.3)
EOSINOPHIL # BLD AUTO: 0.15 K/UL — SIGNIFICANT CHANGE UP (ref 0–0.5)
EOSINOPHIL NFR BLD AUTO: 0.8 % — SIGNIFICANT CHANGE UP (ref 0–6)
EOSINOPHIL NFR FLD: 0 % — SIGNIFICANT CHANGE UP (ref 0–6)
EPI CELLS # UR: SIGNIFICANT CHANGE UP
FLUAV H1 2009 PAND RNA SPEC QL NAA+PROBE: NOT DETECTED — SIGNIFICANT CHANGE UP
FLUAV H1 RNA SPEC QL NAA+PROBE: NOT DETECTED — SIGNIFICANT CHANGE UP
FLUAV H3 RNA SPEC QL NAA+PROBE: NOT DETECTED — SIGNIFICANT CHANGE UP
FLUAV SUBTYP SPEC NAA+PROBE: NOT DETECTED — SIGNIFICANT CHANGE UP
FLUBV RNA SPEC QL NAA+PROBE: NOT DETECTED — SIGNIFICANT CHANGE UP
GAS PNL BLDV: 134 MMOL/L — LOW (ref 136–146)
GAS PNL BLDV: 138 MMOL/L — SIGNIFICANT CHANGE UP (ref 136–146)
GLUCOSE BLDV-MCNC: 226 MG/DL — HIGH (ref 70–99)
GLUCOSE BLDV-MCNC: 235 MG/DL — HIGH (ref 70–99)
GLUCOSE SERPL-MCNC: 239 MG/DL — HIGH (ref 70–99)
GLUCOSE UR-MCNC: 200 — HIGH
HADV DNA SPEC QL NAA+PROBE: NOT DETECTED — SIGNIFICANT CHANGE UP
HCO3 BLDV-SCNC: 23 MMOL/L — SIGNIFICANT CHANGE UP (ref 20–27)
HCO3 BLDV-SCNC: 24 MMOL/L — SIGNIFICANT CHANGE UP (ref 20–27)
HCOV PNL SPEC NAA+PROBE: SIGNIFICANT CHANGE UP
HCT VFR BLD CALC: 35.9 % — LOW (ref 39–50)
HCT VFR BLDV CALC: 35.8 % — LOW (ref 39–51)
HCT VFR BLDV CALC: 39.4 % — SIGNIFICANT CHANGE UP (ref 39–51)
HGB BLD-MCNC: 12 G/DL — LOW (ref 13–17)
HGB BLDV-MCNC: 11.6 G/DL — LOW (ref 13–17)
HGB BLDV-MCNC: 12.8 G/DL — LOW (ref 13–17)
HMPV RNA SPEC QL NAA+PROBE: NOT DETECTED — SIGNIFICANT CHANGE UP
HPIV1 RNA SPEC QL NAA+PROBE: NOT DETECTED — SIGNIFICANT CHANGE UP
HPIV2 RNA SPEC QL NAA+PROBE: NOT DETECTED — SIGNIFICANT CHANGE UP
HPIV3 RNA SPEC QL NAA+PROBE: NOT DETECTED — SIGNIFICANT CHANGE UP
HPIV4 RNA SPEC QL NAA+PROBE: NOT DETECTED — SIGNIFICANT CHANGE UP
HYPOCHROMIA BLD QL: SLIGHT — SIGNIFICANT CHANGE UP
IMM GRANULOCYTES NFR BLD AUTO: 2.3 % — HIGH (ref 0–1.5)
KETONES UR-MCNC: NEGATIVE — SIGNIFICANT CHANGE UP
LACTATE BLDV-MCNC: 2 MMOL/L — SIGNIFICANT CHANGE UP (ref 0.5–2)
LACTATE BLDV-MCNC: 2.7 MMOL/L — HIGH (ref 0.5–2)
LEUKOCYTE ESTERASE UR-ACNC: HIGH
LYMPHOCYTES # BLD AUTO: 1.55 K/UL — SIGNIFICANT CHANGE UP (ref 1–3.3)
LYMPHOCYTES # BLD AUTO: 7.8 % — LOW (ref 13–44)
LYMPHOCYTES NFR SPEC AUTO: 4.4 % — LOW (ref 13–44)
MCHC RBC-ENTMCNC: 29.3 PG — SIGNIFICANT CHANGE UP (ref 27–34)
MCHC RBC-ENTMCNC: 33.4 % — SIGNIFICANT CHANGE UP (ref 32–36)
MCV RBC AUTO: 87.8 FL — SIGNIFICANT CHANGE UP (ref 80–100)
METAMYELOCYTES # FLD: 0 % — SIGNIFICANT CHANGE UP (ref 0–1)
MONOCYTES # BLD AUTO: 2.08 K/UL — HIGH (ref 0–0.9)
MONOCYTES NFR BLD AUTO: 10.4 % — SIGNIFICANT CHANGE UP (ref 2–14)
MONOCYTES NFR BLD: 6.1 % — SIGNIFICANT CHANGE UP (ref 2–9)
MYELOCYTES NFR BLD: 0 % — SIGNIFICANT CHANGE UP (ref 0–0)
NEUTROPHIL AB SER-ACNC: 85.2 % — HIGH (ref 43–77)
NEUTROPHILS # BLD AUTO: 15.64 K/UL — HIGH (ref 1.8–7.4)
NEUTROPHILS NFR BLD AUTO: 78.4 % — HIGH (ref 43–77)
NEUTS BAND # BLD: 2.6 % — SIGNIFICANT CHANGE UP (ref 0–6)
NITRITE UR-MCNC: NEGATIVE — SIGNIFICANT CHANGE UP
NRBC # FLD: 0 K/UL — SIGNIFICANT CHANGE UP (ref 0–0)
OTHER - HEMATOLOGY %: 0 — SIGNIFICANT CHANGE UP
PCO2 BLDV: 43 MMHG — SIGNIFICANT CHANGE UP (ref 41–51)
PCO2 BLDV: 46 MMHG — SIGNIFICANT CHANGE UP (ref 41–51)
PH BLDV: 7.36 PH — SIGNIFICANT CHANGE UP (ref 7.32–7.43)
PH BLDV: 7.39 PH — SIGNIFICANT CHANGE UP (ref 7.32–7.43)
PH UR: 6.5 — SIGNIFICANT CHANGE UP (ref 5–8)
PLATELET # BLD AUTO: 225 K/UL — SIGNIFICANT CHANGE UP (ref 150–400)
PLATELET COUNT - ESTIMATE: NORMAL — SIGNIFICANT CHANGE UP
PMV BLD: 11.2 FL — SIGNIFICANT CHANGE UP (ref 7–13)
PO2 BLDV: 23 MMHG — LOW (ref 35–40)
PO2 BLDV: 29 MMHG — LOW (ref 35–40)
POLYCHROMASIA BLD QL SMEAR: SLIGHT — SIGNIFICANT CHANGE UP
POTASSIUM BLDV-SCNC: 3.9 MMOL/L — SIGNIFICANT CHANGE UP (ref 3.4–4.5)
POTASSIUM BLDV-SCNC: 4.2 MMOL/L — SIGNIFICANT CHANGE UP (ref 3.4–4.5)
POTASSIUM SERPL-MCNC: 4.4 MMOL/L — SIGNIFICANT CHANGE UP (ref 3.5–5.3)
POTASSIUM SERPL-SCNC: 4.4 MMOL/L — SIGNIFICANT CHANGE UP (ref 3.5–5.3)
PROMYELOCYTES # FLD: 0 % — SIGNIFICANT CHANGE UP (ref 0–0)
PROT SERPL-MCNC: 7.8 G/DL — SIGNIFICANT CHANGE UP (ref 6–8.3)
PROT UR-MCNC: 200 — HIGH
RBC # BLD: 4.09 M/UL — LOW (ref 4.2–5.8)
RBC # FLD: 13 % — SIGNIFICANT CHANGE UP (ref 10.3–14.5)
RBC CASTS # UR COMP ASSIST: SIGNIFICANT CHANGE UP (ref 0–?)
REVIEW TO FOLLOW: YES — SIGNIFICANT CHANGE UP
RSV RNA SPEC QL NAA+PROBE: NOT DETECTED — SIGNIFICANT CHANGE UP
RV+EV RNA SPEC QL NAA+PROBE: NOT DETECTED — SIGNIFICANT CHANGE UP
SAO2 % BLDV: 28 % — LOW (ref 60–85)
SAO2 % BLDV: 47.1 % — LOW (ref 60–85)
SODIUM SERPL-SCNC: 140 MMOL/L — SIGNIFICANT CHANGE UP (ref 135–145)
SP GR SPEC: 1.01 — SIGNIFICANT CHANGE UP (ref 1–1.04)
TROPONIN T, HIGH SENSITIVITY: 38 NG/L — SIGNIFICANT CHANGE UP (ref ?–14)
TSH SERPL-MCNC: 0.75 UIU/ML — SIGNIFICANT CHANGE UP (ref 0.27–4.2)
UROBILINOGEN FLD QL: NORMAL — SIGNIFICANT CHANGE UP
VARIANT LYMPHS # BLD: 1.7 % — SIGNIFICANT CHANGE UP
WBC # BLD: 19.93 K/UL — HIGH (ref 3.8–10.5)
WBC # FLD AUTO: 19.93 K/UL — HIGH (ref 3.8–10.5)
WBC UR QL: >50 — HIGH (ref 0–?)

## 2019-09-17 PROCEDURE — 70450 CT HEAD/BRAIN W/O DYE: CPT | Mod: 26

## 2019-09-17 PROCEDURE — 99223 1ST HOSP IP/OBS HIGH 75: CPT | Mod: GC

## 2019-09-17 PROCEDURE — 71046 X-RAY EXAM CHEST 2 VIEWS: CPT | Mod: 26

## 2019-09-17 RX ORDER — APIXABAN 2.5 MG/1
2.5 TABLET, FILM COATED ORAL EVERY 12 HOURS
Refills: 0 | Status: DISCONTINUED | OUTPATIENT
Start: 2019-09-17 | End: 2019-09-19

## 2019-09-17 RX ORDER — SODIUM CHLORIDE 9 MG/ML
1000 INJECTION INTRAMUSCULAR; INTRAVENOUS; SUBCUTANEOUS ONCE
Refills: 0 | Status: COMPLETED | OUTPATIENT
Start: 2019-09-17 | End: 2019-09-17

## 2019-09-17 RX ORDER — AMIODARONE HYDROCHLORIDE 400 MG/1
200 TABLET ORAL DAILY
Refills: 0 | Status: DISCONTINUED | OUTPATIENT
Start: 2019-09-17 | End: 2019-09-19

## 2019-09-17 RX ORDER — SODIUM CHLORIDE 9 MG/ML
1000 INJECTION, SOLUTION INTRAVENOUS
Refills: 0 | Status: DISCONTINUED | OUTPATIENT
Start: 2019-09-17 | End: 2019-09-19

## 2019-09-17 RX ORDER — PANTOPRAZOLE SODIUM 20 MG/1
40 TABLET, DELAYED RELEASE ORAL
Refills: 0 | Status: DISCONTINUED | OUTPATIENT
Start: 2019-09-17 | End: 2019-09-19

## 2019-09-17 RX ORDER — BRIMONIDINE TARTRATE 2 MG/MG
1 SOLUTION/ DROPS OPHTHALMIC
Refills: 0 | Status: DISCONTINUED | OUTPATIENT
Start: 2019-09-17 | End: 2019-09-19

## 2019-09-17 RX ORDER — SITAGLIPTIN 50 MG/1
1 TABLET, FILM COATED ORAL
Qty: 0 | Refills: 0 | DISCHARGE

## 2019-09-17 RX ORDER — DEXTROSE 50 % IN WATER 50 %
15 SYRINGE (ML) INTRAVENOUS ONCE
Refills: 0 | Status: DISCONTINUED | OUTPATIENT
Start: 2019-09-17 | End: 2019-09-19

## 2019-09-17 RX ORDER — METFORMIN HYDROCHLORIDE 850 MG/1
1 TABLET ORAL
Qty: 0 | Refills: 0 | DISCHARGE

## 2019-09-17 RX ORDER — DEXTROSE 50 % IN WATER 50 %
25 SYRINGE (ML) INTRAVENOUS ONCE
Refills: 0 | Status: DISCONTINUED | OUTPATIENT
Start: 2019-09-17 | End: 2019-09-19

## 2019-09-17 RX ORDER — GLUCAGON INJECTION, SOLUTION 0.5 MG/.1ML
1 INJECTION, SOLUTION SUBCUTANEOUS ONCE
Refills: 0 | Status: DISCONTINUED | OUTPATIENT
Start: 2019-09-17 | End: 2019-09-19

## 2019-09-17 RX ORDER — INFLUENZA VIRUS VACCINE 15; 15; 15; 15 UG/.5ML; UG/.5ML; UG/.5ML; UG/.5ML
0.5 SUSPENSION INTRAMUSCULAR ONCE
Refills: 0 | Status: COMPLETED | OUTPATIENT
Start: 2019-09-17 | End: 2019-09-17

## 2019-09-17 RX ORDER — DEXTROSE 50 % IN WATER 50 %
12.5 SYRINGE (ML) INTRAVENOUS ONCE
Refills: 0 | Status: DISCONTINUED | OUTPATIENT
Start: 2019-09-17 | End: 2019-09-19

## 2019-09-17 RX ORDER — INSULIN LISPRO 100/ML
VIAL (ML) SUBCUTANEOUS
Refills: 0 | Status: DISCONTINUED | OUTPATIENT
Start: 2019-09-17 | End: 2019-09-19

## 2019-09-17 RX ORDER — SODIUM CHLORIDE 9 MG/ML
1000 INJECTION INTRAMUSCULAR; INTRAVENOUS; SUBCUTANEOUS
Refills: 0 | Status: DISCONTINUED | OUTPATIENT
Start: 2019-09-17 | End: 2019-09-18

## 2019-09-17 RX ORDER — CEFTRIAXONE 500 MG/1
1000 INJECTION, POWDER, FOR SOLUTION INTRAMUSCULAR; INTRAVENOUS EVERY 24 HOURS
Refills: 0 | Status: DISCONTINUED | OUTPATIENT
Start: 2019-09-18 | End: 2019-09-19

## 2019-09-17 RX ORDER — ATORVASTATIN CALCIUM 80 MG/1
40 TABLET, FILM COATED ORAL AT BEDTIME
Refills: 0 | Status: DISCONTINUED | OUTPATIENT
Start: 2019-09-17 | End: 2019-09-19

## 2019-09-17 RX ORDER — ASPIRIN/CALCIUM CARB/MAGNESIUM 324 MG
81 TABLET ORAL DAILY
Refills: 0 | Status: DISCONTINUED | OUTPATIENT
Start: 2019-09-17 | End: 2019-09-19

## 2019-09-17 RX ORDER — CEFTRIAXONE 500 MG/1
1 INJECTION, POWDER, FOR SOLUTION INTRAMUSCULAR; INTRAVENOUS ONCE
Refills: 0 | Status: COMPLETED | OUTPATIENT
Start: 2019-09-17 | End: 2019-09-17

## 2019-09-17 RX ORDER — ACETAMINOPHEN 500 MG
975 TABLET ORAL ONCE
Refills: 0 | Status: COMPLETED | OUTPATIENT
Start: 2019-09-17 | End: 2019-09-17

## 2019-09-17 RX ORDER — INSULIN LISPRO 100/ML
VIAL (ML) SUBCUTANEOUS AT BEDTIME
Refills: 0 | Status: DISCONTINUED | OUTPATIENT
Start: 2019-09-17 | End: 2019-09-19

## 2019-09-17 RX ORDER — DONEPEZIL HYDROCHLORIDE 10 MG/1
5 TABLET, FILM COATED ORAL AT BEDTIME
Refills: 0 | Status: DISCONTINUED | OUTPATIENT
Start: 2019-09-17 | End: 2019-09-19

## 2019-09-17 RX ADMIN — CEFTRIAXONE 1 MILLIGRAM(S): 500 INJECTION, POWDER, FOR SOLUTION INTRAMUSCULAR; INTRAVENOUS at 19:45

## 2019-09-17 RX ADMIN — Medication 975 MILLIGRAM(S): at 19:45

## 2019-09-17 RX ADMIN — CEFTRIAXONE 100 MILLIGRAM(S): 500 INJECTION, POWDER, FOR SOLUTION INTRAMUSCULAR; INTRAVENOUS at 15:31

## 2019-09-17 RX ADMIN — SODIUM CHLORIDE 75 MILLILITER(S): 9 INJECTION INTRAMUSCULAR; INTRAVENOUS; SUBCUTANEOUS at 23:01

## 2019-09-17 RX ADMIN — SODIUM CHLORIDE 1000 MILLILITER(S): 9 INJECTION INTRAMUSCULAR; INTRAVENOUS; SUBCUTANEOUS at 19:45

## 2019-09-17 RX ADMIN — ATORVASTATIN CALCIUM 40 MILLIGRAM(S): 80 TABLET, FILM COATED ORAL at 23:06

## 2019-09-17 RX ADMIN — Medication 975 MILLIGRAM(S): at 15:44

## 2019-09-17 RX ADMIN — SODIUM CHLORIDE 1000 MILLILITER(S): 9 INJECTION INTRAMUSCULAR; INTRAVENOUS; SUBCUTANEOUS at 15:31

## 2019-09-17 RX ADMIN — DONEPEZIL HYDROCHLORIDE 5 MILLIGRAM(S): 10 TABLET, FILM COATED ORAL at 23:01

## 2019-09-17 NOTE — ED PROVIDER NOTE - PHYSICAL EXAMINATION
Sana Montenegro, .:   GENERAL: Patient awake alert NAD.  HEENT: NC/AT, Moist mucous membranes, PERRL, EOMI.  LUNGS: CTAB, no wheezes or crackles.   CARDIAC: RRR, no m/r/g.    ABDOMEN: Soft, NT, ND, No rebound, guarding. No CVA tenderness.   EXT: No edema. No calf tenderness.  MSK: No spinal tenderness, no pain with movement, no deformities.  NEURO: A&Ox3. Moving all extremities. No pronator drift. 5/5 strength in all extremities.  SKIN: Warm and dry. No rash.  PSYCH: Normal affect.

## 2019-09-17 NOTE — ED PROVIDER NOTE - ATTENDING CONTRIBUTION TO CARE
I have personally performed a face to face bedside history and physical examination of this patient. I have discussed the history, examination, review of systems, assessment and plan of management with the resident. I have reviewed the electronic medical record and amended it to reflect my history, review of systems, physical exam, assessment and plan.    79M pmhx of CAD (s/p CABG 7/2019), CKD presents with family for weakness. Patient felt fatigued for last several days, one day ago had mechanical fall in shower (denies syncope).  Daughter found patient in shower, states he may have been on floor for 20 mins or so.  Unsure if he hit is head.  No complaints, though daughter noted that he had a fever at home this morning to 101, no N/V/D, abd pain.  Patient has been ambulatory since fall.  VSS on arrival, spiked fever in ED.  Exam non-toxic appearing, alert, lungs clear, heart sounds nl, abdomen soft and nt, no cvat, non-focal neuro exam.  EKG nsr with new TWI since 2017.  Unclear etiology of sx, possibly infectious given fever.  Will check labs, ua, cxr.  Dispo pending.

## 2019-09-17 NOTE — ED ADULT NURSE NOTE - NSIMPLEMENTINTERV_GEN_ALL_ED
Implemented All Fall Risk Interventions:  [place] to call system. Call bell, personal items and telephone within reach. Instruct patient to call for assistance. Room bathroom lighting operational. Non-slip footwear when patient is off stretcher. Physically safe environment: no spills, clutter or unnecessary equipment. Stretcher in lowest position, wheels locked, appropriate side rails in place. Provide visual cue, wrist band, yellow gown, etc. Monitor gait and stability. Monitor for mental status changes and reorient to person, place, and time. Review medications for side effects contributing to fall risk. Reinforce activity limits and safety measures with patient and family.

## 2019-09-17 NOTE — H&P ADULT - PROBLEM SELECTOR PLAN 8
Patient is on metformin and januvia at home.  - HbA1C was 8.2% in July 2019  - monitor FSG  - ISS - blood pressure currently stable  - holding antihypertensives in the setting of sepsis  - may resume home medications once patient improves clinically

## 2019-09-17 NOTE — H&P ADULT - PROBLEM SELECTOR PLAN 3
Admitted with AST 65 and ALT 54.  - likely 2/2 sepsis  - hepatitis panel ordered  - trend liver enzymes daily  - if liver enzymes do not downtrend, will order abdominal ultrasound

## 2019-09-17 NOTE — H&P ADULT - PROBLEM SELECTOR PROBLEM 6
Coronary artery disease involving native coronary artery of native heart without angina pectoris Medication management

## 2019-09-17 NOTE — ED ADULT NURSE NOTE - NSFALLRSKASSESASSIST_ED_ALL_ED
yes Patient is a 42yo female reporting she dropped a 45lb weight on her foot while exercising. Patient was wearing a sneaker at the time. Able to bear weight, but with some pain. No deformity noted. Reports mild numbness up right calf. Pedal pulses 2+ bilaterally.

## 2019-09-17 NOTE — H&P ADULT - PROBLEM SELECTOR PLAN 7
- blood pressure currently stable  - holding antihypertensives in the setting of sepsis  - may resume home medications once patient improves clinically - continue with ASA and lipitor 40 mg daily.

## 2019-09-17 NOTE — H&P ADULT - PROBLEM SELECTOR PLAN 1
Patient was admitted with T 100.4F and WBC 19.93 with 78.4% neutrophils and 2.6 bands. CXR clear. s/p 1L NS bolus in ED which normalized lactate from 2.7 to 2.0.  - RVP, BCX and UCX ordered; follow up on results  - continue with IV antibiotics  - continue with IV fluids  - trend CBC daily  - monitor vitals Patient was admitted with T 100.4F and WBC 19.93 with 78.4% neutrophils and 2.6 bands. CXR clear. s/p 1L NS bolus in ED which normalized lactate from 2.7 to 2.0.  - RVP, BCX and UCX ordered; follow up on results.  - continue with IV antibiotics  - continue with IV fluids  - trend CBC daily  - monitor vitals

## 2019-09-17 NOTE — H&P ADULT - NSHPLABSRESULTS_GEN_ALL_CORE
LABS:                          12.0   19.93 )-----------( 225      ( 17 Sep 2019 14:12 )             35.9     Hb Trend: 12.0<--  WBC Trend: 19.93<--  Plt Trend: <--              140  |  101  |  32<H>  ----------------------------<  239<H>  4.4   |  26  |  2.45<H>    Ca    10.2      17 Sep 2019 14:12    TPro  7.8  /  Alb  4.2  /  TBili  0.9  /  DBili  x   /  AST  65<H>  /  ALT  54<H>  /  AlkPhos  93          Urinalysis Basic - ( 17 Sep 2019 14:12 )    Color: LT. YELLOW / Appearance: CLEAR / S.014 / pH: 6.5  Gluc: 200 / Ketone: NEGATIVE  / Bili: NEGATIVE / Urobili: NORMAL   Blood: LARGE / Protein: 200 / Nitrite: NEGATIVE   Leuk Esterase: LARGE / RBC: 5-10 / WBC >50   Sq Epi: x / Non Sq Epi: OCC / Bacteria: MODERATE      CAPILLARY BLOOD GLUCOSE              IMAGING:    < from: Xray Chest 2 Views PA/Lat (19 @ 13:31) >      EXAM:  XR CHEST PA LAT 2V        PROCEDURE DATE:  Sep 17 2019         INTERPRETATION:  CLINICAL INDICATION: chest pain    EXAM:  Frontal and lateral chest from 2019 at 1331. Compared to prior study   from 2019.    IMPRESSION:  Stable previously seen implanted loop recorder over lower medial anterior   left hemithorax.    Clear lungs. No pleural effusions or pneumothorax.    Cardiac and mediastinal silhouettes stable and within normal limits.    Trachea midline.    Mild spinal degenerative change. Unremarkable remaining visualized   osseous structures.     < end of copied text >    EKG: NSR, HR 90 bpm,  ms

## 2019-09-17 NOTE — ED ADULT NURSE NOTE - OBJECTIVE STATEMENT
Pt rec'd AOX3 in Rm 1 c/o recent chest pain yesterday, and fever this a.m.; pt also fell in bathroom last night, states it was a trip and fall; daughter at bedside states pt had seemed weaker than normal past 2 days; pt is recovering from bypass surgery in July, was d/c'd from cardiac rehab to home 2 weeks ago; pt afebrile at this time; labs drawn and sent; awaiting further MD orders.

## 2019-09-17 NOTE — ED ADULT TRIAGE NOTE - CHIEF COMPLAINT QUOTE
Patient brought to ER from home by EMS for c/o fever, unsteady gait and weakness for about a week. Pt also had an unwitnessed fall in bathtub last night. (Pt denies hitting head but it was unwitnessed). Patient brought to ER from home by EMS for c/o fever, unsteady gait and weakness for about a week. Pt also had an unwitnessed fall in bathtub last night. (Pt denies hitting head but it was unwitnessed). .

## 2019-09-17 NOTE — H&P ADULT - ATTENDING COMMENTS
79M with CAD CKD presented after fall at home in setting of sepsis secondary to UTI. Etiology of fall is likely mechanical in setting of infection. Clinically asymptomatic for UTI, but with +UA, fever and leukocytosis, will treat empirically. f/u cultures, and adjust abx based on C&S. TAYLOR on CKD, no signs of obstruction, likely pre-renal secondary to infection. c/w IVF overnight, f/u repeat Cr. Need to clarify home meds as patient was found to have afib s/p CABG requiring AC and Amiodarone. restart home med when possible. monitor hemodynamics.

## 2019-09-17 NOTE — H&P ADULT - HISTORY OF PRESENT ILLNESS
79 year old male with history of CAD, CKD, HTN, HLD, and T2DM who presents after a fall in bathroom. Today, the patient was getting out of the shower and fell. He does not remember how he fell. He is uncertain if he hit his head, but denies losing consciousness. He denies dizziness, lightheadedness or a history of unsteady gait. He does use a walker at baseline. His daughter insisted that he go to the ED. He denies headache, vision changes, chest pain, shortness of breath, nausea, vomiting, dysuria, urinary frequency, urinary urgency 79 year old male with history of CAD, CKD, HTN, HLD, and T2DM who presents after a fall in bathroom. Today, the patient was getting out of the shower and fell. He does not remember how he fell. He is uncertain if he hit his head, but denies losing consciousness. He denies dizziness, lightheadedness or a history of unsteady gait. He does use a walker at baseline. His daughter insisted that he go to the ED. He denies fever, headache, vision changes, chest pain, shortness of breath, nausea, vomiting, dysuria, urinary frequency, urinary urgency    In the ED, vital signs were Tmax 100.4F, -174/63-71 mm Hg, HR 79-95 bpm, RR 16-18, 97-99% RA.  He received 1g ceftriaxone x1, 1L NS bolus x1, and acetaminophen 975 mg x1. 79 year old male with history of CAD s/p CABG, CKD, HTN, HLD, and T2DM who presents after a fall in bathroom. Today, the patient was getting out of the shower and fell. He does not remember how he fell. He is uncertain if he hit his head, but denies losing consciousness. He denies dizziness, lightheadedness or a history of unsteady gait. He does use a walker at baseline. His daughter insisted that he go to the ED. He denies fever, headache, vision changes, chest pain, shortness of breath, nausea, vomiting, dysuria, urinary frequency, urinary urgency    In the ED, vital signs were Tmax 100.4F, -174/63-71 mm Hg, HR 79-95 bpm, RR 16-18, 97-99% RA.  He received 1g ceftriaxone x1, 1L NS bolus x1, and acetaminophen 975 mg x1. 79 year old male with history of CAD s/p CABG (in August 2019 with post-op pAfib/AFlutter now on amiodarone and eliquis), CKD, HTN, HLD, T2DM, s/p loop recorder placement in 2015  who presents after a fall in bathroom. Today, the patient was getting out of the shower and fell. He does not remember how he fell. He is uncertain if he hit his head, but denies losing consciousness. He denies dizziness, lightheadedness or a history of unsteady gait. He does use a walker at baseline. His daughter insisted that he go to the ED. He denies fever, headache, vision changes, chest pain, shortness of breath, nausea, vomiting, dysuria, urinary frequency, urinary urgency    In the ED, vital signs were Tmax 100.4F, -174/63-71 mm Hg, HR 79-95 bpm, RR 16-18, 97-99% RA.  He received 1g ceftriaxone x1, 1L NS bolus x1, and acetaminophen 975 mg x1. 79 year old male with history of CAD s/p CABG (in August 2019 with post-op pAfib/AFlutter now on amiodarone and eliquis), CKD stage III, HTN, HLD, T2DM, s/p loop recorder placement in 2015  who presents after a fall in bathroom. Today, the patient was getting out of the shower and fell. He does not remember how he fell. He is uncertain if he hit his head, but denies losing consciousness. He denies dizziness, lightheadedness or a history of unsteady gait. He does use a walker at baseline. His daughter insisted that he go to the ED. He denies fever, headache, vision changes, chest pain, shortness of breath, nausea, vomiting, dysuria, urinary frequency, urinary urgency    In the ED, vital signs were Tmax 100.4F, -174/63-71 mm Hg, HR 79-95 bpm, RR 16-18, 97-99% RA.  He received 1g ceftriaxone x1, 1L NS bolus x1, and acetaminophen 975 mg x1.

## 2019-09-17 NOTE — ED PROCEDURE NOTE - PROCEDURE ADDITIONAL DETAILS
ED focused limited [place] [address]    Focused ED ultrasound of the lungs and pleura:  indic syncope, fever    Findings: Normal lung sliding bilaterally  No signs of interstitial syndrome  No pleural effusions  No pneumonia visualized    Impression: Normal focused lung ultrasound    Procedure note and images placed in chart

## 2019-09-17 NOTE — ED PROVIDER NOTE - PMH
CAD (coronary artery disease)    CKD (chronic kidney disease), stage III    CVA (cerebral vascular accident)    HLD (hyperlipidemia)    HTN (hypertension)    Non-ST elevation (NSTEMI) myocardial infarction    Type 2 diabetes mellitus without complication

## 2019-09-17 NOTE — ED PROCEDURE NOTE - PROCEDURE ADDITIONAL DETAILS
Focused ED Transthoracic echo 48067 - indication (  syncope    )    Grey scale imaging obtained in four views ( parasternal long, parasternal short, apical and subxiphoid)    No pericardial effusion noted.  No evidence of cardiac tamponade  LV contractility - normal.  EPSS 7.5mm  RV normal size.  IVC 1.1 -> 0.79cm with sniff    Impression: no pericardial effusion, normal contractility, RV normal size.  No MR or AI identified.  Dexeus.

## 2019-09-17 NOTE — H&P ADULT - PROBLEM SELECTOR PLAN 6
- continue with ASA and lipitor 40 mg daily. Patient is not certain of medications or complete medical history  - unclear why patient is on eliquis and amiodarone  - will contact daughter to complete medication reconciliation Patient is not certain of medications or complete medical history. Patient does not know which pharmacy he uses.   - will contact daughter to complete medication reconciliation Patient is not certain of medications or complete medical history. Patient does not know which pharmacy he uses. He appears to have cognitive impairment. Patient was A&Ox1 and only knew his name.  - contact daughter to complete medication reconciliation

## 2019-09-17 NOTE — H&P ADULT - NSHPPHYSICALEXAM_GEN_ALL_CORE
Vital Signs Last 24 Hrs  T(C): 37.4 (17 Sep 2019 17:15), Max: 38 (17 Sep 2019 15:35)  T(F): 99.4 (17 Sep 2019 17:15), Max: 100.4 (17 Sep 2019 15:35)  HR: 79 (17 Sep 2019 17:15) (79 - 95)  BP: 128/63 (17 Sep 2019 17:15) (128/63 - 174/63)  BP(mean): --  RR: 16 (17 Sep 2019 17:15) (16 - 18)  SpO2: 97% (17 Sep 2019 17:15) (97% - 99%)    General: Elderly, Not in acute distress  Head: Normocephalic, Atraumatic  Eyes: PERRLA, EOMI, normal sclera  Throat: Moist mucous membranes  Respiratory: CTAB, normal respiratory effort, no wheezes, crackles, rales, left chest wall tenderness to palpation  CV: RRR, S1/S2, no murmurs, rubs or gallops  Abdominal: Soft, bowel sounds present, NT, ND  Extremities: No edema, 2+ DP pulses  Neurological: A&Ox4, MAEx4, non-focal  Skin: No rashes Vital Signs Last 24 Hrs  T(C): 37.4 (17 Sep 2019 17:15), Max: 38 (17 Sep 2019 15:35)  T(F): 99.4 (17 Sep 2019 17:15), Max: 100.4 (17 Sep 2019 15:35)  HR: 79 (17 Sep 2019 17:15) (79 - 95)  BP: 128/63 (17 Sep 2019 17:15) (128/63 - 174/63)  BP(mean): --  RR: 16 (17 Sep 2019 17:15) (16 - 18)  SpO2: 97% (17 Sep 2019 17:15) (97% - 99%)    General: Elderly, Not in acute distress  Head: Normocephalic, Atraumatic  Eyes: PERRLA, EOMI, normal sclera  Throat: Moist mucous membranes  Respiratory: CTAB, normal respiratory effort, no wheezes, crackles, rales, left chest wall tenderness to palpation  CV: RRR, S1/S2, no murmurs, rubs or gallops  Abdominal: Soft, bowel sounds present, NT, ND  Extremities: No edema, 2+ DP pulses  Neurological: A&Ox1, MAEx4, non-focal  Skin: No rashes

## 2019-09-17 NOTE — H&P ADULT - ASSESSMENT
79 year old male with history of CAD, CKD, HTN, HLD, and T2DM who presents after a fall in bathroom, found with positive urinalysis, admitted for sepsis 2/2 UTI. 79 year old male with history of CAD s/p CABG, CKD, HTN, HLD, and T2DM who presents after a fall in bathroom, found with positive urinalysis, admitted for sepsis 2/2 UTI. 79 year old male with history of CAD s/p CABG, CKD stage III, HTN, HLD, and T2DM who presents after a fall in bathroom, found with positive urinalysis, admitted for sepsis 2/2 UTI.

## 2019-09-17 NOTE — H&P ADULT - NSICDXFAMILYHX_GEN_ALL_CORE_FT
FAMILY HISTORY:  Family history of heart disease    Sibling  Still living? Unknown  Family history of uterine cancer, Age at diagnosis: Age Unknown

## 2019-09-17 NOTE — H&P ADULT - PROBLEM SELECTOR PLAN 4
Admitted with Scr 2.45. Scr was 1.43 in July 2019.  - likely prerenal azotemia 2/2 sepsis  - trend Scr daily  - avoid nephrotoxin and renally dose medications Admitted with Scr 2.45. Scr was 1.43 in July 2019.  - likely prerenal azotemia 2/2 sepsis  - continue with IVFs and encourage po intake  - trend Scr daily  - avoid nephrotoxin and renally dose medications

## 2019-09-17 NOTE — ED ADULT NURSE NOTE - CHIEF COMPLAINT QUOTE
Patient brought to ER from home by EMS for c/o fever, unsteady gait and weakness for about a week. Pt also had an unwitnessed fall in bathtub last night. (Pt denies hitting head but it was unwitnessed). .

## 2019-09-17 NOTE — H&P ADULT - NSHPREVIEWOFSYSTEMS_GEN_ALL_CORE
REVIEW OF SYSTEMS:  CONSTITUTIONAL: SEE HPI  EYES: No eye pain, visual disturbances, or discharge  ENT:  No difficulty hearing, tinnitus, vertigo; No sinus or throat pain  NECK: No pain or stiffness  RESPIRATORY: No cough, wheezing, or hemoptysis; No shortness of breath  CARDIOVASCULAR: No chest pain, palpitations, dizziness, or leg swelling  GASTROINTESTINAL: No abdominal or epigastric pain. No nausea, vomiting, or hematemesis  NEUROLOGICAL: No headaches, memory loss, loss of strength, numbness, or tremors  SKIN: No itching, burning, rashes, or lesions   LYMPH NODES: No enlarged glands  ENDOCRINE: No hot or cold intolerance; No hair loss  MUSCULOSKELETAL: No joint pain or swelling; No muscle, back, or extremity pain  PSYCHIATRIC: No depression, anxiety, mood swings, or difficulty sleeping  HEME/LYMPH: No easy bruising, or bleeding gums  ALLERGY AND IMMUNOLOGIC: No hives or eczema

## 2019-09-17 NOTE — H&P ADULT - PROBLEM SELECTOR PLAN 2
Urinalysis with large leukocyte esterase, negative nitrite, moderate bacteria, and >50 WBCs  - continue with IV ceftriaxone  - if patient worsens clinically, will order CT abdomen and pelvis  - trend CBC daily  - monitor vitals

## 2019-09-17 NOTE — ED PROVIDER NOTE - NS ED ROS FT
CONST: +fevers, no chills  EYES: no pain, no vision changes  ENT: no sore throat, no ear pain, no change in hearing  CV: no chest pain, no leg swelling  RESP: no shortness of breath, no cough  ABD: no abdominal pain, no nausea, no vomiting, no diarrhea  : no dysuria, no flank pain, no hematuria  MSK: no back pain, no extremity pain  NEURO: no headache or additional neurologic complaints  HEME: no easy bleeding  SKIN:  no rash

## 2019-09-17 NOTE — ED PROVIDER NOTE - CLINICAL SUMMARY MEDICAL DECISION MAKING FREE TEXT BOX
79M p/w ?syncope vs. mechanical fall. Will get labs, r/o infection vs. cardiogenic. CTH to r/o bleed. DC if all neg workup.

## 2019-09-17 NOTE — ED PROVIDER NOTE - OBJECTIVE STATEMENT
79M pmhx of CAD (s/p CABG), CKD presents with fall in bathroom, unwitnessed, possible head trauma, does not remember. 79M pmhx of CAD (s/p CABG), CKD presents with fall in bathroom, unwitnessed, possible head trauma, does not remember why he fell. No complaints, though daughter noted that he had a fever at home this morning, no N/V/D, abd pain.

## 2019-09-17 NOTE — H&P ADULT - NSICDXPASTSURGICALHX_GEN_ALL_CORE_FT
PAST SURGICAL HISTORY:  H/O carpal tunnel repair     Status post placement of implantable loop recorder PAST SURGICAL HISTORY:  H/O carpal tunnel repair     S/P CABG (coronary artery bypass graft)     Status post placement of implantable loop recorder

## 2019-09-17 NOTE — H&P ADULT - PROBLEM SELECTOR PLAN 5
Patient is not certain of medications or complete medical history  - unclear why patient is on eliquis and amiodarone  - will contact daughter to complete medication reconciliation CT head with chronic changes, but no acute pathology.  - PT evaluation ordered

## 2019-09-17 NOTE — H&P ADULT - PROBLEM SELECTOR PLAN 9
- DVT: Low improve score, SCDs  - Diet: DASH/TLC consistent carbohydrates  - Dispo: PT evaluation Patient is on metformin and januvia at home.  - HbA1C was 8.2% in July 2019  - monitor FSG  - ISS

## 2019-09-18 LAB
ALBUMIN SERPL ELPH-MCNC: 3.8 G/DL — SIGNIFICANT CHANGE UP (ref 3.3–5)
ALP SERPL-CCNC: 89 U/L — SIGNIFICANT CHANGE UP (ref 40–120)
ALT FLD-CCNC: 47 U/L — HIGH (ref 4–41)
ANION GAP SERPL CALC-SCNC: 15 MMO/L — HIGH (ref 7–14)
AST SERPL-CCNC: 47 U/L — HIGH (ref 4–40)
BASOPHILS # BLD AUTO: 0.04 K/UL — SIGNIFICANT CHANGE UP (ref 0–0.2)
BASOPHILS NFR BLD AUTO: 0.3 % — SIGNIFICANT CHANGE UP (ref 0–2)
BILIRUB SERPL-MCNC: 0.5 MG/DL — SIGNIFICANT CHANGE UP (ref 0.2–1.2)
BUN SERPL-MCNC: 24 MG/DL — HIGH (ref 7–23)
CALCIUM SERPL-MCNC: 9.4 MG/DL — SIGNIFICANT CHANGE UP (ref 8.4–10.5)
CHLORIDE SERPL-SCNC: 105 MMOL/L — SIGNIFICANT CHANGE UP (ref 98–107)
CO2 SERPL-SCNC: 21 MMOL/L — LOW (ref 22–31)
CREAT SERPL-MCNC: 1.83 MG/DL — HIGH (ref 0.5–1.3)
EOSINOPHIL # BLD AUTO: 0.39 K/UL — SIGNIFICANT CHANGE UP (ref 0–0.5)
EOSINOPHIL NFR BLD AUTO: 3 % — SIGNIFICANT CHANGE UP (ref 0–6)
GLUCOSE BLDC GLUCOMTR-MCNC: 100 MG/DL — HIGH (ref 70–99)
GLUCOSE BLDC GLUCOMTR-MCNC: 125 MG/DL — HIGH (ref 70–99)
GLUCOSE BLDC GLUCOMTR-MCNC: 133 MG/DL — HIGH (ref 70–99)
GLUCOSE BLDC GLUCOMTR-MCNC: 177 MG/DL — HIGH (ref 70–99)
GLUCOSE SERPL-MCNC: 144 MG/DL — HIGH (ref 70–99)
HCT VFR BLD CALC: 38.7 % — LOW (ref 39–50)
HGB BLD-MCNC: 12.7 G/DL — LOW (ref 13–17)
IMM GRANULOCYTES NFR BLD AUTO: 0.5 % — SIGNIFICANT CHANGE UP (ref 0–1.5)
LYMPHOCYTES # BLD AUTO: 1.93 K/UL — SIGNIFICANT CHANGE UP (ref 1–3.3)
LYMPHOCYTES # BLD AUTO: 14.8 % — SIGNIFICANT CHANGE UP (ref 13–44)
MAGNESIUM SERPL-MCNC: 1.7 MG/DL — SIGNIFICANT CHANGE UP (ref 1.6–2.6)
MCHC RBC-ENTMCNC: 29 PG — SIGNIFICANT CHANGE UP (ref 27–34)
MCHC RBC-ENTMCNC: 32.8 % — SIGNIFICANT CHANGE UP (ref 32–36)
MCV RBC AUTO: 88.4 FL — SIGNIFICANT CHANGE UP (ref 80–100)
MONOCYTES # BLD AUTO: 1.12 K/UL — HIGH (ref 0–0.9)
MONOCYTES NFR BLD AUTO: 8.6 % — SIGNIFICANT CHANGE UP (ref 2–14)
NEUTROPHILS # BLD AUTO: 9.48 K/UL — HIGH (ref 1.8–7.4)
NEUTROPHILS NFR BLD AUTO: 72.8 % — SIGNIFICANT CHANGE UP (ref 43–77)
NRBC # FLD: 0 K/UL — SIGNIFICANT CHANGE UP (ref 0–0)
PHOSPHATE SERPL-MCNC: 2.8 MG/DL — SIGNIFICANT CHANGE UP (ref 2.5–4.5)
PLATELET # BLD AUTO: 235 K/UL — SIGNIFICANT CHANGE UP (ref 150–400)
PMV BLD: 11.5 FL — SIGNIFICANT CHANGE UP (ref 7–13)
POTASSIUM SERPL-MCNC: 4.2 MMOL/L — SIGNIFICANT CHANGE UP (ref 3.5–5.3)
POTASSIUM SERPL-SCNC: 4.2 MMOL/L — SIGNIFICANT CHANGE UP (ref 3.5–5.3)
PROT SERPL-MCNC: 7.8 G/DL — SIGNIFICANT CHANGE UP (ref 6–8.3)
RBC # BLD: 4.38 M/UL — SIGNIFICANT CHANGE UP (ref 4.2–5.8)
RBC # FLD: 13.1 % — SIGNIFICANT CHANGE UP (ref 10.3–14.5)
SODIUM SERPL-SCNC: 141 MMOL/L — SIGNIFICANT CHANGE UP (ref 135–145)
SPECIMEN SOURCE: SIGNIFICANT CHANGE UP
WBC # BLD: 13.02 K/UL — HIGH (ref 3.8–10.5)
WBC # FLD AUTO: 13.02 K/UL — HIGH (ref 3.8–10.5)

## 2019-09-18 PROCEDURE — 99233 SBSQ HOSP IP/OBS HIGH 50: CPT | Mod: GC

## 2019-09-18 RX ORDER — SODIUM CHLORIDE 9 MG/ML
1000 INJECTION INTRAMUSCULAR; INTRAVENOUS; SUBCUTANEOUS
Refills: 0 | Status: DISCONTINUED | OUTPATIENT
Start: 2019-09-18 | End: 2019-09-19

## 2019-09-18 RX ADMIN — CEFTRIAXONE 100 MILLIGRAM(S): 500 INJECTION, POWDER, FOR SOLUTION INTRAMUSCULAR; INTRAVENOUS at 17:20

## 2019-09-18 RX ADMIN — SODIUM CHLORIDE 75 MILLILITER(S): 9 INJECTION INTRAMUSCULAR; INTRAVENOUS; SUBCUTANEOUS at 17:19

## 2019-09-18 RX ADMIN — PANTOPRAZOLE SODIUM 40 MILLIGRAM(S): 20 TABLET, DELAYED RELEASE ORAL at 07:14

## 2019-09-18 RX ADMIN — BRIMONIDINE TARTRATE 1 DROP(S): 2 SOLUTION/ DROPS OPHTHALMIC at 17:20

## 2019-09-18 RX ADMIN — AMIODARONE HYDROCHLORIDE 200 MILLIGRAM(S): 400 TABLET ORAL at 07:12

## 2019-09-18 RX ADMIN — BRIMONIDINE TARTRATE 1 DROP(S): 2 SOLUTION/ DROPS OPHTHALMIC at 07:13

## 2019-09-18 RX ADMIN — ATORVASTATIN CALCIUM 40 MILLIGRAM(S): 80 TABLET, FILM COATED ORAL at 22:54

## 2019-09-18 RX ADMIN — APIXABAN 2.5 MILLIGRAM(S): 2.5 TABLET, FILM COATED ORAL at 17:20

## 2019-09-18 RX ADMIN — Medication 81 MILLIGRAM(S): at 12:51

## 2019-09-18 RX ADMIN — APIXABAN 2.5 MILLIGRAM(S): 2.5 TABLET, FILM COATED ORAL at 07:12

## 2019-09-18 RX ADMIN — DONEPEZIL HYDROCHLORIDE 5 MILLIGRAM(S): 10 TABLET, FILM COATED ORAL at 22:54

## 2019-09-18 NOTE — PROGRESS NOTE ADULT - PROBLEM SELECTOR PLAN 6
Patient is not certain of medications or complete medical history. Patient does not know which pharmacy he uses. He appears to have cognitive impairment. Patient was A&Ox1 and only knew his name.  - contact daughter to complete medication reconciliation - Daughter contacted, meds obtained

## 2019-09-18 NOTE — PROGRESS NOTE ADULT - PROBLEM SELECTOR PLAN 2
Urinalysis with large leukocyte esterase, negative nitrite, moderate bacteria, and >50 WBCs  - continue with IV ceftriaxone  - if patient worsens clinically, will order CT abdomen and pelvis  - trend CBC daily  - monitor vitals - UA+ for LE, bacteria, WBCs; Ucx + for acinetobacter baumanii  - C/w IV ceftriaxone  - Monitor CBC, vital signs

## 2019-09-18 NOTE — PROGRESS NOTE ADULT - ASSESSMENT
79 year old male with history of CAD s/p CABG, CKD stage III, HTN, HLD, and T2DM who presents after a fall in bathroom, found with positive urinalysis, admitted for sepsis 2/2 UTI.

## 2019-09-18 NOTE — PROGRESS NOTE ADULT - PROBLEM SELECTOR PLAN 3
Admitted with AST 65 and ALT 54.  - likely 2/2 sepsis  - hepatitis panel ordered  - trend liver enzymes daily  - if liver enzymes do not downtrend, will order abdominal ultrasound - Admitted with AST 65, ALT 54; elevation likely 2/2 sepsis  - F/u hepatitides panel  - Trend CMP; ABD US if transaminases remain elevated

## 2019-09-18 NOTE — PROGRESS NOTE ADULT - PROBLEM SELECTOR PLAN 1
Patient was admitted with T 100.4F and WBC 19.93 with 78.4% neutrophils and 2.6 bands. CXR clear. s/p 1L NS bolus in ED which normalized lactate from 2.7 to 2.0.  - RVP, BCX and UCX ordered; follow up on results.  - continue with IV antibiotics  - continue with IV fluids  - trend CBC daily  - monitor vitals - On admission, patient meets sepsis criteria (febrile to 100.4F, WBC 19.93 w/neutrophil predominance and 2.6% bands; elevated lactate  - RVP, Bcx negative  - UA+, Ucx growing acinetobacter baumanii  - C/w IV ceftriaxone  - C/w IV fluids  - Trend CBC, vital signs

## 2019-09-18 NOTE — PROGRESS NOTE ADULT - SUBJECTIVE AND OBJECTIVE BOX
Patient is a 79y old  Male who presents with a chief complaint of Fall, Sepsis (17 Sep 2019 18:51)      SUBJECTIVE / OVERNIGHT EVENTS:    MEDICATIONS  (STANDING):  amiodarone    Tablet 200 milliGRAM(s) Oral daily  apixaban 2.5 milliGRAM(s) Oral every 12 hours  aspirin enteric coated 81 milliGRAM(s) Oral daily  atorvastatin 40 milliGRAM(s) Oral at bedtime  brimonidine 0.2% Ophthalmic Solution 1 Drop(s) Both EYES two times a day  cefTRIAXone   IVPB 1000 milliGRAM(s) IV Intermittent every 24 hours  dextrose 5%. 1000 milliLiter(s) (50 mL/Hr) IV Continuous <Continuous>  dextrose 50% Injectable 12.5 Gram(s) IV Push once  dextrose 50% Injectable 25 Gram(s) IV Push once  dextrose 50% Injectable 25 Gram(s) IV Push once  donepezil 5 milliGRAM(s) Oral at bedtime  influenza   Vaccine 0.5 milliLiter(s) IntraMuscular once  insulin lispro (HumaLOG) corrective regimen sliding scale   SubCutaneous three times a day before meals  insulin lispro (HumaLOG) corrective regimen sliding scale   SubCutaneous at bedtime  pantoprazole    Tablet 40 milliGRAM(s) Oral before breakfast  sodium chloride 0.9%. 1000 milliLiter(s) (75 mL/Hr) IV Continuous <Continuous>    MEDICATIONS  (PRN):  dextrose 40% Gel 15 Gram(s) Oral once PRN Blood Glucose LESS THAN 70 milliGRAM(s)/deciliter  glucagon  Injectable 1 milliGRAM(s) IntraMuscular once PRN Glucose LESS THAN 70 milligrams/deciliter      Vital Signs Last 24 Hrs  T(C): 37.1 (18 Sep 2019 06:03), Max: 38 (17 Sep 2019 15:35)  T(F): 98.7 (18 Sep 2019 06:03), Max: 100.4 (17 Sep 2019 15:35)  HR: 84 (18 Sep 2019 06:03) (79 - 95)  BP: 136/68 (18 Sep 2019 06:03) (128/63 - 174/63)  BP(mean): --  RR: 18 (18 Sep 2019 06:03) (16 - 18)  SpO2: 99% (18 Sep 2019 06:03) (97% - 99%)  CAPILLARY BLOOD GLUCOSE      POCT Blood Glucose.: 146 mg/dL (17 Sep 2019 22:29)    I&O's Summary    17 Sep 2019 07:01  -  18 Sep 2019 07:00  --------------------------------------------------------  IN: 0 mL / OUT: 300 mL / NET: -300 mL        PHYSICAL EXAM:  Vital Signs Last 24 Hrs  T(C): 37.1 (19 @ 06:03)  T(F): 98.7 (19 @ 06:03), Max: 100.4 (19 @ 15:35)  HR: 84 (19 @ 06:03) (79 - 95)  BP: 136/68 (19 @ 06:03)  BP(mean): --  RR: 18 (19 @ 06:03) (16 - 18)  SpO2: 99% (19 @ 06:03) (97% - 99%)  Wt(kg): --     @ 07:01  -   @ 07:00  --------------------------------------------------------  IN: 0 mL / OUT: 300 mL / NET: -300 mL      Constitutional: NAD, awake and alert  EYES: EOMI  ENT:  Normal Hearing, no tonsillar exudates   Neck: Soft and supple , no thyromegaly   Respiratory: Breath sounds are clear bilaterally, No wheezing, rales or rhonchi  Cardiovascular: S1 and S2, regular rate and rhythm, no Murmurs, gallops or rubs, no JVD,    Gastrointestinal: Bowel Sounds present, soft, nontender, nondistended, no guarding, no rebound  Extremities: No cyanosis or clubbing; warm to touch  Vascular: 2+ peripheral pulses lower ex  Neurological: No focal deficits, CN II-XII intact bilaterally, sensation to light touch intact in all extremities, gait intact. Pupils are equally reactive to light and symmetrical in size.   Musculoskeletal: 5/5 strength b/l upper and lower extremities; no joint swelling.  Skin: No rashes  Psych: no depression or anhedonia, AAOx3  HEME: no bruises, no nose bleeds      LABS:                        12.0   19.93 )-----------( 225      ( 17 Sep 2019 14:12 )             35.9         140  |  101  |  32<H>  ----------------------------<  239<H>  4.4   |  26  |  2.45<H>    Ca    10.2      17 Sep 2019 14:12    TPro  7.8  /  Alb  4.2  /  TBili  0.9  /  DBili  x   /  AST  65<H>  /  ALT  54<H>  /  AlkPhos  93            Urinalysis Basic - ( 17 Sep 2019 14:12 )    Color: LT. YELLOW / Appearance: CLEAR / S.014 / pH: 6.5  Gluc: 200 / Ketone: NEGATIVE  / Bili: NEGATIVE / Urobili: NORMAL   Blood: LARGE / Protein: 200 / Nitrite: NEGATIVE   Leuk Esterase: LARGE / RBC: 5-10 / WBC >50   Sq Epi: x / Non Sq Epi: OCC / Bacteria: MODERATE        RADIOLOGY & ADDITIONAL TESTS:    Imaging Personally Reviewed:    Consultant(s) Notes Reviewed:      Care Discussed with Consultants/Other Providers: Patient is a 79y old  Male who presents with a chief complaint of Fall, Sepsis (17 Sep 2019 18:51)      SUBJECTIVE / OVERNIGHT EVENTS: Patient admitted for fall. Patient evaluated at bedside. VIOLA. Patient states that he feels fine; denies any pain, N/V, SOB, fever, lightheadedness, dizziness. No other complaints or concerns demonstrated at time of interview.    CONSTITUTIONAL: No weakness, fevers or chills  EYES/ENT: No visual changes;  No vertigo or throat pain   NECK: No pain or stiffness  RESPIRATORY: No cough, wheezing, hemoptysis; No shortness of breath  CARDIOVASCULAR: No chest pain or palpitations  GASTROINTESTINAL: No abdominal or epigastric pain. No nausea, vomiting, or hematemesis; No diarrhea or constipation. No melena or hematochezia.  GENITOURINARY: No dysuria, frequency or hematuria  NEUROLOGICAL: No numbness or weakness  SKIN: No itching, burning, rashes, or lesions   All other review of systems is negative unless indicated above.    MEDICATIONS  (STANDING):  amiodarone    Tablet 200 milliGRAM(s) Oral daily  apixaban 2.5 milliGRAM(s) Oral every 12 hours  aspirin enteric coated 81 milliGRAM(s) Oral daily  atorvastatin 40 milliGRAM(s) Oral at bedtime  brimonidine 0.2% Ophthalmic Solution 1 Drop(s) Both EYES two times a day  cefTRIAXone   IVPB 1000 milliGRAM(s) IV Intermittent every 24 hours  dextrose 5%. 1000 milliLiter(s) (50 mL/Hr) IV Continuous <Continuous>  dextrose 50% Injectable 12.5 Gram(s) IV Push once  dextrose 50% Injectable 25 Gram(s) IV Push once  dextrose 50% Injectable 25 Gram(s) IV Push once  donepezil 5 milliGRAM(s) Oral at bedtime  influenza   Vaccine 0.5 milliLiter(s) IntraMuscular once  insulin lispro (HumaLOG) corrective regimen sliding scale   SubCutaneous three times a day before meals  insulin lispro (HumaLOG) corrective regimen sliding scale   SubCutaneous at bedtime  pantoprazole    Tablet 40 milliGRAM(s) Oral before breakfast  sodium chloride 0.9%. 1000 milliLiter(s) (75 mL/Hr) IV Continuous <Continuous>    MEDICATIONS  (PRN):  dextrose 40% Gel 15 Gram(s) Oral once PRN Blood Glucose LESS THAN 70 milliGRAM(s)/deciliter  glucagon  Injectable 1 milliGRAM(s) IntraMuscular once PRN Glucose LESS THAN 70 milligrams/deciliter      Vital Signs Last 24 Hrs  T(C): 37.1 (18 Sep 2019 06:03), Max: 38 (17 Sep 2019 15:35)  T(F): 98.7 (18 Sep 2019 06:03), Max: 100.4 (17 Sep 2019 15:35)  HR: 84 (18 Sep 2019 06:03) (79 - 95)  BP: 136/68 (18 Sep 2019 06:03) (128/63 - 174/63)  BP(mean): --  RR: 18 (18 Sep 2019 06:03) (16 - 18)  SpO2: 99% (18 Sep 2019 06:03) (97% - 99%)  CAPILLARY BLOOD GLUCOSE      POCT Blood Glucose.: 146 mg/dL (17 Sep 2019 22:29)    I&O's Summary    17 Sep 2019 07:01  -  18 Sep 2019 07:00  --------------------------------------------------------  IN: 0 mL / OUT: 300 mL / NET: -300 mL        PHYSICAL EXAM:  Vital Signs Last 24 Hrs  T(C): 37.1 (19 @ 06:03)  T(F): 98.7 (19 @ 06:03), Max: 100.4 (19 @ 15:35)  HR: 84 (19 @ 06:03) (79 - 95)  BP: 136/68 (19 @ 06:03)  BP(mean): --  RR: 18 (19 @ 06:03) (16 - 18)  SpO2: 99% (19 @ 06:03) (97% - 99%)  Wt(kg): --     @ 07:01  -   @ 07:00  --------------------------------------------------------  IN: 0 mL / OUT: 300 mL / NET: -300 mL      Constitutional: NAD, awake and alert  EYES: +PERRL, +EOMI, no scleral icterus  Respiratory: CTAB; no wheezing rales, rhochi  Cardiovascular: +S1/S2, RRR, no murmurs, rubs, gallops    Gastrointestinal: Soft, nontender, nondistended; +BS  Extremities: No LE edema  Neurological: No focal deficits, CN II-XII intact bilaterally, sensation to light touch intact in all extremities. A&Ox3   Musculoskeletal: 5/5 strength b/l upper and lower extremities; no joint swelling.  Skin: No rashes      LABS:                        12.0   19.93 )-----------( 225      ( 17 Sep 2019 14:12 )             35.9         140  |  101  |  32<H>  ----------------------------<  239<H>  4.4   |  26  |  2.45<H>    Ca    10.2      17 Sep 2019 14:12    TPro  7.8  /  Alb  4.2  /  TBili  0.9  /  DBili  x   /  AST  65<H>  /  ALT  54<H>  /  AlkPhos  93            Urinalysis Basic - ( 17 Sep 2019 14:12 )    Color: LT. YELLOW / Appearance: CLEAR / S.014 / pH: 6.5  Gluc: 200 / Ketone: NEGATIVE  / Bili: NEGATIVE / Urobili: NORMAL   Blood: LARGE / Protein: 200 / Nitrite: NEGATIVE   Leuk Esterase: LARGE / RBC: 5-10 / WBC >50   Sq Epi: x / Non Sq Epi: OCC / Bacteria: MODERATE        RADIOLOGY & ADDITIONAL TESTS:    Imaging Personally Reviewed:    Consultant(s) Notes Reviewed:      Care Discussed with Consultants/Other Providers:

## 2019-09-18 NOTE — PROGRESS NOTE ADULT - PROBLEM SELECTOR PLAN 8
- blood pressure currently stable  - holding antihypertensives in the setting of sepsis  - may resume home medications once patient improves clinically - Home regimen is metoprolol and amlodipine  - BP stable  - Holding antihy

## 2019-09-18 NOTE — PROGRESS NOTE ADULT - PROBLEM SELECTOR PLAN 4
Admitted with Scr 2.45. Scr was 1.43 in July 2019.  - likely prerenal azotemia 2/2 sepsis  - continue with IVFs and encourage po intake  - trend Scr daily  - avoid nephrotoxin and renally dose medications Admitted with Scr 2.45. Scr was 1.43 in July 2019, likely prerenal  - Fluid resuscitation and PO intake  - Trend creatinine  - Avoid nephrotoxins  - Renally dose medications

## 2019-09-18 NOTE — PHYSICAL THERAPY INITIAL EVALUATION ADULT - PERTINENT HX OF CURRENT PROBLEM, REHAB EVAL
This is a 79 year old male who presents after a fall in bathroom, admitted for sepsis 2/2 UTI. CT head with chronic changes, but no acute pathology.

## 2019-09-18 NOTE — PROGRESS NOTE ADULT - PROBLEM SELECTOR PLAN 5
CT head with chronic changes, but no acute pathology.  - PT evaluation ordered - CTH demonstrates no acute findings  - UA+, on Abx  - To undergo PT

## 2019-09-19 ENCOUNTER — TRANSCRIPTION ENCOUNTER (OUTPATIENT)
Age: 79
End: 2019-09-19

## 2019-09-19 VITALS
RESPIRATION RATE: 18 BRPM | HEART RATE: 73 BPM | OXYGEN SATURATION: 100 % | TEMPERATURE: 97 F | SYSTOLIC BLOOD PRESSURE: 132 MMHG | DIASTOLIC BLOOD PRESSURE: 65 MMHG

## 2019-09-19 LAB
-  AMIKACIN: SIGNIFICANT CHANGE UP
-  AMPICILLIN/SULBACTAM: SIGNIFICANT CHANGE UP
-  CEFEPIME: SIGNIFICANT CHANGE UP
-  CEFTAZIDIME: SIGNIFICANT CHANGE UP
-  CIPROFLOXACIN: SIGNIFICANT CHANGE UP
-  GENTAMICIN: SIGNIFICANT CHANGE UP
-  LEVOFLOXACIN: SIGNIFICANT CHANGE UP
-  MEROPENEM: SIGNIFICANT CHANGE UP
-  TOBRAMYCIN: SIGNIFICANT CHANGE UP
ALBUMIN SERPL ELPH-MCNC: 3.6 G/DL — SIGNIFICANT CHANGE UP (ref 3.3–5)
ALP SERPL-CCNC: 82 U/L — SIGNIFICANT CHANGE UP (ref 40–120)
ALT FLD-CCNC: 42 U/L — HIGH (ref 4–41)
ANION GAP SERPL CALC-SCNC: 12 MMO/L — SIGNIFICANT CHANGE UP (ref 7–14)
AST SERPL-CCNC: 40 U/L — SIGNIFICANT CHANGE UP (ref 4–40)
BACTERIA UR CULT: SIGNIFICANT CHANGE UP
BILIRUB SERPL-MCNC: 0.5 MG/DL — SIGNIFICANT CHANGE UP (ref 0.2–1.2)
BUN SERPL-MCNC: 21 MG/DL — SIGNIFICANT CHANGE UP (ref 7–23)
CALCIUM SERPL-MCNC: 9.1 MG/DL — SIGNIFICANT CHANGE UP (ref 8.4–10.5)
CHLORIDE SERPL-SCNC: 104 MMOL/L — SIGNIFICANT CHANGE UP (ref 98–107)
CO2 SERPL-SCNC: 22 MMOL/L — SIGNIFICANT CHANGE UP (ref 22–31)
CREAT SERPL-MCNC: 1.63 MG/DL — HIGH (ref 0.5–1.3)
GLUCOSE BLDC GLUCOMTR-MCNC: 126 MG/DL — HIGH (ref 70–99)
GLUCOSE BLDC GLUCOMTR-MCNC: 158 MG/DL — HIGH (ref 70–99)
GLUCOSE SERPL-MCNC: 130 MG/DL — HIGH (ref 70–99)
HAV IGM SER-ACNC: NONREACTIVE — SIGNIFICANT CHANGE UP
HBV CORE IGM SER-ACNC: NONREACTIVE — SIGNIFICANT CHANGE UP
HBV SURFACE AG SER-ACNC: NONREACTIVE — SIGNIFICANT CHANGE UP
HCT VFR BLD CALC: 38.8 % — LOW (ref 39–50)
HCV AB S/CO SERPL IA: 0.08 S/CO — SIGNIFICANT CHANGE UP (ref 0–0.99)
HCV AB SERPL-IMP: SIGNIFICANT CHANGE UP
HGB BLD-MCNC: 12.2 G/DL — LOW (ref 13–17)
MAGNESIUM SERPL-MCNC: 1.7 MG/DL — SIGNIFICANT CHANGE UP (ref 1.6–2.6)
MCHC RBC-ENTMCNC: 28.5 PG — SIGNIFICANT CHANGE UP (ref 27–34)
MCHC RBC-ENTMCNC: 31.4 % — LOW (ref 32–36)
MCV RBC AUTO: 90.7 FL — SIGNIFICANT CHANGE UP (ref 80–100)
METHOD TYPE: SIGNIFICANT CHANGE UP
NRBC # FLD: 0 K/UL — SIGNIFICANT CHANGE UP (ref 0–0)
ORGANISM # SPEC MICROSCOPIC CNT: SIGNIFICANT CHANGE UP
ORGANISM # SPEC MICROSCOPIC CNT: SIGNIFICANT CHANGE UP
PHOSPHATE SERPL-MCNC: 3 MG/DL — SIGNIFICANT CHANGE UP (ref 2.5–4.5)
PLATELET # BLD AUTO: 241 K/UL — SIGNIFICANT CHANGE UP (ref 150–400)
PMV BLD: 11.8 FL — SIGNIFICANT CHANGE UP (ref 7–13)
POTASSIUM SERPL-MCNC: 3.6 MMOL/L — SIGNIFICANT CHANGE UP (ref 3.5–5.3)
POTASSIUM SERPL-SCNC: 3.6 MMOL/L — SIGNIFICANT CHANGE UP (ref 3.5–5.3)
PROT SERPL-MCNC: 7.4 G/DL — SIGNIFICANT CHANGE UP (ref 6–8.3)
RBC # BLD: 4.28 M/UL — SIGNIFICANT CHANGE UP (ref 4.2–5.8)
RBC # FLD: 12.9 % — SIGNIFICANT CHANGE UP (ref 10.3–14.5)
SODIUM SERPL-SCNC: 138 MMOL/L — SIGNIFICANT CHANGE UP (ref 135–145)
WBC # BLD: 10.32 K/UL — SIGNIFICANT CHANGE UP (ref 3.8–10.5)
WBC # FLD AUTO: 10.32 K/UL — SIGNIFICANT CHANGE UP (ref 3.8–10.5)

## 2019-09-19 PROCEDURE — 99239 HOSP IP/OBS DSCHRG MGMT >30: CPT | Mod: GC

## 2019-09-19 RX ORDER — CEFPODOXIME PROXETIL 100 MG
1 TABLET ORAL
Qty: 14 | Refills: 0
Start: 2019-09-19 | End: 2019-09-25

## 2019-09-19 RX ORDER — CEFPODOXIME PROXETIL 100 MG
1 TABLET ORAL
Qty: 10 | Refills: 0
Start: 2019-09-19 | End: 2019-09-23

## 2019-09-19 RX ADMIN — CEFTRIAXONE 100 MILLIGRAM(S): 500 INJECTION, POWDER, FOR SOLUTION INTRAMUSCULAR; INTRAVENOUS at 18:08

## 2019-09-19 RX ADMIN — Medication 1: at 13:11

## 2019-09-19 RX ADMIN — BRIMONIDINE TARTRATE 1 DROP(S): 2 SOLUTION/ DROPS OPHTHALMIC at 06:09

## 2019-09-19 RX ADMIN — BRIMONIDINE TARTRATE 1 DROP(S): 2 SOLUTION/ DROPS OPHTHALMIC at 18:08

## 2019-09-19 RX ADMIN — Medication 81 MILLIGRAM(S): at 12:05

## 2019-09-19 RX ADMIN — APIXABAN 2.5 MILLIGRAM(S): 2.5 TABLET, FILM COATED ORAL at 06:09

## 2019-09-19 RX ADMIN — APIXABAN 2.5 MILLIGRAM(S): 2.5 TABLET, FILM COATED ORAL at 18:08

## 2019-09-19 RX ADMIN — PANTOPRAZOLE SODIUM 40 MILLIGRAM(S): 20 TABLET, DELAYED RELEASE ORAL at 06:09

## 2019-09-19 RX ADMIN — AMIODARONE HYDROCHLORIDE 200 MILLIGRAM(S): 400 TABLET ORAL at 06:09

## 2019-09-19 NOTE — PROGRESS NOTE ADULT - PROBLEM SELECTOR PLAN 2
- UA+ for LE, bacteria, WBCs; Ucx + for acinetobacter baumanii  - C/w IV ceftriaxone  - Monitor CBC, vital signs - UA+ for LE, bacteria, WBCs; Ucx + for acinetobacter baumanii  - Ceftriaxone to be transitioned to cefpodoxime, will undergo 7 day course as outpatient

## 2019-09-19 NOTE — DISCHARGE NOTE PROVIDER - NSDCCPCAREPLAN_GEN_ALL_CORE_FT
PRINCIPAL DISCHARGE DIAGNOSIS  Diagnosis: Sepsis  Assessment and Plan of Treatment: You were admitted to the hospital because of a fall. When you were evaluated at the hospital, you were noted to have an increased WBC count (indicating infection), a fever, and a urinary tract infection. Your urine was cultured and found to be growing bacteria. You were given intravenous fluids and were treated with antibiotics until your condition improved. You no longer had a fever and your white blood cell count went down to normal limits. You are being discharged with an oral course of antibiotics to cure the urinary tract infection.      SECONDARY DISCHARGE DIAGNOSES  Diagnosis: Acute cystitis without hematuria  Assessment and Plan of Treatment: You were admitted because of a fall. On assessment, your urine was tested and you were found to have an infection. Your urine was cultured and found to be growing bacteria. You were given antibiotics and your condition improved. You are being discharged with an oral regimen of antibiotics to finish fighting the infection.    Diagnosis: Fall, initial encounter  Assessment and Plan of Treatment: You presented to the hospital because of a fall. You were assessed to have a urinary tract infection and treated with antibiotics. You were given a physical therapy consult and were recommended to go to a rehabilitatioan center. However, given your preference to go home, you are to be discharged to your residence with home physical therapy services. You were made aware of the risks of home physical therapy, including the increased risk of fall, bleeding and death. You and your family expressed understanding in this regard.    Diagnosis: Transaminitis  Assessment and Plan of Treatment: On initial assessment for your fall, you were noted to have elevated liver enzymes (which indicates liver injury). Your liver enzymes were trended and they were noted to come back down to within normal limits. It is likely that the liver injury is related to your initial infection and resolved once the infection resolved.

## 2019-09-19 NOTE — PROGRESS NOTE ADULT - PROBLEM SELECTOR PLAN 9
Patient is on metformin and januvia at home.  - HbA1C was 8.2% in July 2019  - monitor FSG  - ISS - DVT: Low improve score, SCDs  - Diet: DASH/TLC consistent carbohydrates  - Dispo: Home PT as per patient request

## 2019-09-19 NOTE — CHART NOTE - NSCHARTNOTEFT_GEN_A_CORE
Discussed with patient and daughter (present over the phone) regarding Apixaban. Patient has hx of afib/atrial flutter s/p CABG. She reports at last appointment with CTS patient was taken off of Apixaban. He understands he was on the medication to prevent thrombus, strokes, and clots. The daughter stated it was one incident of atrial fibrillation after the procedure and he has not has it since. Patient is no longer taking the medication at home. Patient understands he will need to follow up with his CTS upon discharge for further recommendations.     Rosalind Ott, PGY3.

## 2019-09-19 NOTE — PROGRESS NOTE ADULT - SUBJECTIVE AND OBJECTIVE BOX
Patient is a 79y old  Male who presents with a chief complaint of Fall, Sepsis (18 Sep 2019 08:14)      SUBJECTIVE / OVERNIGHT EVENTS:    MEDICATIONS  (STANDING):  amiodarone    Tablet 200 milliGRAM(s) Oral daily  apixaban 2.5 milliGRAM(s) Oral every 12 hours  aspirin enteric coated 81 milliGRAM(s) Oral daily  atorvastatin 40 milliGRAM(s) Oral at bedtime  brimonidine 0.2% Ophthalmic Solution 1 Drop(s) Both EYES two times a day  cefTRIAXone   IVPB 1000 milliGRAM(s) IV Intermittent every 24 hours  dextrose 5%. 1000 milliLiter(s) (50 mL/Hr) IV Continuous <Continuous>  dextrose 50% Injectable 12.5 Gram(s) IV Push once  dextrose 50% Injectable 25 Gram(s) IV Push once  dextrose 50% Injectable 25 Gram(s) IV Push once  donepezil 5 milliGRAM(s) Oral at bedtime  influenza   Vaccine 0.5 milliLiter(s) IntraMuscular once  insulin lispro (HumaLOG) corrective regimen sliding scale   SubCutaneous three times a day before meals  insulin lispro (HumaLOG) corrective regimen sliding scale   SubCutaneous at bedtime  pantoprazole    Tablet 40 milliGRAM(s) Oral before breakfast  sodium chloride 0.9%. 1000 milliLiter(s) (75 mL/Hr) IV Continuous <Continuous>    MEDICATIONS  (PRN):  dextrose 40% Gel 15 Gram(s) Oral once PRN Blood Glucose LESS THAN 70 milliGRAM(s)/deciliter  glucagon  Injectable 1 milliGRAM(s) IntraMuscular once PRN Glucose LESS THAN 70 milligrams/deciliter      Vital Signs Last 24 Hrs  T(C): 36.6 (19 Sep 2019 06:05), Max: 36.9 (18 Sep 2019 13:59)  T(F): 97.9 (19 Sep 2019 06:05), Max: 98.5 (18 Sep 2019 13:59)  HR: 77 (19 Sep 2019 06:05) (77 - 94)  BP: 130/68 (19 Sep 2019 06:05) (130/68 - 139/70)  BP(mean): --  RR: 18 (19 Sep 2019 06:05) (16 - 18)  SpO2: 100% (19 Sep 2019 06:05) (98% - 100%)  CAPILLARY BLOOD GLUCOSE      POCT Blood Glucose.: 177 mg/dL (18 Sep 2019 22:24)  POCT Blood Glucose.: 133 mg/dL (18 Sep 2019 17:55)  POCT Blood Glucose.: 125 mg/dL (18 Sep 2019 13:23)  POCT Blood Glucose.: 100 mg/dL (18 Sep 2019 09:06)    I&O's Summary    18 Sep 2019 07:01  -  19 Sep 2019 07:00  --------------------------------------------------------  IN: 0 mL / OUT: 850 mL / NET: -850 mL        PHYSICAL EXAM:  Vital Signs Last 24 Hrs  T(C): 36.6 (19 @ 06:05)  T(F): 97.9 (19 @ 06:05), Max: 98.5 (19 @ 13:59)  HR: 77 (19 @ 06:05) (77 - 94)  BP: 130/68 (19 @ 06:05)  BP(mean): --  RR: 18 (19 @ 06:05) (16 - 18)  SpO2: 100% (19 @ 06:05) (98% - 100%)  Wt(kg): --     @ 07:01  -   @ 07:00  --------------------------------------------------------  IN: 0 mL / OUT: 850 mL / NET: -850 mL      Constitutional: NAD, awake and alert  EYES: EOMI  ENT:  Normal Hearing, no tonsillar exudates   Neck: Soft and supple , no thyromegaly   Respiratory: Breath sounds are clear bilaterally, No wheezing, rales or rhonchi  Cardiovascular: S1 and S2, regular rate and rhythm, no Murmurs, gallops or rubs, no JVD,    Gastrointestinal: Bowel Sounds present, soft, nontender, nondistended, no guarding, no rebound  Extremities: No cyanosis or clubbing; warm to touch  Vascular: 2+ peripheral pulses lower ex  Neurological: No focal deficits, CN II-XII intact bilaterally, sensation to light touch intact in all extremities, gait intact. Pupils are equally reactive to light and symmetrical in size.   Musculoskeletal: 5/5 strength b/l upper and lower extremities; no joint swelling.  Skin: No rashes  Psych: no depression or anhedonia, AAOx3  HEME: no bruises, no nose bleeds      LABS:                        12.7   13.02 )-----------( 235      ( 18 Sep 2019 18:31 )             38.7         141  |  105  |  24<H>  ----------------------------<  144<H>  4.2   |  21<L>  |  1.83<H>    Ca    9.4      18 Sep 2019 18:31  Phos  2.8       Mg     1.7         TPro  7.8  /  Alb  3.8  /  TBili  0.5  /  DBili  x   /  AST  47<H>  /  ALT  47<H>  /  AlkPhos  89            Urinalysis Basic - ( 17 Sep 2019 14:12 )    Color: LT. YELLOW / Appearance: CLEAR / S.014 / pH: 6.5  Gluc: 200 / Ketone: NEGATIVE  / Bili: NEGATIVE / Urobili: NORMAL   Blood: LARGE / Protein: 200 / Nitrite: NEGATIVE   Leuk Esterase: LARGE / RBC: 5-10 / WBC >50   Sq Epi: x / Non Sq Epi: OCC / Bacteria: MODERATE        RADIOLOGY & ADDITIONAL TESTS:    Imaging Personally Reviewed:    Consultant(s) Notes Reviewed:      Care Discussed with Consultants/Other Providers: Patient is a 79y old  Male who presents with a chief complaint of Fall, Sepsis (18 Sep 2019 08:14)      SUBJECTIVE / OVERNIGHT EVENTS: Patient evaluated at bedside. VIOLA. Patient states that he feels terrific and wants to go home. He denies fever, N/V, SOB, cough, lightheadedness, dizziness. No other complaints or concerns were introduced at time of interview.    CONSTITUTIONAL: No weakness, fevers or chills  EYES/ENT: No visual changes;  No vertigo or throat pain   NECK: No pain or stiffness  RESPIRATORY: No cough, wheezing, hemoptysis; No shortness of breath  CARDIOVASCULAR: No chest pain or palpitations  GASTROINTESTINAL: No abdominal or epigastric pain. No nausea, vomiting, or hematemesis; No diarrhea or constipation. No melena or hematochezia.  GENITOURINARY: No dysuria, frequency or hematuria  NEUROLOGICAL: No numbness or weakness  SKIN: No itching, burning, rashes, or lesions   All other review of systems is negative unless indicated above.    MEDICATIONS  (STANDING):  amiodarone    Tablet 200 milliGRAM(s) Oral daily  apixaban 2.5 milliGRAM(s) Oral every 12 hours  aspirin enteric coated 81 milliGRAM(s) Oral daily  atorvastatin 40 milliGRAM(s) Oral at bedtime  brimonidine 0.2% Ophthalmic Solution 1 Drop(s) Both EYES two times a day  cefTRIAXone   IVPB 1000 milliGRAM(s) IV Intermittent every 24 hours  dextrose 5%. 1000 milliLiter(s) (50 mL/Hr) IV Continuous <Continuous>  dextrose 50% Injectable 12.5 Gram(s) IV Push once  dextrose 50% Injectable 25 Gram(s) IV Push once  dextrose 50% Injectable 25 Gram(s) IV Push once  donepezil 5 milliGRAM(s) Oral at bedtime  influenza   Vaccine 0.5 milliLiter(s) IntraMuscular once  insulin lispro (HumaLOG) corrective regimen sliding scale   SubCutaneous three times a day before meals  insulin lispro (HumaLOG) corrective regimen sliding scale   SubCutaneous at bedtime  pantoprazole    Tablet 40 milliGRAM(s) Oral before breakfast  sodium chloride 0.9%. 1000 milliLiter(s) (75 mL/Hr) IV Continuous <Continuous>    MEDICATIONS  (PRN):  dextrose 40% Gel 15 Gram(s) Oral once PRN Blood Glucose LESS THAN 70 milliGRAM(s)/deciliter  glucagon  Injectable 1 milliGRAM(s) IntraMuscular once PRN Glucose LESS THAN 70 milligrams/deciliter      Vital Signs Last 24 Hrs  T(C): 36.6 (19 Sep 2019 06:05), Max: 36.9 (18 Sep 2019 13:59)  T(F): 97.9 (19 Sep 2019 06:05), Max: 98.5 (18 Sep 2019 13:59)  HR: 77 (19 Sep 2019 06:05) (77 - 94)  BP: 130/68 (19 Sep 2019 06:05) (130/68 - 139/70)  BP(mean): --  RR: 18 (19 Sep 2019 06:05) (16 - 18)  SpO2: 100% (19 Sep 2019 06:05) (98% - 100%)  CAPILLARY BLOOD GLUCOSE      POCT Blood Glucose.: 177 mg/dL (18 Sep 2019 22:24)  POCT Blood Glucose.: 133 mg/dL (18 Sep 2019 17:55)  POCT Blood Glucose.: 125 mg/dL (18 Sep 2019 13:23)  POCT Blood Glucose.: 100 mg/dL (18 Sep 2019 09:06)    I&O's Summary    18 Sep 2019 07:01  -  19 Sep 2019 07:00  --------------------------------------------------------  IN: 0 mL / OUT: 850 mL / NET: -850 mL        PHYSICAL EXAM:  Vital Signs Last 24 Hrs  T(C): 36.6 (19 @ 06:05)  T(F): 97.9 (19 @ 06:05), Max: 98.5 (19 @ 13:59)  HR: 77 (19 @ 06:05) (77 - 94)  BP: 130/68 (19 @ 06:05)  BP(mean): --  RR: 18 (19 @ 06:05) (16 - 18)  SpO2: 100% (19 @ 06:05) (98% - 100%)  Wt(kg): --     @ 07:01  -  09-19 @ 07:00  --------------------------------------------------------  IN: 0 mL / OUT: 850 mL / NET: -850 mL      Constitutional: NAD, awake and alert  EYES: +PERRL, +EOMI, no scleral icterus  Respiratory: CTAB; no wheezing rales, rhochi  Cardiovascular: +S1/S2, RRR, no murmurs, rubs, gallops    Gastrointestinal: Soft, nontender, nondistended; +BS  Extremities: No LE edema  Neurological: No focal deficits, CN II-XII intact bilaterally, sensation to light touch intact in all extremities. A&Ox3   Musculoskeletal: 5/5 strength b/l upper and lower extremities; no joint swelling.  Skin: No rashes      LABS:                        12.7   13.02 )-----------( 235      ( 18 Sep 2019 18:31 )             38.7         141  |  105  |  24<H>  ----------------------------<  144<H>  4.2   |  21<L>  |  1.83<H>    Ca    9.4      18 Sep 2019 18:31  Phos  2.8       Mg     1.7         TPro  7.8  /  Alb  3.8  /  TBili  0.5  /  DBili  x   /  AST  47<H>  /  ALT  47<H>  /  AlkPhos  89            Urinalysis Basic - ( 17 Sep 2019 14:12 )    Color: LT. YELLOW / Appearance: CLEAR / S.014 / pH: 6.5  Gluc: 200 / Ketone: NEGATIVE  / Bili: NEGATIVE / Urobili: NORMAL   Blood: LARGE / Protein: 200 / Nitrite: NEGATIVE   Leuk Esterase: LARGE / RBC: 5-10 / WBC >50   Sq Epi: x / Non Sq Epi: OCC / Bacteria: MODERATE        RADIOLOGY & ADDITIONAL TESTS:    Imaging Personally Reviewed:    Consultant(s) Notes Reviewed:      Care Discussed with Consultants/Other Providers:

## 2019-09-19 NOTE — DISCHARGE NOTE NURSING/CASE MANAGEMENT/SOCIAL WORK - NSDCPNINST_GEN_ALL_CORE
This Patient is stable for discharge, alert and oriented x 4, partial assistance for daily care is provided; vital signs are stable  and he is being treated for the UTI with Ceftriaxonediet is tolerated well and all Instructions for discharge are discussed and provided.

## 2019-09-19 NOTE — PROGRESS NOTE ADULT - PROBLEM SELECTOR PLAN 5
- CTH demonstrates no acute findings  - UA+, on Abx  - To undergo PT - CTH demonstrates no acute findings  - UA+, to be transitioned from ceftriaxone to cefpodoxime  - PT recommends disposition to rehab, but patient wants to go home, will go home with home PT services

## 2019-09-19 NOTE — PROGRESS NOTE ADULT - PROBLEM SELECTOR PLAN 1
- On admission, patient meets sepsis criteria (febrile to 100.4F, WBC 19.93 w/neutrophil predominance and 2.6% bands; elevated lactate  - RVP, Bcx negative  - UA+, Ucx growing acinetobacter baumanii  - C/w IV ceftriaxone  - C/w IV fluids  - Trend CBC, vital signs - Septic on presentation (febrile to 100.4F, WBC 19.93), w/neutrophil predominance and 2.6% bands; elevated lactate  - RVP, Bcx negative  - UA+, Ucx growing acinetobacter baumanii  - Ceftriaxone to be transitioned to cefpodoxime, will undergo 7 day course as outpatient

## 2019-09-19 NOTE — DISCHARGE NOTE PROVIDER - PROVIDER TOKENS
FREE:[LAST:[Petey],FIRST:[Mable],PHONE:[phone]],FAX:[(   )    -],ADDRESS:[[address] [place] [address]],FOLLOWUP:[1 week]]

## 2019-09-19 NOTE — PROGRESS NOTE ADULT - PROBLEM SELECTOR PLAN 8
- Home regimen is metoprolol and amlodipine  - BP stable  - Holding antihy Patient is on metformin and Januvia at home.  - HbA1C was 8.2% in July 2019  - -177

## 2019-09-19 NOTE — DISCHARGE NOTE PROVIDER - CARE PROVIDER_API CALL
Mable Angelo  [address] [place], [place] [id number]  Phone: [phone]  Fax: (   )    -  Follow Up Time: 1 week

## 2019-09-19 NOTE — DISCHARGE NOTE PROVIDER - HOSPITAL COURSE
History of Present Illness    79 year old male with history of CAD s/p CABG (in August 2019 with post-op pAfib/AFlutter now on amiodarone and eliquis), CKD stage III, HTN, HLD, T2DM, s/p loop recorder placement in 2015  who presents after a fall in bathroom. Today, the patient was getting out of the shower and fell. He does not remember how he fell. He is uncertain if he hit his head, but denies losing consciousness. He denies dizziness, lightheadedness or a history of unsteady gait. He does use a walker at baseline. His daughter insisted that he go to the ED. He denies fever, headache, vision changes, chest pain, shortness of breath, nausea, vomiting, dysuria, urinary frequency, urinary urgency        Emergency Department    In the ED, vital signs were Tmax 100.4F, -174/63-71 mm Hg, HR 79-95 bpm, RR 16-18, 97-99% RA. He received 1g ceftriaxone x1, 1L NS bolus x1, and acetaminophen 975 mg x1.        Hospital Course    Initial labs were significant for leukocytosis (WBC 19.93), TAYLOR on CKD (Cr 2.45, baseline 1.43), transaminitis AST 65, ALT 54), and elevated lactate. UA + for large blood, large LE, moderate bacteria, and WBCs>50. Blood and urine cultures sent, patient started on ceftriaxone. Blood cultures were negative but urine cultures grew Acinetobacter baumanii. Patient remained on ceftriaxone until patient was clinically stable. Patient was transitioned to cefpodoxime to be taken 7 days post-discharge. History of Present Illness    79 year old male with history of CAD s/p CABG (in August 2019 with post-op pAfib/AFlutter now on amiodarone and eliquis), CKD stage III, HTN, HLD, T2DM, s/p loop recorder placement in 2015  who presents after a fall in bathroom. Today, the patient was getting out of the shower and fell. He does not remember how he fell. He is uncertain if he hit his head, but denies losing consciousness. He denies dizziness, lightheadedness or a history of unsteady gait. He does use a walker at baseline. His daughter insisted that he go to the ED. He denies fever, headache, vision changes, chest pain, shortness of breath, nausea, vomiting, dysuria, urinary frequency, urinary urgency        Emergency Department    In the ED, vital signs were Tmax 100.4F, -174/63-71 mm Hg, HR 79-95 bpm, RR 16-18, 97-99% RA. He received 1g ceftriaxone x1, 1L NS bolus x1, and acetaminophen 975 mg x1.        Hospital Course    Initial labs were significant for leukocytosis (WBC 19.93), TAYLOR on CKD (Cr 2.45, baseline 1.43), transaminitis AST 65, ALT 54), and elevated lactate. UA + for large blood, large LE, moderate bacteria, and WBCs>50. Blood and urine cultures sent; ceftriaxone was continued. Blood cultures were negative but urine cultures grew Acinetobacter baumanii. Patient remained on ceftriaxone until patient was clinically stable (resolution of leukocytosis, normalized lactate, absence of fever). The patient's liver enzymes and creatinine were also noted to trend downward towards baseline. Patient was eventually transitioned to cefpodoxime to be taken 7 days post-discharge.        On the day of discharge, the patient was evaluated and determined to be clinically stable. He was assessed to be clinically stable for discharge. History of Present Illness    79 year old male with history of CAD s/p CABG (in August 2019 with post-op pAfib/AFlutter now on amiodarone and eliquis), CKD stage III, HTN, HLD, T2DM, s/p loop recorder placement in 2015  who presents after a fall in bathroom. Today, the patient was getting out of the shower and fell. He does not remember how he fell. He is uncertain if he hit his head, but denies losing consciousness. He denies dizziness, lightheadedness or a history of unsteady gait. He does use a walker at baseline. His daughter insisted that he go to the ED. He denies fever, headache, vision changes, chest pain, shortness of breath, nausea, vomiting, dysuria, urinary frequency, urinary urgency        Emergency Department    In the ED, vital signs were Tmax 100.4F, -174/63-71 mm Hg, HR 79-95 bpm, RR 16-18, 97-99% RA. He received 1g ceftriaxone x1, 1L NS bolus x1, and acetaminophen 975 mg x1.        Hospital Course    Initial labs were significant for leukocytosis (WBC 19.93), TAYLOR on CKD (Cr 2.45, baseline 1.43), transaminitis AST 65, ALT 54), and elevated lactate. UA + for large blood, large LE, moderate bacteria, and WBCs>50. Blood and urine cultures sent; ceftriaxone was continued. Blood cultures were negative but urine cultures grew Acinetobacter baumanii. Patient remained on ceftriaxone until patient was clinically stable (resolution of leukocytosis, normalized lactate, absence of fever). The patient's liver enzymes and creatinine were also noted to trend downward towards baseline. Patient was eventually transitioned to cefpodoxime to be taken 5 days post-discharge for a total of 7 days of therapy. Patient was evaluated by physical therapy and was recommended to go to a rehabilitation facility; however, the patient was insistent on going home and will be discharged to his home with physical therapy services. He and the family were informed of the risks of home physical therapy, specifically increased risk of fall, bleeding, and death. The family expressed understanding in this regard.        On the day of discharge, the patient was evaluated and determined to be clinically stable. He was assessed to be clinically stable for discharge.

## 2019-09-19 NOTE — PROGRESS NOTE ADULT - PROBLEM SELECTOR PLAN 10
- DVT: Low improve score, SCDs  - Diet: DASH/TLC consistent carbohydrates  - Dispo: PT evaluation
- DVT: Low improve score, SCDs  - Diet: DASH/TLC consistent carbohydrates  - Dispo: PT evaluation

## 2019-09-19 NOTE — PROGRESS NOTE ADULT - ATTENDING COMMENTS
Patient seen and examined by myself , case discussed  with resident ,agree with the above finding and plan  79yr old male  with PMHx of  CAD CKD presented after fall at home in setting of sepsis secondary to UTI. Etiology of fall is likely mechanical in setting of infection. Clinically asymptomatic for UTI, but with +UA, fever and leukocytosis, will treat empirically.  - f/u cultures, and adjust abx based on C&S.  -TAYLOR on CKD, no signs of obstruction, likely pre-renal secondary to infection. s/p  IVF overnight, f/u repeat Cr.  - Afib s/p CABG requiring AC and Amiodarone.  c/w  home med   -PT eval noted , recommend Rehab, but pt wants to go home with home PT
Patient seen and examined by myself , case discussed  with resident ,agree with the above finding and plan  79yr old male  with PMHx of  CAD CKD presented after fall at home in setting of sepsis secondary to UTI. Etiology of fall is likely mechanical in setting of infection. Clinically asymptomatic for UTI, but with +UA, fever and leukocytosis, started on  empiric treatment , clinically improved   - Ucx  cultures noted, Pansensitive Acinetobacter Baumannii  , will transition to PO Abx   -TAYLOR on CKD, no signs of obstruction, likely pre-renal secondary to infection. s/p  IVF , Cr tending down, closer to baseline , Pt advised to avoid nephrotoxics like NSAIDs and f/u with PMD in 1  week for repeat BMP   - Afib s/p CABG requiring AC and Amiodarone.  c/w  home med , case was d/w dtr ,Eliquis was stopped by CT Sx as Afib was likely Post op   -PT eval noted , recommend Rehab, but pt wants to go home with home PT  - Patient hemodynamically stable for discharge home  - Time spent in discharge process is 50 min

## 2019-09-19 NOTE — DISCHARGE NOTE NURSING/CASE MANAGEMENT/SOCIAL WORK - PATIENT PORTAL LINK FT
You can access the FollowMyHealth Patient Portal offered by [facility] by registering at the following website: [email or web address]. By joining [facility]’s FollowMyHealth portal, you will also be able to view your health information using other applications (apps) compatible with our system.

## 2019-09-19 NOTE — PROGRESS NOTE ADULT - ASSESSMENT
79 year old male with history of CAD s/p CABG, CKD stage III, HTN, HLD, and T2DM who presents after a fall in bathroom, found with positive urinalysis, admitted for sepsis 2/2 UTI. 79 year old male with history of CAD s/p CABG, CKD stage III, HTN, HLD, and T2DM who presents after a fall in bathroom, found with positive urinalysis, admitted for sepsis 2/2 UTI. Patient is hemodynamically stable with no urinary complaints. Tentative discharge today on oral antibiotics.

## 2019-09-19 NOTE — PROGRESS NOTE ADULT - PROBLEM SELECTOR PLAN 4
Admitted with Scr 2.45. Scr was 1.43 in July 2019, likely prerenal  - Fluid resuscitation and PO intake  - Trend creatinine  - Avoid nephrotoxins  - Renally dose medications Admitted with Scr 2.45. Scr was 1.43 in July 2019, likely prerenal  - Currently 1.63  - Patient advised to have his creatinine monitored by his outpatient doctor

## 2019-09-19 NOTE — DISCHARGE NOTE PROVIDER - NSDCFUADDAPPT_GEN_ALL_CORE_FT
Please follow-up with your primary care physician, Dr. Mable Angelo, within 1 week. Please call [phone] to set up an appointment.

## 2019-09-19 NOTE — PROGRESS NOTE ADULT - PROBLEM SELECTOR PROBLEM 3
Transaminitis
We hope that we have addressed all of your medical concerns. The examination and treatment you received in the Emergency Department were for an emergent problem and were not intended as complete care. It is important that you follow up with your healthcare provider(s) for ongoing care. If your symptoms worsen or do not improve as expected, and you are unable to reach your usual health care provider(s), you should return to the Emergency Department. Today's healthcare is undergoing tremendous change, and patient satisfaction surveys are one of the many tools to assess the quality of medical care. You may receive a survey from the [employer] regarding your experience in the Emergency Department. I hope that your experience has been completely positive, particularly the medical care that I provided. As such, please participate in the survey; anything less than excellent does not meet my expectations or intentions. [facility] and [address] [place] [address] participate in nationally recognized quality of care measures. If your blood pressure is greater than 120/80, as reported below, we urge that you seek medical care to address the potential of high blood pressure, commonly known as hypertension. Hypertension can be hereditary or can be caused by certain medical conditions, pain, stress, or \"white coat syndrome. \"       Please make an appointment with your health care provider(s) for follow up of your Emergency Department visit. VITALS:   Patient Vitals for the past 8 hrs:   Temp Pulse Resp BP SpO2   01/04/19 1437 98.4 °F (36.9 °C) 91 15 (!) 158/94 99 %   01/04/19 1420 -- 95 -- -- 100 %          Thank you for allowing us to provide you with medical care today. We realize that you have many choices for your emergency care needs. Please choose us in the future for any continued health care needs. Minus Paulie Dowd
RAYA [facility]bar[facility]: [phone]            Recent Results (from the past 24 hour(s))   URINALYSIS W/MICROSCOPIC    Collection Time: 01/04/19  2:22 PM   Result Value Ref Range    Color YELLOW/STRAW      Appearance CLEAR CLEAR      Specific gravity 1.010 1.003 - 1.030      pH (UA) 6.5 5.0 - 8.0      Protein NEGATIVE  NEG mg/dL    Glucose NEGATIVE  NEG mg/dL    Ketone NEGATIVE  NEG mg/dL    Bilirubin NEGATIVE  NEG      Blood MODERATE (A) NEG      Urobilinogen 0.2 0.2 - 1.0 EU/dL    Nitrites NEGATIVE  NEG      Leukocyte Esterase NEGATIVE  NEG      WBC 0-4 0 - 4 /hpf    RBC 5-10 0 - 5 /hpf    Epithelial cells FEW FEW /lpf    Bacteria NEGATIVE  NEG /hpf   CBC WITH AUTOMATED DIFF    Collection Time: 01/04/19  2:49 PM   Result Value Ref Range    WBC 8.8 4.1 - 11.1 K/uL    RBC 5.00 4. 10 - 5.70 M/uL    HGB 14.1 12.1 - 17.0 g/dL    HCT 43.4 36.6 - 50.3 %    MCV 86.8 80.0 - 99.0 FL    MCH 28.2 26.0 - 34.0 PG    MCHC 32.5 30.0 - 36.5 g/dL    RDW 13.1 11.5 - 14.5 %    PLATELET 476 859 - 963 K/uL    MPV 12.1 8.9 - 12.9 FL    NRBC 0.0 0  WBC    ABSOLUTE NRBC 0.00 0.00 - 0.01 K/uL    NEUTROPHILS 68 32 - 75 %    LYMPHOCYTES 22 12 - 49 %    MONOCYTES 8 5 - 13 %    EOSINOPHILS 2 0 - 7 %    BASOPHILS 1 0 - 1 %    IMMATURE GRANULOCYTES 0 0.0 - 0.5 %    ABS. NEUTROPHILS 6.0 1.8 - 8.0 K/UL    ABS. LYMPHOCYTES 1.9 0.8 - 3.5 K/UL    ABS. MONOCYTES 0.7 0.0 - 1.0 K/UL    ABS. EOSINOPHILS 0.2 0.0 - 0.4 K/UL    ABS. BASOPHILS 0.1 0.0 - 0.1 K/UL    ABS. IMM.  GRANS. 0.0 0.00 - 0.04 K/UL    DF AUTOMATED     METABOLIC PANEL, COMPREHENSIVE    Collection Time: 01/04/19  2:49 PM   Result Value Ref Range    Sodium 138 136 - 145 mmol/L    Potassium 3.8 3.5 - 5.1 mmol/L    Chloride 106 97 - 108 mmol/L    CO2 24 21 - 32 mmol/L    Anion gap 8 5 - 15 mmol/L    Glucose 88 65 - 100 mg/dL    BUN 13 6 - 20 MG/DL    Creatinine 1.07 0.70 - 1.30 MG/DL    BUN/Creatinine ratio 12 12 - 20      GFR est AA >60 >60 ml/min/1.73m2
GFR est non-AA >60 >60 ml/min/1.73m2    Calcium 9.3 8.5 - 10.1 MG/DL    Bilirubin, total 1.0 0.2 - 1.0 MG/DL    ALT (SGPT) 25 12 - 78 U/L    AST (SGOT) 24 15 - 37 U/L    Alk. phosphatase 83 45 - 117 U/L    Protein, total 7.7 6.4 - 8.2 g/dL    Albumin 4.2 3.5 - 5.0 g/dL    Globulin 3.5 2.0 - 4.0 g/dL    A-G Ratio 1.2 1.1 - 2.2     URINALYSIS W/ RFLX MICROSCOPIC    Collection Time: 01/04/19  2:49 PM   Result Value Ref Range    Color YELLOW/STRAW      Appearance CLEAR CLEAR      Specific gravity 1.010 1.003 - 1.030      pH (UA) 6.0 5.0 - 8.0      Protein NEGATIVE  NEG mg/dL    Glucose NEGATIVE  NEG mg/dL    Ketone NEGATIVE  NEG mg/dL    Bilirubin NEGATIVE  NEG      Blood MODERATE (A) NEG      Urobilinogen 0.2 0.2 - 1.0 EU/dL    Nitrites NEGATIVE  NEG      Leukocyte Esterase NEGATIVE  NEG     URINE MICROSCOPIC ONLY    Collection Time: 01/04/19  2:49 PM   Result Value Ref Range    WBC 0-4 0 - 4 /hpf    RBC 0-5 0 - 5 /hpf    Epithelial cells FEW FEW /lpf    Bacteria NEGATIVE  NEG /hpf       Ct Abd Pelv Wo Cont    Result Date: 1/4/2019  EXAM: CT ABD PELV WO CONT INDICATION: R flank pain COMPARISON: none CONTRAST:  None. TECHNIQUE: Thin axial images were obtained through the abdomen and pelvis. Coronal and sagittal reconstructions were generated. Oral contrast was not administered. CT dose reduction was achieved through use of a standardized protocol tailored for this examination and automatic exposure control for dose modulation. The absence of intravenous contrast material reduces the sensitivity for evaluation of the solid parenchymal organs of the abdomen. FINDINGS: LUNG BASES: Clear. INCIDENTALLY IMAGED HEART AND MEDIASTINUM: Unremarkable. LIVER: No mass or biliary dilatation. GALLBLADDER: Unremarkable. SPLEEN: No mass. PANCREAS: No mass or ductal dilatation. ADRENALS: Unremarkable. KIDNEYS/URETERS: right hydronephrosis secondary to a distal ureteral 2 mm calculus.  Additional 2 mm nonobstructing right renal calculi
are seen. STOMACH: Unremarkable. SMALL BOWEL: No dilatation or wall thickening. COLON: No dilatation or wall thickening. APPENDIX: Unremarkable. PERITONEUM: No ascites or pneumoperitoneum. RETROPERITONEUM: No lymphadenopathy or aortic aneurysm. REPRODUCTIVE ORGANS: URINARY BLADDER: No mass or calculus. BONES: No destructive bone lesion. ADDITIONAL COMMENTS: N/A     IMPRESSION: Right hydronephrosis and hydroureter secondary to a distal ureteral calculus. Nonobstructing additional right renal calculi        Patient Education        Kidney Stone: Care Instructions  Your Care Instructions    Kidney stones are formed when salts, minerals, and other substances normally found in the urine clump together. They can be as small as grains of sand or, rarely, as large as golf balls. While the stone is traveling through the ureter, which is the tube that carries urine from the kidney to the bladder, you will probably feel pain. The pain may be mild or very severe. You may also have some blood in your urine. As soon as the stone reaches the bladder, any intense pain should go away. If a stone is too large to pass on its own, you may need a medical procedure to help you pass the stone. The doctor has checked you carefully, but problems can develop later. If you notice any problems or new symptoms, get medical treatment right away. Follow-up care is a key part of your treatment and safety. Be sure to make and go to all appointments, and call your doctor if you are having problems. It's also a good idea to know your test results and keep a list of the medicines you take. How can you care for yourself at home? · Drink plenty of fluids, enough so that your urine is light yellow or clear like water. If you have kidney, heart, or liver disease and have to limit fluids, talk with your doctor before you increase the amount of fluids you drink. · Take pain medicines exactly as directed.  Call your doctor if you think you are having
a problem with your medicine. ? If the doctor gave you a prescription medicine for pain, take it as prescribed. ? If you are not taking a prescription pain medicine, ask your doctor if you can take an over-the-counter medicine. Read and follow all instructions on the label. · Your doctor may ask you to strain your urine so that you can collect your kidney stone when it passes. You can use a kitchen strainer or a tea strainer to catch the stone. Store it in a plastic bag until you see your doctor again. Preventing future kidney stones  Some changes in your diet may help prevent kidney stones. Depending on the cause of your stones, your doctor may recommend that you:  · Drink plenty of fluids, enough so that your urine is light yellow or clear like water. If you have kidney, heart, or liver disease and have to limit fluids, talk with your doctor before you increase the amount of fluids you drink. · Limit coffee, tea, and alcohol. Also avoid grapefruit juice. · Do not take more than the recommended daily dose of vitamins C and D.  · Avoid antacids such as Gaviscon, Maalox, Mylanta, or Tums. · Limit the amount of salt (sodium) in your diet. · Eat a balanced diet that is not too high in protein. · Limit foods that are high in a substance called oxalate, which can cause kidney stones. These foods include dark green vegetables, rhubarb, chocolate, wheat bran, nuts, cranberries, and beans. When should you call for help? Call your doctor now or seek immediate medical care if:    · You cannot keep down fluids.     · Your pain gets worse.     · You have a fever or chills.     · You have new or worse pain in your back just below your rib cage (the flank area).     · You have new or more blood in your urine.    Watch closely for changes in your health, and be sure to contact your doctor if:    · You do not get better as expected. Where can you learn more? Go to http://lyssa-laverne.info/.   Enter
F415 in the search box to learn more about \"Kidney Stone: Care Instructions. \"  Current as of: March 15, 2018  Content Version: 11.8  © 1992-9164 [employer]. Care instructions adapted under license by [employer] (which disclaims liability or warranty for this information). If you have questions about a medical condition or this instruction, always ask your healthcare professional. [address] any warranty or liability for your use of this information. Patient Education        Nausea and Vomiting: Care Instructions  Your Care Instructions    When you are nauseated, you may feel weak and sweaty and notice a lot of saliva in your mouth. Nausea often leads to vomiting. Most of the time you do not need to worry about nausea and vomiting, but they can be signs of other illnesses. Two common causes of nausea and vomiting are stomach flu and food poisoning. Nausea and vomiting from viral stomach flu will usually start to improve within 24 hours. Nausea and vomiting from food poisoning may last from 12 to 48 hours. The doctor has checked you carefully, but problems can develop later. If you notice any problems or new symptoms, get medical treatment right away. Follow-up care is a key part of your treatment and safety. Be sure to make and go to all appointments, and call your doctor if you are having problems. It's also a good idea to know your test results and keep a list of the medicines you take. How can you care for yourself at home? · To prevent dehydration, drink plenty of fluids, enough so that your urine is light yellow or clear like water. Choose water and other caffeine-free clear liquids until you feel better. If you have kidney, heart, or liver disease and have to limit fluids, talk with your doctor before you increase the amount of fluids you drink. · Rest in bed until you feel better.   · When you are able to eat, try clear soups, mild foods, and
liquids until all symptoms are gone for 12 to 48 hours. Other good choices include dry toast, crackers, cooked cereal, and gelatin dessert, such as Jell-O. When should you call for help? Call 911 anytime you think you may need emergency care. For example, call if:    · You passed out (lost consciousness).    Call your doctor now or seek immediate medical care if:    · You have symptoms of dehydration, such as:  ? Dry eyes and a dry mouth. ? Passing only a little dark urine. ? Feeling thirstier than usual.     · You have new or worsening belly pain.     · You have a new or higher fever.     · You vomit blood or what looks like coffee grounds.    Watch closely for changes in your health, and be sure to contact your doctor if:    · You have ongoing nausea and vomiting.     · Your vomiting is getting worse.     · Your vomiting lasts longer than 2 days.     · You are not getting better as expected. Where can you learn more? Go to http://lyssa-laverne.info/. Enter 25 [id number] in the search box to learn more about \"Nausea and Vomiting: Care Instructions. \"  Current as of: November 20, 2017  Content Version: 11.8  © 0109-6071 [employer]. Care instructions adapted under license by [employer] (which disclaims liability or warranty for this information). If you have questions about a medical condition or this instruction, always ask your healthcare professional. [place] [address] any warranty or liability for your use of this information. Patient Education        Learning About Diet for Kidney Stone Prevention  What are kidney stones? Kidney stones are made of salts and minerals in the urine that form small \"savita. \" Stones can form in the kidneys and the ureters (the tubes that lead from the kidneys to the bladder). They can also form in the bladder. Stones may not cause a problem as long as they stay in the kidneys.  But they can cause sudden, severe
pain. Pain is most likely when the stones travel from the kidneys to the bladder. Kidney stones can cause bloody urine. Kidney stones often run in families. You are more likely to get them if you don't drink enough fluids, mainly water. Certain foods and drinks and some dietary supplements may also increase your risk for kidney stones if you consume too much of them. What can you do to prevent kidney stones? Changing what you eat may not prevent all types of kidney stones. But for people who have a history of certain kinds of kidney stones, some changes in diet may help. A dietitian can help you set up a meal plan that includes healthy, low-oxalate choices. Here are some general guidelines to get you started. Plan your meals and snacks around foods that are low in oxalate. These foods include:  · Corn, kale, parsnips, and squash,. · Beef, chicken, pork, turkey, and fish. · Milk, butter, cheese, and yogurt. You can eat certain foods that are medium-high in oxalate, but eat them only once in a while. These foods include:  · Bread. · Brown rice. · English muffins. · Figs. · Popcorn. · String beans. · Tomatoes. Limit very high-oxalate foods, including:  · Black tea. · Coffee. · Chocolate. · Dark green vegetables. · Nuts. Here are some other things you can do to help prevent kidney stones. · Drink plenty of fluids. If you have kidney, heart, or liver disease and have to limit fluids, talk with your doctor before you increase the amount of fluids you drink. · Do not take more than the recommended daily dose of vitamins C and D.  · Limit the salt in your diet. · Eat a balanced diet that is not too high in protein. Follow-up care is a key part of your treatment and safety. Be sure to make and go to all appointments, and call your doctor if you are having problems. It's also a good idea to know your test results and keep a list of the medicines you take. Where can you learn more?   Go to
http://lyssa-laverne.info/. Enter C138 in the search box to learn more about \"Learning About Diet for Kidney Stone Prevention. \"  Current as of: March 29, 2018  Content Version: 11.8  © 7134-8488 [employer]. Care instructions adapted under license by [employer] (which disclaims liability or warranty for this information). If you have questions about a medical condition or this instruction, always ask your healthcare professional. [place] [address] any warranty or liability for your use of this information.
Transaminitis

## 2019-09-19 NOTE — PROGRESS NOTE ADULT - PROBLEM SELECTOR PLAN 3
- Admitted with AST 65, ALT 54; elevation likely 2/2 sepsis  - F/u hepatitides panel  - Trend CMP; ABD US if transaminases remain elevated - Admitted with AST 65, ALT 54; elevation likely 2/2 sepsis  - Hepatitides panel negative  - AST/ALT 40/42 - Admitted with AST 65, ALT 54; elevation likely 2/2 sepsis  - Hepatitis  panel negative  - AST/ALT 40/42

## 2019-09-19 NOTE — DISCHARGE NOTE PROVIDER - NSDCFUADDINST_GEN_ALL_CORE_FT
You are advised to follow-up with your primary care physician, Dr. Angelo, to follow-up your creatinine to ensure that it has returned to its baseline level of 1.4. You are advised to not take any medications toxic to the kidney, including, Motrin, Alleve, or Advil.    You were recommended to undergo physical therapy at a rehabilitation center, but as per your preference, you are to be discharged to your home with physical therapy services. Please note that physical therapy services at home create increased risk for falls, bleeding, and death. You were made aware of these risks and expressed understanding regarding the aforementioned potential outcomes.

## 2019-09-22 LAB
BACTERIA BLD CULT: SIGNIFICANT CHANGE UP
BACTERIA BLD CULT: SIGNIFICANT CHANGE UP

## 2020-02-18 NOTE — H&P ADULT - CARDIOVASCULAR SYMPTOMS
"Pt requesting PA for Trintellix be completed and faxed to [employer].     Attempted PA. Received message "Your selection "TRINTELLIX" is not covered by this Members prescription drug formulary".    Alternative Drug: Trazodone Hydrochloride   " dyspnea on exertion

## 2020-02-25 ENCOUNTER — APPOINTMENT (OUTPATIENT)
Dept: CARDIOTHORACIC SURGERY | Facility: CLINIC | Age: 80
End: 2020-02-25

## 2020-09-02 NOTE — PHYSICAL THERAPY INITIAL EVALUATION ADULT - DISCHARGE DISPOSITION, PT EVAL
INTERVAL HPI/OVERNIGHT EVENTS:  pt seen and examined  nicole diet     MEDICATIONS  (STANDING):  dextrose 5%. 1000 milliLiter(s) (50 mL/Hr) IV Continuous <Continuous>  enoxaparin Injectable 40 milliGRAM(s) SubCutaneous daily  folic acid 1 milliGRAM(s) Oral daily  multivitamin 1 Tablet(s) Oral daily  pantoprazole  Injectable 40 milliGRAM(s) IV Push daily  thiamine 100 milliGRAM(s) Oral daily  ursodiol Capsule 300 milliGRAM(s) Oral three times a day    MEDICATIONS  (PRN):  LORazepam   Injectable 2 milliGRAM(s) IV Push every 4 hours PRN CIWA > 8      Allergies    No Known Allergies    Intolerances        General:  No wt loss, fevers, chills, night sweats, fatigue,   Eyes:  Good vision, no reported pain  ENT:  No sore throat, pain, runny nose, dysphagia  CV:  No pain, palpitations, hypo/hypertension  Resp:  No dyspnea, cough, tachypnea, wheezing  GI: see above  :  No pain, bleeding, incontinence, nocturia  Muscle:  No pain, weakness  Neuro:  No weakness, tingling, memory problems  Psych:  No fatigue, insomnia, mood problems, depression  Endocrine:  No polyuria, polydipsia, cold/heat intolerance  Heme:  No petechiae, ecchymosis, easy bruisability  Skin:  No rash, tattoos, scars, edema    PHYSICAL EXAM:     Vital Signs Last 24 Hrs  T(C): 37 (30 Aug 2020 04:00), Max: 37.2 (30 Aug 2020 00:00)  T(F): 98.6 (30 Aug 2020 04:00), Max: 99 (30 Aug 2020 00:00)  HR: 127 (30 Aug 2020 09:00) (109 - 149)  BP: 118/86 (30 Aug 2020 09:00) (109/69 - 142/80)  BP(mean): 98 (30 Aug 2020 09:00) (83 - 104)  RR: 30 (30 Aug 2020 09:00) (18 - 37)  SpO2: 96% (30 Aug 2020 09:00) (93% - 97%)      27 Aug 2020 07:01  -  28 Aug 2020 07:00  --------------------------------------------------------  IN: 7750.9 mL / OUT: 7547 mL / NET: 203.9 mL    28 Aug 2020 07:01  -  28 Aug 2020 11:46  --------------------------------------------------------  IN: 1380 mL / OUT: 1550 mL / NET: -170 mL        GENERAL: lying in bed  HEENT:  NC/AT  ABDOMEN:  Soft, non-tender, non-distended  EXTREMITIES:  no  edema  SKIN:  jaundice  NEURO:  Awake alert appropriate               LABS:                        13.1   3.72  )-----------( 101      ( 30 Aug 2020 06:20 )             38.0     08-30    137  |  102  |  6<L>  ----------------------------<  97  3.9   |  28  |  1.10    Ca    8.3<L>      30 Aug 2020 06:20  Phos  2.8     08-30  Mg     2.3     08-30    TPro  6.1  /  Alb  2.4<L>  /  TBili  19.8<HH>  /  DBili  15.60<H>  /  AST  247<H>  /  ALT  189<H>  /  AlkPhos  283<H>  08-30    PT/INR - ( 29 Aug 2020 07:34 )   PT: 12.1 sec;   INR: 1.04 ratio         PTT - ( 29 Aug 2020 07:34 )  PTT:31.8 sec      08-29-20 @ 07:01  -  08-30-20 @ 07:00  --------------------------------------------------------  IN: 2620 mL / OUT: 7500 mL / NET: -4880 mL    08-30-20 @ 07:01 - 08-30-20 @ 10:22  --------------------------------------------------------  IN: 175 mL / OUT: 0 mL / NET: 175 mL      RADIOLOGY & ADDITIONAL TESTS: rehabilitation facility/however patient wants to go home with PT services upon discharge.

## 2020-11-04 NOTE — PATIENT PROFILE ADULT - HEALTH LITERACY
no Prednisone Counseling:  I discussed with the patient the risks of prolonged use of prednisone including but not limited to weight gain, insomnia, osteoporosis, mood changes, diabetes, susceptibility to infection, glaucoma and high blood pressure.  In cases where prednisone use is prolonged, patients should be monitored with blood pressure checks, serum glucose levels and an eye exam.  Additionally, the patient may need to be placed on GI prophylaxis, PCP prophylaxis, and calcium and vitamin D supplementation and/or a bisphosphonate.  The patient verbalized understanding of the proper use and the possible adverse effects of prednisone.  All of the patient's questions and concerns were addressed.

## 2020-12-02 NOTE — ED ADULT NURSE NOTE - TEMPLATE
12/02/20 0220   Events/Summary/Plan   Events/Summary/Plan placed on room air with Sp02 decreasing to 78% over next five minutes   Vital Signs   Pulse (!) 103   Respiration 16   Pulse Oximetry 88 %   Respiratory Assessment   Respiratory Pattern Within Normal Limits   Level of Consciousness   (asleep)   Chest Exam   Work Of Breathing / Effort Within Normal Limits   Oxygen   O2 (LPM) 0   FiO2% 21 %   O2 Delivery Device Room air w/o2 available      General

## 2021-02-11 NOTE — ED PROCEDURE NOTE - NS ED PROC PERFORMED BY1 FT
Kandace [Visual inspection, sensory exam] : Foot exam, including visual inspection, sensory exam with mono filament, and pulse exam, was performed within the last 12 months

## 2022-06-24 NOTE — PROGRESS NOTE ADULT - PROBLEM SELECTOR PROBLEM 6
Medication management Coronary artery disease involving native coronary artery of native heart without angina pectoris Prednisone Counseling:  I discussed with the patient the risks of prolonged use of prednisone including but not limited to weight gain, insomnia, osteoporosis, mood changes, diabetes, susceptibility to infection, glaucoma and high blood pressure.  In cases where prednisone use is prolonged, patients should be monitored with blood pressure checks, serum glucose levels and an eye exam.  Additionally, the patient may need to be placed on GI prophylaxis, PCP prophylaxis, and calcium and vitamin D supplementation and/or a bisphosphonate.  The patient verbalized understanding of the proper use and the possible adverse effects of prednisone.  All of the patient's questions and concerns were addressed.

## 2023-03-28 NOTE — ED PROVIDER NOTE - RE-EVALUATION DATE/TIME:
Quality 431: Preventive Care And Screening: Unhealthy Alcohol Use - Screening: Patient not identified as an unhealthy alcohol user when screened for unhealthy alcohol use using a systematic screening method Detail Level: Detailed Quality 226: Preventive Care And Screening: Tobacco Use: Screening And Cessation Intervention: Patient screened for tobacco use and is an ex/non-smoker 17-Sep-2019 15:20

## 2023-10-19 NOTE — PATIENT PROFILE ADULT - BRADEN SENSORY
Latoya Henriquez presents to Urgent Care Patient arriving with: alone with complaint of cloudy urine, odor, itching, burning with urination. Has tried monistat which did not help.     Onset of symptoms: 4 days    Patient and visitor wearing mask? Yes    Myself mask    Can leave detailed message on   mobile phone:    Mobile [phone]      Tori Barnett) (4) no impairment

## 2023-11-14 NOTE — PRE-OP CHECKLIST - HEIGHT IN CM
157.48
Patient presenting with HANNAH, Cr 4.1 from baseline 0.6  - likely prerenal in the setting of poor po intake and intractable nausea and vomiting  - improved to 2.56 with IVF  - monitor Cr  - avoid nephrotoxic agents

## 2024-02-28 NOTE — PROVIDER CONTACT NOTE (OTHER) - SITUATION
Pt has oral temp of 101.6

## 2025-04-25 ENCOUNTER — INPATIENT (INPATIENT)
Facility: HOSPITAL | Age: 85
LOS: 11 days | Discharge: NURSING, REHAB OR LONG-TERM CARE | End: 2025-05-07
Attending: STUDENT IN AN ORGANIZED HEALTH CARE EDUCATION/TRAINING PROGRAM | Admitting: STUDENT IN AN ORGANIZED HEALTH CARE EDUCATION/TRAINING PROGRAM
Payer: MEDICARE

## 2025-04-25 VITALS
RESPIRATION RATE: 16 BRPM | WEIGHT: 132.94 LBS | SYSTOLIC BLOOD PRESSURE: 116 MMHG | DIASTOLIC BLOOD PRESSURE: 56 MMHG | HEIGHT: 62 IN | HEART RATE: 40 BPM

## 2025-04-25 DIAGNOSIS — Z98.890 OTHER SPECIFIED POSTPROCEDURAL STATES: Chronic | ICD-10-CM

## 2025-04-25 DIAGNOSIS — Z95.818 PRESENCE OF OTHER CARDIAC IMPLANTS AND GRAFTS: Chronic | ICD-10-CM

## 2025-04-25 DIAGNOSIS — Z95.1 PRESENCE OF AORTOCORONARY BYPASS GRAFT: Chronic | ICD-10-CM

## 2025-04-25 LAB
ALBUMIN SERPL ELPH-MCNC: 3.7 G/DL — SIGNIFICANT CHANGE UP (ref 3.3–5)
ALP SERPL-CCNC: 117 U/L — SIGNIFICANT CHANGE UP (ref 40–120)
ALT FLD-CCNC: 89 U/L — HIGH (ref 4–41)
ANION GAP SERPL CALC-SCNC: 13 MMOL/L — SIGNIFICANT CHANGE UP (ref 7–14)
APTT BLD: 37.2 SEC — HIGH (ref 26.1–36.8)
AST SERPL-CCNC: 54 U/L — HIGH (ref 4–40)
B-OH-BUTYR SERPL-SCNC: <0 MMOL/L — SIGNIFICANT CHANGE UP (ref 0–0.4)
BASOPHILS # BLD AUTO: 0.02 K/UL — SIGNIFICANT CHANGE UP (ref 0–0.2)
BASOPHILS NFR BLD AUTO: 0.3 % — SIGNIFICANT CHANGE UP (ref 0–2)
BILIRUB SERPL-MCNC: <0.2 MG/DL — SIGNIFICANT CHANGE UP (ref 0.2–1.2)
BLOOD GAS VENOUS COMPREHENSIVE RESULT: SIGNIFICANT CHANGE UP
BUN SERPL-MCNC: 72 MG/DL — HIGH (ref 7–23)
CALCIUM SERPL-MCNC: 9.6 MG/DL — SIGNIFICANT CHANGE UP (ref 8.4–10.5)
CHLORIDE SERPL-SCNC: 104 MMOL/L — SIGNIFICANT CHANGE UP (ref 98–107)
CO2 SERPL-SCNC: 17 MMOL/L — LOW (ref 22–31)
CREAT SERPL-MCNC: 2.76 MG/DL — HIGH (ref 0.5–1.3)
EGFR: 22 ML/MIN/1.73M2 — LOW
EGFR: 22 ML/MIN/1.73M2 — LOW
EOSINOPHIL # BLD AUTO: 0.47 K/UL — SIGNIFICANT CHANGE UP (ref 0–0.5)
EOSINOPHIL NFR BLD AUTO: 6.6 % — HIGH (ref 0–6)
FLUAV AG NPH QL: SIGNIFICANT CHANGE UP
FLUBV AG NPH QL: SIGNIFICANT CHANGE UP
GAS PNL BLDV: SIGNIFICANT CHANGE UP
GLUCOSE SERPL-MCNC: 324 MG/DL — HIGH (ref 70–99)
HCT VFR BLD CALC: 29.6 % — LOW (ref 39–50)
HGB BLD-MCNC: 10.3 G/DL — LOW (ref 13–17)
IANC: 4.41 K/UL — SIGNIFICANT CHANGE UP (ref 1.8–7.4)
IMM GRANULOCYTES NFR BLD AUTO: 0.3 % — SIGNIFICANT CHANGE UP (ref 0–0.9)
INR BLD: 0.98 RATIO — SIGNIFICANT CHANGE UP (ref 0.85–1.16)
LACTATE SERPL-SCNC: 1.2 MMOL/L — SIGNIFICANT CHANGE UP (ref 0.5–2)
LYMPHOCYTES # BLD AUTO: 1.49 K/UL — SIGNIFICANT CHANGE UP (ref 1–3.3)
LYMPHOCYTES # BLD AUTO: 21 % — SIGNIFICANT CHANGE UP (ref 13–44)
MCHC RBC-ENTMCNC: 30.3 PG — SIGNIFICANT CHANGE UP (ref 27–34)
MCHC RBC-ENTMCNC: 34.8 G/DL — SIGNIFICANT CHANGE UP (ref 32–36)
MCV RBC AUTO: 87.1 FL — SIGNIFICANT CHANGE UP (ref 80–100)
MONOCYTES # BLD AUTO: 0.67 K/UL — SIGNIFICANT CHANGE UP (ref 0–0.9)
MONOCYTES NFR BLD AUTO: 9.5 % — SIGNIFICANT CHANGE UP (ref 2–14)
NEUTROPHILS # BLD AUTO: 4.41 K/UL — SIGNIFICANT CHANGE UP (ref 1.8–7.4)
NEUTROPHILS NFR BLD AUTO: 62.3 % — SIGNIFICANT CHANGE UP (ref 43–77)
NRBC # BLD AUTO: 0 K/UL — SIGNIFICANT CHANGE UP (ref 0–0)
NRBC # FLD: 0 K/UL — SIGNIFICANT CHANGE UP (ref 0–0)
NRBC BLD AUTO-RTO: 0 /100 WBCS — SIGNIFICANT CHANGE UP (ref 0–0)
PLATELET # BLD AUTO: 139 K/UL — LOW (ref 150–400)
POTASSIUM SERPL-MCNC: 5.8 MMOL/L — HIGH (ref 3.5–5.3)
POTASSIUM SERPL-SCNC: 5.8 MMOL/L — HIGH (ref 3.5–5.3)
PROCALCITONIN SERPL-MCNC: 0.09 NG/ML — SIGNIFICANT CHANGE UP (ref 0.02–0.1)
PROT SERPL-MCNC: 7.1 G/DL — SIGNIFICANT CHANGE UP (ref 6–8.3)
PROTHROM AB SERPL-ACNC: 11.4 SEC — SIGNIFICANT CHANGE UP (ref 9.9–13.4)
RBC # BLD: 3.4 M/UL — LOW (ref 4.2–5.8)
RBC # FLD: 12.8 % — SIGNIFICANT CHANGE UP (ref 10.3–14.5)
RSV RNA NPH QL NAA+NON-PROBE: SIGNIFICANT CHANGE UP
SARS-COV-2 RNA SPEC QL NAA+PROBE: SIGNIFICANT CHANGE UP
SODIUM SERPL-SCNC: 134 MMOL/L — LOW (ref 135–145)
SOURCE RESPIRATORY: SIGNIFICANT CHANGE UP
TROPONIN T, HIGH SENSITIVITY RESULT: 45 NG/L — SIGNIFICANT CHANGE UP
TSH SERPL-MCNC: 3.31 UIU/ML — SIGNIFICANT CHANGE UP (ref 0.27–4.2)
WBC # BLD: 7.08 K/UL — SIGNIFICANT CHANGE UP (ref 3.8–10.5)
WBC # FLD AUTO: 7.08 K/UL — SIGNIFICANT CHANGE UP (ref 3.8–10.5)

## 2025-04-25 PROCEDURE — 99291 CRITICAL CARE FIRST HOUR: CPT

## 2025-04-25 PROCEDURE — 70450 CT HEAD/BRAIN W/O DYE: CPT | Mod: 26

## 2025-04-25 PROCEDURE — 71045 X-RAY EXAM CHEST 1 VIEW: CPT | Mod: 26

## 2025-04-25 PROCEDURE — 70496 CT ANGIOGRAPHY HEAD: CPT | Mod: 26

## 2025-04-25 PROCEDURE — 70498 CT ANGIOGRAPHY NECK: CPT | Mod: 26

## 2025-04-25 RX ORDER — PIPERACILLIN-TAZO-DEXTROSE,ISO 2.25G/50ML
3.38 IV SOLUTION, PIGGYBACK PREMIX FROZEN(ML) INTRAVENOUS ONCE
Refills: 0 | Status: COMPLETED | OUTPATIENT
Start: 2025-04-25 | End: 2025-04-25

## 2025-04-25 RX ORDER — VANCOMYCIN HCL IN 5 % DEXTROSE 1.5G/250ML
1000 PLASTIC BAG, INJECTION (ML) INTRAVENOUS ONCE
Refills: 0 | Status: COMPLETED | OUTPATIENT
Start: 2025-04-25 | End: 2025-04-25

## 2025-04-25 RX ORDER — CALCIUM GLUCONATE 20 MG/ML
2 INJECTION, SOLUTION INTRAVENOUS ONCE
Refills: 0 | Status: COMPLETED | OUTPATIENT
Start: 2025-04-25 | End: 2025-04-25

## 2025-04-25 RX ORDER — DEXTROSE 50 % IN WATER 50 %
25 SYRINGE (ML) INTRAVENOUS ONCE
Refills: 0 | Status: COMPLETED | OUTPATIENT
Start: 2025-04-25 | End: 2025-04-25

## 2025-04-25 RX ORDER — DEXTROSE 50 % IN WATER 50 %
50 SYRINGE (ML) INTRAVENOUS ONCE
Refills: 0 | Status: DISCONTINUED | OUTPATIENT
Start: 2025-04-25 | End: 2025-04-25

## 2025-04-25 RX ADMIN — Medication 200 GRAM(S): at 23:51

## 2025-04-25 RX ADMIN — Medication 5 UNIT(S): at 21:33

## 2025-04-25 RX ADMIN — Medication 500 MILLILITER(S): at 23:51

## 2025-04-25 RX ADMIN — Medication 500 MILLILITER(S): at 20:41

## 2025-04-25 RX ADMIN — Medication 25 MILLILITER(S): at 21:32

## 2025-04-25 RX ADMIN — CALCIUM GLUCONATE 200 GRAM(S): 20 INJECTION, SOLUTION INTRAVENOUS at 21:32

## 2025-04-25 NOTE — ED ADULT TRIAGE NOTE - CHIEF COMPLAINT QUOTE
pt coming in with daughter c/o slurred speech x3 days. hx. HTN. DM2. CKD. CVA. MI. pt denies numbness/weakness, dizziness, chest pain, sob. pt bradycardiac, unable to obtain temp in triage.

## 2025-04-25 NOTE — ED PROVIDER NOTE - NS ED ROS FT
General: denies fever,  HENT: denies nasal congestion  CV: denies chest pain, palpitations  Resp: denies difficulty breathing, cough  Abdominal: denies nausea, vomiting, diarrhea, abdominal pain  : denies urinary pain  Neuro: denies  dizziness,

## 2025-04-25 NOTE — ED PROVIDER NOTE - CLINICAL SUMMARY MEDICAL DECISION MAKING FREE TEXT BOX
85ym CAD s/p CABG (in August 2019 with post-op pAfib/AFlutter now on amiodarone and eliquis), CKD stage III, HTN, HLD, T2DM, presnets for slurred speechx 3d. lives w daughter, ambulatory w walker, no falls, and no infectious sx,  states slurred speech but able to make sentences w comprehensible words. was recently in last week put on nifedipine and 3 other meds farxiga, iron and calcitriol, but the daughter states she did not give the nifedipine today given his BP was low systolics in the 80s. otherwise has been taking his lisinopril metoprolol statin lasix flomax and aspirin as instructed. on arrival pt denies any active complaints A&Ox3. HR 40s EKG frederick rate 40s w 1st degree block, normotensive MAP 75, rectally 92 . no other neuro deficits on exam strength 5/5 bl ue le smile symmetric A&Ox3, no m/r/g, lungs clear, abdomen soft non tender, no peripheral edema, will get labs to eval for sepsis and dka 2/2 to sepsis as etiology for hyperglycemia and hypothermia, hypothermia likely explains pt bradycardia but will check labs including K mg and TSH to eval for other sources in addition to ischemic eval although lower suspicion given normal stress/echo last week,  and cta for slurred speech although likely in setting of toxic metabolic encephalopathy vs hypoperfusion vs CVA. do not think this is ccb toxicity although still on ddx. pending work up will be adm.

## 2025-04-25 NOTE — ED PROVIDER NOTE - PHYSICAL EXAMINATION
GENERAL: well appearing in no acute distress, non-toxic appearing  HEAD: normocephalic, atraumatic  CARDIAC: slow regular, normal S1S2, no appreciable murmurs, 2+ pulses in UE/LE b/l  PULM: normal breath sounds, clear to ascultation bilaterally, no rales, rhonchi, wheezing  GI: abdomen nondistended, soft, nontender, no guarding, rebound tenderness  : no madison blood on anorectal exam  NEURO:slurred speech, Moves all extremities spontaneously. symmetric  strength b/l UE, elbow flexion 5/5 bilaterally, elbow extension 5/5 bilaterally, patient can straight leg raise bilaterally and resist symmetrically, symmetric smile, EOM intact b/l  MSK: no peripheral edema, no calf tenderness b/l  SKIN:  no visible rashes

## 2025-04-25 NOTE — ED ADULT NURSE REASSESSMENT NOTE - NS ED NURSE REASSESS COMMENT FT1
pt taken to CT on zoll. Pt returned back to room on zoll. No signs of reactions.   patient laying in semi fowlers position on the stretcher. patient alert and oriented times four. patient denies shortness of breath, chest pain, nausea, vomiting, chill, fever. Patient frederick sinus on the monitor. Respirations equal and adequate. Mosher in place. Patients IV patent, no signs of infiltration. Safety measures in place, call bell within reach. Patient stable upon assessment. Kristin CHEATHAM  pt taken to CT on zoll. Pt returned back to room on zoll. No signs of reactions.   patient laying in semi fowlers position on the stretcher. patient alert and oriented times four. patient denies shortness of breath, chest pain, nausea, vomiting, chill, fever. Patient frederick sinus on the monitor. Respirations equal and adequate. Mosher in place. Patients IV patent, no signs of infiltration. Safety measures in place, call bell within reach. Patient stable upon assessment.

## 2025-04-25 NOTE — ED ADULT NURSE REASSESSMENT NOTE - NS ED NURSE REASSESS COMMENT FT1
report received from KHARI Plummer. pt remains at baseline mental status A&OX3, RR even unlabored completing full clear sentences. pt resting in stretcher comfortably at this time, no new complaints offered. rpt vs per flowsheet, pt noted to be hypothermic, pt remains on hypothermia warming blanket per orders. provider aware of pt HR, pt remains on Zoll. IVF running as per EMAR. stretcher lowest position siderails up safety measures in place.

## 2025-04-25 NOTE — ED ADULT NURSE REASSESSMENT NOTE - NS ED NURSE REASSESS COMMENT FT1
16fr reynolds catheter placed per MD orders using aseptic technique, pt tolerated well. yellow output noted in drainage collection bag. provider aware

## 2025-04-25 NOTE — ED PROVIDER NOTE - ATTENDING CONTRIBUTION TO CARE
85M h/o CAD s/p CABG c/b post-op pAFib/AFlutter on Eliquis, CKD III, HTN, HLD, type 2 DM brought by daughter for slurred speech x3 days. Able to express thoughts but sound is slurred. No other focal neuro deficits reported. On arrival patient found frederick to 40s and hypothermic to 92. Per daughter, has recently been started on nifedipine though she held it the past 2 days due to hypotension, Denies cough, fever/chills, abd pain, n/v/d, dizziness/lightheadedness, palpitations, sob or other associated symptoms.  GEN: AOx3, nad  CV: frederick but regular  PULM: clear lungs b/l  ABD: soft, ntnd  EXT: no edema/swelling  SKIN: no rash/petechiae/ecchymosis  NEURO: AOx3, motor 5/5 all extremities, sensation grossly intact, +slurred speech but expressing thoughts clearly  MDM: 85M h/o CAD s/p CABG c/b post-op pAFib/AFlutter on Eliquis, CKD III, HTN, HLD, type 2 DM brought by daughter for slurred speech x3 days, also found to be hypothermic and bradycardic though normotensive. DDx includes causes of acidosis including sepsis and renal failure. Will perform full sepsis workup with blood and urine cultures, CXR. Check VBG for pH and lactate. DKA also in differnetial - will also check BHB. CHeck electrolytes, renal function, TSH. CTH and CTA head and neck to assess for CVA, though slurred speech may be metabolic origin. Less concern for Ca channel blocker toxicity as pt only recently started on this and has not taken for at least the last 2 days. Zol placed due to bradycardia. Warming blanket in place. To be admitted once ED workup completed.    Upon my evaluation, this patient is at high risk for imminent or life threatening deterioration due to profound breadycardia and hypothermia  and other active medical issues which require my direct attention, intervention, and personal management.  I have personally spent   63    discontinuous minutes  of critical care time exclusive of time spent on separate billing procedures. This includes review of laboratory data, radiology results, discussion with primary team, and monitoring for potential decompensation. Interventions were performed as documented above.

## 2025-04-26 DIAGNOSIS — E11.9 TYPE 2 DIABETES MELLITUS WITHOUT COMPLICATIONS: ICD-10-CM

## 2025-04-26 DIAGNOSIS — E87.5 HYPERKALEMIA: ICD-10-CM

## 2025-04-26 DIAGNOSIS — I25.10 ATHEROSCLEROTIC HEART DISEASE OF NATIVE CORONARY ARTERY WITHOUT ANGINA PECTORIS: ICD-10-CM

## 2025-04-26 DIAGNOSIS — N17.9 ACUTE KIDNEY FAILURE, UNSPECIFIED: ICD-10-CM

## 2025-04-26 DIAGNOSIS — N39.0 URINARY TRACT INFECTION, SITE NOT SPECIFIED: ICD-10-CM

## 2025-04-26 DIAGNOSIS — A41.9 SEPSIS, UNSPECIFIED ORGANISM: ICD-10-CM

## 2025-04-26 DIAGNOSIS — I10 ESSENTIAL (PRIMARY) HYPERTENSION: ICD-10-CM

## 2025-04-26 DIAGNOSIS — Z29.9 ENCOUNTER FOR PROPHYLACTIC MEASURES, UNSPECIFIED: ICD-10-CM

## 2025-04-26 DIAGNOSIS — G92.8 OTHER TOXIC ENCEPHALOPATHY: ICD-10-CM

## 2025-04-26 DIAGNOSIS — I48.91 UNSPECIFIED ATRIAL FIBRILLATION: ICD-10-CM

## 2025-04-26 DIAGNOSIS — R00.1 BRADYCARDIA, UNSPECIFIED: ICD-10-CM

## 2025-04-26 DIAGNOSIS — N18.9 CHRONIC KIDNEY DISEASE, UNSPECIFIED: ICD-10-CM

## 2025-04-26 DIAGNOSIS — E87.20 ACIDOSIS, UNSPECIFIED: ICD-10-CM

## 2025-04-26 LAB
ALBUMIN SERPL ELPH-MCNC: 3.3 G/DL — SIGNIFICANT CHANGE UP (ref 3.3–5)
ALP SERPL-CCNC: 105 U/L — SIGNIFICANT CHANGE UP (ref 40–120)
ALT FLD-CCNC: 84 U/L — HIGH (ref 4–41)
ANION GAP SERPL CALC-SCNC: 10 MMOL/L — SIGNIFICANT CHANGE UP (ref 7–14)
ANION GAP SERPL CALC-SCNC: 11 MMOL/L — SIGNIFICANT CHANGE UP (ref 7–14)
ANION GAP SERPL CALC-SCNC: 11 MMOL/L — SIGNIFICANT CHANGE UP (ref 7–14)
APPEARANCE UR: CLEAR — SIGNIFICANT CHANGE UP
APPEARANCE UR: CLEAR — SIGNIFICANT CHANGE UP
AST SERPL-CCNC: 50 U/L — HIGH (ref 4–40)
BACTERIA # UR AUTO: ABNORMAL /HPF
BACTERIA # UR AUTO: ABNORMAL /HPF
BASOPHILS # BLD AUTO: 0.03 K/UL — SIGNIFICANT CHANGE UP (ref 0–0.2)
BASOPHILS NFR BLD AUTO: 0.5 % — SIGNIFICANT CHANGE UP (ref 0–2)
BILIRUB SERPL-MCNC: 0.3 MG/DL — SIGNIFICANT CHANGE UP (ref 0.2–1.2)
BILIRUB UR-MCNC: NEGATIVE — SIGNIFICANT CHANGE UP
BILIRUB UR-MCNC: NEGATIVE — SIGNIFICANT CHANGE UP
BLOOD GAS VENOUS COMPREHENSIVE RESULT: SIGNIFICANT CHANGE UP
BUN SERPL-MCNC: 62 MG/DL — HIGH (ref 7–23)
BUN SERPL-MCNC: 67 MG/DL — HIGH (ref 7–23)
BUN SERPL-MCNC: 69 MG/DL — HIGH (ref 7–23)
CALCIUM SERPL-MCNC: 9.3 MG/DL — SIGNIFICANT CHANGE UP (ref 8.4–10.5)
CALCIUM SERPL-MCNC: 9.4 MG/DL — SIGNIFICANT CHANGE UP (ref 8.4–10.5)
CALCIUM SERPL-MCNC: 9.4 MG/DL — SIGNIFICANT CHANGE UP (ref 8.4–10.5)
CAST: 2 /LPF — SIGNIFICANT CHANGE UP (ref 0–4)
CAST: 2 /LPF — SIGNIFICANT CHANGE UP (ref 0–4)
CHLORIDE SERPL-SCNC: 107 MMOL/L — SIGNIFICANT CHANGE UP (ref 98–107)
CHLORIDE SERPL-SCNC: 108 MMOL/L — HIGH (ref 98–107)
CHLORIDE SERPL-SCNC: 108 MMOL/L — HIGH (ref 98–107)
CO2 SERPL-SCNC: 15 MMOL/L — LOW (ref 22–31)
CO2 SERPL-SCNC: 16 MMOL/L — LOW (ref 22–31)
CO2 SERPL-SCNC: 16 MMOL/L — LOW (ref 22–31)
COLOR SPEC: YELLOW — SIGNIFICANT CHANGE UP
COLOR SPEC: YELLOW — SIGNIFICANT CHANGE UP
CORTIS AM PEAK SERPL-MCNC: 10.9 UG/DL — SIGNIFICANT CHANGE UP (ref 6–18.4)
CREAT ?TM UR-MCNC: 49 MG/DL — SIGNIFICANT CHANGE UP
CREAT SERPL-MCNC: 2.64 MG/DL — HIGH (ref 0.5–1.3)
CREAT SERPL-MCNC: 2.72 MG/DL — HIGH (ref 0.5–1.3)
CREAT SERPL-MCNC: 2.76 MG/DL — HIGH (ref 0.5–1.3)
DIFF PNL FLD: ABNORMAL
DIFF PNL FLD: ABNORMAL
EGFR: 22 ML/MIN/1.73M2 — LOW
EGFR: 23 ML/MIN/1.73M2 — LOW
EGFR: 23 ML/MIN/1.73M2 — LOW
EOSINOPHIL # BLD AUTO: 0.47 K/UL — SIGNIFICANT CHANGE UP (ref 0–0.5)
EOSINOPHIL NFR BLD AUTO: 8 % — HIGH (ref 0–6)
GAS PNL BLDV: SIGNIFICANT CHANGE UP
GLUCOSE BLDC GLUCOMTR-MCNC: 109 MG/DL — HIGH (ref 70–99)
GLUCOSE BLDC GLUCOMTR-MCNC: 128 MG/DL — HIGH (ref 70–99)
GLUCOSE BLDC GLUCOMTR-MCNC: 146 MG/DL — HIGH (ref 70–99)
GLUCOSE BLDC GLUCOMTR-MCNC: 160 MG/DL — HIGH (ref 70–99)
GLUCOSE BLDC GLUCOMTR-MCNC: 208 MG/DL — HIGH (ref 70–99)
GLUCOSE BLDC GLUCOMTR-MCNC: 74 MG/DL — SIGNIFICANT CHANGE UP (ref 70–99)
GLUCOSE BLDC GLUCOMTR-MCNC: 83 MG/DL — SIGNIFICANT CHANGE UP (ref 70–99)
GLUCOSE BLDC GLUCOMTR-MCNC: 92 MG/DL — SIGNIFICANT CHANGE UP (ref 70–99)
GLUCOSE SERPL-MCNC: 224 MG/DL — HIGH (ref 70–99)
GLUCOSE SERPL-MCNC: 76 MG/DL — SIGNIFICANT CHANGE UP (ref 70–99)
GLUCOSE SERPL-MCNC: 94 MG/DL — SIGNIFICANT CHANGE UP (ref 70–99)
GLUCOSE UR QL: 250 MG/DL
GLUCOSE UR QL: >=1000 MG/DL
HCT VFR BLD CALC: 26.2 % — LOW (ref 39–50)
HGB BLD-MCNC: 9.3 G/DL — LOW (ref 13–17)
IANC: 3.58 K/UL — SIGNIFICANT CHANGE UP (ref 1.8–7.4)
IMM GRANULOCYTES NFR BLD AUTO: 0.3 % — SIGNIFICANT CHANGE UP (ref 0–0.9)
KETONES UR-MCNC: NEGATIVE MG/DL — SIGNIFICANT CHANGE UP
KETONES UR-MCNC: NEGATIVE MG/DL — SIGNIFICANT CHANGE UP
LEUKOCYTE ESTERASE UR-ACNC: ABNORMAL
LEUKOCYTE ESTERASE UR-ACNC: ABNORMAL
LYMPHOCYTES # BLD AUTO: 1.12 K/UL — SIGNIFICANT CHANGE UP (ref 1–3.3)
LYMPHOCYTES # BLD AUTO: 19 % — SIGNIFICANT CHANGE UP (ref 13–44)
MAGNESIUM SERPL-MCNC: 1.8 MG/DL — SIGNIFICANT CHANGE UP (ref 1.6–2.6)
MAGNESIUM SERPL-MCNC: 1.8 MG/DL — SIGNIFICANT CHANGE UP (ref 1.6–2.6)
MAGNESIUM SERPL-MCNC: 1.9 MG/DL — SIGNIFICANT CHANGE UP (ref 1.6–2.6)
MCHC RBC-ENTMCNC: 30 PG — SIGNIFICANT CHANGE UP (ref 27–34)
MCHC RBC-ENTMCNC: 35.5 G/DL — SIGNIFICANT CHANGE UP (ref 32–36)
MCV RBC AUTO: 84.5 FL — SIGNIFICANT CHANGE UP (ref 80–100)
MONOCYTES # BLD AUTO: 0.66 K/UL — SIGNIFICANT CHANGE UP (ref 0–0.9)
MONOCYTES NFR BLD AUTO: 11.2 % — SIGNIFICANT CHANGE UP (ref 2–14)
NEUTROPHILS # BLD AUTO: 3.58 K/UL — SIGNIFICANT CHANGE UP (ref 1.8–7.4)
NEUTROPHILS NFR BLD AUTO: 61 % — SIGNIFICANT CHANGE UP (ref 43–77)
NITRITE UR-MCNC: NEGATIVE — SIGNIFICANT CHANGE UP
NITRITE UR-MCNC: POSITIVE
NRBC # BLD AUTO: 0.03 K/UL — HIGH (ref 0–0)
NRBC # FLD: 0.03 K/UL — HIGH (ref 0–0)
NRBC BLD AUTO-RTO: 0 /100 WBCS — SIGNIFICANT CHANGE UP (ref 0–0)
OSMOLALITY UR: 356 MOSM/KG — SIGNIFICANT CHANGE UP (ref 50–1200)
PH UR: 6 — SIGNIFICANT CHANGE UP (ref 5–8)
PH UR: 6 — SIGNIFICANT CHANGE UP (ref 5–8)
PHOSPHATE SERPL-MCNC: 3.2 MG/DL — SIGNIFICANT CHANGE UP (ref 2.5–4.5)
PHOSPHATE SERPL-MCNC: 3.6 MG/DL — SIGNIFICANT CHANGE UP (ref 2.5–4.5)
PHOSPHATE SERPL-MCNC: 3.8 MG/DL — SIGNIFICANT CHANGE UP (ref 2.5–4.5)
PLATELET # BLD AUTO: 133 K/UL — LOW (ref 150–400)
POTASSIUM SERPL-MCNC: 5 MMOL/L — SIGNIFICANT CHANGE UP (ref 3.5–5.3)
POTASSIUM SERPL-MCNC: 5.4 MMOL/L — HIGH (ref 3.5–5.3)
POTASSIUM SERPL-MCNC: 5.4 MMOL/L — HIGH (ref 3.5–5.3)
POTASSIUM SERPL-SCNC: 5 MMOL/L — SIGNIFICANT CHANGE UP (ref 3.5–5.3)
POTASSIUM SERPL-SCNC: 5.4 MMOL/L — HIGH (ref 3.5–5.3)
POTASSIUM SERPL-SCNC: 5.4 MMOL/L — HIGH (ref 3.5–5.3)
POTASSIUM UR-SCNC: 21.2 MMOL/L — SIGNIFICANT CHANGE UP
PROT ?TM UR-MCNC: 31 MG/DL — SIGNIFICANT CHANGE UP
PROT SERPL-MCNC: 6.2 G/DL — SIGNIFICANT CHANGE UP (ref 6–8.3)
PROT UR-MCNC: 30 MG/DL
PROT UR-MCNC: 30 MG/DL
PROT/CREAT UR-RTO: 0.6 RATIO — HIGH (ref 0–0.2)
RBC # BLD: 3.1 M/UL — LOW (ref 4.2–5.8)
RBC # FLD: 12.6 % — SIGNIFICANT CHANGE UP (ref 10.3–14.5)
RBC CASTS # UR COMP ASSIST: 148 /HPF — HIGH (ref 0–4)
RBC CASTS # UR COMP ASSIST: 92 /HPF — HIGH (ref 0–4)
SODIUM SERPL-SCNC: 134 MMOL/L — LOW (ref 135–145)
SODIUM UR-SCNC: 45 MMOL/L — SIGNIFICANT CHANGE UP
SP GR SPEC: 1.02 — SIGNIFICANT CHANGE UP (ref 1–1.03)
SP GR SPEC: 1.02 — SIGNIFICANT CHANGE UP (ref 1–1.03)
SQUAMOUS # UR AUTO: 2 /HPF — SIGNIFICANT CHANGE UP (ref 0–5)
SQUAMOUS # UR AUTO: 4 /HPF — SIGNIFICANT CHANGE UP (ref 0–5)
TROPONIN T, HIGH SENSITIVITY RESULT: 46 NG/L — SIGNIFICANT CHANGE UP
UROBILINOGEN FLD QL: 0.2 MG/DL — SIGNIFICANT CHANGE UP (ref 0.2–1)
UROBILINOGEN FLD QL: 0.2 MG/DL — SIGNIFICANT CHANGE UP (ref 0.2–1)
UUN UR-MCNC: 390.1 MG/DL — SIGNIFICANT CHANGE UP
WBC # BLD: 5.88 K/UL — SIGNIFICANT CHANGE UP (ref 3.8–10.5)
WBC # FLD AUTO: 5.88 K/UL — SIGNIFICANT CHANGE UP (ref 3.8–10.5)
WBC UR QL: 18 /HPF — HIGH (ref 0–5)
WBC UR QL: 39 /HPF — HIGH (ref 0–5)

## 2025-04-26 PROCEDURE — 76770 US EXAM ABDO BACK WALL COMP: CPT | Mod: 26

## 2025-04-26 PROCEDURE — 93010 ELECTROCARDIOGRAM REPORT: CPT

## 2025-04-26 PROCEDURE — 99222 1ST HOSP IP/OBS MODERATE 55: CPT | Mod: GC

## 2025-04-26 PROCEDURE — 99223 1ST HOSP IP/OBS HIGH 75: CPT

## 2025-04-26 RX ORDER — SODIUM BICARBONATE 1 MEQ/ML
0.19 SYRINGE (ML) INTRAVENOUS
Qty: 150 | Refills: 0 | Status: DISCONTINUED | OUTPATIENT
Start: 2025-04-26 | End: 2025-04-26

## 2025-04-26 RX ORDER — SODIUM BICARBONATE 1 MEQ/ML
1300 SYRINGE (ML) INTRAVENOUS EVERY 8 HOURS
Refills: 0 | Status: DISCONTINUED | OUTPATIENT
Start: 2025-04-26 | End: 2025-05-07

## 2025-04-26 RX ORDER — DEXTROSE 50 % IN WATER 50 %
15 SYRINGE (ML) INTRAVENOUS ONCE
Refills: 0 | Status: DISCONTINUED | OUTPATIENT
Start: 2025-04-26 | End: 2025-05-07

## 2025-04-26 RX ORDER — MELATONIN 5 MG
3 TABLET ORAL AT BEDTIME
Refills: 0 | Status: DISCONTINUED | OUTPATIENT
Start: 2025-04-26 | End: 2025-05-07

## 2025-04-26 RX ORDER — TAMSULOSIN HYDROCHLORIDE 0.4 MG/1
0.4 CAPSULE ORAL AT BEDTIME
Refills: 0 | Status: DISCONTINUED | OUTPATIENT
Start: 2025-04-26 | End: 2025-05-07

## 2025-04-26 RX ORDER — SODIUM ZIRCONIUM CYCLOSILICATE 5 G/5G
10 POWDER, FOR SUSPENSION ORAL ONCE
Refills: 0 | Status: COMPLETED | OUTPATIENT
Start: 2025-04-26 | End: 2025-04-26

## 2025-04-26 RX ORDER — SODIUM ZIRCONIUM CYCLOSILICATE 5 G/5G
10 POWDER, FOR SUSPENSION ORAL
Refills: 0 | Status: DISCONTINUED | OUTPATIENT
Start: 2025-04-26 | End: 2025-04-26

## 2025-04-26 RX ORDER — METOPROLOL SUCCINATE 50 MG/1
1 TABLET, EXTENDED RELEASE ORAL
Refills: 0 | DISCHARGE

## 2025-04-26 RX ORDER — ASPIRIN 325 MG
81 TABLET ORAL DAILY
Refills: 0 | Status: DISCONTINUED | OUTPATIENT
Start: 2025-04-26 | End: 2025-04-29

## 2025-04-26 RX ORDER — ACETAMINOPHEN 500 MG/5ML
650 LIQUID (ML) ORAL EVERY 6 HOURS
Refills: 0 | Status: DISCONTINUED | OUTPATIENT
Start: 2025-04-26 | End: 2025-05-07

## 2025-04-26 RX ORDER — SODIUM BICARBONATE 1 MEQ/ML
650 SYRINGE (ML) INTRAVENOUS
Refills: 0 | Status: DISCONTINUED | OUTPATIENT
Start: 2025-04-26 | End: 2025-04-26

## 2025-04-26 RX ORDER — SODIUM CHLORIDE 9 G/1000ML
1000 INJECTION, SOLUTION INTRAVENOUS
Refills: 0 | Status: DISCONTINUED | OUTPATIENT
Start: 2025-04-26 | End: 2025-05-07

## 2025-04-26 RX ORDER — INSULIN LISPRO 100 U/ML
INJECTION, SOLUTION INTRAVENOUS; SUBCUTANEOUS AT BEDTIME
Refills: 0 | Status: DISCONTINUED | OUTPATIENT
Start: 2025-04-26 | End: 2025-05-07

## 2025-04-26 RX ORDER — CALCITRIOL 0.5 UG/1
0.25 CAPSULE, GELATIN COATED ORAL DAILY
Refills: 0 | Status: DISCONTINUED | OUTPATIENT
Start: 2025-04-26 | End: 2025-05-07

## 2025-04-26 RX ORDER — PIPERACILLIN-TAZO-DEXTROSE,ISO 2.25G/50ML
3.38 IV SOLUTION, PIGGYBACK PREMIX FROZEN(ML) INTRAVENOUS EVERY 12 HOURS
Refills: 0 | Status: DISCONTINUED | OUTPATIENT
Start: 2025-04-26 | End: 2025-04-29

## 2025-04-26 RX ORDER — HEPARIN SODIUM 1000 [USP'U]/ML
5000 INJECTION INTRAVENOUS; SUBCUTANEOUS EVERY 8 HOURS
Refills: 0 | Status: DISCONTINUED | OUTPATIENT
Start: 2025-04-26 | End: 2025-04-29

## 2025-04-26 RX ORDER — INSULIN LISPRO 100 U/ML
INJECTION, SOLUTION INTRAVENOUS; SUBCUTANEOUS
Refills: 0 | Status: DISCONTINUED | OUTPATIENT
Start: 2025-04-26 | End: 2025-05-07

## 2025-04-26 RX ORDER — DEXTROSE 50 % IN WATER 50 %
25 SYRINGE (ML) INTRAVENOUS ONCE
Refills: 0 | Status: DISCONTINUED | OUTPATIENT
Start: 2025-04-26 | End: 2025-05-07

## 2025-04-26 RX ORDER — ATORVASTATIN CALCIUM 80 MG/1
40 TABLET, FILM COATED ORAL AT BEDTIME
Refills: 0 | Status: DISCONTINUED | OUTPATIENT
Start: 2025-04-26 | End: 2025-05-07

## 2025-04-26 RX ORDER — SODIUM ZIRCONIUM CYCLOSILICATE 5 G/5G
10 POWDER, FOR SUSPENSION ORAL THREE TIMES A DAY
Refills: 0 | Status: DISCONTINUED | OUTPATIENT
Start: 2025-04-27 | End: 2025-04-27

## 2025-04-26 RX ORDER — SODIUM ZIRCONIUM CYCLOSILICATE 5 G/5G
10 POWDER, FOR SUSPENSION ORAL THREE TIMES A DAY
Refills: 0 | Status: DISCONTINUED | OUTPATIENT
Start: 2025-04-26 | End: 2025-04-26

## 2025-04-26 RX ORDER — GLUCAGON 3 MG/1
1 POWDER NASAL ONCE
Refills: 0 | Status: DISCONTINUED | OUTPATIENT
Start: 2025-04-26 | End: 2025-05-07

## 2025-04-26 RX ORDER — DEXTROSE 50 % IN WATER 50 %
12.5 SYRINGE (ML) INTRAVENOUS ONCE
Refills: 0 | Status: DISCONTINUED | OUTPATIENT
Start: 2025-04-26 | End: 2025-05-07

## 2025-04-26 RX ORDER — INFLUENZA A VIRUS A/IDAHO/07/2018 (H1N1) ANTIGEN (MDCK CELL DERIVED, PROPIOLACTONE INACTIVATED, INFLUENZA A VIRUS A/INDIANA/08/2018 (H3N2) ANTIGEN (MDCK CELL DERIVED, PROPIOLACTONE INACTIVATED), INFLUENZA B VIRUS B/SINGAPORE/INFTT-16-0610/2016 ANTIGEN (MDCK CELL DERIVED, PROPIOLACTONE INACTIVATED), INFLUENZA B VIRUS B/IOWA/06/2017 ANTIGEN (MDCK CELL DERIVED, PROPIOLACTONE INACTIVATED) 15; 15; 15; 15 UG/.5ML; UG/.5ML; UG/.5ML; UG/.5ML
0.5 INJECTION, SUSPENSION INTRAMUSCULAR ONCE
Refills: 0 | Status: DISCONTINUED | OUTPATIENT
Start: 2025-04-26 | End: 2025-05-07

## 2025-04-26 RX ORDER — CALCIUM GLUCONATE 20 MG/ML
2 INJECTION, SOLUTION INTRAVENOUS ONCE
Refills: 0 | Status: DISCONTINUED | OUTPATIENT
Start: 2025-04-26 | End: 2025-04-26

## 2025-04-26 RX ORDER — DEXTROSE 50 % IN WATER 50 %
50 SYRINGE (ML) INTRAVENOUS ONCE
Refills: 0 | Status: COMPLETED | OUTPATIENT
Start: 2025-04-26 | End: 2025-04-26

## 2025-04-26 RX ADMIN — Medication 1300 MILLIGRAM(S): at 21:04

## 2025-04-26 RX ADMIN — INSULIN LISPRO 2: 100 INJECTION, SOLUTION INTRAVENOUS; SUBCUTANEOUS at 11:17

## 2025-04-26 RX ADMIN — SODIUM ZIRCONIUM CYCLOSILICATE 10 GRAM(S): 5 POWDER, FOR SUSPENSION ORAL at 12:44

## 2025-04-26 RX ADMIN — HEPARIN SODIUM 5000 UNIT(S): 1000 INJECTION INTRAVENOUS; SUBCUTANEOUS at 21:03

## 2025-04-26 RX ADMIN — Medication 75 MEQ/KG/HR: at 07:39

## 2025-04-26 RX ADMIN — CALCITRIOL 0.25 MICROGRAM(S): 0.5 CAPSULE, GELATIN COATED ORAL at 12:44

## 2025-04-26 RX ADMIN — Medication 250 MILLIGRAM(S): at 00:41

## 2025-04-26 RX ADMIN — Medication 81 MILLIGRAM(S): at 12:44

## 2025-04-26 RX ADMIN — SODIUM ZIRCONIUM CYCLOSILICATE 10 GRAM(S): 5 POWDER, FOR SUSPENSION ORAL at 06:47

## 2025-04-26 RX ADMIN — Medication 25 GRAM(S): at 20:57

## 2025-04-26 RX ADMIN — ATORVASTATIN CALCIUM 40 MILLIGRAM(S): 80 TABLET, FILM COATED ORAL at 21:04

## 2025-04-26 RX ADMIN — SODIUM ZIRCONIUM CYCLOSILICATE 10 GRAM(S): 5 POWDER, FOR SUSPENSION ORAL at 17:18

## 2025-04-26 RX ADMIN — INSULIN LISPRO 1: 100 INJECTION, SOLUTION INTRAVENOUS; SUBCUTANEOUS at 16:47

## 2025-04-26 RX ADMIN — Medication 25 GRAM(S): at 09:44

## 2025-04-26 RX ADMIN — HEPARIN SODIUM 5000 UNIT(S): 1000 INJECTION INTRAVENOUS; SUBCUTANEOUS at 14:38

## 2025-04-26 RX ADMIN — Medication 50 MILLILITER(S): at 03:36

## 2025-04-26 RX ADMIN — TAMSULOSIN HYDROCHLORIDE 0.4 MILLIGRAM(S): 0.4 CAPSULE ORAL at 21:04

## 2025-04-26 NOTE — CONSULT NOTE ADULT - ATTENDING COMMENTS
Pt. with CKD, hyperkalemia, and metabolic acidosis currently admitted for management of sepsis/UTI. Jennie LEE reviewed, unclear baseline as no labs since 2019. Scr trend 1.4-2.4 during multiple hospitalizations in 2019. On admission Scr elevated at 2.76 (4/25) and remains stable at 2.72 today. Pt. is non-oliguric, documented urine output is ~1 L over the past 24 hours. Hyperkalemia and acidosis improved. Recommend to discontinue IV sodium bicarbonate infusion and start PO sodium bicarbonate. Serum potassium remains mildly elevated at 5.4, continue medical management of hyperkalemia including Lokelma 10 gm BID for today. Rest of plan, as outlined above. Monitor labs and urine output. Avoid nephrotoxins. Dose medications as per eGFR.     If you have any questions, please feel free to contact me  Celeste Barron  Nephrology  [phone] / [employer] Teams(Preferred)  (After 4 pm or on weekends please page the on-call fellow)

## 2025-04-26 NOTE — PROVIDER CONTACT NOTE (HYPOGLYCEMIA EVENT) - NS PROVIDER CONTACT BACKGROUND-HYPO
Age: 85y    Gender: Male    POCT Blood Glucose:  65 mg/dL (04-26-25 @ 03:28)  59 mg/dL (04-26-25 @ 03:27)  160 mg/dL (04-25-25 @ 22:23)  263 mg/dL (04-25-25 @ 21:20)  381 mg/dL (04-25-25 @ 18:54)      eMAR:  dextrose 50% Injectable   25 milliLiter(s) IV Push (04-25-25 @ 21:32)    dextrose 50% Injectable   50 milliLiter(s) IV Push (04-26-25 @ 03:36)    insulin regular  human recombinant   5 Unit(s) IV Push (04-25-25 @ 21:33)

## 2025-04-26 NOTE — CONSULT NOTE ADULT - PROBLEM SELECTOR RECOMMENDATION 3
Pt w/ metabolic acidosis iso renal failure. SCO2 low 16, BHB neg and venous pH WNL 7.35. Currently on sodium bicarb ggt, however CXR suggesting degree of congestion with edema. d/c bicarb ggt. Start sodium bicarb 1300 tid. Monitor labs.     If you have any questions, please feel free to contact me  Fabiola Hamilton  Nephrology Fellow  [employer]/Page [phone]  (After 5pm or on weekends please page the on-call fellow)

## 2025-04-26 NOTE — CONSULT NOTE ADULT - PROBLEM SELECTOR RECOMMENDATION 2
Pt w/ hyperkalemia iso medication use (Lisinopril and recently started on farxiga) and obstructive uropathy (~1L UOP w/ reynolds insertion). On admission Serum K elevated at 5.8 (4/25), imp to 5.4 w/ medical management. Recommend Lokelma 10g tid. Continue medical management. Monitor labs q12. HOLD Lisinopril and farxiga. Ensure low potassium/renal diet.

## 2025-04-26 NOTE — PROGRESS NOTE ADULT - PROBLEM SELECTOR PLAN 10
- Nutrition: Dysphagia screen --> soft, s/s eval   - Activity: as tolerated   - DVT Prophylaxis: subq hep  - Disposition: Pt pending, ambulates with walker at home - Nutrition: Soft bite size after passing dysphagia screen  - Activity: as tolerated   - DVT Prophylaxis: subq hep  - Disposition: Pt pending, ambulates with walker at home

## 2025-04-26 NOTE — PROGRESS NOTE ADULT - PROBLEM SELECTOR PLAN 9
Hyperglycemic 300 on admission --> hypoglycemic after hyperkalemia cocktail- BHB = 0 not DKA   - Home regimen: Glipizide 5mg AM and 10mg in the PM - hold   - fs q1h for 2h  - check a1c - ISS for now

## 2025-04-26 NOTE — PROGRESS NOTE ADULT - SUBJECTIVE AND OBJECTIVE BOX
Subjective/Overnight Events: No acute events overnight.    Objective:  Vital Signs Last 24 Hrs  T(C): 36.6 (26 Apr 2025 04:29), Max: 36.6 (26 Apr 2025 04:29)  T(F): 97.9 (26 Apr 2025 04:29), Max: 97.9 (26 Apr 2025 04:29)  HR: 60 (26 Apr 2025 04:29) (39 - 60)  BP: 122/52 (26 Apr 2025 04:29) (102/52 - 122/52)  BP(mean): 67 (25 Apr 2025 23:44) (67 - 67)  RR: 15 (26 Apr 2025 04:29) (13 - 16)  SpO2: 95% (26 Apr 2025 04:29) (95% - 98%)    Parameters below as of 26 Apr 2025 04:29  Patient On (Oxygen Delivery Method): room air      MEDICATIONS  (STANDING):  aspirin  chewable 81 milliGRAM(s) Oral daily  atorvastatin 40 milliGRAM(s) Oral at bedtime  calcitriol   Capsule 0.25 MICROGram(s) Oral daily  dextrose 5%. 1000 milliLiter(s) (100 mL/Hr) IV Continuous <Continuous>  dextrose 5%. 1000 milliLiter(s) (50 mL/Hr) IV Continuous <Continuous>  dextrose 50% Injectable 25 Gram(s) IV Push once  dextrose 50% Injectable 12.5 Gram(s) IV Push once  dextrose 50% Injectable 25 Gram(s) IV Push once  glucagon  Injectable 1 milliGRAM(s) IntraMuscular once  heparin   Injectable 5000 Unit(s) SubCutaneous every 8 hours  insulin lispro (ADMELOG) corrective regimen sliding scale   SubCutaneous three times a day before meals  insulin lispro (ADMELOG) corrective regimen sliding scale   SubCutaneous at bedtime  piperacillin/tazobactam IVPB.. 3.375 Gram(s) IV Intermittent every 12 hours  sodium bicarbonate  Infusion 0.187 mEq/kG/Hr (75 mL/Hr) IV Continuous <Continuous>  sodium zirconium cyclosilicate 10 Gram(s) Oral three times a day  tamsulosin 0.4 milliGRAM(s) Oral at bedtime    MEDICATIONS  (PRN):  acetaminophen     Tablet .. 650 milliGRAM(s) Oral every 6 hours PRN Temp greater or equal to 38C (100.4F), Mild Pain (1 - 3)  dextrose Oral Gel 15 Gram(s) Oral once PRN Blood Glucose LESS THAN 70 milliGRAM(s)/deciliter  melatonin 3 milliGRAM(s) Oral at bedtime PRN Insomnia    Allergies    No Known Allergies    Intolerances    Labs:                          9.3    5.88  )-----------( 133      ( 26 Apr 2025 05:33 )             26.2       04-26    134[L]  |  108[H]  |  67[H]  ----------------------------<  94  5.4[H]   |  15[L]  |  2.76[H]    Ca    9.4      26 Apr 2025 05:33  Phos  3.6     04-26  Mg     1.80     04-26    TPro  6.2  /  Alb  3.3  /  TBili  0.3  /  DBili  x   /  AST  50[H]  /  ALT  84[H]  /  AlkPhos  105  04-26    PT/INR - ( 25 Apr 2025 20:03 )   PT: 11.4 sec;   INR: 0.98 ratio         PTT - ( 25 Apr 2025 20:03 )  PTT:37.2 sec    VBG:  pH: 7.19  pCO2: 50    VBG: (most recent)  pH: 7.28  pCO2: 36    Imaging:    FINDINGS:    CT BRAIN:    VENTRICLES AND SULCI: Age appropriate involutional changes.  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 No mass effect, acute   hemorrhage, or midline shift.  There are periventricular and subcortical   white matter hypodensities, consistent with microvascular type changes.  EXTRA-AXIAL: No mass or fluid collection. Basal cisterns are normal in   appearance.  POST CONTRAST: No abnormal enhancing lesion.    VISUALIZED SINUSES:  Scattered mucosal thickening. Nonpneumatization   right frontal sinus, anatomic variant. Near complete opacification left   maxillary sinus. Moderate mucosal thickening ethmoid air cells with near   complete opacification extending into the frontal ethmoidal recess with   near complete opacification of the left frontal sinus. Moderate mucosal   thickening of the right frontal ethmoidal recess.  TYMPANOMASTOID CAVITIES:  Mastoid air cells clear. Nonspecific soft   tissue density left external auditory canal.  VISUALIZED ORBITS: Bilateral lens replacement.  CALVARIUM: Generalized osteopenia.      CTA NECK:    AORTIC ARCH AND VISUALIZED GREAT VESSELS: Atherosclerotic changes. Bovine   aortic arch.    RIGHT:  COMMON CAROTID ARTERY: Minimal atherosclerotic changes at the bifurcation   without significant stenosis.  INTERNAL CAROTID ARTERY: No significant stenosis based on NASCET criteria.  VERTEBRAL ARTERY: Hypoplastic.    LEFT:  COMMON CAROTID ARTERY: Minimal atherosclerotic changes at the bifurcation   without significant stenosis.  INTERNAL CAROTID ARTERY: Atherosclerotic changes at the origin with   resultant mild to moderate stenosis based on NASCET criteria.  VERTEBRAL ARTERY: Normal in course and caliber to the intracranial   circulation. Vertebral artery dominance.    VISUALIZED LUNGS: Bilateral dependent atelectasis. Right greater than   left apical emphysematous changes. Perifissural nodular opacities along   the right major and minor fissure measuring up to 1.0 cm (3-63),   indeterminate.    MISCELLANEOUS: None.    CAROTID STENOSIS REFERENCE: Percent (%) stenosis is expressed in terms of   NASCET Criteria. (NASCET = 100x1-(N/D)). N=greatest narrowing. D=normal   distal diameter - MILD = <50% stenosis. - MODERATE = 50-69% stenosis. -   SEVERE = 70-89% stenosis. - HAIRLINE/CRITICAL = 90-99% stenosis. -   OCCLUDED = 100%stenosis.      CTA HEAD:    INTERNAL CAROTID ARTERIES: Atherosclerotic changes of the bilateral   petrous through cavernous segments with mild stenosis of the right   cavernous segment. 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  MIDDLE CEREBRAL ARTERIES: No significant stenosis or occlusion.  POSTERIOR CEREBRAL ARTERIES: Fetal origin right PCA    DISTAL VERTEBRAL / BASILAR ARTERIES: Trace atherosclerotic changes of the   proximal V4 segments without significant stenosis.    VENOUS STRUCTURES: Inadequate venous phase opacification.    MISCELLANEOUS: No other vascular abnormality is seen.      IMPRESSION:    CT HEAD:  No evidence of acute intracranial pathology.  Chronic age involutional and microangiopathic ischemic changes.  Moderate to marked paranasal sinus disease as described.  Recommend ENT evaluation, treatment and follow-up.  Nonspecific soft tissue density left external auditory canal, correlate   otoscopy.    CTA NECK:  Mild to moderate left proximal internal carotid artery stenosis.  Left vertebral artery artery dominance, hypoplastic right vertebral   artery.    Indeterminate perifissural lung nodules as described.  Additional follow-up/follow-up as per Fleischner criteria.    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  Recommend neurosurgical consultation/follow-up.  Anatomic variants as described.    --- End of Report ---           Subjective/Overnight Events: No acute events overnight. Patient seen and examined by me this morning. Reports his speech has improved, denies nausea, vomiting, diarrhea, constipation, cough, fever, chest pain, and cough.    Objective:  Vital Signs Last 24 Hrs  T(C): 36.6 (26 Apr 2025 04:29), Max: 36.6 (26 Apr 2025 04:29)  T(F): 97.9 (26 Apr 2025 04:29), Max: 97.9 (26 Apr 2025 04:29)  HR: 60 (26 Apr 2025 04:29) (39 - 60)  BP: 122/52 (26 Apr 2025 04:29) (102/52 - 122/52)  BP(mean): 67 (25 Apr 2025 23:44) (67 - 67)  RR: 15 (26 Apr 2025 04:29) (13 - 16)  SpO2: 95% (26 Apr 2025 04:29) (95% - 98%)    Parameters below as of 26 Apr 2025 04:29  Patient On (Oxygen Delivery Method): room air      MEDICATIONS  (STANDING):  aspirin  chewable 81 milliGRAM(s) Oral daily  atorvastatin 40 milliGRAM(s) Oral at bedtime  calcitriol   Capsule 0.25 MICROGram(s) Oral daily  dextrose 5%. 1000 milliLiter(s) (100 mL/Hr) IV Continuous <Continuous>  dextrose 5%. 1000 milliLiter(s) (50 mL/Hr) IV Continuous <Continuous>  dextrose 50% Injectable 25 Gram(s) IV Push once  dextrose 50% Injectable 12.5 Gram(s) IV Push once  dextrose 50% Injectable 25 Gram(s) IV Push once  glucagon  Injectable 1 milliGRAM(s) IntraMuscular once  heparin   Injectable 5000 Unit(s) SubCutaneous every 8 hours  insulin lispro (ADMELOG) corrective regimen sliding scale   SubCutaneous three times a day before meals  insulin lispro (ADMELOG) corrective regimen sliding scale   SubCutaneous at bedtime  piperacillin/tazobactam IVPB.. 3.375 Gram(s) IV Intermittent every 12 hours  sodium bicarbonate  Infusion 0.187 mEq/kG/Hr (75 mL/Hr) IV Continuous <Continuous>  sodium zirconium cyclosilicate 10 Gram(s) Oral three times a day  tamsulosin 0.4 milliGRAM(s) Oral at bedtime    MEDICATIONS  (PRN):  acetaminophen     Tablet .. 650 milliGRAM(s) Oral every 6 hours PRN Temp greater or equal to 38C (100.4F), Mild Pain (1 - 3)  dextrose Oral Gel 15 Gram(s) Oral once PRN Blood Glucose LESS THAN 70 milliGRAM(s)/deciliter  melatonin 3 milliGRAM(s) Oral at bedtime PRN Insomnia    Allergies    No Known Allergies    Intolerances    Labs:                          9.3    5.88  )-----------( 133      ( 26 Apr 2025 05:33 )             26.2       04-26    134[L]  |  108[H]  |  67[H]  ----------------------------<  94  5.4[H]   |  15[L]  |  2.76[H]    Ca    9.4      26 Apr 2025 05:33  Phos  3.6     04-26  Mg     1.80     04-26    TPro  6.2  /  Alb  3.3  /  TBili  0.3  /  DBili  x   /  AST  50[H]  /  ALT  84[H]  /  AlkPhos  105  04-26    PT/INR - ( 25 Apr 2025 20:03 )   PT: 11.4 sec;   INR: 0.98 ratio         PTT - ( 25 Apr 2025 20:03 )  PTT:37.2 sec    VBG:  pH: 7.19  pCO2: 50    VBG: (most recent)  pH: 7.28  pCO2: 36    Imaging:    FINDINGS:    CT BRAIN:    VENTRICLES AND SULCI: Age appropriate involutional changes.  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 No mass effect, acute   hemorrhage, or midline shift.  There are periventricular and subcortical   white matter hypodensities, consistent with microvascular type changes.  EXTRA-AXIAL: No mass or fluid collection. Basal cisterns are normal in   appearance.  POST CONTRAST: No abnormal enhancing lesion.    VISUALIZED SINUSES:  Scattered mucosal thickening. Nonpneumatization   right frontal sinus, anatomic variant. Near complete opacification left   maxillary sinus. Moderate mucosal thickening ethmoid air cells with near   complete opacification extending into the frontal ethmoidal recess with   near complete opacification of the left frontal sinus. Moderate mucosal   thickening of the right frontal ethmoidal recess.  TYMPANOMASTOID CAVITIES:  Mastoid air cells clear. Nonspecific soft   tissue density left external auditory canal.  VISUALIZED ORBITS: Bilateral lens replacement.  CALVARIUM: Generalized osteopenia.      CTA NECK:    AORTIC ARCH AND VISUALIZED GREAT VESSELS: Atherosclerotic changes. Bovine   aortic arch.    RIGHT:  COMMON CAROTID ARTERY: Minimal atherosclerotic changes at the bifurcation   without significant stenosis.  INTERNAL CAROTID ARTERY: No significant stenosis based on NASCET criteria.  VERTEBRAL ARTERY: Hypoplastic.    LEFT:  COMMON CAROTID ARTERY: Minimal atherosclerotic changes at the bifurcation   without significant stenosis.  INTERNAL CAROTID ARTERY: Atherosclerotic changes at the origin with   resultant mild to moderate stenosis based on NASCET criteria.  VERTEBRAL ARTERY: Normal in course and caliber to the intracranial   circulation. Vertebral artery dominance.    VISUALIZED LUNGS: Bilateral dependent atelectasis. Right greater than   left apical emphysematous changes. Perifissural nodular opacities along   the right major and minor fissure measuring up to 1.0 cm (3-63),   indeterminate.    MISCELLANEOUS: None.    CAROTID STENOSIS REFERENCE: Percent (%) stenosis is expressed in terms of   NASCET Criteria. (NASCET = 100x1-(N/D)). N=greatest narrowing. D=normal   distal diameter - MILD = <50% stenosis. - MODERATE = 50-69% stenosis. -   SEVERE = 70-89% stenosis. - HAIRLINE/CRITICAL = 90-99% stenosis. -   OCCLUDED = 100%stenosis.      CTA HEAD:    INTERNAL CAROTID ARTERIES: Atherosclerotic changes of the bilateral   petrous through cavernous segments with mild stenosis of the right   cavernous segment. 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  MIDDLE CEREBRAL ARTERIES: No significant stenosis or occlusion.  POSTERIOR CEREBRAL ARTERIES: Fetal origin right PCA    DISTAL VERTEBRAL / BASILAR ARTERIES: Trace atherosclerotic changes of the   proximal V4 segments without significant stenosis.    VENOUS STRUCTURES: Inadequate venous phase opacification.    MISCELLANEOUS: No other vascular abnormality is seen.      IMPRESSION:    CT HEAD:  No evidence of acute intracranial pathology.  Chronic age involutional and microangiopathic ischemic changes.  Moderate to marked paranasal sinus disease as described.  Recommend ENT evaluation, treatment and follow-up.  Nonspecific soft tissue density left external auditory canal, correlate   otoscopy.    CTA NECK:  Mild to moderate left proximal internal carotid artery stenosis.  Left vertebral artery artery dominance, hypoplastic right vertebral   artery.    Indeterminate perifissural lung nodules as described.  Additional follow-up/follow-up as per Fleischner criteria.    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  Recommend neurosurgical consultation/follow-up.  Anatomic variants as described.    --- End of Report ---           Subjective/Overnight Events: No acute events overnight. Patient seen and examined by me this morning. Reports his speech has improved, denies nausea, vomiting, diarrhea, constipation, cough, fever, chest pain, and cough.    Objective:  Vital Signs Last 24 Hrs  T(C): 36.6 (26 Apr 2025 04:29), Max: 36.6 (26 Apr 2025 04:29)  T(F): 97.9 (26 Apr 2025 04:29), Max: 97.9 (26 Apr 2025 04:29)  HR: 60 (26 Apr 2025 04:29) (39 - 60)  BP: 122/52 (26 Apr 2025 04:29) (102/52 - 122/52)  BP(mean): 67 (25 Apr 2025 23:44) (67 - 67)  RR: 15 (26 Apr 2025 04:29) (13 - 16)  SpO2: 95% (26 Apr 2025 04:29) (95% - 98%)    Parameters below as of 26 Apr 2025 04:29  Patient On (Oxygen Delivery Method): room air      MEDICATIONS  (STANDING):  aspirin  chewable 81 milliGRAM(s) Oral daily  atorvastatin 40 milliGRAM(s) Oral at bedtime  calcitriol   Capsule 0.25 MICROGram(s) Oral daily  dextrose 5%. 1000 milliLiter(s) (100 mL/Hr) IV Continuous <Continuous>  dextrose 5%. 1000 milliLiter(s) (50 mL/Hr) IV Continuous <Continuous>  dextrose 50% Injectable 25 Gram(s) IV Push once  dextrose 50% Injectable 12.5 Gram(s) IV Push once  dextrose 50% Injectable 25 Gram(s) IV Push once  glucagon  Injectable 1 milliGRAM(s) IntraMuscular once  heparin   Injectable 5000 Unit(s) SubCutaneous every 8 hours  insulin lispro (ADMELOG) corrective regimen sliding scale   SubCutaneous three times a day before meals  insulin lispro (ADMELOG) corrective regimen sliding scale   SubCutaneous at bedtime  piperacillin/tazobactam IVPB.. 3.375 Gram(s) IV Intermittent every 12 hours  sodium bicarbonate  Infusion 0.187 mEq/kG/Hr (75 mL/Hr) IV Continuous <Continuous>  sodium zirconium cyclosilicate 10 Gram(s) Oral three times a day  tamsulosin 0.4 milliGRAM(s) Oral at bedtime    MEDICATIONS  (PRN):  acetaminophen     Tablet .. 650 milliGRAM(s) Oral every 6 hours PRN Temp greater or equal to 38C (100.4F), Mild Pain (1 - 3)  dextrose Oral Gel 15 Gram(s) Oral once PRN Blood Glucose LESS THAN 70 milliGRAM(s)/deciliter  melatonin 3 milliGRAM(s) Oral at bedtime PRN Insomnia    Allergies    No Known Allergies    Intolerances    Labs:                          9.3    5.88  )-----------( 133      ( 26 Apr 2025 05:33 )             26.2       04-26    134[L]  |  108[H]  |  67[H]  ----------------------------<  94  5.4[H]   |  15[L]  |  2.76[H]    Ca    9.4      26 Apr 2025 05:33  Phos  3.6     04-26  Mg     1.80     04-26    TPro  6.2  /  Alb  3.3  /  TBili  0.3  /  DBili  x   /  AST  50[H]  /  ALT  84[H]  /  AlkPhos  105  04-26    PT/INR - ( 25 Apr 2025 20:03 )   PT: 11.4 sec;   INR: 0.98 ratio         PTT - ( 25 Apr 2025 20:03 )  PTT:37.2 sec    VBG:  pH: 7.19  pCO2: 50    VBG: (most recent)  pH: 7.28  pCO2: 36    Imaging:    FINDINGS:    CT BRAIN:    VENTRICLES AND SULCI: Age appropriate involutional changes.  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 No mass effect, acute   hemorrhage, or midline shift.  There are periventricular and subcortical   white matter hypodensities, consistent with microvascular type changes.  EXTRA-AXIAL: No mass or fluid collection. Basal cisterns are normal in   appearance.  POST CONTRAST: No abnormal enhancing lesion.    VISUALIZED SINUSES:  Scattered mucosal thickening. Nonpneumatization   right frontal sinus, anatomic variant. Near complete opacification left   maxillary sinus. Moderate mucosal thickening ethmoid air cells with near   complete opacification extending into the frontal ethmoidal recess with   near complete opacification of the left frontal sinus. Moderate mucosal   thickening of the right frontal ethmoidal recess.  TYMPANOMASTOID CAVITIES:  Mastoid air cells clear. Nonspecific soft   tissue density left external auditory canal.  VISUALIZED ORBITS: Bilateral lens replacement.  CALVARIUM: Generalized osteopenia.      CTA NECK:    AORTIC ARCH AND VISUALIZED GREAT VESSELS: Atherosclerotic changes. Bovine   aortic arch.    RIGHT:  COMMON CAROTID ARTERY: Minimal atherosclerotic changes at the bifurcation   without significant stenosis.  INTERNAL CAROTID ARTERY: No significant stenosis based on NASCET criteria.  VERTEBRAL ARTERY: Hypoplastic.    LEFT:  COMMON CAROTID ARTERY: Minimal atherosclerotic changes at the bifurcation   without significant stenosis.  INTERNAL CAROTID ARTERY: Atherosclerotic changes at the origin with   resultant mild to moderate stenosis based on NASCET criteria.  VERTEBRAL ARTERY: Normal in course and caliber to the intracranial   circulation. Vertebral artery dominance.    VISUALIZED LUNGS: Bilateral dependent atelectasis. Right greater than   left apical emphysematous changes. Perifissural nodular opacities along   the right major and minor fissure measuring up to 1.0 cm (3-63),   indeterminate.    MISCELLANEOUS: None.    CAROTID STENOSIS REFERENCE: Percent (%) stenosis is expressed in terms of   NASCET Criteria. (NASCET = 100x1-(N/D)). N=greatest narrowing. D=normal   distal diameter - MILD = <50% stenosis. - MODERATE = 50-69% stenosis. -   SEVERE = 70-89% stenosis. - HAIRLINE/CRITICAL = 90-99% stenosis. -   OCCLUDED = 100%stenosis.      CTA HEAD:    INTERNAL CAROTID ARTERIES: Atherosclerotic changes of the bilateral   petrous through cavernous segments with mild stenosis of the right   cavernous segment. 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  MIDDLE CEREBRAL ARTERIES: No significant stenosis or occlusion.  POSTERIOR CEREBRAL ARTERIES: Fetal origin right PCA    DISTAL VERTEBRAL / BASILAR ARTERIES: Trace atherosclerotic changes of the   proximal V4 segments without significant stenosis.    VENOUS STRUCTURES: Inadequate venous phase opacification.    MISCELLANEOUS: No other vascular abnormality is seen.      IMPRESSION:    CT HEAD:  No evidence of acute intracranial pathology.  Chronic age involutional and microangiopathic ischemic changes.  Moderate to marked paranasal sinus disease as described.  Recommend ENT evaluation, treatment and follow-up.  Nonspecific soft tissue density left external auditory canal, correlate   otoscopy.    CTA NECK:  Mild to moderate left proximal internal carotid artery stenosis.  Left vertebral artery artery dominance, hypoplastic right vertebral   artery.    Indeterminate perifissural lung nodules as described.  Additional follow-up/follow-up as per Fleischner criteria.    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  Recommend neurosurgical consultation/follow-up.  Anatomic variants as described.    --- End of Report ---        Consultant notes reviewed: Renal

## 2025-04-26 NOTE — H&P ADULT - NSHPPHYSICALEXAM_GEN_ALL_CORE
GENERAL: NAD, lying in bed comfortably  HEAD: Atraumatic, normocephalic  EYES: EOMI, PERRL, conjunctiva and sclera clear  ENT: moist mucous membranes  NECK: Supple, no JVD, no thyroidmegaly   HEART: S1, S2, Regular rate and rhythm, no murmurs, rubs, or gallops, extremities wwp   LUNGS: Unlabored respirations, clear to auscultation bilaterally, no crackles, wheezing, or rhonchi  ABDOMEN: Soft, nontender, nondistended, +BS, no CVA tenderness, no suprapubic tenderness   EXTREMITIES: 2+ peripheral pulses bilaterally. No clubbing, cyanosis, or edema  NERVOUS SYSTEM:  A&Ox3, symmetric smile, midline tongue, 5/5 strength UE / LE   SKIN: No rashes or lesions

## 2025-04-26 NOTE — H&P ADULT - PROBLEM SELECTOR PLAN 6
K of 5.8 on admission possibly 2/2 to TAYLOR/CKD. Given insulin / D50 / calcium   > f/u repeat K   > if persistently high can consider a dose of IV diuretic   > add Lokelma if patient passes dysphagia   > monitor on tele

## 2025-04-26 NOTE — PATIENT PROFILE ADULT - FUNCTIONAL ASSESSMENT - BASIC MOBILITY 3.
[facility] called with Dr. Allison Shelby on line to speak with Dr. Veronica Eddy.      Mehdi HOOKER Reser  03/07/20 2059 3 = A little assistance

## 2025-04-26 NOTE — H&P ADULT - PROBLEM SELECTOR PLAN 8
Hyperglycemic 300 on admission --> hypoglycemic after hyperkalemia cocktail- BHB = 0 not DKA   > Home regimen: Glipizide 5mg AM and 10mg in the PM - hold   > fs q1h for 2h  > check a1c - ISS for now

## 2025-04-26 NOTE — PROGRESS NOTE ADULT - PROBLEM SELECTOR PLAN 4
Slurred speech for 3-4d, neuro exam unremarkable other than slurred speech. Likely toxic metabolic encephalopathy from infection  CTH and neck with angio - age related changes, paranasal sinus disease, mild to mod left proximal ICA stenosis, hypoplastic R vertebral artery, 3mm anterior communicating artery saccular aneurysm   - aspiration precautions   - passed dysphagia screen

## 2025-04-26 NOTE — H&P ADULT - NSHPSOCIALHISTORY_GEN_ALL_CORE
Patient baseline AOx3 and ambulates with a walker. He lives with daughter. Former smoker. No alcohol use.

## 2025-04-26 NOTE — PROGRESS NOTE ADULT - PROBLEM SELECTOR PLAN 1
Slurred speech at home, urinary retention w/o dysuria, Hypothermic 92F on admission. likely urinary source   - u/a - protein, blood, LE, pyuria, rvp negative   - CXR - w/o consolidations   - c/w zoysn (renally dosed), hold off vanc likely urinary   - f/u Ucx and Bcx Slurred speech at home, urinary retention w/o dysuria, Hypothermic 92F on admission. likely urinary source   - u/a - protein, blood, LE, pyuria, rvp negative   - CXR - w/o consolidations   - c/w zoysn (renally dosed), hold off vanc given likely urinary source   - f/u Ucx and Bcx

## 2025-04-26 NOTE — H&P ADULT - PROBLEM SELECTOR PLAN 9
- Nutrition: Dysphagia screen --> soft, s/s eval   - Activity: as tolerated   - DVT Prophylaxis: Lovenox    - Disposition: Pt pending, ambulates with walker at home - Nutrition: Dysphagia screen --> soft, s/s eval   - Activity: as tolerated   - DVT Prophylaxis: subq hep  - Disposition: Pt pending, ambulates with walker at home

## 2025-04-26 NOTE — PROGRESS NOTE ADULT - ATTENDING COMMENTS
Patient seen and examined, d/w Dr. Carpenter, agree w/ above.     84 yo M w/ CAD s/p CABG c/b post-op afib, CKD3, HTN, HLD, DM2, presenting for slurred speech (imaging negative for acute infarct), in the setting of hypothermia and bradycardia, with potential TAYLOR on CKD with hyperkalemia and metabolic acidosis, course c/b hypoglycemia likely secondary to temporizing measures for hyperkalemia.     Currently patient's temperature has improved (no longer hypothermic) and his HR has improved (to the 70s). Patient's speech is understandable, will need to obtain collateral from family re: how it compares to his baseline. On my exam, he is orientated to self and hospital, but not to year. Discussed concern for urinary tract infection and plan to followup cultures, on antibiotics. Renal input appreciated, U/S obtained, without hydronephrosis, with bladder wall thickening that could correlate with cystitis.     # Hypothermia (improved)  - ?related to infection or metabolic acidosis impairing thermoregulation vs other  - AM cortisol, TSH wnl, CT without acute infarcts  - continue to monitor    # Bradycardia  - ?related to hypothermia, electrolyte abnormalities, medications?   - holding b-blocker  - monitor on tele, HR improving     # UTI  # metabolic encephalopathy  - +U/A, U/S w/ bladder wall thickening  - on zosyn, f/u cultures, tailor abx as per speciation/sensitivities  - currently AOx2 for me, f/u family re: collateral on baseline  - passed dysphagia screen    # possible TAYLOR on CKD  # metabolic acidosis  # hyperkalemia  - hold farxiga, lisinopril etc, avoid nephrotoxins  - monitor renal function and UOP  - c/w lokelma as per renal  - s/p sodium bicarb gtt, transition to oral bicarb supplements as per renal    # CAD s/p CABG  # HLD  # hx of afib, not on anticoagulation  - c/w asa/statin    # DM2  - hold home oral medications  - c/b hypoglycemia likely secondary to temporizing measure for hyperkalemia  - monitor FS, on sliding scale coverage  - f/u A1C

## 2025-04-26 NOTE — PHYSICAL THERAPY INITIAL EVALUATION ADULT - PERTINENT HX OF CURRENT PROBLEM, REHAB EVAL
As per chart review, " 85M CAD s/p CABG (in August 2019 with post-op pAfib/AFlutter previously on amio/eliquis now discontinued), CKD stage III, HTN, HLD, T2DM,  presenting from home with daughter with slurred speech starting 4/23, hypothermic, bradycardic, hyperkalemia and hyperglycemia, admitted for sepsis 2/2 likely UTI. "

## 2025-04-26 NOTE — PROGRESS NOTE ADULT - PROBLEM SELECTOR PLAN 3
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 Will continue to monitor since this has improved, however, if the patient becomes bradycardic again, will reach out to EP for interrogation of loop recorder

## 2025-04-26 NOTE — PROGRESS NOTE ADULT - ASSESSMENT
85M CAD s/p CABG (in August 2019 with post-op pAfib/AFlutter previously on amio/eliquis now discontinued), CKD stage III, HTN, HLD, T2DM,  presenting from home with daughter with slurred speech starting 4/23, hypothermic, bradycardic, hyperkalemia and hyperglycemia, admitted for sepsis 2/2 likely UTI.  85M CAD s/p CABG (in August 2019 with post-op pAfib/AFlutter previously on amio/eliquis now discontinued), CKD stage III, HTN, HLD, T2DM,  presenting from home with daughter with slurred speech starting 4/23, hypothermic, bradycardic, hyperkalemia and hyperglycemia, admitted for management of UTI, bradycardia, and electrolyte abnormalities.

## 2025-04-26 NOTE — ED ADULT NURSE REASSESSMENT NOTE - NS ED NURSE REASSESS COMMENT FT1
Angel Vernon made aware of downtrending fingerstick glucose values. per MD will come to see pt at bedside soon. pt remains at baseline mental status, RR even unlabored completing full sentences. denies any acute complaints on assessment. sodium bicarb drip initiated as ordered per Nephrology recommendations.

## 2025-04-26 NOTE — PROGRESS NOTE ADULT - PROBLEM SELECTOR PLAN 7
K of 5.8 on admission possibly 2/2 to TAYLOR/CKD. Given insulin / D50 / calcium   - CTM K   - if persistently high can consider a dose of IV diuretic   - add Lokelma if patient passes dysphagia   - monitor on tele K of 5.8 on admission possibly 2/2 to TAYLOR/CKD. Given insulin / D50 / calcium   - CTM K   - if persistently high can consider a dose of IV diuretic   - add Lokelma if patient passes dysphagia screen  - monitor on tele

## 2025-04-26 NOTE — H&P ADULT - PROBLEM SELECTOR PLAN 1
Slurred speech at home, urinary retention w/o dysuria, Hypothermic 92F on admission. likely urinary source   - u/a - protein, blood, LE, pyuria, rvp negative   - CXR - w/o consolidations   > c/w vanc by level and zoysn (renally dosed)   > f/u Ucx and Bcx Slurred speech at home, urinary retention w/o dysuria, Hypothermic 92F on admission. likely urinary source   - u/a - protein, blood, LE, pyuria, rvp negative   - CXR - w/o consolidations   > c/w zoysn (renally dosed), hold off vanc likely urinary   > f/u Ucx and Bcx

## 2025-04-26 NOTE — ED ADULT NURSE REASSESSMENT NOTE - NS ED NURSE REASSESS COMMENT FT1
Pt  received from primary RN stable. Respirations even and unlabored. Pt expresses no discomfort at this time. Labs obtained. Safety precautions in place.

## 2025-04-26 NOTE — H&P ADULT - PROBLEM SELECTOR PLAN 3
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

## 2025-04-26 NOTE — H&P ADULT - HISTORY OF PRESENT ILLNESS
85M CAD s/p CABG (in August 2019 with post-op pAfib/AFlutter previously on amio/eliquis now discontinued), CKD stage III, HTN, HLD, T2DM,  presenting from home with daughter with slurred speech starting 4/23. As per the patient, he denies any acute symptoms including fever, chills, weakness, numbness, dyspnea, chest pain, dysuria, diarrhea, constipation, abdominal pain. Collateral obtained from the daughter (Gloria) who reports that since 4/23 she noted slurred speech without associated confusion. Daughter confirmed that patient doesn't have any other related symptoms.   Of note, patient did start Farxiga last week, and nifedipine 2 weeks prior. He has not started any new medications, no new OTC meds or NSAIDS use. Patient denies recent travel or sick contacts.   Patient baseline AOx3 and ambulates with a walker. He lives with daughter. Former smoker. No alcohol use.     In the ED: Temp 92F, /50, [id number], RR 18 on RA  Patient was given Vancomcyin 1g and Zosyn, 1L NS bolus.   Tele reviewed with several sinus pauses, longest being ~ 2.8s.   Patient received insulin 5u w/ D25 for hyperkalemia of 5.8, but was subsequently hypoglycemic and was given an addition D50 push.   Mosher was placed in the ED for urinary retention, w/ > 1L urine returned

## 2025-04-26 NOTE — PHYSICAL THERAPY INITIAL EVALUATION ADULT - LEVEL OF INDEPENDENCE: SIT/STAND, REHAB EVAL
Unable to perform due to limited by lines, RN able to disconnect from bed at this time and pt deferring

## 2025-04-26 NOTE — H&P ADULT - ASSESSMENT
85M CAD s/p CABG (in August 2019 with post-op pAfib/AFlutter previously on amio/eliquis now discontinued), CKD stage III, HTN, HLD, T2DM,  presenting from home with daughter with slurred speech starting 4/23, hypothermic, bradycardic, hyperkalemia and hyperglycemia, admitted for sepsis 2/2 likely UTI.

## 2025-04-26 NOTE — H&P ADULT - NSHPLABSRESULTS_GEN_ALL_CORE
LABS:                        10.3   7.08  )-----------( 139      ( 25 Apr 2025 20:03 )             29.6     04-26    134[L]  |  108[H]  |  69[H]  ----------------------------<  76  5.0   |  16[L]  |  2.64[H]    Ca    9.4      26 Apr 2025 02:00  Phos  3.8     04-26  Mg     1.90     04-26    TPro  7.1  /  Alb  3.7  /  TBili  <0.2  /  DBili  x   /  AST  54[H]  /  ALT  89[H]  /  AlkPhos  117  04-25    PT/INR - ( 25 Apr 2025 20:03 )   PT: 11.4 sec;   INR: 0.98 ratio         PTT - ( 25 Apr 2025 20:03 )  PTT:37.2 sec      Urinalysis Basic - ( 26 Apr 2025 02:00 )    Color: x / Appearance: x / SG: x / pH: x  Gluc: 76 mg/dL / Ketone: x  / Bili: x / Urobili: x   Blood: x / Protein: x / Nitrite: x   Leuk Esterase: x / RBC: x / WBC x   Sq Epi: x / Non Sq Epi: x / Bacteria: x        Urinalysis with Rflx Culture (collected 25 Apr 2025 23:56)    < from: CT Angio Neck w/ IV Cont (04.25.25 @ 23:30) >    < from: Xray Chest 1 View- PORTABLE-Urgent (04.25.25 @ 23:12) >      FINDINGS:  The heart is normal in size.  The lungs are clear.  There is no pneumothorax or pleural effusion.  No acute bony abnormality.    IMPRESSION:  No focal consolidations.        ******PRELIMINARY REPORT******      ******PRELIMINARY REPORT******     < end of copied text >        IMPRESSION:    CT HEAD:  No evidence of acute intracranial pathology.  Chronic age involutional and microangiopathic ischemic changes.  Moderate to marked paranasal sinus disease as described.  Recommend ENT evaluation, treatment and follow-up.  Nonspecific soft tissue density left external auditory canal, correlate   otoscopy.    CTA NECK:  Mild to moderate left proximal internal carotid artery stenosis.  Left vertebral artery artery dominance, hypoplastic right vertebral   artery.    Indeterminate perifissural lung nodules as described.  Additional follow-up/follow-up as per Fleischner criteria.    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  Recommend neurosurgical consultation/follow-up.  Anatomic variants as described.    < end of copied text >    [X] EKG reviewed: HR 42, 1st degree av block, , no dropped beat on the admission EKG

## 2025-04-26 NOTE — H&P ADULT - PROBLEM SELECTOR PLAN 4
Slurred speech for 3-4d, neuro exam unremarkable other than slurred speech. Likely toxic metabolic encephalopathy from infection  CTH and neck with angio - age related changes, paranasal sinus disease, mild to mod left proximal ICA stenosis, hypoplastic R vertebral artery, 3mm anterior communicating artery saccular aneurysm   > neurology consult in the AM   > aspiration precautions   > dysphagia screen

## 2025-04-26 NOTE — PROGRESS NOTE ADULT - PROBLEM SELECTOR PLAN 2
Admitted Creatine 2.76, unclear baseline per chart review 2019 Cr 1.63, non-oliguric. Likely hypovolemic vs ATN from infection   - urinary retention- Mosher placed in the ED, w/ 1L output   - u/a - protein, blood, LE, pyuria, rvp negative   #NAGMA - 7.28/36/17 likely 2/2, BHB 0 not DKA  - c/w home tamsulosin 0.1mcg  - Monitor labs and urine output. Avoid NSAIDs, ACEI/ARBS, RCA and nephrotoxins. Dose medications as per eGFR  - Hold home meds : lisinopril 2.5mg, Farxiga 5mg, furosemide 20mg BID   - c/w home calcitriol for BMD  - Start bicarb drip  - Start Lokelma 10mg BID  - consider bicarb ggt if need for additional volume resuscitation  - f/u renal bladder u/s to r/o obstruction   - f/u Nephrology consult Admitted Creatine 2.76, unclear baseline per chart review 2019 Cr 1.63, non-oliguric. Likely hypovolemic vs ATN from infection   - urinary retention- Mosher placed in the ED, w/ 1L output   - u/a - protein, blood, LE, pyuria, rvp negative   #NAGMA - 7.28/36/17  - c/w home tamsulosin 0.1mcg  - Monitor labs and urine output. Avoid NSAIDs, ACEI/ARBS, RCA and nephrotoxins. Dose medications as per eGFR  - Hold home meds : lisinopril 2.5mg, Farxiga 5mg, furosemide 20mg BID   - c/w home calcitriol for BMD  - Start bicarb drip  - Start Lokelma 10mg BID  - f/u renal bladder u/s to r/o obstruction   - f/u Nephrology consult Admitted Creatine 2.76, unclear baseline per chart review 2019 Cr 1.63, non-oliguric. Likely hypovolemic vs ATN from infection   - urinary retention- Mosher placed in the ED, w/ 1L output   - u/a - protein, blood, LE, pyuria, rvp negative   #NAGMA - 7.28/36/17  - c/w home tamsulosin 0.1mcg  - Monitor labs and urine output. Avoid NSAIDs, ACEI/ARBS, RCA and nephrotoxins. Dose medications as per eGFR  - Hold home meds : lisinopril 2.5mg, Farxiga 5mg, furosemide 20mg BID   - c/w home calcitriol for BMD  - Start bicarb drip  - f/u renal bladder u/s to r/o obstruction   - f/u Nephrology consult Admitted Creatine 2.76, unclear baseline per chart review 2019 Cr 1.63, non-oliguric. Likely hypovolemic vs ATN from infection   - urinary retention- Mosher placed in the ED, w/ 1L output   - u/a - protein, blood, LE, pyuria, rvp negative   #NAGMA - 7.28/36/17  - c/w home tamsulosin 0.1mcg  - Monitor labs and urine output. Avoid NSAIDs, ACEI/ARBS, RCA and nephrotoxins. Dose medications as per eGFR  - Hold home meds : lisinopril 2.5mg, Farxiga 5mg, furosemide 20mg BID   - c/w home calcitriol for BMD  - Continue with sodium bicarb 1300mg TID  - f/u renal bladder u/s to r/o obstruction   - f/u Nephrology consult

## 2025-04-26 NOTE — CONSULT NOTE ADULT - PROBLEM SELECTOR RECOMMENDATION 9
Pt w/ CKD, likely iso longstanding DM/HTN. Jennie LEE reviewed, unclear baseline as no labs since 2019. Scr trend 1.4-2.4 during multiple hospitalization in 2019. On admission Scr elevated at 2.76 (4/25) and remains stable at 2.72 today. Non oliguric, ~1L UOP w/ reynolds insertion. UA w/ proteinuria, hematuria (likely iso reynolds insertion) and positive for UTI. UPCR 0.6g. Kidney US w/o hydronephrosis. Obtain outpt records. Monitor labs and urine output. Avoid nephrotoxins. Dose medications as per eGFR. Pt w/ CKD, likely iso longstanding DM/HTN. Jennie LEE reviewed, unclear baseline as no labs since 2019. Scr trend 1.4-2.4 during multiple hospitalizations in 2019. On admission Scr elevated at 2.76 (4/25) and remains stable at 2.72 today. Non oliguric, ~1L UOP w/ reynolds insertion. UA w/ proteinuria, hematuria (likely iso reynolds insertion) and positive for UTI. UPCR 0.6g. Kidney US w/o hydronephrosis. Obtain outpt records. Monitor labs and urine output. Avoid nephrotoxins. Dose medications as per eGFR.

## 2025-04-26 NOTE — ED ADULT NURSE REASSESSMENT NOTE - NS ED NURSE REASSESS COMMENT FT1
provider rony nelson paged regarding pt downtrending fingerstick glucose values. pt remains A&oX3 RR even unlabored completing full clear sentences. denies any acute complaints on assessment. safety measures maintained throughout care of pt.

## 2025-04-26 NOTE — H&P ADULT - NSICDXPASTSURGICALHX_GEN_ALL_CORE_FT
PAST SURGICAL HISTORY:  H/O carpal tunnel repair     S/P CABG (coronary artery bypass graft)     Status post placement of implantable loop recorder

## 2025-04-26 NOTE — H&P ADULT - PROBLEM SELECTOR PLAN 2
Admitted Creatine 2.76, unclear baseline per chart review 2019 Cr 1.63, non-oliguric. Likely hypovolemic vs ATN from infection   - urinary retention- Mosher placed in the ED   - u/a - protein, blood, LE, pyuria, rvp negative   #NAGMA - 7.28/36/17 likely 2/2, BHB 0 not DKA  > c/w home tamsulosin 0.1mcg   > Monitor labs and urine output. Avoid NSAIDs, ACEI/ARBS, RCA and nephrotoxins. Dose medications as per eGFR  > Hold home meds : lisinopril 2.5mg, Farxiga 5mg, furosemide 20mg BID   > c/w home calcitriol for BMD  > consider bicarb ggt if need for additional volume resuscitation  > AM Nephrology consult Admitted Creatine 2.76, unclear baseline per chart review 2019 Cr 1.63, non-oliguric. Likely hypovolemic vs ATN from infection   - urinary retention- Mosher placed in the ED, w/ 1L output   - u/a - protein, blood, LE, pyuria, rvp negative   #NAGMA - 7.28/36/17 likely 2/2, BHB 0 not DKA  > c/w home tamsulosin 0.1mcg   > Monitor labs and urine output. Avoid NSAIDs, ACEI/ARBS, RCA and nephrotoxins. Dose medications as per eGFR  > Hold home meds : lisinopril 2.5mg, Farxiga 5mg, furosemide 20mg BID   > c/w home calcitriol for BMD  > Start oral bicarb 650 BID   > Lokelma 10mg BID stop after 2 doses   > consider bicarb ggt if need for additional volume resuscitation  > f/u renal bladder u/s to r/o obstruction   > AM Nephrology consult

## 2025-04-26 NOTE — ED ADULT NURSE REASSESSMENT NOTE - NS ED NURSE REASSESS COMMENT FT1
Report received from night shift RN, pt had 3 second pause on cardiac monitor, resident at bedside and aware, pt blood glucose trending downwards, resident Angel aware, repeat dysphagia performed-pt passed , per MD Ortiz "can give patient juice/food." Pt given apple juice. Will continue to monitor.

## 2025-04-26 NOTE — ED ADULT NURSE REASSESSMENT NOTE - NS ED NURSE REASSESS COMMENT FT1
tele contacted regarding pt going into 2nd degree type 1 heart block on cardiac monitor. Angel Vernon at bedside with patient at this time. pt denies any acute complaints at this time.

## 2025-04-26 NOTE — PROGRESS NOTE ADULT - PROBLEM SELECTOR PLAN 6
Patient with hx of Afib s/p CABG. As per daughter, the patient is no longer in Afib and is no on AC. There is a chart note from 2019 related to this.   - CTM on tele for signs of Afib  - [id number] score 4

## 2025-04-26 NOTE — PHYSICAL THERAPY INITIAL EVALUATION ADULT - ADDITIONAL COMMENTS
Unable to obtain accurate social history secondary to pt. presenting with confusion during subjective history, limited social history obtained through past medical documents, please refer to social work for more attainable social history. Pt states he lives with his daughter in a house and uses a cane to ambulate.   Post PT evaluation, pt left semi-supine in stretcher, all precautions maintained, NAD. RN aware.

## 2025-04-27 LAB
ALBUMIN SERPL ELPH-MCNC: 3.3 G/DL — SIGNIFICANT CHANGE UP (ref 3.3–5)
ALP SERPL-CCNC: 107 U/L — SIGNIFICANT CHANGE UP (ref 40–120)
ALT FLD-CCNC: 80 U/L — HIGH (ref 4–41)
ANION GAP SERPL CALC-SCNC: 12 MMOL/L — SIGNIFICANT CHANGE UP (ref 7–14)
AST SERPL-CCNC: 43 U/L — HIGH (ref 4–40)
BILIRUB SERPL-MCNC: 0.5 MG/DL — SIGNIFICANT CHANGE UP (ref 0.2–1.2)
BUN SERPL-MCNC: 49 MG/DL — HIGH (ref 7–23)
CALCIUM SERPL-MCNC: 9.3 MG/DL — SIGNIFICANT CHANGE UP (ref 8.4–10.5)
CHLORIDE SERPL-SCNC: 104 MMOL/L — SIGNIFICANT CHANGE UP (ref 98–107)
CO2 SERPL-SCNC: 22 MMOL/L — SIGNIFICANT CHANGE UP (ref 22–31)
CREAT SERPL-MCNC: 2.37 MG/DL — HIGH (ref 0.5–1.3)
EGFR: 27 ML/MIN/1.73M2 — LOW
EGFR: 27 ML/MIN/1.73M2 — LOW
GAS PNL BLDV: SIGNIFICANT CHANGE UP
GLUCOSE BLDC GLUCOMTR-MCNC: 124 MG/DL — HIGH (ref 70–99)
GLUCOSE BLDC GLUCOMTR-MCNC: 126 MG/DL — HIGH (ref 70–99)
GLUCOSE BLDC GLUCOMTR-MCNC: 179 MG/DL — HIGH (ref 70–99)
GLUCOSE BLDC GLUCOMTR-MCNC: 81 MG/DL — SIGNIFICANT CHANGE UP (ref 70–99)
GLUCOSE SERPL-MCNC: 91 MG/DL — SIGNIFICANT CHANGE UP (ref 70–99)
HCT VFR BLD CALC: 29.9 % — LOW (ref 39–50)
HGB BLD-MCNC: 10.5 G/DL — LOW (ref 13–17)
MAGNESIUM SERPL-MCNC: 1.6 MG/DL — SIGNIFICANT CHANGE UP (ref 1.6–2.6)
MCHC RBC-ENTMCNC: 29.7 PG — SIGNIFICANT CHANGE UP (ref 27–34)
MCHC RBC-ENTMCNC: 35.1 G/DL — SIGNIFICANT CHANGE UP (ref 32–36)
MCV RBC AUTO: 84.7 FL — SIGNIFICANT CHANGE UP (ref 80–100)
MRSA PCR RESULT.: SIGNIFICANT CHANGE UP
NRBC # BLD AUTO: 0 K/UL — SIGNIFICANT CHANGE UP (ref 0–0)
NRBC # FLD: 0 K/UL — SIGNIFICANT CHANGE UP (ref 0–0)
NRBC BLD AUTO-RTO: 0 /100 WBCS — SIGNIFICANT CHANGE UP (ref 0–0)
PHOSPHATE SERPL-MCNC: 2.8 MG/DL — SIGNIFICANT CHANGE UP (ref 2.5–4.5)
PLATELET # BLD AUTO: 131 K/UL — LOW (ref 150–400)
POTASSIUM SERPL-MCNC: 4.2 MMOL/L — SIGNIFICANT CHANGE UP (ref 3.5–5.3)
POTASSIUM SERPL-SCNC: 4.2 MMOL/L — SIGNIFICANT CHANGE UP (ref 3.5–5.3)
PROT SERPL-MCNC: 6.4 G/DL — SIGNIFICANT CHANGE UP (ref 6–8.3)
RBC # BLD: 3.53 M/UL — LOW (ref 4.2–5.8)
RBC # FLD: 13 % — SIGNIFICANT CHANGE UP (ref 10.3–14.5)
S AUREUS DNA NOSE QL NAA+PROBE: DETECTED
SODIUM SERPL-SCNC: 138 MMOL/L — SIGNIFICANT CHANGE UP (ref 135–145)
WBC # BLD: 6.62 K/UL — SIGNIFICANT CHANGE UP (ref 3.8–10.5)
WBC # FLD AUTO: 6.62 K/UL — SIGNIFICANT CHANGE UP (ref 3.8–10.5)

## 2025-04-27 PROCEDURE — 99232 SBSQ HOSP IP/OBS MODERATE 35: CPT | Mod: GC

## 2025-04-27 PROCEDURE — 99233 SBSQ HOSP IP/OBS HIGH 50: CPT | Mod: GC

## 2025-04-27 RX ORDER — METOPROLOL SUCCINATE 50 MG/1
25 TABLET, EXTENDED RELEASE ORAL DAILY
Refills: 0 | Status: DISCONTINUED | OUTPATIENT
Start: 2025-04-27 | End: 2025-04-28

## 2025-04-27 RX ORDER — MUPIROCIN CALCIUM 20 MG/G
1 CREAM TOPICAL EVERY 12 HOURS
Refills: 0 | Status: DISCONTINUED | OUTPATIENT
Start: 2025-04-27 | End: 2025-05-07

## 2025-04-27 RX ADMIN — TAMSULOSIN HYDROCHLORIDE 0.4 MILLIGRAM(S): 0.4 CAPSULE ORAL at 22:12

## 2025-04-27 RX ADMIN — Medication 81 MILLIGRAM(S): at 11:44

## 2025-04-27 RX ADMIN — SODIUM ZIRCONIUM CYCLOSILICATE 10 GRAM(S): 5 POWDER, FOR SUSPENSION ORAL at 05:27

## 2025-04-27 RX ADMIN — Medication 1300 MILLIGRAM(S): at 14:05

## 2025-04-27 RX ADMIN — Medication 25 GRAM(S): at 09:22

## 2025-04-27 RX ADMIN — Medication 1 APPLICATION(S): at 05:28

## 2025-04-27 RX ADMIN — INSULIN LISPRO 1: 100 INJECTION, SOLUTION INTRAVENOUS; SUBCUTANEOUS at 12:47

## 2025-04-27 RX ADMIN — METOPROLOL SUCCINATE 25 MILLIGRAM(S): 50 TABLET, EXTENDED RELEASE ORAL at 09:23

## 2025-04-27 RX ADMIN — Medication 1300 MILLIGRAM(S): at 05:27

## 2025-04-27 RX ADMIN — ATORVASTATIN CALCIUM 40 MILLIGRAM(S): 80 TABLET, FILM COATED ORAL at 22:12

## 2025-04-27 RX ADMIN — Medication 1300 MILLIGRAM(S): at 22:12

## 2025-04-27 RX ADMIN — HEPARIN SODIUM 5000 UNIT(S): 1000 INJECTION INTRAVENOUS; SUBCUTANEOUS at 14:05

## 2025-04-27 RX ADMIN — CALCITRIOL 0.25 MICROGRAM(S): 0.5 CAPSULE, GELATIN COATED ORAL at 11:44

## 2025-04-27 RX ADMIN — HEPARIN SODIUM 5000 UNIT(S): 1000 INJECTION INTRAVENOUS; SUBCUTANEOUS at 05:27

## 2025-04-27 RX ADMIN — MUPIROCIN CALCIUM 1 APPLICATION(S): 20 CREAM TOPICAL at 18:08

## 2025-04-27 RX ADMIN — HEPARIN SODIUM 5000 UNIT(S): 1000 INJECTION INTRAVENOUS; SUBCUTANEOUS at 22:13

## 2025-04-27 RX ADMIN — Medication 25 GRAM(S): at 22:07

## 2025-04-27 NOTE — PROGRESS NOTE ADULT - SUBJECTIVE AND OBJECTIVE BOX
[facility] Division of Kidney Diseases & Hypertension  FOLLOW UP NOTE  [phone]--------------------------------------------------------------------------------  Chief Complaint: TAYLOR on CKD, hyperkalemia, acidosis     24 hour events/subjective: Pt seen and evaluated at bedside this morning. Reports feeling well, endorses no complaints. Denies any headaches, nausea, vomiting, fevers/chills, chest pain, palpitations, SOB, abdominal pain, and leg swelling.    PAST HISTORY  --------------------------------------------------------------------------------  No significant changes to PMH, PSH, FHx, SHx, unless otherwise noted    ALLERGIES & MEDICATIONS  --------------------------------------------------------------------------------  Allergies    No Known Allergies    Intolerances    Standing Inpatient Medications  aspirin  chewable 81 milliGRAM(s) Oral daily  atorvastatin 40 milliGRAM(s) Oral at bedtime  calcitriol   Capsule 0.25 MICROGram(s) Oral daily  chlorhexidine 2% Cloths 1 Application(s) Topical <User Schedule>  dextrose 5%. 1000 milliLiter(s) IV Continuous <Continuous>  dextrose 5%. 1000 milliLiter(s) IV Continuous <Continuous>  dextrose 50% Injectable 25 Gram(s) IV Push once  dextrose 50% Injectable 12.5 Gram(s) IV Push once  dextrose 50% Injectable 25 Gram(s) IV Push once  glucagon  Injectable 1 milliGRAM(s) IntraMuscular once  heparin   Injectable 5000 Unit(s) SubCutaneous every 8 hours  influenza  Vaccine (HIGH DOSE) 0.5 milliLiter(s) IntraMuscular once  insulin lispro (ADMELOG) corrective regimen sliding scale   SubCutaneous three times a day before meals  insulin lispro (ADMELOG) corrective regimen sliding scale   SubCutaneous at bedtime  metoprolol succinate ER 25 milliGRAM(s) Oral daily  piperacillin/tazobactam IVPB.. 3.375 Gram(s) IV Intermittent every 12 hours  sodium bicarbonate 1300 milliGRAM(s) Oral every 8 hours  sodium zirconium cyclosilicate 10 Gram(s) Oral three times a day  tamsulosin 0.4 milliGRAM(s) Oral at bedtime    PRN Inpatient Medications  acetaminophen     Tablet .. 650 milliGRAM(s) Oral every 6 hours PRN  dextrose Oral Gel 15 Gram(s) Oral once PRN  melatonin 3 milliGRAM(s) Oral at bedtime PRN    REVIEW OF SYSTEMS  --------------------------------------------------------------------------------  Gen: No fevers/chills  Skin: No rashes  Head/Eyes/Ears: No HA  Respiratory: No SOB, cough  CV: No CP  GI: No abdominal pain, diarrhea, N/V  : No dysuria, hematuria  MSK: No edema  Neuro: slurred speech   Heme: No easy bruising or bleeding  Psych: No significant depression    All other systems were reviewed and are negative, except as noted.    VITALS/PHYSICAL EXAM  --------------------------------------------------------------------------------  T(C): 36.4 (04-27-25 @ 09:00), Max: 36.6 (04-26-25 @ 21:00)  HR: 80 (04-27-25 @ 09:00) (58 - 140)  BP: 124/60 (04-27-25 @ 09:00) (124/60 - 148/67)  RR: 18 (04-27-25 @ 09:00) (15 - 18)  SpO2: 98% (04-27-25 @ 09:00) (96% - 100%)  Wt(kg): --  Height (cm): 157.5 (04-25-25 @ 18:55)  Weight (kg): 60.3 (04-25-25 @ 18:55)  BMI (kg/m2): 24.3 (04-25-25 @ 18:55)  BSA (m2): 1.61 (04-25-25 @ 18:55)    04-26-25 @ 07:01  -  04-27-25 @ 07:00  --------------------------------------------------------  IN: 0 mL / OUT: 3300 mL / NET: -3300 mL    Physical Exam:  	Gen: NAD  	HEENT: MMM  	Pulm: CTA B/L  	CV: S1S2  	Abd: Soft, +BS   	Ext: No LE edema B/L  	Neuro: Awake  	Skin: Warm and dry    LABS/STUDIES  --------------------------------------------------------------------------------              10.5   6.62  >-----------<  131      [04-27-25 @ 05:59]              29.9     138  |  104  |  49  ----------------------------<  91      [04-27-25 @ 05:59]  4.2   |  22  |  2.37        Ca     9.3     [04-27-25 @ 05:59]      Mg     1.60     [04-27-25 @ 05:59]      Phos  2.8     [04-27-25 @ 05:59]    TPro  6.4  /  Alb  3.3  /  TBili  0.5  /  DBili  x   /  AST  43  /  ALT  80  /  AlkPhos  107  [04-27-25 @ 05:59]    PT/INR: PT 11.4 , INR 0.98       [04-25-25 @ 20:03]  PTT: 37.2       [04-25-25 @ 20:03]    Creatinine Trend:  SCr 2.37 [04-27 @ 05:59]  SCr 2.72 [04-26 @ 11:27]  SCr 2.76 [04-26 @ 05:33]  SCr 2.64 [04-26 @ 02:00]  SCr 2.76 [04-25 @ 20:03]    Urine Creatinine 49      [04-26-25 @ 04:12]  Urine Protein 31      [04-26-25 @ 04:12]  Urine Sodium 45      [04-26-25 @ 04:12]  Urine Urea Nitrogen 390.1      [04-26-25 @ 04:12]  Urine Potassium 21.2      [04-26-25 @ 04:12]  Urine Osmolality 356      [04-26-25 @ 04:12]    TSH 3.31      [04-25-25 @ 20:24]

## 2025-04-27 NOTE — SWALLOW BEDSIDE ASSESSMENT ADULT - ADDITIONAL RECOMMENDATIONS
5.) This service to follow up as schedule permits.  6.) Medical Team advised to reconsult this service should there be a change in patient status.

## 2025-04-27 NOTE — SPEECH LANGUAGE PATHOLOGY EVALUATION - COMMENTS
Mild word retrieval deficits in divergent naming task 1.) This service will follow up as schedule permits.   2.) Medical Team advised to reconsult this service should there be a change in patient status.  3.) Patient education on speech strategies to maximize intelligibility (slow rate, overarticulation); understanding expressed.   4.) Patient may benefit from Comprehensive Speech/Language Evaluation/Treatment pending discharge plans (Homecare vs. Rehab vs. Outpatient at [facility]- [phone]). 4/26 Internal Medicine Note: "85M CAD s/p CABG (in August 2019 with post-op pAfib/AFlutter previously on amio/eliquis now discontinued), CKD stage III, HTN, HLD, T2DM,  presenting from home with daughter with slurred speech starting 4/23, hypothermic, bradycardic, hyperkalemia and hyperglycemia, admitted for management of UTI, bradycardia, and electrolyte abnormalities."    Order received for Swallow Evaluation with indication "slurred speech"; spoke with medical team (Dr. Pinzon) who provided clarification and requested completion of both Swallow Evaluation and Speech/Language Evaluation. Additional order then received for Speech/Language Evaluation.     4/25 CT Head: "CT HEAD: No evidence of acute intracranial pathology. Chronic age involutional and microangiopathic ischemic changes. Moderate to marked paranasal sinus disease as described. Recommend ENT evaluation, treatment and follow-up. Nonspecific soft tissue density left external auditory canal, correlate otoscopy."    Patient received sitting upright in bed, awake and alert. Patient reports feeling "terrific". Patient reports his speech has improved. 4/26 Internal Medicine Note: "85M CAD s/p CABG (in August 2019 with post-op pAfib/AFlutter previously on amio/eliquis now discontinued), CKD stage III, HTN, HLD, T2DM,  presenting from home with daughter with slurred speech starting 4/23, hypothermic, bradycardic, hyperkalemia and hyperglycemia, admitted for management of UTI, bradycardia, and electrolyte abnormalities."    4/25 CT Head: "CT HEAD: No evidence of acute intracranial pathology. Chronic age involutional and microangiopathic ischemic changes. Moderate to marked paranasal sinus disease as described. Recommend ENT evaluation, treatment and follow-up. Nonspecific soft tissue density left external auditory canal, correlate otoscopy."    Order received for Swallow Evaluation with indication "slurred speech"; spoke with medical team (Dr. Pinzon) who provided clarification and requested completion of both Swallow Evaluation and Speech/Language Evaluation. Additional order then received for Speech/Language Evaluation.     Patient received sitting upright in bed, awake and alert. Patient reports feeling "terrific". Patient reports his speech has improved.

## 2025-04-27 NOTE — PROGRESS NOTE ADULT - PROBLEM SELECTOR PLAN 7
K of 5.8 on admission possibly 2/2 to TAYLOR/CKD. Given insulin / D50 / calcium   - CTM K   - if persistently high can consider a dose of IV diuretic   - add Lokelma if patient passes dysphagia screen  - monitor on tele K of 5.8 on admission possibly 2/2 to TAYLOR/CKD. Given insulin / D50 / calcium   - CTM K (wnl today)  - if persistently high can consider a dose of IV diuretic   - monitor on tele K of 5.8 on admission possibly 2/2 to TAYLOR/CKD. Given insulin / D50 / calcium   - CTM K (wnl today)  - if persistently high can consider a dose of IV diuretic   - monitor on tele  - can stop lokelma as per renal

## 2025-04-27 NOTE — PROVIDER CONTACT NOTE (OTHER) - SITUATION
patient noted with pink-tinged urine in reynolds catheter bag. as per day RN, patient possibly tugged catheter when changing positions. patient noted with pink blood-tinged urine in reynolds catheter bag. as per day RN, patient possibly tugged catheter when changing positions.

## 2025-04-27 NOTE — PROGRESS NOTE ADULT - PROBLEM SELECTOR PLAN 1
Pt w/ CKD, likely iso longstanding DM/HTN. Jennie LEE reviewed, unclear baseline as no labs since 2019. Scr trend 1.4-2.4 during multiple hospitalizations in 2019. On admission Scr elevated at 2.76 (4/25), elevated/imp 2.37 today. Non oliguric, 24hr UOP ~3.3L UOP. UA w/ proteinuria, hematuria (likely iso reynolds insertion) and positive for UTI. UPCR 0.6g. Kidney US w/o hydronephrosis. Obtain outpt records. Monitor labs and urine output. Avoid nephrotoxins. Dose medications as per eGFR.

## 2025-04-27 NOTE — PROGRESS NOTE ADULT - PROBLEM SELECTOR PLAN 6
Patient with hx of Afib s/p CABG. As per daughter, the patient is no longer in Afib and is no on AC. There is a chart note from 2019 related to this.   - CTM on tele for signs of Afib  - [id number] score 4 Patient with hx of Afib s/p CABG. As per daughter, the patient is no longer in Afib and is no on AC. There is a chart note from 2019 related to this.   - CTM on tele for signs of Afib, consider lower dose b-blocker if HR above goal  - [id number] score 4  - f/u further EP recs

## 2025-04-27 NOTE — PROGRESS NOTE ADULT - ATTENDING COMMENTS
Patient seen and examined, d/w Dr. Carpenter, agree w/ above.     84 yo M w/ CAD s/p CABG c/b post-op afib, CKD3, HTN, HLD, DM2, presenting for slurred speech (imaging negative for acute infarct), in the setting of hypothermia and bradycardia, with potential TAYLOR on CKD with hyperkalemia and metabolic acidosis, course c/b hypoglycemia likely secondary to temporizing measures for hyperkalemia.     Patient returning to bed after working with PT, no acute complaints at this time. Reynolds in place, reddish output noted. No further hypothermia noted, not currently bradycardic (is afib 80s on tele at time of my exam), patient is awake/alert and conversant. Verbalized understanding of hospital course, plan for treatment infection/pending cultures, continued monitoring. Asks when he can get out of here, reviewed medical issues that need to be addressed prior to safe discharge. No other questions at this time.     Will f/u PT recommendations (d/w PT at bedside, they felt patient was weak).     # Hypothermia (improved)  - ?related to infection or metabolic acidosis impairing thermoregulation vs other  - AM cortisol, TSH wnl, CT without acute infarcts  - continue to monitor, no further hypothermia noted    # Bradycardia  - ?related to hypothermia, electrolyte abnormalities, medications?   - monitor on tele, no longer bradycardic (currently in afib)  - given Afib (also was tachycardic to 140s overnight) will consider resuming home B-blocker at lower dose w/ hold parameters, team will reach out to EP in AM for further eval/recommendations    # UTI  # metabolic encephalopathy  - +U/A, U/S w/ bladder wall thickening  - on zosyn, f/u cultures, tailor abx as per speciation/sensitivities  - BCx NG  - f/u family re: collateral on baseline  - passed dysphagia screen  - speech therapy eval noted, they did find some deficiencies   - to f/u family re: collateral/baseline  - reynolds was placed for retention in ED, to determine timing of TOV, hematuria could be setting of infection vs reynolds placement?    # possible TAYLOR on CKD3  # metabolic acidosis  # hyperkalemia  - hold farxiga, lisinopril etc, avoid nephrotoxins  - monitor renal function and UOP  - Cr downtrending 2.37 (from 2.72)  - hyperkalemia improved, can d/c lokelma as per renal  - s/p sodium bicarb gtt, c/w oral bicarb supplements as per renal for met acidosis    # CAD s/p CABG  # HLD  # hx of afib, not on anticoagulation  - c/w asa/statin  - cautious resumption of b-blocker as above  - should rediscuss risk/benefits re: A/C with patient/family    # DM2  - hold home oral medications  - c/b hypoglycemia likely secondary to temporizing measure for hyperkalemia  - monitor FS, on sliding scale coverage  - f/u A1C (ordered)

## 2025-04-27 NOTE — PROGRESS NOTE ADULT - PROBLEM SELECTOR PLAN 3
Pt w/ metabolic acidosis iso renal failure. Now improved, SCO2 wnl 22 today. Continue sodium bicarb 1300 tid. Monitor labs.     If you have any questions, please feel free to contact me  Fabiola Hamilton  Nephrology Fellow  [place]/[address] [phone]  (After 5pm or on weekends please page the on-call fellow).

## 2025-04-27 NOTE — SWALLOW BEDSIDE ASSESSMENT ADULT - SWALLOW EVAL: RECOMMENDED FEEDING/EATING TECHNIQUES
3.) Swallowing Guidelines: Upright position with PO, slow rate, small bites/sips, alternate solids/liquids, ensure oral clearance post swallow. Patient expressed understanding.

## 2025-04-27 NOTE — PROGRESS NOTE ADULT - ASSESSMENT
85M CAD s/p CABG (in August 2019 with post-op pAfib/AFlutter previously on amio/eliquis now discontinued), CKD stage III, HTN, HLD, T2DM,  presenting from home with daughter with slurred speech starting 4/23, hypothermic, bradycardic, hyperkalemia and hyperglycemia, admitted for management of UTI, bradycardia, and electrolyte abnormalities.

## 2025-04-27 NOTE — PROGRESS NOTE ADULT - PROBLEM SELECTOR PLAN 2
Admitted Creatine 2.76, unclear baseline per chart review 2019 Cr 1.63, non-oliguric. Likely hypovolemic vs ATN from infection   - urinary retention- Mosher placed in the ED, w/ 1L output   - u/a - protein, blood, LE, pyuria, rvp negative   #NAGMA - 7.28/36/17  - c/w home tamsulosin 0.1mcg  - Monitor labs and urine output. Avoid NSAIDs, ACEI/ARBS, RCA and nephrotoxins. Dose medications as per eGFR  - Hold home meds : lisinopril 2.5mg, Farxiga 5mg, furosemide 20mg BID   - c/w home calcitriol for BMD  - Continue with sodium bicarb 1300mg TID  - Renal bladder US showing signs of renal disease and thickening of urinary bladder wall  - Nephrology consulted, recs appreciated

## 2025-04-27 NOTE — SWALLOW BEDSIDE ASSESSMENT ADULT - SWALLOW EVAL: DIAGNOSIS
Patient presents with Mild Oral dysphagia. Oral stage is marked by adequate oral containment, slowed mastication for Soft and Bite Sized solids, and slowed anterior posterior transfer. Mild oral clearance deficits noted, with mild Soft and Bite Sized residue observed in Left buccal cavity post swallow. Patient reports independent awareness and ability to clear oral residue with liquid wash and lingual sweep. Pharyngeal stage was unremarkable for trials provided, characterized by swallow initiation with hyolaryngeal elevation/excursion evidenced via digital palpation. No overt s/s penetration/aspiration observed for any trials provided.

## 2025-04-27 NOTE — PROGRESS NOTE ADULT - PROBLEM SELECTOR PLAN 2
Now resolved. Can d/c lokelma and monitor. HOLD Lisinopril and farxiga. Ensure low potassium/renal diet.

## 2025-04-27 NOTE — PROGRESS NOTE ADULT - PROBLEM SELECTOR PLAN 9
Hyperglycemic 300 on admission --> hypoglycemic after hyperkalemia cocktail- BHB = 0 not DKA   - Home regimen: Glipizide 5mg AM and 10mg in the PM - hold   - fs q1h for 2h  - check a1c - ISS for now Hyperglycemic 300 on admission --> hypoglycemic after hyperkalemia cocktail- BHB = 0 not DKA   - Home regimen: Glipizide 5mg AM and 10mg in the PM - hold   - check a1c (ordered)  - ISS for now  - monitor FS, goal 110-180, most recent , acceptable

## 2025-04-27 NOTE — PROGRESS NOTE ADULT - PROBLEM SELECTOR PLAN 10
- Nutrition: Soft bite size after passing dysphagia screen  - Activity: as tolerated   - DVT Prophylaxis: subq hep  - Disposition: Pt pending, ambulates with walker at home

## 2025-04-27 NOTE — SPEECH LANGUAGE PATHOLOGY EVALUATION - SLP DIAGNOSIS
Patient presents with Mild Dysarthria, Mild Expressive Language Deficits, and Mild Cognitive-Linguistic Deficits. Receptively, patient is able to ID objects, respond to y/n and wh- questions, as well as follow multi-step commands without difficulty. Expressively, patient is able to participate in basic conversational exchange; mild word retrieval deficits noted within structured tasks (i.e., divergent naming). Speech intelligibility is judged to be mildly decreased, marked by reduced articulatory precision. Mild cognitive-linguistic deficits noted, though unsure of patient's baseline. Patient is A&Ox3 though decreased insight into situation/deficits noted. Additionally, occasional verbal perseverations and decreased short term memory observed (patient recalled 0/3 words after a brief delay, improving to 3/3 with minimal semantic cues).

## 2025-04-27 NOTE — PROGRESS NOTE ADULT - ATTENDING COMMENTS
Pt. with CKD, hyperkalemia, and metabolic acidosis currently admitted for management of sepsis/UTI. Jennie LEE reviewed, unclear baseline as no labs since 2019. Scr trend 1.4-2.4 during multiple hospitalizations in 2019. On admission Scr elevated at 2.76 (4/25) and remains elevated but decreased to 2.37 today (4/27). Pt. is non-oliguric, documented urine output is 3.3L over the past 24 hours. Hyperkalemia and acidosis resolved. Continue with PO sodium bicarbonate. Rest of plan, as outlined above. Monitor labs and urine output. Avoid nephrotoxins. Dose medications as per eGFR.     If you have any questions, please feel free to contact me  Celeste Barron  Nephrology  [phone] / [employer] Teams(Preferred)  (After 4 pm or on weekends please page the on-call fellow)

## 2025-04-27 NOTE — PROGRESS NOTE ADULT - SUBJECTIVE AND OBJECTIVE BOX
Subjective/Overnight Events: Overnight the patient went into Aflutter and had an episode of bradycardia (low as 33). Also went into second degree type 2 and type 1. Patient was in bed and was in no distress. Denies nausea, vomiting, diarrhea, constipation, cough, fever, chest pain, and cough.    Objective:  Vital Signs Last 24 Hrs  T(C): 36.3 (27 Apr 2025 05:00), Max: 36.6 (26 Apr 2025 21:00)  T(F): 97.4 (27 Apr 2025 05:00), Max: 97.9 (26 Apr 2025 21:00)  HR: 100 (27 Apr 2025 05:00) (58 - 140)  BP: 143/65 (27 Apr 2025 05:00) (127/62 - 148/67)  BP(mean): --  RR: 18 (27 Apr 2025 05:00) (15 - 18)  SpO2: 97% (27 Apr 2025 05:00) (96% - 100%)    Parameters below as of 27 Apr 2025 05:00  Patient On (Oxygen Delivery Method): room air      MEDICATIONS  (STANDING):  aspirin  chewable 81 milliGRAM(s) Oral daily  atorvastatin 40 milliGRAM(s) Oral at bedtime  calcitriol   Capsule 0.25 MICROGram(s) Oral daily  chlorhexidine 2% Cloths 1 Application(s) Topical <User Schedule>  dextrose 5%. 1000 milliLiter(s) (100 mL/Hr) IV Continuous <Continuous>  dextrose 5%. 1000 milliLiter(s) (50 mL/Hr) IV Continuous <Continuous>  dextrose 50% Injectable 25 Gram(s) IV Push once  dextrose 50% Injectable 12.5 Gram(s) IV Push once  dextrose 50% Injectable 25 Gram(s) IV Push once  glucagon  Injectable 1 milliGRAM(s) IntraMuscular once  heparin   Injectable 5000 Unit(s) SubCutaneous every 8 hours  influenza  Vaccine (HIGH DOSE) 0.5 milliLiter(s) IntraMuscular once  insulin lispro (ADMELOG) corrective regimen sliding scale   SubCutaneous three times a day before meals  insulin lispro (ADMELOG) corrective regimen sliding scale   SubCutaneous at bedtime  piperacillin/tazobactam IVPB.. 3.375 Gram(s) IV Intermittent every 12 hours  sodium bicarbonate 1300 milliGRAM(s) Oral every 8 hours  sodium zirconium cyclosilicate 10 Gram(s) Oral three times a day  tamsulosin 0.4 milliGRAM(s) Oral at bedtime    MEDICATIONS  (PRN):  acetaminophen     Tablet .. 650 milliGRAM(s) Oral every 6 hours PRN Temp greater or equal to 38C (100.4F), Mild Pain (1 - 3)  dextrose Oral Gel 15 Gram(s) Oral once PRN Blood Glucose LESS THAN 70 milliGRAM(s)/deciliter  melatonin 3 milliGRAM(s) Oral at bedtime PRN Insomnia      Allergies    No Known Allergies    Intolerances    Labs:                          10.5   6.62  )-----------( 131      ( 27 Apr 2025 05:59 )             29.9                           9.3    5.88  )-----------( 133      ( 26 Apr 2025 05:33 )             26.2         04-26    134[L]  |  107  |  62[H]  ----------------------------<  224[H]  5.4[H]   |  16[L]  |  2.72[H]    Ca    9.3      26 Apr 2025 11:27  Phos  3.2     04-26  Mg     1.80     04-26    TPro  6.2  /  Alb  3.3  /  TBili  0.3  /  DBili  x   /  AST  50[H]  /  ALT  84[H]  /  AlkPhos  105  04-26      04-26    134[L]  |  108[H]  |  67[H]  ----------------------------<  94  5.4[H]   |  15[L]  |  2.76[H]    Ca    9.4      26 Apr 2025 05:33  Phos  3.6     04-26  Mg     1.80     04-26    TPro  6.2  /  Alb  3.3  /  TBili  0.3  /  DBili  x   /  AST  50[H]  /  ALT  84[H]  /  AlkPhos  105  04-26    PT/INR - ( 25 Apr 2025 20:03 )   PT: 11.4 sec;   INR: 0.98 ratio         PTT - ( 25 Apr 2025 20:03 )  PTT:37.2 sec    VBG:  pH: 7.19  pCO2: 50    VBG: (most recent)  pH: 7.28  pCO2: 36    Imaging:    FINDINGS:    CT BRAIN:    VENTRICLES AND SULCI: Age appropriate involutional changes.  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 No mass effect, acute   hemorrhage, or midline shift.  There are periventricular and subcortical   white matter hypodensities, consistent with microvascular type changes.  EXTRA-AXIAL: No mass or fluid collection. Basal cisterns are normal in   appearance.  POST CONTRAST: No abnormal enhancing lesion.    VISUALIZED SINUSES:  Scattered mucosal thickening. Nonpneumatization   right frontal sinus, anatomic variant. Near complete opacification left   maxillary sinus. Moderate mucosal thickening ethmoid air cells with near   complete opacification extending into the frontal ethmoidal recess with   near complete opacification of the left frontal sinus. Moderate mucosal   thickening of the right frontal ethmoidal recess.  TYMPANOMASTOID CAVITIES:  Mastoid air cells clear. Nonspecific soft   tissue density left external auditory canal.  VISUALIZED ORBITS: Bilateral lens replacement.  CALVARIUM: Generalized osteopenia.      CTA NECK:    AORTIC ARCH AND VISUALIZED GREAT VESSELS: Atherosclerotic changes. Bovine   aortic arch.    RIGHT:  COMMON CAROTID ARTERY: Minimal atherosclerotic changes at the bifurcation   without significant stenosis.  INTERNAL CAROTID ARTERY: No significant stenosis based on NASCET criteria.  VERTEBRAL ARTERY: Hypoplastic.    LEFT:  COMMON CAROTID ARTERY: Minimal atherosclerotic changes at the bifurcation   without significant stenosis.  INTERNAL CAROTID ARTERY: Atherosclerotic changes at the origin with   resultant mild to moderate stenosis based on NASCET criteria.  VERTEBRAL ARTERY: Normal in course and caliber to the intracranial   circulation. Vertebral artery dominance.    VISUALIZED LUNGS: Bilateral dependent atelectasis. Right greater than   left apical emphysematous changes. Perifissural nodular opacities along   the right major and minor fissure measuring up to 1.0 cm (3-63),   indeterminate.    MISCELLANEOUS: None.    CAROTID STENOSIS REFERENCE: Percent (%) stenosis is expressed in terms of   NASCET Criteria. (NASCET = 100x1-(N/D)). N=greatest narrowing. D=normal   distal diameter - MILD = <50% stenosis. - MODERATE = 50-69% stenosis. -   SEVERE = 70-89% stenosis. - HAIRLINE/CRITICAL = 90-99% stenosis. -   OCCLUDED = 100%stenosis.      CTA HEAD:    INTERNAL CAROTID ARTERIES: Atherosclerotic changes of the bilateral   petrous through cavernous segments with mild stenosis of the right   cavernous segment. 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  MIDDLE CEREBRAL ARTERIES: No significant stenosis or occlusion.  POSTERIOR CEREBRAL ARTERIES: Fetal origin right PCA    DISTAL VERTEBRAL / BASILAR ARTERIES: Trace atherosclerotic changes of the   proximal V4 segments without significant stenosis.    VENOUS STRUCTURES: Inadequate venous phase opacification.    MISCELLANEOUS: No other vascular abnormality is seen.      IMPRESSION:    CT HEAD:  No evidence of acute intracranial pathology.  Chronic age involutional and microangiopathic ischemic changes.  Moderate to marked paranasal sinus disease as described.  Recommend ENT evaluation, treatment and follow-up.  Nonspecific soft tissue density left external auditory canal, correlate   otoscopy.    CTA NECK:  Mild to moderate left proximal internal carotid artery stenosis.  Left vertebral artery artery dominance, hypoplastic right vertebral   artery.    Indeterminate perifissural lung nodules as described.  Additional follow-up/follow-up as per Fleischner criteria.    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  Recommend neurosurgical consultation/follow-up.  Anatomic variants as described.    --- End of Report ---      Renal Bladder US:    IMPRESSION:  Mild bilateral increased renal echotexture suggesting medical renal   disease.    Marked urinary bladder wall thickening versus underdistention. Correlate   with urinalysis due to cystitis, other bladder pathology.    --- End of Report ---      Consultant notes reviewed: Renal   Subjective/Overnight Events: Overnight the patient went into Aflutter and had an episode of bradycardia (low as 33). Also went into second degree block type 1. Patient was in bed and was in no distress. Denies nausea, vomiting, diarrhea, constipation, cough, fever, chest pain, and cough.    Objective:  Vital Signs Last 24 Hrs  T(C): 36.3 (27 Apr 2025 05:00), Max: 36.6 (26 Apr 2025 21:00)  T(F): 97.4 (27 Apr 2025 05:00), Max: 97.9 (26 Apr 2025 21:00)  HR: 100 (27 Apr 2025 05:00) (58 - 140)  BP: 143/65 (27 Apr 2025 05:00) (127/62 - 148/67)  BP(mean): --  RR: 18 (27 Apr 2025 05:00) (15 - 18)  SpO2: 97% (27 Apr 2025 05:00) (96% - 100%)    Parameters below as of 27 Apr 2025 05:00  Patient On (Oxygen Delivery Method): room air    GEN: NAD, lying in bed  HEENT: NC/AT, clear sclera/conjunctiva, EOMI, MMM, hearing intact to voice  RESPIRATORY: Comfortable on RA, speaking in full sentences   : reynolds in place, with reddish output  NEURO: ambulated with PT, moving all extremities, speech understandable, Awake/conversant    MEDICATIONS  (STANDING):  aspirin  chewable 81 milliGRAM(s) Oral daily  atorvastatin 40 milliGRAM(s) Oral at bedtime  calcitriol   Capsule 0.25 MICROGram(s) Oral daily  chlorhexidine 2% Cloths 1 Application(s) Topical <User Schedule>  dextrose 5%. 1000 milliLiter(s) (100 mL/Hr) IV Continuous <Continuous>  dextrose 5%. 1000 milliLiter(s) (50 mL/Hr) IV Continuous <Continuous>  dextrose 50% Injectable 25 Gram(s) IV Push once  dextrose 50% Injectable 12.5 Gram(s) IV Push once  dextrose 50% Injectable 25 Gram(s) IV Push once  glucagon  Injectable 1 milliGRAM(s) IntraMuscular once  heparin   Injectable 5000 Unit(s) SubCutaneous every 8 hours  influenza  Vaccine (HIGH DOSE) 0.5 milliLiter(s) IntraMuscular once  insulin lispro (ADMELOG) corrective regimen sliding scale   SubCutaneous three times a day before meals  insulin lispro (ADMELOG) corrective regimen sliding scale   SubCutaneous at bedtime  piperacillin/tazobactam IVPB.. 3.375 Gram(s) IV Intermittent every 12 hours  sodium bicarbonate 1300 milliGRAM(s) Oral every 8 hours  sodium zirconium cyclosilicate 10 Gram(s) Oral three times a day  tamsulosin 0.4 milliGRAM(s) Oral at bedtime    MEDICATIONS  (PRN):  acetaminophen     Tablet .. 650 milliGRAM(s) Oral every 6 hours PRN Temp greater or equal to 38C (100.4F), Mild Pain (1 - 3)  dextrose Oral Gel 15 Gram(s) Oral once PRN Blood Glucose LESS THAN 70 milliGRAM(s)/deciliter  melatonin 3 milliGRAM(s) Oral at bedtime PRN Insomnia      Allergies  No Known Allergies    Intolerances    Labs:                          10.5   6.62  )-----------( 131      ( 27 Apr 2025 05:59 )             29.9                           9.3    5.88  )-----------( 133      ( 26 Apr 2025 05:33 )             26.2       04-27    138  |  104  |  49[H]  ----------------------------<  91  4.2   |  22  |  2.37[H]    Ca    9.3      27 Apr 2025 05:59  Phos  2.8     04-27  Mg     1.60     04-27    TPro  6.4  /  Alb  3.3  /  TBili  0.5  /  DBili  x   /  AST  43[H]  /  ALT  80[H]  /  AlkPhos  107  04-27 04-26    134[L]  |  107  |  62[H]  ----------------------------<  224[H]  5.4[H]   |  16[L]  |  2.72[H]    Ca    9.3      26 Apr 2025 11:27  Phos  3.2     04-26  Mg     1.80     04-26    TPro  6.2  /  Alb  3.3  /  TBili  0.3  /  DBili  x   /  AST  50[H]  /  ALT  84[H]  /  AlkPhos  105  04-26      04-26    134[L]  |  108[H]  |  67[H]  ----------------------------<  94  5.4[H]   |  15[L]  |  2.76[H]    Ca    9.4      26 Apr 2025 05:33  Phos  3.6     04-26  Mg     1.80     04-26    TPro  6.2  /  Alb  3.3  /  TBili  0.3  /  DBili  x   /  AST  50[H]  /  ALT  84[H]  /  AlkPhos  105  04-26    PT/INR - ( 25 Apr 2025 20:03 )   PT: 11.4 sec;   INR: 0.98 ratio         PTT - ( 25 Apr 2025 20:03 )  PTT:37.2 sec    VBG:  pH: 7.19  pCO2: 50    VBG: (most recent)  pH: 7.28  pCO2: 36    Imaging:    FINDINGS:    CT BRAIN:    VENTRICLES AND SULCI: Age appropriate involutional changes.  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 No mass effect, acute   hemorrhage, or midline shift.  There are periventricular and subcortical   white matter hypodensities, consistent with microvascular type changes.  EXTRA-AXIAL: No mass or fluid collection. Basal cisterns are normal in   appearance.  POST CONTRAST: No abnormal enhancing lesion.    VISUALIZED SINUSES:  Scattered mucosal thickening. Nonpneumatization   right frontal sinus, anatomic variant. Near complete opacification left   maxillary sinus. Moderate mucosal thickening ethmoid air cells with near   complete opacification extending into the frontal ethmoidal recess with   near complete opacification of the left frontal sinus. Moderate mucosal   thickening of the right frontal ethmoidal recess.  TYMPANOMASTOID CAVITIES:  Mastoid air cells clear. Nonspecific soft   tissue density left external auditory canal.  VISUALIZED ORBITS: Bilateral lens replacement.  CALVARIUM: Generalized osteopenia.      CTA NECK:    AORTIC ARCH AND VISUALIZED GREAT VESSELS: Atherosclerotic changes. Bovine   aortic arch.    RIGHT:  COMMON CAROTID ARTERY: Minimal atherosclerotic changes at the bifurcation   without significant stenosis.  INTERNAL CAROTID ARTERY: No significant stenosis based on NASCET criteria.  VERTEBRAL ARTERY: Hypoplastic.    LEFT:  COMMON CAROTID ARTERY: Minimal atherosclerotic changes at the bifurcation   without significant stenosis.  INTERNAL CAROTID ARTERY: Atherosclerotic changes at the origin with   resultant mild to moderate stenosis based on NASCET criteria.  VERTEBRAL ARTERY: Normal in course and caliber to the intracranial   circulation. Vertebral artery dominance.    VISUALIZED LUNGS: Bilateral dependent atelectasis. Right greater than   left apical emphysematous changes. Perifissural nodular opacities along   the right major and minor fissure measuring up to 1.0 cm (3-63),   indeterminate.    MISCELLANEOUS: None.    CAROTID STENOSIS REFERENCE: Percent (%) stenosis is expressed in terms of   NASCET Criteria. (NASCET = 100x1-(N/D)). N=greatest narrowing. D=normal   distal diameter - MILD = <50% stenosis. - MODERATE = 50-69% stenosis. -   SEVERE = 70-89% stenosis. - HAIRLINE/CRITICAL = 90-99% stenosis. -   OCCLUDED = 100%stenosis.      CTA HEAD:    INTERNAL CAROTID ARTERIES: Atherosclerotic changes of the bilateral   petrous through cavernous segments with mild stenosis of the right   cavernous segment. 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  MIDDLE CEREBRAL ARTERIES: No significant stenosis or occlusion.  POSTERIOR CEREBRAL ARTERIES: Fetal origin right PCA    DISTAL VERTEBRAL / BASILAR ARTERIES: Trace atherosclerotic changes of the   proximal V4 segments without significant stenosis.    VENOUS STRUCTURES: Inadequate venous phase opacification.    MISCELLANEOUS: No other vascular abnormality is seen.      IMPRESSION:    CT HEAD:  No evidence of acute intracranial pathology.  Chronic age involutional and microangiopathic ischemic changes.  Moderate to marked paranasal sinus disease as described.  Recommend ENT evaluation, treatment and follow-up.  Nonspecific soft tissue density left external auditory canal, correlate   otoscopy.    CTA NECK:  Mild to moderate left proximal internal carotid artery stenosis.  Left vertebral artery artery dominance, hypoplastic right vertebral   artery.    Indeterminate perifissural lung nodules as described.  Additional follow-up/follow-up as per Fleischner criteria.    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  Recommend neurosurgical consultation/follow-up.  Anatomic variants as described.    --- End of Report ---      Renal Bladder US:    IMPRESSION:  Mild bilateral increased renal echotexture suggesting medical renal   disease.    Marked urinary bladder wall thickening versus underdistention. Correlate   with urinalysis due to cystitis, other bladder pathology.    --- End of Report ---      Consultant notes reviewed: Renal  Team d/w renal acidosis improved, can c/w sodium bicarb, hyperK improved, can d/c lokelma

## 2025-04-27 NOTE — PROGRESS NOTE ADULT - PROBLEM SELECTOR PLAN 1
Slurred speech at home, urinary retention w/o dysuria, Hypothermic 92F on admission. likely urinary source   - u/a - protein, blood, LE, pyuria, rvp negative   - CXR - w/o consolidations   - c/w zoysn (renally dosed), hold off vanc given likely urinary source   - f/u Ucx and Bcx Slurred speech at home, urinary retention w/o dysuria, Hypothermic 92F on admission. likely urinary source   - u/a - protein, blood, LE, pyuria, rvp negative   - CXR - w/o consolidations   - c/w zoysn (renally dosed), hold off vanc given likely urinary source   - f/u Ucx (pending) and Bcx ([id number])

## 2025-04-27 NOTE — PROGRESS NOTE ADULT - PROBLEM SELECTOR PLAN 3
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 And overnight had repeated episodes of bradycardia, now with episodes of Aflutter and second degree block. 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 had repeated episodes of bradycardia, now with episodes of Aflutter and second degree block. Will reach out to EP, ?consider lower dose of metoprolol?

## 2025-04-27 NOTE — SWALLOW BEDSIDE ASSESSMENT ADULT - COMMENTS
4/26 Internal Medicine Note: "85M CAD s/p CABG (in August 2019 with post-op pAfib/AFlutter previously on amio/eliquis now discontinued), CKD stage III, HTN, HLD, T2DM,  presenting from home with daughter with slurred speech starting 4/23, hypothermic, bradycardic, hyperkalemia and hyperglycemia, admitted for management of UTI, bradycardia, and electrolyte abnormalities."    4/25 CXR: "IMPRESSION: Loop recorder and superimposed external pacer pads overlie lower left hemithorax. Hazy increased bilateral lung markings suggesting degree of congestion with edema. No focal consolidations pleural effusions or pneumothorax. Cardiac and mediastinal silhouettes grossly within normal limits for projection. Midline normal caliber trachea. No acute bony abnormality."    Order received for Swallow Evaluation with indication "slurred speech"; spoke with medical team (Dr. Pinzon) who provided clarification and requested completion of both Swallow Evaluation and Speech/Language Evaluation. Additonal order then received for Speech/Language Evaluation.     Patient received sitting upright in bed, awake and alert. Patient reports feeling "terrific". Patient denies difficulty chewing/swallowing and reports his speech has improved. Soft and Bite Sized breakfast tray present.

## 2025-04-28 DIAGNOSIS — R31.9 HEMATURIA, UNSPECIFIED: ICD-10-CM

## 2025-04-28 LAB
A1C WITH ESTIMATED AVERAGE GLUCOSE RESULT: 7.8 % — HIGH (ref 4–5.6)
ALBUMIN SERPL ELPH-MCNC: 3.5 G/DL — SIGNIFICANT CHANGE UP (ref 3.3–5)
ALP SERPL-CCNC: 107 U/L — SIGNIFICANT CHANGE UP (ref 40–120)
ALT FLD-CCNC: 85 U/L — HIGH (ref 4–41)
ANION GAP SERPL CALC-SCNC: 13 MMOL/L — SIGNIFICANT CHANGE UP (ref 7–14)
AST SERPL-CCNC: 47 U/L — HIGH (ref 4–40)
BILIRUB SERPL-MCNC: 0.4 MG/DL — SIGNIFICANT CHANGE UP (ref 0.2–1.2)
BUN SERPL-MCNC: 45 MG/DL — HIGH (ref 7–23)
CALCIUM SERPL-MCNC: 9.6 MG/DL — SIGNIFICANT CHANGE UP (ref 8.4–10.5)
CHLORIDE SERPL-SCNC: 101 MMOL/L — SIGNIFICANT CHANGE UP (ref 98–107)
CO2 SERPL-SCNC: 22 MMOL/L — SIGNIFICANT CHANGE UP (ref 22–31)
CREAT SERPL-MCNC: 2.26 MG/DL — HIGH (ref 0.5–1.3)
EGFR: 28 ML/MIN/1.73M2 — LOW
EGFR: 28 ML/MIN/1.73M2 — LOW
ESTIMATED AVERAGE GLUCOSE: 177 — SIGNIFICANT CHANGE UP
GLUCOSE BLDC GLUCOMTR-MCNC: 175 MG/DL — HIGH (ref 70–99)
GLUCOSE BLDC GLUCOMTR-MCNC: 183 MG/DL — HIGH (ref 70–99)
GLUCOSE BLDC GLUCOMTR-MCNC: 197 MG/DL — HIGH (ref 70–99)
GLUCOSE BLDC GLUCOMTR-MCNC: 99 MG/DL — SIGNIFICANT CHANGE UP (ref 70–99)
GLUCOSE SERPL-MCNC: 85 MG/DL — SIGNIFICANT CHANGE UP (ref 70–99)
HCT VFR BLD CALC: 33.8 % — LOW (ref 39–50)
HGB BLD-MCNC: 11.6 G/DL — LOW (ref 13–17)
MAGNESIUM SERPL-MCNC: 1.7 MG/DL — SIGNIFICANT CHANGE UP (ref 1.6–2.6)
MCHC RBC-ENTMCNC: 29.8 PG — SIGNIFICANT CHANGE UP (ref 27–34)
MCHC RBC-ENTMCNC: 34.3 G/DL — SIGNIFICANT CHANGE UP (ref 32–36)
MCV RBC AUTO: 86.9 FL — SIGNIFICANT CHANGE UP (ref 80–100)
NRBC # BLD AUTO: 0 K/UL — SIGNIFICANT CHANGE UP (ref 0–0)
NRBC # FLD: 0 K/UL — SIGNIFICANT CHANGE UP (ref 0–0)
NRBC BLD AUTO-RTO: 0 /100 WBCS — SIGNIFICANT CHANGE UP (ref 0–0)
PHOSPHATE SERPL-MCNC: 3.9 MG/DL — SIGNIFICANT CHANGE UP (ref 2.5–4.5)
PLATELET # BLD AUTO: 145 K/UL — LOW (ref 150–400)
POTASSIUM SERPL-MCNC: 4.6 MMOL/L — SIGNIFICANT CHANGE UP (ref 3.5–5.3)
POTASSIUM SERPL-SCNC: 4.6 MMOL/L — SIGNIFICANT CHANGE UP (ref 3.5–5.3)
PROT SERPL-MCNC: 6.8 G/DL — SIGNIFICANT CHANGE UP (ref 6–8.3)
RBC # BLD: 3.89 M/UL — LOW (ref 4.2–5.8)
RBC # FLD: 13.3 % — SIGNIFICANT CHANGE UP (ref 10.3–14.5)
SODIUM SERPL-SCNC: 136 MMOL/L — SIGNIFICANT CHANGE UP (ref 135–145)
WBC # BLD: 6.36 K/UL — SIGNIFICANT CHANGE UP (ref 3.8–10.5)
WBC # FLD AUTO: 6.36 K/UL — SIGNIFICANT CHANGE UP (ref 3.8–10.5)

## 2025-04-28 PROCEDURE — 99233 SBSQ HOSP IP/OBS HIGH 50: CPT

## 2025-04-28 PROCEDURE — 99232 SBSQ HOSP IP/OBS MODERATE 35: CPT

## 2025-04-28 RX ORDER — METOPROLOL SUCCINATE 50 MG/1
50 TABLET, EXTENDED RELEASE ORAL DAILY
Refills: 0 | Status: DISCONTINUED | OUTPATIENT
Start: 2025-04-28 | End: 2025-04-29

## 2025-04-28 RX ADMIN — METOPROLOL SUCCINATE 25 MILLIGRAM(S): 50 TABLET, EXTENDED RELEASE ORAL at 07:48

## 2025-04-28 RX ADMIN — TAMSULOSIN HYDROCHLORIDE 0.4 MILLIGRAM(S): 0.4 CAPSULE ORAL at 22:15

## 2025-04-28 RX ADMIN — Medication 25 GRAM(S): at 07:48

## 2025-04-28 RX ADMIN — CALCITRIOL 0.25 MICROGRAM(S): 0.5 CAPSULE, GELATIN COATED ORAL at 14:01

## 2025-04-28 RX ADMIN — MUPIROCIN CALCIUM 1 APPLICATION(S): 20 CREAM TOPICAL at 17:09

## 2025-04-28 RX ADMIN — HEPARIN SODIUM 5000 UNIT(S): 1000 INJECTION INTRAVENOUS; SUBCUTANEOUS at 22:15

## 2025-04-28 RX ADMIN — HEPARIN SODIUM 5000 UNIT(S): 1000 INJECTION INTRAVENOUS; SUBCUTANEOUS at 14:02

## 2025-04-28 RX ADMIN — Medication 81 MILLIGRAM(S): at 14:02

## 2025-04-28 RX ADMIN — INSULIN LISPRO 1: 100 INJECTION, SOLUTION INTRAVENOUS; SUBCUTANEOUS at 16:44

## 2025-04-28 RX ADMIN — Medication 25 GRAM(S): at 22:18

## 2025-04-28 RX ADMIN — Medication 1300 MILLIGRAM(S): at 06:24

## 2025-04-28 RX ADMIN — Medication 1300 MILLIGRAM(S): at 22:14

## 2025-04-28 RX ADMIN — MUPIROCIN CALCIUM 1 APPLICATION(S): 20 CREAM TOPICAL at 06:24

## 2025-04-28 RX ADMIN — METOPROLOL SUCCINATE 50 MILLIGRAM(S): 50 TABLET, EXTENDED RELEASE ORAL at 22:21

## 2025-04-28 RX ADMIN — INSULIN LISPRO 1: 100 INJECTION, SOLUTION INTRAVENOUS; SUBCUTANEOUS at 11:23

## 2025-04-28 RX ADMIN — Medication 1 APPLICATION(S): at 06:34

## 2025-04-28 RX ADMIN — Medication 1300 MILLIGRAM(S): at 14:01

## 2025-04-28 RX ADMIN — ATORVASTATIN CALCIUM 40 MILLIGRAM(S): 80 TABLET, FILM COATED ORAL at 22:15

## 2025-04-28 NOTE — PROGRESS NOTE ADULT - SUBJECTIVE AND OBJECTIVE BOX
[facility] Division of Kidney Diseases & Hypertension  FOLLOW UP NOTE  [phone]--------------------------------------------------------------------------------    Chief Complaint: TAYLOR on CKD, hyperkalemia, acidosis     24 hour events/subjective: Pt seen and evaluated at bedside this morning. Reports feeling well, endorses no complaints. Denies any headaches, nausea, vomiting, fevers/chills, chest pain, palpitations, SOB, abdominal pain, and leg swelling.      PAST HISTORY  --------------------------------------------------------------------------------  No significant changes to PMH, PSH, FHx, SHx, unless otherwise noted    ALLERGIES & MEDICATIONS  --------------------------------------------------------------------------------  Allergies    No Known Allergies    Intolerances      Standing Inpatient Medications  aspirin  chewable 81 milliGRAM(s) Oral daily  atorvastatin 40 milliGRAM(s) Oral at bedtime  calcitriol   Capsule 0.25 MICROGram(s) Oral daily  chlorhexidine 2% Cloths 1 Application(s) Topical <User Schedule>  dextrose 5%. 1000 milliLiter(s) IV Continuous <Continuous>  dextrose 5%. 1000 milliLiter(s) IV Continuous <Continuous>  dextrose 50% Injectable 25 Gram(s) IV Push once  dextrose 50% Injectable 12.5 Gram(s) IV Push once  dextrose 50% Injectable 25 Gram(s) IV Push once  glucagon  Injectable 1 milliGRAM(s) IntraMuscular once  heparin   Injectable 5000 Unit(s) SubCutaneous every 8 hours  influenza  Vaccine (HIGH DOSE) 0.5 milliLiter(s) IntraMuscular once  insulin lispro (ADMELOG) corrective regimen sliding scale   SubCutaneous three times a day before meals  insulin lispro (ADMELOG) corrective regimen sliding scale   SubCutaneous at bedtime  metoprolol succinate ER 25 milliGRAM(s) Oral daily  mupirocin 2% Nasal 1 Application(s) Both Nostrils every 12 hours  piperacillin/tazobactam IVPB.. 3.375 Gram(s) IV Intermittent every 12 hours  sodium bicarbonate 1300 milliGRAM(s) Oral every 8 hours  tamsulosin 0.4 milliGRAM(s) Oral at bedtime    PRN Inpatient Medications  acetaminophen     Tablet .. 650 milliGRAM(s) Oral every 6 hours PRN  dextrose Oral Gel 15 Gram(s) Oral once PRN  melatonin 3 milliGRAM(s) Oral at bedtime PRN      REVIEW OF SYSTEMS  --------------------------------------------------------------------------------  Gen: No fevers/chills  Skin: No rashes  Head/Eyes/Ears: No HA  Respiratory: No SOB, cough  CV: No CP  GI: No abdominal pain, diarrhea, N/V  : No dysuria, hematuria  MSK: No edema  Neuro: slurred speech   Heme: No easy bruising or bleeding  Psych: No significant depression    All other systems were reviewed and are negative, except as noted.    VITALS/PHYSICAL EXAM  --------------------------------------------------------------------------------  T(C): 36.2 (04-28-25 @ 11:45), Max: 36.8 (04-27-25 @ 17:30)  HR: 72 (04-28-25 @ 11:45) (50 - 78)  BP: 141/70 (04-28-25 @ 11:45) (124/57 - 155/79)  RR: 18 (04-28-25 @ 11:45) (16 - 18)  SpO2: 100% (04-28-25 @ 11:45) (97% - 100%)  Wt(kg): --    04-27-25 @ 07:01  -  04-28-25 @ 07:00  --------------------------------------------------------  IN: 360 mL / OUT: 3100 mL / NET: -2740 mL    04-28-25 @ 07:01  -  04-28-25 @ 12:47  --------------------------------------------------------  IN: 100 mL / OUT: 700 mL / NET: -600 mL    Physical Exam:  Gen: NAD  HEENT: MMM  Pulm: CTA B/L  CV: S1S2  Abd: Soft, +BS   Ext: No LE edema B/L  Neuro: Awake  Skin: Warm and dry    LABS/STUDIES  --------------------------------------------------------------------------------              11.6   6.36  >-----------<  145      [04-28-25 @ 06:47]              33.8     136  |  101  |  45  ----------------------------<  85      [04-28-25 @ 06:47]  4.6   |  22  |  2.26        Ca     9.6     [04-28-25 @ 06:47]      Mg     1.70     [04-28-25 @ 06:47]      Phos  3.9     [04-28-25 @ 06:47]    TPro  6.8  /  Alb  3.5  /  TBili  0.4  /  DBili  x   /  AST  47  /  ALT  85  /  AlkPhos  107  [04-28-25 @ 06:47]    Creatinine Trend:  SCr 2.26 [04-28 @ 06:47]  SCr 2.37 [04-27 @ 05:59]  SCr 2.72 [04-26 @ 11:27]  SCr 2.76 [04-26 @ 05:33]  SCr 2.64 [04-26 @ 02:00]    Urine Creatinine 49      [04-26-25 @ 04:12]  Urine Protein 31      [04-26-25 @ 04:12]  Urine Sodium 45      [04-26-25 @ 04:12]  Urine Urea Nitrogen 390.1      [04-26-25 @ 04:12]  Urine Potassium 21.2      [04-26-25 @ 04:12]  Urine Osmolality 356      [04-26-25 @ 04:12]    TSH 3.31      [04-25-25 @ 20:24]

## 2025-04-28 NOTE — PROVIDER CONTACT NOTE (OTHER) - SITUATION
patient noted with bright red blood urine in reynolds catheter bag. patient also noted with blood around penis at reynolds insertion site.

## 2025-04-28 NOTE — PROGRESS NOTE ADULT - PROBLEM SELECTOR PLAN 4
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 had repeated episodes of bradycardia, now with episodes of Aflutter and second degree block.  - Currently restarted Metoprolol Succinate at lower dose 25mg

## 2025-04-28 NOTE — PROGRESS NOTE ADULT - SUBJECTIVE AND OBJECTIVE BOX
Subjective/Overnight Events: Overnight the patient's reynolds bad was noticed to be pink/red. There was blood around the tip of the patient's penis. Subq heparin was held. Also it was noticed that the patient's left pupil was smaller than his right. Patient was in bed and was in no distress. Denies nausea, vomiting, diarrhea, constipation, cough, fever, chest pain, and cough.    MEDICATIONS  (STANDING):  aspirin  chewable 81 milliGRAM(s) Oral daily  atorvastatin 40 milliGRAM(s) Oral at bedtime  calcitriol   Capsule 0.25 MICROGram(s) Oral daily  chlorhexidine 2% Cloths 1 Application(s) Topical <User Schedule>  dextrose 5%. 1000 milliLiter(s) (100 mL/Hr) IV Continuous <Continuous>  dextrose 5%. 1000 milliLiter(s) (50 mL/Hr) IV Continuous <Continuous>  dextrose 50% Injectable 25 Gram(s) IV Push once  dextrose 50% Injectable 12.5 Gram(s) IV Push once  dextrose 50% Injectable 25 Gram(s) IV Push once  glucagon  Injectable 1 milliGRAM(s) IntraMuscular once  heparin   Injectable 5000 Unit(s) SubCutaneous every 8 hours  influenza  Vaccine (HIGH DOSE) 0.5 milliLiter(s) IntraMuscular once  insulin lispro (ADMELOG) corrective regimen sliding scale   SubCutaneous three times a day before meals  insulin lispro (ADMELOG) corrective regimen sliding scale   SubCutaneous at bedtime  metoprolol succinate ER 25 milliGRAM(s) Oral daily  mupirocin 2% Nasal 1 Application(s) Both Nostrils every 12 hours  piperacillin/tazobactam IVPB.. 3.375 Gram(s) IV Intermittent every 12 hours  sodium bicarbonate 1300 milliGRAM(s) Oral every 8 hours  tamsulosin 0.4 milliGRAM(s) Oral at bedtime    MEDICATIONS  (PRN):  acetaminophen     Tablet .. 650 milliGRAM(s) Oral every 6 hours PRN Temp greater or equal to 38C (100.4F), Mild Pain (1 - 3)  dextrose Oral Gel 15 Gram(s) Oral once PRN Blood Glucose LESS THAN 70 milliGRAM(s)/deciliter  melatonin 3 milliGRAM(s) Oral at bedtime PRN Insomnia        Objective:  Vital Signs Last 24 Hrs  T(C): 36.4 (28 Apr 2025 01:30), Max: 36.8 (27 Apr 2025 17:30)  T(F): 97.5 (28 Apr 2025 01:30), Max: 98.2 (27 Apr 2025 17:30)  HR: 74 (28 Apr 2025 01:30) (70 - 80)  BP: 140/77 (28 Apr 2025 01:30) (124/60 - 155/79)  BP(mean): --  RR: 18 (28 Apr 2025 01:30) (18 - 18)  SpO2: 97% (28 Apr 2025 01:30) (97% - 99%)    Parameters below as of 28 Apr 2025 01:30  Patient On (Oxygen Delivery Method): room air      I&O's Summary    27 Apr 2025 07:01  -  28 Apr 2025 07:00  --------------------------------------------------------  IN: 360 mL / OUT: 3100 mL / NET: -2740 mL      General: NAD, lying in bed  Cardiac: Normal S1, S2, no murmurs, rubs, or gallops appreciated  HEENT: NC/AT, clear sclera/conjunctiva, EOMI, MMM, hearing intact to voice  RESPIRATORY: Comfortable on RA, speaking in full sentences   Abdominal: Normoactive bowel sounds, no tenderness to light or deep palpation. No signs of organomegaly  : reynolds in place, with reddish output, dried blood on tip of penis  NEURO: ambulated with PT, moving all extremities, speech understandable, Awake/conversant  Skin: Intact, no signs of bruising, ulcers, or open wounds.  Psych: AAOx3      Allergies  No Known Allergies    Intolerances    Labs:                          10.5   6.62  )-----------( 131      ( 27 Apr 2025 05:59 )             29.9                           9.3    5.88  )-----------( 133      ( 26 Apr 2025 05:33 )             26.2       04-27    138  |  104  |  49[H]  ----------------------------<  91  4.2   |  22  |  2.37[H]    Ca    9.3      27 Apr 2025 05:59  Phos  2.8     04-27  Mg     1.60     04-27    TPro  6.4  /  Alb  3.3  /  TBili  0.5  /  DBili  x   /  AST  43[H]  /  ALT  80[H]  /  AlkPhos  107  04-27      04-26    134[L]  |  107  |  62[H]  ----------------------------<  224[H]  5.4[H]   |  16[L]  |  2.72[H]    Ca    9.3      26 Apr 2025 11:27  Phos  3.2     04-26  Mg     1.80     04-26    TPro  6.2  /  Alb  3.3  /  TBili  0.3  /  DBili  x   /  AST  50[H]  /  ALT  84[H]  /  AlkPhos  105  04-26      04-26    134[L]  |  108[H]  |  67[H]  ----------------------------<  94  5.4[H]   |  15[L]  |  2.76[H]    Ca    9.4      26 Apr 2025 05:33  Phos  3.6     04-26  Mg     1.80     04-26    TPro  6.2  /  Alb  3.3  /  TBili  0.3  /  DBili  x   /  AST  50[H]  /  ALT  84[H]  /  AlkPhos  105  04-26    PT/INR - ( 25 Apr 2025 20:03 )   PT: 11.4 sec;   INR: 0.98 ratio         PTT - ( 25 Apr 2025 20:03 )  PTT:37.2 sec    VBG (most recent)  pH: 7.41  PCO2: 39        Urinalysis with Rflx Culture (collected 25 Apr 2025 23:56)    Culture - Urine (collected 25 Apr 2025 23:56)  Source: Clean Catch  Preliminary Report (27 Apr 2025 19:27):    >100,000 CFU/ml Gram Negative Rods    Culture - Blood (collected 25 Apr 2025 20:00)  Source: Blood Blood-Peripheral  Preliminary Report (28 Apr 2025 01:01):    No growth at 48 Hours    Culture - Blood (collected 25 Apr 2025 19:45)  Source: Blood Blood-Peripheral  Preliminary Report (28 Apr 2025 01:01):    No growth at 48 Hours        Imaging:    FINDINGS:    CT BRAIN:    VENTRICLES AND SULCI: Age appropriate involutional changes.  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 No mass effect, acute   hemorrhage, or midline shift.  There are periventricular and subcortical   white matter hypodensities, consistent with microvascular type changes.  EXTRA-AXIAL: No mass or fluid collection. Basal cisterns are normal in   appearance.  POST CONTRAST: No abnormal enhancing lesion.    VISUALIZED SINUSES:  Scattered mucosal thickening. Nonpneumatization   right frontal sinus, anatomic variant. Near complete opacification left   maxillary sinus. Moderate mucosal thickening ethmoid air cells with near   complete opacification extending into the frontal ethmoidal recess with   near complete opacification of the left frontal sinus. Moderate mucosal   thickening of the right frontal ethmoidal recess.  TYMPANOMASTOID CAVITIES:  Mastoid air cells clear. Nonspecific soft   tissue density left external auditory canal.  VISUALIZED ORBITS: Bilateral lens replacement.  CALVARIUM: Generalized osteopenia.      CTA NECK:    AORTIC ARCH AND VISUALIZED GREAT VESSELS: Atherosclerotic changes. Bovine   aortic arch.    RIGHT:  COMMON CAROTID ARTERY: Minimal atherosclerotic changes at the bifurcation   without significant stenosis.  INTERNAL CAROTID ARTERY: No significant stenosis based on NASCET criteria.  VERTEBRAL ARTERY: Hypoplastic.    LEFT:  COMMON CAROTID ARTERY: Minimal atherosclerotic changes at the bifurcation   without significant stenosis.  INTERNAL CAROTID ARTERY: Atherosclerotic changes at the origin with   resultant mild to moderate stenosis based on NASCET criteria.  VERTEBRAL ARTERY: Normal in course and caliber to the intracranial   circulation. Vertebral artery dominance.    VISUALIZED LUNGS: Bilateral dependent atelectasis. Right greater than   left apical emphysematous changes. Perifissural nodular opacities along   the right major and minor fissure measuring up to 1.0 cm (3-63),   indeterminate.    MISCELLANEOUS: None.    CAROTID STENOSIS REFERENCE: Percent (%) stenosis is expressed in terms of   NASCET Criteria. (NASCET = 100x1-(N/D)). N=greatest narrowing. D=normal   distal diameter - MILD = <50% stenosis. - MODERATE = 50-69% stenosis. -   SEVERE = 70-89% stenosis. - HAIRLINE/CRITICAL = 90-99% stenosis. -   OCCLUDED = 100%stenosis.      CTA HEAD:    INTERNAL CAROTID ARTERIES: Atherosclerotic changes of the bilateral   petrous through cavernous segments with mild stenosis of the right   cavernous segment. 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  MIDDLE CEREBRAL ARTERIES: No significant stenosis or occlusion.  POSTERIOR CEREBRAL ARTERIES: Fetal origin right PCA    DISTAL VERTEBRAL / BASILAR ARTERIES: Trace atherosclerotic changes of the   proximal V4 segments without significant stenosis.    VENOUS STRUCTURES: Inadequate venous phase opacification.    MISCELLANEOUS: No other vascular abnormality is seen.      IMPRESSION:    CT HEAD:  No evidence of acute intracranial pathology.  Chronic age involutional and microangiopathic ischemic changes.  Moderate to marked paranasal sinus disease as described.  Recommend ENT evaluation, treatment and follow-up.  Nonspecific soft tissue density left external auditory canal, correlate   otoscopy.    CTA NECK:  Mild to moderate left proximal internal carotid artery stenosis.  Left vertebral artery artery dominance, hypoplastic right vertebral   artery.    Indeterminate perifissural lung nodules as described.  Additional follow-up/follow-up as per Fleischner criteria.    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  Recommend neurosurgical consultation/follow-up.  Anatomic variants as described.    --- End of Report ---      Renal Bladder US:    IMPRESSION:  Mild bilateral increased renal echotexture suggesting medical renal   disease.    Marked urinary bladder wall thickening versus underdistention. Correlate   with urinalysis due to cystitis, other bladder pathology.    --- End of Report ---      Consultant notes reviewed: Renal  Team d/w renal acidosis improved, can c/w sodium bicarb, hyperK improved, can d/c lokelma

## 2025-04-28 NOTE — PROGRESS NOTE ADULT - PROBLEM SELECTOR PLAN 10
Hyperglycemic 300 on admission --> hypoglycemic after hyperkalemia cocktail- BHB = 0 not DKA   - Home regimen: Glipizide 5mg AM and 10mg in the PM - hold   - check a1c (ordered)  - ISS for now  - monitor FS, goal 110-180, most recent , acceptable

## 2025-04-28 NOTE — PROGRESS NOTE ADULT - PROBLEM SELECTOR PLAN 8
K of 5.8 on admission possibly 2/2 to TAYLOR/CKD. Given insulin / D50 / calcium   - CTM K (wnl today)  - if persistently high can consider a dose of IV diuretic   - monitor on tele  - can stop lokelma as per renal

## 2025-04-28 NOTE — PROGRESS NOTE ADULT - PROBLEM SELECTOR PLAN 3
Pt w/ metabolic acidosis iso renal failure. Now improved, SCO2 wnl 22 today. Continue sodium bicarb 1300 tid. Monitor labs.     If you have any questions, please feel free to contact me  Celeste Barron  Nephrology  [phone] / [employer] Teams(Preferred)  (After 4 pm or on weekends please page the on-call fellow)

## 2025-04-28 NOTE — CONSULT NOTE ADULT - SUBJECTIVE AND OBJECTIVE BOX
[facility] DIVISION OF KIDNEY DISEASES AND HYPERTENSION -- [phone]  -- INITIAL CONSULT NOTE  --------------------------------------------------------------------------------  HPI: 86yo M w/ CAD s/p CABG (in August 2019 with post-op pAfib/AFlutter previously on amio/eliquis now discontinued), CKD stage III, HTN, HLD, T2DM, presenting from home for slurred speech. Nephrology consulted for CKD, metabolic acidosis and hyperkalemia management.     Pt seen and examined in the ED, reports new onset of slurred speech. Otherwise no complaints. Reports that he was recenlty started on a medication for DM (farxiga according to chart) in the past week. Otherwise unable to procide any pertinent history. No known h/o kidney disease. Denies NSAID use. No urinary complaints. Denies fevers, chills, N/V/D, abd pain, dysuria/discharge, difficulty urinating, CP, SOB and LE swelling.    PAST HISTORY  --------------------------------------------------------------------------------  PAST MEDICAL & SURGICAL HISTORY:  Type 2 diabetes mellitus without complication  CVA (cerebral vascular accident)  Non-ST elevation (NSTEMI) myocardial infarction  CAD (coronary artery disease)  CKD (chronic kidney disease), stage III  HTN (hypertension)  HLD (hyperlipidemia)  Status post placement of implantable loop recorder  H/O carpal tunnel repair  S/P CABG (coronary artery bypass graft)    FAMILY HISTORY:  Family history of heart disease    Family history of uterine cancer (Sibling)    PAST SOCIAL HISTORY: see above    ALLERGIES & MEDICATIONS  --------------------------------------------------------------------------------  Allergies    No Known Allergies    Intolerances    Standing Inpatient Medications  aspirin  chewable 81 milliGRAM(s) Oral daily  atorvastatin 40 milliGRAM(s) Oral at bedtime  calcitriol   Capsule 0.25 MICROGram(s) Oral daily  dextrose 5%. 1000 milliLiter(s) IV Continuous <Continuous>  dextrose 5%. 1000 milliLiter(s) IV Continuous <Continuous>  dextrose 50% Injectable 25 Gram(s) IV Push once  dextrose 50% Injectable 12.5 Gram(s) IV Push once  dextrose 50% Injectable 25 Gram(s) IV Push once  glucagon  Injectable 1 milliGRAM(s) IntraMuscular once  heparin   Injectable 5000 Unit(s) SubCutaneous every 8 hours  insulin lispro (ADMELOG) corrective regimen sliding scale   SubCutaneous three times a day before meals  insulin lispro (ADMELOG) corrective regimen sliding scale   SubCutaneous at bedtime  piperacillin/tazobactam IVPB.. 3.375 Gram(s) IV Intermittent every 12 hours  sodium bicarbonate  Infusion 0.187 mEq/kG/Hr IV Continuous <Continuous>  sodium zirconium cyclosilicate 10 Gram(s) Oral once  tamsulosin 0.4 milliGRAM(s) Oral at bedtime    PRN Inpatient Medications  acetaminophen     Tablet .. 650 milliGRAM(s) Oral every 6 hours PRN  dextrose Oral Gel 15 Gram(s) Oral once PRN  melatonin 3 milliGRAM(s) Oral at bedtime PRN    REVIEW OF SYSTEMS  --------------------------------------------------------------------------------  Gen: No fevers/chills  Skin: No rashes  Head/Eyes/Ears: No HA  Respiratory: No SOB, cough  CV: No CP  GI: No abdominal pain, diarrhea, N/V  : No dysuria, hematuria  MSK: No edema  Neuro: slurred speech   Heme: No easy bruising or bleeding  Psych: No significant depression    All other systems were reviewed and are negative, except as noted.    VITALS/PHYSICAL EXAM  --------------------------------------------------------------------------------  T(C): 36 (04-26-25 @ 11:12), Max: 36.6 (04-26-25 @ 04:29)  HR: 58 (04-26-25 @ 11:12) (39 - 68)  BP: 130/58 (04-26-25 @ 11:12) (102/52 - 132/54)  RR: 15 (04-26-25 @ 11:12) (13 - 16)  SpO2: 96% (04-26-25 @ 11:12) (95% - 98%)  Wt(kg): --  Height (cm): 157.5 (04-25-25 @ 18:55)  Weight (kg): 60.3 (04-25-25 @ 18:55)  BMI (kg/m2): 24.3 (04-25-25 @ 18:55)  BSA (m2): 1.61 (04-25-25 @ 18:55)    Physical Exam:  	Gen: NAD  	HEENT: MMM  	Pulm: CTA B/L  	CV: S1S2  	Abd: Soft, +BS   	Ext: No LE edema B/L  	Neuro: Awake  	Skin: Warm and dry    LABS/STUDIES  --------------------------------------------------------------------------------              9.3    5.88  >-----------<  133      [04-26-25 @ 05:33]              26.2     134  |  107  |  62  ----------------------------<  224      [04-26-25 @ 11:27]  5.4   |  16  |  2.72        Ca     9.3     [04-26-25 @ 11:27]      Mg     1.80     [04-26-25 @ 11:27]      Phos  3.2     [04-26-25 @ 11:27]    TPro  6.2  /  Alb  3.3  /  TBili  0.3  /  DBili  x   /  AST  50  /  ALT  84  /  AlkPhos  105  [04-26-25 @ 05:33]    PT/INR: PT 11.4 , INR 0.98       [04-25-25 @ 20:03]  PTT: 37.2       [04-25-25 @ 20:03]    Creatinine Trend:  SCr 2.72 [04-26 @ 11:27]  SCr 2.76 [04-26 @ 05:33]  SCr 2.64 [04-26 @ 02:00]  SCr 2.76 [04-25 @ 20:03]    Urine Creatinine 49      [04-26-25 @ 04:12]  Urine Protein 31      [04-26-25 @ 04:12]  Urine Sodium 45      [04-26-25 @ 04:12]  Urine Urea Nitrogen 390.1      [04-26-25 @ 04:12]  Urine Potassium 21.2      [04-26-25 @ 04:12]  Urine Osmolality 356      [04-26-25 @ 04:12]    TSH 3.31      [04-25-25 @ 20:24]
Date of Admission: 4/26/2025    CHIEF COMPLAINT: Bradycardia    HISTORY OF PRESENT ILLNESS:  This is a 85yoM w/ PMHx CAD s/p CABG, afib/aflutter (AC self discontinued), CKD stage III, HTN, HLD, DMII initially presented on 4/23 with slurred speech and AMS found to be hypothermic 92F and hyperkalemic 5.8 admitted for sepsis 2/2 UTI now on abx.  EPS consulted for bradycardia HR 40-50s and sinus pauses.  TSH 3.3.  Patient takes Toprol 50mg PO daily. Patient denies any chest pain, palpitations, lightheadedness, dizziness, syncope or near syncope.      Allergies  No Known Allergies  Intolerances    MEDICATIONS:  aspirin  chewable 81 milliGRAM(s) Oral daily  heparin   Injectable 5000 Unit(s) SubCutaneous every 8 hours  metoprolol succinate ER 25 milliGRAM(s) Oral daily  piperacillin/tazobactam IVPB.. 3.375 Gram(s) IV Intermittent every 12 hours  acetaminophen     Tablet .. 650 milliGRAM(s) Oral every 6 hours PRN  melatonin 3 milliGRAM(s) Oral at bedtime PRN  atorvastatin 40 milliGRAM(s) Oral at bedtime  dextrose 50% Injectable 25 Gram(s) IV Push once  dextrose 50% Injectable 12.5 Gram(s) IV Push once  dextrose 50% Injectable 25 Gram(s) IV Push once  dextrose Oral Gel 15 Gram(s) Oral once PRN  glucagon  Injectable 1 milliGRAM(s) IntraMuscular once  insulin lispro (ADMELOG) corrective regimen sliding scale   SubCutaneous three times a day before meals  insulin lispro (ADMELOG) corrective regimen sliding scale   SubCutaneous at bedtime  calcitriol   Capsule 0.25 MICROGram(s) Oral daily  chlorhexidine 2% Cloths 1 Application(s) Topical <User Schedule>  dextrose 5%. 1000 milliLiter(s) IV Continuous <Continuous>  dextrose 5%. 1000 milliLiter(s) IV Continuous <Continuous>  influenza  Vaccine (HIGH DOSE) 0.5 milliLiter(s) IntraMuscular once  mupirocin 2% Nasal 1 Application(s) Both Nostrils every 12 hours  sodium bicarbonate 1300 milliGRAM(s) Oral every 8 hours  tamsulosin 0.4 milliGRAM(s) Oral at bedtime    PAST MEDICAL & SURGICAL HISTORY:  Type 2 diabetes mellitus without complication  CVA (cerebral vascular accident)  Non-ST elevation (NSTEMI) myocardial infarction  CAD (coronary artery disease)  CKD (chronic kidney disease), stage III  HTN (hypertension)  HLD (hyperlipidemia)  Status post placement of implantable loop recorder  H/O carpal tunnel repair  S/P CABG (coronary artery bypass graft)    FAMILY HISTORY:  Family history of heart disease  Family history of uterine cancer (Sibling)    REVIEW OF SYSTEMS:  See HPI. Otherwise, 10 point ROS done and otherwise negative.    PHYSICAL EXAM:  T(C): 36.3 (04-28-25 @ 07:45), Max: 36.8 (04-27-25 @ 17:30)  HR: 72 (04-28-25 @ 07:45) (50 - 78)  BP: 146/79 (04-28-25 @ 07:45) (124/57 - 155/79)  RR: 18 (04-28-25 @ 07:45) (16 - 18)  SpO2: 99% (04-28-25 @ 07:45) (97% - 99%)  Wt(kg): --  I&O's Summary    27 Apr 2025 07:01  -  28 Apr 2025 07:00  --------------------------------------------------------  IN: 360 mL / OUT: 3100 mL / NET: -2740 mL    28 Apr 2025 07:01  -  28 Apr 2025 10:38  --------------------------------------------------------  IN: 100 mL / OUT: 700 mL / NET: -600 mL    Appearance: Normal	  HEENT:   Normal oral mucosa, PERRL, EOMI	  Lymphatic: No lymphadenopathy  Cardiovascular: Normal S1 S2, No JVD, No murmurs, No edema  Respiratory: Lungs clear to auscultation	  Psychiatry: A & O x 3, Mood & affect appropriate  Gastrointestinal:  Soft, Non-tender, + BS	  Skin: No rashes, No ecchymoses, No cyanosis	  Neurologic: Non-focal  Extremities: Normal range of motion, No clubbing, cyanosis or edema  Vascular: Peripheral pulses palpable 2+ bilaterally    LABS:	 	  CBC Full  -  ( 28 Apr 2025 06:47 )  WBC Count : 6.36 K/uL  Hemoglobin : 11.6 g/dL  Hematocrit : 33.8 %  Platelet Count - Automated : 145 K/uL  Mean Cell Volume : 86.9 fL  Mean Cell Hemoglobin : 29.8 pg  Mean Cell Hemoglobin Concentration : 34.3 g/dL  Auto Neutrophil # : x  Auto Lymphocyte # : x  Auto Monocyte # : x  Auto Eosinophil # : x  Auto Basophil # : x  Auto Neutrophil % : x  Auto Lymphocyte % : x  Auto Monocyte % : x  Auto Eosinophil % : x  Auto Basophil % : x    04-28    136  |  101  |  45[H]  ----------------------------<  85  4.6   |  22  |  2.26[H]  04-27    138  |  104  |  49[H]  ----------------------------<  91  4.2   |  22  |  2.37[H]    Ca    9.6      28 Apr 2025 06:47  Ca    9.3      27 Apr 2025 05:59  Phos  3.9     04-28  Phos  2.8     04-27  Mg     1.70     04-28  Mg     1.60     04-27    TPro  6.8  /  Alb  3.5  /  TBili  0.4  /  DBili  x   /  AST  47[H]  /  ALT  85[H]  /  AlkPhos  107  04-28  TPro  6.4  /  Alb  3.3  /  TBili  0.5  /  DBili  x   /  AST  43[H]  /  ALT  80[H]  /  AlkPhos  107  04-27

## 2025-04-28 NOTE — CONSULT NOTE ADULT - ASSESSMENT
85yoM w/ PMHx CAD s/p CABG, afib/aflutter (AC self discontinued), CKD stage III, HTN, HLD, DMII initially presented on 4/23 with slurred speech and AMS found to be hypothermic 92F and hyperkalemic 5.8 admitted for sepsis 2/2 UTI now on abx.  EPS consulted for bradycardia HR 40-50s and sinus pauses.  TSH 3.3.  Patient takes Toprol 50mg PO daily. Patient denies any chest pain, palpitations, lightheadedness, dizziness, syncope or near syncope.      Telemetry reviewed: NSR and intermittent mobitz type I  Patient remains asymptomatic - can continue home dose of Toprol   Continue to monitor on telemetry  ILR was placed 07/2015 - battery depleted   No pacing indications at this time 
Pt w/ CKD, metabolic acidosis and hyperkalemia.

## 2025-04-28 NOTE — PROGRESS NOTE ADULT - PROBLEM SELECTOR PLAN 7
Patient with hx of Afib s/p CABG. As per daughter, the patient is no longer in Afib and is no on AC. There is a chart note from 2019 related to this.   - CTM on tele for signs of Afib/Aflutter, consider lower dose b-blocker if HR above goal  - [id number] score 4

## 2025-04-28 NOTE — PROGRESS NOTE ADULT - PROBLEM SELECTOR PLAN 2
Patient with pink/red urine seen overnight and blood noticed on the tip of the penis. May have pulled his reynolds during positional changes during the day.    Plan:  - Held subq heparin last night  - CTM for continued signs of hematuria although this may be due to trauma and UTI. However, if this continues, can consider Urology consult.

## 2025-04-28 NOTE — PROGRESS NOTE ADULT - PROBLEM SELECTOR PLAN 2
Pt. with hyperkalemia in the setting of renal failure, now resolved. Low potassium diet. Continue to hold Lisinopril and farxiga. Ensure low potassium/renal diet.

## 2025-04-28 NOTE — PROGRESS NOTE ADULT - PROBLEM SELECTOR PLAN 1
Pt w/ TAYLOR on CKD in the setting of sepsis/UTI. Underlying CKD iso longstanding DM/HTN. Jennie LEE reviewed, unclear baseline as no labs since 2019. Scr trend 1.4-2.4 during multiple hospitalizations in 2019. On admission Scr elevated at 2.76 (4/25), and remains elevated but decreased to 2.26 today (4/28). Non oliguric, 24hr UOP ~3.1L UOP. UA w/ proteinuria, hematuria (likely iso reynolds insertion) and positive for UTI. UPCR 0.6g. Kidney US w/o hydronephrosis. Obtain outpt records. Monitor labs and urine output. Avoid nephrotoxins. Dose medications as per eGFR.

## 2025-04-28 NOTE — PROGRESS NOTE ADULT - ATTENDING COMMENTS
85 y.o. M w/ a hx of CAD s/p CABG c/b post-op afib, CKD3, HTN, DM2 p/w slurred speech, hypothermia, bradycardia, hypoglycemia found to have UTI.     Patient believes speech is at baseline though daughter reported over the weekend it was not. Recurrent aflutter noted on tele over the weekend.     # Aflutter v afib   # Bradycardia   - Bradycardia maybe related to hypothermia, electrolyte abnormalities, medications?   [ ] EP c/s   - Will need to discuss AC with family given recurrence of afib   - Cont Metoprolol for now     # Hypothermia, resolved   - Perhaps 2/2 infection v metabolic acidosis impairing thermoregulation   - AM cortisol, TSH wnl, CT without acute infarcts    # UTI c/b TME   - UCx: >100K GNR   - Cont Zosyn for now pending Ucx     # TAYLOR on CKD c/b metabolic acidosis and hyperkalemia   # Urinary retention   - S/p bicarb drip, off Lokelma now   - S/p Mosher placement c/b hematuria. No clots seen. hematuria maybe 2/2 UTI v traumatic insertion  - Hold Lisinopril   [ ] TOV eventually     # Slurred speech   - 2/2 TME v stroke- one[place] given pt in afib, at risk for stroke   [ ] MRI brain ordered, will   - passed dysphagia screen  - speech therapy eval noted, they did find some deficiencies   - to f/u family re: collateral/baseline

## 2025-04-28 NOTE — PROGRESS NOTE ADULT - PROBLEM SELECTOR PLAN 1
Slurred speech at home, urinary retention w/o dysuria, Hypothermic 92F on admission. likely urinary source   - u/a - protein, blood, LE, pyuria, rvp negative   - CXR - w/o consolidations   - c/w zoysn (renally dosed), hold off vanc given likely urinary source   - Ucx (showing gram negative rods) and Bcx (NGx24)

## 2025-04-29 LAB
-  AMOXICILLIN/CLAVULANIC ACID: SIGNIFICANT CHANGE UP
-  AMPICILLIN/SULBACTAM: SIGNIFICANT CHANGE UP
-  AMPICILLIN: SIGNIFICANT CHANGE UP
-  AZTREONAM: SIGNIFICANT CHANGE UP
-  CEFAZOLIN: SIGNIFICANT CHANGE UP
-  CEFEPIME: SIGNIFICANT CHANGE UP
-  CEFOXITIN: SIGNIFICANT CHANGE UP
-  CEFTRIAXONE: SIGNIFICANT CHANGE UP
-  CEFUROXIME: SIGNIFICANT CHANGE UP
-  CIPROFLOXACIN: SIGNIFICANT CHANGE UP
-  ERTAPENEM: SIGNIFICANT CHANGE UP
-  GENTAMICIN: SIGNIFICANT CHANGE UP
-  IMIPENEM: SIGNIFICANT CHANGE UP
-  LEVOFLOXACIN: SIGNIFICANT CHANGE UP
-  MEROPENEM: SIGNIFICANT CHANGE UP
-  NITROFURANTOIN: SIGNIFICANT CHANGE UP
-  PIPERACILLIN/TAZOBACTAM: SIGNIFICANT CHANGE UP
-  TOBRAMYCIN: SIGNIFICANT CHANGE UP
-  TRIMETHOPRIM/SULFAMETHOXAZOLE: SIGNIFICANT CHANGE UP
ALBUMIN SERPL ELPH-MCNC: 3.4 G/DL — SIGNIFICANT CHANGE UP (ref 3.3–5)
ALP SERPL-CCNC: 98 U/L — SIGNIFICANT CHANGE UP (ref 40–120)
ALT FLD-CCNC: 69 U/L — HIGH (ref 4–41)
ANION GAP SERPL CALC-SCNC: 11 MMOL/L — SIGNIFICANT CHANGE UP (ref 7–14)
AST SERPL-CCNC: 33 U/L — SIGNIFICANT CHANGE UP (ref 4–40)
BILIRUB SERPL-MCNC: 0.4 MG/DL — SIGNIFICANT CHANGE UP (ref 0.2–1.2)
BUN SERPL-MCNC: 48 MG/DL — HIGH (ref 7–23)
CALCIUM SERPL-MCNC: 9.5 MG/DL — SIGNIFICANT CHANGE UP (ref 8.4–10.5)
CHLORIDE SERPL-SCNC: 99 MMOL/L — SIGNIFICANT CHANGE UP (ref 98–107)
CO2 SERPL-SCNC: 24 MMOL/L — SIGNIFICANT CHANGE UP (ref 22–31)
CREAT SERPL-MCNC: 2.47 MG/DL — HIGH (ref 0.5–1.3)
CULTURE RESULTS: ABNORMAL
EGFR: 25 ML/MIN/1.73M2 — LOW
EGFR: 25 ML/MIN/1.73M2 — LOW
GLUCOSE BLDC GLUCOMTR-MCNC: 133 MG/DL — HIGH (ref 70–99)
GLUCOSE BLDC GLUCOMTR-MCNC: 160 MG/DL — HIGH (ref 70–99)
GLUCOSE BLDC GLUCOMTR-MCNC: 166 MG/DL — HIGH (ref 70–99)
GLUCOSE BLDC GLUCOMTR-MCNC: 182 MG/DL — HIGH (ref 70–99)
GLUCOSE SERPL-MCNC: 92 MG/DL — SIGNIFICANT CHANGE UP (ref 70–99)
HCT VFR BLD CALC: 33 % — LOW (ref 39–50)
HGB BLD-MCNC: 11.6 G/DL — LOW (ref 13–17)
MAGNESIUM SERPL-MCNC: 1.7 MG/DL — SIGNIFICANT CHANGE UP (ref 1.6–2.6)
MCHC RBC-ENTMCNC: 30 PG — SIGNIFICANT CHANGE UP (ref 27–34)
MCHC RBC-ENTMCNC: 35.2 G/DL — SIGNIFICANT CHANGE UP (ref 32–36)
MCV RBC AUTO: 85.3 FL — SIGNIFICANT CHANGE UP (ref 80–100)
METHOD TYPE: SIGNIFICANT CHANGE UP
NRBC # BLD AUTO: 0 K/UL — SIGNIFICANT CHANGE UP (ref 0–0)
NRBC # FLD: 0 K/UL — SIGNIFICANT CHANGE UP (ref 0–0)
NRBC BLD AUTO-RTO: 0 /100 WBCS — SIGNIFICANT CHANGE UP (ref 0–0)
ORGANISM # SPEC MICROSCOPIC CNT: ABNORMAL
ORGANISM # SPEC MICROSCOPIC CNT: ABNORMAL
PHOSPHATE SERPL-MCNC: 4.2 MG/DL — SIGNIFICANT CHANGE UP (ref 2.5–4.5)
PLATELET # BLD AUTO: 148 K/UL — LOW (ref 150–400)
POTASSIUM SERPL-MCNC: 5.2 MMOL/L — SIGNIFICANT CHANGE UP (ref 3.5–5.3)
POTASSIUM SERPL-SCNC: 5.2 MMOL/L — SIGNIFICANT CHANGE UP (ref 3.5–5.3)
PROT SERPL-MCNC: 6.7 G/DL — SIGNIFICANT CHANGE UP (ref 6–8.3)
RBC # BLD: 3.87 M/UL — LOW (ref 4.2–5.8)
RBC # FLD: 12.7 % — SIGNIFICANT CHANGE UP (ref 10.3–14.5)
SODIUM SERPL-SCNC: 134 MMOL/L — LOW (ref 135–145)
SPECIMEN SOURCE: SIGNIFICANT CHANGE UP
WBC # BLD: 7.27 K/UL — SIGNIFICANT CHANGE UP (ref 3.8–10.5)
WBC # FLD AUTO: 7.27 K/UL — SIGNIFICANT CHANGE UP (ref 3.8–10.5)

## 2025-04-29 PROCEDURE — 99231 SBSQ HOSP IP/OBS SF/LOW 25: CPT

## 2025-04-29 PROCEDURE — 99232 SBSQ HOSP IP/OBS MODERATE 35: CPT | Mod: GC

## 2025-04-29 RX ORDER — ASPIRIN 325 MG
81 TABLET ORAL DAILY
Refills: 0 | Status: DISCONTINUED | OUTPATIENT
Start: 2025-04-30 | End: 2025-04-30

## 2025-04-29 RX ORDER — APIXABAN 2.5 MG/1
2.5 TABLET, FILM COATED ORAL EVERY 12 HOURS
Refills: 0 | Status: DISCONTINUED | OUTPATIENT
Start: 2025-04-29 | End: 2025-05-05

## 2025-04-29 RX ORDER — CEFTRIAXONE 500 MG/1
1000 INJECTION, POWDER, FOR SOLUTION INTRAMUSCULAR; INTRAVENOUS EVERY 24 HOURS
Refills: 0 | Status: COMPLETED | OUTPATIENT
Start: 2025-04-29 | End: 2025-05-01

## 2025-04-29 RX ORDER — APIXABAN 2.5 MG/1
1 TABLET, FILM COATED ORAL
Qty: 60 | Refills: 0
Start: 2025-04-29 | End: 2025-05-28

## 2025-04-29 RX ORDER — CLOTRIMAZOLE 1 G/100G
1 CREAM TOPICAL
Refills: 0 | Status: DISCONTINUED | OUTPATIENT
Start: 2025-04-29 | End: 2025-05-07

## 2025-04-29 RX ADMIN — Medication 1300 MILLIGRAM(S): at 05:05

## 2025-04-29 RX ADMIN — CLOTRIMAZOLE 1 APPLICATION(S): 1 CREAM TOPICAL at 17:21

## 2025-04-29 RX ADMIN — Medication 25 GRAM(S): at 09:54

## 2025-04-29 RX ADMIN — Medication 1 APPLICATION(S): at 05:06

## 2025-04-29 RX ADMIN — TAMSULOSIN HYDROCHLORIDE 0.4 MILLIGRAM(S): 0.4 CAPSULE ORAL at 22:42

## 2025-04-29 RX ADMIN — HEPARIN SODIUM 5000 UNIT(S): 1000 INJECTION INTRAVENOUS; SUBCUTANEOUS at 05:05

## 2025-04-29 RX ADMIN — CALCITRIOL 0.25 MICROGRAM(S): 0.5 CAPSULE, GELATIN COATED ORAL at 11:33

## 2025-04-29 RX ADMIN — Medication 81 MILLIGRAM(S): at 11:32

## 2025-04-29 RX ADMIN — APIXABAN 2.5 MILLIGRAM(S): 2.5 TABLET, FILM COATED ORAL at 17:16

## 2025-04-29 RX ADMIN — Medication 1300 MILLIGRAM(S): at 22:41

## 2025-04-29 RX ADMIN — MUPIROCIN CALCIUM 1 APPLICATION(S): 20 CREAM TOPICAL at 08:18

## 2025-04-29 RX ADMIN — Medication 1300 MILLIGRAM(S): at 13:47

## 2025-04-29 RX ADMIN — MUPIROCIN CALCIUM 1 APPLICATION(S): 20 CREAM TOPICAL at 22:41

## 2025-04-29 RX ADMIN — ATORVASTATIN CALCIUM 40 MILLIGRAM(S): 80 TABLET, FILM COATED ORAL at 22:42

## 2025-04-29 RX ADMIN — INSULIN LISPRO 1: 100 INJECTION, SOLUTION INTRAVENOUS; SUBCUTANEOUS at 11:29

## 2025-04-29 RX ADMIN — CEFTRIAXONE 100 MILLIGRAM(S): 500 INJECTION, POWDER, FOR SOLUTION INTRAMUSCULAR; INTRAVENOUS at 12:28

## 2025-04-29 RX ADMIN — INSULIN LISPRO 1: 100 INJECTION, SOLUTION INTRAVENOUS; SUBCUTANEOUS at 16:53

## 2025-04-29 NOTE — PROGRESS NOTE ADULT - PROBLEM SELECTOR PLAN 7
Patient with hx of Afib s/p CABG. As per daughter, the patient is no longer in Afib and is no on AC. There is a chart note from 2019 related to this.   - CTM on tele for signs of Afib/Aflutter, restarted home Toprol  - [id number] score 4  - Spoke with family regarding AC and they are ok with it. Monitoring for any signs of hematuria, however, if that is no longer an issue will start on Eliquis Patient with hx of Afib s/p CABG. As per daughter, the patient is no longer in Afib and is no on AC. There is a chart note from 2019 related to this.   - CTM on tele for signs of Afib/Aflutter, restarted home Toprol  - [id number] score 4  - Spoke with family regarding AC and they are ok with it. Started Eliquis 2.5mg BID today Patient with hx of Afib s/p CABG. As per daughter, the patient is no longer in Afib and is no on AC. There is a chart note from 2019 related to this.   - CTM on tele for signs of Afib/Aflutter  - [id number] score 4  - Spoke with family regarding AC and they are ok with it. Started Eliquis 2.5mg BID today

## 2025-04-29 NOTE — PROGRESS NOTE ADULT - ATTENDING COMMENTS
85 y.o. M w/ a hx of CAD s/p CABG c/b post-op afib, CKD3, HTN, DM2 p/w slurred speech, hypothermia, bradycardia, hypoglycemia found to have UTI.     Patient without any complaints     # Aflutter v afib   # Bradycardia   - Bradycardia maybe related to hypothermia, electrolyte abnormalities, medications?   - Start AC w/ Eliquis   - Cont Metoprolol for now     # Hypothermia, resolved   - Perhaps 2/2 infection v metabolic acidosis impairing thermoregulation   - AM cortisol, TSH wnl, CT without acute infarcts    # UTI c/b TME   - UCx: >100K Klebsiella    - Switch to CTX     # TAYLOR on CKD c/b metabolic acidosis and hyperkalemia   # Urinary retention   # Hematuria   - S/p bicarb drip, off Lokelma now   - S/p Mosher placement c/b hematuria. No clots seen. hematuria maybe 2/2 UTI v traumatic insertion  - Hold Lisinopril   [ ] TOV tonight     # Slurred speech   - 2/2 TME v stroke- [place] given pt in afib, at risk for stroke   [ ] MRI brain ordered    - passed dysphagia screen  - speech therapy eval noted, they did find some deficiencies   - to f/u family re: collateral/baseline

## 2025-04-29 NOTE — PROGRESS NOTE ADULT - PROBLEM SELECTOR PLAN 5
Slurred speech for 3-4d, neuro exam unremarkable other than slurred speech. Likely toxic metabolic encephalopathy from infection  CTH and neck with angio - age related changes, paranasal sinus disease, mild to mod left proximal ICA stenosis, hypoplastic R vertebral artery, 3mm anterior communicating artery saccular aneurysm   - aspiration precautions   - passed dysphagia screen  - MRI head ordered  - As per conversations with family, the patient's speech is showing signs of improvement

## 2025-04-29 NOTE — PROGRESS NOTE ADULT - PROBLEM SELECTOR PLAN 1
Slurred speech at home, urinary retention w/o dysuria, Hypothermic 92F on admission. likely urinary source   - u/a - protein, blood, LE, pyuria, rvp negative   - CXR - w/o consolidations   - c/w zoysn (renally dosed), hold off vanc given likely urinary source   - Ucx (prelim showing gram negative rods) and Bcx (NGx24)  - f/u sensitivities Slurred speech at home, urinary retention w/o dysuria, Hypothermic 92F on admission. likely urinary source   - u/a - protein, blood, LE, pyuria, rvp negative   - CXR - w/o consolidations   - was on zoysn (renally dosed), hold off vanc given likely urinary source   - Ucx growing Klebsiella Pneumoniae and Bcx (NGx24)  - Can de-escalate to Ceftriaxone Slurred speech at home, urinary retention w/o dysuria, Hypothermic 92F on admission. likely urinary source   - u/a - protein, blood, LE, pyuria, rvp negative   - CXR - w/o consolidations   - was on zoysn (renally dosed), hold off vanc given likely urinary source   - Ucx growing Klebsiella Pneumoniae and Bcx (NGx24)  - De-escalate to Ceftriaxone 1g q24h  - TOV tonight

## 2025-04-29 NOTE — PROGRESS NOTE ADULT - PROBLEM SELECTOR PLAN 2
Patient with pink/red urine seen overnight and blood noticed on the tip of the penis. May have pulled his reynolds during positional changes during the day.    Plan:  - CTM for continued signs of hematuria although this may be due to trauma and UTI. Urine has started to clear up.

## 2025-04-29 NOTE — PROGRESS NOTE ADULT - SUBJECTIVE AND OBJECTIVE BOX
Subjective/Overnight Events: No acute events overnight. The patient's urine has been more clear since and nonbloody. As per daughter last night, the patient's speech is improving. Denies nausea, vomiting, diarrhea, constipation, cough, fever, chest pain, and cough.    MEDICATIONS  (STANDING):  aspirin  chewable 81 milliGRAM(s) Oral daily  atorvastatin 40 milliGRAM(s) Oral at bedtime  calcitriol   Capsule 0.25 MICROGram(s) Oral daily  chlorhexidine 2% Cloths 1 Application(s) Topical <User Schedule>  dextrose 5%. 1000 milliLiter(s) (100 mL/Hr) IV Continuous <Continuous>  dextrose 5%. 1000 milliLiter(s) (50 mL/Hr) IV Continuous <Continuous>  dextrose 50% Injectable 25 Gram(s) IV Push once  dextrose 50% Injectable 12.5 Gram(s) IV Push once  dextrose 50% Injectable 25 Gram(s) IV Push once  glucagon  Injectable 1 milliGRAM(s) IntraMuscular once  heparin   Injectable 5000 Unit(s) SubCutaneous every 8 hours  influenza  Vaccine (HIGH DOSE) 0.5 milliLiter(s) IntraMuscular once  insulin lispro (ADMELOG) corrective regimen sliding scale   SubCutaneous three times a day before meals  insulin lispro (ADMELOG) corrective regimen sliding scale   SubCutaneous at bedtime  metoprolol succinate ER 25 milliGRAM(s) Oral daily  mupirocin 2% Nasal 1 Application(s) Both Nostrils every 12 hours  piperacillin/tazobactam IVPB.. 3.375 Gram(s) IV Intermittent every 12 hours  sodium bicarbonate 1300 milliGRAM(s) Oral every 8 hours  tamsulosin 0.4 milliGRAM(s) Oral at bedtime    MEDICATIONS  (PRN):  acetaminophen     Tablet .. 650 milliGRAM(s) Oral every 6 hours PRN Temp greater or equal to 38C (100.4F), Mild Pain (1 - 3)  dextrose Oral Gel 15 Gram(s) Oral once PRN Blood Glucose LESS THAN 70 milliGRAM(s)/deciliter  melatonin 3 milliGRAM(s) Oral at bedtime PRN Insomnia        Objective:  Vital Signs Last 24 Hrs  T(C): 36.7 (29 Apr 2025 05:00), Max: 36.8 (28 Apr 2025 19:30)  T(F): 98.1 (29 Apr 2025 05:00), Max: 98.3 (29 Apr 2025 02:10)  HR: 65 (29 Apr 2025 05:00) (54 - 80)  BP: 130/55 (29 Apr 2025 05:00) (130/55 - 151/70)  BP(mean): --  RR: 18 (29 Apr 2025 05:00) (18 - 18)  SpO2: 95% (29 Apr 2025 05:00) (95% - 100%)    Parameters below as of 29 Apr 2025 05:00  Patient On (Oxygen Delivery Method): room air      I&O's Summary    27 Apr 2025 07:01  -  28 Apr 2025 07:00  --------------------------------------------------------  IN: 360 mL / OUT: 3100 mL / NET: -2740 mL    28 Apr 2025 07:01  -  29 Apr 2025 06:51  --------------------------------------------------------  IN: 1000 mL / OUT: 2800 mL / NET: -1800 mL        General: NAD, lying in bed  Cardiac: Normal S1, S2, no murmurs, rubs, or gallops appreciated  HEENT: NC/AT, clear sclera/conjunctiva, EOMI, MMM, hearing intact to voice  RESPIRATORY: Comfortable on RA, speaking in full sentences   Abdominal: Normoactive bowel sounds, no tenderness to light or deep palpation. No signs of organomegaly  : reynolds in place, with reddish output, dried blood on tip of penis  NEURO: ambulated with PT, moving all extremities, speech understandable, Awake/conversant  Skin: Intact, no signs of bruising, ulcers, or open wounds.  Psych: AAOx3      Allergies  No Known Allergies    Intolerances      Labs:                          11.6   7.27  )-----------( 148      ( 29 Apr 2025 04:45 )             33.0                             10.5   6.62  )-----------( 131      ( 27 Apr 2025 05:59 )             29.9                           9.3    5.88  )-----------( 133      ( 26 Apr 2025 05:33 )             26.2         04-29    134[L]  |  99  |  48[H]  ----------------------------<  92  5.2   |  24  |  2.47[H]    Ca    9.5      29 Apr 2025 04:45  Phos  4.2     04-29  Mg     1.70     04-29    TPro  6.7  /  Alb  3.4  /  TBili  0.4  /  DBili  x   /  AST  33  /  ALT  69[H]  /  AlkPhos  98  04-29 04-27    138  |  104  |  49[H]  ----------------------------<  91  4.2   |  22  |  2.37[H]    Ca    9.3      27 Apr 2025 05:59  Phos  2.8     04-27  Mg     1.60     04-27    TPro  6.4  /  Alb  3.3  /  TBili  0.5  /  DBili  x   /  AST  43[H]  /  ALT  80[H]  /  AlkPhos  107  04-27 04-26    134[L]  |  107  |  62[H]  ----------------------------<  224[H]  5.4[H]   |  16[L]  |  2.72[H]    Ca    9.3      26 Apr 2025 11:27  Phos  3.2     04-26  Mg     1.80     04-26    TPro  6.2  /  Alb  3.3  /  TBili  0.3  /  DBili  x   /  AST  50[H]  /  ALT  84[H]  /  AlkPhos  105  04-26 04-26    134[L]  |  108[H]  |  67[H]  ----------------------------<  94  5.4[H]   |  15[L]  |  2.76[H]    Ca    9.4      26 Apr 2025 05:33  Phos  3.6     04-26  Mg     1.80     04-26    TPro  6.2  /  Alb  3.3  /  TBili  0.3  /  DBili  x   /  AST  50[H]  /  ALT  84[H]  /  AlkPhos  105  04-26    PT/INR - ( 25 Apr 2025 20:03 )   PT: 11.4 sec;   INR: 0.98 ratio         PTT - ( 25 Apr 2025 20:03 )  PTT:37.2 sec    VBG (most recent)  pH: 7.41  PCO2: 39        Urinalysis with Rflx Culture (collected 25 Apr 2025 23:56)    Culture - Urine (collected 25 Apr 2025 23:56)  Source: Clean Catch  Preliminary Report (27 Apr 2025 19:27):    >100,000 CFU/ml Gram Negative Rods    Culture - Blood (collected 25 Apr 2025 20:00)  Source: Blood Blood-Peripheral  Preliminary Report (28 Apr 2025 01:01):    No growth at 48 Hours    Culture - Blood (collected 25 Apr 2025 19:45)  Source: Blood Blood-Peripheral  Preliminary Report (28 Apr 2025 01:01):    No growth at 48 Hours        Imaging:    FINDINGS:    CT BRAIN:    VENTRICLES AND SULCI: Age appropriate involutional changes.  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 No mass effect, acute   hemorrhage, or midline shift.  There are periventricular and subcortical   white matter hypodensities, consistent with microvascular type changes.  EXTRA-AXIAL: No mass or fluid collection. Basal cisterns are normal in   appearance.  POST CONTRAST: No abnormal enhancing lesion.    VISUALIZED SINUSES:  Scattered mucosal thickening. Nonpneumatization   right frontal sinus, anatomic variant. Near complete opacification left   maxillary sinus. Moderate mucosal thickening ethmoid air cells with near   complete opacification extending into the frontal ethmoidal recess with   near complete opacification of the left frontal sinus. Moderate mucosal   thickening of the right frontal ethmoidal recess.  TYMPANOMASTOID CAVITIES:  Mastoid air cells clear. Nonspecific soft   tissue density left external auditory canal.  VISUALIZED ORBITS: Bilateral lens replacement.  CALVARIUM: Generalized osteopenia.      CTA NECK:    AORTIC ARCH AND VISUALIZED GREAT VESSELS: Atherosclerotic changes. Bovine   aortic arch.    RIGHT:  COMMON CAROTID ARTERY: Minimal atherosclerotic changes at the bifurcation   without significant stenosis.  INTERNAL CAROTID ARTERY: No significant stenosis based on NASCET criteria.  VERTEBRAL ARTERY: Hypoplastic.    LEFT:  COMMON CAROTID ARTERY: Minimal atherosclerotic changes at the bifurcation   without significant stenosis.  INTERNAL CAROTID ARTERY: Atherosclerotic changes at the origin with   resultant mild to moderate stenosis based on NASCET criteria.  VERTEBRAL ARTERY: Normal in course and caliber to the intracranial   circulation. Vertebral artery dominance.    VISUALIZED LUNGS: Bilateral dependent atelectasis. Right greater than   left apical emphysematous changes. Perifissural nodular opacities along   the right major and minor fissure measuring up to 1.0 cm (3-63),   indeterminate.    MISCELLANEOUS: None.    CAROTID STENOSIS REFERENCE: Percent (%) stenosis is expressed in terms of   NASCET Criteria. (NASCET = 100x1-(N/D)). N=greatest narrowing. D=normal   distal diameter - MILD = <50% stenosis. - MODERATE = 50-69% stenosis. -   SEVERE = 70-89% stenosis. - HAIRLINE/CRITICAL = 90-99% stenosis. -   OCCLUDED = 100%stenosis.      CTA HEAD:    INTERNAL CAROTID ARTERIES: Atherosclerotic changes of the bilateral   petrous through cavernous segments with mild stenosis of the right   cavernous segment. 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  MIDDLE CEREBRAL ARTERIES: No significant stenosis or occlusion.  POSTERIOR CEREBRAL ARTERIES: Fetal origin right PCA    DISTAL VERTEBRAL / BASILAR ARTERIES: Trace atherosclerotic changes of the   proximal V4 segments without significant stenosis.    VENOUS STRUCTURES: Inadequate venous phase opacification.    MISCELLANEOUS: No other vascular abnormality is seen.      IMPRESSION:    CT HEAD:  No evidence of acute intracranial pathology.  Chronic age involutional and microangiopathic ischemic changes.  Moderate to marked paranasal sinus disease as described.  Recommend ENT evaluation, treatment and follow-up.  Nonspecific soft tissue density left external auditory canal, correlate   otoscopy.    CTA NECK:  Mild to moderate left proximal internal carotid artery stenosis.  Left vertebral artery artery dominance, hypoplastic right vertebral   artery.    Indeterminate perifissural lung nodules as described.  Additional follow-up/follow-up as per Fleischner criteria.    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  Recommend neurosurgical consultation/follow-up.  Anatomic variants as described.    --- End of Report ---      Renal Bladder US:    IMPRESSION:  Mild bilateral increased renal echotexture suggesting medical renal   disease.    Marked urinary bladder wall thickening versus underdistention. Correlate   with urinalysis due to cystitis, other bladder pathology.    --- End of Report ---      Consultant notes reviewed: Renal  Team d/w renal acidosis improved, can c/w sodium bicarb, hyperK improved, can d/c lokelma   Subjective/Overnight Events: No acute events overnight. The patient's urine has been more clear since and nonbloody. As per daughter last night, the patient's speech is improving. Denies nausea, vomiting, diarrhea, constipation, cough, fever, chest pain, and cough.    MEDICATIONS  (STANDING):  apixaban 2.5 milliGRAM(s) Oral every 12 hours  atorvastatin 40 milliGRAM(s) Oral at bedtime  calcitriol   Capsule 0.25 MICROGram(s) Oral daily  cefTRIAXone   IVPB 1000 milliGRAM(s) IV Intermittent every 24 hours  chlorhexidine 2% Cloths 1 Application(s) Topical <User Schedule>  clotrimazole 1% Cream 1 Application(s) Topical two times a day  dextrose 5%. 1000 milliLiter(s) (100 mL/Hr) IV Continuous <Continuous>  dextrose 5%. 1000 milliLiter(s) (50 mL/Hr) IV Continuous <Continuous>  dextrose 50% Injectable 25 Gram(s) IV Push once  dextrose 50% Injectable 12.5 Gram(s) IV Push once  dextrose 50% Injectable 25 Gram(s) IV Push once  glucagon  Injectable 1 milliGRAM(s) IntraMuscular once  influenza  Vaccine (HIGH DOSE) 0.5 milliLiter(s) IntraMuscular once  insulin lispro (ADMELOG) corrective regimen sliding scale   SubCutaneous three times a day before meals  insulin lispro (ADMELOG) corrective regimen sliding scale   SubCutaneous at bedtime  mupirocin 2% Nasal 1 Application(s) Both Nostrils every 12 hours  sodium bicarbonate 1300 milliGRAM(s) Oral every 8 hours  tamsulosin 0.4 milliGRAM(s) Oral at bedtime    MEDICATIONS  (PRN):  acetaminophen     Tablet .. 650 milliGRAM(s) Oral every 6 hours PRN Temp greater or equal to 38C (100.4F), Mild Pain (1 - 3)  dextrose Oral Gel 15 Gram(s) Oral once PRN Blood Glucose LESS THAN 70 milliGRAM(s)/deciliter  melatonin 3 milliGRAM(s) Oral at bedtime PRN Insomnia          Objective:  Vital Signs Last 24 Hrs  T(C): 36.7 (29 Apr 2025 05:00), Max: 36.8 (28 Apr 2025 19:30)  T(F): 98.1 (29 Apr 2025 05:00), Max: 98.3 (29 Apr 2025 02:10)  HR: 65 (29 Apr 2025 05:00) (54 - 80)  BP: 130/55 (29 Apr 2025 05:00) (130/55 - 151/70)  BP(mean): --  RR: 18 (29 Apr 2025 05:00) (18 - 18)  SpO2: 95% (29 Apr 2025 05:00) (95% - 100%)    Parameters below as of 29 Apr 2025 05:00  Patient On (Oxygen Delivery Method): room air      I&O's Summary    27 Apr 2025 07:01  -  28 Apr 2025 07:00  --------------------------------------------------------  IN: 360 mL / OUT: 3100 mL / NET: -2740 mL    28 Apr 2025 07:01  -  29 Apr 2025 06:51  --------------------------------------------------------  IN: 1000 mL / OUT: 2800 mL / NET: -1800 mL        General: NAD, lying in bed  Cardiac: Normal S1, S2, no murmurs, rubs, or gallops appreciated  HEENT: NC/AT, clear sclera/conjunctiva, EOMI, MMM, hearing intact to voice  RESPIRATORY: Comfortable on RA, speaking in full sentences   Abdominal: Normoactive bowel sounds, no tenderness to light or deep palpation. No signs of organomegaly  : reynolds in place, with reddish output, dried blood on tip of penis  NEURO: ambulated with PT, moving all extremities, speech understandable, Awake/conversant  Skin: Intact, no signs of bruising, ulcers, or open wounds.  Psych: AAOx3      Allergies  No Known Allergies    Intolerances      Labs:                          11.6   7.27  )-----------( 148      ( 29 Apr 2025 04:45 )             33.0                             10.5   6.62  )-----------( 131      ( 27 Apr 2025 05:59 )             29.9                           9.3    5.88  )-----------( 133      ( 26 Apr 2025 05:33 )             26.2         04-29    134[L]  |  99  |  48[H]  ----------------------------<  92  5.2   |  24  |  2.47[H]    Ca    9.5      29 Apr 2025 04:45  Phos  4.2     04-29  Mg     1.70     04-29    TPro  6.7  /  Alb  3.4  /  TBili  0.4  /  DBili  x   /  AST  33  /  ALT  69[H]  /  AlkPhos  98  04-29 04-27    138  |  104  |  49[H]  ----------------------------<  91  4.2   |  22  |  2.37[H]    Ca    9.3      27 Apr 2025 05:59  Phos  2.8     04-27  Mg     1.60     04-27    TPro  6.4  /  Alb  3.3  /  TBili  0.5  /  DBili  x   /  AST  43[H]  /  ALT  80[H]  /  AlkPhos  107  04-27 04-26    134[L]  |  107  |  62[H]  ----------------------------<  224[H]  5.4[H]   |  16[L]  |  2.72[H]    Ca    9.3      26 Apr 2025 11:27  Phos  3.2     04-26  Mg     1.80     04-26    TPro  6.2  /  Alb  3.3  /  TBili  0.3  /  DBili  x   /  AST  50[H]  /  ALT  84[H]  /  AlkPhos  105  04-26 04-26    134[L]  |  108[H]  |  67[H]  ----------------------------<  94  5.4[H]   |  15[L]  |  2.76[H]    Ca    9.4      26 Apr 2025 05:33  Phos  3.6     04-26  Mg     1.80     04-26    TPro  6.2  /  Alb  3.3  /  TBili  0.3  /  DBili  x   /  AST  50[H]  /  ALT  84[H]  /  AlkPhos  105  04-26    PT/INR - ( 25 Apr 2025 20:03 )   PT: 11.4 sec;   INR: 0.98 ratio         PTT - ( 25 Apr 2025 20:03 )  PTT:37.2 sec    VBG (most recent)  pH: 7.41  PCO2: 39        Urinalysis with Rflx Culture (collected 25 Apr 2025 23:56)    Culture - Urine (collected 25 Apr 2025 23:56)  Source: Clean Catch  Preliminary Report (27 Apr 2025 19:27):    >100,000 CFU/ml Gram Negative Rods    Culture - Blood (collected 25 Apr 2025 20:00)  Source: Blood Blood-Peripheral  Preliminary Report (28 Apr 2025 01:01):    No growth at 48 Hours    Culture - Blood (collected 25 Apr 2025 19:45)  Source: Blood Blood-Peripheral  Preliminary Report (28 Apr 2025 01:01):    No growth at 48 Hours        Imaging:    FINDINGS:    CT BRAIN:    VENTRICLES AND SULCI: Age appropriate involutional changes.  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 No mass effect, acute   hemorrhage, or midline shift.  There are periventricular and subcortical   white matter hypodensities, consistent with microvascular type changes.  EXTRA-AXIAL: No mass or fluid collection. Basal cisterns are normal in   appearance.  POST CONTRAST: No abnormal enhancing lesion.    VISUALIZED SINUSES:  Scattered mucosal thickening. Nonpneumatization   right frontal sinus, anatomic variant. Near complete opacification left   maxillary sinus. Moderate mucosal thickening ethmoid air cells with near   complete opacification extending into the frontal ethmoidal recess with   near complete opacification of the left frontal sinus. Moderate mucosal   thickening of the right frontal ethmoidal recess.  TYMPANOMASTOID CAVITIES:  Mastoid air cells clear. Nonspecific soft   tissue density left external auditory canal.  VISUALIZED ORBITS: Bilateral lens replacement.  CALVARIUM: Generalized osteopenia.      CTA NECK:    AORTIC ARCH AND VISUALIZED GREAT VESSELS: Atherosclerotic changes. Bovine   aortic arch.    RIGHT:  COMMON CAROTID ARTERY: Minimal atherosclerotic changes at the bifurcation   without significant stenosis.  INTERNAL CAROTID ARTERY: No significant stenosis based on NASCET criteria.  VERTEBRAL ARTERY: Hypoplastic.    LEFT:  COMMON CAROTID ARTERY: Minimal atherosclerotic changes at the bifurcation   without significant stenosis.  INTERNAL CAROTID ARTERY: Atherosclerotic changes at the origin with   resultant mild to moderate stenosis based on NASCET criteria.  VERTEBRAL ARTERY: Normal in course and caliber to the intracranial   circulation. Vertebral artery dominance.    VISUALIZED LUNGS: Bilateral dependent atelectasis. Right greater than   left apical emphysematous changes. Perifissural nodular opacities along   the right major and minor fissure measuring up to 1.0 cm (3-63),   indeterminate.    MISCELLANEOUS: None.    CAROTID STENOSIS REFERENCE: Percent (%) stenosis is expressed in terms of   NASCET Criteria. (NASCET = 100x1-(N/D)). N=greatest narrowing. D=normal   distal diameter - MILD = <50% stenosis. - MODERATE = 50-69% stenosis. -   SEVERE = 70-89% stenosis. - HAIRLINE/CRITICAL = 90-99% stenosis. -   OCCLUDED = 100%stenosis.      CTA HEAD:    INTERNAL CAROTID ARTERIES: Atherosclerotic changes of the bilateral   petrous through cavernous segments with mild stenosis of the right   cavernous segment. 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  MIDDLE CEREBRAL ARTERIES: No significant stenosis or occlusion.  POSTERIOR CEREBRAL ARTERIES: Fetal origin right PCA    DISTAL VERTEBRAL / BASILAR ARTERIES: Trace atherosclerotic changes of the   proximal V4 segments without significant stenosis.    VENOUS STRUCTURES: Inadequate venous phase opacification.    MISCELLANEOUS: No other vascular abnormality is seen.      IMPRESSION:    CT HEAD:  No evidence of acute intracranial pathology.  Chronic age involutional and microangiopathic ischemic changes.  Moderate to marked paranasal sinus disease as described.  Recommend ENT evaluation, treatment and follow-up.  Nonspecific soft tissue density left external auditory canal, correlate   otoscopy.    CTA NECK:  Mild to moderate left proximal internal carotid artery stenosis.  Left vertebral artery artery dominance, hypoplastic right vertebral   artery.    Indeterminate perifissural lung nodules as described.  Additional follow-up/follow-up as per Fleischner criteria.    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  Recommend neurosurgical consultation/follow-up.  Anatomic variants as described.    --- End of Report ---      Renal Bladder US:    IMPRESSION:  Mild bilateral increased renal echotexture suggesting medical renal   disease.    Marked urinary bladder wall thickening versus underdistention. Correlate   with urinalysis due to cystitis, other bladder pathology.    --- End of Report ---      Consultant notes reviewed: Renal  Team d/w renal acidosis improved, can c/w sodium bicarb, hyperK improved, can d/c lokelma

## 2025-04-29 NOTE — PROGRESS NOTE ADULT - SUBJECTIVE AND OBJECTIVE BOX
Interval History:  No acute events overnight  Telemetry: Mobitz type I with brief episodes of rate controlled atrial flutter and intermittent 2:1 HB while asleep; remains asymptomatic     MEDICATIONS  (STANDING):  apixaban 2.5 milliGRAM(s) Oral every 12 hours  atorvastatin 40 milliGRAM(s) Oral at bedtime  calcitriol   Capsule 0.25 MICROGram(s) Oral daily  cefTRIAXone   IVPB 1000 milliGRAM(s) IV Intermittent every 24 hours  chlorhexidine 2% Cloths 1 Application(s) Topical <User Schedule>  clotrimazole 1% Cream 1 Application(s) Topical two times a day  dextrose 5%. 1000 milliLiter(s) (100 mL/Hr) IV Continuous <Continuous>  dextrose 5%. 1000 milliLiter(s) (50 mL/Hr) IV Continuous <Continuous>  dextrose 50% Injectable 25 Gram(s) IV Push once  dextrose 50% Injectable 12.5 Gram(s) IV Push once  dextrose 50% Injectable 25 Gram(s) IV Push once  glucagon  Injectable 1 milliGRAM(s) IntraMuscular once  influenza  Vaccine (HIGH DOSE) 0.5 milliLiter(s) IntraMuscular once  insulin lispro (ADMELOG) corrective regimen sliding scale   SubCutaneous three times a day before meals  insulin lispro (ADMELOG) corrective regimen sliding scale   SubCutaneous at bedtime  metoprolol succinate ER 50 milliGRAM(s) Oral daily  mupirocin 2% Nasal 1 Application(s) Both Nostrils every 12 hours  sodium bicarbonate 1300 milliGRAM(s) Oral every 8 hours  tamsulosin 0.4 milliGRAM(s) Oral at bedtime    MEDICATIONS  (PRN):  acetaminophen     Tablet .. 650 milliGRAM(s) Oral every 6 hours PRN Temp greater or equal to 38C (100.4F), Mild Pain (1 - 3)  dextrose Oral Gel 15 Gram(s) Oral once PRN Blood Glucose LESS THAN 70 milliGRAM(s)/deciliter  melatonin 3 milliGRAM(s) Oral at bedtime PRN Insomnia    Vital Signs Last 24 Hrs  T(C): 36.3 (04-29-25 @ 11:30), Max: 36.8 (04-28-25 @ 19:30)  T(F): 97.3 (04-29-25 @ 11:30), Max: 98.3 (04-29-25 @ 02:10)  HR: 118 (04-29-25 @ 11:30) (54 - 118)  BP: 118/56 (04-29-25 @ 11:30) (118/56 - 151/70)  BP(mean): --  RR: 18 (04-29-25 @ 11:30) (18 - 18)  SpO2: 97% (04-29-25 @ 11:30) (95% - 99%)    Appearance: Normal	  HEENT:   Normal oral mucosa, PERRL, EOMI	  Lymphatic: No lymphadenopathy  Cardiovascular: Normal S1 S2, No JVD, No murmurs, No edema  Respiratory: Lungs clear to auscultation	  Psychiatry: A & O x 3, Mood & affect appropriate  Gastrointestinal:  Soft, Non-tender, + BS	  Skin: No rashes, No ecchymoses, No cyanosis	  Neurologic: Non-focal  Extremities: Normal range of motion, No clubbing, cyanosis or edema  Vascular: Peripheral pulses palpable 2+ bilaterally    LABS:	 	    CBC Full  -  ( 29 Apr 2025 04:45 )  WBC Count : 7.27 K/uL  Hemoglobin : 11.6 g/dL  Hematocrit : 33.0 %  Platelet Count - Automated : 148 K/uL  Mean Cell Volume : 85.3 fL  Mean Cell Hemoglobin : 30.0 pg  Mean Cell Hemoglobin Concentration : 35.2 g/dL  Auto Neutrophil # : x  Auto Lymphocyte # : x  Auto Monocyte # : x  Auto Eosinophil # : x  Auto Basophil # : x  Auto Neutrophil % : x  Auto Lymphocyte % : x  Auto Monocyte % : x  Auto Eosinophil % : x  Auto Basophil % : x    04-29    134[L]  |  99  |  48[H]  ----------------------------<  92  5.2   |  24  |  2.47[H]  04-28    136  |  101  |  45[H]  ----------------------------<  85  4.6   |  22  |  2.26[H]    Ca    9.5      29 Apr 2025 04:45  Ca    9.6      28 Apr 2025 06:47  Phos  4.2     04-29  Phos  3.9     04-28  Mg     1.70     04-29  Mg     1.70     04-28    TPro  6.7  /  Alb  3.4  /  TBili  0.4  /  DBili  x   /  AST  33  /  ALT  69[H]  /  AlkPhos  98  04-29  TPro  6.8  /  Alb  3.5  /  TBili  0.4  /  DBili  x   /  AST  47[H]  /  ALT  85[H]  /  AlkPhos  107  04-28    LIVER FUNCTIONS - ( 29 Apr 2025 04:45 )  Alb: 3.4 g/dL / Pro: 6.7 g/dL / ALK PHOS: 98 U/L / ALT: 69 U/L / AST: 33 U/L / GGT: x

## 2025-04-29 NOTE — PROGRESS NOTE ADULT - ASSESSMENT
85yoM w/ PMHx CAD s/p CABG, afib/aflutter (AC self discontinued), CKD stage III, HTN, HLD, DMII initially presented on 4/23 with slurred speech and AMS found to be hypothermic 92F and hyperkalemic 5.8 admitted for sepsis 2/2 UTI now on abx.  EPS consulted for bradycardia HR 40-50s and sinus pauses.  TSH 3.3.  Patient takes Toprol 50mg PO daily. Patient denies any chest pain, palpitations, lightheadedness, dizziness, syncope or near syncope.      Telemetry reviewed  Given findings of intermittent 2:1 HB, may benefit from micra PPM once clinically optimized   Hold AV agustín blockers   CHADsVASC: 5 - Eliquis 2.5mg PO BID

## 2025-04-29 NOTE — PROGRESS NOTE ADULT - PROBLEM SELECTOR PLAN 11
- Nutrition: Soft bite size after passing dysphagia screen  - Activity: as tolerated   - DVT Prophylaxis: subq hep  - Disposition: VITALIY, will need speech pathologist f/u - Nutrition: Soft bite size after passing dysphagia screen  - Activity: as tolerated   - DVT Prophylaxis: On Eliquis  - Disposition: VITALIY, will need speech pathologist f/u

## 2025-04-29 NOTE — PROGRESS NOTE ADULT - PROBLEM SELECTOR PLAN 4
HR 40-50s,  systolic, EKG w/ 1st degree AV-block, tele with occasional sinus pauses, longest being 2.8s   Etiology possible from infection, hyperkalemia (BRASH syndrome?) exacerbated by infection   - TSH 3.3 wnl   - held AVN blocking agents - metoprolol succinate 50mg   - patient had previous admission for Bradycardia in 2019. had repeated episodes of bradycardia, now with episodes of Aflutter and second degree block.  - Restarted home Toprol HR 40-50s,  systolic, EKG w/ 1st degree AV-block, tele with occasional sinus pauses, longest being 2.8s   Etiology possible from infection, hyperkalemia (BRASH syndrome?) exacerbated by infection   - TSH 3.3 wnl   - held AVN blocking agents - metoprolol succinate 50mg   - Was frederick to the 30s today  - EP recs appreciated

## 2025-04-30 LAB
ALBUMIN SERPL ELPH-MCNC: 3.3 G/DL — SIGNIFICANT CHANGE UP (ref 3.3–5)
ALP SERPL-CCNC: 92 U/L — SIGNIFICANT CHANGE UP (ref 40–120)
ALT FLD-CCNC: 71 U/L — HIGH (ref 4–41)
ANION GAP SERPL CALC-SCNC: 15 MMOL/L — HIGH (ref 7–14)
AST SERPL-CCNC: 45 U/L — HIGH (ref 4–40)
BILIRUB SERPL-MCNC: 0.2 MG/DL — SIGNIFICANT CHANGE UP (ref 0.2–1.2)
BUN SERPL-MCNC: 56 MG/DL — HIGH (ref 7–23)
CALCIUM SERPL-MCNC: 9.6 MG/DL — SIGNIFICANT CHANGE UP (ref 8.4–10.5)
CHLORIDE SERPL-SCNC: 99 MMOL/L — SIGNIFICANT CHANGE UP (ref 98–107)
CO2 SERPL-SCNC: 20 MMOL/L — LOW (ref 22–31)
CREAT SERPL-MCNC: 2.62 MG/DL — HIGH (ref 0.5–1.3)
EGFR: 24 ML/MIN/1.73M2 — LOW
EGFR: 24 ML/MIN/1.73M2 — LOW
GLUCOSE BLDC GLUCOMTR-MCNC: 126 MG/DL — HIGH (ref 70–99)
GLUCOSE BLDC GLUCOMTR-MCNC: 155 MG/DL — HIGH (ref 70–99)
GLUCOSE BLDC GLUCOMTR-MCNC: 211 MG/DL — HIGH (ref 70–99)
GLUCOSE BLDC GLUCOMTR-MCNC: 329 MG/DL — HIGH (ref 70–99)
GLUCOSE SERPL-MCNC: 158 MG/DL — HIGH (ref 70–99)
HCT VFR BLD CALC: 32.6 % — LOW (ref 39–50)
HGB BLD-MCNC: 11 G/DL — LOW (ref 13–17)
MAGNESIUM SERPL-MCNC: 1.7 MG/DL — SIGNIFICANT CHANGE UP (ref 1.6–2.6)
MCHC RBC-ENTMCNC: 29.7 PG — SIGNIFICANT CHANGE UP (ref 27–34)
MCHC RBC-ENTMCNC: 33.7 G/DL — SIGNIFICANT CHANGE UP (ref 32–36)
MCV RBC AUTO: 88.1 FL — SIGNIFICANT CHANGE UP (ref 80–100)
NRBC # BLD AUTO: 0 K/UL — SIGNIFICANT CHANGE UP (ref 0–0)
NRBC # FLD: 0 K/UL — SIGNIFICANT CHANGE UP (ref 0–0)
NRBC BLD AUTO-RTO: 0 /100 WBCS — SIGNIFICANT CHANGE UP (ref 0–0)
PHOSPHATE SERPL-MCNC: 4.2 MG/DL — SIGNIFICANT CHANGE UP (ref 2.5–4.5)
PLATELET # BLD AUTO: 164 K/UL — SIGNIFICANT CHANGE UP (ref 150–400)
POTASSIUM SERPL-MCNC: 4.8 MMOL/L — SIGNIFICANT CHANGE UP (ref 3.5–5.3)
POTASSIUM SERPL-SCNC: 4.8 MMOL/L — SIGNIFICANT CHANGE UP (ref 3.5–5.3)
PROT SERPL-MCNC: 6.7 G/DL — SIGNIFICANT CHANGE UP (ref 6–8.3)
RBC # BLD: 3.7 M/UL — LOW (ref 4.2–5.8)
RBC # FLD: 13.2 % — SIGNIFICANT CHANGE UP (ref 10.3–14.5)
SODIUM SERPL-SCNC: 134 MMOL/L — LOW (ref 135–145)
WBC # BLD: 7.24 K/UL — SIGNIFICANT CHANGE UP (ref 3.8–10.5)
WBC # FLD AUTO: 7.24 K/UL — SIGNIFICANT CHANGE UP (ref 3.8–10.5)

## 2025-04-30 PROCEDURE — 99232 SBSQ HOSP IP/OBS MODERATE 35: CPT

## 2025-04-30 PROCEDURE — 70551 MRI BRAIN STEM W/O DYE: CPT | Mod: 26

## 2025-04-30 PROCEDURE — 99231 SBSQ HOSP IP/OBS SF/LOW 25: CPT

## 2025-04-30 RX ORDER — NIFEDIPINE 30 MG
60 TABLET, EXTENDED RELEASE 24 HR ORAL DAILY
Refills: 0 | Status: DISCONTINUED | OUTPATIENT
Start: 2025-04-30 | End: 2025-05-07

## 2025-04-30 RX ADMIN — INSULIN LISPRO 2: 100 INJECTION, SOLUTION INTRAVENOUS; SUBCUTANEOUS at 16:47

## 2025-04-30 RX ADMIN — Medication 1300 MILLIGRAM(S): at 13:06

## 2025-04-30 RX ADMIN — CLOTRIMAZOLE 1 APPLICATION(S): 1 CREAM TOPICAL at 17:16

## 2025-04-30 RX ADMIN — Medication 1 APPLICATION(S): at 05:21

## 2025-04-30 RX ADMIN — Medication 81 MILLIGRAM(S): at 11:12

## 2025-04-30 RX ADMIN — CEFTRIAXONE 100 MILLIGRAM(S): 500 INJECTION, POWDER, FOR SOLUTION INTRAMUSCULAR; INTRAVENOUS at 11:11

## 2025-04-30 RX ADMIN — Medication 1300 MILLIGRAM(S): at 21:19

## 2025-04-30 RX ADMIN — Medication 1300 MILLIGRAM(S): at 05:21

## 2025-04-30 RX ADMIN — MUPIROCIN CALCIUM 1 APPLICATION(S): 20 CREAM TOPICAL at 21:19

## 2025-04-30 RX ADMIN — APIXABAN 2.5 MILLIGRAM(S): 2.5 TABLET, FILM COATED ORAL at 05:20

## 2025-04-30 RX ADMIN — TAMSULOSIN HYDROCHLORIDE 0.4 MILLIGRAM(S): 0.4 CAPSULE ORAL at 21:19

## 2025-04-30 RX ADMIN — CLOTRIMAZOLE 1 APPLICATION(S): 1 CREAM TOPICAL at 05:22

## 2025-04-30 RX ADMIN — INSULIN LISPRO 2: 100 INJECTION, SOLUTION INTRAVENOUS; SUBCUTANEOUS at 21:30

## 2025-04-30 RX ADMIN — Medication 60 MILLIGRAM(S): at 11:58

## 2025-04-30 RX ADMIN — INSULIN LISPRO 1: 100 INJECTION, SOLUTION INTRAVENOUS; SUBCUTANEOUS at 11:11

## 2025-04-30 RX ADMIN — APIXABAN 2.5 MILLIGRAM(S): 2.5 TABLET, FILM COATED ORAL at 17:18

## 2025-04-30 RX ADMIN — ATORVASTATIN CALCIUM 40 MILLIGRAM(S): 80 TABLET, FILM COATED ORAL at 21:18

## 2025-04-30 RX ADMIN — CALCITRIOL 0.25 MICROGRAM(S): 0.5 CAPSULE, GELATIN COATED ORAL at 11:12

## 2025-04-30 NOTE — PROGRESS NOTE ADULT - PROBLEM SELECTOR PLAN 5
Slurred speech for 3-4d, neuro exam unremarkable other than slurred speech. Likely toxic metabolic encephalopathy from infection  CTH and neck with angio - age related changes, paranasal sinus disease, mild to mod left proximal ICA stenosis, hypoplastic R vertebral artery, 3mm anterior communicating artery saccular aneurysm   - aspiration precautions   - passed dysphagia screen  - MRI head ordered  - As per conversations with family, the patient's speech is showing signs of improvement Slurred speech for 3-4d, neuro exam unremarkable other than slurred speech. Likely toxic metabolic encephalopathy from infection  CTH and neck with angio - age related changes, paranasal sinus disease, mild to mod left proximal ICA stenosis, hypoplastic R vertebral artery, 3mm anterior communicating artery saccular aneurysm   - aspiration precautions   - passed dysphagia screen  - MRI head negative for acute infarction  - As per conversations with family, the patient's speech is showing signs of improvement

## 2025-04-30 NOTE — PROGRESS NOTE ADULT - PROBLEM SELECTOR PLAN 10
Hyperglycemic 300 on admission --> hypoglycemic after hyperkalemia cocktail- BHB = 0 not DKA   - Home regimen: Glipizide 5mg AM and 10mg in the PM - hold   - check a1c (ordered)  - ISS for now  - monitor FS, goal 110-180, most recent , acceptable Hyperglycemic 300 on admission --> hypoglycemic after hyperkalemia cocktail- BHB = 0 not DKA   - Home regimen: Glipizide 5mg AM and 10mg in the PM - hold   - A1c: 7.8%  - ISS for now  - monitor FS, goal 110-180

## 2025-04-30 NOTE — PROGRESS NOTE ADULT - ASSESSMENT
85yoM w/ PMHx CAD s/p CABG, afib/aflutter (AC self discontinued), CKD stage III, HTN, HLD, DMII initially presented on 4/23 with slurred speech and AMS found to be hypothermic 92F and hyperkalemic 5.8 admitted for sepsis 2/2 UTI now on abx.  EPS consulted for bradycardia HR 40-50s and sinus pauses.  TSH 3.3.  Patient takes Toprol 50mg PO daily. Patient denies any chest pain, palpitations, lightheadedness, dizziness, syncope or near syncope.      ## Bradycardia  ## TAYLOR on CKD  ## UTI  ## Toxic metabolic encephalopathy      --Telemetry reviewed: overnight NSR with Wenckebach with intermittent 2:1  Given findings of intermittent 2:1 HB, may benefit from micra PPM once clinically optimized   --Hold AV agustín blockers   --CHADsVASC: 5 - Eliquis 2.5mg PO BID   --Continue care per primary team

## 2025-04-30 NOTE — PROGRESS NOTE ADULT - PROBLEM SELECTOR PLAN 1
Slurred speech at home, urinary retention w/o dysuria, Hypothermic 92F on admission. likely urinary source   - u/a - protein, blood, LE, pyuria, rvp negative   - CXR - w/o consolidations   - was on zoysn (renally dosed), hold off vanc given likely urinary source   - Ucx growing Klebsiella Pneumoniae and Bcx ([id number])  - De-escalate to Ceftriaxone 1g q24h (5/2)  - TOV passed

## 2025-04-30 NOTE — PROGRESS NOTE ADULT - PROBLEM SELECTOR PLAN 2
Patient with pink/red urine seen overnight and blood noticed on the tip of the penis. May have pulled his reynolds during positional changes during the day.    Plan:  - CTM for continued signs of hematuria although this may be due to trauma and UTI. Urine has started to clear up.  - TOV passed Patient with pink/red urine seen overnight and blood noticed on the tip of the penis. May have pulled his reynolds during positional changes during the day.    Plan:  - CTM for continued signs of hematuria although this may be due to trauma and UTI. Urine has started to clear up.

## 2025-04-30 NOTE — PROGRESS NOTE ADULT - PROBLEM SELECTOR PLAN 7
Patient with hx of Afib s/p CABG. As per daughter, the patient is no longer in Afib and is no on AC. There is a chart note from 2019 related to this.   - CTM on tele for signs of Afib/Aflutter  - [id number] score 4  - Spoke with family regarding AC and they are ok with it. Started Eliquis 2.5mg BID today

## 2025-04-30 NOTE — PROGRESS NOTE ADULT - PROBLEM SELECTOR PLAN 6
hx of CABG 2019, EKG reviewed w/o ST changes or TWI   - c/w home atorvastatin 40mg  - c/w home asa? hx of CABG 2019, EKG reviewed w/o ST changes or TWI   - c/w home atorvastatin 40mg  - Discontinue Aspirin as the patient has stable CAD and is over 1 year since CABG (AFIRE trial).

## 2025-04-30 NOTE — PROGRESS NOTE ADULT - SUBJECTIVE AND OBJECTIVE BOX
interval history:  no acute overnight event  Tele with NSR       PAST MEDICAL & SURGICAL HISTORY:  Type 2 diabetes mellitus without complication    CVA (cerebral vascular accident)    Non-ST elevation (NSTEMI) myocardial infarction    CAD (coronary artery disease)    CKD (chronic kidney disease), stage III    HTN (hypertension)    HLD (hyperlipidemia)    No significant past surgical history    Status post placement of implantable loop recorder    H/O carpal tunnel repair    S/P CABG (coronary artery bypass graft)        MEDICATIONS  (STANDING):  apixaban 2.5 milliGRAM(s) Oral every 12 hours  aspirin  chewable 81 milliGRAM(s) Oral daily  atorvastatin 40 milliGRAM(s) Oral at bedtime  calcitriol   Capsule 0.25 MICROGram(s) Oral daily  cefTRIAXone   IVPB 1000 milliGRAM(s) IV Intermittent every 24 hours  clotrimazole 1% Cream 1 Application(s) Topical two times a day  dextrose 5%. 1000 milliLiter(s) (100 mL/Hr) IV Continuous <Continuous>  dextrose 5%. 1000 milliLiter(s) (50 mL/Hr) IV Continuous <Continuous>  dextrose 50% Injectable 25 Gram(s) IV Push once  dextrose 50% Injectable 12.5 Gram(s) IV Push once  dextrose 50% Injectable 25 Gram(s) IV Push once  glucagon  Injectable 1 milliGRAM(s) IntraMuscular once  influenza  Vaccine (HIGH DOSE) 0.5 milliLiter(s) IntraMuscular once  insulin lispro (ADMELOG) corrective regimen sliding scale   SubCutaneous three times a day before meals  insulin lispro (ADMELOG) corrective regimen sliding scale   SubCutaneous at bedtime  mupirocin 2% Nasal 1 Application(s) Both Nostrils every 12 hours  sodium bicarbonate 1300 milliGRAM(s) Oral every 8 hours  tamsulosin 0.4 milliGRAM(s) Oral at bedtime    MEDICATIONS  (PRN):  acetaminophen     Tablet .. 650 milliGRAM(s) Oral every 6 hours PRN Temp greater or equal to 38C (100.4F), Mild Pain (1 - 3)  dextrose Oral Gel 15 Gram(s) Oral once PRN Blood Glucose LESS THAN 70 milliGRAM(s)/deciliter  melatonin 3 milliGRAM(s) Oral at bedtime PRN Insomnia            Vital Signs Last 24 Hrs  T(C): 36.4 (30 Apr 2025 05:15), Max: 36.6 (30 Apr 2025 02:40)  T(F): 97.6 (30 Apr 2025 05:15), Max: 97.9 (30 Apr 2025 02:40)  HR: 79 (30 Apr 2025 05:15) (55 - 118)  BP: 113/60 (30 Apr 2025 05:15) (113/60 - 167/57)  BP(mean): --  RR: 18 (30 Apr 2025 02:40) (18 - 18)  SpO2: 98% (30 Apr 2025 05:15) (95% - 98%)    Parameters below as of 30 Apr 2025 05:15  Patient On (Oxygen Delivery Method): room air                INTERPRETATION OF TELEMETRY: NSR at 60s mostly with Wenckebach and intermittent 2:1     ECG:        LABS:                        11.0   7.24  )-----------( 164      ( 30 Apr 2025 03:50 )             32.6     04-30    134[L]  |  99  |  56[H]  ----------------------------<  158[H]  4.8   |  20[L]  |  2.62[H]    Ca    9.6      30 Apr 2025 03:50  Phos  4.2     04-30  Mg     1.70     04-30    TPro  6.7  /  Alb  3.3  /  TBili  0.2  /  DBili  x   /  AST  45[H]  /  ALT  71[H]  /  AlkPhos  92  04-30          Urinalysis Basic - ( 30 Apr 2025 03:50 )    Color: x / Appearance: x / SG: x / pH: x  Gluc: 158 mg/dL / Ketone: x  / Bili: x / Urobili: x   Blood: x / Protein: x / Nitrite: x   Leuk Esterase: x / RBC: x / WBC x   Sq Epi: x / Non Sq Epi: x / Bacteria: x      I&O's Summary    29 Apr 2025 07:01  -  30 Apr 2025 07:00  --------------------------------------------------------  IN: 420 mL / OUT: 560 mL / NET: -140 mL      BNP  RADIOLOGY & ADDITIONAL STUDIES:      PHYSICAL EXAM:    GENERAL: In no apparent distress, well nourished, and hydrated.  HEART: Regular rate and rhythm; No murmurs, rubs, or gallops.  PULMONARY: Clear to auscultation and percussion.  No rales, wheezing, or rhonchi bilaterally.  ABDOMEN: Soft, Nontender, Nondistended; Bowel sounds present  EXTREMITIES:  Peripheral Pulses present, No clubbing, cyanosis, or edema  NEUROLOGICAL: Grossly nonfocal

## 2025-04-30 NOTE — PROGRESS NOTE ADULT - SUBJECTIVE AND OBJECTIVE BOX
Subjective/Overnight Events: No acute events overnight. Denies nausea, vomiting, diarrhea, constipation, cough, fever, chest pain, and cough.    MEDICATIONS  (STANDING):  apixaban 2.5 milliGRAM(s) Oral every 12 hours  aspirin  chewable 81 milliGRAM(s) Oral daily  atorvastatin 40 milliGRAM(s) Oral at bedtime  calcitriol   Capsule 0.25 MICROGram(s) Oral daily  cefTRIAXone   IVPB 1000 milliGRAM(s) IV Intermittent every 24 hours  clotrimazole 1% Cream 1 Application(s) Topical two times a day  dextrose 5%. 1000 milliLiter(s) (100 mL/Hr) IV Continuous <Continuous>  dextrose 5%. 1000 milliLiter(s) (50 mL/Hr) IV Continuous <Continuous>  dextrose 50% Injectable 25 Gram(s) IV Push once  dextrose 50% Injectable 12.5 Gram(s) IV Push once  dextrose 50% Injectable 25 Gram(s) IV Push once  glucagon  Injectable 1 milliGRAM(s) IntraMuscular once  influenza  Vaccine (HIGH DOSE) 0.5 milliLiter(s) IntraMuscular once  insulin lispro (ADMELOG) corrective regimen sliding scale   SubCutaneous three times a day before meals  insulin lispro (ADMELOG) corrective regimen sliding scale   SubCutaneous at bedtime  mupirocin 2% Nasal 1 Application(s) Both Nostrils every 12 hours  sodium bicarbonate 1300 milliGRAM(s) Oral every 8 hours  tamsulosin 0.4 milliGRAM(s) Oral at bedtime    MEDICATIONS  (PRN):  acetaminophen     Tablet .. 650 milliGRAM(s) Oral every 6 hours PRN Temp greater or equal to 38C (100.4F), Mild Pain (1 - 3)  dextrose Oral Gel 15 Gram(s) Oral once PRN Blood Glucose LESS THAN 70 milliGRAM(s)/deciliter  melatonin 3 milliGRAM(s) Oral at bedtime PRN Insomnia      Objective:  Vital Signs Last 24 Hrs  T(C): 36.4 (30 Apr 2025 05:15), Max: 36.6 (30 Apr 2025 02:40)  T(F): 97.6 (30 Apr 2025 05:15), Max: 97.9 (30 Apr 2025 02:40)  HR: 79 (30 Apr 2025 05:15) (55 - 118)  BP: 113/60 (30 Apr 2025 05:15) (113/60 - 167/57)  BP(mean): --  RR: 18 (30 Apr 2025 02:40) (18 - 18)  SpO2: 98% (30 Apr 2025 05:15) (95% - 98%)    Parameters below as of 30 Apr 2025 05:15  Patient On (Oxygen Delivery Method): room air      I&O's Summary    28 Apr 2025 07:01  -  29 Apr 2025 07:00  --------------------------------------------------------  IN: 1000 mL / OUT: 2800 mL / NET: -1800 mL    29 Apr 2025 07:01  -  30 Apr 2025 06:40  --------------------------------------------------------  IN: 420 mL / OUT: 560 mL / NET: -140 mL        General: NAD, lying in bed  Cardiac: Normal S1, S2, no murmurs, rubs, or gallops appreciated  HEENT: NC/AT, clear sclera/conjunctiva, EOMI, MMM, hearing intact to voice  RESPIRATORY: Comfortable on RA, speaking in full sentences   Abdominal: Normoactive bowel sounds, no tenderness to light or deep palpation. No signs of organomegaly  : No longer with reynolds  NEURO: ambulated with PT, moving all extremities, speech understandable, Awake/conversant  Skin: Intact, no signs of bruising, ulcers, or open wounds.  Psych: AAOx3      Allergies  No Known Allergies    Intolerances      Labs:                          11.0   7.24  )-----------( 164      ( 30 Apr 2025 03:50 )             32.6                             11.6   7.27  )-----------( 148      ( 29 Apr 2025 04:45 )             33.0                             10.5   6.62  )-----------( 131      ( 27 Apr 2025 05:59 )             29.9                           9.3    5.88  )-----------( 133      ( 26 Apr 2025 05:33 )             26.2         04-30    134[L]  |  99  |  56[H]  ----------------------------<  158[H]  4.8   |  20[L]  |  2.62[H]    Ca    9.6      30 Apr 2025 03:50  Phos  4.2     04-30  Mg     1.70     04-30    TPro  6.7  /  Alb  3.3  /  TBili  0.2  /  DBili  x   /  AST  45[H]  /  ALT  71[H]  /  AlkPhos  92  04-30      04-29    134[L]  |  99  |  48[H]  ----------------------------<  92  5.2   |  24  |  2.47[H]    Ca    9.5      29 Apr 2025 04:45  Phos  4.2     04-29  Mg     1.70     04-29    TPro  6.7  /  Alb  3.4  /  TBili  0.4  /  DBili  x   /  AST  33  /  ALT  69[H]  /  AlkPhos  98  04-29      04-27    138  |  104  |  49[H]  ----------------------------<  91  4.2   |  22  |  2.37[H]    Ca    9.3      27 Apr 2025 05:59  Phos  2.8     04-27  Mg     1.60     04-27    TPro  6.4  /  Alb  3.3  /  TBili  0.5  /  DBili  x   /  AST  43[H]  /  ALT  80[H]  /  AlkPhos  107  04-27      04-26    134[L]  |  107  |  62[H]  ----------------------------<  224[H]  5.4[H]   |  16[L]  |  2.72[H]    Ca    9.3      26 Apr 2025 11:27  Phos  3.2     04-26  Mg     1.80     04-26    TPro  6.2  /  Alb  3.3  /  TBili  0.3  /  DBili  x   /  AST  50[H]  /  ALT  84[H]  /  AlkPhos  105  04-26      04-26    134[L]  |  108[H]  |  67[H]  ----------------------------<  94  5.4[H]   |  15[L]  |  2.76[H]    Ca    9.4      26 Apr 2025 05:33  Phos  3.6     04-26  Mg     1.80     04-26    TPro  6.2  /  Alb  3.3  /  TBili  0.3  /  DBili  x   /  AST  50[H]  /  ALT  84[H]  /  AlkPhos  105  04-26    PT/INR - ( 25 Apr 2025 20:03 )   PT: 11.4 sec;   INR: 0.98 ratio         PTT - ( 25 Apr 2025 20:03 )  PTT:37.2 sec    VBG (most recent)  pH: 7.41  PCO2: 39        Urinalysis with Rflx Culture (collected 25 Apr 2025 23:56)    Culture - Urine (collected 25 Apr 2025 23:56)  Source: Clean Catch  Preliminary Report (27 Apr 2025 19:27):    >100,000 CFU/ml Gram Negative Rods    Culture - Blood (collected 25 Apr 2025 20:00)  Source: Blood Blood-Peripheral  Preliminary Report (28 Apr 2025 01:01):    No growth at 48 Hours    Culture - Blood (collected 25 Apr 2025 19:45)  Source: Blood Blood-Peripheral  Preliminary Report (28 Apr 2025 01:01):    No growth at 48 Hours        Imaging:    FINDINGS:    CT BRAIN:    VENTRICLES AND SULCI: Age appropriate involutional changes.  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 No mass effect, acute   hemorrhage, or midline shift.  There are periventricular and subcortical   white matter hypodensities, consistent with microvascular type changes.  EXTRA-AXIAL: No mass or fluid collection. Basal cisterns are normal in   appearance.  POST CONTRAST: No abnormal enhancing lesion.    VISUALIZED SINUSES:  Scattered mucosal thickening. Nonpneumatization   right frontal sinus, anatomic variant. Near complete opacification left   maxillary sinus. Moderate mucosal thickening ethmoid air cells with near   complete opacification extending into the frontal ethmoidal recess with   near complete opacification of the left frontal sinus. Moderate mucosal   thickening of the right frontal ethmoidal recess.  TYMPANOMASTOID CAVITIES:  Mastoid air cells clear. Nonspecific soft   tissue density left external auditory canal.  VISUALIZED ORBITS: Bilateral lens replacement.  CALVARIUM: Generalized osteopenia.      CTA NECK:    AORTIC ARCH AND VISUALIZED GREAT VESSELS: Atherosclerotic changes. Bovine   aortic arch.    RIGHT:  COMMON CAROTID ARTERY: Minimal atherosclerotic changes at the bifurcation   without significant stenosis.  INTERNAL CAROTID ARTERY: No significant stenosis based on NASCET criteria.  VERTEBRAL ARTERY: Hypoplastic.    LEFT:  COMMON CAROTID ARTERY: Minimal atherosclerotic changes at the bifurcation   without significant stenosis.  INTERNAL CAROTID ARTERY: Atherosclerotic changes at the origin with   resultant mild to moderate stenosis based on NASCET criteria.  VERTEBRAL ARTERY: Normal in course and caliber to the intracranial   circulation. Vertebral artery dominance.    VISUALIZED LUNGS: Bilateral dependent atelectasis. Right greater than   left apical emphysematous changes. Perifissural nodular opacities along   the right major and minor fissure measuring up to 1.0 cm (3-63),   indeterminate.    MISCELLANEOUS: None.    CAROTID STENOSIS REFERENCE: Percent (%) stenosis is expressed in terms of   NASCET Criteria. (NASCET = 100x1-(N/D)). N=greatest narrowing. D=normal   distal diameter - MILD = <50% stenosis. - MODERATE = 50-69% stenosis. -   SEVERE = 70-89% stenosis. - HAIRLINE/CRITICAL = 90-99% stenosis. -   OCCLUDED = 100%stenosis.      CTA HEAD:    INTERNAL CAROTID ARTERIES: Atherosclerotic changes of the bilateral   petrous through cavernous segments with mild stenosis of the right   cavernous segment. 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  MIDDLE CEREBRAL ARTERIES: No significant stenosis or occlusion.  POSTERIOR CEREBRAL ARTERIES: Fetal origin right PCA    DISTAL VERTEBRAL / BASILAR ARTERIES: Trace atherosclerotic changes of the   proximal V4 segments without significant stenosis.    VENOUS STRUCTURES: Inadequate venous phase opacification.    MISCELLANEOUS: No other vascular abnormality is seen.      IMPRESSION:    CT HEAD:  No evidence of acute intracranial pathology.  Chronic age involutional and microangiopathic ischemic changes.  Moderate to marked paranasal sinus disease as described.  Recommend ENT evaluation, treatment and follow-up.  Nonspecific soft tissue density left external auditory canal, correlate   otoscopy.    CTA NECK:  Mild to moderate left proximal internal carotid artery stenosis.  Left vertebral artery artery dominance, hypoplastic right vertebral   artery.    Indeterminate perifissural lung nodules as described.  Additional follow-up/follow-up as per Fleischner criteria.    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  Recommend neurosurgical consultation/follow-up.  Anatomic variants as described.    --- End of Report ---      Renal Bladder US:    IMPRESSION:  Mild bilateral increased renal echotexture suggesting medical renal   disease.    Marked urinary bladder wall thickening versus underdistention. Correlate   with urinalysis due to cystitis, other bladder pathology.    --- End of Report ---      Consultant notes reviewed: Renal  Team d/w renal acidosis improved, can c/w sodium bicarb, hyperK improved, can d/c lokelma   Subjective/Overnight Events: No acute events overnight. Denies nausea, vomiting, diarrhea, constipation, cough, fever, chest pain, and cough.    MEDICATIONS  (STANDING):  apixaban 2.5 milliGRAM(s) Oral every 12 hours  aspirin  chewable 81 milliGRAM(s) Oral daily  atorvastatin 40 milliGRAM(s) Oral at bedtime  calcitriol   Capsule 0.25 MICROGram(s) Oral daily  cefTRIAXone   IVPB 1000 milliGRAM(s) IV Intermittent every 24 hours  clotrimazole 1% Cream 1 Application(s) Topical two times a day  dextrose 5%. 1000 milliLiter(s) (100 mL/Hr) IV Continuous <Continuous>  dextrose 5%. 1000 milliLiter(s) (50 mL/Hr) IV Continuous <Continuous>  dextrose 50% Injectable 25 Gram(s) IV Push once  dextrose 50% Injectable 12.5 Gram(s) IV Push once  dextrose 50% Injectable 25 Gram(s) IV Push once  glucagon  Injectable 1 milliGRAM(s) IntraMuscular once  influenza  Vaccine (HIGH DOSE) 0.5 milliLiter(s) IntraMuscular once  insulin lispro (ADMELOG) corrective regimen sliding scale   SubCutaneous three times a day before meals  insulin lispro (ADMELOG) corrective regimen sliding scale   SubCutaneous at bedtime  mupirocin 2% Nasal 1 Application(s) Both Nostrils every 12 hours  sodium bicarbonate 1300 milliGRAM(s) Oral every 8 hours  tamsulosin 0.4 milliGRAM(s) Oral at bedtime    MEDICATIONS  (PRN):  acetaminophen     Tablet .. 650 milliGRAM(s) Oral every 6 hours PRN Temp greater or equal to 38C (100.4F), Mild Pain (1 - 3)  dextrose Oral Gel 15 Gram(s) Oral once PRN Blood Glucose LESS THAN 70 milliGRAM(s)/deciliter  melatonin 3 milliGRAM(s) Oral at bedtime PRN Insomnia      Objective:  Vital Signs Last 24 Hrs  T(C): 36.4 (30 Apr 2025 05:15), Max: 36.6 (30 Apr 2025 02:40)  T(F): 97.6 (30 Apr 2025 05:15), Max: 97.9 (30 Apr 2025 02:40)  HR: 79 (30 Apr 2025 05:15) (55 - 118)  BP: 113/60 (30 Apr 2025 05:15) (113/60 - 167/57)  BP(mean): --  RR: 18 (30 Apr 2025 02:40) (18 - 18)  SpO2: 98% (30 Apr 2025 05:15) (95% - 98%)    Parameters below as of 30 Apr 2025 05:15  Patient On (Oxygen Delivery Method): room air      I&O's Summary    28 Apr 2025 07:01  -  29 Apr 2025 07:00  --------------------------------------------------------  IN: 1000 mL / OUT: 2800 mL / NET: -1800 mL    29 Apr 2025 07:01  -  30 Apr 2025 06:40  --------------------------------------------------------  IN: 420 mL / OUT: 560 mL / NET: -140 mL        General: NAD, lying in bed  Cardiac: Normal S1, S2, no murmurs, rubs, or gallops appreciated  HEENT: NC/AT, clear sclera/conjunctiva, EOMI, MMM, hearing intact to voice  RESPIRATORY: Comfortable on RA, speaking in full sentences   Abdominal: Normoactive bowel sounds, no tenderness to light or deep palpation. No signs of organomegaly  : No longer with reynolds  NEURO: ambulated with PT, moving all extremities, speech understandable, Awake/conversant  Skin: Intact, no signs of bruising, ulcers, or open wounds.  Psych: AAOx3      Allergies  No Known Allergies    Intolerances      Labs:                          11.0   7.24  )-----------( 164      ( 30 Apr 2025 03:50 )             32.6                             11.6   7.27  )-----------( 148      ( 29 Apr 2025 04:45 )             33.0                             10.5   6.62  )-----------( 131      ( 27 Apr 2025 05:59 )             29.9                           9.3    5.88  )-----------( 133      ( 26 Apr 2025 05:33 )             26.2         04-30    134[L]  |  99  |  56[H]  ----------------------------<  158[H]  4.8   |  20[L]  |  2.62[H]    Ca    9.6      30 Apr 2025 03:50  Phos  4.2     04-30  Mg     1.70     04-30    TPro  6.7  /  Alb  3.3  /  TBili  0.2  /  DBili  x   /  AST  45[H]  /  ALT  71[H]  /  AlkPhos  92  04-30      04-29    134[L]  |  99  |  48[H]  ----------------------------<  92  5.2   |  24  |  2.47[H]    Ca    9.5      29 Apr 2025 04:45  Phos  4.2     04-29  Mg     1.70     04-29    TPro  6.7  /  Alb  3.4  /  TBili  0.4  /  DBili  x   /  AST  33  /  ALT  69[H]  /  AlkPhos  98  04-29      04-27    138  |  104  |  49[H]  ----------------------------<  91  4.2   |  22  |  2.37[H]    Ca    9.3      27 Apr 2025 05:59  Phos  2.8     04-27  Mg     1.60     04-27    TPro  6.4  /  Alb  3.3  /  TBili  0.5  /  DBili  x   /  AST  43[H]  /  ALT  80[H]  /  AlkPhos  107  04-27      04-26    134[L]  |  107  |  62[H]  ----------------------------<  224[H]  5.4[H]   |  16[L]  |  2.72[H]    Ca    9.3      26 Apr 2025 11:27  Phos  3.2     04-26  Mg     1.80     04-26    TPro  6.2  /  Alb  3.3  /  TBili  0.3  /  DBili  x   /  AST  50[H]  /  ALT  84[H]  /  AlkPhos  105  04-26      04-26    134[L]  |  108[H]  |  67[H]  ----------------------------<  94  5.4[H]   |  15[L]  |  2.76[H]    Ca    9.4      26 Apr 2025 05:33  Phos  3.6     04-26  Mg     1.80     04-26    TPro  6.2  /  Alb  3.3  /  TBili  0.3  /  DBili  x   /  AST  50[H]  /  ALT  84[H]  /  AlkPhos  105  04-26    PT/INR - ( 25 Apr 2025 20:03 )   PT: 11.4 sec;   INR: 0.98 ratio         PTT - ( 25 Apr 2025 20:03 )  PTT:37.2 sec    VBG (most recent)  pH: 7.41  PCO2: 39        Urinalysis with Rflx Culture (collected 25 Apr 2025 23:56)    Culture - Urine (collected 25 Apr 2025 23:56)  Source: Clean Catch  Preliminary Report (27 Apr 2025 19:27):    >100,000 CFU/ml Gram Negative Rods    Culture - Blood (collected 25 Apr 2025 20:00)  Source: Blood Blood-Peripheral  Preliminary Report (28 Apr 2025 01:01):    No growth at 48 Hours    Culture - Blood (collected 25 Apr 2025 19:45)  Source: Blood Blood-Peripheral  Preliminary Report (28 Apr 2025 01:01):    No growth at 48 Hours        Imaging:    MRI Head:    IMPRESSION: No acute infarction.    --- End of Report ---      FINDINGS:    CT BRAIN:    VENTRICLES AND SULCI: Age appropriate involutional changes.  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 No mass effect, acute   hemorrhage, or midline shift.  There are periventricular and subcortical   white matter hypodensities, consistent with microvascular type changes.  EXTRA-AXIAL: No mass or fluid collection. Basal cisterns are normal in   appearance.  POST CONTRAST: No abnormal enhancing lesion.    VISUALIZED SINUSES:  Scattered mucosal thickening. Nonpneumatization   right frontal sinus, anatomic variant. Near complete opacification left   maxillary sinus. Moderate mucosal thickening ethmoid air cells with near   complete opacification extending into the frontal ethmoidal recess with   near complete opacification of the left frontal sinus. Moderate mucosal   thickening of the right frontal ethmoidal recess.  TYMPANOMASTOID CAVITIES:  Mastoid air cells clear. Nonspecific soft   tissue density left external auditory canal.  VISUALIZED ORBITS: Bilateral lens replacement.  CALVARIUM: Generalized osteopenia.      CTA NECK:    AORTIC ARCH AND VISUALIZED GREAT VESSELS: Atherosclerotic changes. Bovine   aortic arch.    RIGHT:  COMMON CAROTID ARTERY: Minimal atherosclerotic changes at the bifurcation   without significant stenosis.  INTERNAL CAROTID ARTERY: No significant stenosis based on NASCET criteria.  VERTEBRAL ARTERY: Hypoplastic.    LEFT:  COMMON CAROTID ARTERY: Minimal atherosclerotic changes at the bifurcation   without significant stenosis.  INTERNAL CAROTID ARTERY: Atherosclerotic changes at the origin with   resultant mild to moderate stenosis based on NASCET criteria.  VERTEBRAL ARTERY: Normal in course and caliber to the intracranial   circulation. Vertebral artery dominance.    VISUALIZED LUNGS: Bilateral dependent atelectasis. Right greater than   left apical emphysematous changes. Perifissural nodular opacities along   the right major and minor fissure measuring up to 1.0 cm (3-63),   indeterminate.    MISCELLANEOUS: None.    CAROTID STENOSIS REFERENCE: Percent (%) stenosis is expressed in terms of   NASCET Criteria. (NASCET = 100x1-(N/D)). N=greatest narrowing. D=normal   distal diameter - MILD = <50% stenosis. - MODERATE = 50-69% stenosis. -   SEVERE = 70-89% stenosis. - HAIRLINE/CRITICAL = 90-99% stenosis. -   OCCLUDED = 100%stenosis.      CTA HEAD:    INTERNAL CAROTID ARTERIES: Atherosclerotic changes of the bilateral   petrous through cavernous segments with mild stenosis of the right   cavernous segment. 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  MIDDLE CEREBRAL ARTERIES: No significant stenosis or occlusion.  POSTERIOR CEREBRAL ARTERIES: Fetal origin right PCA    DISTAL VERTEBRAL / BASILAR ARTERIES: Trace atherosclerotic changes of the   proximal V4 segments without significant stenosis.    VENOUS STRUCTURES: Inadequate venous phase opacification.    MISCELLANEOUS: No other vascular abnormality is seen.      IMPRESSION:    CT HEAD:  No evidence of acute intracranial pathology.  Chronic age involutional and microangiopathic ischemic changes.  Moderate to marked paranasal sinus disease as described.  Recommend ENT evaluation, treatment and follow-up.  Nonspecific soft tissue density left external auditory canal, correlate   otoscopy.    CTA NECK:  Mild to moderate left proximal internal carotid artery stenosis.  Left vertebral artery artery dominance, hypoplastic right vertebral   artery.    Indeterminate perifissural lung nodules as described.  Additional follow-up/follow-up as per Fleischner criteria.    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  Recommend neurosurgical consultation/follow-up.  Anatomic variants as described.    --- End of Report ---      Renal Bladder US:    IMPRESSION:  Mild bilateral increased renal echotexture suggesting medical renal   disease.    Marked urinary bladder wall thickening versus underdistention. Correlate   with urinalysis due to cystitis, other bladder pathology.    --- End of Report ---      Consultant notes reviewed: Renal  Team d/w renal acidosis improved, can c/w sodium bicarb, hyperK improved, can d/c lokelma

## 2025-04-30 NOTE — PROGRESS NOTE ADULT - PROBLEM SELECTOR PLAN 4
HR 40-50s,  systolic, EKG w/ 1st degree AV-block, tele with occasional sinus pauses, longest being 2.8s   Etiology possible from infection, hyperkalemia (BRASH syndrome?) exacerbated by infection   - TSH 3.3 wnl   - held AVN blocking agents - metoprolol succinate 50mg   - Was frederick to the 30s today  - EP recs appreciated  - PPM TBD

## 2025-04-30 NOTE — PROGRESS NOTE ADULT - PROBLEM SELECTOR PLAN 9
Home regimen : metop succinate 50, nifedipine 60mg qd,   - Hold for infection Home regimen : metop succinate 50, nifedipine 60mg qd,   - Hold Metoprolol Succinate  - Restart Nifedipine 60mg

## 2025-04-30 NOTE — PROGRESS NOTE ADULT - ATTENDING COMMENTS
85 y.o. 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     Patient feels well, no complaints currently. MR brain negative for acute infarct.     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   - EP following   - Cont Eliquis, D/C Metoprolol     # UTI c/b TME   - UCx: >100K Klebsiella   - Cont CTX, can transition to PO on d/c. Cont abx through 5/2.     # TAYLOR on CKD c/b metabolic acidosis and hyperkalemia   # Urinary retention   - S/p bicarb drip, off Lokelma now   - S/p Mosher placement c/b hematuria. No clots seen. hematuria maybe 2/2 UTI v traumatic insertion  - Hold Lisinopril   - Passed TOV  - Per daughter, Cr was 3.3 in January- current Cr may represent baseline?     # Slurred speech   - 2/2 TME v stroke- [place] given pt in afib, at risk for stroke   - MRI brain negative for acute stroke   - passed dysphagia screen  - speech therapy eval noted, they did find some deficiencies   - Improving     # Hypothermia, resolved   - Perhaps 2/2 infection v metabolic acidosis impairing thermoregulation   - AM cortisol, TSH wnl, CT without acute infarcts

## 2025-04-30 NOTE — PROGRESS NOTE ADULT - PROBLEM SELECTOR PLAN 11
- Nutrition: Soft bite size after passing dysphagia screen  - Activity: as tolerated   - DVT Prophylaxis: On Eliquis  - Disposition: VITALIY, will need speech pathologist f/u

## 2025-05-01 ENCOUNTER — RESULT REVIEW (OUTPATIENT)
Age: 85
End: 2025-05-01

## 2025-05-01 LAB
ALBUMIN SERPL ELPH-MCNC: 3.3 G/DL — SIGNIFICANT CHANGE UP (ref 3.3–5)
ALP SERPL-CCNC: 95 U/L — SIGNIFICANT CHANGE UP (ref 40–120)
ALT FLD-CCNC: 96 U/L — HIGH (ref 4–41)
ANION GAP SERPL CALC-SCNC: 15 MMOL/L — HIGH (ref 7–14)
AST SERPL-CCNC: 71 U/L — HIGH (ref 4–40)
BILIRUB SERPL-MCNC: <0.2 MG/DL — SIGNIFICANT CHANGE UP (ref 0.2–1.2)
BUN SERPL-MCNC: 55 MG/DL — HIGH (ref 7–23)
CALCIUM SERPL-MCNC: 9.3 MG/DL — SIGNIFICANT CHANGE UP (ref 8.4–10.5)
CHLORIDE SERPL-SCNC: 101 MMOL/L — SIGNIFICANT CHANGE UP (ref 98–107)
CO2 SERPL-SCNC: 21 MMOL/L — LOW (ref 22–31)
CREAT SERPL-MCNC: 2.3 MG/DL — HIGH (ref 0.5–1.3)
CULTURE RESULTS: SIGNIFICANT CHANGE UP
CULTURE RESULTS: SIGNIFICANT CHANGE UP
EGFR: 27 ML/MIN/1.73M2 — LOW
EGFR: 27 ML/MIN/1.73M2 — LOW
GLUCOSE BLDC GLUCOMTR-MCNC: 111 MG/DL — HIGH (ref 70–99)
GLUCOSE BLDC GLUCOMTR-MCNC: 255 MG/DL — HIGH (ref 70–99)
GLUCOSE BLDC GLUCOMTR-MCNC: 293 MG/DL — HIGH (ref 70–99)
GLUCOSE BLDC GLUCOMTR-MCNC: 96 MG/DL — SIGNIFICANT CHANGE UP (ref 70–99)
GLUCOSE SERPL-MCNC: 59 MG/DL — LOW (ref 70–99)
HCT VFR BLD CALC: 32.4 % — LOW (ref 39–50)
HGB BLD-MCNC: 10.8 G/DL — LOW (ref 13–17)
MAGNESIUM SERPL-MCNC: 1.8 MG/DL — SIGNIFICANT CHANGE UP (ref 1.6–2.6)
MCHC RBC-ENTMCNC: 29.4 PG — SIGNIFICANT CHANGE UP (ref 27–34)
MCHC RBC-ENTMCNC: 33.3 G/DL — SIGNIFICANT CHANGE UP (ref 32–36)
MCV RBC AUTO: 88.3 FL — SIGNIFICANT CHANGE UP (ref 80–100)
NRBC # BLD AUTO: 0 K/UL — SIGNIFICANT CHANGE UP (ref 0–0)
NRBC # FLD: 0 K/UL — SIGNIFICANT CHANGE UP (ref 0–0)
NRBC BLD AUTO-RTO: 0 /100 WBCS — SIGNIFICANT CHANGE UP (ref 0–0)
PHOSPHATE SERPL-MCNC: 4.6 MG/DL — HIGH (ref 2.5–4.5)
PLATELET # BLD AUTO: 156 K/UL — SIGNIFICANT CHANGE UP (ref 150–400)
POTASSIUM SERPL-MCNC: 4.8 MMOL/L — SIGNIFICANT CHANGE UP (ref 3.5–5.3)
POTASSIUM SERPL-SCNC: 4.8 MMOL/L — SIGNIFICANT CHANGE UP (ref 3.5–5.3)
PROT SERPL-MCNC: 6.6 G/DL — SIGNIFICANT CHANGE UP (ref 6–8.3)
RBC # BLD: 3.67 M/UL — LOW (ref 4.2–5.8)
RBC # FLD: 13.2 % — SIGNIFICANT CHANGE UP (ref 10.3–14.5)
SODIUM SERPL-SCNC: 137 MMOL/L — SIGNIFICANT CHANGE UP (ref 135–145)
SPECIMEN SOURCE: SIGNIFICANT CHANGE UP
SPECIMEN SOURCE: SIGNIFICANT CHANGE UP
WBC # BLD: 6.64 K/UL — SIGNIFICANT CHANGE UP (ref 3.8–10.5)
WBC # FLD AUTO: 6.64 K/UL — SIGNIFICANT CHANGE UP (ref 3.8–10.5)

## 2025-05-01 PROCEDURE — 93306 TTE W/DOPPLER COMPLETE: CPT | Mod: 26

## 2025-05-01 PROCEDURE — 99231 SBSQ HOSP IP/OBS SF/LOW 25: CPT

## 2025-05-01 PROCEDURE — 99232 SBSQ HOSP IP/OBS MODERATE 35: CPT | Mod: GC

## 2025-05-01 RX ORDER — CEFTRIAXONE 500 MG/1
1000 INJECTION, POWDER, FOR SOLUTION INTRAMUSCULAR; INTRAVENOUS EVERY 24 HOURS
Refills: 0 | Status: COMPLETED | OUTPATIENT
Start: 2025-05-02 | End: 2025-05-02

## 2025-05-01 RX ADMIN — Medication 1300 MILLIGRAM(S): at 13:55

## 2025-05-01 RX ADMIN — APIXABAN 2.5 MILLIGRAM(S): 2.5 TABLET, FILM COATED ORAL at 05:41

## 2025-05-01 RX ADMIN — Medication 1300 MILLIGRAM(S): at 22:17

## 2025-05-01 RX ADMIN — MUPIROCIN CALCIUM 1 APPLICATION(S): 20 CREAM TOPICAL at 11:17

## 2025-05-01 RX ADMIN — MUPIROCIN CALCIUM 1 APPLICATION(S): 20 CREAM TOPICAL at 22:18

## 2025-05-01 RX ADMIN — CEFTRIAXONE 100 MILLIGRAM(S): 500 INJECTION, POWDER, FOR SOLUTION INTRAMUSCULAR; INTRAVENOUS at 11:16

## 2025-05-01 RX ADMIN — ATORVASTATIN CALCIUM 40 MILLIGRAM(S): 80 TABLET, FILM COATED ORAL at 22:18

## 2025-05-01 RX ADMIN — Medication 1 APPLICATION(S): at 11:18

## 2025-05-01 RX ADMIN — INSULIN LISPRO 3: 100 INJECTION, SOLUTION INTRAVENOUS; SUBCUTANEOUS at 17:37

## 2025-05-01 RX ADMIN — Medication 1300 MILLIGRAM(S): at 05:42

## 2025-05-01 RX ADMIN — CLOTRIMAZOLE 1 APPLICATION(S): 1 CREAM TOPICAL at 05:03

## 2025-05-01 RX ADMIN — TAMSULOSIN HYDROCHLORIDE 0.4 MILLIGRAM(S): 0.4 CAPSULE ORAL at 22:18

## 2025-05-01 RX ADMIN — APIXABAN 2.5 MILLIGRAM(S): 2.5 TABLET, FILM COATED ORAL at 17:38

## 2025-05-01 RX ADMIN — INSULIN LISPRO 3: 100 INJECTION, SOLUTION INTRAVENOUS; SUBCUTANEOUS at 12:14

## 2025-05-01 RX ADMIN — Medication 60 MILLIGRAM(S): at 05:41

## 2025-05-01 RX ADMIN — CALCITRIOL 0.25 MICROGRAM(S): 0.5 CAPSULE, GELATIN COATED ORAL at 11:16

## 2025-05-01 NOTE — PROGRESS NOTE ADULT - PROBLEM SELECTOR PLAN 10
Hyperglycemic 300 on admission --> hypoglycemic after hyperkalemia cocktail- BHB = 0 not DKA   - Home regimen: Glipizide 5mg AM and 10mg in the PM - hold   - A1c: 7.8%  - ISS for now  - monitor FS, goal 110-180

## 2025-05-01 NOTE — PROGRESS NOTE ADULT - ASSESSMENT
85yoM w/ PMHx CAD s/p CABG, afib/aflutter (AC self discontinued), CKD stage III, HTN, HLD, DMII initially presented on 4/23 with slurred speech and AMS found to be hypothermic 92F and hyperkalemic 5.8 admitted for sepsis 2/2 UTI now on abx.  EPS consulted for bradycardia HR 40-50s and sinus pauses.  TSH 3.3.  Patient takes Toprol 50mg PO daily. Patient denies any chest pain, palpitations, lightheadedness, dizziness, syncope or near syncope.      ## Bradycardia  ## TAYLOR on CKD  ## UTI  ## Toxic metabolic encephalopathy      --Telemetry reviewed: overnight NSR with Wenckebach with intermittent 2:1  Given findings of intermittent 2:1 HB, may benefit from leadless PPM with ILR explant simultaneously. The process of the procedures along with the risks and benefits for each procedure were explained in detail which included but not limited to bleeding requiring transfusion, infection, stroke, plural effusion, esophageal injury, pericardial effusion, cardiac tamponade requiring chest tube, intubation, and death. Patient and her daughter ( Gloria Nunn ) expressed understanding and all questions were answered. Consent obtained  -- NPO after MN  -- T&S X2, AM labs  -- TTE to eval LV and valvular function  --Hold AV agustín blockers   --CHADsVASC: 5 - Eliquis 2.5mg PO BID   --Continue care per primary team

## 2025-05-01 NOTE — PROGRESS NOTE ADULT - PROBLEM SELECTOR PLAN 7
Patient with hx of Afib s/p CABG. As per daughter, the patient is no longer in Afib and is no on AC. There is a chart note from 2019 related to this.   - CTM on tele for signs of Afib/Aflutter  - [id number] score 4  - Spoke with family regarding AC and they are ok with it. Started Eliquis 2.5mg BID today Patient with hx of Afib s/p CABG. As per daughter, the patient is no longer in Afib and is no on AC. There is a chart note from 2019 related to this.   - CTM on tele for signs of Afib/Aflutter  - [id number] score 4  - Spoke with family regarding AC and they are ok with it. Started Eliquis 2.5mg BID

## 2025-05-01 NOTE — PROGRESS NOTE ADULT - PROBLEM SELECTOR PLAN 7
Patient with hx of Afib s/p CABG. As per daughter, the patient is no longer in Afib and is no on AC. There is a chart note from 2019 related to this.   - CTM on tele for signs of Afib/Aflutter  - [id number] score 4  - Spoke with family regarding AC and they are ok with it. Started Eliquis 2.5mg BID today

## 2025-05-01 NOTE — PROGRESS NOTE ADULT - SUBJECTIVE AND OBJECTIVE BOX
Subjective/Overnight Events: No acute events overnight. Denies nausea, vomiting, diarrhea, constipation, cough, fever, chest pain, and cough.    MEDICATIONS  (STANDING):  apixaban 2.5 milliGRAM(s) Oral every 12 hours  atorvastatin 40 milliGRAM(s) Oral at bedtime  calcitriol   Capsule 0.25 MICROGram(s) Oral daily  cefTRIAXone   IVPB 1000 milliGRAM(s) IV Intermittent every 24 hours  clotrimazole 1% Cream 1 Application(s) Topical two times a day  dextrose 5%. 1000 milliLiter(s) (100 mL/Hr) IV Continuous <Continuous>  dextrose 5%. 1000 milliLiter(s) (50 mL/Hr) IV Continuous <Continuous>  dextrose 50% Injectable 25 Gram(s) IV Push once  dextrose 50% Injectable 12.5 Gram(s) IV Push once  dextrose 50% Injectable 25 Gram(s) IV Push once  glucagon  Injectable 1 milliGRAM(s) IntraMuscular once  influenza  Vaccine (HIGH DOSE) 0.5 milliLiter(s) IntraMuscular once  insulin lispro (ADMELOG) corrective regimen sliding scale   SubCutaneous three times a day before meals  insulin lispro (ADMELOG) corrective regimen sliding scale   SubCutaneous at bedtime  mupirocin 2% Nasal 1 Application(s) Both Nostrils every 12 hours  NIFEdipine XL 60 milliGRAM(s) Oral daily  sodium bicarbonate 1300 milliGRAM(s) Oral every 8 hours  tamsulosin 0.4 milliGRAM(s) Oral at bedtime    MEDICATIONS  (PRN):  acetaminophen     Tablet .. 650 milliGRAM(s) Oral every 6 hours PRN Temp greater or equal to 38C (100.4F), Mild Pain (1 - 3)  dextrose Oral Gel 15 Gram(s) Oral once PRN Blood Glucose LESS THAN 70 milliGRAM(s)/deciliter  melatonin 3 milliGRAM(s) Oral at bedtime PRN Insomnia      Objective:  Vital Signs Last 24 Hrs  T(C): 36.8 (01 May 2025 05:40), Max: 36.8 (01 May 2025 05:40)  T(F): 98.3 (01 May 2025 05:40), Max: 98.3 (01 May 2025 05:40)  HR: 60 (01 May 2025 05:40) (60 - 74)  BP: 109/78 (01 May 2025 05:40) (109/78 - 153/54)  BP(mean): --  RR: 16 (01 May 2025 05:40) (16 - 18)  SpO2: 99% (01 May 2025 05:40) (97% - 99%)    Parameters below as of 01 May 2025 05:40  Patient On (Oxygen Delivery Method): room air        I&O's Summary    29 Apr 2025 07:01  -  30 Apr 2025 07:00  --------------------------------------------------------  IN: 420 mL / OUT: 560 mL / NET: -140 mL    30 Apr 2025 07:01  -  01 May 2025 06:47  --------------------------------------------------------  IN: 320 mL / OUT: 0 mL / NET: 320 mL        Physical Exam:  General: NAD, lying in bed  Cardiac: Normal S1, S2, no murmurs, rubs, or gallops appreciated  HEENT: NC/AT, clear sclera/conjunctiva, EOMI, MMM, hearing intact to voice  RESPIRATORY: Comfortable on RA, speaking in full sentences   Abdominal: Normoactive bowel sounds, no tenderness to light or deep palpation. No signs of organomegaly  : No longer with reynolds  NEURO: ambulated with PT, moving all extremities, speech understandable, Awake/conversant  Skin: Intact, no signs of bruising, ulcers, or open wounds.  Psych: AAOx3      Allergies  No Known Allergies    Intolerances      Labs:                          11.0   7.24  )-----------( 164      ( 30 Apr 2025 03:50 )             32.6                             11.6   7.27  )-----------( 148      ( 29 Apr 2025 04:45 )             33.0                             10.5   6.62  )-----------( 131      ( 27 Apr 2025 05:59 )             29.9                           9.3    5.88  )-----------( 133      ( 26 Apr 2025 05:33 )             26.2         04-30    134[L]  |  99  |  56[H]  ----------------------------<  158[H]  4.8   |  20[L]  |  2.62[H]    Ca    9.6      30 Apr 2025 03:50  Phos  4.2     04-30  Mg     1.70     04-30    TPro  6.7  /  Alb  3.3  /  TBili  0.2  /  DBili  x   /  AST  45[H]  /  ALT  71[H]  /  AlkPhos  92  04-30      04-29    134[L]  |  99  |  48[H]  ----------------------------<  92  5.2   |  24  |  2.47[H]    Ca    9.5      29 Apr 2025 04:45  Phos  4.2     04-29  Mg     1.70     04-29    TPro  6.7  /  Alb  3.4  /  TBili  0.4  /  DBili  x   /  AST  33  /  ALT  69[H]  /  AlkPhos  98  04-29      04-27    138  |  104  |  49[H]  ----------------------------<  91  4.2   |  22  |  2.37[H]    Ca    9.3      27 Apr 2025 05:59  Phos  2.8     04-27  Mg     1.60     04-27    TPro  6.4  /  Alb  3.3  /  TBili  0.5  /  DBili  x   /  AST  43[H]  /  ALT  80[H]  /  AlkPhos  107  04-27      04-26    134[L]  |  107  |  62[H]  ----------------------------<  224[H]  5.4[H]   |  16[L]  |  2.72[H]    Ca    9.3      26 Apr 2025 11:27  Phos  3.2     04-26  Mg     1.80     04-26    TPro  6.2  /  Alb  3.3  /  TBili  0.3  /  DBili  x   /  AST  50[H]  /  ALT  84[H]  /  AlkPhos  105  04-26      04-26    134[L]  |  108[H]  |  67[H]  ----------------------------<  94  5.4[H]   |  15[L]  |  2.76[H]    Ca    9.4      26 Apr 2025 05:33  Phos  3.6     04-26  Mg     1.80     04-26    TPro  6.2  /  Alb  3.3  /  TBili  0.3  /  DBili  x   /  AST  50[H]  /  ALT  84[H]  /  AlkPhos  105  04-26    PT/INR - ( 25 Apr 2025 20:03 )   PT: 11.4 sec;   INR: 0.98 ratio         PTT - ( 25 Apr 2025 20:03 )  PTT:37.2 sec    VBG (most recent)  pH: 7.41  PCO2: 39        Urinalysis with Rflx Culture (collected 25 Apr 2025 23:56)    Culture - Urine (collected 25 Apr 2025 23:56)  Source: Clean Catch  Preliminary Report (27 Apr 2025 19:27):    >100,000 CFU/ml Gram Negative Rods    Culture - Blood (collected 25 Apr 2025 20:00)  Source: Blood Blood-Peripheral  Preliminary Report (28 Apr 2025 01:01):    No growth at 48 Hours    Culture - Blood (collected 25 Apr 2025 19:45)  Source: Blood Blood-Peripheral  Preliminary Report (28 Apr 2025 01:01):    No growth at 48 Hours        Imaging:    MRI Head:    IMPRESSION: No acute infarction.    --- End of Report ---      FINDINGS:    CT BRAIN:    VENTRICLES AND SULCI: Age appropriate involutional changes.  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 No mass effect, acute   hemorrhage, or midline shift.  There are periventricular and subcortical   white matter hypodensities, consistent with microvascular type changes.  EXTRA-AXIAL: No mass or fluid collection. Basal cisterns are normal in   appearance.  POST CONTRAST: No abnormal enhancing lesion.    VISUALIZED SINUSES:  Scattered mucosal thickening. Nonpneumatization   right frontal sinus, anatomic variant. Near complete opacification left   maxillary sinus. Moderate mucosal thickening ethmoid air cells with near   complete opacification extending into the frontal ethmoidal recess with   near complete opacification of the left frontal sinus. Moderate mucosal   thickening of the right frontal ethmoidal recess.  TYMPANOMASTOID CAVITIES:  Mastoid air cells clear. Nonspecific soft   tissue density left external auditory canal.  VISUALIZED ORBITS: Bilateral lens replacement.  CALVARIUM: Generalized osteopenia.      CTA NECK:    AORTIC ARCH AND VISUALIZED GREAT VESSELS: Atherosclerotic changes. Bovine   aortic arch.    RIGHT:  COMMON CAROTID ARTERY: Minimal atherosclerotic changes at the bifurcation   without significant stenosis.  INTERNAL CAROTID ARTERY: No significant stenosis based on NASCET criteria.  VERTEBRAL ARTERY: Hypoplastic.    LEFT:  COMMON CAROTID ARTERY: Minimal atherosclerotic changes at the bifurcation   without significant stenosis.  INTERNAL CAROTID ARTERY: Atherosclerotic changes at the origin with   resultant mild to moderate stenosis based on NASCET criteria.  VERTEBRAL ARTERY: Normal in course and caliber to the intracranial   circulation. Vertebral artery dominance.    VISUALIZED LUNGS: Bilateral dependent atelectasis. Right greater than   left apical emphysematous changes. Perifissural nodular opacities along   the right major and minor fissure measuring up to 1.0 cm (3-63),   indeterminate.    MISCELLANEOUS: None.    CAROTID STENOSIS REFERENCE: Percent (%) stenosis is expressed in terms of   NASCET Criteria. (NASCET = 100x1-(N/D)). N=greatest narrowing. D=normal   distal diameter - MILD = <50% stenosis. - MODERATE = 50-69% stenosis. -   SEVERE = 70-89% stenosis. - HAIRLINE/CRITICAL = 90-99% stenosis. -   OCCLUDED = 100%stenosis.      CTA HEAD:    INTERNAL CAROTID ARTERIES: Atherosclerotic changes of the bilateral   petrous through cavernous segments with mild stenosis of the right   cavernous segment. 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  MIDDLE CEREBRAL ARTERIES: No significant stenosis or occlusion.  POSTERIOR CEREBRAL ARTERIES: Fetal origin right PCA    DISTAL VERTEBRAL / BASILAR ARTERIES: Trace atherosclerotic changes of the   proximal V4 segments without significant stenosis.    VENOUS STRUCTURES: Inadequate venous phase opacification.    MISCELLANEOUS: No other vascular abnormality is seen.      IMPRESSION:    CT HEAD:  No evidence of acute intracranial pathology.  Chronic age involutional and microangiopathic ischemic changes.  Moderate to marked paranasal sinus disease as described.  Recommend ENT evaluation, treatment and follow-up.  Nonspecific soft tissue density left external auditory canal, correlate   otoscopy.    CTA NECK:  Mild to moderate left proximal internal carotid artery stenosis.  Left vertebral artery artery dominance, hypoplastic right vertebral   artery.    Indeterminate perifissural lung nodules as described.  Additional follow-up/follow-up as per Fleischner criteria.    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  Recommend neurosurgical consultation/follow-up.  Anatomic variants as described.    --- End of Report ---      Renal Bladder US:    IMPRESSION:  Mild bilateral increased renal echotexture suggesting medical renal   disease.    Marked urinary bladder wall thickening versus underdistention. Correlate   with urinalysis due to cystitis, other bladder pathology.    --- End of Report ---      Consultant notes reviewed: Renal  Team d/w renal acidosis improved, can c/w sodium bicarb, hyperK improved, can d/c lokelma

## 2025-05-01 NOTE — PROGRESS NOTE ADULT - PROBLEM SELECTOR PLAN 1
1. Mixed hyperlipidemia  Assessment & Plan:  Was elevated in past. No changes.  Due again in near future.  I recommend repeat labs.    Orders:  -     Comprehensive metabolic panel; Future; Expected date: 02/19/2024  -     Lipid panel; Future; Expected date: 02/19/2024    2. Class 2 severe obesity due to excess calories with serious comorbidity and body mass index (BMI) of 36.0 to 36.9 in adult   Assessment & Plan:  Reviewed about exercise and diet issues.    Consider follow with Bariatrics.      Orders:  -     Comprehensive metabolic panel; Future; Expected date: 02/19/2024  -     TSH, 3rd generation; Future; Expected date: 02/19/2024  -     CBC and differential; Future; Expected date: 02/19/2024    3. Moderate persistent asthma without complication  Assessment & Plan:  No need for albuterol lately.  Continue singulair.        4. Gastroesophageal reflux disease without esophagitis  Assessment & Plan:  Stable with Nexium.          COVID 19 Instructions    Akash LEON Vanessas was advised to limit contact with others to essential tasks such as getting food, medications, and medical care.    Proper handwashing reviewed, and Hand sanitzer when washing is not available.    If the patient develops symptoms of COVID 19, the patient should call the office as soon as possible.    It is strongly recommended that Flu Vaccinations be obtained.      Virtual Visits:  Joselo: This works on smart phones (any phone with Internet browsing capability).  You should get a text message when the provider is ready to see you.  Click on the link in the text message, and the call should start.  You will need to type in your name, and allow camera and microphone access.  This is HIPPA compliant, and secure.      If you have not already done so, get immunized to COVID 19.      We are committed to getting you vaccinated as soon as possible and will be closely following [facility] and [place] [employer] guidelines as they are released and revised.  Please refer  to our COVID-19 vaccine webpage for the most up to date information on the vaccine and our distribution efforts.    This site will also have the most up to date recommendations for COVID booster vaccine.    [email or web address]    Call [phone] ([phone]), option 7    You can also visit https://www.vaccines.gov/ to find vaccines in your area.    OUR LOCATION:    [facility]  [address] [place] [address], [address]  [place], [place], [address] [phone]  Fax: [phone]    Lab services, Rheumatology, and OB/GYN are at this location as well.     Slurred speech at home, urinary retention w/o dysuria, Hypothermic 92F on admission. likely urinary source   - u/a - protein, blood, LE, pyuria, rvp negative   - CXR - w/o consolidations   - was on zoysn (renally dosed), hold off vanc given likely urinary source   - Ucx growing Klebsiella Pneumoniae and Bcx ([id number])  - De-escalate to Ceftriaxone 1g q24h (5/2)  - TOV passed

## 2025-05-01 NOTE — PROGRESS NOTE ADULT - PROBLEM SELECTOR PLAN 9
Home regimen : metop succinate 50, nifedipine 60mg qd,   - Hold Metoprolol Succinate  - Restart Nifedipine 60mg

## 2025-05-01 NOTE — PROGRESS NOTE ADULT - SUBJECTIVE AND OBJECTIVE BOX
interval history:  no acute overnight event  Tele with NSR   pt. denies palpitation, CP, SOB      PAST MEDICAL & SURGICAL HISTORY:  Type 2 diabetes mellitus without complication    CVA (cerebral vascular accident)    Non-ST elevation (NSTEMI) myocardial infarction    CAD (coronary artery disease)    CKD (chronic kidney disease), stage III    HTN (hypertension)    HLD (hyperlipidemia)    No significant past surgical history    Status post placement of implantable loop recorder    H/O carpal tunnel repair    S/P CABG (coronary artery bypass graft)        MEDICATIONS  (STANDING):  apixaban 2.5 milliGRAM(s) Oral every 12 hours  atorvastatin 40 milliGRAM(s) Oral at bedtime  calcitriol   Capsule 0.25 MICROGram(s) Oral daily  cefTRIAXone   IVPB 1000 milliGRAM(s) IV Intermittent every 24 hours  clotrimazole 1% Cream 1 Application(s) Topical two times a day  dextrose 5%. 1000 milliLiter(s) (100 mL/Hr) IV Continuous <Continuous>  dextrose 5%. 1000 milliLiter(s) (50 mL/Hr) IV Continuous <Continuous>  dextrose 50% Injectable 25 Gram(s) IV Push once  dextrose 50% Injectable 12.5 Gram(s) IV Push once  dextrose 50% Injectable 25 Gram(s) IV Push once  glucagon  Injectable 1 milliGRAM(s) IntraMuscular once  influenza  Vaccine (HIGH DOSE) 0.5 milliLiter(s) IntraMuscular once  insulin lispro (ADMELOG) corrective regimen sliding scale   SubCutaneous three times a day before meals  insulin lispro (ADMELOG) corrective regimen sliding scale   SubCutaneous at bedtime  mupirocin 2% Nasal 1 Application(s) Both Nostrils every 12 hours  NIFEdipine XL 60 milliGRAM(s) Oral daily  sodium bicarbonate 1300 milliGRAM(s) Oral every 8 hours  tamsulosin 0.4 milliGRAM(s) Oral at bedtime    MEDICATIONS  (PRN):  acetaminophen     Tablet .. 650 milliGRAM(s) Oral every 6 hours PRN Temp greater or equal to 38C (100.4F), Mild Pain (1 - 3)  dextrose Oral Gel 15 Gram(s) Oral once PRN Blood Glucose LESS THAN 70 milliGRAM(s)/deciliter  melatonin 3 milliGRAM(s) Oral at bedtime PRN Insomnia            Vital Signs Last 24 Hrs  T(C): 36.8 (01 May 2025 05:40), Max: 36.8 (01 May 2025 05:40)  T(F): 98.3 (01 May 2025 05:40), Max: 98.3 (01 May 2025 05:40)  HR: 60 (01 May 2025 05:40) (60 - 74)  BP: 109/78 (01 May 2025 05:40) (109/78 - 153/54)  BP(mean): --  RR: 16 (01 May 2025 05:40) (16 - 18)  SpO2: 99% (01 May 2025 05:40) (97% - 99%)    Parameters below as of 01 May 2025 05:40  Patient On (Oxygen Delivery Method): room air                INTERPRETATION OF TELEMETRY: NSR at 60s mostly with Wenckebach and intermittent 2:1     ECG:        LABS:                        10.8   6.64  )-----------( 156      ( 01 May 2025 03:53 )             32.4     05-01    137  |  101  |  55[H]  ----------------------------<  59[L]  4.8   |  21[L]  |  2.30[H]    Ca    9.3      01 May 2025 03:53  Phos  4.6     05-01  Mg     1.80     05-01    TPro  6.6  /  Alb  3.3  /  TBili  <0.2  /  DBili  x   /  AST  71[H]  /  ALT  96[H]  /  AlkPhos  95  05-01          Urinalysis Basic - ( 01 May 2025 03:53 )    Color: x / Appearance: x / SG: x / pH: x  Gluc: 59 mg/dL / Ketone: x  / Bili: x / Urobili: x   Blood: x / Protein: x / Nitrite: x   Leuk Esterase: x / RBC: x / WBC x   Sq Epi: x / Non Sq Epi: x / Bacteria: x      I&O's Summary    30 Apr 2025 07:01  -  01 May 2025 07:00  --------------------------------------------------------  IN: 320 mL / OUT: 0 mL / NET: 320 mL      BNP  RADIOLOGY & ADDITIONAL STUDIES:      PHYSICAL EXAM:      GENERAL: In no apparent distress, well nourished, and hydrated.  HEART: Regular rate and rhythm; No murmurs, rubs, or gallops.  PULMONARY: Clear to auscultation and percussion.  No rales, wheezing, or rhonchi bilaterally.  ABDOMEN: Soft, Nontender, Nondistended; Bowel sounds present  EXTREMITIES:  Peripheral Pulses present, No clubbing, cyanosis, or edema  NEUROLOGICAL: Grossly nonfocal

## 2025-05-01 NOTE — PROGRESS NOTE ADULT - PROBLEM SELECTOR PLAN 5
Slurred speech for 3-4d, neuro exam unremarkable other than slurred speech. Likely toxic metabolic encephalopathy from infection  CTH and neck with angio - age related changes, paranasal sinus disease, mild to mod left proximal ICA stenosis, hypoplastic R vertebral artery, 3mm anterior communicating artery saccular aneurysm   - aspiration precautions   - passed dysphagia screen  - MRI head negative for acute infarction  - As per conversations with family, the patient's speech is showing signs of improvement

## 2025-05-01 NOTE — PROGRESS NOTE ADULT - ATTENDING COMMENTS
85 y.o. 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     Patient feels well, no complaints currently.     # Aflutter v afib   # Wenckebach with intermittent 2:1  [ ] Micra placement Fri   - Initial Bradycardia maybe related to hypothermia, electrolyte abnormalities, medications?   - EP following   - Cont Eliquis, D/C Metoprolol     # UTI c/b TME   - UCx: >100K Klebsiella   - Cont CTX, can transition to PO on d/c. Cont abx through 5/2.     # TAYLOR on CKD c/b metabolic acidosis and hyperkalemia   # Urinary retention   - S/p bicarb drip, off Lokelma now   - S/p Mosher placement c/b hematuria. No clots seen. hematuria maybe 2/2 UTI v traumatic insertion  - Hold Lisinopril   - Passed TOV  - Per daughter, Cr was 3.3 in January- current Cr may represent baseline?     # Slurred speech   - 2/2 TME v stroke- [place] given pt in afib, at risk for stroke   - MRI brain negative for acute stroke   - passed dysphagia screen  - speech therapy eval noted, they did find some deficiencies   - Improving     # Hypothermia, resolved   - Perhaps 2/2 infection v metabolic acidosis impairing thermoregulation   - AM cortisol, TSH wnl, CT without acute infarcts    Dispo: [facility], awaiting auth

## 2025-05-01 NOTE — PROGRESS NOTE ADULT - SUBJECTIVE AND OBJECTIVE BOX
Subjective/Overnight Events: No acute events overnight. Denies nausea, vomiting, diarrhea, constipation, cough, fever, chest pain, and cough.    MEDICATIONS  (STANDING):  apixaban 2.5 milliGRAM(s) Oral every 12 hours  atorvastatin 40 milliGRAM(s) Oral at bedtime  calcitriol   Capsule 0.25 MICROGram(s) Oral daily  cefTRIAXone   IVPB 1000 milliGRAM(s) IV Intermittent every 24 hours  clotrimazole 1% Cream 1 Application(s) Topical two times a day  dextrose 5%. 1000 milliLiter(s) (100 mL/Hr) IV Continuous <Continuous>  dextrose 5%. 1000 milliLiter(s) (50 mL/Hr) IV Continuous <Continuous>  dextrose 50% Injectable 25 Gram(s) IV Push once  dextrose 50% Injectable 12.5 Gram(s) IV Push once  dextrose 50% Injectable 25 Gram(s) IV Push once  glucagon  Injectable 1 milliGRAM(s) IntraMuscular once  influenza  Vaccine (HIGH DOSE) 0.5 milliLiter(s) IntraMuscular once  insulin lispro (ADMELOG) corrective regimen sliding scale   SubCutaneous three times a day before meals  insulin lispro (ADMELOG) corrective regimen sliding scale   SubCutaneous at bedtime  mupirocin 2% Nasal 1 Application(s) Both Nostrils every 12 hours  NIFEdipine XL 60 milliGRAM(s) Oral daily  sodium bicarbonate 1300 milliGRAM(s) Oral every 8 hours  tamsulosin 0.4 milliGRAM(s) Oral at bedtime    MEDICATIONS  (PRN):  acetaminophen     Tablet .. 650 milliGRAM(s) Oral every 6 hours PRN Temp greater or equal to 38C (100.4F), Mild Pain (1 - 3)  dextrose Oral Gel 15 Gram(s) Oral once PRN Blood Glucose LESS THAN 70 milliGRAM(s)/deciliter  melatonin 3 milliGRAM(s) Oral at bedtime PRN Insomnia      Objective:  Vital Signs Last 24 Hrs  T(C): 36.8 (01 May 2025 05:40), Max: 36.8 (01 May 2025 05:40)  T(F): 98.3 (01 May 2025 05:40), Max: 98.3 (01 May 2025 05:40)  HR: 60 (01 May 2025 05:40) (60 - 74)  BP: 109/78 (01 May 2025 05:40) (109/78 - 153/54)  BP(mean): --  RR: 16 (01 May 2025 05:40) (16 - 18)  SpO2: 99% (01 May 2025 05:40) (97% - 99%)    Parameters below as of 01 May 2025 05:40  Patient On (Oxygen Delivery Method): room air        I&O's Summary    30 Apr 2025 07:01  -  01 May 2025 07:00  --------------------------------------------------------  IN: 320 mL / OUT: 0 mL / NET: 320 mL        Physical Exam:  General: NAD, lying in bed  Cardiac: Normal S1, S2, no murmurs, rubs, or gallops appreciated  HEENT: NC/AT, clear sclera/conjunctiva, EOMI, MMM, hearing intact to voice  RESPIRATORY: Comfortable on RA, speaking in full sentences   Abdominal: Normoactive bowel sounds, no tenderness to light or deep palpation. No signs of organomegaly  : No longer with reynolds  NEURO: ambulated with PT, moving all extremities, speech understandable, Awake/conversant  Skin: Intact, no signs of bruising, ulcers, or open wounds.  Psych: AAOx3      Allergies  No Known Allergies    Intolerances      Labs:                          10.8   6.64  )-----------( 156      ( 01 May 2025 03:53 )             32.4                             11.0   7.24  )-----------( 164      ( 30 Apr 2025 03:50 )             32.6                             11.6   7.27  )-----------( 148      ( 29 Apr 2025 04:45 )             33.0                             10.5   6.62  )-----------( 131      ( 27 Apr 2025 05:59 )             29.9                           9.3    5.88  )-----------( 133      ( 26 Apr 2025 05:33 )             26.2         05-01    137  |  101  |  55[H]  ----------------------------<  59[L]  4.8   |  21[L]  |  2.30[H]    Ca    9.3      01 May 2025 03:53  Phos  4.6     05-01  Mg     1.80     05-01    TPro  6.6  /  Alb  3.3  /  TBili  <0.2  /  DBili  x   /  AST  71[H]  /  ALT  96[H]  /  AlkPhos  95  05-01        04-30    134[L]  |  99  |  56[H]  ----------------------------<  158[H]  4.8   |  20[L]  |  2.62[H]    Ca    9.6      30 Apr 2025 03:50  Phos  4.2     04-30  Mg     1.70     04-30    TPro  6.7  /  Alb  3.3  /  TBili  0.2  /  DBili  x   /  AST  45[H]  /  ALT  71[H]  /  AlkPhos  92  04-30 04-29    134[L]  |  99  |  48[H]  ----------------------------<  92  5.2   |  24  |  2.47[H]    Ca    9.5      29 Apr 2025 04:45  Phos  4.2     04-29  Mg     1.70     04-29    TPro  6.7  /  Alb  3.4  /  TBili  0.4  /  DBili  x   /  AST  33  /  ALT  69[H]  /  AlkPhos  98  04-29      04-27    138  |  104  |  49[H]  ----------------------------<  91  4.2   |  22  |  2.37[H]    Ca    9.3      27 Apr 2025 05:59  Phos  2.8     04-27  Mg     1.60     04-27    TPro  6.4  /  Alb  3.3  /  TBili  0.5  /  DBili  x   /  AST  43[H]  /  ALT  80[H]  /  AlkPhos  107  04-27      04-26    134[L]  |  107  |  62[H]  ----------------------------<  224[H]  5.4[H]   |  16[L]  |  2.72[H]    Ca    9.3      26 Apr 2025 11:27  Phos  3.2     04-26  Mg     1.80     04-26    TPro  6.2  /  Alb  3.3  /  TBili  0.3  /  DBili  x   /  AST  50[H]  /  ALT  84[H]  /  AlkPhos  105  04-26      04-26    134[L]  |  108[H]  |  67[H]  ----------------------------<  94  5.4[H]   |  15[L]  |  2.76[H]    Ca    9.4      26 Apr 2025 05:33  Phos  3.6     04-26  Mg     1.80     04-26    TPro  6.2  /  Alb  3.3  /  TBili  0.3  /  DBili  x   /  AST  50[H]  /  ALT  84[H]  /  AlkPhos  105  04-26    PT/INR - ( 25 Apr 2025 20:03 )   PT: 11.4 sec;   INR: 0.98 ratio         PTT - ( 25 Apr 2025 20:03 )  PTT:37.2 sec    VBG (most recent)  pH: 7.41  PCO2: 39        Urinalysis with Rflx Culture (collected 25 Apr 2025 23:56)    Culture - Urine (collected 25 Apr 2025 23:56)  Source: Clean Catch  Preliminary Report (27 Apr 2025 19:27):    >100,000 CFU/ml Gram Negative Rods    Culture - Blood (collected 25 Apr 2025 20:00)  Source: Blood Blood-Peripheral  Preliminary Report (28 Apr 2025 01:01):    No growth at 48 Hours    Culture - Blood (collected 25 Apr 2025 19:45)  Source: Blood Blood-Peripheral  Preliminary Report (28 Apr 2025 01:01):    No growth at 48 Hours        Imaging:    MRI Head:    IMPRESSION: No acute infarction.    --- End of Report ---      FINDINGS:    CT BRAIN:    VENTRICLES AND SULCI: Age appropriate involutional changes.  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 No mass effect, acute   hemorrhage, or midline shift.  There are periventricular and subcortical   white matter hypodensities, consistent with microvascular type changes.  EXTRA-AXIAL: No mass or fluid collection. Basal cisterns are normal in   appearance.  POST CONTRAST: No abnormal enhancing lesion.    VISUALIZED SINUSES:  Scattered mucosal thickening. Nonpneumatization   right frontal sinus, anatomic variant. Near complete opacification left   maxillary sinus. Moderate mucosal thickening ethmoid air cells with near   complete opacification extending into the frontal ethmoidal recess with   near complete opacification of the left frontal sinus. Moderate mucosal   thickening of the right frontal ethmoidal recess.  TYMPANOMASTOID CAVITIES:  Mastoid air cells clear. Nonspecific soft   tissue density left external auditory canal.  VISUALIZED ORBITS: Bilateral lens replacement.  CALVARIUM: Generalized osteopenia.      CTA NECK:    AORTIC ARCH AND VISUALIZED GREAT VESSELS: Atherosclerotic changes. Bovine   aortic arch.    RIGHT:  COMMON CAROTID ARTERY: Minimal atherosclerotic changes at the bifurcation   without significant stenosis.  INTERNAL CAROTID ARTERY: No significant stenosis based on NASCET criteria.  VERTEBRAL ARTERY: Hypoplastic.    LEFT:  COMMON CAROTID ARTERY: Minimal atherosclerotic changes at the bifurcation   without significant stenosis.  INTERNAL CAROTID ARTERY: Atherosclerotic changes at the origin with   resultant mild to moderate stenosis based on NASCET criteria.  VERTEBRAL ARTERY: Normal in course and caliber to the intracranial   circulation. Vertebral artery dominance.    VISUALIZED LUNGS: Bilateral dependent atelectasis. Right greater than   left apical emphysematous changes. Perifissural nodular opacities along   the right major and minor fissure measuring up to 1.0 cm (3-63),   indeterminate.    MISCELLANEOUS: None.    CAROTID STENOSIS REFERENCE: Percent (%) stenosis is expressed in terms of   NASCET Criteria. (NASCET = 100x1-(N/D)). N=greatest narrowing. D=normal   distal diameter - MILD = <50% stenosis. - MODERATE = 50-69% stenosis. -   SEVERE = 70-89% stenosis. - HAIRLINE/CRITICAL = 90-99% stenosis. -   OCCLUDED = 100%stenosis.      CTA HEAD:    INTERNAL CAROTID ARTERIES: Atherosclerotic changes of the bilateral   petrous through cavernous segments with mild stenosis of the right   cavernous segment. 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  MIDDLE CEREBRAL ARTERIES: No significant stenosis or occlusion.  POSTERIOR CEREBRAL ARTERIES: Fetal origin right PCA    DISTAL VERTEBRAL / BASILAR ARTERIES: Trace atherosclerotic changes of the   proximal V4 segments without significant stenosis.    VENOUS STRUCTURES: Inadequate venous phase opacification.    MISCELLANEOUS: No other vascular abnormality is seen.      IMPRESSION:    CT HEAD:  No evidence of acute intracranial pathology.  Chronic age involutional and microangiopathic ischemic changes.  Moderate to marked paranasal sinus disease as described.  Recommend ENT evaluation, treatment and follow-up.  Nonspecific soft tissue density left external auditory canal, correlate   otoscopy.    CTA NECK:  Mild to moderate left proximal internal carotid artery stenosis.  Left vertebral artery artery dominance, hypoplastic right vertebral   artery.    Indeterminate perifissural lung nodules as described.  Additional follow-up/follow-up as per Fleischner criteria.    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  Recommend neurosurgical consultation/follow-up.  Anatomic variants as described.    --- End of Report ---      Renal Bladder US:    IMPRESSION:  Mild bilateral increased renal echotexture suggesting medical renal   disease.    Marked urinary bladder wall thickening versus underdistention. Correlate   with urinalysis due to cystitis, other bladder pathology.    --- End of Report ---      Consultant notes reviewed: Renal  Team d/w renal acidosis improved, can c/w sodium bicarb, hyperK improved, can d/c lokelma   Subjective/Overnight Events: No acute events overnight. Denies nausea, vomiting, diarrhea, constipation, cough, fever, chest pain, and cough.    MEDICATIONS  (STANDING):  apixaban 2.5 milliGRAM(s) Oral every 12 hours  atorvastatin 40 milliGRAM(s) Oral at bedtime  calcitriol   Capsule 0.25 MICROGram(s) Oral daily  cefTRIAXone   IVPB 1000 milliGRAM(s) IV Intermittent every 24 hours  clotrimazole 1% Cream 1 Application(s) Topical two times a day  dextrose 5%. 1000 milliLiter(s) (100 mL/Hr) IV Continuous <Continuous>  dextrose 5%. 1000 milliLiter(s) (50 mL/Hr) IV Continuous <Continuous>  dextrose 50% Injectable 25 Gram(s) IV Push once  dextrose 50% Injectable 12.5 Gram(s) IV Push once  dextrose 50% Injectable 25 Gram(s) IV Push once  glucagon  Injectable 1 milliGRAM(s) IntraMuscular once  influenza  Vaccine (HIGH DOSE) 0.5 milliLiter(s) IntraMuscular once  insulin lispro (ADMELOG) corrective regimen sliding scale   SubCutaneous three times a day before meals  insulin lispro (ADMELOG) corrective regimen sliding scale   SubCutaneous at bedtime  mupirocin 2% Nasal 1 Application(s) Both Nostrils every 12 hours  NIFEdipine XL 60 milliGRAM(s) Oral daily  sodium bicarbonate 1300 milliGRAM(s) Oral every 8 hours  tamsulosin 0.4 milliGRAM(s) Oral at bedtime    MEDICATIONS  (PRN):  acetaminophen     Tablet .. 650 milliGRAM(s) Oral every 6 hours PRN Temp greater or equal to 38C (100.4F), Mild Pain (1 - 3)  dextrose Oral Gel 15 Gram(s) Oral once PRN Blood Glucose LESS THAN 70 milliGRAM(s)/deciliter  melatonin 3 milliGRAM(s) Oral at bedtime PRN Insomnia      Objective:  Vital Signs Last 24 Hrs  T(C): 36.8 (01 May 2025 05:40), Max: 36.8 (01 May 2025 05:40)  T(F): 98.3 (01 May 2025 05:40), Max: 98.3 (01 May 2025 05:40)  HR: 60 (01 May 2025 05:40) (60 - 74)  BP: 109/78 (01 May 2025 05:40) (109/78 - 153/54)  BP(mean): --  RR: 16 (01 May 2025 05:40) (16 - 18)  SpO2: 99% (01 May 2025 05:40) (97% - 99%)    Parameters below as of 01 May 2025 05:40  Patient On (Oxygen Delivery Method): room air        I&O's Summary    30 Apr 2025 07:01  -  01 May 2025 07:00  --------------------------------------------------------  IN: 320 mL / OUT: 0 mL / NET: 320 mL        Physical Exam:  General: NAD, lying in bed  Cardiac: Normal S1, S2, no murmurs, rubs, or gallops appreciated  HEENT: NC/AT, clear sclera/conjunctiva, EOMI, MMM, hearing intact to voice  RESPIRATORY: Comfortable on RA, speaking in full sentences   Abdominal: Normoactive bowel sounds, no tenderness to light or deep palpation. No signs of organomegaly  : No longer with reynolds  NEURO: ambulated with PT, moving all extremities, speech understandable, Awake/conversant  Skin: Intact, no signs of bruising, ulcers, or open wounds.  Psych: AAOx3      Allergies  No Known Allergies    Intolerances      Labs:                          10.8   6.64  )-----------( 156      ( 01 May 2025 03:53 )             32.4                             11.0   7.24  )-----------( 164      ( 30 Apr 2025 03:50 )             32.6                             11.6   7.27  )-----------( 148      ( 29 Apr 2025 04:45 )             33.0                             10.5   6.62  )-----------( 131      ( 27 Apr 2025 05:59 )             29.9                           9.3    5.88  )-----------( 133      ( 26 Apr 2025 05:33 )             26.2         05-01    137  |  101  |  55[H]  ----------------------------<  59[L]  4.8   |  21[L]  |  2.30[H]    Ca    9.3      01 May 2025 03:53  Phos  4.6     05-01  Mg     1.80     05-01    TPro  6.6  /  Alb  3.3  /  TBili  <0.2  /  DBili  x   /  AST  71[H]  /  ALT  96[H]  /  AlkPhos  95  05-01        04-30    134[L]  |  99  |  56[H]  ----------------------------<  158[H]  4.8   |  20[L]  |  2.62[H]    Ca    9.6      30 Apr 2025 03:50  Phos  4.2     04-30  Mg     1.70     04-30    TPro  6.7  /  Alb  3.3  /  TBili  0.2  /  DBili  x   /  AST  45[H]  /  ALT  71[H]  /  AlkPhos  92  04-30 04-29    134[L]  |  99  |  48[H]  ----------------------------<  92  5.2   |  24  |  2.47[H]    Ca    9.5      29 Apr 2025 04:45  Phos  4.2     04-29  Mg     1.70     04-29    TPro  6.7  /  Alb  3.4  /  TBili  0.4  /  DBili  x   /  AST  33  /  ALT  69[H]  /  AlkPhos  98  04-29      04-27    138  |  104  |  49[H]  ----------------------------<  91  4.2   |  22  |  2.37[H]    Ca    9.3      27 Apr 2025 05:59  Phos  2.8     04-27  Mg     1.60     04-27    TPro  6.4  /  Alb  3.3  /  TBili  0.5  /  DBili  x   /  AST  43[H]  /  ALT  80[H]  /  AlkPhos  107  04-27      04-26    134[L]  |  107  |  62[H]  ----------------------------<  224[H]  5.4[H]   |  16[L]  |  2.72[H]    Ca    9.3      26 Apr 2025 11:27  Phos  3.2     04-26  Mg     1.80     04-26    TPro  6.2  /  Alb  3.3  /  TBili  0.3  /  DBili  x   /  AST  50[H]  /  ALT  84[H]  /  AlkPhos  105  04-26      04-26    134[L]  |  108[H]  |  67[H]  ----------------------------<  94  5.4[H]   |  15[L]  |  2.76[H]    Ca    9.4      26 Apr 2025 05:33  Phos  3.6     04-26  Mg     1.80     04-26    TPro  6.2  /  Alb  3.3  /  TBili  0.3  /  DBili  x   /  AST  50[H]  /  ALT  84[H]  /  AlkPhos  105  04-26    PT/INR - ( 25 Apr 2025 20:03 )   PT: 11.4 sec;   INR: 0.98 ratio         PTT - ( 25 Apr 2025 20:03 )  PTT:37.2 sec    VBG (most recent)  pH: 7.41  PCO2: 39        Urinalysis with Rflx Culture (collected 25 Apr 2025 23:56)    Culture - Urine (collected 25 Apr 2025 23:56)  Source: Clean Catch  Preliminary Report (27 Apr 2025 19:27):    >100,000 CFU/ml Gram Negative Rods    Culture - Blood (collected 25 Apr 2025 20:00)  Source: Blood Blood-Peripheral  Preliminary Report (28 Apr 2025 01:01):    No growth at 48 Hours    Culture - Blood (collected 25 Apr 2025 19:45)  Source: Blood Blood-Peripheral  Preliminary Report (28 Apr 2025 01:01):    No growth at 48 Hours        Imaging:    TTE:     CONCLUSIONS:      1. The left ventricular cavity is normal in size. Left ventricular wall thickness is normal. Left ventricular systolic function is mildly decreased with an ejection fraction visually estimated at 45 to 50%. There are regional wall motion abnormalities present. Hypokinesis of the basal to mid inferior wall and basal inferolateral wall. There is mild (grade 1) left ventricular diastolic dysfunction.   2. Normal right ventricular cavity size and normal right ventricular systolic function. Tricuspid annular plane systolic excursion (TAPSE) is 2.6 cm (normal >=1.7 cm).   3. Structurally normal mitral valve with normal leaflet excursion. There is calcification of the mitral valve annulus. There is mild mitral regurgitation.   4. No prior echocardiogram is available for comparison.      MRI Head:    IMPRESSION: No acute infarction.    --- End of Report ---      FINDINGS:    CT BRAIN:    VENTRICLES AND SULCI: Age appropriate involutional changes.  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 No mass effect, acute   hemorrhage, or midline shift.  There are periventricular and subcortical   white matter hypodensities, consistent with microvascular type changes.  EXTRA-AXIAL: No mass or fluid collection. Basal cisterns are normal in   appearance.  POST CONTRAST: No abnormal enhancing lesion.    VISUALIZED SINUSES:  Scattered mucosal thickening. Nonpneumatization   right frontal sinus, anatomic variant. Near complete opacification left   maxillary sinus. Moderate mucosal thickening ethmoid air cells with near   complete opacification extending into the frontal ethmoidal recess with   near complete opacification of the left frontal sinus. Moderate mucosal   thickening of the right frontal ethmoidal recess.  TYMPANOMASTOID CAVITIES:  Mastoid air cells clear. Nonspecific soft   tissue density left external auditory canal.  VISUALIZED ORBITS: Bilateral lens replacement.  CALVARIUM: Generalized osteopenia.      CTA NECK:    AORTIC ARCH AND VISUALIZED GREAT VESSELS: Atherosclerotic changes. Bovine   aortic arch.    RIGHT:  COMMON CAROTID ARTERY: Minimal atherosclerotic changes at the bifurcation   without significant stenosis.  INTERNAL CAROTID ARTERY: No significant stenosis based on NASCET criteria.  VERTEBRAL ARTERY: Hypoplastic.    LEFT:  COMMON CAROTID ARTERY: Minimal atherosclerotic changes at the bifurcation   without significant stenosis.  INTERNAL CAROTID ARTERY: Atherosclerotic changes at the origin with   resultant mild to moderate stenosis based on NASCET criteria.  VERTEBRAL ARTERY: Normal in course and caliber to the intracranial   circulation. Vertebral artery dominance.    VISUALIZED LUNGS: Bilateral dependent atelectasis. Right greater than   left apical emphysematous changes. Perifissural nodular opacities along   the right major and minor fissure measuring up to 1.0 cm (3-63),   indeterminate.    MISCELLANEOUS: None.    CAROTID STENOSIS REFERENCE: Percent (%) stenosis is expressed in terms of   NASCET Criteria. (NASCET = 100x1-(N/D)). N=greatest narrowing. D=normal   distal diameter - MILD = <50% stenosis. - MODERATE = 50-69% stenosis. -   SEVERE = 70-89% stenosis. - HAIRLINE/CRITICAL = 90-99% stenosis. -   OCCLUDED = 100%stenosis.      CTA HEAD:    INTERNAL CAROTID ARTERIES: Atherosclerotic changes of the bilateral   petrous through cavernous segments with mild stenosis of the right   cavernous segment. 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  MIDDLE CEREBRAL ARTERIES: No significant stenosis or occlusion.  POSTERIOR CEREBRAL ARTERIES: Fetal origin right PCA    DISTAL VERTEBRAL / BASILAR ARTERIES: Trace atherosclerotic changes of the   proximal V4 segments without significant stenosis.    VENOUS STRUCTURES: Inadequate venous phase opacification.    MISCELLANEOUS: No other vascular abnormality is seen.      IMPRESSION:    CT HEAD:  No evidence of acute intracranial pathology.  Chronic age involutional and microangiopathic ischemic changes.  Moderate to marked paranasal sinus disease as described.  Recommend ENT evaluation, treatment and follow-up.  Nonspecific soft tissue density left external auditory canal, correlate   otoscopy.    CTA NECK:  Mild to moderate left proximal internal carotid artery stenosis.  Left vertebral artery artery dominance, hypoplastic right vertebral   artery.    Indeterminate perifissural lung nodules as described.  Additional follow-up/follow-up as per Fleischner criteria.    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  Recommend neurosurgical consultation/follow-up.  Anatomic variants as described.    --- End of Report ---      Renal Bladder US:    IMPRESSION:  Mild bilateral increased renal echotexture suggesting medical renal   disease.    Marked urinary bladder wall thickening versus underdistention. Correlate   with urinalysis due to cystitis, other bladder pathology.    --- End of Report ---      Consultant notes reviewed: Renal  Team d/w renal acidosis improved, can c/w sodium bicarb, hyperK improved, can d/c lokelma

## 2025-05-01 NOTE — PROGRESS NOTE ADULT - PROBLEM SELECTOR PLAN 6
hx of CABG 2019, EKG reviewed w/o ST changes or TWI   - c/w home atorvastatin 40mg  - Discontinue Aspirin as the patient has stable CAD and is over 1 year since CABG (AFIRE trial).

## 2025-05-01 NOTE — PROGRESS NOTE ADULT - PROBLEM SELECTOR PLAN 4
HR 40-50s,  systolic, EKG w/ 1st degree AV-block, tele with occasional sinus pauses, longest being 2.8s   Etiology possible from infection, hyperkalemia (BRASH syndrome?) exacerbated by infection   - TSH 3.3 wnl   - held AVN blocking agents - metoprolol succinate 50mg   - Was f[place]erick to the 30s today  - EP recs appreciated  - PPM TBD; patient is clinically optimized for placement HR 40-50s,  systolic, EKG w/ 1st degree AV-block, tele with occasional sinus pauses, longest being 2.8s   Etiology possible from infection, hyperkalemia (BRASH syndrome?) exacerbated by infection   - TSH 3.3 wnl   - held AVN blocking agents - metoprolol succinate 50mg   - Was frederick to the 30s today  - EP recs appreciated  - PPM tomorrow, NPO at midnight  - TTE showing mildly reduced EF, regional wall motion abnormalities, hypokinesis of the basal to mid inferior wall and basal inferolateral wall

## 2025-05-02 LAB
ALBUMIN SERPL ELPH-MCNC: 3.5 G/DL — SIGNIFICANT CHANGE UP (ref 3.3–5)
ALP SERPL-CCNC: 106 U/L — SIGNIFICANT CHANGE UP (ref 40–120)
ALT FLD-CCNC: 111 U/L — HIGH (ref 4–41)
ANION GAP SERPL CALC-SCNC: 15 MMOL/L — HIGH (ref 7–14)
APTT BLD: 36.2 SEC — SIGNIFICANT CHANGE UP (ref 26.1–36.8)
AST SERPL-CCNC: 75 U/L — HIGH (ref 4–40)
BILIRUB SERPL-MCNC: <0.2 MG/DL — SIGNIFICANT CHANGE UP (ref 0.2–1.2)
BLD GP AB SCN SERPL QL: NEGATIVE — SIGNIFICANT CHANGE UP
BUN SERPL-MCNC: 55 MG/DL — HIGH (ref 7–23)
CALCIUM SERPL-MCNC: 9.2 MG/DL — SIGNIFICANT CHANGE UP (ref 8.4–10.5)
CHLORIDE SERPL-SCNC: 100 MMOL/L — SIGNIFICANT CHANGE UP (ref 98–107)
CO2 SERPL-SCNC: 21 MMOL/L — LOW (ref 22–31)
CREAT SERPL-MCNC: 2.28 MG/DL — HIGH (ref 0.5–1.3)
EGFR: 28 ML/MIN/1.73M2 — LOW
EGFR: 28 ML/MIN/1.73M2 — LOW
GLUCOSE BLDC GLUCOMTR-MCNC: 104 MG/DL — HIGH (ref 70–99)
GLUCOSE BLDC GLUCOMTR-MCNC: 133 MG/DL — HIGH (ref 70–99)
GLUCOSE BLDC GLUCOMTR-MCNC: 204 MG/DL — HIGH (ref 70–99)
GLUCOSE BLDC GLUCOMTR-MCNC: 206 MG/DL — HIGH (ref 70–99)
GLUCOSE SERPL-MCNC: 97 MG/DL — SIGNIFICANT CHANGE UP (ref 70–99)
HCT VFR BLD CALC: 31.9 % — LOW (ref 39–50)
HCT VFR BLD CALC: 36.3 % — LOW (ref 39–50)
HGB BLD-MCNC: 11 G/DL — LOW (ref 13–17)
HGB BLD-MCNC: 12.3 G/DL — LOW (ref 13–17)
INR BLD: 1.08 RATIO — SIGNIFICANT CHANGE UP (ref 0.85–1.16)
MAGNESIUM SERPL-MCNC: 1.7 MG/DL — SIGNIFICANT CHANGE UP (ref 1.6–2.6)
MCHC RBC-ENTMCNC: 29.9 PG — SIGNIFICANT CHANGE UP (ref 27–34)
MCHC RBC-ENTMCNC: 29.9 PG — SIGNIFICANT CHANGE UP (ref 27–34)
MCHC RBC-ENTMCNC: 33.9 G/DL — SIGNIFICANT CHANGE UP (ref 32–36)
MCHC RBC-ENTMCNC: 34.5 G/DL — SIGNIFICANT CHANGE UP (ref 32–36)
MCV RBC AUTO: 86.7 FL — SIGNIFICANT CHANGE UP (ref 80–100)
MCV RBC AUTO: 88.1 FL — SIGNIFICANT CHANGE UP (ref 80–100)
NRBC # BLD AUTO: 0 K/UL — SIGNIFICANT CHANGE UP (ref 0–0)
NRBC # BLD AUTO: 0 K/UL — SIGNIFICANT CHANGE UP (ref 0–0)
NRBC # FLD: 0 K/UL — SIGNIFICANT CHANGE UP (ref 0–0)
NRBC # FLD: 0 K/UL — SIGNIFICANT CHANGE UP (ref 0–0)
NRBC BLD AUTO-RTO: 0 /100 WBCS — SIGNIFICANT CHANGE UP (ref 0–0)
NRBC BLD AUTO-RTO: 0 /100 WBCS — SIGNIFICANT CHANGE UP (ref 0–0)
PHOSPHATE SERPL-MCNC: 3.9 MG/DL — SIGNIFICANT CHANGE UP (ref 2.5–4.5)
PLATELET # BLD AUTO: 151 K/UL — SIGNIFICANT CHANGE UP (ref 150–400)
PLATELET # BLD AUTO: 173 K/UL — SIGNIFICANT CHANGE UP (ref 150–400)
POTASSIUM SERPL-MCNC: 5.3 MMOL/L — SIGNIFICANT CHANGE UP (ref 3.5–5.3)
POTASSIUM SERPL-SCNC: 5.3 MMOL/L — SIGNIFICANT CHANGE UP (ref 3.5–5.3)
PROT SERPL-MCNC: 6.8 G/DL — SIGNIFICANT CHANGE UP (ref 6–8.3)
PROTHROM AB SERPL-ACNC: 12.5 SEC — SIGNIFICANT CHANGE UP (ref 9.9–13.4)
RBC # BLD: 3.68 M/UL — LOW (ref 4.2–5.8)
RBC # BLD: 4.12 M/UL — LOW (ref 4.2–5.8)
RBC # FLD: 13.2 % — SIGNIFICANT CHANGE UP (ref 10.3–14.5)
RBC # FLD: 13.4 % — SIGNIFICANT CHANGE UP (ref 10.3–14.5)
RH IG SCN BLD-IMP: POSITIVE — SIGNIFICANT CHANGE UP
SODIUM SERPL-SCNC: 136 MMOL/L — SIGNIFICANT CHANGE UP (ref 135–145)
WBC # BLD: 8.9 K/UL — SIGNIFICANT CHANGE UP (ref 3.8–10.5)
WBC # BLD: 9.91 K/UL — SIGNIFICANT CHANGE UP (ref 3.8–10.5)
WBC # FLD AUTO: 8.9 K/UL — SIGNIFICANT CHANGE UP (ref 3.8–10.5)
WBC # FLD AUTO: 9.91 K/UL — SIGNIFICANT CHANGE UP (ref 3.8–10.5)

## 2025-05-02 PROCEDURE — 99231 SBSQ HOSP IP/OBS SF/LOW 25: CPT

## 2025-05-02 PROCEDURE — 99232 SBSQ HOSP IP/OBS MODERATE 35: CPT | Mod: GC

## 2025-05-02 PROCEDURE — 99232 SBSQ HOSP IP/OBS MODERATE 35: CPT

## 2025-05-02 RX ADMIN — Medication 1300 MILLIGRAM(S): at 05:20

## 2025-05-02 RX ADMIN — ATORVASTATIN CALCIUM 40 MILLIGRAM(S): 80 TABLET, FILM COATED ORAL at 22:07

## 2025-05-02 RX ADMIN — APIXABAN 2.5 MILLIGRAM(S): 2.5 TABLET, FILM COATED ORAL at 17:51

## 2025-05-02 RX ADMIN — INSULIN LISPRO 2: 100 INJECTION, SOLUTION INTRAVENOUS; SUBCUTANEOUS at 12:22

## 2025-05-02 RX ADMIN — TAMSULOSIN HYDROCHLORIDE 0.4 MILLIGRAM(S): 0.4 CAPSULE ORAL at 22:08

## 2025-05-02 RX ADMIN — CALCITRIOL 0.25 MICROGRAM(S): 0.5 CAPSULE, GELATIN COATED ORAL at 11:28

## 2025-05-02 RX ADMIN — MUPIROCIN CALCIUM 1 APPLICATION(S): 20 CREAM TOPICAL at 11:28

## 2025-05-02 RX ADMIN — Medication 1 APPLICATION(S): at 11:28

## 2025-05-02 RX ADMIN — CEFTRIAXONE 100 MILLIGRAM(S): 500 INJECTION, POWDER, FOR SOLUTION INTRAMUSCULAR; INTRAVENOUS at 11:29

## 2025-05-02 RX ADMIN — CLOTRIMAZOLE 1 APPLICATION(S): 1 CREAM TOPICAL at 05:20

## 2025-05-02 RX ADMIN — Medication 1300 MILLIGRAM(S): at 22:07

## 2025-05-02 RX ADMIN — CLOTRIMAZOLE 1 APPLICATION(S): 1 CREAM TOPICAL at 17:51

## 2025-05-02 RX ADMIN — Medication 60 MILLIGRAM(S): at 05:21

## 2025-05-02 RX ADMIN — Medication 1300 MILLIGRAM(S): at 12:54

## 2025-05-02 RX ADMIN — APIXABAN 2.5 MILLIGRAM(S): 2.5 TABLET, FILM COATED ORAL at 06:37

## 2025-05-02 RX ADMIN — MUPIROCIN CALCIUM 1 APPLICATION(S): 20 CREAM TOPICAL at 22:09

## 2025-05-02 NOTE — PROGRESS NOTE ADULT - PROBLEM SELECTOR PLAN 4
HR 40-50s,  systolic, EKG w/ 1st degree AV-block, tele with occasional sinus pauses, longest being 2.8s   Etiology possible from infection, hyperkalemia (BRASH syndrome?) exacerbated by infection   - TSH 3.3 wnl   - held AVN blocking agents - metoprolol succinate 50mg   - Was frederick to the 30s today  - EP recs appreciated  - PPM today  - TTE showing mildly reduced EF, regional wall motion abnormalities, hypokinesis of the basal to mid inferior wall and basal inferolateral wall

## 2025-05-02 NOTE — PROGRESS NOTE ADULT - PROBLEM SELECTOR PLAN 1
Pt w/ TAYLOR on CKD in the setting of sepsis/UTI. Underlying CKD iso longstanding DM/HTN. [facility]ie LEE reviewed, unclear baseline as no labs since 2019. Scr trend 1.4-2.4 during multiple hospitalizations in 2019. On admission Scr elevated at 2.76 (4/25), and remains elevated/stable at 2.28 today (5/2). UOP is not documented. UA w/ proteinuria, hematuria (likely iso reynolds insertion) and positive for UTI. UPCR 0.6g. Kidney US w/o hydronephrosis. Obtain outpt records. Monitor labs and urine output. Avoid nephrotoxins. Dose medications as per eGFR.

## 2025-05-02 NOTE — PROGRESS NOTE ADULT - PROBLEM SELECTOR PLAN 1
Slurred speech at home, urinary retention w/o dysuria, Hypothermic 92F on admission. likely urinary source   - u/a - protein, blood, LE, pyuria, rvp negative   - CXR - w/o consolidations   - was on zoysn (renally dosed), hold off vanc given likely urinary source   - Ucx growing Klebsiella Pneumoniae and Bcx (NGx24)  - Ceftriaxone 1g q24h (5/2)  - TOV passed    - Patient became hypothermic overnight (5/2); asymptomatic; will continue to monitor and trend temps

## 2025-05-02 NOTE — PROGRESS NOTE ADULT - PROBLEM SELECTOR PLAN 9
Home regimen : metop succinate 50, nifedipine 60mg qd,   - Hold Metoprolol Succinate  - Restart Nifedipine 60mg  - BPs have been stable

## 2025-05-02 NOTE — PROGRESS NOTE ADULT - ASSESSMENT
85yoM w/ PMHx CAD s/p CABG, afib/aflutter (AC self discontinued), CKD stage III, HTN, HLD, DMII initially presented on 4/23 with slurred speech and AMS found to be hypothermic 92F and hyperkalemic 5.8 admitted for sepsis 2/2 UTI now on abx.  EPS consulted for bradycardia HR 40-50s and sinus pauses.  TSH 3.3.  Patient takes Toprol 50mg PO daily. Patient denies any chest pain, palpitations, lightheadedness, dizziness, syncope or near syncope.      ## Bradycardia  ## TAYLOR on CKD  ## UTI  ## Toxic metabolic encephalopathy      -- Telemetry reviewed: overnight NSR with Paroxysmal slow AF   Given findings of intermittent 2:1 HB, may benefit from leadless PPM with ILR explant simultaneously  -- Overnight hypothermic 94.5 and trend Temp  -- Plan for pacemaker when medical optimized  -- Keep T&S X2 active  -- TTE : EF 45-50%  -- Hold AV agustín blockers   --CHADsVASC: 5 - Eliquis 2.5mg PO BID   --Continue care per primary team

## 2025-05-02 NOTE — PROGRESS NOTE ADULT - PROBLEM SELECTOR PLAN 8
K of 5.8 on admission possibly 2/2 to TAYLOR/CKD. Given insulin / D50 / calcium   - CTM K (wnl today)  - if persistently high can consider a dose of IV diuretic   - monitor on tele  - can consider Lokelma today

## 2025-05-02 NOTE — PROGRESS NOTE ADULT - PROBLEM SELECTOR PLAN 3
Admitted Creatine 2.76. From speaking with patient's family and looking through health portal, patient's last Cr was 3.3    - urinary retention- Mosher placed in the ED, w/ 1L output   - u/a - protein, blood, LE, pyuria, rvp negative   #NAGMA - 7.28/36/17  - c/w home tamsulosin 0.1mcg  - Monitor labs and urine output. Avoid NSAIDs, ACEI/ARBS, RCA and nephrotoxins. Dose medications as per eGFR  - Hold home meds : lisinopril 2.5mg, Farxiga 5mg, furosemide 20mg BID   - c/w home calcitriol for BMD  - Continue with sodium bicarb 1300mg TID  - Renal bladder US showing signs of renal disease and thickening of urinary bladder wall  - Potassium high normal today (5/2), might need to start Lokelma

## 2025-05-02 NOTE — PROGRESS NOTE ADULT - SUBJECTIVE AND OBJECTIVE BOX
[facility] Division of Kidney Diseases & Hypertension  FOLLOW UP NOTE  [phone]--------------------------------------------------------------------------------    Chief Complaint: TAYLOR on CKD, hyperkalemia, acidosis     24 hour events/subjective: Pt seen and evaluated at bedside this morning. Reports feeling well, endorses no complaints. Denies any headaches, nausea, vomiting, fevers/chills, chest pain, palpitations, SOB, abdominal pain, and leg swelling.    PAST HISTORY  --------------------------------------------------------------------------------  No significant changes to PMH, PSH, FHx, SHx, unless otherwise noted    ALLERGIES & MEDICATIONS  --------------------------------------------------------------------------------  Allergies    No Known Allergies    Intolerances      Standing Inpatient Medications  apixaban 2.5 milliGRAM(s) Oral every 12 hours  atorvastatin 40 milliGRAM(s) Oral at bedtime  calcitriol   Capsule 0.25 MICROGram(s) Oral daily  chlorhexidine 2% Cloths 1 Application(s) Topical daily  clotrimazole 1% Cream 1 Application(s) Topical two times a day  dextrose 5%. 1000 milliLiter(s) IV Continuous <Continuous>  dextrose 5%. 1000 milliLiter(s) IV Continuous <Continuous>  dextrose 50% Injectable 25 Gram(s) IV Push once  dextrose 50% Injectable 12.5 Gram(s) IV Push once  dextrose 50% Injectable 25 Gram(s) IV Push once  glucagon  Injectable 1 milliGRAM(s) IntraMuscular once  influenza  Vaccine (HIGH DOSE) 0.5 milliLiter(s) IntraMuscular once  insulin lispro (ADMELOG) corrective regimen sliding scale   SubCutaneous three times a day before meals  insulin lispro (ADMELOG) corrective regimen sliding scale   SubCutaneous at bedtime  mupirocin 2% Nasal 1 Application(s) Both Nostrils every 12 hours  NIFEdipine XL 60 milliGRAM(s) Oral daily  sodium bicarbonate 1300 milliGRAM(s) Oral every 8 hours  tamsulosin 0.4 milliGRAM(s) Oral at bedtime    PRN Inpatient Medications  acetaminophen     Tablet .. 650 milliGRAM(s) Oral every 6 hours PRN  dextrose Oral Gel 15 Gram(s) Oral once PRN  melatonin 3 milliGRAM(s) Oral at bedtime PRN      REVIEW OF SYSTEMS  --------------------------------------------------------------------------------  Gen: No fevers/chills  Skin: No rashes  Head/Eyes/Ears: No HA  Respiratory: No SOB, cough  CV: No CP  GI: No abdominal pain, diarrhea, N/V  : No dysuria, hematuria  MSK: No edema    All other systems were reviewed and are negative, except as noted.    VITALS/PHYSICAL EXAM  --------------------------------------------------------------------------------  T(C): 36.5 (05-02-25 @ 13:15), Max: 37 (05-01-25 @ 17:30)  HR: 95 (05-02-25 @ 13:15) (64 - 95)  BP: 118/60 (05-02-25 @ 13:15) (118/52 - 134/59)  RR: 17 (05-02-25 @ 13:15) (16 - 17)  SpO2: 100% (05-02-25 @ 13:15) (97% - 100%)  Wt(kg): --    05-02-25 @ 07:01  -  05-02-25 @ 14:39  --------------------------------------------------------  IN: 330 mL / OUT: 0 mL / NET: 330 mL    Physical Exam:  Gen: NAD  HEENT: MMM  Pulm: CTA B/L  CV: S1S2  Abd: Soft, +BS   Ext: No LE edema B/L  Neuro: Awake and alert  Skin: Warm and dry    LABS/STUDIES  --------------------------------------------------------------------------------              11.0   8.90  >-----------<  151      [05-02-25 @ 04:42]              31.9     136  |  100  |  55  ----------------------------<  97      [05-02-25 @ 04:42]  5.3   |  21  |  2.28        Ca     9.2     [05-02-25 @ 04:42]      Mg     1.70     [05-02-25 @ 04:42]      Phos  3.9     [05-02-25 @ 04:42]    TPro  6.8  /  Alb  3.5  /  TBili  <0.2  /  DBili  x   /  AST  75  /  ALT  111  /  AlkPhos  106  [05-02-25 @ 04:42]    PT/INR: PT 12.5 , INR 1.08       [05-02-25 @ 04:42]  PTT: 36.2       [05-02-25 @ 04:42]    Creatinine Trend:  SCr 2.28 [05-02 @ 04:42]  SCr 2.30 [05-01 @ 03:53]  SCr 2.62 [04-30 @ 03:50]  SCr 2.47 [04-29 @ 04:45]  SCr 2.26 [04-28 @ 06:47]    Urine Creatinine 49      [04-26-25 @ 04:12]  Urine Protein 31      [04-26-25 @ 04:12]  Urine Sodium 45      [04-26-25 @ 04:12]  Urine Urea Nitrogen 390.1      [04-26-25 @ 04:12]  Urine Potassium 21.2      [04-26-25 @ 04:12]  Urine Osmolality 356      [04-26-25 @ 04:12]    TSH 3.31      [04-25-25 @ 20:24]

## 2025-05-02 NOTE — PROGRESS NOTE ADULT - PROBLEM SELECTOR PLAN 7
Patient with hx of Afib s/p CABG. As per daughter, the patient is no longer in Afib and is no on AC. There is a chart note from 2019 related to this.   - CTM on tele for signs of Afib/Aflutter  - [id number] score 4  - Spoke with family regarding AC and they are ok with it. Started Eliquis 2.5mg BID

## 2025-05-02 NOTE — PROGRESS NOTE ADULT - PROBLEM SELECTOR PLAN 3
Pt w/ metabolic acidosis iso renal failure. Now improved, SCO2 stable at 21 today. Continue sodium bicarb 1300 tid. Monitor labs.     If you have any questions, please feel free to contact me  Celeste Barron  Nephrology  [phone] / [employer] Teams(Preferred)  (After 4 pm or on weekends please page the on-call fellow)

## 2025-05-02 NOTE — PROGRESS NOTE ADULT - SUBJECTIVE AND OBJECTIVE BOX
Subjective/Overnight Events: Overnight, the patient became mildly hypothermic (94.5). He was asymptomatic and was placed on a bear hugger. Patient seen and examined by me this morning, denies nausea, vomiting, diarrhea, constipation, cough, fever, chest pain, and cough.    MEDICATIONS  (STANDING):  apixaban 2.5 milliGRAM(s) Oral every 12 hours  atorvastatin 40 milliGRAM(s) Oral at bedtime  calcitriol   Capsule 0.25 MICROGram(s) Oral daily  cefTRIAXone   IVPB 1000 milliGRAM(s) IV Intermittent every 24 hours  clotrimazole 1% Cream 1 Application(s) Topical two times a day  dextrose 5%. 1000 milliLiter(s) (100 mL/Hr) IV Continuous <Continuous>  dextrose 5%. 1000 milliLiter(s) (50 mL/Hr) IV Continuous <Continuous>  dextrose 50% Injectable 25 Gram(s) IV Push once  dextrose 50% Injectable 12.5 Gram(s) IV Push once  dextrose 50% Injectable 25 Gram(s) IV Push once  glucagon  Injectable 1 milliGRAM(s) IntraMuscular once  influenza  Vaccine (HIGH DOSE) 0.5 milliLiter(s) IntraMuscular once  insulin lispro (ADMELOG) corrective regimen sliding scale   SubCutaneous three times a day before meals  insulin lispro (ADMELOG) corrective regimen sliding scale   SubCutaneous at bedtime  mupirocin 2% Nasal 1 Application(s) Both Nostrils every 12 hours  NIFEdipine XL 60 milliGRAM(s) Oral daily  sodium bicarbonate 1300 milliGRAM(s) Oral every 8 hours  tamsulosin 0.4 milliGRAM(s) Oral at bedtime    MEDICATIONS  (PRN):  acetaminophen     Tablet .. 650 milliGRAM(s) Oral every 6 hours PRN Temp greater or equal to 38C (100.4F), Mild Pain (1 - 3)  dextrose Oral Gel 15 Gram(s) Oral once PRN Blood Glucose LESS THAN 70 milliGRAM(s)/deciliter  melatonin 3 milliGRAM(s) Oral at bedtime PRN Insomnia      Objective:  Vital Signs Last 24 Hrs  T(C): 36.6 (02 May 2025 05:20), Max: 37 (01 May 2025 17:30)  T(F): 97.9 (02 May 2025 05:20), Max: 98.6 (01 May 2025 17:30)  HR: 86 (02 May 2025 05:20) (64 - 86)  BP: 122/51 (02 May 2025 05:20) (108/50 - 134/59)  BP(mean): --  RR: 17 (02 May 2025 05:20) (16 - 17)  SpO2: 97% (02 May 2025 05:20) (97% - 99%)    Parameters below as of 02 May 2025 05:20  Patient On (Oxygen Delivery Method): room air      I&O's Summary    30 Apr 2025 07:01  -  01 May 2025 07:00  --------------------------------------------------------  IN: 320 mL / OUT: 0 mL / NET: 320 mL        Physical Exam:  General: NAD, lying in bed  Cardiac: Normal S1, S2, no murmurs, rubs, or gallops appreciated  HEENT: NC/AT, clear sclera/conjunctiva, EOMI, MMM, hearing intact to voice  RESPIRATORY: Comfortable on RA, speaking in full sentences   Abdominal: Normoactive bowel sounds, no tenderness to light or deep palpation. No signs of organomegaly  : No longer with reynolds  NEURO: ambulated with PT, moving all extremities, speech understandable, Awake/conversant  Skin: Intact, no signs of bruising, ulcers, or open wounds.  Psych: AAOx3      Allergies  No Known Allergies    Intolerances      Labs:                          11.0   8.90  )-----------( 151      ( 02 May 2025 04:42 )             31.9                             10.8   6.64  )-----------( 156      ( 01 May 2025 03:53 )             32.4                             11.0   7.24  )-----------( 164      ( 30 Apr 2025 03:50 )             32.6                             11.6   7.27  )-----------( 148      ( 29 Apr 2025 04:45 )             33.0                             10.5   6.62  )-----------( 131      ( 27 Apr 2025 05:59 )             29.9                           9.3    5.88  )-----------( 133      ( 26 Apr 2025 05:33 )             26.2         05-02    136  |  100  |  55[H]  ----------------------------<  97  5.3   |  21[L]  |  2.28[H]    Ca    9.2      02 May 2025 04:42  Phos  3.9     05-02  Mg     1.70     05-02    TPro  6.8  /  Alb  3.5  /  TBili  <0.2  /  DBili  x   /  AST  75[H]  /  ALT  111[H]  /  AlkPhos  106  05-02      05-01    137  |  101  |  55[H]  ----------------------------<  59[L]  4.8   |  21[L]  |  2.30[H]    Ca    9.3      01 May 2025 03:53  Phos  4.6     05-01  Mg     1.80     05-01    TPro  6.6  /  Alb  3.3  /  TBili  <0.2  /  DBili  x   /  AST  71[H]  /  ALT  96[H]  /  AlkPhos  95  05-01        04-30    134[L]  |  99  |  56[H]  ----------------------------<  158[H]  4.8   |  20[L]  |  2.62[H]    Ca    9.6      30 Apr 2025 03:50  Phos  4.2     04-30  Mg     1.70     04-30    TPro  6.7  /  Alb  3.3  /  TBili  0.2  /  DBili  x   /  AST  45[H]  /  ALT  71[H]  /  AlkPhos  92  04-30      04-29    134[L]  |  99  |  48[H]  ----------------------------<  92  5.2   |  24  |  2.47[H]    Ca    9.5      29 Apr 2025 04:45  Phos  4.2     04-29  Mg     1.70     04-29    TPro  6.7  /  Alb  3.4  /  TBili  0.4  /  DBili  x   /  AST  33  /  ALT  69[H]  /  AlkPhos  98  04-29 04-27    138  |  104  |  49[H]  ----------------------------<  91  4.2   |  22  |  2.37[H]    Ca    9.3      27 Apr 2025 05:59  Phos  2.8     04-27  Mg     1.60     04-27    TPro  6.4  /  Alb  3.3  /  TBili  0.5  /  DBili  x   /  AST  43[H]  /  ALT  80[H]  /  AlkPhos  107  04-27      04-26    134[L]  |  107  |  62[H]  ----------------------------<  224[H]  5.4[H]   |  16[L]  |  2.72[H]    Ca    9.3      26 Apr 2025 11:27  Phos  3.2     04-26  Mg     1.80     04-26    TPro  6.2  /  Alb  3.3  /  TBili  0.3  /  DBili  x   /  AST  50[H]  /  ALT  84[H]  /  AlkPhos  105  04-26      04-26    134[L]  |  108[H]  |  67[H]  ----------------------------<  94  5.4[H]   |  15[L]  |  2.76[H]    Ca    9.4      26 Apr 2025 05:33  Phos  3.6     04-26  Mg     1.80     04-26    TPro  6.2  /  Alb  3.3  /  TBili  0.3  /  DBili  x   /  AST  50[H]  /  ALT  84[H]  /  AlkPhos  105  04-26    PT/INR - ( 25 Apr 2025 20:03 )   PT: 11.4 sec;   INR: 0.98 ratio         PTT - ( 25 Apr 2025 20:03 )  PTT:37.2 sec    VBG (most recent)  pH: 7.41  PCO2: 39        Urinalysis with Rflx Culture (collected 25 Apr 2025 23:56)    Culture - Urine (collected 25 Apr 2025 23:56)  Source: Clean Catch  Preliminary Report (27 Apr 2025 19:27):    >100,000 CFU/ml Gram Negative Rods    Culture - Blood (collected 25 Apr 2025 20:00)  Source: Blood Blood-Peripheral  Preliminary Report (28 Apr 2025 01:01):    No growth at 48 Hours    Culture - Blood (collected 25 Apr 2025 19:45)  Source: Blood Blood-Peripheral  Preliminary Report (28 Apr 2025 01:01):    No growth at 48 Hours        Imaging:    TTE:     CONCLUSIONS:      1. The left ventricular cavity is normal in size. Left ventricular wall thickness is normal. Left ventricular systolic function is mildly decreased with an ejection fraction visually estimated at 45 to 50%. There are regional wall motion abnormalities present. Hypokinesis of the basal to mid inferior wall and basal inferolateral wall. There is mild (grade 1) left ventricular diastolic dysfunction.   2. Normal right ventricular cavity size and normal right ventricular systolic function. Tricuspid annular plane systolic excursion (TAPSE) is 2.6 cm (normal >=1.7 cm).   3. Structurally normal mitral valve with normal leaflet excursion. There is calcification of the mitral valve annulus. There is mild mitral regurgitation.   4. No prior echocardiogram is available for comparison.      MRI Head:    IMPRESSION: No acute infarction.    --- End of Report ---      FINDINGS:    CT BRAIN:    VENTRICLES AND SULCI: Age appropriate involutional changes.  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 No mass effect, acute   hemorrhage, or midline shift.  There are periventricular and subcortical   white matter hypodensities, consistent with microvascular type changes.  EXTRA-AXIAL: No mass or fluid collection. Basal cisterns are normal in   appearance.  POST CONTRAST: No abnormal enhancing lesion.    VISUALIZED SINUSES:  Scattered mucosal thickening. Nonpneumatization   right frontal sinus, anatomic variant. Near complete opacification left   maxillary sinus. Moderate mucosal thickening ethmoid air cells with near   complete opacification extending into the frontal ethmoidal recess with   near complete opacification of the left frontal sinus. Moderate mucosal   thickening of the right frontal ethmoidal recess.  TYMPANOMASTOID CAVITIES:  Mastoid air cells clear. Nonspecific soft   tissue density left external auditory canal.  VISUALIZED ORBITS: Bilateral lens replacement.  CALVARIUM: Generalized osteopenia.      CTA NECK:    AORTIC ARCH AND VISUALIZED GREAT VESSELS: Atherosclerotic changes. Bovine   aortic arch.    RIGHT:  COMMON CAROTID ARTERY: Minimal atherosclerotic changes at the bifurcation   without significant stenosis.  INTERNAL CAROTID ARTERY: No significant stenosis based on NASCET criteria.  VERTEBRAL ARTERY: Hypoplastic.    LEFT:  COMMON CAROTID ARTERY: Minimal atherosclerotic changes at the bifurcation   without significant stenosis.  INTERNAL CAROTID ARTERY: Atherosclerotic changes at the origin with   resultant mild to moderate stenosis based on NASCET criteria.  VERTEBRAL ARTERY: Normal in course and caliber to the intracranial   circulation. Vertebral artery dominance.    VISUALIZED LUNGS: Bilateral dependent atelectasis. Right greater than   left apical emphysematous changes. Perifissural nodular opacities along   the right major and minor fissure measuring up to 1.0 cm (3-63),   indeterminate.    MISCELLANEOUS: None.    CAROTID STENOSIS REFERENCE: Percent (%) stenosis is expressed in terms of   NASCET Criteria. (NASCET = 100x1-(N/D)). N=greatest narrowing. D=normal   distal diameter - MILD = <50% stenosis. - MODERATE = 50-69% stenosis. -   SEVERE = 70-89% stenosis. - HAIRLINE/CRITICAL = 90-99% stenosis. -   OCCLUDED = 100%stenosis.      CTA HEAD:    INTERNAL CAROTID ARTERIES: Atherosclerotic changes of the bilateral   petrous through cavernous segments with mild stenosis of the right   cavernous segment. 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  MIDDLE CEREBRAL ARTERIES: No significant stenosis or occlusion.  POSTERIOR CEREBRAL ARTERIES: Fetal origin right PCA    DISTAL VERTEBRAL / BASILAR ARTERIES: Trace atherosclerotic changes of the   proximal V4 segments without significant stenosis.    VENOUS STRUCTURES: Inadequate venous phase opacification.    MISCELLANEOUS: No other vascular abnormality is seen.      IMPRESSION:    CT HEAD:  No evidence of acute intracranial pathology.  Chronic age involutional and microangiopathic ischemic changes.  Moderate to marked paranasal sinus disease as described.  Recommend ENT evaluation, treatment and follow-up.  Nonspecific soft tissue density left external auditory canal, correlate   otoscopy.    CTA NECK:  Mild to moderate left proximal internal carotid artery stenosis.  Left vertebral artery artery dominance, hypoplastic right vertebral   artery.    Indeterminate perifissural lung nodules as described.  Additional follow-up/follow-up as per Fleischner criteria.    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  Recommend neurosurgical consultation/follow-up.  Anatomic variants as described.    --- End of Report ---      Renal Bladder US:    IMPRESSION:  Mild bilateral increased renal echotexture suggesting medical renal   disease.    Marked urinary bladder wall thickening versus underdistention. Correlate   with urinalysis due to cystitis, other bladder pathology.    --- End of Report ---      Consultant notes reviewed: Renal  Team d/w renal acidosis improved, can c/w sodium bicarb, hyperK improved, can d/c lokelma

## 2025-05-02 NOTE — PROVIDER CONTACT NOTE (OTHER) - BACKGROUND
Patient admitted for sepsis. PMH HTN, DM2.
Patient admitted for sepsis. PMH HTN, DM2.
sepsis
Patient admitted for sepsis. PMH HTN, DM2.
Patient admitted for sepsis. PMH HTN, DM2.
admitting dx: sepsis

## 2025-05-02 NOTE — PROGRESS NOTE ADULT - SUBJECTIVE AND OBJECTIVE BOX
interval history:  +hypothermic (94.5) overnight  pt. denies palpitation, CP, SOB  On ABX   Tele with NSR with Paroxysmal slow AF        PAST MEDICAL & SURGICAL HISTORY:  Type 2 diabetes mellitus without complication    CVA (cerebral vascular accident)    Non-ST elevation (NSTEMI) myocardial infarction    CAD (coronary artery disease)    CKD (chronic kidney disease), stage III    HTN (hypertension)    HLD (hyperlipidemia)    No significant past surgical history    Status post placement of implantable loop recorder    H/O carpal tunnel repair    S/P CABG (coronary artery bypass graft)        MEDICATIONS  (STANDING):  apixaban 2.5 milliGRAM(s) Oral every 12 hours  atorvastatin 40 milliGRAM(s) Oral at bedtime  calcitriol   Capsule 0.25 MICROGram(s) Oral daily  cefTRIAXone   IVPB 1000 milliGRAM(s) IV Intermittent every 24 hours  chlorhexidine 2% Cloths 1 Application(s) Topical daily  clotrimazole 1% Cream 1 Application(s) Topical two times a day  dextrose 5%. 1000 milliLiter(s) (100 mL/Hr) IV Continuous <Continuous>  dextrose 5%. 1000 milliLiter(s) (50 mL/Hr) IV Continuous <Continuous>  dextrose 50% Injectable 25 Gram(s) IV Push once  dextrose 50% Injectable 12.5 Gram(s) IV Push once  dextrose 50% Injectable 25 Gram(s) IV Push once  glucagon  Injectable 1 milliGRAM(s) IntraMuscular once  influenza  Vaccine (HIGH DOSE) 0.5 milliLiter(s) IntraMuscular once  insulin lispro (ADMELOG) corrective regimen sliding scale   SubCutaneous three times a day before meals  insulin lispro (ADMELOG) corrective regimen sliding scale   SubCutaneous at bedtime  mupirocin 2% Nasal 1 Application(s) Both Nostrils every 12 hours  NIFEdipine XL 60 milliGRAM(s) Oral daily  sodium bicarbonate 1300 milliGRAM(s) Oral every 8 hours  tamsulosin 0.4 milliGRAM(s) Oral at bedtime    MEDICATIONS  (PRN):  acetaminophen     Tablet .. 650 milliGRAM(s) Oral every 6 hours PRN Temp greater or equal to 38C (100.4F), Mild Pain (1 - 3)  dextrose Oral Gel 15 Gram(s) Oral once PRN Blood Glucose LESS THAN 70 milliGRAM(s)/deciliter  melatonin 3 milliGRAM(s) Oral at bedtime PRN Insomnia            Vital Signs Last 24 Hrs  T(C): 36.6 (02 May 2025 05:20), Max: 37 (01 May 2025 17:30)  T(F): 97.9 (02 May 2025 05:20), Max: 98.6 (01 May 2025 17:30)  HR: 86 (02 May 2025 05:20) (64 - 86)  BP: 122/51 (02 May 2025 05:20) (108/50 - 134/59)  BP(mean): --  RR: 17 (02 May 2025 05:20) (16 - 17)  SpO2: 97% (02 May 2025 05:20) (97% - 99%)    Parameters below as of 02 May 2025 05:20  Patient On (Oxygen Delivery Method): room air                INTERPRETATION OF TELEMETRY: NSR at 60s-90s with Paroxysmal slow AF AT 40S-50S    ECG:        LABS:                        11.0   8.90  )-----------( 151      ( 02 May 2025 04:42 )             31.9     05-02    136  |  100  |  55[H]  ----------------------------<  97  5.3   |  21[L]  |  2.28[H]    Ca    9.2      02 May 2025 04:42  Phos  3.9     05-02  Mg     1.70     05-02    TPro  6.8  /  Alb  3.5  /  TBili  <0.2  /  DBili  x   /  AST  75[H]  /  ALT  111[H]  /  AlkPhos  106  05-02        PT/INR - ( 02 May 2025 04:42 )   PT: 12.5 sec;   INR: 1.08 ratio         PTT - ( 02 May 2025 04:42 )  PTT:36.2 sec  Urinalysis Basic - ( 02 May 2025 04:42 )    Color: x / Appearance: x / SG: x / pH: x  Gluc: 97 mg/dL / Ketone: x  / Bili: x / Urobili: x   Blood: x / Protein: x / Nitrite: x   Leuk Esterase: x / RBC: x / WBC x   Sq Epi: x / Non Sq Epi: x / Bacteria: x      I&O's Summary    BNP  RADIOLOGY & ADDITIONAL STUDIES:  CONCLUSIONS:      1. The left ventricular cavity is normal in size. Left ventricular wall thickness is normal. Left ventricular systolic function is mildly decreased with an ejection fraction visually estimated at 45 to 50%. There are regional wall motion abnormalities present. Hypokinesis of the basal to mid inferior wall and basal inferolateral wall. There is mild (grade 1) left ventricular diastolic dysfunction.   2. Normal right ventricular cavity size and normal right ventricular systolic function. Tricuspid annular plane systolic excursion (TAPSE) is 2.6 cm (normal >=1.7 cm).   3. Structurally normal mitral valve with normal leaflet excursion. There is calcification of the mitral valve annulus. There is mild mitral regurgitation.   4. No prior echocardiogram is available for comparison.          PHYSICAL EXAM:    GENERAL: In no apparent distress, well nourished, and hydrated.  HEART: Regular rate and rhythm; No murmurs, rubs, or gallops.  PULMONARY: Clear to auscultation and percussion.  No rales, wheezing, or rhonchi bilaterally.  ABDOMEN: Soft, Nontender, Nondistended; Bowel sounds present  EXTREMITIES:  Peripheral Pulses present, No clubbing, cyanosis, or edema  NEUROLOGICAL: Grossly nonfocal

## 2025-05-02 NOTE — PROGRESS NOTE ADULT - ATTENDING COMMENTS
85 y.o. 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     Rectal temp of 94 overnight, patient asymptomatic and temp has been wnl since then. Denies all infectious ROS including HA, sinus pain/congestion, rhinorrhea, cough, sore throat, CP, SOB, abdominal pain, N/V, diarrhea, joint pain or back pain.     # Aflutter v afib   # Wenckebach with intermittent 2:1  [ ] Micra placement pushed to Monday   - Initial Bradycardia maybe related to hypothermia, electrolyte abnormalities, medications?   - EP following   - Cont Eliquis, D/C Metoprolol     # Hypothermia  - Previously resolved, hypothermic last night but infectious ROS negative and patient very well appearing.   - Initially 2/2 infection v metabolic acidosis impairing thermoregulation   - AM cortisol, TSH wnl, CT without acute infarcts    # UTI c/b TME   - UCx: >100K Klebsiella   - S/p abx, course completed 5/2     # TAYLOR on CKD c/b metabolic acidosis and hyperkalemia   # Urinary retention   - S/p bicarb drip, off Lokelma now   - S/p Mosher placement c/b hematuria. No clots seen. hematuria maybe 2/2 UTI v traumatic insertion  - Hold Lisinopril   - Passed TOV  - Per daughter, Cr was 3.3 in January- current Cr may represent baseline?     # Slurred speech   - 2/2 TME v stroke- oneida given pt in afib, at risk for stroke   - MRI brain negative for acute stroke   - passed dysphagia screen  - speech therapy eval noted, they did find some deficiencies   - Improving         Dispo: [facility], awaiting auth . 85 y.o. 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     Rectal temp of 94 overnight, patient asymptomatic and temp has been wnl since then. Denies all infectious ROS including HA, sinus pain/congestion, rhinorrhea, cough, sore throat, CP, SOB, abdominal pain, N/V, diarrhea, joint pain or back pain.     # Aflutter v afib   # Wenckebach with intermittent 2:1  [ ] Micra placement pushed to Monday   - Initial Bradycardia maybe related to hypothermia, electrolyte abnormalities, medications?   - EP following   - Cont Eliquis, D/C Metoprolol     # Hypothermia  - Previously resolved, hypothermic last night but infectious ROS negative and patient very well appearing.   - Initially 2/2 infection v metabolic acidosis impairing thermoregulation   - AM cortisol, TSH wnl, CT without acute infarcts    # Transaminitis   - Present on admission, fluctuating. Can be 2/2 OP Fluconazole, hold for now.   - Ctm   # UTI c/b TME   - UCx: >100K Klebsiella   - S/p abx, course completed 5/2     # TAYLOR on CKD c/b metabolic acidosis and hyperkalemia, resolved  # Urinary retention, resolved   - S/p bicarb drip, off Lokelma now   - S/p Mosher placement c/b hematuria. No clots seen. hematuria maybe 2/2 UTI v traumatic insertion  - Hold Lisinopril   - Passed TOV  - Per daughter, Cr was 3.3 in January- current Cr may represent baseline?     # Slurred speech   - 2/2 TME v stroke- oneida given pt in afib, at risk for stroke   - MRI brain negative for acute stroke   - passed dysphagia screen  - speech therapy eval noted, they did find some deficiencies   - Improving     Dispo: [facility], awaiting auth .

## 2025-05-03 LAB
ALBUMIN SERPL ELPH-MCNC: 3.7 G/DL — SIGNIFICANT CHANGE UP (ref 3.3–5)
ALP SERPL-CCNC: 109 U/L — SIGNIFICANT CHANGE UP (ref 40–120)
ALT FLD-CCNC: 92 U/L — HIGH (ref 4–41)
ANION GAP SERPL CALC-SCNC: 13 MMOL/L — SIGNIFICANT CHANGE UP (ref 7–14)
AST SERPL-CCNC: 48 U/L — HIGH (ref 4–40)
BILIRUB SERPL-MCNC: 0.2 MG/DL — SIGNIFICANT CHANGE UP (ref 0.2–1.2)
BUN SERPL-MCNC: 50 MG/DL — HIGH (ref 7–23)
CALCIUM SERPL-MCNC: 9.5 MG/DL — SIGNIFICANT CHANGE UP (ref 8.4–10.5)
CHLORIDE SERPL-SCNC: 103 MMOL/L — SIGNIFICANT CHANGE UP (ref 98–107)
CO2 SERPL-SCNC: 22 MMOL/L — SIGNIFICANT CHANGE UP (ref 22–31)
CREAT SERPL-MCNC: 2.25 MG/DL — HIGH (ref 0.5–1.3)
EGFR: 28 ML/MIN/1.73M2 — LOW
EGFR: 28 ML/MIN/1.73M2 — LOW
GLUCOSE BLDC GLUCOMTR-MCNC: 137 MG/DL — HIGH (ref 70–99)
GLUCOSE BLDC GLUCOMTR-MCNC: 150 MG/DL — HIGH (ref 70–99)
GLUCOSE BLDC GLUCOMTR-MCNC: 199 MG/DL — HIGH (ref 70–99)
GLUCOSE BLDC GLUCOMTR-MCNC: 259 MG/DL — HIGH (ref 70–99)
GLUCOSE SERPL-MCNC: 124 MG/DL — HIGH (ref 70–99)
HCT VFR BLD CALC: 32.5 % — LOW (ref 39–50)
HGB BLD-MCNC: 11.2 G/DL — LOW (ref 13–17)
MAGNESIUM SERPL-MCNC: 1.8 MG/DL — SIGNIFICANT CHANGE UP (ref 1.6–2.6)
MCHC RBC-ENTMCNC: 30.2 PG — SIGNIFICANT CHANGE UP (ref 27–34)
MCHC RBC-ENTMCNC: 34.5 G/DL — SIGNIFICANT CHANGE UP (ref 32–36)
MCV RBC AUTO: 87.6 FL — SIGNIFICANT CHANGE UP (ref 80–100)
NRBC # BLD AUTO: 0 K/UL — SIGNIFICANT CHANGE UP (ref 0–0)
NRBC # FLD: 0 K/UL — SIGNIFICANT CHANGE UP (ref 0–0)
NRBC BLD AUTO-RTO: 0 /100 WBCS — SIGNIFICANT CHANGE UP (ref 0–0)
PHOSPHATE SERPL-MCNC: 3.5 MG/DL — SIGNIFICANT CHANGE UP (ref 2.5–4.5)
PLATELET # BLD AUTO: 160 K/UL — SIGNIFICANT CHANGE UP (ref 150–400)
POTASSIUM SERPL-MCNC: 4.5 MMOL/L — SIGNIFICANT CHANGE UP (ref 3.5–5.3)
POTASSIUM SERPL-SCNC: 4.5 MMOL/L — SIGNIFICANT CHANGE UP (ref 3.5–5.3)
PROT SERPL-MCNC: 7.1 G/DL — SIGNIFICANT CHANGE UP (ref 6–8.3)
RBC # BLD: 3.71 M/UL — LOW (ref 4.2–5.8)
RBC # FLD: 13.2 % — SIGNIFICANT CHANGE UP (ref 10.3–14.5)
SODIUM SERPL-SCNC: 138 MMOL/L — SIGNIFICANT CHANGE UP (ref 135–145)
WBC # BLD: 8.87 K/UL — SIGNIFICANT CHANGE UP (ref 3.8–10.5)
WBC # FLD AUTO: 8.87 K/UL — SIGNIFICANT CHANGE UP (ref 3.8–10.5)

## 2025-05-03 PROCEDURE — 99233 SBSQ HOSP IP/OBS HIGH 50: CPT

## 2025-05-03 RX ADMIN — CLOTRIMAZOLE 1 APPLICATION(S): 1 CREAM TOPICAL at 17:15

## 2025-05-03 RX ADMIN — CALCITRIOL 0.25 MICROGRAM(S): 0.5 CAPSULE, GELATIN COATED ORAL at 11:41

## 2025-05-03 RX ADMIN — Medication 1 APPLICATION(S): at 11:08

## 2025-05-03 RX ADMIN — INSULIN LISPRO 3: 100 INJECTION, SOLUTION INTRAVENOUS; SUBCUTANEOUS at 11:37

## 2025-05-03 RX ADMIN — ATORVASTATIN CALCIUM 40 MILLIGRAM(S): 80 TABLET, FILM COATED ORAL at 22:12

## 2025-05-03 RX ADMIN — TAMSULOSIN HYDROCHLORIDE 0.4 MILLIGRAM(S): 0.4 CAPSULE ORAL at 22:12

## 2025-05-03 RX ADMIN — Medication 60 MILLIGRAM(S): at 06:16

## 2025-05-03 RX ADMIN — CLOTRIMAZOLE 1 APPLICATION(S): 1 CREAM TOPICAL at 06:16

## 2025-05-03 RX ADMIN — Medication 1300 MILLIGRAM(S): at 06:16

## 2025-05-03 RX ADMIN — MUPIROCIN CALCIUM 1 APPLICATION(S): 20 CREAM TOPICAL at 22:13

## 2025-05-03 RX ADMIN — MUPIROCIN CALCIUM 1 APPLICATION(S): 20 CREAM TOPICAL at 11:09

## 2025-05-03 RX ADMIN — Medication 1300 MILLIGRAM(S): at 12:17

## 2025-05-03 RX ADMIN — APIXABAN 2.5 MILLIGRAM(S): 2.5 TABLET, FILM COATED ORAL at 17:21

## 2025-05-03 RX ADMIN — Medication 650 MILLIGRAM(S): at 16:00

## 2025-05-03 RX ADMIN — Medication 1300 MILLIGRAM(S): at 22:12

## 2025-05-03 RX ADMIN — Medication 650 MILLIGRAM(S): at 17:00

## 2025-05-03 RX ADMIN — APIXABAN 2.5 MILLIGRAM(S): 2.5 TABLET, FILM COATED ORAL at 06:16

## 2025-05-03 NOTE — DIETITIAN INITIAL EVALUATION ADULT - REASON FOR ADMISSION
Slurred speech  Per chart, Pt is 85M CAD s/p CABG (in August 2019 with post-op pAfib/AFlutter previously on amio/eliquis now discontinued), CKD stage III, HTN, HLD, T2DM,  presenting from home with daughter with slurred speech starting 4/23, hypothermic, bradycardic, hyperkalemia and hyperglycemia, admitted for management of UTI, bradycardia, and electrolyte abnormalities.

## 2025-05-03 NOTE — DIETITIAN INITIAL EVALUATION ADULT - PROBLEM/PLAN-8
Chief complaint:   Chief Complaint   Patient presents with   • Physical       Vitals:  Visit Vitals  /64 (BP Location: LUE - Left upper extremity, Patient Position: Sitting, Cuff Size: Regular)   Pulse 68   Temp 98 °F (36.7 °C) (Temporal)   Resp 16   Ht 5' 3\" (1.6 m)   Wt 86.2 kg (190 lb)   BMI 33.66 kg/m²       HISTORY OF PRESENT ILLNESS     Pt is here to establish care and for follow-up of depression/anxiety. PMH is significant for depression/anxiety and asthma.     Depression/anxiety- pt is currently on sertraline 100 mg daily and hydroxyzine 25 mg prn. She states that she has had depression and anxiety most of her life. Started therapy around 5th grade, but did not start medications until age 19. Has a h/o trauma, but did not give specifics regarding this. She feels that her mood is \"not great\" lately. States that over the last year she has had a lot of deaths in her family and this has affected her depression/anxiety. She states prior to this she had been doing well. She states that she is able to functional day to day, but feels depressed and anxious. She states she has decreased motivation and energy. She basically is at home or working, tends to isolate herself. She feels that she is sleeping too much.  She worries about \"everything all the time.\" She has panic attacks, but frequency will vary. Hydroxyzine does help, but mostly by just making her tired & causing her to fall asleep. She states that she has had a lot of intrusive passive suicidal ideations. Denies any plan and does not have any intention of acting on her thoughts. She denies any h/o suicidal attempts or hospitalizations for depression. Does have a h/o cutting, but has not done this in a long time. She does have good support through her boyfriend and family. She has been in therapy in the past, but it has been >2 years. She is interested in starting this again. She is tolerating medications well without any side effects.     Pt has h/o  asthma. States this is typically triggered by allergies. She currently is taking albuterol inhaler as needed. Denies any h/o hospitalizations for asthma. Denies any cough, chest tightness, wheezing, or shortness of breath.         Other significant problems:  Patient Active Problem List    Diagnosis Date Noted   • Generalized anxiety disorder 02/12/2024     Priority: Low   • Major depressive disorder, recurrent episode, moderate (CMD) 02/12/2024     Priority: Low   • Mild intermittent asthma without complication 02/12/2024     Priority: Low   • Anxiety 11/07/2022     Priority: Low   • Obesity (BMI 30-39.9) 10/19/2020     Priority: Low   • Vitamin D deficiency 11/07/2019     Priority: Low   • IUD (intrauterine device) in place 09/16/2019     Priority: Low     Mirena IUD placed 9/16/19     • Gastroesophageal reflux disease 10/17/2018     Priority: Low   • Lactose intolerance 09/01/2016     Priority: Low   • Eczema 11/13/2014     Priority: Low   • Allergic rhinitis      Priority: Low   • Reactive airway disease      Priority: Low       PAST MEDICAL, FAMILY AND SOCIAL HISTORY     Medications:  Current Outpatient Medications   Medication Sig Dispense Refill   • sertraline (ZOLOFT) 50 MG tablet Take 1 tablet po daily in addition to 100 mg tablet for total of 150 mg daily 90 tablet 1   • sertraline (ZOLOFT) 100 MG tablet Take 1 tablet by mouth daily. 90 tablet 0   • cholecalciferol 1.25 mg (50,000 units) tablet Take 1 tablet by mouth 1 day a week. (Patient not taking: Reported on 2/12/2024) 12 tablet 0   • VITAMIN D PO 2000 IU (Patient not taking: Reported on 2/12/2024)     • hydrOXYzine (ATARAX) 25 MG tablet Take 1 tablet by mouth every 8 hours as needed for Itching or Anxiety. 90 tablet 0   • albuterol 108 (90 Base) MCG/ACT inhaler Inhale 2 puffs into the lungs every 4 hours as needed for Shortness of Breath or Wheezing. Use with spacer chamber 8 g 5   • hydroCORTisone (CORTIZONE) 2.5 % cream Apply to affected dry  skin/eczema twice daily for up to 2 weeks. 30 g 2   • IBUPROFEN PO Take by mouth as needed.     • levonorgestrel (MIRENA) (52 MG) 20 MCG/DAY intrauterine device 1 each by Intrauterine route 1 time. Inserted 9/16/19, due for removal 9/16/2024.     • fexofenadine (ALLEGRA) 180 MG tablet Take 180 mg by mouth daily as needed. Indications: Hayfever        No current facility-administered medications for this visit.       Allergies:  ALLERGIES:   Allergen Reactions   • Pineapple RASH     Spotty rash on arms and chest   • Seasonal Other (See Comments)     Nasal congestion       Past Medical  History/Surgeries:  Past Medical History:   Diagnosis Date   • Allergic rhinitis     spring and late summer   • Anxiety and depression 10/19/2020   • Eczema 11/13/2014   • Gastroesophageal reflux disease 10/17/2018   • Lactose intolerance 9/1/2016   • Obesity (BMI 30-39.9) 10/19/2020   • Otitis media    • Reactive airway disease     induced by allergies or viruses   • Vitamin D deficiency 11/7/2019       Past Surgical History:   Procedure Laterality Date   • Esophagogastroduodenoscopy  12/27/2016       Family History:  Family History   Problem Relation Age of Onset   • Asthma Mother    • Allergic Rhinitis Mother    • Asthma Father    • Allergic Rhinitis Father    • Myocardial Infarction Father    • Hypertension Father    • Heart disease Father    • Asthma Sister    • Allergic Rhinitis Sister    • Allergic Rhinitis Brother    • Asthma Brother    • Asthma Paternal Aunt    • Asthma Maternal Grandmother    • Heart disease Paternal Grandfather    • Myocardial Infarction Paternal Grandfather 52   • Cancer, Prostate Paternal Grandfather    • Cancer, Breast Neg Hx    • Cancer, Ovarian Neg Hx    • Cancer, Endometrial Neg Hx    • Cancer, Colon Neg Hx        Social History:  Social History     Tobacco Use   • Smoking status: Never   • Smokeless tobacco: Never   • Tobacco comments:     no passive smoke exposure   Substance Use Topics   • Alcohol  use: Not Currently     Comment: monthly       REVIEW OF SYSTEMS     Review of Systems   Constitutional:  Negative for activity change, appetite change, chills, fever and unexpected weight change.   Respiratory:  Negative for cough, shortness of breath and wheezing.    Cardiovascular:  Negative for chest pain, palpitations and leg swelling.   Gastrointestinal:  Negative for abdominal pain, constipation and diarrhea.   Skin:  Negative for rash.   Neurological:  Negative for dizziness, light-headedness and headaches.   Psychiatric/Behavioral:  Positive for dysphoric mood and sleep disturbance. Negative for self-injury and suicidal ideas. The patient is nervous/anxious.      Generalized Anxiety Disorder (GAD7)      Over the last 2 weeks, how often have you been bothered by the following problems?   Score: 17      Score:  0-4 minimal symptoms 10-14 moderate symptoms   5-9 mild symptoms 15-21 severe symptoms        1.  Feeling nervous, anxious or on edge Nearly every day   2.  Not being able to stop or control worrying Nearly every day   3.  Worrying too much about different things Nearly every day   4.  Trouble relaxing More than half the days   5.  Being so restless that it's hard to sit still More than half the days   6.  Becoming easily annoyed or irritable Nearly every day   7.  Feeling afraid as if something awful might happen Several days            If you checked off any problems, how difficult have these made it for you to do your work, take care of things at home, or get along with other people? Very difficult          Review Flowsheet  More data exists       2/12/2024   PHQ 2/9 Score   Adult PHQ 2 Score 5   Adult PHQ 2 Interpretation Further screening needed   Little interest or pleasure in activity? More than half the days   Feeling down, depressed or hopeless? Nearly every day   Adult PHQ 9 Score 22   Adult PHQ 9 Interpretation Severe Depression   Trouble falling or staying asleep or sleeping all the time?  DISPLAY PLAN FREE TEXT Nearly every day   Feeling tired or having little energy? Nearly every day   Poor appetite or overeating? Nearly every day   Feeling bad about yourself or that you are a failure or have let yourself or family down? Nearly every day   Trouble concentrating on things such as reading the newspaper or watching TV? Several days   Moving or speaking slowly that other people have noticed or the opposite - being so fidgety or restless that you have been moving around a lot more than usual? Several days   Thoughts that you would be better off dead or of hurting yourself in some way? Nearly every day   If you reported any problems, how difficult have these problems made it to do your work, take care of things at home, or get along with other people? Very difficult   PHYSICAL EXAM     Physical Exam  Vitals and nursing note reviewed.   Constitutional:       General: She is not in acute distress.     Appearance: Normal appearance. She is well-developed and well-groomed. She is not ill-appearing or toxic-appearing.   HENT:      Head: Normocephalic and atraumatic.      Right Ear: Tympanic membrane, ear canal and external ear normal.      Left Ear: Tympanic membrane, ear canal and external ear normal.      Nose: Nose normal.      Mouth/Throat:      Mouth: Mucous membranes are moist.      Pharynx: Oropharynx is clear. No oropharyngeal exudate or posterior oropharyngeal erythema.      Neck: Neck supple.   Eyes:      General: No scleral icterus.     Conjunctiva/sclera: Conjunctivae normal.      Pupils: Pupils are equal, round, and reactive to light.   Neck:      Thyroid: No thyroid mass, thyromegaly or thyroid tenderness.      Trachea: Trachea normal.   Cardiovascular:      Rate and Rhythm: Normal rate and regular rhythm.      Heart sounds: Normal heart sounds, S1 normal and S2 normal. No murmur heard.  Pulmonary:      Effort: Pulmonary effort is normal. No respiratory distress.      Breath sounds: Normal breath sounds. No decreased  breath sounds, wheezing, rhonchi or rales.   Abdominal:      General: Bowel sounds are normal. There is no distension.      Palpations: Abdomen is soft.      Tenderness: There is no abdominal tenderness. There is no guarding or rebound.   Musculoskeletal:      Right lower leg: No edema.      Left lower leg: No edema.   Lymphadenopathy:      Head:      Right side of head: No submental, submandibular or tonsillar adenopathy.      Left side of head: No submental, submandibular or tonsillar adenopathy.      Cervical: No cervical adenopathy.      Right cervical: No superficial, deep or posterior cervical adenopathy.     Left cervical: No superficial, deep or posterior cervical adenopathy.      Upper Body:      Right upper body: No supraclavicular adenopathy.      Left upper body: No supraclavicular adenopathy.   Skin:     General: Skin is warm and dry.      Findings: No rash.   Neurological:      General: No focal deficit present.      Mental Status: She is alert and oriented to person, place, and time.      Gait: Gait is intact. Gait normal.   Psychiatric:         Attention and Perception: Attention normal.         Mood and Affect: Affect normal. Mood is anxious.         Speech: Speech normal.         Behavior: Behavior normal. Behavior is cooperative.         Thought Content: Thought content normal.         Judgment: Judgment normal.         ASSESSMENT/PLAN     1. Generalized anxiety disorder  2. Major depressive disorder, recurrent episode, moderate (CMD)  -IVETTE-7 score is 17, consistent with severe anxiety.   -PHQ-9 score is 22, consistent with moderately severe depression.   -Pt is currently on sertraline 100 mg daily. Will increase up to 150 mg daily.   -Continue hydroxyzine 25 mg TID prn for panic attacks.   -Referral placed to behavioral health for cognitive behavioral therapy.   -Counseled on importance of regular exercise and meditation/mindfulness techniques for depression/anxiety management.   -F/u in 6-8  weeks for re-evaluation or sooner as needed.     3. Mild intermittent asthma without complication  -Symptoms only triggered by allergies.   -Currently asymptomatic.   -Continue albuterol inhaler 2 puffs Q4-6H prn for wheezing or shortness of breath.   -Had flu vaccine through employer around 10/2023.     4. Vitamin D deficiency  -Will check vit D level to monitor.

## 2025-05-03 NOTE — DIETITIAN INITIAL EVALUATION ADULT - OTHER INFO
Pt seen at bedside. At time of visit patient is NPO pending procedure/test. Pt reported improved appetite in house. Per RN flowsheet, patient with good PO intake % noted. Patient denies difficulty chewing or swallowing at present. Pt denies any nausea, vomiting, diarrhea, or constipation at this time. Per swallow bedside assessment 4/27, recommend Soft and Bite Sized diet with Thin Liquids. No GI distress reported i.e. nausea, vomiting, diarrhea. Per Pt, last BM yesterday 5/2. Not noted to be on a bowel regimen.  No edema, No PI/DTI noted, per RN flowsheet. Labs noted for elevated BUN, Cr, low GFR and HgA1c 7.8% (4/28). Pt educated on Consistent Carbohydrate and Renal diet and understanding. RDN answered questions/concerns related to diet. RDN remains available and will follow-up per protocol.

## 2025-05-03 NOTE — DIETITIAN INITIAL EVALUATION ADULT - CALCULATED TO (G/KG)
8/10/2022    Boris K Gerber, MD [facility] [address] [place] [address]    RE: Jessica Qiu       Dear Colleague,     I had the pleasure of seeing Jessica Qiu in the [facility].  CARDIOLOGY VISIT    REASON FOR VISIT: AI, dilated aorta    SUBJECTIVE:  74-year-old female seen for f/u of aortic insufficiency and dilated ascending aorta.   She has factor V leiden with LLE DVT and PE 5/2020.     Echocardiogram May 2008 at [facility] showed preserved ejection fraction, trace aortic insufficiency, positive bubble study, aorta 4.75 cm.  Thoracic MRA showed ascending aorta 4.6 x 4.3 cm, arch 2.7 cm, and descending aorta 2.5 cm.       Echo 2/2017 showed normal left ventricular size, ejection fraction 60%, trileaflet aortic valve, 2+ AI, aorta 4.6 cm.        Echo April 2018 showed EF 60%, normal RV, mild to moderate aortic insufficiency, ascending aorta 4.6 cm.      Echo January 2021 showed EF 60%, normal RV, 3+ AI, aorta 4.4 cm.     Echo July 2022 showed EF 55%, 1-2+ AI, aorta 4.5 cm.    She has been feeling a little more dyspneic lately.  She also has some intermittent slight worsening of her lower extremity edema.  She feels an occasional stabbing chest pain that comes on randomly and lasts 1 or 2 seconds, this does not occur with exertion.  She has been trying to minimize salt.  She thinks her blood pressure tends to run on the low side.    MEDICATIONS:  Current Outpatient Medications   Medication     allopurinol (ZYLOPRIM) 300 MG tablet     citalopram (CELEXA) 20 MG tablet     diphenhydrAMINE (BENADRYL) 25 MG tablet     XARELTO ANTICOAGULANT 10 MG TABS tablet     No current facility-administered medications for this visit.       ALLERGIES:  Allergies   Allergen Reactions     Hmg-Coa-R Inhibitors Muscle Pain (Myalgia)     Muscle pain and stiffness     Pollen Extract      Pt. Has hayfever       REVIEW OF SYSTEMS:  Constitutional:  No weight loss, fever, chills  HEENT:   "Eyes:  No visual loss, blurred vision, double vision or yellow sclerae. No hearing loss, sneezing, congestion, runny nose or sore throat.  Skin:  No rash or itching.  Cardiovascular: per HPI  Respiratory: per HPI  GI:  No anorexia, nausea, vomiting or diarrhea. No abdominal pain or blood.  :  No dysurea, hematuria  Neurologic:  No headache, paralysis, ataxia, numbness or tingling in the extremities. No change in bowel or bladder control.  Musculoskeletal:  No muscle pain  Hematologic:  No bleeding or bruising.  Lymphatics:  No enlarged nodes. No history of splenectomy.  Endocrine:  No reports of sweating, cold or heat intolerance. No polyuria or polydipsia.  Allergies:  No history of asthma, hives, eczema or rhinitis.    PHYSICAL EXAM:  /70 (BP Location: Right arm, Patient Position: Sitting, Cuff Size: Adult Large)   Pulse 85   Ht 1.614 m (5' 3.55\")   Wt 91.2 kg (201 lb)   LMP  (LMP Unknown)   SpO2 97%   BMI 34.99 kg/m      Constitutional: awake, alert, no distress  Eyes: PERRL, sclera nonicteric  ENT: trachea midline  Respiratory: Lungs clear   Cardiovascular: regular rate and rhythm, 2/6 diastolic murmur at the upper sternal border, trace ankle edema  GI: nondistended, nontender, bowel sounds present  Lymph/Hematologic: no lymphadenopathy  Skin: dry, no rash  Musculoskeletal: good muscle tone, strength 5/5 in upper and lower extremities  Neurologic: no focal deficits  Neuropsychiatric: appropriate affact    DATA:  Lab:   Recent Labs   Lab Test 05/29/20  0856 01/27/17  0945 01/15/15  1018   CHOL 199 259* 270*   HDL 55 59 81   * 157* 145*   TRIG 197* 217* 219*   CHOLHDLRATIO  --   --  3.3     ASSESSMENT:  74-year-old female seen for dilated aorta and aortic insufficiency.  Her echo was unchanged.  Aorta has been stable for over 10 years.  Doubtful her edema and dyspnea are cardiac related.  Will check BMP and BNP today.  Her edema could be from venous insufficiency, ultrasound in 2017 showed " right venous reflux.  If edema worsens, could try low-dose diuretic, but would also repeat lower extremity venous competency studies.    RECOMMENDATIONS:  1.  Dilated ascending aorta with aortic insufficiency  - Repeat echo in 2 years, next in 2024    2.  Lower extremity edema  -BMP, NT proBNP  - If symptoms worsen, lower extremity venous competency studies  - Could consider low-dose diuretic in the future    Follow-up in 1 year with JOSE.    Demarcus Dumont MD  Cardiology - [facility]  Pager:  [phone]  Text Page  August 10, 2022    Thank you for allowing me to participate in the care of your patient.      Sincerely,     Demarcus Dumont MD     [facility]  cc:   Referred Self,   60.3

## 2025-05-03 NOTE — DIETITIAN INITIAL EVALUATION ADULT - ADD RECOMMEND
- Advance diet as medical feasible. Defer food and fluid consistency to SLP/Team.  - Please consistently document % PO intake in nursing flowsheets to assess adequacy of nutritional intake/monitor need for further nutritional intervention(s).   - Monitor weights, diet tolerance, skin integrity, BMs, pertinent labs.   - RDN services remain available as needed.

## 2025-05-03 NOTE — DIETITIAN INITIAL EVALUATION ADULT - OTHER CALCULATIONS
Defer fluid needs to MD/Team.   Ideal Body Weight: 118lbs / 53.5 kg +/-10%  Wt hx as per Jennie HIE:60.3kg (dosing wt 4/25), 63.1kg (9/17/19). Wt appears relatively stable.

## 2025-05-03 NOTE — DIETITIAN INITIAL EVALUATION ADULT - PERTINENT LABORATORY DATA
05-03    138  |  103  |  50[H]  ----------------------------<  124[H]  4.5   |  22  |  2.25[H]    Ca    9.5      03 May 2025 05:20  Phos  3.5     05-03  Mg     1.80     05-03    TPro  7.1  /  Alb  3.7  /  TBili  0.2  /  DBili  x   /  AST  48[H]  /  ALT  92[H]  /  AlkPhos  109  05-03  POCT Blood Glucose.: 137 mg/dL (05-03-25 @ 07:58)  A1C with Estimated Average Glucose Result: 7.8 % (04-28-25 @ 06:47)

## 2025-05-03 NOTE — PROGRESS NOTE ADULT - PROBLEM SELECTOR PLAN 7
Patient with hx of Afib s/p CABG. As per daughter, the patient is no longer in Afib and is no on AC. There is a chart note from 2019 related to this.   - CTM on tele for signs of Afib/Aflutter  - Ospj2lbag score 4  - Spoke with family regarding AC and they are ok with it. Started Eliquis 2.5mg BID

## 2025-05-03 NOTE — PROGRESS NOTE ADULT - PROBLEM SELECTOR PLAN 11
- Nutrition: Soft bite size after passing dysphagia screen  - Activity: as tolerated   - DVT Prophylaxis: On Eliquis  - Disposition: VITALIY, will need speech pathologist f/u - Nutrition: Soft bite size after passing dysphagia screen  - Activity: as tolerated   - DVT Prophylaxis: On Eliquis  - Disposition: VITALIY, will need speech pathologist f/u, pending PPM

## 2025-05-03 NOTE — PROGRESS NOTE ADULT - ATTENDING COMMENTS
ATTENDING STATEMENT:  I have read and agree with this Progress Note. I have personally seen and examined this patient. I have fully participated in the care of this patient. I have reviewed all pertinent clinical information, including history, physical exam, plan, and the Resident’s note and agree except as noted.        Paco Abbasi MD  Internal Medicine, Hospitalist

## 2025-05-03 NOTE — PROGRESS NOTE ADULT - SUBJECTIVE AND OBJECTIVE BOX
PROGRESS NOTE:   Authored by Dr. Joe Cha  Patient is a 85y old  Male who presents with a chief complaint of Slurred speech (02 May 2025 14:38)      SUBJECTIVE / OVERNIGHT EVENTS:    MEDICATIONS  (STANDING):  apixaban 2.5 milliGRAM(s) Oral every 12 hours  atorvastatin 40 milliGRAM(s) Oral at bedtime  calcitriol   Capsule 0.25 MICROGram(s) Oral daily  chlorhexidine 2% Cloths 1 Application(s) Topical daily  clotrimazole 1% Cream 1 Application(s) Topical two times a day  dextrose 5%. 1000 milliLiter(s) (100 mL/Hr) IV Continuous <Continuous>  dextrose 5%. 1000 milliLiter(s) (50 mL/Hr) IV Continuous <Continuous>  dextrose 50% Injectable 25 Gram(s) IV Push once  dextrose 50% Injectable 12.5 Gram(s) IV Push once  dextrose 50% Injectable 25 Gram(s) IV Push once  glucagon  Injectable 1 milliGRAM(s) IntraMuscular once  influenza  Vaccine (HIGH DOSE) 0.5 milliLiter(s) IntraMuscular once  insulin lispro (ADMELOG) corrective regimen sliding scale   SubCutaneous three times a day before meals  insulin lispro (ADMELOG) corrective regimen sliding scale   SubCutaneous at bedtime  mupirocin 2% Nasal 1 Application(s) Both Nostrils every 12 hours  NIFEdipine XL 60 milliGRAM(s) Oral daily  sodium bicarbonate 1300 milliGRAM(s) Oral every 8 hours  tamsulosin 0.4 milliGRAM(s) Oral at bedtime    MEDICATIONS  (PRN):  acetaminophen     Tablet .. 650 milliGRAM(s) Oral every 6 hours PRN Temp greater or equal to 38C (100.4F), Mild Pain (1 - 3)  dextrose Oral Gel 15 Gram(s) Oral once PRN Blood Glucose LESS THAN 70 milliGRAM(s)/deciliter  melatonin 3 milliGRAM(s) Oral at bedtime PRN Insomnia      CAPILLARY BLOOD GLUCOSE      POCT Blood Glucose.: 206 mg/dL (02 May 2025 21:52)  POCT Blood Glucose.: 133 mg/dL (02 May 2025 17:40)  POCT Blood Glucose.: 204 mg/dL (02 May 2025 12:06)  POCT Blood Glucose.: 104 mg/dL (02 May 2025 08:27)    I&O's Summary    02 May 2025 07:01  -  03 May 2025 07:00  --------------------------------------------------------  IN: 480 mL / OUT: 200 mL / NET: 280 mL        PHYSICAL EXAM:  Vital Signs Last 24 Hrs  T(C): 36.4 (03 May 2025 06:15), Max: 36.6 (02 May 2025 17:00)  T(F): 97.6 (03 May 2025 06:15), Max: 97.9 (02 May 2025 17:00)  HR: 85 (03 May 2025 06:15) (81 - 95)  BP: 132/64 (03 May 2025 06:15) (110/75 - 132/64)  BP(mean): --  RR: 16 (03 May 2025 06:15) (16 - 18)  SpO2: 100% (03 May 2025 06:15) (99% - 100%)    Parameters below as of 03 May 2025 06:15  Patient On (Oxygen Delivery Method): room air        GENERAL: NAD, lying comfortably in bed  HEAD: Atraumatic, normocephalic  EYES: EOMI b/l PERRLA b/l, conjunctiva and sclera clear  NECK: Supple, No JVD, No LAD  RESPIRATORY: Normal respiratory effort; lungs are clear to auscultation bilaterally  CARDIOVASCULAR: Regular rate and rhythm, normal S1 and S2, no murmur/rub/gallop; No lower extremity edema  ABDOMEN: Nontender, normoactive bowel sounds, no rebound/guarding; No hepatosplenomegaly  MUSCULOSKELETAL: no clubbing or cyanosis of digits; no joint swelling or tenderness to palpation  NEURO: Non focal   PSYCH: A+O to person, place, and time; affect appropriate     PROGRESS NOTE:   Authored by Dr. Joe Cha  Patient is a 85y old  Male who presents with a chief complaint of Slurred speech (02 May 2025 14:38)      SUBJECTIVE / OVERNIGHT EVENTS:  Patient was seen and examined at bedside, no new complains, and no overnight events.     MEDICATIONS  (STANDING):  apixaban 2.5 milliGRAM(s) Oral every 12 hours  atorvastatin 40 milliGRAM(s) Oral at bedtime  calcitriol   Capsule 0.25 MICROGram(s) Oral daily  chlorhexidine 2% Cloths 1 Application(s) Topical daily  clotrimazole 1% Cream 1 Application(s) Topical two times a day  dextrose 5%. 1000 milliLiter(s) (100 mL/Hr) IV Continuous <Continuous>  dextrose 5%. 1000 milliLiter(s) (50 mL/Hr) IV Continuous <Continuous>  dextrose 50% Injectable 25 Gram(s) IV Push once  dextrose 50% Injectable 12.5 Gram(s) IV Push once  dextrose 50% Injectable 25 Gram(s) IV Push once  glucagon  Injectable 1 milliGRAM(s) IntraMuscular once  influenza  Vaccine (HIGH DOSE) 0.5 milliLiter(s) IntraMuscular once  insulin lispro (ADMELOG) corrective regimen sliding scale   SubCutaneous three times a day before meals  insulin lispro (ADMELOG) corrective regimen sliding scale   SubCutaneous at bedtime  mupirocin 2% Nasal 1 Application(s) Both Nostrils every 12 hours  NIFEdipine XL 60 milliGRAM(s) Oral daily  sodium bicarbonate 1300 milliGRAM(s) Oral every 8 hours  tamsulosin 0.4 milliGRAM(s) Oral at bedtime    MEDICATIONS  (PRN):  acetaminophen     Tablet .. 650 milliGRAM(s) Oral every 6 hours PRN Temp greater or equal to 38C (100.4F), Mild Pain (1 - 3)  dextrose Oral Gel 15 Gram(s) Oral once PRN Blood Glucose LESS THAN 70 milliGRAM(s)/deciliter  melatonin 3 milliGRAM(s) Oral at bedtime PRN Insomnia      CAPILLARY BLOOD GLUCOSE      POCT Blood Glucose.: 206 mg/dL (02 May 2025 21:52)  POCT Blood Glucose.: 133 mg/dL (02 May 2025 17:40)  POCT Blood Glucose.: 204 mg/dL (02 May 2025 12:06)  POCT Blood Glucose.: 104 mg/dL (02 May 2025 08:27)    I&O's Summary    02 May 2025 07:01  -  03 May 2025 07:00  --------------------------------------------------------  IN: 480 mL / OUT: 200 mL / NET: 280 mL        PHYSICAL EXAM:  Vital Signs Last 24 Hrs  T(C): 36.4 (03 May 2025 06:15), Max: 36.6 (02 May 2025 17:00)  T(F): 97.6 (03 May 2025 06:15), Max: 97.9 (02 May 2025 17:00)  HR: 85 (03 May 2025 06:15) (81 - 95)  BP: 132/64 (03 May 2025 06:15) (110/75 - 132/64)  BP(mean): --  RR: 16 (03 May 2025 06:15) (16 - 18)  SpO2: 100% (03 May 2025 06:15) (99% - 100%)    Parameters below as of 03 May 2025 06:15  Patient On (Oxygen Delivery Method): room air        GENERAL: NAD, lying comfortably in bed  HEAD: Atraumatic, normocephalic  EYES: EOMI b/l PERRLA b/l, conjunctiva and sclera clear  NECK: Supple, No JVD, No LAD  RESPIRATORY: Normal respiratory effort; lungs are clear to auscultation bilaterally  CARDIOVASCULAR: Regular rate and rhythm, normal S1 and S2, no murmur/rub/gallop; No lower extremity edema  ABDOMEN: Nontender, normoactive bowel sounds, no rebound/guarding; No hepatosplenomegaly  MUSCULOSKELETAL: no clubbing or cyanosis of digits; no joint swelling or tenderness to palpation  NEURO: Non focal   PSYCH: A+O to person, place, and time; affect appropriate        LABS:                        11.2   8.87  )-----------( 160      ( 03 May 2025 05:20 )             32.5     05-03    138  |  103  |  50[H]  ----------------------------<  124[H]  4.5   |  22  |  2.25[H]    Ca    9.5      03 May 2025 05:20  Phos  3.5     05-03  Mg     1.80     05-03    TPro  7.1  /  Alb  3.7  /  TBili  0.2  /  DBili  x   /  AST  48[H]  /  ALT  92[H]  /  AlkPhos  109  05-03    PT/INR - ( 02 May 2025 04:42 )   PT: 12.5 sec;   INR: 1.08 ratio         PTT - ( 02 May 2025 04:42 )  PTT:36.2 sec      Urinalysis Basic - ( 03 May 2025 05:20 )    Color: x / Appearance: x / SG: x / pH: x  Gluc: 124 mg/dL / Ketone: x  / Bili: x / Urobili: x   Blood: x / Protein: x / Nitrite: x   Leuk Esterase: x / RBC: x / WBC x   Sq Epi: x / Non Sq Epi: x / Bacteria: x

## 2025-05-03 NOTE — DIETITIAN INITIAL EVALUATION ADULT - NS FNS DIET ORDER
Diet, NPO:   NPO for Procedure/Test     NPO Start Date: 04-May-2025,   NPO Start Time: 23:59  Except Medications (05-02-25 @ 17:34)

## 2025-05-03 NOTE — DIETITIAN INITIAL EVALUATION ADULT - PERTINENT MEDS FT
MEDICATIONS  (STANDING):  apixaban 2.5 milliGRAM(s) Oral every 12 hours  atorvastatin 40 milliGRAM(s) Oral at bedtime  calcitriol   Capsule 0.25 MICROGram(s) Oral daily  chlorhexidine 2% Cloths 1 Application(s) Topical daily  clotrimazole 1% Cream 1 Application(s) Topical two times a day  dextrose 5%. 1000 milliLiter(s) (100 mL/Hr) IV Continuous <Continuous>  dextrose 5%. 1000 milliLiter(s) (50 mL/Hr) IV Continuous <Continuous>  dextrose 50% Injectable 25 Gram(s) IV Push once  dextrose 50% Injectable 12.5 Gram(s) IV Push once  dextrose 50% Injectable 25 Gram(s) IV Push once  glucagon  Injectable 1 milliGRAM(s) IntraMuscular once  influenza  Vaccine (HIGH DOSE) 0.5 milliLiter(s) IntraMuscular once  insulin lispro (ADMELOG) corrective regimen sliding scale   SubCutaneous three times a day before meals  insulin lispro (ADMELOG) corrective regimen sliding scale   SubCutaneous at bedtime  mupirocin 2% Nasal 1 Application(s) Both Nostrils every 12 hours  NIFEdipine XL 60 milliGRAM(s) Oral daily  sodium bicarbonate 1300 milliGRAM(s) Oral every 8 hours  tamsulosin 0.4 milliGRAM(s) Oral at bedtime    MEDICATIONS  (PRN):  acetaminophen     Tablet .. 650 milliGRAM(s) Oral every 6 hours PRN Temp greater or equal to 38C (100.4F), Mild Pain (1 - 3)  dextrose Oral Gel 15 Gram(s) Oral once PRN Blood Glucose LESS THAN 70 milliGRAM(s)/deciliter  melatonin 3 milliGRAM(s) Oral at bedtime PRN Insomnia

## 2025-05-03 NOTE — DIETITIAN INITIAL EVALUATION ADULT - FUNCTIONAL SCREEN CURRENT LEVEL: SWALLOWING (IF SCORE 2 OR MORE FOR ANY ITEM, CONSULT REHAB SERVICES), MLM)
0 = swallows foods/liquids without difficulty 7 y/o male with penile swelling today about 4 hours ago. Likely allergic reaction to penile area. Will start on benadryl and d/c home with f/u

## 2025-05-03 NOTE — DIETITIAN INITIAL EVALUATION ADULT - ORAL INTAKE PTA/DIET HISTORY
Patient reports fair appetite/PO intake PTA, consumes a regular diet at baseline. Pt confirms NKFA, food intolerance. Pt does not recall his UBW at this time.

## 2025-05-04 LAB
ALBUMIN SERPL ELPH-MCNC: 4.3 G/DL — SIGNIFICANT CHANGE UP (ref 3.3–5)
ALP SERPL-CCNC: 132 U/L — HIGH (ref 40–120)
ALT FLD-CCNC: 93 U/L — HIGH (ref 4–41)
ANION GAP SERPL CALC-SCNC: 15 MMOL/L — HIGH (ref 7–14)
AST SERPL-CCNC: 45 U/L — HIGH (ref 4–40)
BILIRUB SERPL-MCNC: 0.4 MG/DL — SIGNIFICANT CHANGE UP (ref 0.2–1.2)
BUN SERPL-MCNC: 41 MG/DL — HIGH (ref 7–23)
CALCIUM SERPL-MCNC: 10.1 MG/DL — SIGNIFICANT CHANGE UP (ref 8.4–10.5)
CHLORIDE SERPL-SCNC: 97 MMOL/L — LOW (ref 98–107)
CO2 SERPL-SCNC: 22 MMOL/L — SIGNIFICANT CHANGE UP (ref 22–31)
CREAT SERPL-MCNC: 1.95 MG/DL — HIGH (ref 0.5–1.3)
EGFR: 34 ML/MIN/1.73M2 — LOW
EGFR: 34 ML/MIN/1.73M2 — LOW
GLUCOSE BLDC GLUCOMTR-MCNC: 135 MG/DL — HIGH (ref 70–99)
GLUCOSE BLDC GLUCOMTR-MCNC: 176 MG/DL — HIGH (ref 70–99)
GLUCOSE BLDC GLUCOMTR-MCNC: 182 MG/DL — HIGH (ref 70–99)
GLUCOSE BLDC GLUCOMTR-MCNC: 272 MG/DL — HIGH (ref 70–99)
GLUCOSE SERPL-MCNC: 147 MG/DL — HIGH (ref 70–99)
HCT VFR BLD CALC: 40.1 % — SIGNIFICANT CHANGE UP (ref 39–50)
HGB BLD-MCNC: 13.5 G/DL — SIGNIFICANT CHANGE UP (ref 13–17)
MAGNESIUM SERPL-MCNC: 1.9 MG/DL — SIGNIFICANT CHANGE UP (ref 1.6–2.6)
MCHC RBC-ENTMCNC: 29.5 PG — SIGNIFICANT CHANGE UP (ref 27–34)
MCHC RBC-ENTMCNC: 33.7 G/DL — SIGNIFICANT CHANGE UP (ref 32–36)
MCV RBC AUTO: 87.7 FL — SIGNIFICANT CHANGE UP (ref 80–100)
NRBC # BLD AUTO: 0 K/UL — SIGNIFICANT CHANGE UP (ref 0–0)
NRBC # FLD: 0 K/UL — SIGNIFICANT CHANGE UP (ref 0–0)
NRBC BLD AUTO-RTO: 0 /100 WBCS — SIGNIFICANT CHANGE UP (ref 0–0)
PHOSPHATE SERPL-MCNC: 3.3 MG/DL — SIGNIFICANT CHANGE UP (ref 2.5–4.5)
PLATELET # BLD AUTO: 193 K/UL — SIGNIFICANT CHANGE UP (ref 150–400)
POTASSIUM SERPL-MCNC: 4.7 MMOL/L — SIGNIFICANT CHANGE UP (ref 3.5–5.3)
POTASSIUM SERPL-SCNC: 4.7 MMOL/L — SIGNIFICANT CHANGE UP (ref 3.5–5.3)
PROT SERPL-MCNC: 8.6 G/DL — HIGH (ref 6–8.3)
RBC # BLD: 4.57 M/UL — SIGNIFICANT CHANGE UP (ref 4.2–5.8)
RBC # FLD: 13.5 % — SIGNIFICANT CHANGE UP (ref 10.3–14.5)
SODIUM SERPL-SCNC: 134 MMOL/L — LOW (ref 135–145)
WBC # BLD: 10.29 K/UL — SIGNIFICANT CHANGE UP (ref 3.8–10.5)
WBC # FLD AUTO: 10.29 K/UL — SIGNIFICANT CHANGE UP (ref 3.8–10.5)

## 2025-05-04 PROCEDURE — 99231 SBSQ HOSP IP/OBS SF/LOW 25: CPT

## 2025-05-04 PROCEDURE — 99233 SBSQ HOSP IP/OBS HIGH 50: CPT

## 2025-05-04 RX ADMIN — Medication 1300 MILLIGRAM(S): at 05:51

## 2025-05-04 RX ADMIN — Medication 1 APPLICATION(S): at 12:25

## 2025-05-04 RX ADMIN — CLOTRIMAZOLE 1 APPLICATION(S): 1 CREAM TOPICAL at 05:53

## 2025-05-04 RX ADMIN — INSULIN LISPRO 3: 100 INJECTION, SOLUTION INTRAVENOUS; SUBCUTANEOUS at 12:27

## 2025-05-04 RX ADMIN — CLOTRIMAZOLE 1 APPLICATION(S): 1 CREAM TOPICAL at 17:41

## 2025-05-04 RX ADMIN — APIXABAN 2.5 MILLIGRAM(S): 2.5 TABLET, FILM COATED ORAL at 05:53

## 2025-05-04 RX ADMIN — TAMSULOSIN HYDROCHLORIDE 0.4 MILLIGRAM(S): 0.4 CAPSULE ORAL at 21:22

## 2025-05-04 RX ADMIN — CALCITRIOL 0.25 MICROGRAM(S): 0.5 CAPSULE, GELATIN COATED ORAL at 12:28

## 2025-05-04 RX ADMIN — Medication 1300 MILLIGRAM(S): at 12:28

## 2025-05-04 RX ADMIN — Medication 1300 MILLIGRAM(S): at 21:21

## 2025-05-04 RX ADMIN — Medication 60 MILLIGRAM(S): at 05:52

## 2025-05-04 RX ADMIN — INSULIN LISPRO 1: 100 INJECTION, SOLUTION INTRAVENOUS; SUBCUTANEOUS at 16:56

## 2025-05-04 RX ADMIN — MUPIROCIN CALCIUM 1 APPLICATION(S): 20 CREAM TOPICAL at 21:21

## 2025-05-04 RX ADMIN — APIXABAN 2.5 MILLIGRAM(S): 2.5 TABLET, FILM COATED ORAL at 17:41

## 2025-05-04 RX ADMIN — ATORVASTATIN CALCIUM 40 MILLIGRAM(S): 80 TABLET, FILM COATED ORAL at 21:22

## 2025-05-04 RX ADMIN — MUPIROCIN CALCIUM 1 APPLICATION(S): 20 CREAM TOPICAL at 12:25

## 2025-05-04 NOTE — PROGRESS NOTE ADULT - TIME BILLING
The time was used discussed with patient, family, primary team, consultants, and acute management.
The time was used discussed with patient, family, primary team, consultants, and acute management.
Reviewing labs/vitals/telemetry/consultant recs, interviewing/examining patient, discussing plan/findings/recommendations, d/w physical therapist, coordinating care, placing orders, documentation

## 2025-05-04 NOTE — PROGRESS NOTE ADULT - PROBLEM SELECTOR PLAN 7
Patient with hx of Afib s/p CABG. As per daughter, the patient is no longer in Afib and is no on AC. There is a chart note from 2019 related to this.   - CTM on tele for signs of Afib/Aflutter  - [id number] score 4  - Spoke with family regarding AC and they are ok with it. Started Eliquis 2.5mg BID

## 2025-05-04 NOTE — PROGRESS NOTE ADULT - PROBLEM SELECTOR PLAN 8
K of 5.8 on admission possibly 2/2 to TAYLOR/CKD. Given insulin / D50 / calcium   - CTM K (wnl today)  - if persistently high can consider a dose of IV diuretic   - monitor on tele

## 2025-05-04 NOTE — PROGRESS NOTE ADULT - PROBLEM SELECTOR PLAN 3
Admitted Creatine 2.76. From speaking with patient's family and looking through health portal, patient's last Cr was 3.3    - urinary retention- Mosher placed in the ED, w/ 1L output   - u/a - protein, blood, LE, pyuria, rvp negative   #NAGMA - 7.28/36/17  - c/w home tamsulosin 0.1mcg  - Monitor labs and urine output. Avoid NSAIDs, ACEI/ARBS, RCA and nephrotoxins. Dose medications as per eGFR  - Hold home meds : lisinopril 2.5mg, Farxiga 5mg, furosemide 20mg BID   - c/w home calcitriol for BMD  - Continue with sodium bicarb 1300mg TID  - Renal bladder US showing signs of renal disease and thickening of urinary bladder wall  - Cr downtrending

## 2025-05-04 NOTE — PROGRESS NOTE ADULT - ASSESSMENT
85yoM w/ PMHx CAD s/p CABG, afib/aflutter (AC self discontinued), CKD stage III, HTN, HLD, DMII initially presented on 4/23 with slurred speech and AMS found to be hypothermic 92F and hyperkalemic 5.8 admitted for sepsis 2/2 UTI now on abx.  EPS consulted for bradycardia HR 40-50s and sinus pauses.  TSH 3.3.  Patient takes Toprol 50mg PO daily. Patient denies any chest pain, palpitations, lightheadedness, dizziness, syncope or near syncope.      ## Bradycardia  ## TAYLOR on CKD  ## UTI  ## Toxic metabolic encephalopathy    -- Telemetry reviewed: overnight NSR with few episodes of Wenckebach. Given findings of intermittent 2:1 HB, expect he will benefit from leadless PPM with ILR explant simultaneously.  -- Keep NPO at midnight for leadless PPM/ILR explant Monday. Please order AM coags, BMP, CBC  -- Keep T&S  active  -- TTE : EF 45-50%  -- Hold AV agustín blockers   -- CHADsVASC: 5 - Eliquis 2.5mg PO BID   -- Continue care per primary team    Tomás Alfaro MD  Cardiology Fellow  All Cardiology service information can be found 24/7 on [email or web address], password: [email or web address]

## 2025-05-04 NOTE — PROGRESS NOTE ADULT - PROBLEM SELECTOR PLAN 4
HR 40-50s,  systolic, EKG w/ 1st degree AV-block, tele with occasional sinus pauses, longest being 2.8s   Etiology possible from infection, hyperkalemia (BRASH syndrome?) exacerbated by infection   - TSH 3.3 wnl   - held AVN blocking agents - metoprolol succinate 50mg   - Was frederick to the 30s today  - EP recs appreciated  - PPM scheduled for Monday   - TTE showing mildly reduced EF, regional wall motion abnormalities, hypokinesis of the basal to mid inferior wall and basal inferolateral wall

## 2025-05-04 NOTE — PROGRESS NOTE ADULT - PROBLEM SELECTOR PLAN 1
Slurred speech at home, urinary retention w/o dysuria, Hypothermic 92F on admission. likely urinary source   - u/a - protein, blood, LE, pyuria, rvp negative   - CXR - w/o consolidations   - was on zoysn (renally dosed), hold off vanc given likely urinary source   - Ucx growing Klebsiella Pneumoniae and Bcx (NGx24)  - Ceftriaxone 1g q24h (5/2)  - TOV passed    - Patient became hypothermic overnight (5/2); asymptomatic; will continue to monitor and trend temps. No episodes since.

## 2025-05-04 NOTE — PROGRESS NOTE ADULT - SUBJECTIVE AND OBJECTIVE BOX
SUBJECTIVE / OVERNIGHT EVENTS:  Patient was seen and examined at bedside, no new complains, and no overnight events.     MEDICATIONS  (STANDING):  apixaban 2.5 milliGRAM(s) Oral every 12 hours  atorvastatin 40 milliGRAM(s) Oral at bedtime  calcitriol   Capsule 0.25 MICROGram(s) Oral daily  chlorhexidine 2% Cloths 1 Application(s) Topical daily  clotrimazole 1% Cream 1 Application(s) Topical two times a day  dextrose 5%. 1000 milliLiter(s) (100 mL/Hr) IV Continuous <Continuous>  dextrose 5%. 1000 milliLiter(s) (50 mL/Hr) IV Continuous <Continuous>  dextrose 50% Injectable 25 Gram(s) IV Push once  dextrose 50% Injectable 12.5 Gram(s) IV Push once  dextrose 50% Injectable 25 Gram(s) IV Push once  glucagon  Injectable 1 milliGRAM(s) IntraMuscular once  influenza  Vaccine (HIGH DOSE) 0.5 milliLiter(s) IntraMuscular once  insulin lispro (ADMELOG) corrective regimen sliding scale   SubCutaneous three times a day before meals  insulin lispro (ADMELOG) corrective regimen sliding scale   SubCutaneous at bedtime  mupirocin 2% Nasal 1 Application(s) Both Nostrils every 12 hours  NIFEdipine XL 60 milliGRAM(s) Oral daily  sodium bicarbonate 1300 milliGRAM(s) Oral every 8 hours  tamsulosin 0.4 milliGRAM(s) Oral at bedtime    MEDICATIONS  (PRN):  acetaminophen     Tablet .. 650 milliGRAM(s) Oral every 6 hours PRN Temp greater or equal to 38C (100.4F), Mild Pain (1 - 3)  dextrose Oral Gel 15 Gram(s) Oral once PRN Blood Glucose LESS THAN 70 milliGRAM(s)/deciliter  melatonin 3 milliGRAM(s) Oral at bedtime PRN Insomnia        CAPILLARY BLOOD GLUCOSE    POCT Blood Glucose.: 199 mg/dL (03 May 2025 22:18)  POCT Blood Glucose.: 206 mg/dL (02 May 2025 21:52)  POCT Blood Glucose.: 133 mg/dL (02 May 2025 17:40)  POCT Blood Glucose.: 204 mg/dL (02 May 2025 12:06)  POCT Blood Glucose.: 104 mg/dL (02 May 2025 08:27)        I&O's Summary    02 May 2025 07:01  -  03 May 2025 07:00  --------------------------------------------------------  IN: 480 mL / OUT: 200 mL / NET: 280 mL    03 May 2025 07:01  -  04 May 2025 06:27  --------------------------------------------------------  IN: 400 mL / OUT: 0 mL / NET: 400 mL        PHYSICAL EXAM:  Vital Signs Last 24 Hrs  T(C): 36.6 (04 May 2025 05:50), Max: 36.6 (04 May 2025 05:50)  T(F): 97.8 (04 May 2025 05:50), Max: 97.8 (04 May 2025 05:50)  HR: 82 (04 May 2025 05:50) (76 - 83)  BP: 160/81 (04 May 2025 05:50) (121/80 - 160/81)  BP(mean): --  RR: 18 (04 May 2025 05:50) (17 - 18)  SpO2: 100% (04 May 2025 05:50) (98% - 100%)    Parameters below as of 04 May 2025 05:50  Patient On (Oxygen Delivery Method): room air        GENERAL: NAD, lying comfortably in bed  HEAD: Atraumatic, normocephalic  EYES: EOMI b/l PERRLA b/l, conjunctiva and sclera clear  NECK: Supple, No JVD, No LAD  RESPIRATORY: Normal respiratory effort; lungs are clear to auscultation bilaterally  CARDIOVASCULAR: Regular rate and rhythm, normal S1 and S2, no murmur/rub/gallop; No lower extremity edema  ABDOMEN: Nontender, normoactive bowel sounds, no rebound/guarding; No hepatosplenomegaly  MUSCULOSKELETAL: no clubbing or cyanosis of digits; no joint swelling or tenderness to palpation  NEURO: Non focal   PSYCH: A+O to person, place, and time; affect appropriate        LABS:                                   13.5   10.29 )-----------( 193      ( 04 May 2025 03:40 )             40.1                    11.2   8.87  )-----------( 160      ( 03 May 2025 05:20 )             32.5         05-04    134[L]  |  97[L]  |  41[H]  ----------------------------<  147[H]  4.7   |  22  |  1.95[H]    Ca    10.1      04 May 2025 03:40  Phos  3.3     05-04  Mg     1.90     05-04    TPro  8.6[H]  /  Alb  4.3  /  TBili  0.4  /  DBili  x   /  AST  45[H]  /  ALT  93[H]  /  AlkPhos  132[H]  05-04        05-03    138  |  103  |  50[H]  ----------------------------<  124[H]  4.5   |  22  |  2.25[H]    Ca    9.5      03 May 2025 05:20  Phos  3.5     05-03  Mg     1.80     05-03    TPro  7.1  /  Alb  3.7  /  TBili  0.2  /  DBili  x   /  AST  48[H]  /  ALT  92[H]  /  AlkPhos  109  05-03    PT/INR - ( 02 May 2025 04:42 )   PT: 12.5 sec;   INR: 1.08 ratio         PTT - ( 02 May 2025 04:42 )  PTT:36.2 sec      Urinalysis Basic - ( 03 May 2025 05:20 )    Color: x / Appearance: x / SG: x / pH: x  Gluc: 124 mg/dL / Ketone: x  / Bili: x / Urobili: x   Blood: x / Protein: x / Nitrite: x   Leuk Esterase: x / RBC: x / WBC x   Sq Epi: x / Non Sq Epi: x / Bacteria: x

## 2025-05-04 NOTE — PROGRESS NOTE ADULT - PROBLEM SELECTOR PLAN 11
- Nutrition: Soft bite size after passing dysphagia screen  - Activity: as tolerated   - DVT Prophylaxis: On Eliquis  - Disposition: VITALIY, will need speech pathologist f/u, pending PPM

## 2025-05-04 NOTE — PROGRESS NOTE ADULT - SUBJECTIVE AND OBJECTIVE BOX
Tomás Alfaro MD  Cardiology Fellow  All Cardiology service information can be found 24/7 on [email or web address], password: [employer]    Overnight Events:   No acute events overnight. Feels well this AM. Tele shows few episodes of Wenckebach, no further 2:1 AVB.  Patient seen and examined at bedside.  No chest pain, shortness of breath, lightheadedness, dizziness, bleeding, palpitations       Current Meds:  acetaminophen     Tablet .. 650 milliGRAM(s) Oral every 6 hours PRN  apixaban 2.5 milliGRAM(s) Oral every 12 hours  atorvastatin 40 milliGRAM(s) Oral at bedtime  calcitriol   Capsule 0.25 MICROGram(s) Oral daily  chlorhexidine 2% Cloths 1 Application(s) Topical daily  clotrimazole 1% Cream 1 Application(s) Topical two times a day  dextrose 5%. 1000 milliLiter(s) IV Continuous <Continuous>  dextrose 5%. 1000 milliLiter(s) IV Continuous <Continuous>  dextrose 50% Injectable 25 Gram(s) IV Push once  dextrose 50% Injectable 12.5 Gram(s) IV Push once  dextrose 50% Injectable 25 Gram(s) IV Push once  dextrose Oral Gel 15 Gram(s) Oral once PRN  glucagon  Injectable 1 milliGRAM(s) IntraMuscular once  influenza  Vaccine (HIGH DOSE) 0.5 milliLiter(s) IntraMuscular once  insulin lispro (ADMELOG) corrective regimen sliding scale   SubCutaneous three times a day before meals  insulin lispro (ADMELOG) corrective regimen sliding scale   SubCutaneous at bedtime  melatonin 3 milliGRAM(s) Oral at bedtime PRN  mupirocin 2% Nasal 1 Application(s) Both Nostrils every 12 hours  NIFEdipine XL 60 milliGRAM(s) Oral daily  sodium bicarbonate 1300 milliGRAM(s) Oral every 8 hours  tamsulosin 0.4 milliGRAM(s) Oral at bedtime      PAST MEDICAL & SURGICAL HISTORY:  Type 2 diabetes mellitus without complication      CVA (cerebral vascular accident)      Non-ST elevation (NSTEMI) myocardial infarction      CAD (coronary artery disease)      CKD (chronic kidney disease), stage III      HTN (hypertension)      HLD (hyperlipidemia)      Status post placement of implantable loop recorder      H/O carpal tunnel repair      S/P CABG (coronary artery bypass graft)          Vitals:  T(F): 97.8 (05-04), Max: 97.8 (05-04)  HR: 82 (05-04) (76 - 83)  BP: 160/81 (05-04) (121/80 - 160/81)  RR: 18 (05-04)  SpO2: 100% (05-04)  I&O's Summary    03 May 2025 07:01  -  04 May 2025 07:00  --------------------------------------------------------  IN: 400 mL / OUT: 0 mL / NET: 400 mL        Physical Exam:  Appearance: No acute distress; well appearing  Eyes: PERRL, EOMI, pink conjunctiva  HENT: Normal oral mucosa  Cardiovascular: RRR, S1, S2, no murmurs, rubs, or gallops; no edema; no JVD  Respiratory: Clear to auscultation bilaterally  Musculoskeletal: No clubbing; no joint deformity   Neurologic: Non-focal  Psychiatry: AAOx3, mood & affect appropriate  Skin: No rashes, ecchymoses, or cyanosis                          13.5   10.29 )-----------( 193      ( 04 May 2025 03:40 )             40.1     05-04    134[L]  |  97[L]  |  41[H]  ----------------------------<  147[H]  4.7   |  22  |  1.95[H]    Ca    10.1      04 May 2025 03:40  Phos  3.3     05-04  Mg     1.90     05-04    TPro  8.6[H]  /  Alb  4.3  /  TBili  0.4  /  DBili  x   /  AST  45[H]  /  ALT  93[H]  /  AlkPhos  132[H]  05-04        New ECG(s): Personally reviewed    Echo:    Stress Testing:     Cath:    Imaging:    Interpretation of Telemetry:  Sinus rhythm with few episodes of Wenckebach

## 2025-05-05 ENCOUNTER — TRANSCRIPTION ENCOUNTER (OUTPATIENT)
Age: 85
End: 2025-05-05

## 2025-05-05 DIAGNOSIS — R10.9 UNSPECIFIED ABDOMINAL PAIN: ICD-10-CM

## 2025-05-05 LAB
ALBUMIN SERPL ELPH-MCNC: 3.6 G/DL — SIGNIFICANT CHANGE UP (ref 3.3–5)
ALP SERPL-CCNC: 111 U/L — SIGNIFICANT CHANGE UP (ref 40–120)
ALT FLD-CCNC: 62 U/L — HIGH (ref 4–41)
ANION GAP SERPL CALC-SCNC: 13 MMOL/L — SIGNIFICANT CHANGE UP (ref 7–14)
APTT BLD: 37.8 SEC — HIGH (ref 26.1–36.8)
AST SERPL-CCNC: 24 U/L — SIGNIFICANT CHANGE UP (ref 4–40)
BILIRUB SERPL-MCNC: 0.4 MG/DL — SIGNIFICANT CHANGE UP (ref 0.2–1.2)
BLD GP AB SCN SERPL QL: NEGATIVE — SIGNIFICANT CHANGE UP
BUN SERPL-MCNC: 35 MG/DL — HIGH (ref 7–23)
CALCIUM SERPL-MCNC: 9.5 MG/DL — SIGNIFICANT CHANGE UP (ref 8.4–10.5)
CHLORIDE SERPL-SCNC: 96 MMOL/L — LOW (ref 98–107)
CO2 SERPL-SCNC: 23 MMOL/L — SIGNIFICANT CHANGE UP (ref 22–31)
CREAT SERPL-MCNC: 1.92 MG/DL — HIGH (ref 0.5–1.3)
EGFR: 34 ML/MIN/1.73M2 — LOW
EGFR: 34 ML/MIN/1.73M2 — LOW
GLUCOSE BLDC GLUCOMTR-MCNC: 122 MG/DL — HIGH (ref 70–99)
GLUCOSE BLDC GLUCOMTR-MCNC: 143 MG/DL — HIGH (ref 70–99)
GLUCOSE BLDC GLUCOMTR-MCNC: 155 MG/DL — HIGH (ref 70–99)
GLUCOSE BLDC GLUCOMTR-MCNC: 203 MG/DL — HIGH (ref 70–99)
GLUCOSE SERPL-MCNC: 146 MG/DL — HIGH (ref 70–99)
HCT VFR BLD CALC: 37.1 % — LOW (ref 39–50)
HGB BLD-MCNC: 12.5 G/DL — LOW (ref 13–17)
INR BLD: 1.3 RATIO — HIGH (ref 0.85–1.16)
MAGNESIUM SERPL-MCNC: 1.9 MG/DL — SIGNIFICANT CHANGE UP (ref 1.6–2.6)
MCHC RBC-ENTMCNC: 30 PG — SIGNIFICANT CHANGE UP (ref 27–34)
MCHC RBC-ENTMCNC: 33.7 G/DL — SIGNIFICANT CHANGE UP (ref 32–36)
MCV RBC AUTO: 89 FL — SIGNIFICANT CHANGE UP (ref 80–100)
NRBC # BLD AUTO: 0 K/UL — SIGNIFICANT CHANGE UP (ref 0–0)
NRBC # FLD: 0 K/UL — SIGNIFICANT CHANGE UP (ref 0–0)
NRBC BLD AUTO-RTO: 0 /100 WBCS — SIGNIFICANT CHANGE UP (ref 0–0)
PHOSPHATE SERPL-MCNC: 3.6 MG/DL — SIGNIFICANT CHANGE UP (ref 2.5–4.5)
PLATELET # BLD AUTO: 205 K/UL — SIGNIFICANT CHANGE UP (ref 150–400)
POTASSIUM SERPL-MCNC: 4.1 MMOL/L — SIGNIFICANT CHANGE UP (ref 3.5–5.3)
POTASSIUM SERPL-SCNC: 4.1 MMOL/L — SIGNIFICANT CHANGE UP (ref 3.5–5.3)
PROT SERPL-MCNC: 7.2 G/DL — SIGNIFICANT CHANGE UP (ref 6–8.3)
PROTHROM AB SERPL-ACNC: 15.4 SEC — HIGH (ref 9.9–13.4)
RBC # BLD: 4.17 M/UL — LOW (ref 4.2–5.8)
RBC # FLD: 13.2 % — SIGNIFICANT CHANGE UP (ref 10.3–14.5)
RH IG SCN BLD-IMP: POSITIVE — SIGNIFICANT CHANGE UP
SODIUM SERPL-SCNC: 132 MMOL/L — LOW (ref 135–145)
WBC # BLD: 10.69 K/UL — HIGH (ref 3.8–10.5)
WBC # FLD AUTO: 10.69 K/UL — HIGH (ref 3.8–10.5)

## 2025-05-05 PROCEDURE — 99232 SBSQ HOSP IP/OBS MODERATE 35: CPT | Mod: GC

## 2025-05-05 PROCEDURE — 99231 SBSQ HOSP IP/OBS SF/LOW 25: CPT

## 2025-05-05 PROCEDURE — 93010 ELECTROCARDIOGRAM REPORT: CPT

## 2025-05-05 PROCEDURE — 71045 X-RAY EXAM CHEST 1 VIEW: CPT | Mod: 26

## 2025-05-05 PROCEDURE — 33274 TCAT INSJ/RPL PERM LDLS PM: CPT

## 2025-05-05 PROCEDURE — 33286 RMVL SUBQ CAR RHYTHM MNTR: CPT | Mod: 59

## 2025-05-05 RX ADMIN — CLOTRIMAZOLE 1 APPLICATION(S): 1 CREAM TOPICAL at 05:14

## 2025-05-05 RX ADMIN — Medication 1300 MILLIGRAM(S): at 05:14

## 2025-05-05 RX ADMIN — INSULIN LISPRO 1: 100 INJECTION, SOLUTION INTRAVENOUS; SUBCUTANEOUS at 07:58

## 2025-05-05 RX ADMIN — APIXABAN 2.5 MILLIGRAM(S): 2.5 TABLET, FILM COATED ORAL at 05:14

## 2025-05-05 RX ADMIN — TAMSULOSIN HYDROCHLORIDE 0.4 MILLIGRAM(S): 0.4 CAPSULE ORAL at 21:27

## 2025-05-05 RX ADMIN — CALCITRIOL 0.25 MICROGRAM(S): 0.5 CAPSULE, GELATIN COATED ORAL at 17:50

## 2025-05-05 RX ADMIN — ATORVASTATIN CALCIUM 40 MILLIGRAM(S): 80 TABLET, FILM COATED ORAL at 21:28

## 2025-05-05 RX ADMIN — MUPIROCIN CALCIUM 1 APPLICATION(S): 20 CREAM TOPICAL at 09:08

## 2025-05-05 RX ADMIN — MUPIROCIN CALCIUM 1 APPLICATION(S): 20 CREAM TOPICAL at 21:28

## 2025-05-05 RX ADMIN — Medication 1300 MILLIGRAM(S): at 21:27

## 2025-05-05 RX ADMIN — Medication 60 MILLIGRAM(S): at 05:15

## 2025-05-05 RX ADMIN — CLOTRIMAZOLE 1 APPLICATION(S): 1 CREAM TOPICAL at 18:24

## 2025-05-05 RX ADMIN — Medication 1 APPLICATION(S): at 12:08

## 2025-05-05 NOTE — PROGRESS NOTE ADULT - PROBLEM SELECTOR PLAN 3
Admitted Creatine 2.76. From speaking with patient's family and looking through health portal, patient's last Cr was 3.3    - urinary retention- Mosher placed in the ED, w/ 1L output   - u/a - protein, blood, LE, pyuria, rvp negative   #NAGMA - 7.28/36/17  - c/w home tamsulosin 0.1mcg  - Monitor labs and urine output. Avoid NSAIDs, ACEI/ARBS, RCA and nephrotoxins. Dose medications as per eGFR  - Hold home meds : lisinopril 2.5mg, Farxiga 5mg, furosemide 20mg BID   - c/w home calcitriol for BMD  - Continue with sodium bicarb 1300mg TID  - Renal bladder US showing signs of renal disease and thickening of urinary bladder wall  - Cr downtrending Patient with pink/red urine seen overnight and blood noticed on the tip of the penis. May have pulled his reynolds during positional changes during the day.    Plan:  - CTM for continued signs of hematuria although this may be due to trauma and UTI. Urine has started to clear up.

## 2025-05-05 NOTE — PROGRESS NOTE ADULT - PROBLEM SELECTOR PLAN 11
- Nutrition: Soft bite size after passing dysphagia screen  - Activity: as tolerated   - DVT Prophylaxis: On Eliquis  - Disposition: VITALIY, will need speech pathologist f/u, pending PPM Hyperglycemic 300 on admission --> hypoglycemic after hyperkalemia cocktail- BHB = 0 not DKA   - Home regimen: Glipizide 5mg AM and 10mg in the PM - hold   - A1c: 7.8%  - ISS for now  - monitor FS, goal 110-180

## 2025-05-05 NOTE — PROGRESS NOTE ADULT - NS ATTEND AMEND GEN_ALL_CORE FT
plan for leadless PPM when ready
plan for leadless PPM when ready
Leadless PPM today
patient should call the office.    While transcribing every attempt was made to maintain the accuracy of the note in terms of it's contents,there may have been some errors made inadvertently.  Thank you for allowing me to participate in the care of this patient.    Gio Esposito MD.    Cc: Lesli Cotton MD      
plan for PPM when ready
plan for leadless PPM when ready
plan for PPM when ready

## 2025-05-05 NOTE — PROGRESS NOTE ADULT - SUBJECTIVE AND OBJECTIVE BOX
Patient offers no complaints.   He denies chest pain, shortness of breath, palpitations or lightheadedness.   No events overnight.  NPO for leadless PPM placement later today.        Vital Signs Last 24 Hrs  T(C): 36.4 (05 May 2025 05:00), Max: 36.8 (04 May 2025 17:40)  T(F): 97.6 (05 May 2025 05:00), Max: 98.2 (04 May 2025 17:40)  HR: 86 (05 May 2025 05:00) (79 - 86)  BP: 131/86 (05 May 2025 05:00) (124/67 - 155/71)  BP(mean): --  RR: 17 (05 May 2025 05:00) (17 - 18)  SpO2: 96% (05 May 2025 05:00) (96% - 100%)    Parameters below as of 05 May 2025 05:00  Patient On (Oxygen Delivery Method): room air      Telemetry: Sinus rhythm 70-90's with 1st degree AVB.     MEDICATIONS  (STANDING):  apixaban 2.5 milliGRAM(s) Oral every 12 hours  atorvastatin 40 milliGRAM(s) Oral at bedtime  calcitriol   Capsule 0.25 MICROGram(s) Oral daily  chlorhexidine 2% Cloths 1 Application(s) Topical daily  clotrimazole 1% Cream 1 Application(s) Topical two times a day  dextrose 5%. 1000 milliLiter(s) (50 mL/Hr) IV Continuous <Continuous>  dextrose 5%. 1000 milliLiter(s) (100 mL/Hr) IV Continuous <Continuous>  dextrose 50% Injectable 12.5 Gram(s) IV Push once  dextrose 50% Injectable 25 Gram(s) IV Push once  dextrose 50% Injectable 25 Gram(s) IV Push once  glucagon  Injectable 1 milliGRAM(s) IntraMuscular once  influenza  Vaccine (HIGH DOSE) 0.5 milliLiter(s) IntraMuscular once  insulin lispro (ADMELOG) corrective regimen sliding scale   SubCutaneous three times a day before meals  insulin lispro (ADMELOG) corrective regimen sliding scale   SubCutaneous at bedtime  mupirocin 2% Nasal 1 Application(s) Both Nostrils every 12 hours  NIFEdipine XL 60 milliGRAM(s) Oral daily  sodium bicarbonate 1300 milliGRAM(s) Oral every 8 hours  tamsulosin 0.4 milliGRAM(s) Oral at bedtime    MEDICATIONS  (PRN):  acetaminophen     Tablet .. 650 milliGRAM(s) Oral every 6 hours PRN Temp greater or equal to 38C (100.4F), Mild Pain (1 - 3)  dextrose Oral Gel 15 Gram(s) Oral once PRN Blood Glucose LESS THAN 70 milliGRAM(s)/deciliter  melatonin 3 milliGRAM(s) Oral at bedtime PRN Insomnia          Physical exam:   Gen- well developed well nourished in NAD  Resp-  clear to auscultation. No wheezing, rales or rhonchi  CV- S1 and S2 RRR. No murmurs, gallops or rubs  ABD- soft, mild diffuse tenderness to palpation. + bowel sounds No distension.   EXT- no edema or calf tenderness bilaterallly  Neuro- grossly nonfocal                            12.5   10.69 )-----------( 205      ( 05 May 2025 04:11 )             37.1     PT/INR - ( 05 May 2025 04:11 )   PT: 15.4 sec;   INR: 1.30 ratio         PTT - ( 05 May 2025 04:11 )  PTT:37.8 sec  05-05    132[L]  |  96[L]  |  35[H]  ----------------------------<  146[H]  4.1   |  23  |  1.92[H]    Ca    9.5      05 May 2025 04:11  Phos  3.6     05-05  Mg     1.90     05-05    TPro  7.2  /  Alb  3.6  /  TBili  0.4  /  DBili  x   /  AST  24  /  ALT  62[H]  /  AlkPhos  111  05-05        < from: TTE W or WO Ultrasound Enhancing Agent (05.01.25 @ 10:29) >  TRANSTHORACIC ECHOCARDIOGRAM REPORT  ________________________________________________________________________________                                      _______       Pt. Name:       JOSEFINA KUMAR Study Date:    5/1/2025  MRN:            [id number]        YOB: 1940  Accession #:    [id number]        Age:           85 years  Account#:       [id number]         Gender:        M  Heart Rate:                      Height:        62.00 in (157.48 cm)  Rhythm:                          Weight:        132.00 lb (59.88 kg)  Blood Pressure: 109/78 mmHg      BSA/BMI:       1.60 m² / 24.14 kg/m²  ________________________________________________________________________________________  Referring Physician:    [id number] Merlin Mathew  Interpreting Physician: Aguila Castillo M.D.  Primary Sonographer:    [facility]  Fellow (Performing):    Kemal Patel MD    CPT:               ECHO TTE WO CON COMP W DOPP - 19233.m  Indication(s):     Abnormal electrocardiogram ECG EKG - R94.31  Procedure:         Transthoracic echocardiogram with 2-D, M-mode and complete                     spectral and color flow Doppler.  Ordering Location: [facility]  Admission Status:  Inpatient  Study Information: Image quality for this study is good.    _______________________________________________________________________________________     CONCLUSIONS:      1. The left ventricular cavity is normal in size. Left ventricular wall thickness is normal. Left ventricular systolic function is mildly decreased with an ejection fraction visually estimated at 45 to 50%. There are regional wall motion abnormalities present. Hypokinesis of the basal to mid inferior wall and basal inferolateral wall. There is mild (grade 1) left ventricular diastolic dysfunction.   2. Normal right ventricular cavity size and normal right ventricular systolic function. Tricuspid annular plane systolic excursion (TAPSE) is 2.6 cm (normal >=1.7 cm).   3. Structurally normal mitral valve with normal leaflet excursion. There is calcification of the mitral valve annulus. There is mild mitral regurgitation.   4. No prior echocardiogram is available for comparison.    ________________________________________________________________________________________  FINDINGS:     Left Ventricle:  The left ventricular cavity is normal in size. Left ventricular wall thickness is normal. Left ventricular systolic function is mildly decreased with an ejection fraction visually estimated at 45 to 50%. There are regional wall motion abnormalities present. Hypokinesis of the basal to mid inferior wall and basal inferolateral wall. There is mild (grade 1) left ventricular diastolic dysfunction.     Right Ventricle:  The right ventricular cavity is normal in size and right ventricular systolic function is normal. Tricuspid annular plane systolic excursion (TAPSE) is 2.6 cm (normal >=1.7 cm).    Left Atrium:  The left atrium is normal in size with an indexed volume of 29.02 ml/m².     Right Atrium:  The right atrium is normal in size with an indexed volume of 18.16 ml/m² and an indexed area of 8.61 cm²/m².     Aortic Valve:  The aortic valve appears trileaflet with normal systolic excursion. There is calcification of the aortic valve leaflets. There is no evidence of aortic regurgitation.     Mitral Valve:  Structurally normal mitral valve with normal leaflet excursion. There is calcification of the mitral valve annulus. There is mild mitral regurgitation.     Tricuspid Valve:  Structurally normal tricuspid valve with normalleaflet excursion. There is trace tricuspid regurgitation. There is insufficient tricuspid regurgitation detected to calculate pulmonary artery systolic pressure.     Pulmonic Valve:  Structurally normal pulmonic valve with normal leaflet excursion. There is trace pulmonic regurgitation.     Aorta:  The aortic root at the sinuses of Valsalva is normal in size, measuring 2.50 cm (indexed 1.56 cm/m²). The ascending aorta is normal in size, measuring 2.80 cm (indexed 1.75 cm/m²).     Pericardium:  No pericardial effusion seen.     Systemic Veins:  The inferior vena cava was not well visualized.  ____________________________________________________________________

## 2025-05-05 NOTE — PROGRESS NOTE ADULT - PROBLEM SELECTOR PLAN 10
Hyperglycemic 300 on admission --> hypoglycemic after hyperkalemia cocktail- BHB = 0 not DKA   - Home regimen: Glipizide 5mg AM and 10mg in the PM - hold   - A1c: 7.8%  - ISS for now  - monitor FS, goal 110-180 Home regimen : metop succinate 50, nifedipine 60mg qd,   - Hold Metoprolol Succinate  - Restart Nifedipine 60mg  - BPs have been stable

## 2025-05-05 NOTE — PROGRESS NOTE ADULT - PROBLEM SELECTOR PLAN 4
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 From speaking with patient's family and looking through health portal, patient's last Cr was 3.3    - urinary retention- Mosher placed in the ED, w/ 1L output   - u/a - protein, blood, LE, pyuria, rvp negative   #NAGMA - 7.28/36/17  - c/w home tamsulosin 0.1mcg  - Monitor labs and urine output. Avoid NSAIDs, ACEI/ARBS, RCA and nephrotoxins. Dose medications as per eGFR  - Hold home meds : lisinopril 2.5mg, Farxiga 5mg, furosemide 20mg BID   - c/w home calcitriol for BMD  - Continue with sodium bicarb 1300mg TID  - Renal bladder US showing signs of renal disease and thickening of urinary bladder wall  - Cr downtrending

## 2025-05-05 NOTE — PROGRESS NOTE ADULT - SUBJECTIVE AND OBJECTIVE BOX
SUBJECTIVE / OVERNIGHT EVENTS:  Patient was seen and examined at bedside, no new complains, and no overnight events. Ready for his PPM today.     MEDICATIONS  (STANDING):  apixaban 2.5 milliGRAM(s) Oral every 12 hours  atorvastatin 40 milliGRAM(s) Oral at bedtime  calcitriol   Capsule 0.25 MICROGram(s) Oral daily  chlorhexidine 2% Cloths 1 Application(s) Topical daily  clotrimazole 1% Cream 1 Application(s) Topical two times a day  dextrose 5%. 1000 milliLiter(s) (100 mL/Hr) IV Continuous <Continuous>  dextrose 5%. 1000 milliLiter(s) (50 mL/Hr) IV Continuous <Continuous>  dextrose 50% Injectable 25 Gram(s) IV Push once  dextrose 50% Injectable 12.5 Gram(s) IV Push once  dextrose 50% Injectable 25 Gram(s) IV Push once  glucagon  Injectable 1 milliGRAM(s) IntraMuscular once  influenza  Vaccine (HIGH DOSE) 0.5 milliLiter(s) IntraMuscular once  insulin lispro (ADMELOG) corrective regimen sliding scale   SubCutaneous three times a day before meals  insulin lispro (ADMELOG) corrective regimen sliding scale   SubCutaneous at bedtime  mupirocin 2% Nasal 1 Application(s) Both Nostrils every 12 hours  NIFEdipine XL 60 milliGRAM(s) Oral daily  sodium bicarbonate 1300 milliGRAM(s) Oral every 8 hours  tamsulosin 0.4 milliGRAM(s) Oral at bedtime    MEDICATIONS  (PRN):  acetaminophen     Tablet .. 650 milliGRAM(s) Oral every 6 hours PRN Temp greater or equal to 38C (100.4F), Mild Pain (1 - 3)  dextrose Oral Gel 15 Gram(s) Oral once PRN Blood Glucose LESS THAN 70 milliGRAM(s)/deciliter  melatonin 3 milliGRAM(s) Oral at bedtime PRN Insomnia        CAPILLARY BLOOD GLUCOSE    POCT Blood Glucose.: 176 mg/dL (04 May 2025 21:07)  POCT Blood Glucose.: 199 mg/dL (03 May 2025 22:18)  POCT Blood Glucose.: 206 mg/dL (02 May 2025 21:52)  POCT Blood Glucose.: 133 mg/dL (02 May 2025 17:40)  POCT Blood Glucose.: 204 mg/dL (02 May 2025 12:06)  POCT Blood Glucose.: 104 mg/dL (02 May 2025 08:27)        I&O's Summary    03 May 2025 07:01  -  04 May 2025 07:00  --------------------------------------------------------  IN: 400 mL / OUT: 0 mL / NET: 400 mL    04 May 2025 07:01  -  05 May 2025 06:55  --------------------------------------------------------  IN: 520 mL / OUT: 800 mL / NET: -280 mL        PHYSICAL EXAM:  Vital Signs Last 24 Hrs  T(C): 36.4 (05 May 2025 05:00), Max: 36.8 (04 May 2025 17:40)  T(F): 97.6 (05 May 2025 05:00), Max: 98.2 (04 May 2025 17:40)  HR: 86 (05 May 2025 05:00) (79 - 86)  BP: 131/86 (05 May 2025 05:00) (124/67 - 155/71)  BP(mean): --  RR: 17 (05 May 2025 05:00) (17 - 18)  SpO2: 96% (05 May 2025 05:00) (96% - 100%)    Parameters below as of 05 May 2025 05:00  Patient On (Oxygen Delivery Method): room air        GENERAL: NAD, lying comfortably in bed  HEAD: Atraumatic, normocephalic  EYES: EOMI b/l PERRLA b/l, conjunctiva and sclera clear  NECK: Supple, No JVD, No LAD  RESPIRATORY: Normal respiratory effort; lungs are clear to auscultation bilaterally  CARDIOVASCULAR: Regular rate and rhythm, normal S1 and S2, no murmur/rub/gallop; No lower extremity edema  ABDOMEN: Nontender, normoactive bowel sounds, no rebound/guarding; No hepatosplenomegaly  MUSCULOSKELETAL: no clubbing or cyanosis of digits; no joint swelling or tenderness to palpation  NEURO: Non focal   PSYCH: A+O to person, place, and time; affect appropriate        LABS:                                12.5   10.69 )-----------( 205      ( 05 May 2025 04:11 )             37.1                                13.5   10.29 )-----------( 193      ( 04 May 2025 03:40 )             40.1                    11.2   8.87  )-----------( 160      ( 03 May 2025 05:20 )             32.5         05-05    132[L]  |  96[L]  |  35[H]  ----------------------------<  146[H]  4.1   |  23  |  1.92[H]    Ca    9.5      05 May 2025 04:11  Phos  3.6     05-05  Mg     1.90     05-05    TPro  7.2  /  Alb  3.6  /  TBili  0.4  /  DBili  x   /  AST  24  /  ALT  62[H]  /  AlkPhos  111  05-05      05-04    134[L]  |  97[L]  |  41[H]  ----------------------------<  147[H]  4.7   |  22  |  1.95[H]    Ca    10.1      04 May 2025 03:40  Phos  3.3     05-04  Mg     1.90     05-04    TPro  8.6[H]  /  Alb  4.3  /  TBili  0.4  /  DBili  x   /  AST  45[H]  /  ALT  93[H]  /  AlkPhos  132[H]  05-04        05-03    138  |  103  |  50[H]  ----------------------------<  124[H]  4.5   |  22  |  2.25[H]    Ca    9.5      03 May 2025 05:20  Phos  3.5     05-03  Mg     1.80     05-03    TPro  7.1  /  Alb  3.7  /  TBili  0.2  /  DBili  x   /  AST  48[H]  /  ALT  92[H]  /  AlkPhos  109  05-03    PT/INR - ( 02 May 2025 04:42 )   PT: 12.5 sec;   INR: 1.08 ratio         PTT - ( 02 May 2025 04:42 )  PTT:36.2 sec      Urinalysis Basic - ( 03 May 2025 05:20 )    Color: x / Appearance: x / SG: x / pH: x  Gluc: 124 mg/dL / Ketone: x  / Bili: x / Urobili: x   Blood: x / Protein: x / Nitrite: x   Leuk Esterase: x / RBC: x / WBC x   Sq Epi: x / Non Sq Epi: x / Bacteria: x         SUBJECTIVE / OVERNIGHT EVENTS:  Patient was seen and examined at bedside, no new complains, and no overnight events. Ready for his PPM today.     MEDICATIONS  (STANDING):  apixaban 2.5 milliGRAM(s) Oral every 12 hours  atorvastatin 40 milliGRAM(s) Oral at bedtime  calcitriol   Capsule 0.25 MICROGram(s) Oral daily  chlorhexidine 2% Cloths 1 Application(s) Topical daily  clotrimazole 1% Cream 1 Application(s) Topical two times a day  dextrose 5%. 1000 milliLiter(s) (100 mL/Hr) IV Continuous <Continuous>  dextrose 5%. 1000 milliLiter(s) (50 mL/Hr) IV Continuous <Continuous>  dextrose 50% Injectable 25 Gram(s) IV Push once  dextrose 50% Injectable 12.5 Gram(s) IV Push once  dextrose 50% Injectable 25 Gram(s) IV Push once  glucagon  Injectable 1 milliGRAM(s) IntraMuscular once  influenza  Vaccine (HIGH DOSE) 0.5 milliLiter(s) IntraMuscular once  insulin lispro (ADMELOG) corrective regimen sliding scale   SubCutaneous three times a day before meals  insulin lispro (ADMELOG) corrective regimen sliding scale   SubCutaneous at bedtime  mupirocin 2% Nasal 1 Application(s) Both Nostrils every 12 hours  NIFEdipine XL 60 milliGRAM(s) Oral daily  sodium bicarbonate 1300 milliGRAM(s) Oral every 8 hours  tamsulosin 0.4 milliGRAM(s) Oral at bedtime    MEDICATIONS  (PRN):  acetaminophen     Tablet .. 650 milliGRAM(s) Oral every 6 hours PRN Temp greater or equal to 38C (100.4F), Mild Pain (1 - 3)  dextrose Oral Gel 15 Gram(s) Oral once PRN Blood Glucose LESS THAN 70 milliGRAM(s)/deciliter  melatonin 3 milliGRAM(s) Oral at bedtime PRN Insomnia        CAPILLARY BLOOD GLUCOSE    POCT Blood Glucose.: 176 mg/dL (04 May 2025 21:07)  POCT Blood Glucose.: 199 mg/dL (03 May 2025 22:18)  POCT Blood Glucose.: 206 mg/dL (02 May 2025 21:52)  POCT Blood Glucose.: 133 mg/dL (02 May 2025 17:40)  POCT Blood Glucose.: 204 mg/dL (02 May 2025 12:06)  POCT Blood Glucose.: 104 mg/dL (02 May 2025 08:27)        I&O's Summary    03 May 2025 07:01  -  04 May 2025 07:00  --------------------------------------------------------  IN: 400 mL / OUT: 0 mL / NET: 400 mL    04 May 2025 07:01  -  05 May 2025 06:55  --------------------------------------------------------  IN: 520 mL / OUT: 800 mL / NET: -280 mL        PHYSICAL EXAM:  Vital Signs Last 24 Hrs  T(C): 36.4 (05 May 2025 05:00), Max: 36.8 (04 May 2025 17:40)  T(F): 97.6 (05 May 2025 05:00), Max: 98.2 (04 May 2025 17:40)  HR: 86 (05 May 2025 05:00) (79 - 86)  BP: 131/86 (05 May 2025 05:00) (124/67 - 155/71)  BP(mean): --  RR: 17 (05 May 2025 05:00) (17 - 18)  SpO2: 96% (05 May 2025 05:00) (96% - 100%)    Parameters below as of 05 May 2025 05:00  Patient On (Oxygen Delivery Method): room air        GENERAL: NAD, lying comfortably in bed  HEAD: Atraumatic, normocephalic  EYES: EOMI b/l PERRLA b/l, conjunctiva and sclera clear  NECK: Supple, No JVD, No LAD  RESPIRATORY: Normal respiratory effort; lungs are clear to auscultation bilaterally  CARDIOVASCULAR: Regular rate and rhythm, normal S1 and S2, no murmur/rub/gallop; No lower extremity edema  ABDOMEN: TTP in the LLQ (new), patient guarding, normoactive bowel sounds, No hepatosplenomegaly  MUSCULOSKELETAL: no clubbing or cyanosis of digits; no joint swelling or tenderness to palpation  NEURO: Non focal   PSYCH: A+O to person, place, and time; affect appropriate        LABS:                                12.5   10.69 )-----------( 205      ( 05 May 2025 04:11 )             37.1                                13.5   10.29 )-----------( 193      ( 04 May 2025 03:40 )             40.1                    11.2   8.87  )-----------( 160      ( 03 May 2025 05:20 )             32.5         05-05    132[L]  |  96[L]  |  35[H]  ----------------------------<  146[H]  4.1   |  23  |  1.92[H]    Ca    9.5      05 May 2025 04:11  Phos  3.6     05-05  Mg     1.90     05-05    TPro  7.2  /  Alb  3.6  /  TBili  0.4  /  DBili  x   /  AST  24  /  ALT  62[H]  /  AlkPhos  111  05-05      05-04    134[L]  |  97[L]  |  41[H]  ----------------------------<  147[H]  4.7   |  22  |  1.95[H]    Ca    10.1      04 May 2025 03:40  Phos  3.3     05-04  Mg     1.90     05-04    TPro  8.6[H]  /  Alb  4.3  /  TBili  0.4  /  DBili  x   /  AST  45[H]  /  ALT  93[H]  /  AlkPhos  132[H]  05-04        05-03    138  |  103  |  50[H]  ----------------------------<  124[H]  4.5   |  22  |  2.25[H]    Ca    9.5      03 May 2025 05:20  Phos  3.5     05-03  Mg     1.80     05-03    TPro  7.1  /  Alb  3.7  /  TBili  0.2  /  DBili  x   /  AST  48[H]  /  ALT  92[H]  /  AlkPhos  109  05-03    PT/INR - ( 02 May 2025 04:42 )   PT: 12.5 sec;   INR: 1.08 ratio         PTT - ( 02 May 2025 04:42 )  PTT:36.2 sec      Urinalysis Basic - ( 03 May 2025 05:20 )    Color: x / Appearance: x / SG: x / pH: x  Gluc: 124 mg/dL / Ketone: x  / Bili: x / Urobili: x   Blood: x / Protein: x / Nitrite: x   Leuk Esterase: x / RBC: x / WBC x   Sq Epi: x / Non Sq Epi: x / Bacteria: x

## 2025-05-05 NOTE — PROGRESS NOTE ADULT - PROBLEM SELECTOR PLAN 2
Patient with pink/red urine seen overnight and blood noticed on the tip of the penis. May have pulled his reynolds during positional changes during the day.    Plan:  - CTM for continued signs of hematuria although this may be due to trauma and UTI. Urine has started to clear up. Patient with new onset left lower quadrant abdominal tenderness. Patient was seen grimacing in pain and jumped when palpating LLQ    Plan:  - Pain and elevated WBC noted, will continue to monitor  - if pain continues will order CT A/P

## 2025-05-05 NOTE — PROGRESS NOTE ADULT - ATTENDING COMMENTS
85 y.o. 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     # Aflutter v afib   # Wenckebach with intermittent 2:1  [ ] Micra placement today  - Initial Bradycardia maybe related to hypothermia, electrolyte abnormalities, medications?   - EP following   - Cont Eliquis, D/C Metoprolol     # Hypothermia  - Previously resolved, hypothermic last night but infectious ROS negative and patient very well appearing.   - Initially 2/2 infection v metabolic acidosis impairing thermoregulation   - AM cortisol, TSH wnl, CT without acute infarcts    # Transaminitis   - Present on admission, fluctuating. Can be 2/2 OP Fluconazole, hold for now.   - Ctm     # UTI c/b TME   - UCx: >100K Klebsiella   - S/p abx, course completed 5/2     # TAYLOR on CKD c/b metabolic acidosis and hyperkalemia, resolved  # Urinary retention, resolved   - S/p bicarb drip, off Lokelma now   - S/p Mosher placement c/b hematuria. No clots seen. hematuria maybe 2/2 UTI v traumatic insertion  - Hold Lisinopril   - Passed TOV  - Per daughter, Cr was 3.3 in January- current Cr may represent baseline?     # Slurred speech   - 2/2 TME v stroke- oneida given pt in afib, at risk for stroke   - MRI brain negative for acute stroke   - passed dysphagia screen  - speech therapy eval noted, they did find some deficiencies   - Improving     Dispo: [facility], awaiting auth

## 2025-05-05 NOTE — PROGRESS NOTE ADULT - PROBLEM SELECTOR PLAN 9
Home regimen : metop succinate 50, nifedipine 60mg qd,   - Hold Metoprolol Succinate  - Restart Nifedipine 60mg  - BPs have been stable K of 5.8 on admission possibly 2/2 to TAYLOR/CKD. Given insulin / D50 / calcium   - CTM K (wnl today)  - if persistently high can consider a dose of IV diuretic   - monitor on tele

## 2025-05-05 NOTE — PROGRESS NOTE ADULT - PROBLEM SELECTOR PLAN 6
hx of CABG 2019, EKG reviewed w/o ST changes or TWI   - c/w home atorvastatin 40mg  - Discontinue Aspirin as the patient has stable CAD and is over 1 year since CABG (AFIRE trial). Slurred speech for 3-4d, neuro exam unremarkable other than slurred speech. Likely toxic metabolic encephalopathy from infection  CTH and neck with angio - age related changes, paranasal sinus disease, mild to mod left proximal ICA stenosis, hypoplastic R vertebral artery, 3mm anterior communicating artery saccular aneurysm   - aspiration precautions   - passed dysphagia screen  - MRI head negative for acute infarction  - As per conversations with family, the patient's speech is showing signs of improvement

## 2025-05-05 NOTE — PROGRESS NOTE ADULT - ASSESSMENT
85yoM w/ PMHx CAD s/p CABG 8/2019 with post operative afib/aflutter previously on amiodarone/Eliquis, CKD stage III, HTN, HLD, DMT2 initially presented on 4/23 with slurred speech and AMS. Found to be hypothermic (92F) and hyperkalemic 5.8 in the setting of sepsis 2/2 UTI.  EPS consulted for sinus bradycardia HR 40-50s, sinus pauses, Wenckebach and intermittent 2:1 AV block. Currently in sinus rhythm with 1st degree AVB. No AFib overnight. Eliquis restarted. Patient had been on Toprol 50mg PO daily. Now on hold.   ECHO: LVEF mildly decreased with EF 45-50%. Normal RV. Mild mitral regurgitation.   TSH: 3.3  CHADsVASc2 5 -> on Eliquis 2.5mg bid    Plan:   -- Keep NPO at midnight for leadless PPM/ILR explant today. Patient is consented.   -- Keep T&S  active  -- Continue to hold AV agustín blockers        85yoM w/ PMHx CAD s/p CABG 8/2019 with post operative afib/aflutter previously on amiodarone/Eliquis, CKD stage III, HTN, HLD, DMT2 initially presented on 4/23 with slurred speech and AMS. Found to be hypothermic (92F) and hyperkalemic 5.8 in the setting of sepsis 2/2 UTI.  EPS consulted for sinus bradycardia HR 40-50s, sinus pauses, Wenckebach and intermittent 2:1 AV block. Currently in sinus rhythm with 1st degree AVB. No AFib overnight. Eliquis restarted. Patient had been on Toprol 50mg PO daily. Now on hold.   ECHO: LVEF mildly decreased with EF 45-50%. Normal RV. Mild mitral regurgitation.   TSH: 3.3  CHADsVASc2 5 -> on Eliquis 2.5mg bid    Plan:   -- Keep NPO at midnight for leadless PPM implant and ILR explant today. Patient is consented.   -- Keep T&S  active  -- Continue to hold AV agustín blockers

## 2025-05-05 NOTE — PROGRESS NOTE ADULT - PROBLEM SELECTOR PLAN 8
K of 5.8 on admission possibly 2/2 to TAYLOR/CKD. Given insulin / D50 / calcium   - CTM K (wnl today)  - if persistently high can consider a dose of IV diuretic   - monitor on tele Patient with hx of Afib s/p CABG. As per daughter, the patient is no longer in Afib and is no on AC. There is a chart note from 2019 related to this.   - CTM on tele for signs of Afib/Aflutter  - Tekn2ucdn score 4  - Spoke with family regarding AC and they are ok with it. Started Eliquis 2.5mg BID

## 2025-05-05 NOTE — PROGRESS NOTE ADULT - PROBLEM SELECTOR PLAN 7
Patient with hx of Afib s/p CABG. As per daughter, the patient is no longer in Afib and is no on AC. There is a chart note from 2019 related to this.   - CTM on tele for signs of Afib/Aflutter  - [id number] score 4  - Spoke with family regarding AC and they are ok with it. Started Eliquis 2.5mg BID hx of CABG 2019, EKG reviewed w/o ST changes or TWI   - c/w home atorvastatin 40mg  - Discontinue Aspirin as the patient has stable CAD and is over 1 year since CABG (AFIRE trial).

## 2025-05-05 NOTE — PROGRESS NOTE ADULT - PROBLEM SELECTOR PLAN 5
Slurred speech for 3-4d, neuro exam unremarkable other than slurred speech. Likely toxic metabolic encephalopathy from infection  CTH and neck with angio - age related changes, paranasal sinus disease, mild to mod left proximal ICA stenosis, hypoplastic R vertebral artery, 3mm anterior communicating artery saccular aneurysm   - aspiration precautions   - passed dysphagia screen  - MRI head negative for acute infarction  - As per conversations with family, the patient's speech is showing signs of improvement HR 40-50s,  systolic, EKG w/ 1st degree AV-block, tele with occasional sinus pauses, longest being 2.8s   Etiology possible from infection, hyperkalemia (BRASH syndrome?) exacerbated by infection   - TSH 3.3 wnl   - held AVN blocking agents - metoprolol succinate 50mg   - Was frederick to the 30s today  - EP recs appreciated  - PPM scheduled for today  - TTE showing mildly reduced EF, regional wall motion abnormalities, hypokinesis of the basal to mid inferior wall and basal inferolateral wall

## 2025-05-06 ENCOUNTER — TRANSCRIPTION ENCOUNTER (OUTPATIENT)
Age: 85
End: 2025-05-06

## 2025-05-06 LAB
ALBUMIN SERPL ELPH-MCNC: 3.4 G/DL — SIGNIFICANT CHANGE UP (ref 3.3–5)
ALP SERPL-CCNC: 108 U/L — SIGNIFICANT CHANGE UP (ref 40–120)
ALT FLD-CCNC: 42 U/L — HIGH (ref 4–41)
ANION GAP SERPL CALC-SCNC: 13 MMOL/L — SIGNIFICANT CHANGE UP (ref 7–14)
AST SERPL-CCNC: 24 U/L — SIGNIFICANT CHANGE UP (ref 4–40)
BASOPHILS # BLD AUTO: 0.04 K/UL — SIGNIFICANT CHANGE UP (ref 0–0.2)
BASOPHILS NFR BLD AUTO: 0.3 % — SIGNIFICANT CHANGE UP (ref 0–2)
BILIRUB SERPL-MCNC: 0.3 MG/DL — SIGNIFICANT CHANGE UP (ref 0.2–1.2)
BUN SERPL-MCNC: 39 MG/DL — HIGH (ref 7–23)
CALCIUM SERPL-MCNC: 9.1 MG/DL — SIGNIFICANT CHANGE UP (ref 8.4–10.5)
CHLORIDE SERPL-SCNC: 99 MMOL/L — SIGNIFICANT CHANGE UP (ref 98–107)
CO2 SERPL-SCNC: 22 MMOL/L — SIGNIFICANT CHANGE UP (ref 22–31)
CREAT SERPL-MCNC: 2.09 MG/DL — HIGH (ref 0.5–1.3)
CRP SERPL-MCNC: 180 MG/L — HIGH
EGFR: 31 ML/MIN/1.73M2 — LOW
EGFR: 31 ML/MIN/1.73M2 — LOW
EOSINOPHIL # BLD AUTO: 0.26 K/UL — SIGNIFICANT CHANGE UP (ref 0–0.5)
EOSINOPHIL NFR BLD AUTO: 2.3 % — SIGNIFICANT CHANGE UP (ref 0–6)
GLUCOSE BLDC GLUCOMTR-MCNC: 113 MG/DL — HIGH (ref 70–99)
GLUCOSE BLDC GLUCOMTR-MCNC: 130 MG/DL — HIGH (ref 70–99)
GLUCOSE BLDC GLUCOMTR-MCNC: 144 MG/DL — HIGH (ref 70–99)
GLUCOSE BLDC GLUCOMTR-MCNC: 226 MG/DL — HIGH (ref 70–99)
GLUCOSE SERPL-MCNC: 132 MG/DL — HIGH (ref 70–99)
HCT VFR BLD CALC: 33 % — LOW (ref 39–50)
HGB BLD-MCNC: 11.4 G/DL — LOW (ref 13–17)
IANC: 8.75 K/UL — HIGH (ref 1.8–7.4)
IMM GRANULOCYTES NFR BLD AUTO: 0.3 % — SIGNIFICANT CHANGE UP (ref 0–0.9)
LACTATE SERPL-SCNC: 1 MMOL/L — SIGNIFICANT CHANGE UP (ref 0.5–2)
LYMPHOCYTES # BLD AUTO: 1.28 K/UL — SIGNIFICANT CHANGE UP (ref 1–3.3)
LYMPHOCYTES # BLD AUTO: 11.2 % — LOW (ref 13–44)
MAGNESIUM SERPL-MCNC: 1.8 MG/DL — SIGNIFICANT CHANGE UP (ref 1.6–2.6)
MCHC RBC-ENTMCNC: 30.2 PG — SIGNIFICANT CHANGE UP (ref 27–34)
MCHC RBC-ENTMCNC: 34.5 G/DL — SIGNIFICANT CHANGE UP (ref 32–36)
MCV RBC AUTO: 87.3 FL — SIGNIFICANT CHANGE UP (ref 80–100)
MONOCYTES # BLD AUTO: 1.07 K/UL — HIGH (ref 0–0.9)
MONOCYTES NFR BLD AUTO: 9.4 % — SIGNIFICANT CHANGE UP (ref 2–14)
NEUTROPHILS # BLD AUTO: 8.75 K/UL — HIGH (ref 1.8–7.4)
NEUTROPHILS NFR BLD AUTO: 76.5 % — SIGNIFICANT CHANGE UP (ref 43–77)
NRBC # BLD AUTO: 0 K/UL — SIGNIFICANT CHANGE UP (ref 0–0)
NRBC # FLD: 0 K/UL — SIGNIFICANT CHANGE UP (ref 0–0)
NRBC BLD AUTO-RTO: 0 /100 WBCS — SIGNIFICANT CHANGE UP (ref 0–0)
PHOSPHATE SERPL-MCNC: 3.4 MG/DL — SIGNIFICANT CHANGE UP (ref 2.5–4.5)
PLATELET # BLD AUTO: 202 K/UL — SIGNIFICANT CHANGE UP (ref 150–400)
POTASSIUM SERPL-MCNC: 4.7 MMOL/L — SIGNIFICANT CHANGE UP (ref 3.5–5.3)
POTASSIUM SERPL-SCNC: 4.7 MMOL/L — SIGNIFICANT CHANGE UP (ref 3.5–5.3)
PROT SERPL-MCNC: 6.8 G/DL — SIGNIFICANT CHANGE UP (ref 6–8.3)
RBC # BLD: 3.78 M/UL — LOW (ref 4.2–5.8)
RBC # FLD: 13 % — SIGNIFICANT CHANGE UP (ref 10.3–14.5)
SODIUM SERPL-SCNC: 134 MMOL/L — LOW (ref 135–145)
WBC # BLD: 11.43 K/UL — HIGH (ref 3.8–10.5)
WBC # FLD AUTO: 11.43 K/UL — HIGH (ref 3.8–10.5)

## 2025-05-06 PROCEDURE — 99238 HOSP IP/OBS DSCHRG MGMT 30/<: CPT | Mod: GC

## 2025-05-06 RX ORDER — APIXABAN 2.5 MG/1
2.5 TABLET, FILM COATED ORAL EVERY 12 HOURS
Refills: 0 | Status: DISCONTINUED | OUTPATIENT
Start: 2025-05-06 | End: 2025-05-07

## 2025-05-06 RX ORDER — METOPROLOL SUCCINATE 50 MG/1
1 TABLET, EXTENDED RELEASE ORAL
Qty: 0 | Refills: 0 | DISCHARGE
Start: 2025-05-06

## 2025-05-06 RX ORDER — CLOTRIMAZOLE 1 G/100G
1 CREAM TOPICAL
Qty: 0 | Refills: 0 | DISCHARGE
Start: 2025-05-06

## 2025-05-06 RX ORDER — SODIUM BICARBONATE 1 MEQ/ML
2 SYRINGE (ML) INTRAVENOUS
Qty: 0 | Refills: 0 | DISCHARGE
Start: 2025-05-06

## 2025-05-06 RX ORDER — METOPROLOL SUCCINATE 50 MG/1
1 TABLET, EXTENDED RELEASE ORAL
Refills: 0 | DISCHARGE

## 2025-05-06 RX ORDER — FUROSEMIDE 10 MG/ML
1 INJECTION INTRAMUSCULAR; INTRAVENOUS
Refills: 0 | DISCHARGE

## 2025-05-06 RX ORDER — LISINOPRIL 5 MG/1
2.5 TABLET ORAL DAILY
Refills: 0 | Status: DISCONTINUED | OUTPATIENT
Start: 2025-05-06 | End: 2025-05-07

## 2025-05-06 RX ORDER — METOPROLOL SUCCINATE 50 MG/1
25 TABLET, EXTENDED RELEASE ORAL DAILY
Refills: 0 | Status: DISCONTINUED | OUTPATIENT
Start: 2025-05-06 | End: 2025-05-07

## 2025-05-06 RX ORDER — FLUCONAZOLE 150 MG
1 TABLET ORAL
Refills: 0 | DISCHARGE

## 2025-05-06 RX ADMIN — MUPIROCIN CALCIUM 1 APPLICATION(S): 20 CREAM TOPICAL at 09:24

## 2025-05-06 RX ADMIN — Medication 60 MILLIGRAM(S): at 05:20

## 2025-05-06 RX ADMIN — CLOTRIMAZOLE 1 APPLICATION(S): 1 CREAM TOPICAL at 17:22

## 2025-05-06 RX ADMIN — ATORVASTATIN CALCIUM 40 MILLIGRAM(S): 80 TABLET, FILM COATED ORAL at 22:01

## 2025-05-06 RX ADMIN — Medication 1 APPLICATION(S): at 12:56

## 2025-05-06 RX ADMIN — METOPROLOL SUCCINATE 25 MILLIGRAM(S): 50 TABLET, EXTENDED RELEASE ORAL at 15:33

## 2025-05-06 RX ADMIN — APIXABAN 2.5 MILLIGRAM(S): 2.5 TABLET, FILM COATED ORAL at 15:33

## 2025-05-06 RX ADMIN — Medication 1300 MILLIGRAM(S): at 13:16

## 2025-05-06 RX ADMIN — Medication 1300 MILLIGRAM(S): at 22:02

## 2025-05-06 RX ADMIN — CALCITRIOL 0.25 MICROGRAM(S): 0.5 CAPSULE, GELATIN COATED ORAL at 12:56

## 2025-05-06 RX ADMIN — Medication 1300 MILLIGRAM(S): at 05:20

## 2025-05-06 RX ADMIN — CLOTRIMAZOLE 1 APPLICATION(S): 1 CREAM TOPICAL at 05:20

## 2025-05-06 RX ADMIN — TAMSULOSIN HYDROCHLORIDE 0.4 MILLIGRAM(S): 0.4 CAPSULE ORAL at 22:06

## 2025-05-06 NOTE — PROVIDER CONTACT NOTE (OTHER) - ACTION/TREATMENT ORDERED:
Will remove in AM.
no new orders given at this time. continue to monitor neuro checks as ordered.
MD made aware, plan of care ongoing.
hold subcutaneous heparin injection. endorsed to day team to follow up.
acp notified
place patient on bear hugger warming blanket
no new orders given at this time

## 2025-05-06 NOTE — PROGRESS NOTE ADULT - PROBLEM SELECTOR PLAN 4
Admitted Creatine 2.76. From speaking with patient's family and looking through health portal, patient's last Cr was 3.3    - urinary retention- Mosher placed in the ED, w/ 1L output   - u/a - protein, blood, LE, pyuria, rvp negative   #NAGMA - 7.28/36/17  - c/w home tamsulosin 0.1mcg  - Monitor labs and urine output. Avoid NSAIDs, ACEI/ARBS, RCA and nephrotoxins. Dose medications as per eGFR  - Hold home meds : lisinopril 2.5mg, Farxiga 5mg, furosemide 20mg BID   - c/w home calcitriol for BMD  - Continue with sodium bicarb 1300mg TID  - Renal bladder US showing signs of renal disease and thickening of urinary bladder wall  - Cr downtrending Admitted Creatine 2.76. From speaking with patient's family and looking through health portal, patient's last Cr was 3.3    - urinary retention- Mosher placed in the ED, w/ 1L output   - u/a - protein, blood, LE, pyuria, rvp negative   #NAGMA - 7.28/36/17  - c/w home tamsulosin 0.1mcg  - Monitor labs and urine output. Avoid NSAIDs, ACEI/ARBS, RCA and nephrotoxins. Dose medications as per eGFR  - Hold home meds : Farxiga 5mg, furosemide 20mg BID   - c/w home calcitriol for BMD  - Continue with sodium bicarb 1300mg TID  - Renal bladder US showing signs of renal disease and thickening of urinary bladder wall  - Cr downtrending  - Restart home lisinopril

## 2025-05-06 NOTE — PROGRESS NOTE ADULT - SUBJECTIVE AND OBJECTIVE BOX
Subjective: Denies HA, lightheadedness, dizziness, CP, palpitation, SOB, and pain at the PPM and ILR site.     Past 24 events:  -s/p ILR explant and micra PPM on 5/5/2025  - Post-op PPM implant and ILR explant instruction has been verbally explained and given to the patient. Patient was also given home monitor, booklet and ID card. Patient expressed understanding and all questions were answered. A copy of the instruction is located in the patients chart     MEDICATIONS  (STANDING):  atorvastatin 40 milliGRAM(s) Oral at bedtime  calcitriol   Capsule 0.25 MICROGram(s) Oral daily  chlorhexidine 2% Cloths 1 Application(s) Topical daily  clotrimazole 1% Cream 1 Application(s) Topical two times a day  dextrose 5%. 1000 milliLiter(s) (100 mL/Hr) IV Continuous <Continuous>  dextrose 5%. 1000 milliLiter(s) (50 mL/Hr) IV Continuous <Continuous>  dextrose 50% Injectable 25 Gram(s) IV Push once  dextrose 50% Injectable 12.5 Gram(s) IV Push once  dextrose 50% Injectable 25 Gram(s) IV Push once  glucagon  Injectable 1 milliGRAM(s) IntraMuscular once  influenza  Vaccine (HIGH DOSE) 0.5 milliLiter(s) IntraMuscular once  insulin lispro (ADMELOG) corrective regimen sliding scale   SubCutaneous three times a day before meals  insulin lispro (ADMELOG) corrective regimen sliding scale   SubCutaneous at bedtime  mupirocin 2% Nasal 1 Application(s) Both Nostrils every 12 hours  NIFEdipine XL 60 milliGRAM(s) Oral daily  sodium bicarbonate 1300 milliGRAM(s) Oral every 8 hours  tamsulosin 0.4 milliGRAM(s) Oral at bedtime    MEDICATIONS  (PRN):  acetaminophen     Tablet .. 650 milliGRAM(s) Oral every 6 hours PRN Temp greater or equal to 38C (100.4F), Mild Pain (1 - 3)  dextrose Oral Gel 15 Gram(s) Oral once PRN Blood Glucose LESS THAN 70 milliGRAM(s)/deciliter  melatonin 3 milliGRAM(s) Oral at bedtime PRN Insomnia      Vital Signs Last 24 Hrs  T(C): 36.5 (06 May 2025 05:10), Max: 37 (05 May 2025 17:50)  T(F): 97.7 (06 May 2025 05:10), Max: 98.6 (05 May 2025 17:50)  HR: 87 (06 May 2025 05:10) (74 - 91)  BP: 125/71 (06 May 2025 05:10) (103/53 - 139/90)  BP(mean): --  RR: 17 (06 May 2025 05:10) (13 - 17)  SpO2: 98% (06 May 2025 05:10) (96% - 100%)    Parameters below as of 06 May 2025 05:10  Patient On (Oxygen Delivery Method): room air      I&O's Detail    05 May 2025 07:01  -  06 May 2025 07:00  --------------------------------------------------------  IN:    Oral Fluid: 200 mL  Total IN: 200 mL    OUT:    Voided (mL): 500 mL  Total OUT: 500 mL    Total NET: -300 mL          Physical Exam:  GENERAL: Lying in bed, well appearing, speaking in full sentence, in NAD  HEART: S1S2 RRR  ILR site  was  clean, dry, and intact. No bleeding, drainage hematoma, or ecchymosis appreciate  PULMONARY: CTABL anteriorly, normal respiratory effort  Right groin site  was  clean, dry, and intact. No bleeding, drainage hematoma, or ecchymosis appreciated.    ABDOMEN: + Bowel sounds present, soft, NDNT  EXTREMITIES:  Warm, well -perfused, no pedal edema, distal pulses present  NEUROLOGICAL:AOx3    TELEMETERIC: 1st degree AV block HR 70-90 bpm                            11.4   11.43 )-----------( 202      ( 06 May 2025 04:08 )             33.0     PT/INR - ( 05 May 2025 04:11 )   PT: 15.4 sec;   INR: 1.30 ratio         PTT - ( 05 May 2025 04:11 )  PTT:37.8 sec  05-06    134[L]  |  99  |  39[H]  ----------------------------<  132[H]  4.7   |  22  |  2.09[H]    Ca    9.1      06 May 2025 04:08  Phos  3.4     05-06  Mg     1.80     05-06    TPro  6.8  /  Alb  3.4  /  TBili  0.3  /  DBili  x   /  AST  24  /  ALT  42[H]  /  AlkPhos  108  05-06      < from: TTE W or WO Ultrasound Enhancing Agent (05.01.25 @ 10:29) >  TRANSTHORACIC ECHOCARDIOGRAM REPORT  ________________________________________________________________________________                                      _______       Pt. Name:       JOSEFINA KUMAR Study Date:    5/1/2025  MRN:            [id number]        YOB: 1940  Accession #:    [id number]        Age:           85 years  Account#:       [id number]         Gender:        M  Heart Rate:                      Height:        62.00 in (157.48 cm)  Rhythm:                          Weight:        132.00 lb (59.88 kg)  Blood Pressure: 109/78 mmHg      BSA/BMI:       1.60 m² / 24.14 kg/m²  ________________________________________________________________________________________  Referring Physician:    [id number] Merlin Mathew  Interpreting Physician: Aguila Castillo M.D.  Primary Sonographer:    [facility]  Fellow (Performing):    Kemal Patel MD    CPT:               ECHO TTE WO CON COMP W DOPP - 19500.m  Indication(s):     Abnormal electrocardiogram ECG EKG - R94.31  Procedure:         Transthoracic echocardiogram with 2-D, M-mode and complete                     spectral and color flow Doppler.  Ordering Location: [facility]  Admission Status:  Inpatient  Study Information: Image quality for this study is good.    _______________________________________________________________________________________     CONCLUSIONS:      1. The left ventricular cavity is normal in size. Left ventricular wall thickness is normal. Left ventricular systolic function is mildly decreased with an ejection fraction visually estimated at 45 to 50%. There are regional wall motion abnormalities present. Hypokinesis of the basal to mid inferior wall and basal inferolateral wall. There is mild (grade 1) left ventricular diastolic dysfunction.   2. Normal right ventricular cavity size and normal right ventricular systolic function. Tricuspid annular plane systolic excursion (TAPSE) is 2.6 cm (normal >=1.7 cm).   3. Structurally normal mitral valve with normal leaflet excursion. There is calcification of the mitral valve annulus. There is mild mitral regurgitation.   4. No prior echocardiogram is available for comparison.    ________________________________________________________________________________________  FINDINGS:     Left Ventricle:  The left ventricular cavity is normal in size. Left ventricular wall thickness is normal. Left ventricular systolic function is mildly decreased with an ejection fraction visually estimated at 45 to 50%. There are regional wall motion abnormalities present. Hypokinesis of the basal to mid inferior wall and basal inferolateral wall. There is mild (grade 1) left ventricular diastolic dysfunction.     Right Ventricle:  The right ventricular cavity is normal in size and right ventricular systolic function is normal. Tricuspid annular plane systolic excursion (TAPSE) is 2.6 cm (normal >=1.7 cm).    Left Atrium:  The left atrium is normal in size with an indexed volume of 29.02 ml/m².     Right Atrium:  The right atrium is normal in size with an indexed volume of 18.16 ml/m² and an indexed area of 8.61 cm²/m².     Aortic Valve:  The aortic valve appears trileaflet with normal systolic excursion. There is calcification of the aortic valve leaflets. There is no evidence of aortic regurgitation.     Mitral Valve:  Structurally normal mitral valve with normal leaflet excursion. There is calcification of the mitral valve annulus. There is mild mitral regurgitation.     Tricuspid Valve:  Structurally normal tricuspid valve with normalleaflet excursion. There is trace tricuspid regurgitation. There is insufficient tricuspid regurgitation detected to calculate pulmonary artery systolic pressure.     Pulmonic Valve:  Structurally normal pulmonic valve with normal leaflet excursion. There is trace pulmonic regurgitation.     Aorta:  The aortic root at the sinuses of Valsalva is normal in size, measuring 2.50 cm (indexed 1.56 cm/m²). The ascending aorta is normal in size, measuring 2.80 cm (indexed 1.75 cm/m²).     Pericardium:  No pericardial effusion seen.     Systemic Veins:  The inferior vena cava was not well visualized.

## 2025-05-06 NOTE — PROVIDER CONTACT NOTE (OTHER) - DATE AND TIME:
01-May-2025 23:17
06-May-2025 01:54
27-Apr-2025 20:20
27-Apr-2025 20:20
28-Apr-2025 06:08
26-Apr-2025 23:24
27-Apr-2025 09:20

## 2025-05-06 NOTE — PROGRESS NOTE ADULT - PROBLEM SELECTOR PLAN 10
Home regimen : metop succinate 50, nifedipine 60mg qd,   - Hold Metoprolol Succinate  - Restart Nifedipine 60mg  - BPs have been stable Home regimen : metop succinate 50, nifedipine 60mg qd,   - Restarted Metoprolol Succinate 25mg  - Restart Nifedipine 60mg  - BPs have been stable

## 2025-05-06 NOTE — DISCHARGE NOTE PROVIDER - NSDCFUADDAPPT_GEN_ALL_CORE_FT
APPTS ARE READY TO BE MADE: [X] YES    Best Family or Patient Contact (if needed):    Additional Information about above appointments (if needed):    1:   2:   3:     Other comments or requests:    APPTS ARE READY TO BE MADE: [X] YES    Best Family or Patient Contact (if needed):    Additional Information about above appointments (if needed):    1: PCP Dr. aCthleen Bhatti  2: Cardiologist Dr. Faulkner  3:     Other comments or requests:    APPTS ARE READY TO BE MADE: [X] YES    Best Family or Patient Contact (if needed):    Additional Information about above appointments (if needed):    1: PCP Dr. Cathleen Bhatti  2: Cardiologist Dr. Faulkner  3: Electrophysiology appt 5/21/2025    Other comments or requests:    APPTS ARE READY TO BE MADE: [X] YES    Best Family or Patient Contact (if needed):    Additional Information about above appointments (if needed):    1: PCP Dr. Cathleen Bhatti  2: Cardiologist Dr. Faulkner  3: Electrophysiology appt 5/21/2025    Other comments or requests:     Patient is being transferred. Caregiver will arrange follow up.

## 2025-05-06 NOTE — PROGRESS NOTE ADULT - PROBLEM SELECTOR PLAN 5
HR 40-50s,  systolic, EKG w/ 1st degree AV-block, tele with occasional sinus pauses, longest being 2.8s   Etiology possible from infection, hyperkalemia (BRASH syndrome?) exacerbated by infection   - TSH 3.3 wnl   - held AVN blocking agents - metoprolol succinate 50mg   - Was frederick to the 30s today  - EP recs appreciated  - s/p PPM placement  - TTE showing mildly reduced EF, regional wall motion abnormalities, hypokinesis of the basal to mid inferior wall and basal inferolateral wall

## 2025-05-06 NOTE — DISCHARGE NOTE PROVIDER - NSDCMRMEDTOKEN_GEN_ALL_CORE_FT
atorvastatin 40 mg oral tablet: 1 tab(s) orally once a day (at bedtime)  calcitriol 0.25 mcg oral capsule: 1 cap(s) orally once a day  clotrimazole 1% topical cream: 1 Apply topically to affected area 2 times a day  Eliquis 2.5 mg oral tablet: 1 tab(s) orally every 12 hours  Farxiga 5 mg oral tablet: 1 tab(s) orally once a day  ferrous sulfate 324 mg (65 mg elemental iron) oral delayed release tablet: 1 tab(s) orally once a day  fluconazole 200 mg oral tablet: 1 tab(s) orally once a week For oncomycosis  glipiZIDE 10 mg oral tablet: 1 tab(s) orally once a day (in the evening)  glipiZIDE 5 mg oral tablet: 1 tab(s) orally once a day (in the morning)  lisinopril 2.5 mg oral tablet: 1 tab(s) orally once a day  Metoprolol Succinate ER 25 mg oral tablet, extended release: 1 tab(s) orally once a day  NIFEdipine 60 mg oral tablet, extended release: 1 tab(s) orally once a day  sodium bicarbonate 650 mg oral tablet: 2 tab(s) orally every 8 hours  tamsulosin 0.4 mg oral capsule: 1 cap(s) orally once a day   atorvastatin 40 mg oral tablet: 1 tab(s) orally once a day (at bedtime)  calcitriol 0.25 mcg oral capsule: 1 cap(s) orally once a day  clotrimazole 1% topical cream: 1 Apply topically to affected area 2 times a day  Eliquis 2.5 mg oral tablet: 1 tab(s) orally every 12 hours  Farxiga 5 mg oral tablet: 1 tab(s) orally once a day  ferrous sulfate 324 mg (65 mg elemental iron) oral delayed release tablet: 1 tab(s) orally once a day  glipiZIDE 10 mg oral tablet: 1 tab(s) orally once a day (in the evening)  glipiZIDE 5 mg oral tablet: 1 tab(s) orally once a day (in the morning)  lisinopril 2.5 mg oral tablet: 1 tab(s) orally once a day  Metoprolol Succinate ER 25 mg oral tablet, extended release: 1 tab(s) orally once a day  NIFEdipine 60 mg oral tablet, extended release: 1 tab(s) orally once a day  sodium bicarbonate 650 mg oral tablet: 2 tab(s) orally every 8 hours  tamsulosin 0.4 mg oral capsule: 1 cap(s) orally once a day

## 2025-05-06 NOTE — DISCHARGE NOTE PROVIDER - CARE PROVIDER_API CALL
Jasvir Faulkner.  Cardiovascular Disease  [address], [address] [place] [address]  Phone: [phone]  Fax: [phone]  Established Patient  Follow Up Time: 1 week

## 2025-05-06 NOTE — PROGRESS NOTE ADULT - ASSESSMENT
This is a 85year old with a hx of CAD s/p CABG 8/2019 with post operative afib/aflutter previously on amiodarone/Eliquis, CKD stage III, HTN, HLD, DMT2 who initially presented on 4/23 with slurred speech and AMS. Found to be hypothermic (92F),hyperglycemia, and hyperkalemic 5.8 in the setting of sepsis 2/2 UTI.  EPS consulted for sinus bradycardia HR 40-50s, sinus pauses, Wenckebach and intermittent 2:1 AV block. Currently in sinus rhythm with 1st degree AVB s/p Micra PPM and ILR explant on 5/5/2025.     Plan:  - Continuous telemetric monitoring  - Monitor electrolytes and replete K to 4 and Mg to 2  - TTE showed LVEF mildly decreased with EF 45-50%. Normal RV. Mild mitral regurgitation.   - Restart Eliquis on 5/8/2025  for thromboembolic ppx, CHADsVASc2 5  - Post-op PPM implant and ILR explant instruction has been verbally explained and given to the patient. Patient was also given home monitor, booklet and ID card. Patient expressed understanding and all questions were answered. A copy of the instruction is located in the patients chart   - Patient is schedule for an appointment on 5/21/2025 at 1:20pm( 4th floor Oncology building [facility] [address], [place], [address] [phone] )  - Continue care per primary team    Patient to be staff with attending. Please await attending addendum  This is a 85year old with a hx of CAD s/p CABG 8/2019 with post operative afib/aflutter previously on amiodarone/Eliquis, CKD stage III, HTN, HLD, DMT2 who initially presented on 4/23 with slurred speech and AMS. Found to be hypothermic (92F),hyperglycemia, and hyperkalemic 5.8 in the setting of sepsis 2/2 UTI.  EPS consulted for sinus bradycardia HR 40-50s, sinus pauses, Wenckebach and intermittent 2:1 AV block. Currently in sinus rhythm with 1st degree AVB s/p Micra PPM and ILR explant on 5/5/2025. RP will sign off    Plan:  - Continuous telemetric monitoring  - Monitor electrolytes and replete K to 4 and Mg to 2  - TTE showed LVEF mildly decreased with EF 45-50%. Normal RV. Mild mitral regurgitation.   - Restart Eliquis today for thromboembolic ppx, CHADsVASc2 5  - Post-op PPM implant and ILR explant instruction has been verbally explained and given to the patient. Patient was also given home monitor, booklet and ID card. Patient expressed understanding and all questions were answered. A copy of the instruction is located in the patients chart   - Patient is schedule for an appointment on 5/21/2025 at 1:20pm( 4th floor Oncology [facility] [address], [address], [place], [place], [address] [phone] )  - Continue care per primary team    Patient to be staff with attending. Please await attending addendum

## 2025-05-06 NOTE — DISCHARGE NOTE PROVIDER - NSDCCPCAREPLAN_GEN_ALL_CORE_FT
PRINCIPAL DISCHARGE DIAGNOSIS  Diagnosis: Urinary tract infection  Assessment and Plan of Treatment: You came in with slurred speech, which we found was related to an infection in your urinary system. Your urine tests showed signs of infection, and the culture grew bacteria called Klebsiella pneumoniae. Your body was fighting this infection, causing you to become very cold (92°F) when you arrived. We initially treated you with a broad-spectrum antibiotic called Zosyn and then switched to a more targeted antibiotic, Ceftriaxone. As we've treated this infection, your speech has been gradually improving, confirming our suspicion that the infection was affecting your brain function temporarily.      SECONDARY DISCHARGE DIAGNOSES  Diagnosis: Bradycardia  Assessment and Plan of Treatment: When you arrived, your heart was beating too slowly at only 40-50 beats per minute, and we saw concerning pauses up to 2.8 seconds long on your heart monitor. We detected a condition called first-degree AV block and Wenckebach with occasional 2:1 heart block. Because of these serious rhythm problems, we placed a small pacemaker called Micra inside your heart on May 5 to ensure your heart maintains a proper rhythm. Given your history of atrial flutter after your heart bypass surgery in 2019, we've also restarted you on the blood thinner apixaban to prevent blood clots.   If you have chest pain and/or shortness of breath please call 911 and return to the hospital.    Diagnosis: CKD (chronic kidney disease)  Assessment and Plan of Treatment: Your kidney function was significantly decreased when you arrived, with a creatinine level of 2.76, which is higher than your usual baseline. This was likely due to a combination of the infection and possible dehydration. We placed a catheter initially and drained over a liter of urine. To help your kidneys, we held certain medications that could cause further damage and gave you sodium bicarbonate to correct your blood's acid level. We're happy to see your kidney function has been improving, with your creatinine now down to 2.09. We'll continue to monitor this closely as you recover.   Please follow up with your PCP for your kidney levels to be monitored.    Diagnosis: Abdominal pain  Assessment and Plan of Treatment: Over the final days of your admission, you reported some pain on your left lower side of your belly. You have been able to go to the bathroom and have tolerated food. If you begin to experience pain after eating, or pain regardless of whether or not someone is pressing on your belly, please notifiy the staff at the rehab center and return to the hospital. In addition if you have bloody stool return to the hospital.     PRINCIPAL DISCHARGE DIAGNOSIS  Diagnosis: Urinary tract infection  Assessment and Plan of Treatment: You came in with slurred speech, which we found was related to an infection in your urinary system. Your urine tests showed signs of infection, and the culture grew bacteria called Klebsiella pneumoniae. Your body was fighting this infection, causing you to become very cold (92°F) when you arrived. We initially treated you with a broad-spectrum antibiotic called Zosyn and then switched to a more targeted antibiotic, Ceftriaxone. As we've treated this infection, your speech has been gradually improving, confirming our suspicion that the infection was affecting your brain function temporarily.      SECONDARY DISCHARGE DIAGNOSES  Diagnosis: CKD (chronic kidney disease)  Assessment and Plan of Treatment: Your kidney function was significantly decreased when you arrived, with a creatinine level of 2.76, which is higher than your usual baseline. This was likely due to a combination of the infection and possible dehydration. We placed a catheter initially and drained over a liter of urine. To help your kidneys, we held certain medications that could cause further damage and gave you sodium bicarbonate to correct your blood's acid level. We're happy to see your kidney function has been improving, with your creatinine now down to 2.09. We'll continue to monitor this closely as you recover.   Please follow up with your PCP for your kidney levels to be monitored.    Diagnosis: Bradycardia  Assessment and Plan of Treatment: When you arrived, your heart was beating too slowly at only 40-50 beats per minute, and we saw concerning pauses up to 2.8 seconds long on your heart monitor. We detected a condition called first-degree AV block and Wenckebach with occasional 2:1 heart block. Because of these serious rhythm problems, we placed a small pacemaker called Micra inside your heart on May 5 to ensure your heart maintains a proper rhythm. Given your history of atrial flutter after your heart bypass surgery in 2019, we've also restarted you on the blood thinner apixaban to prevent blood clots.   If you have chest pain and/or shortness of breath please call 911 and return to the hospital.  You have an appointment with the electropysiology team on 5/21/2025    Diagnosis: Abdominal pain  Assessment and Plan of Treatment: Over the final days of your admission, you reported some pain on your left lower side of your belly. You have been able to go to the bathroom and have tolerated food. If you begin to experience pain after eating, or pain regardless of whether or not someone is pressing on your belly, please notifiy the staff at the rehab center and return to the hospital. In addition if you have bloody stool return to the hospital.     PRINCIPAL DISCHARGE DIAGNOSIS  Diagnosis: Urinary tract infection  Assessment and Plan of Treatment: You came in with slurred speech, which we found was related to an infection in your urinary system. Your urine tests showed signs of infection, and the culture grew bacteria called Klebsiella pneumoniae. Your body was fighting this infection, causing you to become very cold (92°F) when you arrived. We initially treated you with a broad-spectrum antibiotic called Zosyn and then switched to a more targeted antibiotic, Ceftriaxone. As we've treated this infection, your speech has been gradually improving, confirming our suspicion that the infection was affecting your brain function temporarily.      SECONDARY DISCHARGE DIAGNOSES  Diagnosis: CKD (chronic kidney disease)  Assessment and Plan of Treatment: Your kidney function was significantly decreased when you arrived, with a creatinine level of 2.76, which is higher than your usual baseline. This was likely due to a combination of the infection and possible dehydration. We placed a catheter initially and drained over a liter of urine. To help your kidneys, we held certain medications that could cause further damage and gave you sodium bicarbonate to correct your blood's acid level. We're happy to see your kidney function has been improving, with your creatinine now down to 2.09. We'll continue to monitor this closely as you recover.   Please follow up with your PCP for your kidney levels to be monitored.    Diagnosis: Bradycardia  Assessment and Plan of Treatment: When you arrived, your heart was beating too slowly at only 40-50 beats per minute, and we saw concerning pauses up to 2.8 seconds long on your heart monitor. We detected a condition called first-degree AV block and Wenckebach with occasional 2:1 heart block. Because of these serious rhythm problems, we placed a small pacemaker called Micra inside your heart on May 5 to ensure your heart maintains a proper rhythm. Given your history of atrial flutter after your heart bypass surgery in 2019, we've also restarted you on the blood thinner apixaban to prevent blood clots.   If you have chest pain and/or shortness of breath please call 911 and return to the hospital.  You have an appointment with the electropysiology team on 5/21/2025    Diagnosis: Abdominal pain  Assessment and Plan of Treatment: Two days before discharge, you reported some pain on your left lower side of your belly. This pain resolved and you have been able to go to the bathroom and have tolerated food. If you begin to experience pain after eating, or pain regardless of whether or not someone is pressing on your belly, please notifiy the staff at the rehab center and return to the hospital. In addition if you have bloody stool return to the hospital.

## 2025-05-06 NOTE — DISCHARGE NOTE PROVIDER - NSDCCPTREATMENT_GEN_ALL_CORE_FT
PRINCIPAL PROCEDURE  Procedure: CT angiogram head w contrast  Findings and Treatment: IMPRESSION:  CT HEAD:  No evidence of acute intracranial pathology.  Chronic age involutional and microangiopathic ischemic changes.  Moderate to marked paranasal sinus disease as described.  Recommend ENT evaluation, treatment and follow-up.  Nonspecific soft tissue density left external auditory canal, correlate   otoscopy.  CTA NECK:  Mild to moderate left proximal internal carotid artery stenosis.  Left vertebral artery artery dominance, hypoplastic right vertebral   artery.  Indeterminate perifissural lung nodules as described.  Additional follow-up/follow-up as per Fleischner criteria.  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  Recommend neurosurgical consultation/follow-up.  Anatomic variants as described.  --- End of Report ---      SECONDARY PROCEDURE  Procedure: US kidney and bladder  Findings and Treatment: IMPRESSION:  Mild bilateral increased renal echotexture suggesting medical renal   disease.  Marked urinary bladder wall thickening versus underdistention. Correlate   with urinalysis due to cystitis, other bladder pathology.  --- End of Report ---

## 2025-05-06 NOTE — PROGRESS NOTE ADULT - PROBLEM SELECTOR PLAN 8
Patient with hx of Afib s/p CABG. As per daughter, the patient is no longer in Afib and is no on AC. There is a chart note from 2019 related to this.   - CTM on tele for signs of Afib/Aflutter  - Ldns9ydji score 4  - Spoke with family regarding AC and they are ok with it. Started Eliquis 2.5mg BID (holding after PPM temporarily) Patient with hx of Afib s/p CABG. As per daughter, the patient is no longer in Afib and is no on AC. There is a chart note from 2019 related to this.   - CTM on tele for signs of Afib/Aflutter  - Ctum5osef score 4  - Spoke with family regarding AC and they are ok with it. Restarted Eliquis 2.5mg BID

## 2025-05-06 NOTE — PROVIDER CONTACT NOTE (OTHER) - ASSESSMENT
Patient had pacemaker placed today- Right groin sheath in place, laying flat
no other neuro deficits noted. no facial droop noted. BL upper and lower extremities no drift and equal strength. no s/s of distress or discomfort noted.
T 97.2. P 50. /57. R 16. O2 98%. patient denies pain. patient stated he didn't pull at [facility].
patient denies pain
T 94.5 (rectal). P 67. /59. R 16. O2 99%. patient asymptomatic.
pt converting to between aflutter to 2nd degrees   pt went slow aflutter to 33 then back to 2nd degree type 1 at 22:59 then at 23:10 converted to second degree type 2 then at 23:16 switched back to aflutter to 36  now back to second degree type 1  pt in bed no s/s of distress noted, states is fine  asymptomatic
Patient continues to fluctuate between 2nd degree type 1, aflutter and 2nd degree type 2- dropping as low as HR 32 on tele, HR sustaining mainly between 40's-50's. Pt. On assessment, pt. is asymptomatic.

## 2025-05-06 NOTE — DISCHARGE NOTE PROVIDER - NSDCFUSCHEDAPPT_GEN_ALL_CORE_FT
[facility] [phone]  Scheduled Appointment: 05/21/2025

## 2025-05-06 NOTE — PROGRESS NOTE ADULT - ATTENDING COMMENTS
85 y.o. M w/ a hx of CAD s/p CABG c/b post-op afib, CKD3, HTN, DM2 p/w slurred speech, hypothermia, bradycardia, hypoglycemia found to have UTI and Wenckebach with intermittent 2:1 now s/p Micra placement.     Patient feels well, tolerating PO.     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   - EP following   - Resume Eliquis     # Hypothermia  - Previously resolved, hypothermic last night but infectious ROS negative and patient very well appearing.   - Initially 2/2 infection v metabolic acidosis impairing thermoregulation   - AM cortisol, TSH wnl, CT without acute infarcts    # Transaminitis   - Present on admission, fluctuating. Can be 2/2 OP Fluconazole, hold for now.   - Ctm     # UTI c/b TME   - UCx: >100K Klebsiella   - S/p abx, course completed 5/2     # TAYLOR on CKD c/b metabolic acidosis and hyperkalemia, resolved  # Urinary retention, resolved   - S/p bicarb drip, off Lokelma now   - S/p Mosher placement c/b hematuria. No clots seen. hematuria maybe 2/2 UTI v traumatic insertion  - Hold Lisinopril   - Passed TOV  - Per daughter, Cr was 3.3 in January- current Cr may represent baseline?     # Slurred speech   - 2/2 TME v stroke- oneida given pt in afib, at risk for stroke   - MRI brain negative for acute stroke   - passed dysphagia screen  - speech therapy eval noted, they did find some deficiencies   - Improving     Dispo: [facility], d/c today

## 2025-05-06 NOTE — DISCHARGE NOTE PROVIDER - HOSPITAL COURSE
HPI:  85M CAD s/p CABG (in August 2019 with post-op pAfib/AFlutter previously on amio/eliquis now discontinued), CKD stage III, HTN, HLD, T2DM,  presenting from home with daughter with slurred speech starting 4/23. As per the patient, he denies any acute symptoms including fever, chills, weakness, numbness, dyspnea, chest pain, dysuria, diarrhea, constipation, abdominal pain. Collateral obtained from the daughter (Gloria) who reports that since 4/23 she noted slurred speech without associated confusion. Daughter confirmed that patient doesn't have any other related symptoms.   Of note, patient did start Farxiga last week, and nifedipine 2 weeks prior. He has not started any new medications, no new OTC meds or NSAIDS use. Patient denies recent travel or sick contacts.   Patient baseline AOx3 and ambulates with a walker. He lives with daughter. Former smoker. No alcohol use.     In the ED: Temp 92F, /50, [id number], RR 18 on RA. Patient was given Vancomcyin 1g and Zosyn, 1L NS bolus. Tele reviewed with several sinus pauses, longest being ~ 2.8s.   Patient received insulin 5u w/ D25 for hyperkalemia of 5.8, but was subsequently hypoglycemic and was given an addition D50 push. Mosher was placed in the ED for urinary retention, w/ > 1L urine returned.    Hospital Course:  The patient is an 85-year-old male with a past medical history of coronary artery disease status post CABG in August 2019 with post-operative atrial fibrillation/flutter (previously on amiodarone/eliquis, now discontinued), CKD stage III, hypertension, hyperlipidemia, and type 2 diabetes mellitus, presented from home with his daughter on April 25, 2025, with slurred speech that began on April 23. On arrival, he was found to be hypothermic (92°F), bradycardic (HR 40-50s), with hyperkalemia (5.8) and hyperglycemia. Initial workup revealed urinary source of infection with urinalysis showing proteinuria, hematuria, and pyuria. The patient was started on Zosyn (renally dosed) with subsequent urine cultures growing Klebsiella pneumoniae, and antibiotics were de-escalated to Ceftriaxone. EKG showed first-degree AV block with sinus pauses (longest being 2.8 seconds) and Wenckebach with intermittent 2:1 block, necessitating EP consultation. He underwent Micra pacemaker placement on May 5. During hospitalization, the patient developed acute kidney injury (Cr 2.76) on CKD with NAGMA, which was managed with sodium bicarbonate and holding of nephrotoxic medications. His hyperkalemia resolved with treatment. CT head and MRI brain showed no acute intracranial pathology but revealed a 3mm anterior communicating artery saccular aneurysm. The patient had improvement in his slurred speech, which was attributed to toxic metabolic encephalopathy from infection. Speech therapy evaluation showed mild dysarthria, mild expressive language deficits, and mild cognitive-linguistic deficits. TTE showed mildly reduced EF (45-50%) with regional wall motion abnormalities. Given his history of atrial fibrillation with a CHADsVASc2 score of 4, he was restarted on apixaban. The patient's clinical status improved throughout hospitalization with resolution of hypothermia, bradycardia, and hyperkalemia. His renal function was improving with creatinine trending down to 2.09 by May 6. The plan was for discharge to subacute rehabilitation.    Important Medication Changes and Reason:  Please continue all home medications    Active or Pending Issues Requiring Follow-up:  Please follow up with your PCP in 1-2 weeks    Advanced Directives:   [X] Full code  [ ] DNR  [ ] Hospice    Discharge Diagnoses:  UTI  Bradycardia       HPI:  85M CAD s/p CABG (in August 2019 with post-op pAfib/AFlutter previously on amio/eliquis now discontinued), CKD stage III, HTN, HLD, T2DM,  presenting from home with daughter with slurred speech starting 4/23. As per the patient, he denies any acute symptoms including fever, chills, weakness, numbness, dyspnea, chest pain, dysuria, diarrhea, constipation, abdominal pain. Collateral obtained from the daughter (Gloria) who reports that since 4/23 she noted slurred speech without associated confusion. Daughter confirmed that patient doesn't have any other related symptoms.   Of note, patient did start Farxiga last week, and nifedipine 2 weeks prior. He has not started any new medications, no new OTC meds or NSAIDS use. Patient denies recent travel or sick contacts.   Patient baseline AOx3 and ambulates with a walker. He lives with daughter. Former smoker. No alcohol use.     In the ED: Temp 92F, /50, [id number], RR 18 on RA. Patient was given Vancomcyin 1g and Zosyn, 1L NS bolus. Tele reviewed with several sinus pauses, longest being ~ 2.8s.   Patient received insulin 5u w/ D25 for hyperkalemia of 5.8, but was subsequently hypoglycemic and was given an addition D50 push. Mosher was placed in the ED for urinary retention, w/ > 1L urine returned.    Hospital Course:  The patient is an 85-year-old male with a past medical history of coronary artery disease status post CABG in August 2019 with post-operative atrial fibrillation/flutter (previously on amiodarone/eliquis, now discontinued), CKD stage III, hypertension, hyperlipidemia, and type 2 diabetes mellitus, presented from home with his daughter on April 25, 2025, with slurred speech that began on April 23. On arrival, he was found to be hypothermic (92°F), bradycardic (HR 40-50s), with hyperkalemia (5.8) and hyperglycemia. Initial workup revealed urinary source of infection with urinalysis showing proteinuria, hematuria, and pyuria. The patient was started on Zosyn (renally dosed) with subsequent urine cultures growing Klebsiella pneumoniae, and antibiotics were de-escalated to Ceftriaxone. EKG showed first-degree AV block with sinus pauses (longest being 2.8 seconds) and Wenckebach with intermittent 2:1 block, necessitating EP consultation. He underwent Micra pacemaker placement on May 5. During hospitalization, the patient developed acute kidney injury (Cr 2.76) on CKD with NAGMA, which was managed with sodium bicarbonate and holding of nephrotoxic medications. His hyperkalemia resolved with treatment. CT head and MRI brain showed no acute intracranial pathology but revealed a 3mm anterior communicating artery saccular aneurysm. The patient had improvement in his slurred speech, which was attributed to toxic metabolic encephalopathy from infection. Speech therapy evaluation showed mild dysarthria, mild expressive language deficits, and mild cognitive-linguistic deficits. TTE showed mildly reduced EF (45-50%) with regional wall motion abnormalities. Given his history of atrial fibrillation with a CHADsVASc2 score of 4, he was restarted on apixaban. The patient's clinical status improved throughout hospitalization with resolution of hypothermia, bradycardia, and hyperkalemia. His renal function was improving with creatinine trending down to 2.09 by May 6. The plan was for discharge to subacute rehabilitation.    Important Medication Changes and Reason:  Please continue all home medications    Active or Pending Issues Requiring Follow-up:  Please follow up with your PCP (  in 1-2 weeks    Advanced Directives:   [X] Full code  [ ] DNR  [ ] Hospice    Discharge Diagnoses:  UTI  Bradycardia       HPI:  85M CAD s/p CABG (in August 2019 with post-op pAfib/AFlutter previously on amio/eliquis now discontinued), CKD stage III, HTN, HLD, T2DM,  presenting from home with daughter with slurred speech starting 4/23. As per the patient, he denies any acute symptoms including fever, chills, weakness, numbness, dyspnea, chest pain, dysuria, diarrhea, constipation, abdominal pain. Collateral obtained from the daughter (Gloria) who reports that since 4/23 she noted slurred speech without associated confusion. Daughter confirmed that patient doesn't have any other related symptoms.   Of note, patient did start Farxiga last week, and nifedipine 2 weeks prior. He has not started any new medications, no new OTC meds or NSAIDS use. Patient denies recent travel or sick contacts.   Patient baseline AOx3 and ambulates with a walker. He lives with daughter. Former smoker. No alcohol use.     In the ED: Temp 92F, /50, [id number], RR 18 on RA. Patient was given Vancomcyin 1g and Zosyn, 1L NS bolus. Tele reviewed with several sinus pauses, longest being ~ 2.8s.   Patient received insulin 5u w/ D25 for hyperkalemia of 5.8, but was subsequently hypoglycemic and was given an addition D50 push. Mosher was placed in the ED for urinary retention, w/ > 1L urine returned.    Hospital Course:  The patient is an 85-year-old male with a past medical history of coronary artery disease status post CABG in August 2019 with post-operative atrial fibrillation/flutter (previously on amiodarone/eliquis, now discontinued), CKD stage III, hypertension, hyperlipidemia, and type 2 diabetes mellitus, presented from home with his daughter on April 25, 2025, with slurred speech that began on April 23. On arrival, he was found to be hypothermic (92°F), bradycardic (HR 40-50s), with hyperkalemia (5.8) and hyperglycemia. Initial workup revealed urinary source of infection with urinalysis showing proteinuria, hematuria, and pyuria. The patient was started on Zosyn (renally dosed) with subsequent urine cultures growing Klebsiella pneumoniae, and antibiotics were de-escalated to Ceftriaxone. EKG showed first-degree AV block with sinus pauses (longest being 2.8 seconds) and Wenckebach with intermittent 2:1 block, necessitating EP consultation. He underwent Micra pacemaker placement on May 5. During hospitalization, the patient developed acute kidney injury (Cr 2.76) on CKD with NAGMA, which was managed with sodium bicarbonate and holding of nephrotoxic medications. His hyperkalemia resolved with treatment. CT head and MRI brain showed no acute intracranial pathology but revealed a 3mm anterior communicating artery saccular aneurysm. The patient had improvement in his slurred speech, which was attributed to toxic metabolic encephalopathy from infection. Speech therapy evaluation showed mild dysarthria, mild expressive language deficits, and mild cognitive-linguistic deficits. TTE showed mildly reduced EF (45-50%) with regional wall motion abnormalities. Given his history of atrial fibrillation with a CHADsVASc2 score of 4, he was restarted on apixaban. The patient's clinical status improved throughout hospitalization with resolution of hypothermia, bradycardia, and hyperkalemia. His renal function was improving with creatinine trending down to 2.09 by May 6. The plan was for discharge to subacute rehabilitation.    Important Medication Changes and Reason:  Please continue all home medications    Active or Pending Issues Requiring Follow-up:  Please follow up with your PCP (Dr. Cathleen Bhatti  in 1-2 weeks)  Please follow up with your cardiologist (Dr. Faulkner in 1-2 weeks)    Advanced Directives:   [X] Full code  [ ] DNR  [ ] Hospice    Discharge Diagnoses:  UTI  Bradycardia       HPI:  85M CAD s/p CABG (in August 2019 with post-op pAfib/AFlutter previously on amio/eliquis now discontinued), CKD stage III, HTN, HLD, T2DM,  presenting from home with daughter with slurred speech starting 4/23. As per the patient, he denies any acute symptoms including fever, chills, weakness, numbness, dyspnea, chest pain, dysuria, diarrhea, constipation, abdominal pain. Collateral obtained from the daughter (Gloria) who reports that since 4/23 she noted slurred speech without associated confusion. Daughter confirmed that patient doesn't have any other related symptoms.   Of note, patient did start Farxiga last week, and nifedipine 2 weeks prior. He has not started any new medications, no new OTC meds or NSAIDS use. Patient denies recent travel or sick contacts.   Patient baseline AOx3 and ambulates with a walker. He lives with daughter. Former smoker. No alcohol use.     In the ED: Temp 92F, /50, [id number], RR 18 on RA. Patient was given Vancomcyin 1g and Zosyn, 1L NS bolus. Tele reviewed with several sinus pauses, longest being ~ 2.8s.   Patient received insulin 5u w/ D25 for hyperkalemia of 5.8, but was subsequently hypoglycemic and was given an addition D50 push. Mosher was placed in the ED for urinary retention, w/ > 1L urine returned.    Hospital Course:  The patient is an 85-year-old male with a past medical history of coronary artery disease status post CABG in August 2019 with post-operative atrial fibrillation/flutter (previously on amiodarone/eliquis, now discontinued), CKD stage III, hypertension, hyperlipidemia, and type 2 diabetes mellitus, presented from home with his daughter on April 25, 2025, with slurred speech that began on April 23. On arrival, he was found to be hypothermic (92°F), bradycardic (HR 40-50s), with hyperkalemia (5.8) and hyperglycemia. Initial workup revealed urinary source of infection with urinalysis showing proteinuria, hematuria, and pyuria. The patient was started on Zosyn (renally dosed) with subsequent urine cultures growing Klebsiella pneumoniae, and antibiotics were de-escalated to Ceftriaxone. CT head and MRI brain showed no acute intracranial pathology but revealed a 3mm anterior communicating artery saccular aneurysm. The patient had improvement in his slurred speech, which was attributed to toxic metabolic encephalopathy from infection. Speech therapy evaluation showed mild dysarthria, mild expressive language deficits, and mild cognitive-linguistic deficits.   EKG showed first-degree AV block with sinus pauses (longest being 2.8 seconds) and Wenckebach with intermittent 2:1 block, and intermittent aflutter necessitating EP consultation. He underwent Micra pacemaker placement on May 5. TTE showed mildly reduced EF (45-50%) with regional wall motion abnormalities. Given his history of atrial fibrillation with a CHADsVASc2 score of 4, he was restarted on apixaban.  During hospitalization, the patient developed acute kidney injury (Cr 2.76) on CKD with NAGMA, which was managed with sodium bicarbonate and holding of nephrotoxic medications. His hyperkalemia resolved with treatment.  The patient's clinical status improved throughout hospitalization with resolution of hypothermia, bradycardia, and hyperkalemia. His renal function was improving with creatinine trending down to 2.09 by May 6. The plan was for discharge to subacute rehabilitation.    Important Medication Changes and Reason:  Please continue all home medications    Active or Pending Issues Requiring Follow-up:  Please follow up with your PCP (Dr. Cathleen Bhatti  in 1-2 weeks)  Please follow up with your cardiologist (Dr. Faulkner in 1-2 weeks)    Advanced Directives:   [X] Full code  [ ] DNR  [ ] Hospice    Discharge Diagnoses:  UTI  Bradycardia

## 2025-05-06 NOTE — PROGRESS NOTE ADULT - PROBLEM SELECTOR PLAN 2
Patient with new onset left lower quadrant abdominal tenderness. Patient was seen grimacing in pain and jumped when palpating LLQ    Plan:  - Pain and elevated WBC noted, will continue to monitor  - if pain continues will order CT A/P Patient with new onset left lower quadrant abdominal tenderness. Patient was seen grimacing in pain and jumped when palpating LLQ    Plan:  - Pain and elevated WBC noted, will continue to monitor

## 2025-05-06 NOTE — PROGRESS NOTE ADULT - SUBJECTIVE AND OBJECTIVE BOX
SUBJECTIVE / OVERNIGHT EVENTS: No acute events overnight. Patient was seen and examined at bedside, minimal pain s/p PPM. Continues to report LLQ pain?     MEDICATIONS  (STANDING):  atorvastatin 40 milliGRAM(s) Oral at bedtime  calcitriol   Capsule 0.25 MICROGram(s) Oral daily  chlorhexidine 2% Cloths 1 Application(s) Topical daily  clotrimazole 1% Cream 1 Application(s) Topical two times a day  dextrose 5%. 1000 milliLiter(s) (100 mL/Hr) IV Continuous <Continuous>  dextrose 5%. 1000 milliLiter(s) (50 mL/Hr) IV Continuous <Continuous>  dextrose 50% Injectable 25 Gram(s) IV Push once  dextrose 50% Injectable 12.5 Gram(s) IV Push once  dextrose 50% Injectable 25 Gram(s) IV Push once  glucagon  Injectable 1 milliGRAM(s) IntraMuscular once  influenza  Vaccine (HIGH DOSE) 0.5 milliLiter(s) IntraMuscular once  insulin lispro (ADMELOG) corrective regimen sliding scale   SubCutaneous three times a day before meals  insulin lispro (ADMELOG) corrective regimen sliding scale   SubCutaneous at bedtime  mupirocin 2% Nasal 1 Application(s) Both Nostrils every 12 hours  NIFEdipine XL 60 milliGRAM(s) Oral daily  sodium bicarbonate 1300 milliGRAM(s) Oral every 8 hours  tamsulosin 0.4 milliGRAM(s) Oral at bedtime    MEDICATIONS  (PRN):  acetaminophen     Tablet .. 650 milliGRAM(s) Oral every 6 hours PRN Temp greater or equal to 38C (100.4F), Mild Pain (1 - 3)  dextrose Oral Gel 15 Gram(s) Oral once PRN Blood Glucose LESS THAN 70 milliGRAM(s)/deciliter  melatonin 3 milliGRAM(s) Oral at bedtime PRN Insomnia      CAPILLARY BLOOD GLUCOSE    POCT Blood Glucose.: 203 mg/dL (05 May 2025 20:54)  POCT Blood Glucose.: 176 mg/dL (04 May 2025 21:07)  POCT Blood Glucose.: 199 mg/dL (03 May 2025 22:18)  POCT Blood Glucose.: 206 mg/dL (02 May 2025 21:52)  POCT Blood Glucose.: 133 mg/dL (02 May 2025 17:40)  POCT Blood Glucose.: 204 mg/dL (02 May 2025 12:06)  POCT Blood Glucose.: 104 mg/dL (02 May 2025 08:27)        I&O's Summary    05 May 2025 07:01  -  06 May 2025 07:00  --------------------------------------------------------  IN: 200 mL / OUT: 500 mL / NET: -300 mL        PHYSICAL EXAM:  Vital Signs Last 24 Hrs  T(C): 36.5 (06 May 2025 05:10), Max: 37 (05 May 2025 17:50)  T(F): 97.7 (06 May 2025 05:10), Max: 98.6 (05 May 2025 17:50)  HR: 87 (06 May 2025 05:10) (74 - 91)  BP: 125/71 (06 May 2025 05:10) (103/53 - 139/90)  BP(mean): --  RR: 17 (06 May 2025 05:10) (13 - 17)  SpO2: 98% (06 May 2025 05:10) (96% - 100%)    Parameters below as of 06 May 2025 05:10  Patient On (Oxygen Delivery Method): room air      GENERAL: NAD, lying comfortably in bed  HEAD: Atraumatic, normocephalic  EYES: EOMI b/l PERRLA b/l, conjunctiva and sclera clear  NECK: Supple, No JVD, No LAD  RESPIRATORY: Normal respiratory effort; lungs are clear to auscultation bilaterally  CARDIOVASCULAR: Regular rate and rhythm, normal S1 and S2, no murmur/rub/gallop; No lower extremity edema  ABDOMEN: TTP in the LLQ (new), patient guarding, normoactive bowel sounds, No hepatosplenomegaly  MUSCULOSKELETAL: no clubbing or cyanosis of digits; no joint swelling or tenderness to palpation  NEURO: Non focal   PSYCH: A+O to person, place, and time; affect appropriate        LABS:                                11.4   11.43 )-----------( 202      ( 06 May 2025 04:08 )             33.0                           12.5   10.69 )-----------( 205      ( 05 May 2025 04:11 )             37.1                                13.5   10.29 )-----------( 193      ( 04 May 2025 03:40 )             40.1                    11.2   8.87  )-----------( 160      ( 03 May 2025 05:20 )             32.5         05-06    134[L]  |  99  |  39[H]  ----------------------------<  132[H]  4.7   |  22  |  2.09[H]    Ca    9.1      06 May 2025 04:08  Phos  3.4     05-06  Mg     1.80     05-06    TPro  6.8  /  Alb  3.4  /  TBili  0.3  /  DBili  x   /  AST  24  /  ALT  42[H]  /  AlkPhos  108  05-06      05-05    132[L]  |  96[L]  |  35[H]  ----------------------------<  146[H]  4.1   |  23  |  1.92[H]    Ca    9.5      05 May 2025 04:11  Phos  3.6     05-05  Mg     1.90     05-05    TPro  7.2  /  Alb  3.6  /  TBili  0.4  /  DBili  x   /  AST  24  /  ALT  62[H]  /  AlkPhos  111  05-05      05-04    134[L]  |  97[L]  |  41[H]  ----------------------------<  147[H]  4.7   |  22  |  1.95[H]    Ca    10.1      04 May 2025 03:40  Phos  3.3     05-04  Mg     1.90     05-04    TPro  8.6[H]  /  Alb  4.3  /  TBili  0.4  /  DBili  x   /  AST  45[H]  /  ALT  93[H]  /  AlkPhos  132[H]  05-04        05-03    138  |  103  |  50[H]  ----------------------------<  124[H]  4.5   |  22  |  2.25[H]    Ca    9.5      03 May 2025 05:20  Phos  3.5     05-03  Mg     1.80     05-03    TPro  7.1  /  Alb  3.7  /  TBili  0.2  /  DBili  x   /  AST  48[H]  /  ALT  92[H]  /  AlkPhos  109  05-03    PT/INR - ( 02 May 2025 04:42 )   PT: 12.5 sec;   INR: 1.08 ratio         PTT - ( 02 May 2025 04:42 )  PTT:36.2 sec      Urinalysis Basic - ( 03 May 2025 05:20 )    Color: x / Appearance: x / SG: x / pH: x  Gluc: 124 mg/dL / Ketone: x  / Bili: x / Urobili: x   Blood: x / Protein: x / Nitrite: x   Leuk Esterase: x / RBC: x / WBC x   Sq Epi: x / Non Sq Epi: x / Bacteria: x         SUBJECTIVE / OVERNIGHT EVENTS: No acute events overnight. Patient was seen and examined at bedside, minimal pain s/p PPM. Continues to report LLQ pain although only when pressed deeply.    MEDICATIONS  (STANDING):  atorvastatin 40 milliGRAM(s) Oral at bedtime  calcitriol   Capsule 0.25 MICROGram(s) Oral daily  chlorhexidine 2% Cloths 1 Application(s) Topical daily  clotrimazole 1% Cream 1 Application(s) Topical two times a day  dextrose 5%. 1000 milliLiter(s) (100 mL/Hr) IV Continuous <Continuous>  dextrose 5%. 1000 milliLiter(s) (50 mL/Hr) IV Continuous <Continuous>  dextrose 50% Injectable 25 Gram(s) IV Push once  dextrose 50% Injectable 12.5 Gram(s) IV Push once  dextrose 50% Injectable 25 Gram(s) IV Push once  glucagon  Injectable 1 milliGRAM(s) IntraMuscular once  influenza  Vaccine (HIGH DOSE) 0.5 milliLiter(s) IntraMuscular once  insulin lispro (ADMELOG) corrective regimen sliding scale   SubCutaneous three times a day before meals  insulin lispro (ADMELOG) corrective regimen sliding scale   SubCutaneous at bedtime  mupirocin 2% Nasal 1 Application(s) Both Nostrils every 12 hours  NIFEdipine XL 60 milliGRAM(s) Oral daily  sodium bicarbonate 1300 milliGRAM(s) Oral every 8 hours  tamsulosin 0.4 milliGRAM(s) Oral at bedtime    MEDICATIONS  (PRN):  acetaminophen Tablet. 650 milliGRAM(s) Oral every 6 hours PRN Temp greater or equal to 38C (100.4F), Mild Pain (1 - 3)  dextrose Oral Gel 15 Gram(s) Oral once PRN Blood Glucose LESS THAN 70 milliGRAM(s)/deciliter  melatonin 3 milliGRAM(s) Oral at bedtime PRN Insomnia      CAPILLARY BLOOD GLUCOSE    POCT Blood Glucose.: 203 mg/dL (05 May 2025 20:54)  POCT Blood Glucose.: 176 mg/dL (04 May 2025 21:07)  POCT Blood Glucose.: 199 mg/dL (03 May 2025 22:18)  POCT Blood Glucose.: 206 mg/dL (02 May 2025 21:52)  POCT Blood Glucose.: 133 mg/dL (02 May 2025 17:40)  POCT Blood Glucose.: 204 mg/dL (02 May 2025 12:06)  POCT Blood Glucose.: 104 mg/dL (02 May 2025 08:27)        I&O's Summary    05 May 2025 07:01  -  06 May 2025 07:00  --------------------------------------------------------  IN: 200 mL / OUT: 500 mL / NET: -300 mL        PHYSICAL EXAM:  Vital Signs Last 24 Hrs  T(C): 36.5 (06 May 2025 05:10), Max: 37 (05 May 2025 17:50)  T(F): 97.7 (06 May 2025 05:10), Max: 98.6 (05 May 2025 17:50)  HR: 87 (06 May 2025 05:10) (74 - 91)  BP: 125/71 (06 May 2025 05:10) (103/53 - 139/90)  BP(mean): --  RR: 17 (06 May 2025 05:10) (13 - 17)  SpO2: 98% (06 May 2025 05:10) (96% - 100%)    Parameters below as of 06 May 2025 05:10  Patient On (Oxygen Delivery Method): room air      GENERAL: NAD, lying comfortably in bed  HEAD: Atraumatic, normocephalic  EYES: EOMI b/l PERRLA b/l, conjunctiva and sclera clear  NECK: Supple, No JVD, No LAD  RESPIRATORY: Normal respiratory effort; lungs are clear to auscultation bilaterally  CARDIOVASCULAR: Regular rate and rhythm, normal S1 and S2, no murmur/rub/gallop; No lower extremity edema  ABDOMEN: TTP in the LLQ (new), patient guarding, normoactive bowel sounds, No hepatosplenomegaly  MUSCULOSKELETAL: no clubbing or cyanosis of digits; no joint swelling or tenderness to palpation  NEURO: Non focal   PSYCH: A+O to person, place, and time; affect appropriate        LABS:                                11.4   11.43 )-----------( 202      ( 06 May 2025 04:08 )             33.0                           12.5   10.69 )-----------( 205      ( 05 May 2025 04:11 )             37.1                                13.5   10.29 )-----------( 193      ( 04 May 2025 03:40 )             40.1                    11.2   8.87  )-----------( 160      ( 03 May 2025 05:20 )             32.5         05-06    134[L]  |  99  |  39[H]  ----------------------------<  132[H]  4.7   |  22  |  2.09[H]    Ca    9.1      06 May 2025 04:08  Phos  3.4     05-06  Mg     1.80     05-06    TPro  6.8  /  Alb  3.4  /  TBili  0.3  /  DBili  x   /  AST  24  /  ALT  42[H]  /  AlkPhos  108  05-06      05-05    132[L]  |  96[L]  |  35[H]  ----------------------------<  146[H]  4.1   |  23  |  1.92[H]    Ca    9.5      05 May 2025 04:11  Phos  3.6     05-05  Mg     1.90     05-05    TPro  7.2  /  Alb  3.6  /  TBili  0.4  /  DBili  x   /  AST  24  /  ALT  62[H]  /  AlkPhos  111  05-05      05-04    134[L]  |  97[L]  |  41[H]  ----------------------------<  147[H]  4.7   |  22  |  1.95[H]    Ca    10.1      04 May 2025 03:40  Phos  3.3     05-04  Mg     1.90     05-04    TPro  8.6[H]  /  Alb  4.3  /  TBili  0.4  /  DBili  x   /  AST  45[H]  /  ALT  93[H]  /  AlkPhos  132[H]  05-04        05-03    138  |  103  |  50[H]  ----------------------------<  124[H]  4.5   |  22  |  2.25[H]    Ca    9.5      03 May 2025 05:20  Phos  3.5     05-03  Mg     1.80     05-03    TPro  7.1  /  Alb  3.7  /  TBili  0.2  /  DBili  x   /  AST  48[H]  /  ALT  92[H]  /  AlkPhos  109  05-03    PT/INR - ( 02 May 2025 04:42 )   PT: 12.5 sec;   INR: 1.08 ratio         PTT - ( 02 May 2025 04:42 )  PTT:36.2 sec      Urinalysis Basic - ( 03 May 2025 05:20 )    Color: x / Appearance: x / SG: x / pH: x  Gluc: 124 mg/dL / Ketone: x  / Bili: x / Urobili: x   Blood: x / Protein: x / Nitrite: x   Leuk Esterase: x / RBC: x / WBC x   Sq Epi: x / Non Sq Epi: x / Bacteria: x

## 2025-05-06 NOTE — PROVIDER CONTACT NOTE (OTHER) - NAME OF MD/NP/PA/DO NOTIFIED:
Patti Morrison (HS 1)
T1 Angel Vernon
Coverage Benamin
Erika Viadl
Patti Morrison (HS 1)

## 2025-05-07 ENCOUNTER — TRANSCRIPTION ENCOUNTER (OUTPATIENT)
Age: 85
End: 2025-05-07

## 2025-05-07 VITALS — SYSTOLIC BLOOD PRESSURE: 101 MMHG | DIASTOLIC BLOOD PRESSURE: 60 MMHG

## 2025-05-07 LAB
GLUCOSE BLDC GLUCOMTR-MCNC: 191 MG/DL — HIGH (ref 70–99)
GLUCOSE BLDC GLUCOMTR-MCNC: 98 MG/DL — SIGNIFICANT CHANGE UP (ref 70–99)

## 2025-05-07 PROCEDURE — 99232 SBSQ HOSP IP/OBS MODERATE 35: CPT | Mod: GC

## 2025-05-07 RX ADMIN — Medication 60 MILLIGRAM(S): at 06:54

## 2025-05-07 RX ADMIN — LISINOPRIL 2.5 MILLIGRAM(S): 5 TABLET ORAL at 06:58

## 2025-05-07 RX ADMIN — MUPIROCIN CALCIUM 1 APPLICATION(S): 20 CREAM TOPICAL at 09:00

## 2025-05-07 RX ADMIN — Medication 1300 MILLIGRAM(S): at 06:53

## 2025-05-07 RX ADMIN — METOPROLOL SUCCINATE 25 MILLIGRAM(S): 50 TABLET, EXTENDED RELEASE ORAL at 06:54

## 2025-05-07 RX ADMIN — Medication 1 APPLICATION(S): at 11:19

## 2025-05-07 RX ADMIN — CLOTRIMAZOLE 1 APPLICATION(S): 1 CREAM TOPICAL at 06:59

## 2025-05-07 RX ADMIN — INSULIN LISPRO 1: 100 INJECTION, SOLUTION INTRAVENOUS; SUBCUTANEOUS at 11:22

## 2025-05-07 RX ADMIN — CALCITRIOL 0.25 MICROGRAM(S): 0.5 CAPSULE, GELATIN COATED ORAL at 11:27

## 2025-05-07 RX ADMIN — APIXABAN 2.5 MILLIGRAM(S): 2.5 TABLET, FILM COATED ORAL at 06:54

## 2025-05-07 NOTE — PROGRESS NOTE ADULT - PROBLEM SELECTOR PLAN 12
- Nutrition: Soft bite size after passing dysphagia screen  - Activity: as tolerated   - DVT Prophylaxis: On Eliquis  - Disposition: VITALIY, will need speech pathologist f/u
- Nutrition: Soft bite size after passing dysphagia screen  - Activity: as tolerated   - DVT Prophylaxis: On Eliquis  - Disposition: VITALIY, will need speech pathologist f/u
- Nutrition: Soft bite size after passing dysphagia screen  - Activity: as tolerated   - DVT Prophylaxis: On Eliquis  - Disposition: VITALIY, will need speech pathologist f/u, pending PPM

## 2025-05-07 NOTE — PROGRESS NOTE ADULT - SUBJECTIVE AND OBJECTIVE BOX
SUBJECTIVE / OVERNIGHT EVENTS: No acute events overnight. Patient was seen and examined at bedside, minimal pain s/p PPM. Continues to report LLQ pain although only when pressed deeply.    MEDICATIONS  (STANDING):  apixaban 2.5 milliGRAM(s) Oral every 12 hours  atorvastatin 40 milliGRAM(s) Oral at bedtime  calcitriol   Capsule 0.25 MICROGram(s) Oral daily  chlorhexidine 2% Cloths 1 Application(s) Topical daily  clotrimazole 1% Cream 1 Application(s) Topical two times a day  dextrose 5%. 1000 milliLiter(s) (50 mL/Hr) IV Continuous <Continuous>  dextrose 5%. 1000 milliLiter(s) (100 mL/Hr) IV Continuous <Continuous>  dextrose 50% Injectable 12.5 Gram(s) IV Push once  dextrose 50% Injectable 25 Gram(s) IV Push once  dextrose 50% Injectable 25 Gram(s) IV Push once  glucagon  Injectable 1 milliGRAM(s) IntraMuscular once  influenza  Vaccine (HIGH DOSE) 0.5 milliLiter(s) IntraMuscular once  insulin lispro (ADMELOG) corrective regimen sliding scale   SubCutaneous three times a day before meals  insulin lispro (ADMELOG) corrective regimen sliding scale   SubCutaneous at bedtime  lisinopril 2.5 milliGRAM(s) Oral daily  metoprolol succinate ER 25 milliGRAM(s) Oral daily  mupirocin 2% Nasal 1 Application(s) Both Nostrils every 12 hours  NIFEdipine XL 60 milliGRAM(s) Oral daily  sodium bicarbonate 1300 milliGRAM(s) Oral every 8 hours  tamsulosin 0.4 milliGRAM(s) Oral at bedtime    MEDICATIONS  (PRN):  acetaminophen     Tablet .. 650 milliGRAM(s) Oral every 6 hours PRN Temp greater or equal to 38C (100.4F), Mild Pain (1 - 3)  dextrose Oral Gel 15 Gram(s) Oral once PRN Blood Glucose LESS THAN 70 milliGRAM(s)/deciliter  melatonin 3 milliGRAM(s) Oral at bedtime PRN Insomnia      CAPILLARY BLOOD GLUCOSE    POCT Blood Glucose.: 144 mg/dL (06 May 2025 21:59)  POCT Blood Glucose.: 203 mg/dL (05 May 2025 20:54)  POCT Blood Glucose.: 176 mg/dL (04 May 2025 21:07)  POCT Blood Glucose.: 199 mg/dL (03 May 2025 22:18)  POCT Blood Glucose.: 206 mg/dL (02 May 2025 21:52)  POCT Blood Glucose.: 133 mg/dL (02 May 2025 17:40)  POCT Blood Glucose.: 204 mg/dL (02 May 2025 12:06)  POCT Blood Glucose.: 104 mg/dL (02 May 2025 08:27)      I&O's Summary    05 May 2025 07:01  -  06 May 2025 07:00  --------------------------------------------------------  IN: 200 mL / OUT: 500 mL / NET: -300 mL        PHYSICAL EXAM:  Vital Signs Last 24 Hrs  T(C): 36.7 (06 May 2025 21:00), Max: 36.9 (06 May 2025 12:50)  T(F): 98.1 (06 May 2025 21:00), Max: 98.5 (06 May 2025 12:50)  HR: 78 (06 May 2025 21:00) (78 - 94)  BP: 129/61 (06 May 2025 21:00) (129/61 - 135/61)  BP(mean): --  RR: 17 (06 May 2025 21:00) (17 - 18)  SpO2: 98% (06 May 2025 21:00) (95% - 98%)    Parameters below as of 06 May 2025 21:00  Patient On (Oxygen Delivery Method): room air        GENERAL: NAD, lying comfortably in bed  HEAD: Atraumatic, normocephalic  EYES: EOMI b/l PERRLA b/l, conjunctiva and sclera clear  NECK: Supple, No JVD, No LAD  RESPIRATORY: Normal respiratory effort; lungs are clear to auscultation bilaterally  CARDIOVASCULAR: Regular rate and rhythm, normal S1 and S2, no murmur/rub/gallop; No lower extremity edema  ABDOMEN: TTP in the LLQ (new), patient guarding, normoactive bowel sounds, No hepatosplenomegaly  MUSCULOSKELETAL: no clubbing or cyanosis of digits; no joint swelling or tenderness to palpation  NEURO: Non focal   PSYCH: A+O to person, place, and time; affect appropriate        LABS:                                11.4   11.43 )-----------( 202      ( 06 May 2025 04:08 )             33.0                           12.5   10.69 )-----------( 205      ( 05 May 2025 04:11 )             37.1                                13.5   10.29 )-----------( 193      ( 04 May 2025 03:40 )             40.1                    11.2   8.87  )-----------( 160      ( 03 May 2025 05:20 )             32.5         05-06    134[L]  |  99  |  39[H]  ----------------------------<  132[H]  4.7   |  22  |  2.09[H]    Ca    9.1      06 May 2025 04:08  Phos  3.4     05-06  Mg     1.80     05-06    TPro  6.8  /  Alb  3.4  /  TBili  0.3  /  DBili  x   /  AST  24  /  ALT  42[H]  /  AlkPhos  108  05-06      05-05    132[L]  |  96[L]  |  35[H]  ----------------------------<  146[H]  4.1   |  23  |  1.92[H]    Ca    9.5      05 May 2025 04:11  Phos  3.6     05-05  Mg     1.90     05-05    TPro  7.2  /  Alb  3.6  /  TBili  0.4  /  DBili  x   /  AST  24  /  ALT  62[H]  /  AlkPhos  111  05-05      05-04    134[L]  |  97[L]  |  41[H]  ----------------------------<  147[H]  4.7   |  22  |  1.95[H]    Ca    10.1      04 May 2025 03:40  Phos  3.3     05-04  Mg     1.90     05-04    TPro  8.6[H]  /  Alb  4.3  /  TBili  0.4  /  DBili  x   /  AST  45[H]  /  ALT  93[H]  /  AlkPhos  132[H]  05-04        05-03    138  |  103  |  50[H]  ----------------------------<  124[H]  4.5   |  22  |  2.25[H]    Ca    9.5      03 May 2025 05:20  Phos  3.5     05-03  Mg     1.80     05-03    TPro  7.1  /  Alb  3.7  /  TBili  0.2  /  DBili  x   /  AST  48[H]  /  ALT  92[H]  /  AlkPhos  109  05-03    PT/INR - ( 02 May 2025 04:42 )   PT: 12.5 sec;   INR: 1.08 ratio         PTT - ( 02 May 2025 04:42 )  PTT:36.2 sec      Urinalysis Basic - ( 03 May 2025 05:20 )    Color: x / Appearance: x / SG: x / pH: x  Gluc: 124 mg/dL / Ketone: x  / Bili: x / Urobili: x   Blood: x / Protein: x / Nitrite: x   Leuk Esterase: x / RBC: x / WBC x   Sq Epi: x / Non Sq Epi: x / Bacteria: x

## 2025-05-07 NOTE — CHART NOTE - NSCHARTNOTEFT_GEN_A_CORE
Notified by RN that patient's BP this afternoon is 90s/40s. Evaluated patient at bedside. Patient sitting in chair comfortably and asymptomatic. No dizziness, headaches, chest pain, abdominal pain. Repeat measurement of BP w/ Manual BP cuff showed BP of 101/60.

## 2025-05-07 NOTE — PROGRESS NOTE ADULT - PROBLEM SELECTOR PLAN 2
Patient with new onset left lower quadrant abdominal tenderness. Patient was seen grimacing in pain and jumped when palpating LLQ    Plan:  - Pain and elevated WBC noted, will continue to monitor  - Patient able to tolerate meals and have BMs

## 2025-05-07 NOTE — DISCHARGE NOTE NURSING/CASE MANAGEMENT/SOCIAL WORK - NSDCPEFALRISK_GEN_ALL_CORE
For information on Fall & Injury Prevention, visit: [email or web address] OR  [email or web address] OR  https://www.cdc.gov/steadi/patient.html

## 2025-05-07 NOTE — PROGRESS NOTE ADULT - REASON FOR ADMISSION
Slurred speech

## 2025-05-07 NOTE — DISCHARGE NOTE NURSING/CASE MANAGEMENT/SOCIAL WORK - FINANCIAL ASSISTANCE
[facility] provides services at a reduced cost to those who are determined to be eligible through [facility]’s financial assistance program. Information regarding [facility]’s financial assistance program can be found by going to [email or web address] or by calling [phone].

## 2025-05-07 NOTE — PROGRESS NOTE ADULT - PROBLEM SELECTOR PLAN 4
Admitted Creatine 2.76. From speaking with patient's family and looking through health portal, patient's last Cr was 3.3    - urinary retention- Mosher placed in the ED, w/ 1L output   - u/a - protein, blood, LE, pyuria, rvp negative   #NAGMA - 7.28/36/17  - c/w home tamsulosin 0.1mcg  - Monitor labs and urine output. Avoid NSAIDs, ACEI/ARBS, RCA and nephrotoxins. Dose medications as per eGFR  - Hold home meds : Farxiga 5mg, furosemide 20mg BID   - c/w home calcitriol for BMD  - Continue with sodium bicarb 1300mg TID  - Renal bladder US showing signs of renal disease and thickening of urinary bladder wall  - Cr downtrending  - Restart home lisinopril

## 2025-05-07 NOTE — PROGRESS NOTE ADULT - PROBLEM SELECTOR PLAN 10
Home regimen : metop succinate 50, nifedipine 60mg qd,   - Restarted Metoprolol Succinate 25mg  - Restart Nifedipine 60mg  - BPs have been stable

## 2025-05-07 NOTE — PROGRESS NOTE ADULT - PROBLEM SELECTOR PLAN 8
Patient with hx of Afib s/p CABG. As per daughter, the patient is no longer in Afib and is no on AC. There is a chart note from 2019 related to this.   - CTM on tele for signs of Afib/Aflutter  - [id number] score 4  - Spoke with family regarding AC and they are ok with it. Restarted Eliquis 2.5mg BID

## 2025-05-07 NOTE — DISCHARGE NOTE NURSING/CASE MANAGEMENT/SOCIAL WORK - NSDCFUADDAPPT_GEN_ALL_CORE_FT
APPTS ARE READY TO BE MADE: [X] YES    Best Family or Patient Contact (if needed):    Additional Information about above appointments (if needed):    1: PCP Dr. Cathleen Bhatti  2: Cardiologist Dr. Faulkner  3: Electrophysiology appt 5/21/2025    Other comments or requests:

## 2025-05-07 NOTE — PROGRESS NOTE ADULT - PROBLEM SELECTOR PROBLEM 1
Urinary tract infection
Chronic kidney disease, unspecified CKD stage
Urinary tract infection
Urinary tract infection
Acute kidney injury superimposed on CKD
Urinary tract infection
Acute kidney injury superimposed on CKD
Urinary tract infection

## 2025-05-07 NOTE — DISCHARGE NOTE NURSING/CASE MANAGEMENT/SOCIAL WORK - PATIENT PORTAL LINK FT
You can access the FollowMyHealth Patient Portal offered by [facility] by registering at the following website: [email or web address] By joining [employer]’s FollowMyHealth portal, you will also be able to view your health information using other applications (apps) compatible with our system.

## 2025-05-07 NOTE — DISCHARGE NOTE NURSING/CASE MANAGEMENT/SOCIAL WORK - NSDCCRNAME_GEN_ALL_CORE_FT
1 - [facility], [address]  2 - [facility]

## 2025-05-07 NOTE — PROGRESS NOTE ADULT - PROVIDER SPECIALTY LIST ADULT
Electrophysiology
Nephrology
Electrophysiology
Internal Medicine
Nephrology
Internal Medicine
Internal Medicine
Nephrology
Internal Medicine

## 2025-05-21 ENCOUNTER — APPOINTMENT (OUTPATIENT)
Dept: ELECTROPHYSIOLOGY | Facility: CLINIC | Age: 85
End: 2025-05-21

## 2025-05-24 ENCOUNTER — INPATIENT (INPATIENT)
Facility: HOSPITAL | Age: 85
LOS: 2 days | Discharge: ANOTHER HOSPITAL | End: 2025-05-27
Attending: INTERNAL MEDICINE | Admitting: INTERNAL MEDICINE
Payer: MEDICARE

## 2025-05-24 VITALS
HEIGHT: 62 IN | SYSTOLIC BLOOD PRESSURE: 152 MMHG | WEIGHT: 133.82 LBS | DIASTOLIC BLOOD PRESSURE: 77 MMHG | HEART RATE: 62 BPM | OXYGEN SATURATION: 98 % | RESPIRATION RATE: 16 BRPM

## 2025-05-24 DIAGNOSIS — R26.2 DIFFICULTY IN WALKING, NOT ELSEWHERE CLASSIFIED: ICD-10-CM

## 2025-05-24 DIAGNOSIS — D50.9 IRON DEFICIENCY ANEMIA, UNSPECIFIED: ICD-10-CM

## 2025-05-24 DIAGNOSIS — N39.0 URINARY TRACT INFECTION, SITE NOT SPECIFIED: ICD-10-CM

## 2025-05-24 DIAGNOSIS — Z29.9 ENCOUNTER FOR PROPHYLACTIC MEASURES, UNSPECIFIED: ICD-10-CM
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
## 2025-05-24 DIAGNOSIS — I48.91 UNSPECIFIED ATRIAL FIBRILLATION: ICD-10-CM

## 2025-05-24 DIAGNOSIS — R79.89 OTHER SPECIFIED ABNORMAL FINDINGS OF BLOOD CHEMISTRY: ICD-10-CM

## 2025-05-24 DIAGNOSIS — Z95.1 PRESENCE OF AORTOCORONARY BYPASS GRAFT: Chronic | ICD-10-CM

## 2025-05-24 DIAGNOSIS — I10 ESSENTIAL (PRIMARY) HYPERTENSION: ICD-10-CM

## 2025-05-24 DIAGNOSIS — R41.82 ALTERED MENTAL STATUS, UNSPECIFIED: ICD-10-CM

## 2025-05-24 DIAGNOSIS — E87.6 HYPOKALEMIA: ICD-10-CM

## 2025-05-24 DIAGNOSIS — E11.9 TYPE 2 DIABETES MELLITUS WITHOUT COMPLICATIONS: ICD-10-CM

## 2025-05-24 DIAGNOSIS — G93.40 ENCEPHALOPATHY, UNSPECIFIED: ICD-10-CM

## 2025-05-24 DIAGNOSIS — Z95.818 PRESENCE OF OTHER CARDIAC IMPLANTS AND GRAFTS: Chronic | ICD-10-CM

## 2025-05-24 DIAGNOSIS — Z98.890 OTHER SPECIFIED POSTPROCEDURAL STATES: Chronic | ICD-10-CM

## 2025-05-24 DIAGNOSIS — N40.0 BENIGN PROSTATIC HYPERPLASIA WITHOUT LOWER URINARY TRACT SYMPTOMS: ICD-10-CM

## 2025-05-24 DIAGNOSIS — N17.9 ACUTE KIDNEY FAILURE, UNSPECIFIED: ICD-10-CM

## 2025-05-24 DIAGNOSIS — Z95.0 PRESENCE OF CARDIAC PACEMAKER: ICD-10-CM

## 2025-05-24 LAB
ALBUMIN SERPL ELPH-MCNC: 2.9 G/DL — LOW (ref 3.3–5)
ALBUMIN SERPL ELPH-MCNC: 3.2 G/DL — LOW (ref 3.3–5)
ALP SERPL-CCNC: 134 U/L — HIGH (ref 40–120)
ALP SERPL-CCNC: 138 U/L — HIGH (ref 40–120)
ALT FLD-CCNC: 70 U/L — SIGNIFICANT CHANGE UP (ref 12–78)
ALT FLD-CCNC: 75 U/L — SIGNIFICANT CHANGE UP (ref 12–78)
AMMONIA BLD-MCNC: 19 UMOL/L — SIGNIFICANT CHANGE UP (ref 11–32)
ANION GAP SERPL CALC-SCNC: 4 MMOL/L — LOW (ref 5–17)
ANION GAP SERPL CALC-SCNC: 6 MMOL/L — SIGNIFICANT CHANGE UP (ref 5–17)
APPEARANCE UR: ABNORMAL
APTT BLD: 46.8 SEC — HIGH (ref 26.1–36.8)
AST SERPL-CCNC: 44 U/L — HIGH (ref 15–37)
AST SERPL-CCNC: 48 U/L — HIGH (ref 15–37)
BACTERIA # UR AUTO: ABNORMAL /HPF
BASOPHILS # BLD AUTO: 0.01 K/UL — SIGNIFICANT CHANGE UP (ref 0–0.2)
BASOPHILS # BLD AUTO: 0.02 K/UL — SIGNIFICANT CHANGE UP (ref 0–0.2)
BASOPHILS NFR BLD AUTO: 0.2 % — SIGNIFICANT CHANGE UP (ref 0–2)
BASOPHILS NFR BLD AUTO: 0.3 % — SIGNIFICANT CHANGE UP (ref 0–2)
BILIRUB SERPL-MCNC: 0.4 MG/DL — SIGNIFICANT CHANGE UP (ref 0.2–1.2)
BILIRUB SERPL-MCNC: 0.5 MG/DL — SIGNIFICANT CHANGE UP (ref 0.2–1.2)
BILIRUB UR-MCNC: NEGATIVE — SIGNIFICANT CHANGE UP
BUN SERPL-MCNC: 52 MG/DL — HIGH (ref 7–23)
BUN SERPL-MCNC: 52 MG/DL — HIGH (ref 7–23)
CALCIUM SERPL-MCNC: 9.5 MG/DL — SIGNIFICANT CHANGE UP (ref 8.5–10.1)
CALCIUM SERPL-MCNC: 9.9 MG/DL — SIGNIFICANT CHANGE UP (ref 8.5–10.1)
CHLORIDE SERPL-SCNC: 108 MMOL/L — SIGNIFICANT CHANGE UP (ref 96–108)
CHLORIDE SERPL-SCNC: 110 MMOL/L — HIGH (ref 96–108)
CO2 SERPL-SCNC: 24 MMOL/L — SIGNIFICANT CHANGE UP (ref 22–31)
CO2 SERPL-SCNC: 27 MMOL/L — SIGNIFICANT CHANGE UP (ref 22–31)
COLOR SPEC: YELLOW — SIGNIFICANT CHANGE UP
CREAT SERPL-MCNC: 2.46 MG/DL — HIGH (ref 0.5–1.3)
CREAT SERPL-MCNC: 2.48 MG/DL — HIGH (ref 0.5–1.3)
DIFF PNL FLD: ABNORMAL
EGFR: 25 ML/MIN/1.73M2 — LOW
EOSINOPHIL # BLD AUTO: 0.17 K/UL — SIGNIFICANT CHANGE UP (ref 0–0.5)
EOSINOPHIL # BLD AUTO: 0.18 K/UL — SIGNIFICANT CHANGE UP (ref 0–0.5)
EOSINOPHIL NFR BLD AUTO: 3 % — SIGNIFICANT CHANGE UP (ref 0–6)
EOSINOPHIL NFR BLD AUTO: 3.7 % — SIGNIFICANT CHANGE UP (ref 0–6)
EPI CELLS # UR: PRESENT
GLUCOSE BLDC GLUCOMTR-MCNC: 139 MG/DL — HIGH (ref 70–99)
GLUCOSE BLDC GLUCOMTR-MCNC: 181 MG/DL — HIGH (ref 70–99)
GLUCOSE BLDC GLUCOMTR-MCNC: 71 MG/DL — SIGNIFICANT CHANGE UP (ref 70–99)
GLUCOSE BLDC GLUCOMTR-MCNC: 83 MG/DL — SIGNIFICANT CHANGE UP (ref 70–99)
GLUCOSE SERPL-MCNC: 101 MG/DL — HIGH (ref 70–99)
GLUCOSE SERPL-MCNC: 165 MG/DL — HIGH (ref 70–99)
GLUCOSE UR QL: 500 MG/DL
HCT VFR BLD CALC: 30.4 % — LOW (ref 39–50)
HCT VFR BLD CALC: 30.4 % — LOW (ref 39–50)
HGB BLD-MCNC: 10.2 G/DL — LOW (ref 13–17)
HGB BLD-MCNC: 10.3 G/DL — LOW (ref 13–17)
IMM GRANULOCYTES NFR BLD AUTO: 0.2 % — SIGNIFICANT CHANGE UP (ref 0–0.9)
IMM GRANULOCYTES NFR BLD AUTO: 0.3 % — SIGNIFICANT CHANGE UP (ref 0–0.9)
INR BLD: 1.29 RATIO — HIGH (ref 0.85–1.16)
KETONES UR QL: NEGATIVE MG/DL — SIGNIFICANT CHANGE UP
LACTATE SERPL-SCNC: 0.9 MMOL/L — SIGNIFICANT CHANGE UP (ref 0.7–2)
LEUKOCYTE ESTERASE UR-ACNC: ABNORMAL
LYMPHOCYTES # BLD AUTO: 0.82 K/UL — LOW (ref 1–3.3)
LYMPHOCYTES # BLD AUTO: 0.98 K/UL — LOW (ref 1–3.3)
LYMPHOCYTES # BLD AUTO: 16.1 % — SIGNIFICANT CHANGE UP (ref 13–44)
LYMPHOCYTES # BLD AUTO: 17.8 % — SIGNIFICANT CHANGE UP (ref 13–44)
MCHC RBC-ENTMCNC: 29.1 PG — SIGNIFICANT CHANGE UP (ref 27–34)
MCHC RBC-ENTMCNC: 29.3 PG — SIGNIFICANT CHANGE UP (ref 27–34)
MCHC RBC-ENTMCNC: 33.6 G/DL — SIGNIFICANT CHANGE UP (ref 32–36)
MCHC RBC-ENTMCNC: 33.9 G/DL — SIGNIFICANT CHANGE UP (ref 32–36)
MCV RBC AUTO: 86.6 FL — SIGNIFICANT CHANGE UP (ref 80–100)
MCV RBC AUTO: 86.9 FL — SIGNIFICANT CHANGE UP (ref 80–100)
MONOCYTES # BLD AUTO: 0.46 K/UL — SIGNIFICANT CHANGE UP (ref 0–0.9)
MONOCYTES # BLD AUTO: 0.53 K/UL — SIGNIFICANT CHANGE UP (ref 0–0.9)
MONOCYTES NFR BLD AUTO: 10 % — SIGNIFICANT CHANGE UP (ref 2–14)
MONOCYTES NFR BLD AUTO: 8.7 % — SIGNIFICANT CHANGE UP (ref 2–14)
NEUTROPHILS # BLD AUTO: 3.14 K/UL — SIGNIFICANT CHANGE UP (ref 1.8–7.4)
NEUTROPHILS # BLD AUTO: 4.34 K/UL — SIGNIFICANT CHANGE UP (ref 1.8–7.4)
NEUTROPHILS NFR BLD AUTO: 68.1 % — SIGNIFICANT CHANGE UP (ref 43–77)
NEUTROPHILS NFR BLD AUTO: 71.6 % — SIGNIFICANT CHANGE UP (ref 43–77)
NITRITE UR-MCNC: NEGATIVE — SIGNIFICANT CHANGE UP
NRBC BLD AUTO-RTO: 0 /100 WBCS — SIGNIFICANT CHANGE UP (ref 0–0)
NRBC BLD AUTO-RTO: 0 /100 WBCS — SIGNIFICANT CHANGE UP (ref 0–0)
PH UR: 6 — SIGNIFICANT CHANGE UP (ref 5–8)
PLATELET # BLD AUTO: 163 K/UL — SIGNIFICANT CHANGE UP (ref 150–400)
PLATELET # BLD AUTO: 177 K/UL — SIGNIFICANT CHANGE UP (ref 150–400)
POTASSIUM SERPL-MCNC: 4.4 MMOL/L — SIGNIFICANT CHANGE UP (ref 3.5–5.3)
POTASSIUM SERPL-MCNC: 5 MMOL/L — SIGNIFICANT CHANGE UP (ref 3.5–5.3)
POTASSIUM SERPL-SCNC: 4.4 MMOL/L — SIGNIFICANT CHANGE UP (ref 3.5–5.3)
POTASSIUM SERPL-SCNC: 5 MMOL/L — SIGNIFICANT CHANGE UP (ref 3.5–5.3)
PROT SERPL-MCNC: 7.3 GM/DL — SIGNIFICANT CHANGE UP (ref 6–8.3)
PROT SERPL-MCNC: 7.7 GM/DL — SIGNIFICANT CHANGE UP (ref 6–8.3)
PROT UR-MCNC: 100 MG/DL
PROTHROM AB SERPL-ACNC: 14.9 SEC — HIGH (ref 9.9–13.4)
RBC # BLD: 3.5 M/UL — LOW (ref 4.2–5.8)
RBC # BLD: 3.51 M/UL — LOW (ref 4.2–5.8)
RBC # FLD: 13.5 % — SIGNIFICANT CHANGE UP (ref 10.3–14.5)
RBC # FLD: 14 % — SIGNIFICANT CHANGE UP (ref 10.3–14.5)
RBC CASTS # UR COMP ASSIST: 6 /HPF — HIGH (ref 0–4)
SODIUM SERPL-SCNC: 138 MMOL/L — SIGNIFICANT CHANGE UP (ref 135–145)
SODIUM SERPL-SCNC: 141 MMOL/L — SIGNIFICANT CHANGE UP (ref 135–145)
SP GR SPEC: 1.01 — SIGNIFICANT CHANGE UP (ref 1–1.03)
TROPONIN I, HIGH SENSITIVITY RESULT: 1219.9 NG/L — HIGH
TROPONIN I, HIGH SENSITIVITY RESULT: 1328.5 NG/L — HIGH
UROBILINOGEN FLD QL: 0.2 MG/DL — SIGNIFICANT CHANGE UP (ref 0.2–1)
WBC # BLD: 4.61 K/UL — SIGNIFICANT CHANGE UP (ref 3.8–10.5)
WBC # BLD: 6.07 K/UL — SIGNIFICANT CHANGE UP (ref 3.8–10.5)
WBC # FLD AUTO: 4.61 K/UL — SIGNIFICANT CHANGE UP (ref 3.8–10.5)
WBC # FLD AUTO: 6.07 K/UL — SIGNIFICANT CHANGE UP (ref 3.8–10.5)
WBC UR QL: 45 /HPF — HIGH (ref 0–5)

## 2025-05-24 PROCEDURE — 99222 1ST HOSP IP/OBS MODERATE 55: CPT

## 2025-05-24 PROCEDURE — 71045 X-RAY EXAM CHEST 1 VIEW: CPT | Mod: 26

## 2025-05-24 PROCEDURE — 99223 1ST HOSP IP/OBS HIGH 75: CPT

## 2025-05-24 PROCEDURE — 70450 CT HEAD/BRAIN W/O DYE: CPT | Mod: 26

## 2025-05-24 PROCEDURE — 12345: CPT | Mod: NC

## 2025-05-24 PROCEDURE — 93010 ELECTROCARDIOGRAM REPORT: CPT

## 2025-05-24 PROCEDURE — 99285 EMERGENCY DEPT VISIT HI MDM: CPT | Mod: 25

## 2025-05-24 RX ORDER — SODIUM CHLORIDE 9 G/1000ML
1000 INJECTION, SOLUTION INTRAVENOUS
Refills: 0 | Status: DISCONTINUED | OUTPATIENT
Start: 2025-05-24 | End: 2025-05-27

## 2025-05-24 RX ORDER — METOPROLOL SUCCINATE 50 MG/1
25 TABLET, EXTENDED RELEASE ORAL DAILY
Refills: 0 | Status: DISCONTINUED | OUTPATIENT
Start: 2025-05-24 | End: 2025-05-27

## 2025-05-24 RX ORDER — DEXTROSE 50 % IN WATER 50 %
15 SYRINGE (ML) INTRAVENOUS ONCE
Refills: 0 | Status: DISCONTINUED | OUTPATIENT
Start: 2025-05-24 | End: 2025-05-27

## 2025-05-24 RX ORDER — ONDANSETRON HCL/PF 4 MG/2 ML
4 VIAL (ML) INJECTION EVERY 8 HOURS
Refills: 0 | Status: DISCONTINUED | OUTPATIENT
Start: 2025-05-24 | End: 2025-05-27

## 2025-05-24 RX ORDER — DEXTROSE 50 % IN WATER 50 %
25 SYRINGE (ML) INTRAVENOUS ONCE
Refills: 0 | Status: DISCONTINUED | OUTPATIENT
Start: 2025-05-24 | End: 2025-05-27

## 2025-05-24 RX ORDER — CALCITRIOL 0.5 UG/1
0.25 CAPSULE, GELATIN COATED ORAL DAILY
Refills: 0 | Status: DISCONTINUED | OUTPATIENT
Start: 2025-05-24 | End: 2025-05-27

## 2025-05-24 RX ORDER — TAMSULOSIN HYDROCHLORIDE 0.4 MG/1
0.4 CAPSULE ORAL AT BEDTIME
Refills: 0 | Status: DISCONTINUED | OUTPATIENT
Start: 2025-05-24 | End: 2025-05-27

## 2025-05-24 RX ORDER — MAGNESIUM, ALUMINUM HYDROXIDE 200-200 MG
30 TABLET,CHEWABLE ORAL EVERY 4 HOURS
Refills: 0 | Status: DISCONTINUED | OUTPATIENT
Start: 2025-05-24 | End: 2025-05-27

## 2025-05-24 RX ORDER — SODIUM CHLORIDE 9 G/1000ML
1000 INJECTION, SOLUTION INTRAVENOUS
Refills: 0 | Status: DISCONTINUED | OUTPATIENT
Start: 2025-05-24 | End: 2025-05-25

## 2025-05-24 RX ORDER — SODIUM BICARBONATE 1 MEQ/ML
1300 SYRINGE (ML) INTRAVENOUS EVERY 8 HOURS
Refills: 0 | Status: DISCONTINUED | OUTPATIENT
Start: 2025-05-24 | End: 2025-05-27

## 2025-05-24 RX ORDER — CEFTRIAXONE 500 MG/1
1000 INJECTION, POWDER, FOR SOLUTION INTRAMUSCULAR; INTRAVENOUS EVERY 24 HOURS
Refills: 0 | Status: COMPLETED | OUTPATIENT
Start: 2025-05-25 | End: 2025-05-27

## 2025-05-24 RX ORDER — APIXABAN 2.5 MG/1
2.5 TABLET, FILM COATED ORAL
Refills: 0 | Status: DISCONTINUED | OUTPATIENT
Start: 2025-05-24 | End: 2025-05-26

## 2025-05-24 RX ORDER — MEROPENEM 1 G/30ML
500 INJECTION INTRAVENOUS EVERY 12 HOURS
Refills: 0 | Status: DISCONTINUED | OUTPATIENT
Start: 2025-05-24 | End: 2025-05-24

## 2025-05-24 RX ORDER — MELATONIN 5 MG
3 TABLET ORAL AT BEDTIME
Refills: 0 | Status: DISCONTINUED | OUTPATIENT
Start: 2025-05-24 | End: 2025-05-27

## 2025-05-24 RX ORDER — ACETAMINOPHEN 500 MG/5ML
650 LIQUID (ML) ORAL EVERY 6 HOURS
Refills: 0 | Status: DISCONTINUED | OUTPATIENT
Start: 2025-05-24 | End: 2025-05-27

## 2025-05-24 RX ORDER — MIDAZOLAM IN 0.9 % SOD.CHLORID 1 MG/ML
1 PLASTIC BAG, INJECTION (ML) INTRAVENOUS ONCE
Refills: 0 | Status: DISCONTINUED | OUTPATIENT
Start: 2025-05-24 | End: 2025-05-24

## 2025-05-24 RX ORDER — MEROPENEM 1 G/30ML
1000 INJECTION INTRAVENOUS ONCE
Refills: 0 | Status: COMPLETED | OUTPATIENT
Start: 2025-05-24 | End: 2025-05-24

## 2025-05-24 RX ORDER — GLUCAGON 3 MG/1
1 POWDER NASAL ONCE
Refills: 0 | Status: DISCONTINUED | OUTPATIENT
Start: 2025-05-24 | End: 2025-05-27

## 2025-05-24 RX ORDER — FERROUS SULFATE 137(45) MG
325 TABLET, EXTENDED RELEASE ORAL DAILY
Refills: 0 | Status: DISCONTINUED | OUTPATIENT
Start: 2025-05-24 | End: 2025-05-27

## 2025-05-24 RX ORDER — HALOPERIDOL 10 MG/1
5 TABLET ORAL ONCE
Refills: 0 | Status: COMPLETED | OUTPATIENT
Start: 2025-05-24 | End: 2025-05-24

## 2025-05-24 RX ORDER — INSULIN LISPRO 100 U/ML
INJECTION, SOLUTION INTRAVENOUS; SUBCUTANEOUS
Refills: 0 | Status: DISCONTINUED | OUTPATIENT
Start: 2025-05-24 | End: 2025-05-27

## 2025-05-24 RX ORDER — ATORVASTATIN CALCIUM 80 MG/1
40 TABLET, FILM COATED ORAL AT BEDTIME
Refills: 0 | Status: DISCONTINUED | OUTPATIENT
Start: 2025-05-24 | End: 2025-05-27

## 2025-05-24 RX ORDER — AMMONIUM LACTATE 12 %
1 LOTION (ML) TOPICAL
Refills: 0 | Status: DISCONTINUED | OUTPATIENT
Start: 2025-05-24 | End: 2025-05-27

## 2025-05-24 RX ORDER — DEXTROSE 50 % IN WATER 50 %
12.5 SYRINGE (ML) INTRAVENOUS ONCE
Refills: 0 | Status: DISCONTINUED | OUTPATIENT
Start: 2025-05-24 | End: 2025-05-27

## 2025-05-24 RX ADMIN — MEROPENEM 100 MILLIGRAM(S): 1 INJECTION INTRAVENOUS at 15:28

## 2025-05-24 RX ADMIN — Medication 1300 MILLIGRAM(S): at 13:10

## 2025-05-24 RX ADMIN — MEROPENEM 1000 MILLIGRAM(S): 1 INJECTION INTRAVENOUS at 03:37

## 2025-05-24 RX ADMIN — APIXABAN 2.5 MILLIGRAM(S): 2.5 TABLET, FILM COATED ORAL at 17:28

## 2025-05-24 RX ADMIN — METOPROLOL SUCCINATE 25 MILLIGRAM(S): 50 TABLET, EXTENDED RELEASE ORAL at 11:38

## 2025-05-24 RX ADMIN — Medication 1 MILLIGRAM(S): at 05:30

## 2025-05-24 RX ADMIN — Medication 1300 MILLIGRAM(S): at 21:27

## 2025-05-24 RX ADMIN — TAMSULOSIN HYDROCHLORIDE 0.4 MILLIGRAM(S): 0.4 CAPSULE ORAL at 21:27

## 2025-05-24 RX ADMIN — MEROPENEM 100 MILLIGRAM(S): 1 INJECTION INTRAVENOUS at 03:07

## 2025-05-24 RX ADMIN — SODIUM CHLORIDE 75 MILLILITER(S): 9 INJECTION, SOLUTION INTRAVENOUS at 11:38

## 2025-05-24 RX ADMIN — CALCITRIOL 0.25 MICROGRAM(S): 0.5 CAPSULE, GELATIN COATED ORAL at 13:10

## 2025-05-24 RX ADMIN — Medication 1 APPLICATION(S): at 18:52

## 2025-05-24 RX ADMIN — ATORVASTATIN CALCIUM 40 MILLIGRAM(S): 80 TABLET, FILM COATED ORAL at 21:27

## 2025-05-24 RX ADMIN — HALOPERIDOL 5 MILLIGRAM(S): 10 TABLET ORAL at 05:30

## 2025-05-24 RX ADMIN — Medication 3 MILLIGRAM(S): at 21:27

## 2025-05-24 RX ADMIN — Medication 325 MILLIGRAM(S): at 11:38

## 2025-05-24 RX ADMIN — SODIUM CHLORIDE 75 MILLILITER(S): 9 INJECTION, SOLUTION INTRAVENOUS at 13:10

## 2025-05-24 NOTE — CONSULT NOTE ADULT - SUBJECTIVE AND OBJECTIVE BOX
[facility] NEPHROLOGY SERVICES, [facility]  NEPHROLOGY AND HYPERTENSION  [address]  [address]  [place] [address]  [phone]    MD COLTON WORLEY MD YELENA ROSENBERG, MD BINNY KOSHY, MD CHRISTOPHER CAPUTO, MD EDWARD BOVER, MD      Information from chart:  "Patient is a 85y old  Male who presents with a chief complaint of ESBL UTI (24 May 2025 10:08)    HPI:  87-year-old male with past medical history of paroxysmal A-fib, diabetes, CKD, hyperlipidemia, bradycardia status post cardiac pacemaker who was brought to the ED from [facility] due to confusion and altered mental status. He was also found to have ESBL/ Klebsiella UTI as per urine culture. Review of systems: Limited due to mental status however the patient denies any fevers, chills, dysuria, or other complaints.   The patient's vitals were stable on presentation. Labs notable for mild anemia, troponin 1228.5>1219, creatinine 2.4 (2.0 two weeks ago). CTH negative for acute findings. Patient received Haldol, Versed, and meropenem in the ED. Cardiology consulted in ED. (24 May 2025 07:39)   "    Patient more alert       PAST MEDICAL & SURGICAL HISTORY:  Type 2 diabetes mellitus without complication      CVA (cerebral vascular accident)      Non-ST elevation (NSTEMI) myocardial infarction      CAD (coronary artery disease)      CKD (chronic kidney disease), stage III      HTN (hypertension)      HLD (hyperlipidemia)      Status post placement of implantable loop recorder      H/O carpal tunnel repair      S/P CABG (coronary artery bypass graft)        FAMILY HISTORY:  Family history of heart disease    Family history of uterine cancer (Sibling)      Allergies    No Known Allergies    Intolerances      Home Medications:  atorvastatin 40 mg oral tablet: 1 tab(s) orally once a day (at bedtime) (26 Apr 2025 05:33)  calcitriol 0.25 mcg oral capsule: 1 cap(s) orally once a day (26 Apr 2025 05:32)  clotrimazole 1% topical cream: 1 Apply topically to affected area 2 times a day (06 May 2025 14:20)  Farxiga 5 mg oral tablet: 1 tab(s) orally once a day (26 Apr 2025 05:31)  ferrous sulfate 324 mg (65 mg elemental iron) oral delayed release tablet: 1 tab(s) orally once a day (26 Apr 2025 05:33)  glipiZIDE 10 mg oral tablet: 1 tab(s) orally once a day (in the evening) (26 Apr 2025 05:33)  lisinopril 2.5 mg oral tablet: 1 tab(s) orally once a day (26 Apr 2025 05:27)  Metoprolol Succinate ER 25 mg oral tablet, extended release: 1 tab(s) orally once a day (06 May 2025 14:20)  NIFEdipine 60 mg oral tablet, extended release: 1 tab(s) orally once a day (26 Apr 2025 05:30)  sodium bicarbonate 650 mg oral tablet: 2 tab(s) orally every 8 hours (06 May 2025 14:20)  tamsulosin 0.4 mg oral capsule: 1 cap(s) orally once a day (26 Apr 2025 05:30)    MEDICATIONS  (STANDING):  ammonium lactate 12% Lotion 1 Application(s) Topical two times a day  apixaban 2.5 milliGRAM(s) Oral two times a day  atorvastatin 40 milliGRAM(s) Oral at bedtime  calcitriol   Capsule 0.25 MICROGram(s) Oral daily  dextrose 5%. 1000 milliLiter(s) (50 mL/Hr) IV Continuous <Continuous>  dextrose 5%. 1000 milliLiter(s) (100 mL/Hr) IV Continuous <Continuous>  dextrose 50% Injectable 25 Gram(s) IV Push once  dextrose 50% Injectable 12.5 Gram(s) IV Push once  dextrose 50% Injectable 25 Gram(s) IV Push once  ferrous    sulfate 325 milliGRAM(s) Oral daily  glucagon  Injectable 1 milliGRAM(s) IntraMuscular once  insulin lispro (ADMELOG) corrective regimen sliding scale   SubCutaneous three times a day before meals  lactated ringers. 1000 milliLiter(s) (75 mL/Hr) IV Continuous <Continuous>  meropenem  IVPB 500 milliGRAM(s) IV Intermittent every 12 hours  metoprolol succinate ER 25 milliGRAM(s) Oral daily  sodium bicarbonate 1300 milliGRAM(s) Oral every 8 hours  tamsulosin 0.4 milliGRAM(s) Oral at bedtime    MEDICATIONS  (PRN):  acetaminophen     Tablet .. 650 milliGRAM(s) Oral every 6 hours PRN Temp greater or equal to 38C (100.4F), Mild Pain (1 - 3)  aluminum hydroxide/magnesium hydroxide/simethicone Suspension 30 milliLiter(s) Oral every 4 hours PRN Dyspepsia  dextrose Oral Gel 15 Gram(s) Oral once PRN Blood Glucose LESS THAN 70 milliGRAM(s)/deciliter  melatonin 3 milliGRAM(s) Oral at bedtime PRN Insomnia  ondansetron Injectable 4 milliGRAM(s) IV Push every 8 hours PRN Nausea and/or Vomiting    Vital Signs Last 24 Hrs  T(C): 36.7 (24 May 2025 16:40), Max: 36.7 (24 May 2025 16:40)  T(F): 98 (24 May 2025 16:40), Max: 98 (24 May 2025 16:40)  HR: 79 (24 May 2025 16:40) (62 - 79)  BP: 120/68 (24 May 2025 16:40) (120/68 - 160/84)  BP(mean): --  RR: 18 (24 May 2025 16:40) (11 - 18)  SpO2: 99% (24 May 2025 16:40) (97% - 99%)    Parameters below as of 24 May 2025 16:40  Patient On (Oxygen Delivery Method): room air        Daily Height in cm: 165.1 (24 May 2025 09:14)    Daily     05-23-25 @ 07:01  -  05-24-25 @ 07:00  --------------------------------------------------------  IN: 0 mL / OUT: 600 mL / NET: -600 mL      CAPILLARY BLOOD GLUCOSE      POCT Blood Glucose.: 83 mg/dL (24 May 2025 16:48)  POCT Blood Glucose.: 139 mg/dL (24 May 2025 11:33)  POCT Blood Glucose.: 71 mg/dL (24 May 2025 08:06)    PHYSICAL EXAM:      T(C): 36.7 (05-24-25 @ 16:40), Max: 36.7 (05-24-25 @ 16:40)  HR: 79 (05-24-25 @ 16:40) (62 - 79)  BP: 120/68 (05-24-25 @ 16:40) (120/68 - 160/84)  RR: 18 (05-24-25 @ 16:40) (11 - 18)  SpO2: 99% (05-24-25 @ 16:40) (97% - 99%)  Wt(kg): --  Lungs clear  Heart S1S2  Abd soft NT ND  Extremities:   tr edema              05-24    141  |  110[H]  |  52[H]  ----------------------------<  165[H]  4.4   |  27  |  2.48[H]    Ca    9.5      24 May 2025 10:45    TPro  7.3  /  Alb  2.9[L]  /  TBili  0.4  /  DBili  x   /  AST  48[H]  /  ALT  70  /  AlkPhos  134[H]  05-24                          10.2   4.61  )-----------( 163      ( 24 May 2025 10:45 )             30.4     Creatinine Trend: 2.48<--, 2.46<--, 2.09<--, 1.92<--, 1.95<--, 2.25<--  Urinalysis Basic - ( 24 May 2025 10:45 )    Color: x / Appearance: x / SG: x / pH: x  Gluc: 165 mg/dL / Ketone: x  / Bili: x / Urobili: x   Blood: x / Protein: x / Nitrite: x   Leuk Esterase: x / RBC: x / WBC x   Sq Epi: x / Non Sq Epi: x / Bacteria: x          Assessment  TAYLOR CKD 3, UTI    Plan  IV abx  Avoid nephrotoxins       Yuriy Guillermo MD

## 2025-05-24 NOTE — PROGRESS NOTE ADULT - ASSESSMENT
87-year-old male with past medical history of paroxysmal A-fib, diabetes, CKD, hyperlipidemia, bradycardia status post cardiac pacemaker who was brought to the ED from [facility] due to confusion and altered mental status. He was also found to have ESBL/ Klebsiella UTI as per urine culture. Found with troponin 1228.5>1219, creatinine 2.4 (2.0 two weeks ago). CTH negative for acute findings. Cardiology consulted in ED. Admit to medicine

## 2025-05-24 NOTE — ED PROVIDER NOTE - CARE PLAN
Principal Discharge DX:	UTI (urinary tract infection), bacterial  Secondary Diagnosis:	AMS (altered mental status)   1

## 2025-05-24 NOTE — H&P ADULT - PROBLEM SELECTOR PLAN 2
- Elevated troponin, r/o ACS  - cardiology was consulted in ED  - Trended to peak  - Chest pain free  - EKG: no ST elevation  - stat EKG for chest pain  - telemetry monitoring  - f/u cardio recs

## 2025-05-24 NOTE — CONSULT NOTE ADULT - SUBJECTIVE AND OBJECTIVE BOX
Cardiology Consult Note   [Please check [email or web address] password: [email or web address]" for cardiology service schedule and contact information]    History of Present Illness:   Following history obtained from admission HPI  HPI:  87-year-old male with past medical history of paroxysmal A-fib, diabetes, CKD, hyperlipidemia, bradycardia status post cardiac pacemaker who was brought to the ED from [facility] due to confusion and altered mental status. He was also found to have ESBL/ Klebsiella UTI as per urine culture. Review of systems: Limited due to mental status however the patient denies any fevers, chills, dysuria, or other complaints.   The patient's vitals were stable on presentation. Labs notable for mild anemia, troponin 1228.5>1219, creatinine 2.4 (2.0 two weeks ago). CTH negative for acute findings. Patient received Haldol, Versed, and meropenem in the ED. Cardiology consulted in ED. (24 May 2025 07:39)    Additional HPI:     PAST MEDICAL & SURGICAL HISTORY:  Type 2 diabetes mellitus without complication      CVA (cerebral vascular accident)      Non-ST elevation (NSTEMI) myocardial infarction      CAD (coronary artery disease)      CKD (chronic kidney disease), stage III      HTN (hypertension)      HLD (hyperlipidemia)      Status post placement of implantable loop recorder      H/O carpal tunnel repair      S/P CABG (coronary artery bypass graft)        FAMILY HISTORY:  Family history of heart disease    Family history of uterine cancer (Sibling)      SOCIAL HISTORY:  unchanged    MEDICATIONS:  apixaban 2.5 milliGRAM(s) Oral two times a day  metoprolol succinate ER 25 milliGRAM(s) Oral daily    meropenem  IVPB 500 milliGRAM(s) IV Intermittent every 12 hours      acetaminophen     Tablet .. 650 milliGRAM(s) Oral every 6 hours PRN  melatonin 3 milliGRAM(s) Oral at bedtime PRN  ondansetron Injectable 4 milliGRAM(s) IV Push every 8 hours PRN    aluminum hydroxide/magnesium hydroxide/simethicone Suspension 30 milliLiter(s) Oral every 4 hours PRN    atorvastatin 40 milliGRAM(s) Oral at bedtime  dextrose 50% Injectable 25 Gram(s) IV Push once  dextrose 50% Injectable 12.5 Gram(s) IV Push once  dextrose 50% Injectable 25 Gram(s) IV Push once  dextrose Oral Gel 15 Gram(s) Oral once PRN  glucagon  Injectable 1 milliGRAM(s) IntraMuscular once  insulin lispro (ADMELOG) corrective regimen sliding scale   SubCutaneous three times a day before meals    calcitriol   Capsule 0.25 MICROGram(s) Oral daily  dextrose 5%. 1000 milliLiter(s) IV Continuous <Continuous>  dextrose 5%. 1000 milliLiter(s) IV Continuous <Continuous>  ferrous    sulfate 325 milliGRAM(s) Oral daily  lactated ringers. 1000 milliLiter(s) IV Continuous <Continuous>  sodium bicarbonate 1300 milliGRAM(s) Oral every 8 hours  tamsulosin 0.4 milliGRAM(s) Oral at bedtime      REVIEW OF SYSTEMS:  CV: chest pain (-), palpitation (-), orthopnea (-), PND (-), edema (-)  PULM: SOB (-), cough (-), wheezing (-), hemoptysis (-).   CONST: fever (-), chills (-) or fatigue (-)  GI: abdominal distension (-), abdominal pain (-) , nausea/vomiting (-), hematemesis, (-), melena (-), hematochezia (-)  : dysuria (-), frequency (-), hematuria (-).   NEURO: numbness (-), weakness (-), dizziness (-), AMS (+)  SKIN: itching (-), rash (-)  HEENT:  visual changes (-); vertigo or throat pain (-);  neck stiffness (-)     All other review of systems is negative unless indicated above.   -------------------------------------------------------------------------------------------  PHYSICAL EXAM:  T(C): --  HR: 78 (05-24-25 @ 06:28) (62 - 78)  BP: 127/99 (05-24-25 @ 06:28) (124/60 - 152/77)  RR: 12 (05-24-25 @ 06:28) (11 - 16)  SpO2: 97% (05-24-25 @ 04:39) (97% - 98%)  Wt(kg): --  I&O's Summary    23 May 2025 07:01  -  24 May 2025 07:00  --------------------------------------------------------  IN: 0 mL / OUT: 600 mL / NET: -600 mL        General: No acute distress. Awake and conversant.   Eyes: Normal conjunctiva, anicteric. Round symmetric pupils.   ENT: Hearing grossly intact. No nasal discharge.   Neck: Neck is supple. No masses or thyromegaly.   Respiratory: Respirations are non-labored. No wheezing.   Skin: Warm. No rashes or ulcers.   Psych: Alert and oriented. Cooperative, Appropriate mood and affect, Normal judgment.   CV: No lower extremity edema.   MSK: Normal ambulation. No clubbing or cyanosis.   Neuro: Sensation and CN II-XII grossly normal.      -------------------------------------------------------------------------------------------  LABS:  05-24    138  |  108  |  52[H]  ----------------------------<  101[H]  5.0   |  24  |  2.46[H]    Ca    9.9      24 May 2025 02:57    TPro  7.7  /  Alb  3.2[L]  /  TBili  0.5  /  DBili  x   /  AST  44[H]  /  ALT  75  /  AlkPhos  138[H]  05-24    Creatinine Trend: 2.46<--, 2.09<--, 1.92<--, 1.95<--, 2.25<--, 2.28<--                        10.3   6.07  )-----------( 177      ( 24 May 2025 02:57 )             30.4     PT/INR - ( 24 May 2025 04:12 )   PT: 14.9 sec;   INR: 1.29 ratio         PTT - ( 24 May 2025 04:12 )  PTT:46.8 sec    Lipid Panel: apixaban 2.5 milliGRAM(s) Oral two times a day  metoprolol succinate ER 25 milliGRAM(s) Oral daily    Cardiac Enzymes:         -------------------------------------------------------------------------------------------  Meds:  aluminum hydroxide/magnesium hydroxide/simethicone Suspension 30 milliLiter(s) Oral every 4 hours PRN    -------------------------------------------------------------------------------------------  Cardiovascular Diagnostic Testing:    ECG:     Echo:   < from: TTE W or WO Ultrasound Enhancing Agent (05.01.25 @ 10:29) >      1. The left ventricular cavity is normal in size. Left ventricular wall thickness is normal. Left ventricular systolic function is mildly decreased with an ejection fraction visually estimated at 45 to 50%. There are regional wall motion abnormalities present. Hypokinesis of the basal to mid inferior wall and basal inferolateral wall. There is mild (grade 1) left ventricular diastolic dysfunction.   2. Normal right ventricular cavity size and normal right ventricular systolic function. Tricuspid annular plane systolic excursion (TAPSE) is 2.6 cm (normal >=1.7 cm).   3. Structurally normal mitral valve with normal leaflet excursion. There is calcification of the mitral valve annulus. There is mild mitral regurgitation.   4. No prior echocardiogram is available for comparison.      < end of copied text >    Stress Testing:    Cath:    Imaging:    CXR:  reviewed  -------------------------------------------------------------------------------------------  Problems Assessed:   1. Troponin Elevation- troponin downtrending, suspect myocardial injury in setting of sepsis, TAYLOR.  2. HFrEF  3. TAYLOR on CKD  4. Severe Sepsis with AMS  5. Afib  6. CAD    Plan:   • Trend troponin one more day, add CKMB  • Pharmacologic stress once recovered from sepsis and encephalopathy prior to discharge.   • Continue Atorvastatin  • Continue Eliquis  • Continue metoprolol ER at current dose, resume lisinopril when creatinine is at baseline for GDMT    Robert Kevin MD   of Cardiology  [facility] at [address], [address] [place] [address]  Phone: [phone]  Fax: [phone]  -------------------------------------------------------------------------------------------  Billing Statement:   76/56/51/36 minutes spent on total encounter. Necessity of time spent during this encounter on this date of service was due to review of medical information in EMR, co-ordination of hospital and outpatient care, discussion with patient and communication with primary team.   -------------------------------------------------------------------------------------------   Cardiology Consult Note   [Please check [email or web address] password: [email or web address]" for cardiology service schedule and contact information]    History of Present Illness:   Following history obtained from admission HPI  HPI:  87-year-old male with past medical history of paroxysmal A-fib, diabetes, CKD, hyperlipidemia, bradycardia status post cardiac pacemaker who was brought to the ED from [facility] due to confusion and altered mental status. He was also found to have ESBL/ Klebsiella UTI as per urine culture. Review of systems: Limited due to mental status however the patient denies any fevers, chills, dysuria, or other complaints.   The patient's vitals were stable on presentation. Labs notable for mild anemia, troponin 1228.5>1219, creatinine 2.4 (2.0 two weeks ago). CTH negative for acute findings. Patient received Haldol, Versed, and meropenem in the ED. Cardiology consulted in ED. (24 May 2025 07:39)    Additional HPI: Patient agitated, confused. Cannot provide history.     PAST MEDICAL & SURGICAL HISTORY:  Type 2 diabetes mellitus without complication      CVA (cerebral vascular accident)      Non-ST elevation (NSTEMI) myocardial infarction      CAD (coronary artery disease)      CKD (chronic kidney disease), stage III      HTN (hypertension)      HLD (hyperlipidemia)      Status post placement of implantable loop recorder      H/O carpal tunnel repair      S/P CABG (coronary artery bypass graft)        FAMILY HISTORY:  Family history of heart disease    Family history of uterine cancer (Sibling)      SOCIAL HISTORY:  unchanged    MEDICATIONS:  apixaban 2.5 milliGRAM(s) Oral two times a day  metoprolol succinate ER 25 milliGRAM(s) Oral daily    meropenem  IVPB 500 milliGRAM(s) IV Intermittent every 12 hours      acetaminophen     Tablet .. 650 milliGRAM(s) Oral every 6 hours PRN  melatonin 3 milliGRAM(s) Oral at bedtime PRN  ondansetron Injectable 4 milliGRAM(s) IV Push every 8 hours PRN    aluminum hydroxide/magnesium hydroxide/simethicone Suspension 30 milliLiter(s) Oral every 4 hours PRN    atorvastatin 40 milliGRAM(s) Oral at bedtime  dextrose 50% Injectable 25 Gram(s) IV Push once  dextrose 50% Injectable 12.5 Gram(s) IV Push once  dextrose 50% Injectable 25 Gram(s) IV Push once  dextrose Oral Gel 15 Gram(s) Oral once PRN  glucagon  Injectable 1 milliGRAM(s) IntraMuscular once  insulin lispro (ADMELOG) corrective regimen sliding scale   SubCutaneous three times a day before meals    calcitriol   Capsule 0.25 MICROGram(s) Oral daily  dextrose 5%. 1000 milliLiter(s) IV Continuous <Continuous>  dextrose 5%. 1000 milliLiter(s) IV Continuous <Continuous>  ferrous    sulfate 325 milliGRAM(s) Oral daily  lactated ringers. 1000 milliLiter(s) IV Continuous <Continuous>  sodium bicarbonate 1300 milliGRAM(s) Oral every 8 hours  tamsulosin 0.4 milliGRAM(s) Oral at bedtime      REVIEW OF SYSTEMS:  CV: chest pain (-), palpitation (-), orthopnea (-), PND (-), edema (-)  PULM: SOB (-), cough (-), wheezing (-), hemoptysis (-).   CONST: fever (-), chills (-) or fatigue (-)  GI: abdominal distension (-), abdominal pain (-) , nausea/vomiting (-), hematemesis, (-), melena (-), hematochezia (-)  : dysuria (-), frequency (-), hematuria (-).   NEURO: numbness (-), weakness (-), dizziness (-), AMS (+)  SKIN: itching (-), rash (-)  HEENT:  visual changes (-); vertigo or throat pain (-);  neck stiffness (-)     All other review of systems is negative unless indicated above.   -------------------------------------------------------------------------------------------  PHYSICAL EXAM:  T(C): --  HR: 78 (05-24-25 @ 06:28) (62 - 78)  BP: 127/99 (05-24-25 @ 06:28) (124/60 - 152/77)  RR: 12 (05-24-25 @ 06:28) (11 - 16)  SpO2: 97% (05-24-25 @ 04:39) (97% - 98%)  Wt(kg): --  I&O's Summary    23 May 2025 07:01  -  24 May 2025 07:00  --------------------------------------------------------  IN: 0 mL / OUT: 600 mL / NET: -600 mL      Exam deferred due to agitation.   Patient did not cooperate.      -------------------------------------------------------------------------------------------  LABS:  05-24    138  |  108  |  52[H]  ----------------------------<  101[H]  5.0   |  24  |  2.46[H]    Ca    9.9      24 May 2025 02:57    TPro  7.7  /  Alb  3.2[L]  /  TBili  0.5  /  DBili  x   /  AST  44[H]  /  ALT  75  /  AlkPhos  138[H]  05-24    Creatinine Trend: 2.46<--, 2.09<--, 1.92<--, 1.95<--, 2.25<--, 2.28<--                        10.3   6.07  )-----------( 177      ( 24 May 2025 02:57 )             30.4     PT/INR - ( 24 May 2025 04:12 )   PT: 14.9 sec;   INR: 1.29 ratio         PTT - ( 24 May 2025 04:12 )  PTT:46.8 sec    Lipid Panel: apixaban 2.5 milliGRAM(s) Oral two times a day  metoprolol succinate ER 25 milliGRAM(s) Oral daily    Cardiac Enzymes:       -------------------------------------------------------------------------------------------  Meds:  aluminum hydroxide/magnesium hydroxide/simethicone Suspension 30 milliLiter(s) Oral every 4 hours PRN    -------------------------------------------------------------------------------------------  Cardiovascular Diagnostic Testing:    ECG:     Echo:   < from: TTE W or WO Ultrasound Enhancing Agent (05.01.25 @ 10:29) >      1. The left ventricular cavity is normal in size. Left ventricular wall thickness is normal. Left ventricular systolic function is mildly decreased with an ejection fraction visually estimated at 45 to 50%. There are regional wall motion abnormalities present. Hypokinesis of the basal to mid inferior wall and basal inferolateral wall. There is mild (grade 1) left ventricular diastolic dysfunction.   2. Normal right ventricular cavity size and normal right ventricular systolic function. Tricuspid annular plane systolic excursion (TAPSE) is 2.6 cm (normal >=1.7 cm).   3. Structurally normal mitral valve with normal leaflet excursion. There is calcification of the mitral valve annulus. There is mild mitral regurgitation.   4. No prior echocardiogram is available for comparison.      < end of copied text >    Stress Testing:    Cath:    Imaging:    CXR:  reviewed  -------------------------------------------------------------------------------------------  Problems Assessed:   1. Troponin Elevation- troponin downtrending, suspect myocardial injury in setting of sepsis, TAYLOR.  2. HFrEF  3. TAYLOR on CKD  4. Severe Sepsis with AMS  5. Afib  6. CAD    Plan:   • Trend troponin one more day, add CKMB  • Recent echocardiogram from 5/2025 reviewed. Pharmacologic stress once recovered from sepsis and encephalopathy prior to discharge.   • Continue Atorvastatin  • Continue Eliquis  • Continue metoprolol ER at current dose, resume lisinopril when creatinine is at baseline for GDMT    Robert Kevin MD   of Cardiology  [facility] at [address], [address]  [place] [id number]  Phone: [phone]  Fax: [phone]  -------------------------------------------------------------------------------------------

## 2025-05-24 NOTE — ED ADULT TRIAGE NOTE - CHIEF COMPLAINT QUOTE
As per [facility] staff pt with intermittent confusion x yesterday. States pt with positive urine culture for ESBL. Pt awake, not talking or answering questions in triage.

## 2025-05-24 NOTE — H&P ADULT - PROBLEM SELECTOR PLAN 1
- Afebrile, no leukocytosis   - Urine culture with ESBL klebsiella  - Continue with iv meropenem  - Continue with IV fluid   - Follow up urine culture

## 2025-05-24 NOTE — H&P ADULT - HISTORY OF PRESENT ILLNESS
87-year-old male with past medical history of paroxysmal A-fib, diabetes, CKD, hyperlipidemia, bradycardia status post cardiac pacemaker who was brought to the ED from [facility] due to confusion and altered mental status. He was also found to have ESBL/ Klebsiella UTI as per urine culture. Review of systems: Limited due to mental status however the patient denies any fevers, chills, dysuria, or other complaints.   The patient's vitals were stable on presentation. Labs notable for mild anemia, troponin 1228.5>1219, creatinine 2.4 (2.0 two weeks ago). CTH negative for acute findings. Patient received Haldol, Versed, and meropenem in the ED. Cardiology consulted in ED.

## 2025-05-24 NOTE — H&P ADULT - PROBLEM SELECTOR PLAN 4
- trial of IVF LR @75cc/hr  - Avoid nephrotoxin  - i/os  - f/u am labs  - Obtain renal u/s and nephro consult if worsening

## 2025-05-24 NOTE — PROGRESS NOTE ADULT - SUBJECTIVE AND OBJECTIVE BOX
Patient is a 85y old  Male who presents with a chief complaint of ESBL UTI (24 May 2025 08:18)      OVERNIGHT EVENTS:    MEDICATIONS  (STANDING):  apixaban 2.5 milliGRAM(s) Oral two times a day  atorvastatin 40 milliGRAM(s) Oral at bedtime  calcitriol   Capsule 0.25 MICROGram(s) Oral daily  dextrose 5%. 1000 milliLiter(s) (50 mL/Hr) IV Continuous <Continuous>  dextrose 5%. 1000 milliLiter(s) (100 mL/Hr) IV Continuous <Continuous>  dextrose 50% Injectable 25 Gram(s) IV Push once  dextrose 50% Injectable 12.5 Gram(s) IV Push once  dextrose 50% Injectable 25 Gram(s) IV Push once  ferrous    sulfate 325 milliGRAM(s) Oral daily  glucagon  Injectable 1 milliGRAM(s) IntraMuscular once  insulin lispro (ADMELOG) corrective regimen sliding scale   SubCutaneous three times a day before meals  lactated ringers. 1000 milliLiter(s) (75 mL/Hr) IV Continuous <Continuous>  meropenem  IVPB 500 milliGRAM(s) IV Intermittent every 12 hours  metoprolol succinate ER 25 milliGRAM(s) Oral daily  sodium bicarbonate 1300 milliGRAM(s) Oral every 8 hours  tamsulosin 0.4 milliGRAM(s) Oral at bedtime    MEDICATIONS  (PRN):  acetaminophen     Tablet .. 650 milliGRAM(s) Oral every 6 hours PRN Temp greater or equal to 38C (100.4F), Mild Pain (1 - 3)  aluminum hydroxide/magnesium hydroxide/simethicone Suspension 30 milliLiter(s) Oral every 4 hours PRN Dyspepsia  dextrose Oral Gel 15 Gram(s) Oral once PRN Blood Glucose LESS THAN 70 milliGRAM(s)/deciliter  melatonin 3 milliGRAM(s) Oral at bedtime PRN Insomnia  ondansetron Injectable 4 milliGRAM(s) IV Push every 8 hours PRN Nausea and/or Vomiting      Allergies    No Known Allergies    Intolerances        SUBJECTIVE: in bed in NAD, no acute events overnight     T(F): 97.1 (25 @ 09:14), Max: 97.1 (25 @ 09:14)  HR: 78 (25 @ 09:14) (62 - 78)  BP: 160/84 (25 @ 09:14) (124/60 - 160/84)  RR: 16 (25 @ 09:14) (11 - 16)  SpO2: 99% (25 @ 09:14) (97% - 99%)  Wt(kg): --    PHYSICAL EXAM:  GENERAL: NAD, well-groomed, well-developed  HEAD:  Atraumatic, Normocephalic  EYES: EOMI, PERRLA, conjunctiva and sclera clear  ENMT: No tonsillar erythema, exudates, or enlargement; Moist mucous membranes, Good dentition, No lesions  NECK: Supple,   CHEST/LUNG: Clear to  auscultation bilaterally; No rales, rhonchi, wheezing, or rubs  bilaterally  HEART: Regular rate and rhythm; No murmurs, rubs, or gallops  ABDOMEN: Soft, Nontender, Nondistended; Bowel sounds present  EXTREMITIES:  2+ Peripheral Pulses, No clubbing, cyanosis, or edema BL LE  SKIN: No rashes or lesions  NERVOUS SYSTEM:  Alert & Oriented X3, Good concentration; Motor Strength 5/5 B/L upper and lower extremities;   DTRs 2+ intact and symmetric, sensation intact BL    LABS:                        10.3   6.07  )-----------( 177      ( 24 May 2025 02:57 )             30.4         138  |  108  |  52[H]  ----------------------------<  101[H]  5.0   |  24  |  2.46[H]    Ca    9.9      24 May 2025 02:57    TPro  7.7  /  Alb  3.2[L]  /  TBili  0.5  /  DBili  x   /  AST  44[H]  /  ALT  75  /  AlkPhos  138[H]      PT/INR - ( 24 May 2025 04:12 )   PT: 14.9 sec;   INR: 1.29 ratio         PTT - ( 24 May 2025 04:12 )  PTT:46.8 sec  Urinalysis Basic - ( 24 May 2025 04:12 )    Color: Yellow / Appearance: Cloudy / S.013 / pH: x  Gluc: x / Ketone: x  / Bili: Negative / Urobili: 0.2 mg/dL   Blood: x / Protein: 100 mg/dL / Nitrite: Negative   Leuk Esterase: Large / RBC: 6 /HPF / WBC 45 /HPF   Sq Epi: x / Non Sq Epi: x / Bacteria: Too Numerous to count /HPF      Cultures;   CAPILLARY BLOOD GLUCOSE      POCT Blood Glucose.: 71 mg/dL (24 May 2025 08:06)    Lipid panel:           RADIOLOGY & ADDITIONAL TESTS:  < from: CT Head No Cont (25 @ 05:09) >    IMPRESSION:  No evidence of acute intracranial pathology.    < end of copied text >  < from: Xray Chest 1 View-PORTABLE IMMEDIATE (25 @ 03:08) >    IMPRESSION: No focal consolidation.    < end of copied text >      Imaging Personally Reviewed:  [ x] YES      Consultant(s) Notes Reviewed:  [x ] YES     Care Discussed with [x ] Consultants [X ] Patient [x ] Family  [x ]    [x ]  Other; RN Patient is a 85y old  Male who presents with a chief complaint of ESBL UTI (24 May 2025 08:18)      OVERNIGHT EVENTS:    MEDICATIONS  (STANDING):  apixaban 2.5 milliGRAM(s) Oral two times a day  atorvastatin 40 milliGRAM(s) Oral at bedtime  calcitriol   Capsule 0.25 MICROGram(s) Oral daily  dextrose 5%. 1000 milliLiter(s) (50 mL/Hr) IV Continuous <Continuous>  dextrose 5%. 1000 milliLiter(s) (100 mL/Hr) IV Continuous <Continuous>  dextrose 50% Injectable 25 Gram(s) IV Push once  dextrose 50% Injectable 12.5 Gram(s) IV Push once  dextrose 50% Injectable 25 Gram(s) IV Push once  ferrous    sulfate 325 milliGRAM(s) Oral daily  glucagon  Injectable 1 milliGRAM(s) IntraMuscular once  insulin lispro (ADMELOG) corrective regimen sliding scale   SubCutaneous three times a day before meals  lactated ringers. 1000 milliLiter(s) (75 mL/Hr) IV Continuous <Continuous>  meropenem  IVPB 500 milliGRAM(s) IV Intermittent every 12 hours  metoprolol succinate ER 25 milliGRAM(s) Oral daily  sodium bicarbonate 1300 milliGRAM(s) Oral every 8 hours  tamsulosin 0.4 milliGRAM(s) Oral at bedtime    MEDICATIONS  (PRN):  acetaminophen     Tablet .. 650 milliGRAM(s) Oral every 6 hours PRN Temp greater or equal to 38C (100.4F), Mild Pain (1 - 3)  aluminum hydroxide/magnesium hydroxide/simethicone Suspension 30 milliLiter(s) Oral every 4 hours PRN Dyspepsia  dextrose Oral Gel 15 Gram(s) Oral once PRN Blood Glucose LESS THAN 70 milliGRAM(s)/deciliter  melatonin 3 milliGRAM(s) Oral at bedtime PRN Insomnia  ondansetron Injectable 4 milliGRAM(s) IV Push every 8 hours PRN Nausea and/or Vomiting      Allergies    No Known Allergies    Intolerances        SUBJECTIVE: in bed in NAD, no acute events overnight     T(F): 97.1 (25 @ 09:14), Max: 97.1 (25 @ 09:14)  HR: 78 (25 @ 09:14) (62 - 78)  BP: 160/84 (25 @ 09:14) (124/60 - 160/84)  RR: 16 (25 @ 09:14) (11 - 16)  SpO2: 99% (25 @ 09:14) (97% - 99%)  Wt(kg): --    PHYSICAL EXAM:  GENERAL: NAD, well-groomed, well-developed  HEAD:  Atraumatic, Normocephalic  EYES: EOMI, PERRLA, conjunctiva and sclera clear  ENMT: No tonsillar erythema, exudates, or enlargement; Moist mucous membranes, Good dentition, No lesions  NECK: Supple,   CHEST/LUNG: Clear to  auscultation bilaterally; No rales, rhonchi, wheezing, or rubs  bilaterally  HEART: Regular rate and rhythm; No murmurs, rubs, or gallops  ABDOMEN: Soft, Nontender, Nondistended; Bowel sounds present  EXTREMITIES:  2+ Peripheral Pulses, No clubbing, cyanosis, or edema BL LE  SKIN: No rashes or lesions, dry feet  NERVOUS SYSTEM:  Alert & confused   LABS:                        10.3   6.07  )-----------( 177      ( 24 May 2025 02:57 )             30.4         138  |  108  |  52[H]  ----------------------------<  101[H]  5.0   |  24  |  2.46[H]    Ca    9.9      24 May 2025 02:57    TPro  7.7  /  Alb  3.2[L]  /  TBili  0.5  /  DBili  x   /  AST  44[H]  /  ALT  75  /  AlkPhos  138[H]      PT/INR - ( 24 May 2025 04:12 )   PT: 14.9 sec;   INR: 1.29 ratio         PTT - ( 24 May 2025 04:12 )  PTT:46.8 sec  Urinalysis Basic - ( 24 May 2025 04:12 )    Color: Yellow / Appearance: Cloudy / S.013 / pH: x  Gluc: x / Ketone: x  / Bili: Negative / Urobili: 0.2 mg/dL   Blood: x / Protein: 100 mg/dL / Nitrite: Negative   Leuk Esterase: Large / RBC: 6 /HPF / WBC 45 /HPF   Sq Epi: x / Non Sq Epi: x / Bacteria: Too Numerous to count /HPF      Cultures;   CAPILLARY BLOOD GLUCOSE      POCT Blood Glucose.: 71 mg/dL (24 May 2025 08:06)    Lipid panel:           RADIOLOGY & ADDITIONAL TESTS:  < from: CT Head No Cont (25 @ 05:09) >    IMPRESSION:  No evidence of acute intracranial pathology.    < end of copied text >  < from: Xray Chest 1 View-PORTABLE IMMEDIATE (25 @ 03:08) >    IMPRESSION: No focal consolidation.    < end of copied text >      Imaging Personally Reviewed:  [ x] YES      Consultant(s) Notes Reviewed:  [x ] YES     Care Discussed with [x ] Consultants [X ] Patient [x ] Family  [x ]    [x ]  Other; RN

## 2025-05-24 NOTE — ED ADULT NURSE NOTE - NSFALLRISKINTERV_ED_ALL_ED
Assistance OOB with selected safe patient handling equipment if applicable/Assistance with ambulation/Communicate fall risk and risk factors to all staff, patient, and family/Monitor gait and stability/Monitor for mental status changes and reorient to person, place, and time, as needed/Provide visual cue: yellow wristband, yellow gown, etc/Reinforce activity limits and safety measures with patient and family/Toileting schedule using arm’s reach rule for commode and bathroom/Use of alarms - bed, stretcher, chair and/or video monitoring/Call bell, personal items and telephone in reach/Instruct patient to call for assistance before getting out of bed/chair/stretcher/Non-slip footwear applied when patient is off stretcher/[place] to call system/Physically safe environment - no spills, clutter or unnecessary equipment/Purposeful Proactive Rounding/Room/bathroom lighting operational, light cord in reach

## 2025-05-24 NOTE — H&P ADULT - NSHPPHYSICALEXAM_GEN_ALL_CORE
GENERAL: NAD  HEAD:  Atraumatic, normocephalic  EYES: EOMI, PERRL  NECK: Supple, trachea midline, no JVD  HEART: Regular rate and rhythm  LUNGS: Unlabored respirations. Clear to auscultation bilaterally, no crackles, wheezing, or rhonchi  ABDOMEN: Soft, nontender, nondistended, +BS  EXTREMITIES: 2+ peripheral pulses bilaterally.  NERVOUS SYSTEM:  A&Ox3, moving all extremities, no focal deficits

## 2025-05-24 NOTE — ED ADULT NURSE NOTE - OBJECTIVE STATEMENT
84 yo M PMHx from [facility] c/o intermittent confusion. On arrival, pt refused to speak, only respond to being called by name. Pt irritable when being moved. Full code. Per [facility] staff pt urine tested + for Klebsiella. 84 yo M PMHx from [facility] c/o intermittent confusion, AO2 baseline. On arrival, pt refused to speak, only respond to being called by name. Pt irritable when being moved. Full code. Per [facility] staff pt urine tested + for Klebsiella. V paced on CM. 86 yo M PMHx CAD, CKD stage III, HTN, HLD, T2DM from [facility] c/o intermittent confusion, AO2 baseline. On arrival, pt refused to speak, only respond to being called by name. Pt irritable when being moved. Per [facility] staff pt urine tested + for Klebsiella. V paced on CM. Full code.

## 2025-05-24 NOTE — ED PROVIDER NOTE - CLINICAL SUMMARY MEDICAL DECISION MAKING FREE TEXT BOX
Patient noted to have UTI with Klebsiella on lab review will otherwise give dose of meropenem and lab work also identified troponin elevation which may need further evaluation inpatient however patient does not have any active chest pain creatinine elevation noted and troponin is 1219 on repeat down from 1328.  Will otherwise admit for further care and evaluation with medicine team. Patient noted to have UTI with Klebsiella on lab review will otherwise give dose of meropenem and lab work also identified troponin elevation which may need further evaluation inpatient however patient does not have any active chest pain creatinine elevation noted and troponin is 1219 on repeat down from 1328.  Will otherwise admit for further care and evaluation with medicine team.    Cardiology Dr. Kevin consulted regarding troponin elevation EKG noted ventricular paced rhythm.

## 2025-05-24 NOTE — ED PROVIDER NOTE - ED STEMI HIDDEN
There was a little decline in attention span today otherwise the cognitive tests have remained stable     Continue current medications     Try and be more mentally active, try word searches     Follow up with Gastroenterology as scheduled     Follow up for annual wellness visit in 6 months and memory testing in 6 months    hide

## 2025-05-24 NOTE — ED PROVIDER NOTE - OBJECTIVE STATEMENT
85 year old male with PMH of  CVA residual slurred speech, hypertension, hyperlipidemia, CAD, type 2 diabetes otherwise presented to ER due to worsening mental status from nursing facility.  Patient otherwise denies any discomfort and has not had any fevers but noted to have some shaking in the past couple days.  Patient had UA noted to have culture positive for Klebsiella pneumonia.

## 2025-05-24 NOTE — H&P ADULT - NSHPLABSRESULTS_GEN_ALL_CORE
LABS:  cret                        10.3   6.07  )-----------( 177      ( 24 May 2025 02:57 )             30.4     05-24    138  |  108  |  52[H]  ----------------------------<  101[H]  5.0   |  24  |  2.46[H]    Ca    9.9      24 May 2025 02:57    TPro  7.7  /  Alb  3.2[L]  /  TBili  0.5  /  DBili  x   /  AST  44[H]  /  ALT  75  /  AlkPhos  138[H]  05-24    PT/INR - ( 24 May 2025 04:12 )   PT: 14.9 sec;   INR: 1.29 ratio         PTT - ( 24 May 2025 04:12 )  PTT:46.8 sec    < from: CT Head No Cont (05.24.25 @ 05:09) >      IMPRESSION:  No evidence of acute intracranial pathology.    < end of copied text >

## 2025-05-25 LAB
ANION GAP SERPL CALC-SCNC: 5 MMOL/L — SIGNIFICANT CHANGE UP (ref 5–17)
APTT BLD: 45.9 SEC — HIGH (ref 26.1–36.8)
BUN SERPL-MCNC: 50 MG/DL — HIGH (ref 7–23)
CALCIUM SERPL-MCNC: 9.3 MG/DL — SIGNIFICANT CHANGE UP (ref 8.5–10.1)
CHLORIDE SERPL-SCNC: 114 MMOL/L — HIGH (ref 96–108)
CK MB BLD-MCNC: 1.4 % — SIGNIFICANT CHANGE UP (ref 0–3.5)
CK MB BLD-MCNC: 5.3 % — HIGH (ref 0–3.5)
CK MB CFR SERPL CALC: 22.1 NG/ML — HIGH (ref 0.5–3.6)
CK MB CFR SERPL CALC: 3.3 NG/ML — SIGNIFICANT CHANGE UP (ref 0.5–3.6)
CK SERPL-CCNC: 241 U/L — SIGNIFICANT CHANGE UP (ref 26–308)
CK SERPL-CCNC: 419 U/L — HIGH (ref 26–308)
CO2 SERPL-SCNC: 26 MMOL/L — SIGNIFICANT CHANGE UP (ref 22–31)
CREAT SERPL-MCNC: 2.46 MG/DL — HIGH (ref 0.5–1.3)
EGFR: 25 ML/MIN/1.73M2 — LOW
EGFR: 25 ML/MIN/1.73M2 — LOW
GLUCOSE BLDC GLUCOMTR-MCNC: 150 MG/DL — HIGH (ref 70–99)
GLUCOSE BLDC GLUCOMTR-MCNC: 174 MG/DL — HIGH (ref 70–99)
GLUCOSE BLDC GLUCOMTR-MCNC: 59 MG/DL — LOW (ref 70–99)
GLUCOSE BLDC GLUCOMTR-MCNC: 65 MG/DL — LOW (ref 70–99)
GLUCOSE BLDC GLUCOMTR-MCNC: 66 MG/DL — LOW (ref 70–99)
GLUCOSE BLDC GLUCOMTR-MCNC: 73 MG/DL — SIGNIFICANT CHANGE UP (ref 70–99)
GLUCOSE BLDC GLUCOMTR-MCNC: 75 MG/DL — SIGNIFICANT CHANGE UP (ref 70–99)
GLUCOSE SERPL-MCNC: 71 MG/DL — SIGNIFICANT CHANGE UP (ref 70–99)
HCT VFR BLD CALC: 28.7 % — LOW (ref 39–50)
HGB BLD-MCNC: 9.8 G/DL — LOW (ref 13–17)
MAGNESIUM SERPL-MCNC: 1.5 MG/DL — LOW (ref 1.6–2.6)
MCHC RBC-ENTMCNC: 29.7 PG — SIGNIFICANT CHANGE UP (ref 27–34)
MCHC RBC-ENTMCNC: 34.1 G/DL — SIGNIFICANT CHANGE UP (ref 32–36)
MCV RBC AUTO: 87 FL — SIGNIFICANT CHANGE UP (ref 80–100)
NRBC BLD AUTO-RTO: 0 /100 WBCS — SIGNIFICANT CHANGE UP (ref 0–0)
PHOSPHATE SERPL-MCNC: 3.4 MG/DL — SIGNIFICANT CHANGE UP (ref 2.5–4.5)
PLATELET # BLD AUTO: 173 K/UL — SIGNIFICANT CHANGE UP (ref 150–400)
POTASSIUM SERPL-MCNC: 5.1 MMOL/L — SIGNIFICANT CHANGE UP (ref 3.5–5.3)
POTASSIUM SERPL-SCNC: 5.1 MMOL/L — SIGNIFICANT CHANGE UP (ref 3.5–5.3)
RBC # BLD: 3.3 M/UL — LOW (ref 4.2–5.8)
RBC # FLD: 14.1 % — SIGNIFICANT CHANGE UP (ref 10.3–14.5)
SODIUM SERPL-SCNC: 145 MMOL/L — SIGNIFICANT CHANGE UP (ref 135–145)
TROPONIN I, HIGH SENSITIVITY RESULT: 2078.4 NG/L — HIGH
WBC # BLD: 5.05 K/UL — SIGNIFICANT CHANGE UP (ref 3.8–10.5)
WBC # FLD AUTO: 5.05 K/UL — SIGNIFICANT CHANGE UP (ref 3.8–10.5)

## 2025-05-25 PROCEDURE — 99232 SBSQ HOSP IP/OBS MODERATE 35: CPT

## 2025-05-25 PROCEDURE — G0545: CPT

## 2025-05-25 PROCEDURE — 99222 1ST HOSP IP/OBS MODERATE 55: CPT

## 2025-05-25 PROCEDURE — 93010 ELECTROCARDIOGRAM REPORT: CPT

## 2025-05-25 PROCEDURE — 99233 SBSQ HOSP IP/OBS HIGH 50: CPT

## 2025-05-25 RX ORDER — DEXTROSE 50 % IN WATER 50 %
12.5 SYRINGE (ML) INTRAVENOUS ONCE
Refills: 0 | Status: COMPLETED | OUTPATIENT
Start: 2025-05-25 | End: 2025-05-25

## 2025-05-25 RX ORDER — SODIUM CHLORIDE 9 G/1000ML
1000 INJECTION, SOLUTION INTRAVENOUS
Refills: 0 | Status: DISCONTINUED | OUTPATIENT
Start: 2025-05-25 | End: 2025-05-26

## 2025-05-25 RX ORDER — MAGNESIUM SULFATE 500 MG/ML
2 SYRINGE (ML) INJECTION ONCE
Refills: 0 | Status: COMPLETED | OUTPATIENT
Start: 2025-05-25 | End: 2025-05-25

## 2025-05-25 RX ADMIN — SODIUM CHLORIDE 65 MILLILITER(S): 9 INJECTION, SOLUTION INTRAVENOUS at 16:52

## 2025-05-25 RX ADMIN — METOPROLOL SUCCINATE 25 MILLIGRAM(S): 50 TABLET, EXTENDED RELEASE ORAL at 05:33

## 2025-05-25 RX ADMIN — Medication 1 APPLICATION(S): at 17:48

## 2025-05-25 RX ADMIN — SODIUM CHLORIDE 75 MILLILITER(S): 9 INJECTION, SOLUTION INTRAVENOUS at 08:06

## 2025-05-25 RX ADMIN — Medication 1 APPLICATION(S): at 05:36

## 2025-05-25 RX ADMIN — APIXABAN 2.5 MILLIGRAM(S): 2.5 TABLET, FILM COATED ORAL at 05:33

## 2025-05-25 RX ADMIN — Medication 1300 MILLIGRAM(S): at 05:33

## 2025-05-25 RX ADMIN — Medication 1300 MILLIGRAM(S): at 21:20

## 2025-05-25 RX ADMIN — CEFTRIAXONE 100 MILLIGRAM(S): 500 INJECTION, POWDER, FOR SOLUTION INTRAMUSCULAR; INTRAVENOUS at 05:33

## 2025-05-25 RX ADMIN — Medication 25 GRAM(S): at 12:17

## 2025-05-25 RX ADMIN — SODIUM CHLORIDE 75 MILLILITER(S): 9 INJECTION, SOLUTION INTRAVENOUS at 00:49

## 2025-05-25 RX ADMIN — Medication 12.5 GRAM(S): at 08:04

## 2025-05-25 RX ADMIN — TAMSULOSIN HYDROCHLORIDE 0.4 MILLIGRAM(S): 0.4 CAPSULE ORAL at 21:20

## 2025-05-25 RX ADMIN — ATORVASTATIN CALCIUM 40 MILLIGRAM(S): 80 TABLET, FILM COATED ORAL at 21:20

## 2025-05-25 NOTE — PROGRESS NOTE ADULT - SUBJECTIVE AND OBJECTIVE BOX
Patient is a 85y old  Male who presents with a chief complaint of ESBL UTI (24 May 2025 08:18)      OVERNIGHT EVENTS:    MEDICATIONS  (STANDING):  ammonium lactate 12% Lotion 1 Application(s) Topical two times a day  apixaban 2.5 milliGRAM(s) Oral two times a day  atorvastatin 40 milliGRAM(s) Oral at bedtime  calcitriol   Capsule 0.25 MICROGram(s) Oral daily  cefTRIAXone   IVPB 1000 milliGRAM(s) IV Intermittent every 24 hours  dextrose 5%. 1000 milliLiter(s) (50 mL/Hr) IV Continuous <Continuous>  dextrose 5%. 1000 milliLiter(s) (100 mL/Hr) IV Continuous <Continuous>  dextrose 50% Injectable 25 Gram(s) IV Push once  dextrose 50% Injectable 12.5 Gram(s) IV Push once  dextrose 50% Injectable 25 Gram(s) IV Push once  ferrous    sulfate 325 milliGRAM(s) Oral daily  glucagon  Injectable 1 milliGRAM(s) IntraMuscular once  insulin lispro (ADMELOG) corrective regimen sliding scale   SubCutaneous three times a day before meals  lactated ringers. 1000 milliLiter(s) (75 mL/Hr) IV Continuous <Continuous>  magnesium sulfate  IVPB 2 Gram(s) IV Intermittent once  metoprolol succinate ER 25 milliGRAM(s) Oral daily  sodium bicarbonate 1300 milliGRAM(s) Oral every 8 hours  tamsulosin 0.4 milliGRAM(s) Oral at bedtime    MEDICATIONS  (PRN):  acetaminophen     Tablet .. 650 milliGRAM(s) Oral every 6 hours PRN Temp greater or equal to 38C (100.4F), Mild Pain (1 - 3)  aluminum hydroxide/magnesium hydroxide/simethicone Suspension 30 milliLiter(s) Oral every 4 hours PRN Dyspepsia  dextrose Oral Gel 15 Gram(s) Oral once PRN Blood Glucose LESS THAN 70 milliGRAM(s)/deciliter  melatonin 3 milliGRAM(s) Oral at bedtime PRN Insomnia  ondansetron Injectable 4 milliGRAM(s) IV Push every 8 hours PRN Nausea and/or Vomiting      Allergies    No Known Allergies    Intolerances        SUBJECTIVE: in bed in NAD, no acute events overnight     Vital Signs Last 24 Hrs  T(C): 36.3 (25 May 2025 10:30), Max: 36.7 (24 May 2025 16:40)  T(F): 97.3 (25 May 2025 10:30), Max: 98 (24 May 2025 16:40)  HR: 91 (25 May 2025 07:30) (79 - 91)  BP: 114/66 (25 May 2025 10:30) (111/65 - 124/65)  BP(mean): --  RR: 18 (25 May 2025 10:30) (18 - 18)  SpO2: 98% (25 May 2025 10:30) (96% - 99%)    Parameters below as of 25 May 2025 10:30  Patient On (Oxygen Delivery Method): room air      PHYSICAL EXAM:  GENERAL: NAD, well-groomed, well-developed  HEAD:  Atraumatic, Normocephalic  EYES: EOMI, PERRLA, conjunctiva and sclera clear  ENMT: No tonsillar erythema, exudates, or enlargement; Moist mucous membranes, Good dentition, No lesions  NECK: Supple,   CHEST/LUNG: Clear to  auscultation bilaterally; No rales, rhonchi, wheezing, or rubs  bilaterally  HEART: Regular rate and rhythm; No murmurs, rubs, or gallops  ABDOMEN: Soft, Nontender, Nondistended; Bowel sounds present  EXTREMITIES:  2+ Peripheral Pulses, No clubbing, cyanosis, or edema BL LE  SKIN: No rashes or lesions, dry feet  NERVOUS SYSTEM:  Alert & confused   LABS:                                             9.8    5.05  )-----------( 173      ( 25 May 2025 06:15 )             28.7   05-25    145  |  114[H]  |  50[H]  ----------------------------<  71  5.1   |  26  |  2.46[H]    Ca    9.3      25 May 2025 06:15  Phos  3.4     05-25  Mg     1.5     05-25    TPro  7.3  /  Alb  2.9[L]  /  TBili  0.4  /  DBili  x   /  AST  48[H]  /  ALT  70  /  AlkPhos  134[H]  05-24  CKMB Mass Assay (05.25.25 @ 06:15)    CPK Mass Assay %: 5.3 %    Creatine Kinase in AM (05.25.25 @ 06:15)    Creatine Kinase: 419 U/L    Troponin I, High Sensitivity Result: 2078.4: Serial measurements of high sensitivity troponin I (hs TnI) showing rapid  upward or downward changes suggest acute myocardial injury.  Please note  that a sustained elevation of hsTnI can be caused by renal disease,  chronic heart failure, sepsis, pulmonary embolism and other clinical  conditions. ng/L (05.25.25 @ 06:15)      Cultures;   CAPILLARY BLOOD GLUCOSE      Lipid panel:           RADIOLOGY & ADDITIONAL TESTS:  < from: CT Head No Cont (05.24.25 @ 05:09) >    IMPRESSION:  No evidence of acute intracranial pathology.    < end of copied text >  < from: Xray Chest 1 View-PORTABLE IMMEDIATE (05.24.25 @ 03:08) >    IMPRESSION: No focal consolidation.    < end of copied text >      Imaging Personally Reviewed:  [ x] YES      Consultant(s) Notes Reviewed:  [x ] YES     Care Discussed with [x ] Consultants [X ] Patient [x ] Family  [x ]    [x ]  Other; RN

## 2025-05-25 NOTE — PROGRESS NOTE ADULT - ASSESSMENT
87M with paroxysmal CAD/CABG HFrEF 45% A-fib, DM HLD CKD, recent admission 4/25-5/25 to [facility] with sepsis, s/p leadless ppm on that admission for heart block and bradycardia, [facility], [facility]  pt resting, comfortable appearing  CE trend noted  no cp or sob currently or recently  EKG paced  on eliquis for hx of afib  cont to trend CE, monitor on tele, monitor for CP  cont lipitor and toprol for known CAD  on abx, and with TAYOLR currently  will follow with you

## 2025-05-25 NOTE — CONSULT NOTE ADULT - ASSESSMENT
87-year-old male with past medical history of paroxysmal A-fib, diabetes, CKD, hyperlipidemia, bradycardia status post cardiac pacemaker who was brought to the ED from [facility] due to confusion and altered mental status. He was also found to have ESBL/ Klebsiella UTI as per urine culture. exam limited lethargic ox 1 no clear foclaity  CTH chronic left parietal encephalomalacia      suspect encephalopathy in setting of infection    abx  supportive care  On AC  -In order to enhance patient's overall well-being and clinical course, please try avoiding benzodiazepines, anticholinergics, and antihistamines (Can cause worsening confusion/delirium). Additionally, continue reorientation, supportive care, maintaining regular sleep/wake cycle, and optimizing nutritional/medical factors.    87-year-old male with past medical history of paroxysmal A-fib, diabetes, CKD, hyperlipidemia, bradycardia status post cardiac pacemaker who was brought to the ED from [facility] due to confusion and altered mental status. He was also found to have ESBL/ Klebsiella UTI as per urine culture. exam limited lethargic ox 1 no clear foclaity  CTH chronic left parietal encephalomalacia      suspect encephalopathy in setting of infection    abx as per ID  supportive care  On AC  will clinicall reassess  -In order to enhance patient's overall well-being and clinical course, please try avoiding benzodiazepines, anticholinergics, and antihistamines (Can cause worsening confusion/delirium). Additionally, continue reorientation, supportive care, maintaining regular sleep/wake cycle, and optimizing nutritional/medical factors.

## 2025-05-25 NOTE — CONSULT NOTE ADULT - SUBJECTIVE AND OBJECTIVE BOX
[facility]  INFECTIOUS DISEASES   [address] [place] [address]  Tel: [phone]     Fax: [phone]  ==============================================================================  DO Gale Gross MD Sehrish Shahid, MD Alexandra Gutman, NP   ==============================================================================    JOSEFINA KUMAR  MRN-[id number]  Male  85y ([birth year])        Patient is a 85y old  Male who presents with a chief complaint of ESBL UTI (25 May 2025 12:20)      HPI:  87-year-old male with past medical history of paroxysmal A-fib, diabetes, CKD, hyperlipidemia, bradycardia status post cardiac pacemaker who was brought to the ED from [facility] due to confusion and altered mental status. He was also found to have Klebsiella UTI as per urine culture. Review of systems: Limited due to mental status however the patient denies any fevers, chills, dysuria, or other complaints.   The patient's vitals were stable on presentation. Labs notable for mild anemia, troponin 1228.5>1219, creatinine 2.4 (2.0 two weeks ago). CTH negative for acute findings. Patient received Haldol, Versed, and meropenem in the ED. Cardiology consulted in ED. (24 May 2025 07:39)      ID consulted for workup and antibiotic management     PAST MEDICAL & SURGICAL HISTORY:  Type 2 diabetes mellitus without complication      CVA (cerebral vascular accident)      Non-ST elevation (NSTEMI) myocardial infarction      CAD (coronary artery disease)      CKD (chronic kidney disease), stage III      HTN (hypertension)      HLD (hyperlipidemia)      Status post placement of implantable loop recorder      H/O carpal tunnel repair      S/P CABG (coronary artery bypass graft)          Allergies  No Known Allergies        ANTIMICROBIALS:  cefTRIAXone   IVPB 1000 every 24 hours      MEDICATIONS  (STANDING):  cefTRIAXone   IVPB   100 mL/Hr IV Intermittent (05-25-25 @ 05:33)    meropenem  IVPB   100 mL/Hr IV Intermittent (05-24-25 @ 15:28)    meropenem  IVPB   100 mL/Hr IV Intermittent (05-24-25 @ 03:07)        OTHER MEDS: MEDICATIONS  (STANDING):  acetaminophen     Tablet .. 650 every 6 hours PRN  aluminum hydroxide/magnesium hydroxide/simethicone Suspension 30 every 4 hours PRN  apixaban 2.5 two times a day  atorvastatin 40 at bedtime  dextrose 50% Injectable 25 once  dextrose 50% Injectable 12.5 once  dextrose 50% Injectable 25 once  dextrose Oral Gel 15 once PRN  glucagon  Injectable 1 once  insulin lispro (ADMELOG) corrective regimen sliding scale  three times a day before meals  melatonin 3 at bedtime PRN  metoprolol succinate ER 25 daily  ondansetron Injectable 4 every 8 hours PRN  tamsulosin 0.4 at bedtime      SOCIAL HISTORY:     Smoking Cigarettes [ ]Active [x ] Former [ ]Denies   ETOH [ ]denies [x ]Former [ ]Current Use denies   Drug Use [ x]Never [ ] Former [ ] Active     FAMILY HISTORY:  Family history of heart disease    Family history of uterine cancer (Sibling)        REVIEW OF SYSTEMS  [ X ] ROS unobtainable because:  confusion, says nothing is bothering or hurting him   [  ] All other systems negative except as noted below:	    Constitutional:  [ ] fever [ ] chills  [ ] weight loss  [ ] weakness  Skin:  [ ] rash [ ] phlebitis	  Eyes: [ ] icterus [ ] pain  [ ] discharge	  ENMT: [ ] sore throat  [ ] thrush [ ] ulcers [ ] exudates  Respiratory: [ ] dyspnea [ ] hemoptysis [ ] cough [ ] sputum	  Cardiovascular:  [ ] chest pain [ ] palpitations [ ] edema	  Gastrointestinal:  [ ] nausea [ ] vomiting [ ] diarrhea [ ] constipation [ ] pain	  Genitourinary:  [ ] dysuria [ ] frequency [ ] hematuria [ ] discharge [ ] flank pain  [ ] incontinence  Musculoskeletal:  [ ] myalgias [ ] arthralgias [ ] arthritis  [ ] back pain  Neurological:  [ ] headache [ ] seizures  [ ] confusion/altered mental status  Psychiatric:  [ ] anxiety [ ] depression	  Hematology/Lymphatics:  [ ] lymphadenopathy  Endocrine:  [ ] adrenal [ ] thyroid  Allergic/Immunologic:	 [ ] transplant [ ] seasonal    Vital Signs Last 24 Hrs  T(F): 97.4 (05-25-25 @ 23:29), Max: 99.6 (05-25-25 @ 16:49)    Vital Signs Last 24 Hrs  HR: 78 (05-25-25 @ 23:29) (78 - 91)  BP: 132/66 (05-25-25 @ 23:29) (111/65 - 132/66)  RR: 18 (05-25-25 @ 23:29)  SpO2: 98% (05-25-25 @ 16:49) (96% - 98%)  Wt(kg): --    PHYSICAL EXAM:  Constitutional: non-toxic, no distress  HEAD/EYES: anicteric, no conjunctival injection  ENT:  supple, no thrush  Cardiovascular:   IRR , no murmur, no edema  Respiratory:  clear BS bilaterally, no wheezes, no rales  GI:  soft, non-tender, normal bowel sounds  :  no reynolds, no CVA tenderness  Musculoskeletal:  no synovitis, normal ROM  Neurologic: awake and alert to person, place, good strength, no focal findings  Skin:  no rash, no erythema, no phlebitis  Heme/Onc: no lymphadenopathy   Psychiatric:  awake, alert, appropriate mood        WBC  WBC Count: 5.05 K/uL (05-25 @ 06:15)  WBC Count: 4.61 K/uL (05-24 @ 10:45)  WBC Count: 6.07 K/uL (05-24 @ 02:57)        CBC                        9.8    5.05  )-----------( 173      ( 25 May 2025 06:15 )             28.7     BMP/CMP  05-25    145  |  114[H]  |  50[H]  ----------------------------<  71  5.1   |  26  |  2.46[H]    Ca    9.3      25 May 2025 06:15  Phos  3.4     05-25  Mg     1.5     05-25    TPro  7.3  /  Alb  2.9[L]  /  TBili  0.4  /  DBili  x   /  AST  48[H]  /  ALT  70  /  AlkPhos  134[H]  05-24    Creatinine Trend  Creatinine Trend: 2.46<--, 2.48<--, 2.46<--, 2.09<--, 1.92<--, 1.95<--        Lactate, Blood: 0.9 mmol/L (05-24-25 @ 02:57)      MICROBIOLOGY:  Catheterized  05-24-25   >100,000 CFU/ml Gram Negative Rods  --  --      Blood Blood-Peripheral  05-24-25   No growth at 24 hours  --  --      Blood Blood-Peripheral  05-24-25   No growth at 24 hours  --  --      Clean Catch  04-25-25   >100,000 CFU/ml Klebsiella pneumoniae  --  Klebsiella pneumoniae        RADIOLOGY:      ACC: [id number] EXAM:  CT BRAIN   ORDERED BY: SAMI SCHRADER     PROCEDURE DATE:  05/24/2025          INTERPRETATION:  CLINICAL INFORMATION: AMS    COMPARISON: None.    CONTRAST:  IV Contrast: None      TECHNIQUE:  Serial axial images were obtained from the skull base to the   vertex using multi-slice helical technique. Sagittal and coronal   reformats were obtained.    FINDINGS:    VENTRICLES AND SULCI: Age appropriate involutional changes.  INTRA-AXIAL: No mass effect, acute hemorrhage, or midline shift.  There   are periventricular and subcortical white matter hypodensities,   consistent with microvascular type changes. Encephalomalacia/gliosis in   the left posterior parietal lobe with ex vacuo dilation of the posterior   horn of the left lateral ventricle.  EXTRA-AXIAL: No mass or fluid collection. Basal cisterns are normal in   appearance.    VISUALIZED SINUSES:  Scattered mucosal thickening.  TYMPANOMASTOID CAVITIES:  Clear.  VISUALIZED ORBITS: Normal.  CALVARIUM: Intact.    MISCELLANEOUS: None.      IMPRESSION:  No evidence of acute intracranial pathology.      YUNIOR SEGURA MD  This document has been electronically signed. May 24 2025  5:51AM    I have personally reviewed the above imaging

## 2025-05-25 NOTE — PROVIDER CONTACT NOTE (HYPOGLYCEMIA EVENT) - NS PROVIDER CONTACT BACKGROUND-HYPO
Age: 85y    Gender: Male    POCT Blood Glucose:  59 mg/dL (05-25-25 @ 07:47)  66 mg/dL (05-25-25 @ 07:46)  181 mg/dL (05-24-25 @ 22:23)  83 mg/dL (05-24-25 @ 16:48)  139 mg/dL (05-24-25 @ 11:33)  71 mg/dL (05-24-25 @ 08:06)      eMAR:atorvastatin   40 milliGRAM(s) Oral (05-24-25 @ 21:27)

## 2025-05-25 NOTE — PROGRESS NOTE ADULT - ASSESSMENT
87-year-old male with past medical history of paroxysmal A-fib, diabetes, CKD, hyperlipidemia, bradycardia status post cardiac pacemaker who was brought to the ED from [facility] due to confusion and altered mental status. He was also found to have ESBL/ Klebsiella UTI as per urine culture. Found with troponin 1228.5>1219, creatinine 2.4 (2.0 two weeks ago). CTH negative for acute findings. Cardiology consulted in ED. Admit to medicine

## 2025-05-25 NOTE — CONSULT NOTE ADULT - ASSESSMENT
87-year-old male with past medical history of paroxysmal A-fib, diabetes, CKD, hyperlipidemia, bradycardia status post cardiac pacemaker who was brought to the ED from [facility] due to confusion and altered mental status. He was also found to have Klebsiella UTI as per urine culture. Review of systems: Limited due to mental status however the patient denies any fevers, chills, dysuria, or other complaints.   The patient's vitals were stable on presentation. Labs notable for mild anemia, troponin 1228.5>1219, creatinine 2.4 (2.0 two weeks ago).   CTH negative for acute findings. Patient received Haldol, Versed, and meropenem in the ED. Cardiology consulted in ED.   Checked HIE and the urine cultures that he grew was not ESBL but did grow Klebsiella   his urine cultures is growing GNR   changed the meropenem to ceftriaxone   unclear that all of his mental status changes are from a UTI   would look for other causes as well     Plan:  stop meropenem  started ceftriaxone   neurology has been consulted   trend troponin and ensure mental status is not from cardiac causes   ensure not constipated and that he is emptying bladder  control HR/Afib     Discussed plan with primary team     Delmer Pickens, DO  Chief, Infectious Disease at [facility]  Reachable via [employer] Teams or ID office: [phone]  Weekdays: After 5pm, please call [phone] for all inquiries and new consults  Weekends: Message on-call infectious disease physician via teams (see [place])

## 2025-05-25 NOTE — CONSULT NOTE ADULT - SUBJECTIVE AND OBJECTIVE BOX
Neurology consult    JOSEFINA KUMAR85yMale     Patient is a 85y old  Male who presents with a chief complaint of ESBL UTI (25 May 2025 11:07)      HPI:  87-year-old male with past medical history of paroxysmal A-fib, diabetes, CKD, hyperlipidemia, bradycardia status post cardiac pacemaker who was brought to the ED from [facility] due to confusion and altered mental status. He was also found to have ESBL/ Klebsiella UTI as per urine culture. Review of systems: Limited due to mental status however the patient denies any fevers, chills, dysuria, or other complaints.   The patient's vitals were stable on presentation. Labs notable for mild anemia, troponin 1228.5>1219, creatinine 2.4 (2.0 two weeks ago). CTH negative for acute findings. Patient received Haldol, Versed, and meropenem in the ED. Cardiology consulted in ED. (24 May 2025 07:39)      MEDICATIONS    acetaminophen     Tablet .. 650 milliGRAM(s) Oral every 6 hours PRN  aluminum hydroxide/magnesium hydroxide/simethicone Suspension 30 milliLiter(s) Oral every 4 hours PRN  ammonium lactate 12% Lotion 1 Application(s) Topical two times a day  apixaban 2.5 milliGRAM(s) Oral two times a day  atorvastatin 40 milliGRAM(s) Oral at bedtime  calcitriol   Capsule 0.25 MICROGram(s) Oral daily  cefTRIAXone   IVPB 1000 milliGRAM(s) IV Intermittent every 24 hours  dextrose 5%. 1000 milliLiter(s) IV Continuous <Continuous>  dextrose 5%. 1000 milliLiter(s) IV Continuous <Continuous>  dextrose 50% Injectable 25 Gram(s) IV Push once  dextrose 50% Injectable 12.5 Gram(s) IV Push once  dextrose 50% Injectable 25 Gram(s) IV Push once  dextrose Oral Gel 15 Gram(s) Oral once PRN  ferrous    sulfate 325 milliGRAM(s) Oral daily  glucagon  Injectable 1 milliGRAM(s) IntraMuscular once  insulin lispro (ADMELOG) corrective regimen sliding scale   SubCutaneous three times a day before meals  lactated ringers. 1000 milliLiter(s) IV Continuous <Continuous>  melatonin 3 milliGRAM(s) Oral at bedtime PRN  metoprolol succinate ER 25 milliGRAM(s) Oral daily  ondansetron Injectable 4 milliGRAM(s) IV Push every 8 hours PRN  sodium bicarbonate 1300 milliGRAM(s) Oral every 8 hours  tamsulosin 0.4 milliGRAM(s) Oral at bedtime      PMH: Type 2 diabetes mellitus without complication    CVA (cerebral vascular accident)    Non-ST elevation (NSTEMI) myocardial infarction    CAD (coronary artery disease)    CKD (chronic kidney disease), stage III    HTN (hypertension)    HLD (hyperlipidemia)         PSH: No significant past surgical history    Status post placement of implantable loop recorder    H/O carpal tunnel repair    S/P CABG (coronary artery bypass graft)        Family history: No history of dementia, strokes, or seizures   FAMILY HISTORY:  Family history of heart disease    Family history of uterine cancer (Sibling)        SOCIAL HISTORY:  No history of tobacco or alcohol use     Allergies    No Known Allergies    Intolerances            Vital Signs Last 24 Hrs  T(C): 36.3 (25 May 2025 10:30), Max: 36.7 (24 May 2025 16:40)  T(F): 97.3 (25 May 2025 10:30), Max: 98 (24 May 2025 16:40)  HR: 91 (25 May 2025 07:30) (79 - 91)  BP: 114/66 (25 May 2025 10:30) (111/65 - 124/65)  BP(mean): --  RR: 18 (25 May 2025 10:30) (18 - 18)  SpO2: 98% (25 May 2025 10:30) (96% - 99%)    Parameters below as of 25 May 2025 10:30  Patient On (Oxygen Delivery Method): room air          On Neurological Examination:    Mental Status - Patient is lethargic ox1     Cranial Nerves - PERRL, EOMI, VFF, t, no gross facial asymmetr    Motor Exam -   withdraws in all ext         LABS:  CBC Full  -  ( 25 May 2025 06:15 )  WBC Count : 5.05 K/uL  RBC Count : 3.30 M/uL  Hemoglobin : 9.8 g/dL  Hematocrit : 28.7 %  Platelet Count - Automated : 173 K/uL  Mean Cell Volume : 87.0 fl  Mean Cell Hemoglobin : 29.7 pg  Mean Cell Hemoglobin Concentration : 34.1 g/dL  Auto Neutrophil # : x  Auto Lymphocyte # : x  Auto Monocyte # : x  Auto Eosinophil # : x  Auto Basophil # : x  Auto Neutrophil % : x  Auto Lymphocyte % : x  Auto Monocyte % : x  Auto Eosinophil % : x  Auto Basophil % : x    Urinalysis Basic - ( 25 May 2025 06:15 )    Color: x / Appearance: x / SG: x / pH: x  Gluc: 71 mg/dL / Ketone: x  / Bili: x / Urobili: x   Blood: x / Protein: x / Nitrite: x   Leuk Esterase: x / RBC: x / WBC x   Sq Epi: x / Non Sq Epi: x / Bacteria: x      05-25    145  |  114[H]  |  50[H]  ----------------------------<  71  5.1   |  26  |  2.46[H]    Ca    9.3      25 May 2025 06:15  Phos  3.4     05-25  Mg     1.5     05-25    TPro  7.3  /  Alb  2.9[L]  /  TBili  0.4  /  DBili  x   /  AST  48[H]  /  ALT  70  /  AlkPhos  134[H]  05-24    LIVER FUNCTIONS - ( 24 May 2025 10:45 )  Alb: 2.9 g/dL / Pro: 7.3 gm/dL / ALK PHOS: 134 U/L / ALT: 70 U/L / AST: 48 U/L / GGT: x           Hemoglobin A1C:       PT/INR - ( 24 May 2025 04:12 )   PT: 14.9 sec;   INR: 1.29 ratio         PTT - ( 24 May 2025 04:12 )  PTT:46.8 sec      RADIOLOGY  < from: CT Head No Cont (05.24.25 @ 05:09) >  IMPRESSION:  No evidence of acute intracranial pathology.    < end of copied text >  < from: CT Head No Cont (05.24.25 @ 05:09) >  Encephalomalacia/gliosis in   the left posterior parietal lobe with ex vacuo dilation of the posterior   horn of the left lateral ventricle.    < end of copied text >

## 2025-05-25 NOTE — PROGRESS NOTE ADULT - SUBJECTIVE AND OBJECTIVE BOX
24H hour events:   pt resting  +occasional dizziness but denies active or recent cp or sob  CE noted  MEDICATIONS:  apixaban 2.5 milliGRAM(s) Oral two times a day  metoprolol succinate ER 25 milliGRAM(s) Oral daily    cefTRIAXone   IVPB 1000 milliGRAM(s) IV Intermittent every 24 hours      acetaminophen     Tablet .. 650 milliGRAM(s) Oral every 6 hours PRN  melatonin 3 milliGRAM(s) Oral at bedtime PRN  ondansetron Injectable 4 milliGRAM(s) IV Push every 8 hours PRN    aluminum hydroxide/magnesium hydroxide/simethicone Suspension 30 milliLiter(s) Oral every 4 hours PRN    atorvastatin 40 milliGRAM(s) Oral at bedtime  dextrose 50% Injectable 25 Gram(s) IV Push once  dextrose 50% Injectable 12.5 Gram(s) IV Push once  dextrose 50% Injectable 25 Gram(s) IV Push once  dextrose Oral Gel 15 Gram(s) Oral once PRN  glucagon  Injectable 1 milliGRAM(s) IntraMuscular once  insulin lispro (ADMELOG) corrective regimen sliding scale   SubCutaneous three times a day before meals    ammonium lactate 12% Lotion 1 Application(s) Topical two times a day  calcitriol   Capsule 0.25 MICROGram(s) Oral daily  dextrose 5%. 1000 milliLiter(s) IV Continuous <Continuous>  dextrose 5%. 1000 milliLiter(s) IV Continuous <Continuous>  ferrous    sulfate 325 milliGRAM(s) Oral daily  lactated ringers. 1000 milliLiter(s) IV Continuous <Continuous>  sodium bicarbonate 1300 milliGRAM(s) Oral every 8 hours  tamsulosin 0.4 milliGRAM(s) Oral at bedtime          PHYSICAL EXAM:  T(C): 36.3 (05-25-25 @ 10:30), Max: 36.7 (05-24-25 @ 16:40)  HR: 91 (05-25-25 @ 07:30) (79 - 91)  BP: 114/66 (05-25-25 @ 10:30) (111/65 - 124/65)  RR: 18 (05-25-25 @ 10:30) (18 - 18)  SpO2: 98% (05-25-25 @ 10:30) (96% - 99%)  Wt(kg): --  I&O's Summary    24 May 2025 07:01  -  25 May 2025 07:00  --------------------------------------------------------  IN: 975 mL / OUT: 0 mL / NET: 975 mL        Appearance: Normal	  HEENT:   Normal oral mucosa, PERRL, EOMI	  Lymphatic: No lymphadenopathy  Cardiovascular: Normal S1 S2, No JVD, No murmurs, No edema  Respiratory: Lungs clear to auscultation	  Psychiatry: A & O x 3, Mood & affect appropriate  Gastrointestinal:  Soft, Non-tender, + BS	  Skin: No rashes, No ecchymoses, No cyanosis	  Neurologic: Non-focal  Extremities: Normal range of motion    LABS:	 	    CBC Full  -  ( 25 May 2025 06:15 )  WBC Count : 5.05 K/uL  Hemoglobin : 9.8 g/dL  Hematocrit : 28.7 %  Platelet Count - Automated : 173 K/uL  Mean Cell Volume : 87.0 fl  Mean Cell Hemoglobin : 29.7 pg  Mean Cell Hemoglobin Concentration : 34.1 g/dL  Auto Neutrophil # : x  Auto Lymphocyte # : x  Auto Monocyte # : x  Auto Eosinophil # : x  Auto Basophil # : x  Auto Neutrophil % : x  Auto Lymphocyte % : x  Auto Monocyte % : x  Auto Eosinophil % : x  Auto Basophil % : x    05-25    145  |  114[H]  |  50[H]  ----------------------------<  71  5.1   |  26  |  2.46[H]  05-24    141  |  110[H]  |  52[H]  ----------------------------<  165[H]  4.4   |  27  |  2.48[H]    Ca    9.3      25 May 2025 06:15  Ca    9.5      24 May 2025 10:45  Phos  3.4     05-25  Mg     1.5     05-25    TPro  7.3  /  Alb  2.9[L]  /  TBili  0.4  /  DBili  x   /  AST  48[H]  /  ALT  70  /  AlkPhos  134[H]  05-24  TPro  7.7  /  Alb  3.2[L]  /  TBili  0.5  /  DBili  x   /  AST  44[H]  /  ALT  75  /  AlkPhos  138[H]  05-24      proBNP:   Lipid Profile:   HgA1c:   TSH:       CARDIAC MARKERS:            TELEMETRY: 	    ECG:  	  RADIOLOGY:  OTHER: 	    PREVIOUS DIAGNOSTIC TESTING:    [ ] Echocardiogram:  [ ]  Catheterization:  [ ] Stress Test:  	  	  ASSESSMENT/PLAN:

## 2025-05-25 NOTE — PROVIDER CONTACT NOTE (OTHER) - SITUATION
Patient alert and confused , refused all po meds.  Scheduled for 6pm Eliquis. FS 65 repeat 73 @ 4:30pm

## 2025-05-26 LAB
A1C WITH ESTIMATED AVERAGE GLUCOSE RESULT: 7.8 % — HIGH (ref 4–5.6)
ANION GAP SERPL CALC-SCNC: 6 MMOL/L — SIGNIFICANT CHANGE UP (ref 5–17)
APTT BLD: 41.8 SEC — HIGH (ref 26.1–36.8)
BUN SERPL-MCNC: 42 MG/DL — HIGH (ref 7–23)
CALCIUM SERPL-MCNC: 9.5 MG/DL — SIGNIFICANT CHANGE UP (ref 8.5–10.1)
CHLORIDE SERPL-SCNC: 117 MMOL/L — HIGH (ref 96–108)
CK MB BLD-MCNC: 3.6 % — HIGH (ref 0–3.5)
CK MB CFR SERPL CALC: 22 NG/ML — HIGH (ref 0.5–3.6)
CK SERPL-CCNC: 605 U/L — HIGH (ref 26–308)
CO2 SERPL-SCNC: 23 MMOL/L — SIGNIFICANT CHANGE UP (ref 22–31)
CREAT SERPL-MCNC: 2.77 MG/DL — HIGH (ref 0.5–1.3)
EGFR: 22 ML/MIN/1.73M2 — LOW
EGFR: 22 ML/MIN/1.73M2 — LOW
ESTIMATED AVERAGE GLUCOSE: 177 MG/DL — HIGH (ref 68–114)
GLUCOSE BLDC GLUCOMTR-MCNC: 117 MG/DL — HIGH (ref 70–99)
GLUCOSE BLDC GLUCOMTR-MCNC: 195 MG/DL — HIGH (ref 70–99)
GLUCOSE BLDC GLUCOMTR-MCNC: 208 MG/DL — HIGH (ref 70–99)
GLUCOSE BLDC GLUCOMTR-MCNC: 215 MG/DL — HIGH (ref 70–99)
GLUCOSE SERPL-MCNC: 137 MG/DL — HIGH (ref 70–99)
HCT VFR BLD CALC: 32.7 % — LOW (ref 39–50)
HGB BLD-MCNC: 10.8 G/DL — LOW (ref 13–17)
MAGNESIUM SERPL-MCNC: 1.8 MG/DL — SIGNIFICANT CHANGE UP (ref 1.6–2.6)
MCHC RBC-ENTMCNC: 29.3 PG — SIGNIFICANT CHANGE UP (ref 27–34)
MCHC RBC-ENTMCNC: 33 G/DL — SIGNIFICANT CHANGE UP (ref 32–36)
MCV RBC AUTO: 88.9 FL — SIGNIFICANT CHANGE UP (ref 80–100)
NRBC BLD AUTO-RTO: 0 /100 WBCS — SIGNIFICANT CHANGE UP (ref 0–0)
PHOSPHATE SERPL-MCNC: 2.7 MG/DL — SIGNIFICANT CHANGE UP (ref 2.5–4.5)
PLATELET # BLD AUTO: 160 K/UL — SIGNIFICANT CHANGE UP (ref 150–400)
POTASSIUM SERPL-MCNC: 4.7 MMOL/L — SIGNIFICANT CHANGE UP (ref 3.5–5.3)
POTASSIUM SERPL-SCNC: 4.7 MMOL/L — SIGNIFICANT CHANGE UP (ref 3.5–5.3)
RBC # BLD: 3.68 M/UL — LOW (ref 4.2–5.8)
RBC # FLD: 13.9 % — SIGNIFICANT CHANGE UP (ref 10.3–14.5)
SODIUM SERPL-SCNC: 146 MMOL/L — HIGH (ref 135–145)
TROPONIN I, HIGH SENSITIVITY RESULT: HIGH NG/L
WBC # BLD: 6.78 K/UL — SIGNIFICANT CHANGE UP (ref 3.8–10.5)
WBC # FLD AUTO: 6.78 K/UL — SIGNIFICANT CHANGE UP (ref 3.8–10.5)

## 2025-05-26 PROCEDURE — 99233 SBSQ HOSP IP/OBS HIGH 50: CPT

## 2025-05-26 PROCEDURE — 93010 ELECTROCARDIOGRAM REPORT: CPT

## 2025-05-26 RX ORDER — ASPIRIN 325 MG
324 TABLET ORAL ONCE
Refills: 0 | Status: COMPLETED | OUTPATIENT
Start: 2025-05-26 | End: 2025-05-26

## 2025-05-26 RX ORDER — HEPARIN SODIUM 1000 [USP'U]/ML
3500 INJECTION INTRAVENOUS; SUBCUTANEOUS EVERY 6 HOURS
Refills: 0 | Status: DISCONTINUED | OUTPATIENT
Start: 2025-05-26 | End: 2025-05-27

## 2025-05-26 RX ORDER — SODIUM CHLORIDE 9 G/1000ML
1000 INJECTION, SOLUTION INTRAVENOUS
Refills: 0 | Status: DISCONTINUED | OUTPATIENT
Start: 2025-05-26 | End: 2025-05-27

## 2025-05-26 RX ORDER — ASPIRIN 325 MG
81 TABLET ORAL DAILY
Refills: 0 | Status: DISCONTINUED | OUTPATIENT
Start: 2025-05-27 | End: 2025-05-27

## 2025-05-26 RX ORDER — HEPARIN SODIUM 1000 [USP'U]/ML
INJECTION INTRAVENOUS; SUBCUTANEOUS
Qty: 25000 | Refills: 0 | Status: DISCONTINUED | OUTPATIENT
Start: 2025-05-26 | End: 2025-05-27

## 2025-05-26 RX ORDER — HEPARIN SODIUM 1000 [USP'U]/ML
3500 INJECTION INTRAVENOUS; SUBCUTANEOUS ONCE
Refills: 0 | Status: COMPLETED | OUTPATIENT
Start: 2025-05-26 | End: 2025-05-26

## 2025-05-26 RX ADMIN — APIXABAN 2.5 MILLIGRAM(S): 2.5 TABLET, FILM COATED ORAL at 17:15

## 2025-05-26 RX ADMIN — Medication 1300 MILLIGRAM(S): at 14:27

## 2025-05-26 RX ADMIN — TAMSULOSIN HYDROCHLORIDE 0.4 MILLIGRAM(S): 0.4 CAPSULE ORAL at 21:32

## 2025-05-26 RX ADMIN — INSULIN LISPRO 2: 100 INJECTION, SOLUTION INTRAVENOUS; SUBCUTANEOUS at 12:06

## 2025-05-26 RX ADMIN — SODIUM CHLORIDE 65 MILLILITER(S): 9 INJECTION, SOLUTION INTRAVENOUS at 07:31

## 2025-05-26 RX ADMIN — Medication 1 APPLICATION(S): at 05:55

## 2025-05-26 RX ADMIN — APIXABAN 2.5 MILLIGRAM(S): 2.5 TABLET, FILM COATED ORAL at 05:50

## 2025-05-26 RX ADMIN — METOPROLOL SUCCINATE 25 MILLIGRAM(S): 50 TABLET, EXTENDED RELEASE ORAL at 05:50

## 2025-05-26 RX ADMIN — Medication 1300 MILLIGRAM(S): at 05:50

## 2025-05-26 RX ADMIN — SODIUM CHLORIDE 60 MILLILITER(S): 9 INJECTION, SOLUTION INTRAVENOUS at 21:39

## 2025-05-26 RX ADMIN — Medication 1300 MILLIGRAM(S): at 21:32

## 2025-05-26 RX ADMIN — INSULIN LISPRO 1: 100 INJECTION, SOLUTION INTRAVENOUS; SUBCUTANEOUS at 17:17

## 2025-05-26 RX ADMIN — HEPARIN SODIUM 700 UNIT(S)/HR: 1000 INJECTION INTRAVENOUS; SUBCUTANEOUS at 17:39

## 2025-05-26 RX ADMIN — CALCITRIOL 0.25 MICROGRAM(S): 0.5 CAPSULE, GELATIN COATED ORAL at 11:48

## 2025-05-26 RX ADMIN — Medication 1 APPLICATION(S): at 17:16

## 2025-05-26 RX ADMIN — CEFTRIAXONE 100 MILLIGRAM(S): 500 INJECTION, POWDER, FOR SOLUTION INTRAMUSCULAR; INTRAVENOUS at 05:56

## 2025-05-26 RX ADMIN — HEPARIN SODIUM 700 UNIT(S)/HR: 1000 INJECTION INTRAVENOUS; SUBCUTANEOUS at 19:27

## 2025-05-26 RX ADMIN — Medication 325 MILLIGRAM(S): at 11:47

## 2025-05-26 RX ADMIN — Medication 324 MILLIGRAM(S): at 17:55

## 2025-05-26 RX ADMIN — INSULIN LISPRO 2: 100 INJECTION, SOLUTION INTRAVENOUS; SUBCUTANEOUS at 09:08

## 2025-05-26 RX ADMIN — ATORVASTATIN CALCIUM 40 MILLIGRAM(S): 80 TABLET, FILM COATED ORAL at 21:32

## 2025-05-26 RX ADMIN — SODIUM CHLORIDE 60 MILLILITER(S): 9 INJECTION, SOLUTION INTRAVENOUS at 14:23

## 2025-05-26 RX ADMIN — HEPARIN SODIUM 3500 UNIT(S): 1000 INJECTION INTRAVENOUS; SUBCUTANEOUS at 17:41

## 2025-05-26 NOTE — PROGRESS NOTE ADULT - SUBJECTIVE AND OBJECTIVE BOX
[facility], [facility]  NEPHROLOGY AND HYPERTENSION  [address]  [address]  [place] [address]  [phone]    MD COLTON WORLEY MD YELENA ROSENBERG, MD BINNY KOSHY, MD CHRISTOPHER CAPUTO, MD EDWARD BOVER, MD          Patient events noted    MEDICATIONS  (STANDING):  ammonium lactate 12% Lotion 1 Application(s) Topical two times a day  atorvastatin 40 milliGRAM(s) Oral at bedtime  calcitriol   Capsule 0.25 MICROGram(s) Oral daily  cefTRIAXone   IVPB 1000 milliGRAM(s) IV Intermittent every 24 hours  dextrose 5%. 1000 milliLiter(s) (60 mL/Hr) IV Continuous <Continuous>  dextrose 5%. 1000 milliLiter(s) (50 mL/Hr) IV Continuous <Continuous>  dextrose 5%. 1000 milliLiter(s) (100 mL/Hr) IV Continuous <Continuous>  dextrose 50% Injectable 25 Gram(s) IV Push once  dextrose 50% Injectable 12.5 Gram(s) IV Push once  dextrose 50% Injectable 25 Gram(s) IV Push once  ferrous    sulfate 325 milliGRAM(s) Oral daily  glucagon  Injectable 1 milliGRAM(s) IntraMuscular once  heparin  Infusion.  Unit(s)/Hr (7 mL/Hr) IV Continuous <Continuous>  insulin lispro (ADMELOG) corrective regimen sliding scale   SubCutaneous three times a day before meals  metoprolol succinate ER 25 milliGRAM(s) Oral daily  sodium bicarbonate 1300 milliGRAM(s) Oral every 8 hours  tamsulosin 0.4 milliGRAM(s) Oral at bedtime    MEDICATIONS  (PRN):  acetaminophen     Tablet .. 650 milliGRAM(s) Oral every 6 hours PRN Temp greater or equal to 38C (100.4F), Mild Pain (1 - 3)  aluminum hydroxide/magnesium hydroxide/simethicone Suspension 30 milliLiter(s) Oral every 4 hours PRN Dyspepsia  dextrose Oral Gel 15 Gram(s) Oral once PRN Blood Glucose LESS THAN 70 milliGRAM(s)/deciliter  heparin   Injectable 3500 Unit(s) IV Push every 6 hours PRN For aPTT less than 40  melatonin 3 milliGRAM(s) Oral at bedtime PRN Insomnia  ondansetron Injectable 4 milliGRAM(s) IV Push every 8 hours PRN Nausea and/or Vomiting      05-26-25 @ 07:01  -  05-26-25 @ 19:32  --------------------------------------------------------  IN: 360 mL / OUT: 0 mL / NET: 360 mL      PHYSICAL EXAM:      T(C): 36.8 (05-26-25 @ 16:18), Max: 36.8 (05-26-25 @ 10:46)  HR: 73 (05-26-25 @ 16:18) (73 - 92)  BP: 126/71 (05-26-25 @ 16:18) (126/71 - 143/63)  RR: 18 (05-26-25 @ 16:18) (18 - 20)  SpO2: 93% (05-26-25 @ 16:18) (90% - 97%)  Wt(kg): --  Lungs clear  Heart S1S2  Abd soft NT ND  Extremities:   tr edema                                    10.8   6.78  )-----------( 160      ( 26 May 2025 08:02 )             32.7     05-26    146[H]  |  117[H]  |  42[H]  ----------------------------<  137[H]  4.7   |  23  |  2.77[H]    Ca    9.5      26 May 2025 08:02  Phos  2.7     05-26  Mg     1.8     05-26          Creatinine Trend: 2.77<--, 2.46<--, 2.48<--, 2.46<--, 2.09<--, 1.92<--        Assessment  TAYLOR CKD 3, UTI    Plan  IV abx  IVF challenge  Avoid nephrotoxins     Yuriy Guillermo MD

## 2025-05-26 NOTE — PHYSICAL THERAPY INITIAL EVALUATION ADULT - NSPTDISCHREC_GEN_A_CORE
Pt. presents with strength deficits in the extremities, decreased general endurance, difficulty in bed mobility and transfers, unsteady standing and ambulation balance, decreased safety awareness, fall risk./Sub-acute Rehab

## 2025-05-26 NOTE — PROGRESS NOTE ADULT - SUBJECTIVE AND OBJECTIVE BOX
Patient is a 85y old  Male who presents with a chief complaint of ESBL UTI (24 May 2025 08:18)      OVERNIGHT EVENTS:    MEDICATIONS  (STANDING):  ammonium lactate 12% Lotion 1 Application(s) Topical two times a day  apixaban 2.5 milliGRAM(s) Oral two times a day  atorvastatin 40 milliGRAM(s) Oral at bedtime  calcitriol   Capsule 0.25 MICROGram(s) Oral daily  cefTRIAXone   IVPB 1000 milliGRAM(s) IV Intermittent every 24 hours  dextrose 5%. 1000 milliLiter(s) (60 mL/Hr) IV Continuous <Continuous>  dextrose 5%. 1000 milliLiter(s) (50 mL/Hr) IV Continuous <Continuous>  dextrose 5%. 1000 milliLiter(s) (100 mL/Hr) IV Continuous <Continuous>  dextrose 50% Injectable 25 Gram(s) IV Push once  dextrose 50% Injectable 12.5 Gram(s) IV Push once  dextrose 50% Injectable 25 Gram(s) IV Push once  ferrous    sulfate 325 milliGRAM(s) Oral daily  glucagon  Injectable 1 milliGRAM(s) IntraMuscular once  insulin lispro (ADMELOG) corrective regimen sliding scale   SubCutaneous three times a day before meals  metoprolol succinate ER 25 milliGRAM(s) Oral daily  sodium bicarbonate 1300 milliGRAM(s) Oral every 8 hours  tamsulosin 0.4 milliGRAM(s) Oral at bedtime    MEDICATIONS  (PRN):  acetaminophen     Tablet .. 650 milliGRAM(s) Oral every 6 hours PRN Temp greater or equal to 38C (100.4F), Mild Pain (1 - 3)  aluminum hydroxide/magnesium hydroxide/simethicone Suspension 30 milliLiter(s) Oral every 4 hours PRN Dyspepsia  dextrose Oral Gel 15 Gram(s) Oral once PRN Blood Glucose LESS THAN 70 milliGRAM(s)/deciliter  melatonin 3 milliGRAM(s) Oral at bedtime PRN Insomnia  ondansetron Injectable 4 milliGRAM(s) IV Push every 8 hours PRN Nausea and/or Vomiting      Allergies    No Known Allergies    Intolerances        SUBJECTIVE: in bed in NAD, no acute events overnight     Vital Signs Last 24 Hrs  T(C): 36.8 (26 May 2025 10:46), Max: 37.6 (25 May 2025 16:49)  T(F): 98.2 (26 May 2025 10:46), Max: 99.6 (25 May 2025 16:49)  HR: 90 (26 May 2025 10:46) (78 - 92)  BP: 132/74 (26 May 2025 10:46) (116/68 - 143/63)  BP(mean): --  RR: 18 (26 May 2025 10:46) (18 - 20)  SpO2: 95% (26 May 2025 10:46) (90% - 98%)    Parameters below as of 26 May 2025 10:46  Patient On (Oxygen Delivery Method): room air      PHYSICAL EXAM:  GENERAL: NAD, well-groomed, well-developed  HEAD:  Atraumatic, Normocephalic  EYES: EOMI, PERRLA, conjunctiva and sclera clear  ENMT: No tonsillar erythema, exudates, or enlargement; Moist mucous membranes, Good dentition, No lesions  NECK: Supple,   CHEST/LUNG: Clear to  auscultation bilaterally; No rales, rhonchi, wheezing, or rubs  bilaterally  HEART: Regular rate and rhythm; No murmurs, rubs, or gallops  ABDOMEN: Soft, Nontender, Nondistended; Bowel sounds present  EXTREMITIES:  2+ Peripheral Pulses, No clubbing, cyanosis, or edema BL LE  SKIN: No rashes or lesions, dry feet  NERVOUS SYSTEM:  Alert & confused   LABS:                                 10.8   6.78  )-----------( 160      ( 26 May 2025 08:02 )             32.7   05-26    146[H]  |  117[H]  |  42[H]  ----------------------------<  137[H]  4.7   |  23  |  2.77[H]    Ca    9.5      26 May 2025 08:02  Phos  2.7     05-26  Mg     1.8     05-26      Cultures;   CAPILLARY BLOOD GLUCOSE      Lipid panel:           RADIOLOGY & ADDITIONAL TESTS:  < from: CT Head No Cont (05.24.25 @ 05:09) >    IMPRESSION:  No evidence of acute intracranial pathology.    < end of copied text >  < from: Xray Chest 1 View-PORTABLE IMMEDIATE (05.24.25 @ 03:08) >    IMPRESSION: No focal consolidation.    < end of copied text >      Imaging Personally Reviewed:  [ x] YES      Consultant(s) Notes Reviewed:  [x ] YES     Care Discussed with [x ] Consultants [X ] Patient [x ] Family  [x ]    [x ]  Other; RN

## 2025-05-26 NOTE — PROGRESS NOTE ADULT - ASSESSMENT
87-year-old male with past medical history of paroxysmal A-fib, diabetes, CKD, hyperlipidemia, bradycardia status post cardiac pacemaker who was brought to the ED from [facility] due to confusion and altered mental status. He was also found to have ESBL/ Klebsiella UTI as per urine culture. Found with troponin 1228.5>1219, creatinine 2.4 (2.0 two weeks ago). CTH negative for acute findings. Cardiology consulted in ED. Admit to medicine

## 2025-05-26 NOTE — PROGRESS NOTE ADULT - PROBLEM SELECTOR PLAN 2
-per my colleague's documentation " Elevated troponin, r/o ACS  - cardiology was consulted in ED  - Trended to peak  - Chest pain free  - EKG: no ST elevation  - stat EKG for chest pain  - telemetry monitoring  - f/u cardio recs:  5/24/2025 cardiology reccs noted stress test prior to d/c , check trop and ckmb  5/25/2025 trop elevated higher than yesterday, CK MB elevated . reaching out to cardiology for f/u today .   stat ekg. consider heparin drip  5/26/2025 medical management
-per my colleague's documentation " Elevated troponin, r/o ACS  - cardiology was consulted in ED  - Trended to peak  - Chest pain free  - EKG: no ST elevation  - stat EKG for chest pain  - telemetry monitoring  - f/u cardio recs:  5/24/2025 cardiology reccs noted stress test prior to d/c , check trop and ckmb  5/25/2025 trop elevated higher than yesterday, CK MB elevated . reaching out to cardiology for f/u today .   stat ekg. consider heparin drip
-per my colleague's documentation " Elevated troponin, r/o ACS  - cardiology was consulted in ED  - Trended to peak  - Chest pain free  - EKG: no ST elevation  - stat EKG for chest pain  - telemetry monitoring  - f/u cardio recs:  5/24/2025 cardiology reccs noted stress test prior to d/c , check trop and ckmb

## 2025-05-26 NOTE — PHYSICAL THERAPY INITIAL EVALUATION ADULT - PERTINENT HX OF CURRENT PROBLEM, REHAB EVAL
Pt is admitted from [facility]  dx UTI, bacterial AMS. h/o ESBL, DM, CKD, Bradycardia, Pacemaker ; [facility] ; pt states prior to being adminted to facilities he is independent in ambulation using a cane.

## 2025-05-26 NOTE — CHART NOTE - NSCHARTNOTEFT_GEN_A_CORE
pt remains hemodynamically stable and ekg remains unchanged (paced)  Cardiac enzyme trend noted  pt remains without cp or sob, is resting on RA  a discussion was had RE: transferring the pt to [facility]  case discussed with interventional team via transfer center  given lack of CP, pt not in ADHF, hemodynamically stable, pt with walter (now 2.7 from 1.9) and on IV ABX, pt not yet optimized for cath and no emergent need for cath at this time  will trend CE daily with AM labs  will cont to assess for CP  per multidisciplinary discussion with transfer center and conclusion: if pt develops CP, becomes hemodynamically unstable or if medicine team is not comfortable keeping pt, can call ICU/CCU inhouse at [facility] for eval or call [facility] for medicine to medicine transfer and cardiology will follow at [facility]   will cont to follow with you, if pt stays at [facility], when pt further optimized and ready for an ischemic eval, it will be set up.

## 2025-05-26 NOTE — PHYSICAL THERAPY INITIAL EVALUATION ADULT - STRENGTHENING, PT EVAL
Improve strength in the UE and LE to 5/5, improve general and be able to perform functional tasks-bed mobility, sitting, standing, transfers and ambulate in a safe manner with or without  assistive device and prevent falls.

## 2025-05-26 NOTE — PROGRESS NOTE ADULT - SUBJECTIVE AND OBJECTIVE BOX
24H hour events:   pt resting  no cp or sob  no complaints  MEDICATIONS:  apixaban 2.5 milliGRAM(s) Oral two times a day  metoprolol succinate ER 25 milliGRAM(s) Oral daily    cefTRIAXone   IVPB 1000 milliGRAM(s) IV Intermittent every 24 hours      acetaminophen     Tablet .. 650 milliGRAM(s) Oral every 6 hours PRN  melatonin 3 milliGRAM(s) Oral at bedtime PRN  ondansetron Injectable 4 milliGRAM(s) IV Push every 8 hours PRN    aluminum hydroxide/magnesium hydroxide/simethicone Suspension 30 milliLiter(s) Oral every 4 hours PRN    atorvastatin 40 milliGRAM(s) Oral at bedtime  dextrose 50% Injectable 25 Gram(s) IV Push once  dextrose 50% Injectable 12.5 Gram(s) IV Push once  dextrose 50% Injectable 25 Gram(s) IV Push once  dextrose Oral Gel 15 Gram(s) Oral once PRN  glucagon  Injectable 1 milliGRAM(s) IntraMuscular once  insulin lispro (ADMELOG) corrective regimen sliding scale   SubCutaneous three times a day before meals    ammonium lactate 12% Lotion 1 Application(s) Topical two times a day  calcitriol   Capsule 0.25 MICROGram(s) Oral daily  dextrose 5% + sodium chloride 0.45%. 1000 milliLiter(s) IV Continuous <Continuous>  dextrose 5%. 1000 milliLiter(s) IV Continuous <Continuous>  dextrose 5%. 1000 milliLiter(s) IV Continuous <Continuous>  ferrous    sulfate 325 milliGRAM(s) Oral daily  sodium bicarbonate 1300 milliGRAM(s) Oral every 8 hours  tamsulosin 0.4 milliGRAM(s) Oral at bedtime          PHYSICAL EXAM:  T(C): 36.4 (05-26-25 @ 05:33), Max: 37.6 (05-25-25 @ 16:49)  HR: 87 (05-26-25 @ 05:33) (78 - 90)  BP: 143/63 (05-26-25 @ 05:33) (114/66 - 143/63)  RR: 20 (05-26-25 @ 05:33) (18 - 20)  SpO2: 97% (05-26-25 @ 05:33) (90% - 98%)  Wt(kg): --  I&O's Summary      Appearance: Normal	  HEENT:   Normal oral mucosa, PERRL, EOMI	  Lymphatic: No lymphadenopathy  Cardiovascular: Normal S1 S2, No JVD, No murmurs, No edema  Respiratory: Lungs clear to auscultation	  Psychiatry: A & O x 3, Mood & affect appropriate  Gastrointestinal:  Soft, Non-tender, + BS	  Skin: No rashes, No ecchymoses, No cyanosis	  Neurologic: Non-focal  Extremities: Normal range of motion      LABS:	 	    CBC Full  -  ( 26 May 2025 08:02 )  WBC Count : 6.78 K/uL  Hemoglobin : 10.8 g/dL  Hematocrit : 32.7 %  Platelet Count - Automated : 160 K/uL  Mean Cell Volume : 88.9 fl  Mean Cell Hemoglobin : 29.3 pg  Mean Cell Hemoglobin Concentration : 33.0 g/dL  Auto Neutrophil # : x  Auto Lymphocyte # : x  Auto Monocyte # : x  Auto Eosinophil # : x  Auto Basophil # : x  Auto Neutrophil % : x  Auto Lymphocyte % : x  Auto Monocyte % : x  Auto Eosinophil % : x  Auto Basophil % : x    05-26    146[H]  |  117[H]  |  42[H]  ----------------------------<  137[H]  4.7   |  23  |  2.77[H]  05-25    145  |  114[H]  |  50[H]  ----------------------------<  71  5.1   |  26  |  2.46[H]    Ca    9.5      26 May 2025 08:02  Ca    9.3      25 May 2025 06:15  Phos  2.7     05-26  Phos  3.4     05-25  Mg     1.8     05-26  Mg     1.5     05-25    TPro  7.3  /  Alb  2.9[L]  /  TBili  0.4  /  DBili  x   /  AST  48[H]  /  ALT  70  /  AlkPhos  134[H]  05-24      proBNP:   Lipid Profile:   HgA1c:   TSH:       CARDIAC MARKERS:            TELEMETRY: 	    ECG:  	  RADIOLOGY:  OTHER: 	    PREVIOUS DIAGNOSTIC TESTING:    [ ] Echocardiogram:  [ ]  Catheterization:  [ ] Stress Test:  	  	  ASSESSMENT/PLAN:

## 2025-05-26 NOTE — PHYSICAL THERAPY INITIAL EVALUATION ADULT - ADDITIONAL COMMENTS
Patient states [facility] he is independent in ADLS at home and ambulated with a cane.

## 2025-05-26 NOTE — PROGRESS NOTE ADULT - PROBLEM SELECTOR PLAN 7
- Home meds: glipizide, ?farxiga   - start  ISS, POC BG achs>> calculate basal bolus based on need  - hypoglycemia protocol in place  - carb control diet  - f/u A1c  5/25/2025- check a1c - was hypoglycemic this morning supportive care
- Home meds: glipizide, ?farxiga   - start  ISS, POC BG achs>> calculate basal bolus based on need  - hypoglycemia protocol in place  - carb control diet  - f/u A1c  5/25/2025- check a1c - was hypoglycemic this morning supportive care  5/26/2025 f/u a1c
- Home meds: glipizide, ?farxiga   - start  ISS, POC BG achs>> calculate basal bolus based on need  - hypoglycemia protocol in place  - carb control diet  - f/u A1c

## 2025-05-26 NOTE — PHYSICAL THERAPY INITIAL EVALUATION ADULT - LIVES WITH, PROFILE
Pt states he lives in a pvt house with daughter and son in law and grand children , has 6 steps with rails to enter the house, another 4 steps with rails inside the house.

## 2025-05-26 NOTE — PROGRESS NOTE ADULT - SUBJECTIVE AND OBJECTIVE BOX
Patient seen and examined this am. No new events    MEDICATIONS:    acetaminophen     Tablet .. 650 milliGRAM(s) Oral every 6 hours PRN  aluminum hydroxide/magnesium hydroxide/simethicone Suspension 30 milliLiter(s) Oral every 4 hours PRN  ammonium lactate 12% Lotion 1 Application(s) Topical two times a day  apixaban 2.5 milliGRAM(s) Oral two times a day  atorvastatin 40 milliGRAM(s) Oral at bedtime  calcitriol   Capsule 0.25 MICROGram(s) Oral daily  cefTRIAXone   IVPB 1000 milliGRAM(s) IV Intermittent every 24 hours  dextrose 5%. 1000 milliLiter(s) IV Continuous <Continuous>  dextrose 5%. 1000 milliLiter(s) IV Continuous <Continuous>  dextrose 5%. 1000 milliLiter(s) IV Continuous <Continuous>  dextrose 50% Injectable 25 Gram(s) IV Push once  dextrose 50% Injectable 12.5 Gram(s) IV Push once  dextrose 50% Injectable 25 Gram(s) IV Push once  dextrose Oral Gel 15 Gram(s) Oral once PRN  ferrous    sulfate 325 milliGRAM(s) Oral daily  glucagon  Injectable 1 milliGRAM(s) IntraMuscular once  insulin lispro (ADMELOG) corrective regimen sliding scale   SubCutaneous three times a day before meals  melatonin 3 milliGRAM(s) Oral at bedtime PRN  metoprolol succinate ER 25 milliGRAM(s) Oral daily  ondansetron Injectable 4 milliGRAM(s) IV Push every 8 hours PRN  sodium bicarbonate 1300 milliGRAM(s) Oral every 8 hours  tamsulosin 0.4 milliGRAM(s) Oral at bedtime      LABS:                          10.8   6.78  )-----------( 160      ( 26 May 2025 08:02 )             32.7     05-26    146[H]  |  117[H]  |  42[H]  ----------------------------<  137[H]  4.7   |  23  |  2.77[H]    Ca    9.5      26 May 2025 08:02  Phos  2.7     05-26  Mg     1.8     05-26      CAPILLARY BLOOD GLUCOSE      POCT Blood Glucose.: 208 mg/dL (26 May 2025 11:48)  POCT Blood Glucose.: 215 mg/dL (26 May 2025 08:39)  POCT Blood Glucose.: 150 mg/dL (25 May 2025 21:55)  POCT Blood Glucose.: 73 mg/dL (25 May 2025 16:28)  POCT Blood Glucose.: 65 mg/dL (25 May 2025 16:22)    PTT - ( 25 May 2025 19:27 )  PTT:45.9 sec  Urinalysis Basic - ( 26 May 2025 08:02 )    Color: x / Appearance: x / SG: x / pH: x  Gluc: 137 mg/dL / Ketone: x  / Bili: x / Urobili: x   Blood: x / Protein: x / Nitrite: x   Leuk Esterase: x / RBC: x / WBC x   Sq Epi: x / Non Sq Epi: x / Bacteria: x      I&O's Summary    Vital Signs Last 24 Hrs  T(C): 36.8 (26 May 2025 10:46), Max: 37.6 (25 May 2025 16:49)  T(F): 98.2 (26 May 2025 10:46), Max: 99.6 (25 May 2025 16:49)  HR: 90 (26 May 2025 10:46) (78 - 92)  BP: 132/74 (26 May 2025 10:46) (116/68 - 143/63)  BP(mean): --  RR: 18 (26 May 2025 10:46) (18 - 20)  SpO2: 95% (26 May 2025 10:46) (90% - 98%)    Parameters below as of 26 May 2025 10:46  Patient On (Oxygen Delivery Method): room air        On Neurological Examination:    Mental Status -AAOx2 follows commands    Cranial Nerves - PERRL, EOMI, VFF, t, no gross facial asymmetry    Motor Exam -   no drift  FTN intact      RADIOLOGY  < from: CT Head No Cont (05.24.25 @ 05:09) >  IMPRESSION:  No evidence of acute intracranial pathology.    < end of copied text >  < from: CT Head No Cont (05.24.25 @ 05:09) >  Encephalomalacia/gliosis in   the left posterior parietal lobe with ex vacuo dilation of the posterior   horn of the left lateral ventricle.    < end of copied text >

## 2025-05-26 NOTE — PROGRESS NOTE ADULT - ASSESSMENT
87-year-old male with past medical history of paroxysmal A-fib, diabetes, CKD, hyperlipidemia, bradycardia status post cardiac pacemaker who was brought to the ED from [facility] due to confusion and altered mental status. He was also found to have ESBL/ Klebsiella UTI as per urine culture. exam limited lethargic ox 1 no clear foclaity  CTH chronic left parietal encephalomalacia      suspect encephalopathy in setting of infection    abx as per ID  supportive care  On AC  will clinicall reassess  -In order to enhance patient's overall well-being and clinical course, please try avoiding benzodiazepines, anticholinergics, and antihistamines (Can cause worsening confusion/delirium). Additionally, continue reorientation, supportive care, maintaining regular sleep/wake cycle, and optimizing nutritional/medical factors.    87-year-old male with past medical history of paroxysmal A-fib, diabetes, CKD, hyperlipidemia, bradycardia status post cardiac pacemaker who was brought to the ED from [facility] due to confusion and altered mental status. He was also found to have ESBL/ Klebsiella UTI as per urine culture. exam improved today  CTH chronic left parietal encephalomalacia      suspect encephalopathy in setting of infection- improved today from yesterday    abx as per ID  supportive care  On AC  -In order to enhance patient's overall well-being and clinical course, please try avoiding benzodiazepines, anticholinergics, and antihistamines (Can cause worsening confusion/delirium). Additionally, continue reorientation, supportive care, maintaining regular sleep/wake cycle, and optimizing nutritional/medical factors.

## 2025-05-26 NOTE — PROGRESS NOTE ADULT - PROBLEM SELECTOR PLAN 3
per my colleague's documentation "- likely due to UTI  - manage as above"  5/24/2025 agreed
per my colleague's documentation "- likely due to UTI  - manage as above"  5/24/2025 agreed
per my colleague's documentation "- likely due to UTI  - manage as above"  5/24/2025 agreed  5/26/2025 improving

## 2025-05-26 NOTE — PROGRESS NOTE ADULT - PROBLEM SELECTOR PLAN 4
- trial of IVF LR @75cc/hr  - Avoid nephrotoxin  - i/os  - f/u am labs  - Obtain renal u/s and nephro consult if worsening
- trial of IVF LR @75cc/hr  - Avoid nephrotoxin  - i/os  - f/u am labs  - Obtain renal u/s and nephro consult if worsening  5/25/2025- appreciate renal consult - CKD stage 3
- trial of IVF LR @75cc/hr  - Avoid nephrotoxin  - i/os  - f/u am labs  - Obtain renal u/s and nephro consult if worsening  5/25/2025- appreciate renal consult - CKD stage 3

## 2025-05-26 NOTE — PROGRESS NOTE ADULT - PROBLEM SELECTOR PLAN 1
-per my colleague's documentation " Afebrile, no leukocytosis   - Urine culture with ESBL klebsiella  - Continue with iv meropenem  - Continue with IV fluid   - Follow up urine culture"  5/24/2025 continue with antibx, ID consult , f/u culture data  5/25/2025 reviewed EMR and paper  chart no e/o of ESBL kleb in urine  only kleb -  consulted ID . antibx changed .    f/u on urine cx done here
-per my colleague's documentation " Afebrile, no leukocytosis   - Urine culture with ESBL klebsiella  - Continue with iv meropenem  - Continue with IV fluid   - Follow up urine culture"  5/24/2025 continue with antibx, ID consult , f/u culture data  5/25/2025 reviewed EMR and paper  chart no e/o of ESBL kleb in urine  only kleb -  consulted ID . antibx changed .    f/u on urine cx done here  5/26/2025 urine cx --> GNR - continue with antibx
-per my colleague's documentation " Afebrile, no leukocytosis   - Urine culture with ESBL klebsiella  - Continue with iv meropenem  - Continue with IV fluid   - Follow up urine culture"  5/24/2025 continue with antibx, ID consult , f/u culture data

## 2025-05-26 NOTE — PROGRESS NOTE ADULT - ASSESSMENT
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

## 2025-05-26 NOTE — PHYSICAL THERAPY INITIAL EVALUATION ADULT - GENERAL OBSERVATIONS, REHAB EVAL
Pt is alert, oriented x 4, coherent, follow instructions, on room air, denies pain, c/o generalized weakness, difficulty ambulating.

## 2025-05-27 ENCOUNTER — TRANSCRIPTION ENCOUNTER (OUTPATIENT)
Age: 85
End: 2025-05-27

## 2025-05-27 ENCOUNTER — INPATIENT (INPATIENT)
Facility: HOSPITAL | Age: 85
LOS: 3 days | Discharge: NURSING, REHAB OR LONG-TERM CARE | End: 2025-05-31
Attending: HOSPITALIST | Admitting: HOSPITALIST
Payer: MEDICARE

## 2025-05-27 VITALS
HEART RATE: 77 BPM | TEMPERATURE: 97 F | RESPIRATION RATE: 18 BRPM | OXYGEN SATURATION: 100 % | SYSTOLIC BLOOD PRESSURE: 120 MMHG | WEIGHT: 123.24 LBS | DIASTOLIC BLOOD PRESSURE: 60 MMHG | HEIGHT: 65 IN

## 2025-05-27 VITALS
SYSTOLIC BLOOD PRESSURE: 138 MMHG | HEART RATE: 99 BPM | RESPIRATION RATE: 18 BRPM | OXYGEN SATURATION: 95 % | DIASTOLIC BLOOD PRESSURE: 80 MMHG

## 2025-05-27 DIAGNOSIS — Z95.1 PRESENCE OF AORTOCORONARY BYPASS GRAFT: Chronic | ICD-10-CM

## 2025-05-27 DIAGNOSIS — N17.9 ACUTE KIDNEY FAILURE, UNSPECIFIED: ICD-10-CM

## 2025-05-27 DIAGNOSIS — I21.4 NON-ST ELEVATION (NSTEMI) MYOCARDIAL INFARCTION: ICD-10-CM

## 2025-05-27 DIAGNOSIS — Z95.0 PRESENCE OF CARDIAC PACEMAKER: ICD-10-CM

## 2025-05-27 DIAGNOSIS — N39.0 URINARY TRACT INFECTION, SITE NOT SPECIFIED: ICD-10-CM

## 2025-05-27 DIAGNOSIS — E03.9 HYPOTHYROIDISM, UNSPECIFIED: ICD-10-CM

## 2025-05-27 DIAGNOSIS — N40.1 BENIGN PROSTATIC HYPERPLASIA WITH LOWER URINARY TRACT SYMPTOMS: ICD-10-CM

## 2025-05-27 DIAGNOSIS — Z98.890 OTHER SPECIFIED POSTPROCEDURAL STATES: Chronic | ICD-10-CM

## 2025-05-27 DIAGNOSIS — G93.49 OTHER ENCEPHALOPATHY: ICD-10-CM

## 2025-05-27 DIAGNOSIS — Z95.818 PRESENCE OF OTHER CARDIAC IMPLANTS AND GRAFTS: Chronic | ICD-10-CM

## 2025-05-27 DIAGNOSIS — Z29.9 ENCOUNTER FOR PROPHYLACTIC MEASURES, UNSPECIFIED: ICD-10-CM

## 2025-05-27 DIAGNOSIS — I25.10 ATHEROSCLEROTIC HEART DISEASE OF NATIVE CORONARY ARTERY WITHOUT ANGINA PECTORIS: ICD-10-CM

## 2025-05-27 DIAGNOSIS — D50.9 IRON DEFICIENCY ANEMIA, UNSPECIFIED: ICD-10-CM

## 2025-05-27 DIAGNOSIS — E11.9 TYPE 2 DIABETES MELLITUS WITHOUT COMPLICATIONS: ICD-10-CM

## 2025-05-27 DIAGNOSIS — E78.5 HYPERLIPIDEMIA, UNSPECIFIED: ICD-10-CM

## 2025-05-27 DIAGNOSIS — I48.20 CHRONIC ATRIAL FIBRILLATION, UNSPECIFIED: ICD-10-CM

## 2025-05-27 LAB
-  AMOXICILLIN/CLAVULANIC ACID: SIGNIFICANT CHANGE UP
-  AMPICILLIN/SULBACTAM: SIGNIFICANT CHANGE UP
-  AMPICILLIN: SIGNIFICANT CHANGE UP
-  AZTREONAM: SIGNIFICANT CHANGE UP
-  CEFAZOLIN: SIGNIFICANT CHANGE UP
-  CEFEPIME: SIGNIFICANT CHANGE UP
-  CEFOXITIN: SIGNIFICANT CHANGE UP
-  CEFTRIAXONE: SIGNIFICANT CHANGE UP
-  CEFUROXIME: SIGNIFICANT CHANGE UP
-  CIPROFLOXACIN: SIGNIFICANT CHANGE UP
-  ERTAPENEM: SIGNIFICANT CHANGE UP
-  GENTAMICIN: SIGNIFICANT CHANGE UP
-  IMIPENEM: SIGNIFICANT CHANGE UP
-  LEVOFLOXACIN: SIGNIFICANT CHANGE UP
-  MEROPENEM: SIGNIFICANT CHANGE UP
-  NITROFURANTOIN: SIGNIFICANT CHANGE UP
-  PIPERACILLIN/TAZOBACTAM: SIGNIFICANT CHANGE UP
-  TIGECYCLINE: SIGNIFICANT CHANGE UP
-  TOBRAMYCIN: SIGNIFICANT CHANGE UP
-  TRIMETHOPRIM/SULFAMETHOXAZOLE: SIGNIFICANT CHANGE UP
ANION GAP SERPL CALC-SCNC: 5 MMOL/L — SIGNIFICANT CHANGE UP (ref 5–17)
APTT BLD: 173.7 SEC — CRITICAL HIGH (ref 26.1–36.8)
APTT BLD: 50.1 SEC — HIGH (ref 26.1–36.8)
APTT BLD: 62.5 SEC — HIGH (ref 26.1–36.8)
BUN SERPL-MCNC: 43 MG/DL — HIGH (ref 7–23)
CALCIUM SERPL-MCNC: 9 MG/DL — SIGNIFICANT CHANGE UP (ref 8.5–10.1)
CHLORIDE SERPL-SCNC: 112 MMOL/L — HIGH (ref 96–108)
CK MB CFR SERPL CALC: 10.3 NG/ML — HIGH (ref 0.5–3.6)
CO2 SERPL-SCNC: 25 MMOL/L — SIGNIFICANT CHANGE UP (ref 22–31)
CREAT SERPL-MCNC: 2.6 MG/DL — HIGH (ref 0.5–1.3)
CULTURE RESULTS: ABNORMAL
EGFR: 24 ML/MIN/1.73M2 — LOW
EGFR: 24 ML/MIN/1.73M2 — LOW
GLUCOSE BLDC GLUCOMTR-MCNC: 107 MG/DL — HIGH (ref 70–99)
GLUCOSE BLDC GLUCOMTR-MCNC: 128 MG/DL — HIGH (ref 70–99)
GLUCOSE BLDC GLUCOMTR-MCNC: 169 MG/DL — HIGH (ref 70–99)
GLUCOSE BLDC GLUCOMTR-MCNC: 169 MG/DL — HIGH (ref 70–99)
GLUCOSE SERPL-MCNC: 108 MG/DL — HIGH (ref 70–99)
HCT VFR BLD CALC: 28.4 % — LOW (ref 39–50)
HCT VFR BLD CALC: 29.5 % — LOW (ref 39–50)
HCT VFR BLD CALC: 29.8 % — LOW (ref 39–50)
HGB BLD-MCNC: 10.2 G/DL — LOW (ref 13–17)
HGB BLD-MCNC: 9.6 G/DL — LOW (ref 13–17)
HGB BLD-MCNC: 9.9 G/DL — LOW (ref 13–17)
LACTATE SERPL-SCNC: 1.3 MMOL/L — SIGNIFICANT CHANGE UP (ref 0.7–2)
MAGNESIUM SERPL-MCNC: 1.5 MG/DL — LOW (ref 1.6–2.6)
MCHC RBC-ENTMCNC: 29.5 PG — SIGNIFICANT CHANGE UP (ref 27–34)
MCHC RBC-ENTMCNC: 29.5 PG — SIGNIFICANT CHANGE UP (ref 27–34)
MCHC RBC-ENTMCNC: 29.9 PG — SIGNIFICANT CHANGE UP (ref 27–34)
MCHC RBC-ENTMCNC: 33.6 G/DL — SIGNIFICANT CHANGE UP (ref 32–36)
MCHC RBC-ENTMCNC: 33.8 G/DL — SIGNIFICANT CHANGE UP (ref 32–36)
MCHC RBC-ENTMCNC: 34.2 G/DL — SIGNIFICANT CHANGE UP (ref 32–36)
MCV RBC AUTO: 86.1 FL — SIGNIFICANT CHANGE UP (ref 80–100)
MCV RBC AUTO: 87.8 FL — SIGNIFICANT CHANGE UP (ref 80–100)
MCV RBC AUTO: 88.5 FL — SIGNIFICANT CHANGE UP (ref 80–100)
METHOD TYPE: SIGNIFICANT CHANGE UP
NRBC # BLD AUTO: 0.03 K/UL — HIGH (ref 0–0)
NRBC # FLD: 0.03 K/UL — HIGH (ref 0–0)
NRBC BLD AUTO-RTO: 0 /100 WBCS — SIGNIFICANT CHANGE UP (ref 0–0)
ORGANISM # SPEC MICROSCOPIC CNT: ABNORMAL
ORGANISM # SPEC MICROSCOPIC CNT: SIGNIFICANT CHANGE UP
PHOSPHATE SERPL-MCNC: 3 MG/DL — SIGNIFICANT CHANGE UP (ref 2.5–4.5)
PLATELET # BLD AUTO: 132 K/UL — LOW (ref 150–400)
PLATELET # BLD AUTO: 141 K/UL — LOW (ref 150–400)
PLATELET # BLD AUTO: 152 K/UL — SIGNIFICANT CHANGE UP (ref 150–400)
POTASSIUM SERPL-MCNC: 4.6 MMOL/L — SIGNIFICANT CHANGE UP (ref 3.5–5.3)
POTASSIUM SERPL-SCNC: 4.6 MMOL/L — SIGNIFICANT CHANGE UP (ref 3.5–5.3)
RBC # BLD: 3.21 M/UL — LOW (ref 4.2–5.8)
RBC # BLD: 3.36 M/UL — LOW (ref 4.2–5.8)
RBC # BLD: 3.46 M/UL — LOW (ref 4.2–5.8)
RBC # FLD: 13.5 % — SIGNIFICANT CHANGE UP (ref 10.3–14.5)
RBC # FLD: 13.8 % — SIGNIFICANT CHANGE UP (ref 10.3–14.5)
RBC # FLD: 14.1 % — SIGNIFICANT CHANGE UP (ref 10.3–14.5)
SODIUM SERPL-SCNC: 142 MMOL/L — SIGNIFICANT CHANGE UP (ref 135–145)
SPECIMEN SOURCE: SIGNIFICANT CHANGE UP
TROPONIN I, HIGH SENSITIVITY RESULT: 8653.1 NG/L — HIGH
TROPONIN I, HIGH SENSITIVITY RESULT: HIGH NG/L
WBC # BLD: 6.41 K/UL — SIGNIFICANT CHANGE UP (ref 3.8–10.5)
WBC # BLD: 7.65 K/UL — SIGNIFICANT CHANGE UP (ref 3.8–10.5)
WBC # BLD: 8.59 K/UL — SIGNIFICANT CHANGE UP (ref 3.8–10.5)
WBC # FLD AUTO: 6.41 K/UL — SIGNIFICANT CHANGE UP (ref 3.8–10.5)
WBC # FLD AUTO: 7.65 K/UL — SIGNIFICANT CHANGE UP (ref 3.8–10.5)
WBC # FLD AUTO: 8.59 K/UL — SIGNIFICANT CHANGE UP (ref 3.8–10.5)

## 2025-05-27 PROCEDURE — 99222 1ST HOSP IP/OBS MODERATE 55: CPT

## 2025-05-27 PROCEDURE — 93010 ELECTROCARDIOGRAM REPORT: CPT | Mod: 76

## 2025-05-27 PROCEDURE — 99223 1ST HOSP IP/OBS HIGH 75: CPT | Mod: GC

## 2025-05-27 PROCEDURE — 99231 SBSQ HOSP IP/OBS SF/LOW 25: CPT

## 2025-05-27 RX ORDER — CALCITRIOL 0.5 UG/1
1 CAPSULE, GELATIN COATED ORAL
Qty: 0 | Refills: 0 | DISCHARGE
Start: 2025-05-27

## 2025-05-27 RX ORDER — INSULIN LISPRO 100 U/ML
INJECTION, SOLUTION INTRAVENOUS; SUBCUTANEOUS AT BEDTIME
Refills: 0 | Status: DISCONTINUED | OUTPATIENT
Start: 2025-05-27 | End: 2025-05-31

## 2025-05-27 RX ORDER — FERROUS SULFATE 137(45) MG
1 TABLET, EXTENDED RELEASE ORAL
Refills: 0 | DISCHARGE

## 2025-05-27 RX ORDER — SODIUM CHLORIDE 9 G/1000ML
1000 INJECTION, SOLUTION INTRAVENOUS
Refills: 0 | Status: DISCONTINUED | OUTPATIENT
Start: 2025-05-27 | End: 2025-05-31

## 2025-05-27 RX ORDER — SODIUM BICARBONATE 1 MEQ/ML
2 SYRINGE (ML) INTRAVENOUS
Qty: 0 | Refills: 0 | DISCHARGE
Start: 2025-05-27

## 2025-05-27 RX ORDER — NIFEDIPINE 30 MG
1 TABLET, EXTENDED RELEASE 24 HR ORAL
Refills: 0 | DISCHARGE

## 2025-05-27 RX ORDER — SODIUM CHLORIDE 9 G/1000ML
1000 INJECTION, SOLUTION INTRAVENOUS
Refills: 0 | Status: DISCONTINUED | OUTPATIENT
Start: 2025-05-27 | End: 2025-05-29

## 2025-05-27 RX ORDER — TAMSULOSIN HYDROCHLORIDE 0.4 MG/1
1 CAPSULE ORAL
Refills: 0 | DISCHARGE

## 2025-05-27 RX ORDER — DEXTROSE 50 % IN WATER 50 %
12.5 SYRINGE (ML) INTRAVENOUS ONCE
Refills: 0 | Status: DISCONTINUED | OUTPATIENT
Start: 2025-05-27 | End: 2025-05-31

## 2025-05-27 RX ORDER — ATORVASTATIN CALCIUM 80 MG/1
1 TABLET, FILM COATED ORAL
Qty: 0 | Refills: 0 | DISCHARGE
Start: 2025-05-27

## 2025-05-27 RX ORDER — FERROUS SULFATE 137(45) MG
1 TABLET, EXTENDED RELEASE ORAL
Qty: 0 | Refills: 0 | DISCHARGE
Start: 2025-05-27

## 2025-05-27 RX ORDER — CEFTRIAXONE 500 MG/1
1000 INJECTION, POWDER, FOR SOLUTION INTRAMUSCULAR; INTRAVENOUS EVERY 24 HOURS
Refills: 0 | Status: DISCONTINUED | OUTPATIENT
Start: 2025-05-28 | End: 2025-05-30

## 2025-05-27 RX ORDER — GLUCAGON 3 MG/1
1 POWDER NASAL ONCE
Refills: 0 | Status: DISCONTINUED | OUTPATIENT
Start: 2025-05-27 | End: 2025-05-31

## 2025-05-27 RX ORDER — DEXTROSE 50 % IN WATER 50 %
15 SYRINGE (ML) INTRAVENOUS ONCE
Refills: 0 | Status: DISCONTINUED | OUTPATIENT
Start: 2025-05-27 | End: 2025-05-31

## 2025-05-27 RX ORDER — HEPARIN SODIUM 1000 [USP'U]/ML
600 INJECTION INTRAVENOUS; SUBCUTANEOUS
Qty: 25000 | Refills: 0 | Status: DISCONTINUED | OUTPATIENT
Start: 2025-05-27 | End: 2025-05-29

## 2025-05-27 RX ORDER — LISINOPRIL 5 MG/1
1 TABLET ORAL
Refills: 0 | DISCHARGE

## 2025-05-27 RX ORDER — TAMSULOSIN HYDROCHLORIDE 0.4 MG/1
1 CAPSULE ORAL
Qty: 0 | Refills: 0 | DISCHARGE
Start: 2025-05-27

## 2025-05-27 RX ORDER — ASPIRIN 325 MG
1 TABLET ORAL
Qty: 0 | Refills: 0 | DISCHARGE
Start: 2025-05-27

## 2025-05-27 RX ORDER — CALCITRIOL 0.5 UG/1
1 CAPSULE, GELATIN COATED ORAL
Refills: 0 | DISCHARGE

## 2025-05-27 RX ORDER — ASPIRIN 325 MG
81 TABLET ORAL DAILY
Refills: 0 | Status: DISCONTINUED | OUTPATIENT
Start: 2025-05-27 | End: 2025-05-27

## 2025-05-27 RX ORDER — TAMSULOSIN HYDROCHLORIDE 0.4 MG/1
0.4 CAPSULE ORAL AT BEDTIME
Refills: 0 | Status: DISCONTINUED | OUTPATIENT
Start: 2025-05-27 | End: 2025-05-31

## 2025-05-27 RX ORDER — INSULIN LISPRO 100 U/ML
INJECTION, SOLUTION INTRAVENOUS; SUBCUTANEOUS
Refills: 0 | Status: DISCONTINUED | OUTPATIENT
Start: 2025-05-27 | End: 2025-05-31

## 2025-05-27 RX ORDER — DEXTROSE 50 % IN WATER 50 %
25 SYRINGE (ML) INTRAVENOUS ONCE
Refills: 0 | Status: DISCONTINUED | OUTPATIENT
Start: 2025-05-27 | End: 2025-05-31

## 2025-05-27 RX ORDER — DAPAGLIFLOZIN 5 MG/1
1 TABLET, FILM COATED ORAL
Refills: 0 | DISCHARGE

## 2025-05-27 RX ORDER — MAGNESIUM SULFATE 500 MG/ML
1 SYRINGE (ML) INJECTION ONCE
Refills: 0 | Status: DISCONTINUED | OUTPATIENT
Start: 2025-05-27 | End: 2025-05-27

## 2025-05-27 RX ORDER — ATORVASTATIN CALCIUM 80 MG/1
40 TABLET, FILM COATED ORAL AT BEDTIME
Refills: 0 | Status: DISCONTINUED | OUTPATIENT
Start: 2025-05-27 | End: 2025-05-30

## 2025-05-27 RX ORDER — METOPROLOL SUCCINATE 50 MG/1
1 TABLET, EXTENDED RELEASE ORAL
Qty: 0 | Refills: 0 | DISCHARGE
Start: 2025-05-27

## 2025-05-27 RX ADMIN — HEPARIN SODIUM 500 UNIT(S)/HR: 1000 INJECTION INTRAVENOUS; SUBCUTANEOUS at 07:31

## 2025-05-27 RX ADMIN — ATORVASTATIN CALCIUM 40 MILLIGRAM(S): 80 TABLET, FILM COATED ORAL at 21:06

## 2025-05-27 RX ADMIN — INSULIN LISPRO 1: 100 INJECTION, SOLUTION INTRAVENOUS; SUBCUTANEOUS at 08:54

## 2025-05-27 RX ADMIN — SODIUM CHLORIDE 50 MILLILITER(S): 9 INJECTION, SOLUTION INTRAVENOUS at 15:55

## 2025-05-27 RX ADMIN — HEPARIN SODIUM 600 UNIT(S)/HR: 1000 INJECTION INTRAVENOUS; SUBCUTANEOUS at 19:27

## 2025-05-27 RX ADMIN — METOPROLOL SUCCINATE 25 MILLIGRAM(S): 50 TABLET, EXTENDED RELEASE ORAL at 05:38

## 2025-05-27 RX ADMIN — Medication 1300 MILLIGRAM(S): at 05:38

## 2025-05-27 RX ADMIN — Medication 1 APPLICATION(S): at 05:38

## 2025-05-27 RX ADMIN — HEPARIN SODIUM 600 UNIT(S)/HR: 1000 INJECTION INTRAVENOUS; SUBCUTANEOUS at 14:19

## 2025-05-27 RX ADMIN — SODIUM CHLORIDE 60 MILLILITER(S): 9 INJECTION, SOLUTION INTRAVENOUS at 05:43

## 2025-05-27 RX ADMIN — HEPARIN SODIUM 600 UNIT(S)/HR: 1000 INJECTION INTRAVENOUS; SUBCUTANEOUS at 21:16

## 2025-05-27 RX ADMIN — TAMSULOSIN HYDROCHLORIDE 0.4 MILLIGRAM(S): 0.4 CAPSULE ORAL at 21:06

## 2025-05-27 RX ADMIN — HEPARIN SODIUM 500 UNIT(S)/HR: 1000 INJECTION INTRAVENOUS; SUBCUTANEOUS at 02:03

## 2025-05-27 RX ADMIN — SODIUM CHLORIDE 50 MILLILITER(S): 9 INJECTION, SOLUTION INTRAVENOUS at 19:27

## 2025-05-27 RX ADMIN — CEFTRIAXONE 100 MILLIGRAM(S): 500 INJECTION, POWDER, FOR SOLUTION INTRAMUSCULAR; INTRAVENOUS at 05:35

## 2025-05-27 RX ADMIN — HEPARIN SODIUM 0 UNIT(S)/HR: 1000 INJECTION INTRAVENOUS; SUBCUTANEOUS at 00:57

## 2025-05-27 NOTE — PROGRESS NOTE ADULT - ASSESSMENT
87-year-old male with past medical history of paroxysmal A-fib, diabetes, CKD, hyperlipidemia, bradycardia status post cardiac pacemaker who was brought to the ED from [facility] due to confusion and altered mental status. He was also found to have ESBL/ Klebsiella UTI as per urine culture. exam improved today  CTH chronic left parietal encephalomalacia      suspect encephalopathy in setting of infection- improved today from yesterday    abx as per ID  supportive care  On AC  -In order to enhance patient's overall well-being and clinical course, please try avoiding benzodiazepines, anticholinergics, and antihistamines (Can cause worsening confusion/delirium). Additionally, continue reorientation, supportive care, maintaining regular sleep/wake cycle, and optimizing nutritional/medical factors.

## 2025-05-27 NOTE — DISCHARGE NOTE PROVIDER - HOSPITAL COURSE
87-year-old male with past medical history of paroxysmal A-fib, diabetes, CKD, hyperlipidemia, bradycardia-status post cardiac pacemaker who was brought to the ED from [facility] due to confusion and altered mental status. He was also found to have ESBL/ Klebsiella UTI as per urine culture. Found with troponin 1228.5>1219, creatinine 2.4 (2.0 two weeks ago). CTH negative for acute findings. Cardiology consulted in ED. Admit to medicine      admitted for Urinary Tract Infection- ID consult continue with antibx    NSTEMI- troponin peaked at 13K -- xfer to [facility] for cardiac eval and intervention, cardiology consulted  Afib- was rate controlled and treated with full dose AC   acute metabolic encephalopathy- ct head negative - improving      xfer to J for NSTEMI

## 2025-05-27 NOTE — DISCHARGE NOTE PROVIDER - NSDCMRMEDTOKEN_GEN_ALL_CORE_FT
aspirin 81 mg oral delayed release tablet: 1 tab(s) orally once a day  atorvastatin 40 mg oral tablet: 1 tab(s) orally once a day (at bedtime)  calcitriol 0.25 mcg oral capsule: 1 cap(s) orally once a day  ferrous sulfate 325 mg (65 mg elemental iron) oral tablet: 1 tab(s) orally once a day  metoprolol succinate 25 mg oral tablet, extended release: 1 tab(s) orally once a day  sodium bicarbonate 650 mg oral tablet: 2 tab(s) orally every 8 hours  tamsulosin 0.4 mg oral capsule: 1 cap(s) orally once a day (at bedtime)

## 2025-05-27 NOTE — DISCHARGE NOTE PROVIDER - CARE PROVIDER_API CALL
follwo up primary care doctor and cardiologits,   Phone: (   )    -  Fax: (   )    -  Follow Up Time:

## 2025-05-27 NOTE — PROGRESS NOTE ADULT - SUBJECTIVE AND OBJECTIVE BOX
Patient seen and examined this am. No new events    MEDICATIONS:    acetaminophen     Tablet .. 650 milliGRAM(s) Oral every 6 hours PRN  aluminum hydroxide/magnesium hydroxide/simethicone Suspension 30 milliLiter(s) Oral every 4 hours PRN  ammonium lactate 12% Lotion 1 Application(s) Topical two times a day  aspirin enteric coated 81 milliGRAM(s) Oral daily  atorvastatin 40 milliGRAM(s) Oral at bedtime  calcitriol   Capsule 0.25 MICROGram(s) Oral daily  dextrose 5%. 1000 milliLiter(s) IV Continuous <Continuous>  dextrose 5%. 1000 milliLiter(s) IV Continuous <Continuous>  dextrose 5%. 1000 milliLiter(s) IV Continuous <Continuous>  dextrose 50% Injectable 25 Gram(s) IV Push once  dextrose 50% Injectable 12.5 Gram(s) IV Push once  dextrose 50% Injectable 25 Gram(s) IV Push once  dextrose Oral Gel 15 Gram(s) Oral once PRN  ferrous    sulfate 325 milliGRAM(s) Oral daily  glucagon  Injectable 1 milliGRAM(s) IntraMuscular once  heparin   Injectable 3500 Unit(s) IV Push every 6 hours PRN  heparin  Infusion.  Unit(s)/Hr IV Continuous <Continuous>  insulin lispro (ADMELOG) corrective regimen sliding scale   SubCutaneous three times a day before meals  melatonin 3 milliGRAM(s) Oral at bedtime PRN  metoprolol succinate ER 25 milliGRAM(s) Oral daily  ondansetron Injectable 4 milliGRAM(s) IV Push every 8 hours PRN  sodium bicarbonate 1300 milliGRAM(s) Oral every 8 hours  tamsulosin 0.4 milliGRAM(s) Oral at bedtime      LABS:                          9.9    7.65  )-----------( 152      ( 27 May 2025 07:19 )             29.5     05-27    142  |  112[H]  |  43[H]  ----------------------------<  108[H]  4.6   |  25  |  2.60[H]    Ca    9.0      27 May 2025 07:19  Phos  2.7     05-26  Mg     1.8     05-26      CAPILLARY BLOOD GLUCOSE      POCT Blood Glucose.: 169 mg/dL (27 May 2025 07:57)  POCT Blood Glucose.: 117 mg/dL (26 May 2025 21:04)  POCT Blood Glucose.: 195 mg/dL (26 May 2025 17:12)  POCT Blood Glucose.: 208 mg/dL (26 May 2025 11:48)    PTT - ( 26 May 2025 23:30 )  PTT:173.7 sec  Urinalysis Basic - ( 27 May 2025 07:19 )    Color: x / Appearance: x / SG: x / pH: x  Gluc: 108 mg/dL / Ketone: x  / Bili: x / Urobili: x   Blood: x / Protein: x / Nitrite: x   Leuk Esterase: x / RBC: x / WBC x   Sq Epi: x / Non Sq Epi: x / Bacteria: x      I&O's Summary    26 May 2025 07:01  -  27 May 2025 07:00  --------------------------------------------------------  IN: 600 mL / OUT: 0 mL / NET: 600 mL    27 May 2025 07:01  -  27 May 2025 09:44  --------------------------------------------------------  IN: 320 mL / OUT: 0 mL / NET: 320 mL      Vital Signs Last 24 Hrs  T(C): 36.8 (27 May 2025 04:47), Max: 36.8 (26 May 2025 10:46)  T(F): 98.3 (27 May 2025 04:47), Max: 98.3 (27 May 2025 04:47)  HR: 99 (27 May 2025 04:54) (73 - 99)  BP: 138/80 (27 May 2025 04:54) (126/71 - 154/67)  BP(mean): --  RR: 18 (27 May 2025 04:54) (18 - 18)  SpO2: 95% (27 May 2025 04:54) (92% - 95%)    Parameters below as of 27 May 2025 04:54  Patient On (Oxygen Delivery Method): room air          On Neurological Examination:    Mental Status -AAOx2 follows commands    Cranial Nerves - PERRL, EOMI, VFF, t, no gross facial asymmetry    Motor Exam -   no drift  FTN intact      RADIOLOGY  < from: CT Head No Cont (05.24.25 @ 05:09) >  IMPRESSION:  No evidence of acute intracranial pathology.    < end of copied text >  < from: CT Head No Cont (05.24.25 @ 05:09) >  Encephalomalacia/gliosis in   the left posterior parietal lobe with ex vacuo dilation of the posterior   horn of the left lateral ventricle.    < end of copied text >

## 2025-05-27 NOTE — DISCHARGE NOTE PROVIDER - NSDCCPCAREPLAN_GEN_ALL_CORE_FT
PRINCIPAL DISCHARGE DIAGNOSIS  Diagnosis: UTI (urinary tract infection), bacterial  Assessment and Plan of Treatment:       SECONDARY DISCHARGE DIAGNOSES  Diagnosis: AMS (altered mental status)  Assessment and Plan of Treatment:     Diagnosis: Elevated troponin  Assessment and Plan of Treatment:     Diagnosis: Acute kidney injury superimposed on CKD  Assessment and Plan of Treatment:     Diagnosis: Cardiac pacemaker  Assessment and Plan of Treatment:     Diagnosis: Afib  Assessment and Plan of Treatment:     Diagnosis: Acute metabolic encephalopathy  Assessment and Plan of Treatment:     Diagnosis: NSTEMI (non-ST elevation myocardial infarction)  Assessment and Plan of Treatment:

## 2025-05-27 NOTE — DISCHARGE NOTE PROVIDER - NSDCFUSCHEDAPPT_GEN_ALL_CORE_FT
[facility] Physician [facility] [phone]  Scheduled Appointment: 08/04/2025

## 2025-05-28 LAB
ALBUMIN SERPL ELPH-MCNC: 3.1 G/DL — LOW (ref 3.3–5)
ALP SERPL-CCNC: 127 U/L — HIGH (ref 40–120)
ALT FLD-CCNC: 56 U/L — HIGH (ref 4–41)
ANION GAP SERPL CALC-SCNC: 12 MMOL/L — SIGNIFICANT CHANGE UP (ref 7–14)
APTT BLD: 67.3 SEC — HIGH (ref 26.1–36.8)
AST SERPL-CCNC: 46 U/L — HIGH (ref 4–40)
BILIRUB SERPL-MCNC: 0.6 MG/DL — SIGNIFICANT CHANGE UP (ref 0.2–1.2)
BLD GP AB SCN SERPL QL: NEGATIVE — SIGNIFICANT CHANGE UP
BUN SERPL-MCNC: 39 MG/DL — HIGH (ref 7–23)
CALCIUM SERPL-MCNC: 9.1 MG/DL — SIGNIFICANT CHANGE UP (ref 8.4–10.5)
CHLORIDE SERPL-SCNC: 108 MMOL/L — HIGH (ref 98–107)
CO2 SERPL-SCNC: 21 MMOL/L — LOW (ref 22–31)
CREAT ?TM UR-MCNC: 35 MG/DL — SIGNIFICANT CHANGE UP
CREAT SERPL-MCNC: 2.41 MG/DL — HIGH (ref 0.5–1.3)
EGFR: 26 ML/MIN/1.73M2 — LOW
EGFR: 26 ML/MIN/1.73M2 — LOW
GLUCOSE BLDC GLUCOMTR-MCNC: 144 MG/DL — HIGH (ref 70–99)
GLUCOSE SERPL-MCNC: 112 MG/DL — HIGH (ref 70–99)
HCT VFR BLD CALC: 30.7 % — LOW (ref 39–50)
HGB BLD-MCNC: 10.4 G/DL — LOW (ref 13–17)
MAGNESIUM SERPL-MCNC: 1.6 MG/DL — SIGNIFICANT CHANGE UP (ref 1.6–2.6)
MCHC RBC-ENTMCNC: 29.6 PG — SIGNIFICANT CHANGE UP (ref 27–34)
MCHC RBC-ENTMCNC: 33.9 G/DL — SIGNIFICANT CHANGE UP (ref 32–36)
MCV RBC AUTO: 87.5 FL — SIGNIFICANT CHANGE UP (ref 80–100)
NRBC # BLD AUTO: 0 K/UL — SIGNIFICANT CHANGE UP (ref 0–0)
NRBC # FLD: 0 K/UL — SIGNIFICANT CHANGE UP (ref 0–0)
NRBC BLD AUTO-RTO: 0 /100 WBCS — SIGNIFICANT CHANGE UP (ref 0–0)
OSMOLALITY UR: 409 MOSM/KG — SIGNIFICANT CHANGE UP (ref 50–1200)
PHOSPHATE SERPL-MCNC: 3.1 MG/DL — SIGNIFICANT CHANGE UP (ref 2.5–4.5)
PLATELET # BLD AUTO: 143 K/UL — LOW (ref 150–400)
POTASSIUM SERPL-MCNC: 4.4 MMOL/L — SIGNIFICANT CHANGE UP (ref 3.5–5.3)
POTASSIUM SERPL-SCNC: 4.4 MMOL/L — SIGNIFICANT CHANGE UP (ref 3.5–5.3)
POTASSIUM UR-SCNC: 17 MMOL/L — SIGNIFICANT CHANGE UP
PROT ?TM UR-MCNC: 36 MG/DL — SIGNIFICANT CHANGE UP
PROT SERPL-MCNC: 6.5 G/DL — SIGNIFICANT CHANGE UP (ref 6–8.3)
PROT/CREAT UR-RTO: 1 RATIO — HIGH (ref 0–0.2)
RBC # BLD: 3.51 M/UL — LOW (ref 4.2–5.8)
RBC # FLD: 13.5 % — SIGNIFICANT CHANGE UP (ref 10.3–14.5)
RH IG SCN BLD-IMP: POSITIVE — SIGNIFICANT CHANGE UP
SODIUM SERPL-SCNC: 141 MMOL/L — SIGNIFICANT CHANGE UP (ref 135–145)
SODIUM UR-SCNC: 116 MMOL/L — SIGNIFICANT CHANGE UP
TROPONIN T, HIGH SENSITIVITY RESULT: 793 NG/L — CRITICAL HIGH
UUN UR-MCNC: 351 MG/DL — SIGNIFICANT CHANGE UP
WBC # BLD: 6.87 K/UL — SIGNIFICANT CHANGE UP (ref 3.8–10.5)
WBC # FLD AUTO: 6.87 K/UL — SIGNIFICANT CHANGE UP (ref 3.8–10.5)

## 2025-05-28 PROCEDURE — 99232 SBSQ HOSP IP/OBS MODERATE 35: CPT | Mod: GC

## 2025-05-28 RX ADMIN — CEFTRIAXONE 100 MILLIGRAM(S): 500 INJECTION, POWDER, FOR SOLUTION INTRAMUSCULAR; INTRAVENOUS at 04:07

## 2025-05-28 RX ADMIN — HEPARIN SODIUM 600 UNIT(S)/HR: 1000 INJECTION INTRAVENOUS; SUBCUTANEOUS at 19:15

## 2025-05-28 RX ADMIN — HEPARIN SODIUM 600 UNIT(S)/HR: 1000 INJECTION INTRAVENOUS; SUBCUTANEOUS at 03:21

## 2025-05-28 RX ADMIN — ATORVASTATIN CALCIUM 40 MILLIGRAM(S): 80 TABLET, FILM COATED ORAL at 21:49

## 2025-05-28 RX ADMIN — TAMSULOSIN HYDROCHLORIDE 0.4 MILLIGRAM(S): 0.4 CAPSULE ORAL at 21:49

## 2025-05-28 RX ADMIN — HEPARIN SODIUM 600 UNIT(S)/HR: 1000 INJECTION INTRAVENOUS; SUBCUTANEOUS at 07:23

## 2025-05-29 ENCOUNTER — RESULT REVIEW (OUTPATIENT)
Age: 85
End: 2025-05-29

## 2025-05-29 LAB
ANION GAP SERPL CALC-SCNC: 11 MMOL/L — SIGNIFICANT CHANGE UP (ref 7–14)
APTT BLD: 44.7 SEC — HIGH (ref 26.1–36.8)
APTT BLD: 56 SEC — HIGH (ref 26.1–36.8)
BUN SERPL-MCNC: 40 MG/DL — HIGH (ref 7–23)
CALCIUM SERPL-MCNC: 9 MG/DL — SIGNIFICANT CHANGE UP (ref 8.4–10.5)
CHLORIDE SERPL-SCNC: 108 MMOL/L — HIGH (ref 98–107)
CO2 SERPL-SCNC: 20 MMOL/L — LOW (ref 22–31)
CREAT SERPL-MCNC: 2.12 MG/DL — HIGH (ref 0.5–1.3)
CULTURE RESULTS: SIGNIFICANT CHANGE UP
CULTURE RESULTS: SIGNIFICANT CHANGE UP
EGFR: 30 ML/MIN/1.73M2 — LOW
EGFR: 30 ML/MIN/1.73M2 — LOW
GLUCOSE SERPL-MCNC: 106 MG/DL — HIGH (ref 70–99)
HCT VFR BLD CALC: 28.3 % — LOW (ref 39–50)
HCT VFR BLD CALC: 30.6 % — LOW (ref 39–50)
HGB BLD-MCNC: 10.3 G/DL — LOW (ref 13–17)
HGB BLD-MCNC: 9.7 G/DL — LOW (ref 13–17)
MAGNESIUM SERPL-MCNC: 1.5 MG/DL — LOW (ref 1.6–2.6)
MCHC RBC-ENTMCNC: 29.1 PG — SIGNIFICANT CHANGE UP (ref 27–34)
MCHC RBC-ENTMCNC: 29.4 PG — SIGNIFICANT CHANGE UP (ref 27–34)
MCHC RBC-ENTMCNC: 33.7 G/DL — SIGNIFICANT CHANGE UP (ref 32–36)
MCHC RBC-ENTMCNC: 34.3 G/DL — SIGNIFICANT CHANGE UP (ref 32–36)
MCV RBC AUTO: 85 FL — SIGNIFICANT CHANGE UP (ref 80–100)
MCV RBC AUTO: 87.4 FL — SIGNIFICANT CHANGE UP (ref 80–100)
NRBC # BLD AUTO: 0 K/UL — SIGNIFICANT CHANGE UP (ref 0–0)
NRBC # BLD AUTO: 0 K/UL — SIGNIFICANT CHANGE UP (ref 0–0)
NRBC # FLD: 0 K/UL — SIGNIFICANT CHANGE UP (ref 0–0)
NRBC # FLD: 0 K/UL — SIGNIFICANT CHANGE UP (ref 0–0)
NRBC BLD AUTO-RTO: 0 /100 WBCS — SIGNIFICANT CHANGE UP (ref 0–0)
NRBC BLD AUTO-RTO: 0 /100 WBCS — SIGNIFICANT CHANGE UP (ref 0–0)
PHOSPHATE SERPL-MCNC: 3 MG/DL — SIGNIFICANT CHANGE UP (ref 2.5–4.5)
PLATELET # BLD AUTO: 153 K/UL — SIGNIFICANT CHANGE UP (ref 150–400)
PLATELET # BLD AUTO: 171 K/UL — SIGNIFICANT CHANGE UP (ref 150–400)
POTASSIUM SERPL-MCNC: 4.5 MMOL/L — SIGNIFICANT CHANGE UP (ref 3.5–5.3)
POTASSIUM SERPL-SCNC: 4.5 MMOL/L — SIGNIFICANT CHANGE UP (ref 3.5–5.3)
RBC # BLD: 3.33 M/UL — LOW (ref 4.2–5.8)
RBC # BLD: 3.5 M/UL — LOW (ref 4.2–5.8)
RBC # FLD: 13 % — SIGNIFICANT CHANGE UP (ref 10.3–14.5)
RBC # FLD: 13.6 % — SIGNIFICANT CHANGE UP (ref 10.3–14.5)
SODIUM SERPL-SCNC: 139 MMOL/L — SIGNIFICANT CHANGE UP (ref 135–145)
SPECIMEN SOURCE: SIGNIFICANT CHANGE UP
SPECIMEN SOURCE: SIGNIFICANT CHANGE UP
WBC # BLD: 5.21 K/UL — SIGNIFICANT CHANGE UP (ref 3.8–10.5)
WBC # BLD: 7.16 K/UL — SIGNIFICANT CHANGE UP (ref 3.8–10.5)
WBC # FLD AUTO: 5.21 K/UL — SIGNIFICANT CHANGE UP (ref 3.8–10.5)
WBC # FLD AUTO: 7.16 K/UL — SIGNIFICANT CHANGE UP (ref 3.8–10.5)

## 2025-05-29 PROCEDURE — 99233 SBSQ HOSP IP/OBS HIGH 50: CPT

## 2025-05-29 PROCEDURE — 93308 TTE F-UP OR LMTD: CPT | Mod: 26,GC

## 2025-05-29 PROCEDURE — 99233 SBSQ HOSP IP/OBS HIGH 50: CPT | Mod: GC

## 2025-05-29 RX ORDER — HEPARIN SODIUM 1000 [USP'U]/ML
5000 INJECTION INTRAVENOUS; SUBCUTANEOUS EVERY 12 HOURS
Refills: 0 | Status: DISCONTINUED | OUTPATIENT
Start: 2025-05-29 | End: 2025-05-29

## 2025-05-29 RX ORDER — MAGNESIUM SULFATE 500 MG/ML
2 SYRINGE (ML) INJECTION ONCE
Refills: 0 | Status: COMPLETED | OUTPATIENT
Start: 2025-05-29 | End: 2025-05-29

## 2025-05-29 RX ORDER — HEPARIN SODIUM 1000 [USP'U]/ML
INJECTION INTRAVENOUS; SUBCUTANEOUS
Qty: 25000 | Refills: 0 | Status: DISCONTINUED | OUTPATIENT
Start: 2025-05-29 | End: 2025-05-30

## 2025-05-29 RX ADMIN — CEFTRIAXONE 100 MILLIGRAM(S): 500 INJECTION, POWDER, FOR SOLUTION INTRAMUSCULAR; INTRAVENOUS at 04:34

## 2025-05-29 RX ADMIN — Medication 25 GRAM(S): at 12:39

## 2025-05-29 RX ADMIN — HEPARIN SODIUM 600 UNIT(S)/HR: 1000 INJECTION INTRAVENOUS; SUBCUTANEOUS at 08:12

## 2025-05-29 RX ADMIN — HEPARIN SODIUM 700 UNIT(S)/HR: 1000 INJECTION INTRAVENOUS; SUBCUTANEOUS at 19:23

## 2025-05-29 RX ADMIN — TAMSULOSIN HYDROCHLORIDE 0.4 MILLIGRAM(S): 0.4 CAPSULE ORAL at 22:17

## 2025-05-29 RX ADMIN — HEPARIN SODIUM 700 UNIT(S)/HR: 1000 INJECTION INTRAVENOUS; SUBCUTANEOUS at 16:53

## 2025-05-29 RX ADMIN — ATORVASTATIN CALCIUM 40 MILLIGRAM(S): 80 TABLET, FILM COATED ORAL at 22:17

## 2025-05-30 LAB
ALBUMIN SERPL ELPH-MCNC: 3.4 G/DL — SIGNIFICANT CHANGE UP (ref 3.3–5)
ALP SERPL-CCNC: 121 U/L — HIGH (ref 40–120)
ALT FLD-CCNC: 53 U/L — HIGH (ref 4–41)
ANION GAP SERPL CALC-SCNC: 11 MMOL/L — SIGNIFICANT CHANGE UP (ref 7–14)
APTT BLD: 69.7 SEC — HIGH (ref 26.1–36.8)
AST SERPL-CCNC: 37 U/L — SIGNIFICANT CHANGE UP (ref 4–40)
BASOPHILS # BLD AUTO: 0.02 K/UL — SIGNIFICANT CHANGE UP (ref 0–0.2)
BASOPHILS NFR BLD AUTO: 0.4 % — SIGNIFICANT CHANGE UP (ref 0–2)
BILIRUB SERPL-MCNC: 0.3 MG/DL — SIGNIFICANT CHANGE UP (ref 0.2–1.2)
BLD GP AB SCN SERPL QL: NEGATIVE — SIGNIFICANT CHANGE UP
BUN SERPL-MCNC: 40 MG/DL — HIGH (ref 7–23)
CALCIUM SERPL-MCNC: 9 MG/DL — SIGNIFICANT CHANGE UP (ref 8.4–10.5)
CHLORIDE SERPL-SCNC: 104 MMOL/L — SIGNIFICANT CHANGE UP (ref 98–107)
CO2 SERPL-SCNC: 23 MMOL/L — SIGNIFICANT CHANGE UP (ref 22–31)
CREAT SERPL-MCNC: 2.33 MG/DL — HIGH (ref 0.5–1.3)
EGFR: 27 ML/MIN/1.73M2 — LOW
EGFR: 27 ML/MIN/1.73M2 — LOW
EOSINOPHIL # BLD AUTO: 0.51 K/UL — HIGH (ref 0–0.5)
EOSINOPHIL NFR BLD AUTO: 9.5 % — HIGH (ref 0–6)
FLUAV AG NPH QL: SIGNIFICANT CHANGE UP
FLUBV AG NPH QL: SIGNIFICANT CHANGE UP
GLUCOSE SERPL-MCNC: 131 MG/DL — HIGH (ref 70–99)
HCT VFR BLD CALC: 29.4 % — LOW (ref 39–50)
HGB BLD-MCNC: 10.1 G/DL — LOW (ref 13–17)
IANC: 2.79 K/UL — SIGNIFICANT CHANGE UP (ref 1.8–7.4)
IMM GRANULOCYTES NFR BLD AUTO: 0.6 % — SIGNIFICANT CHANGE UP (ref 0–0.9)
LYMPHOCYTES # BLD AUTO: 1.45 K/UL — SIGNIFICANT CHANGE UP (ref 1–3.3)
LYMPHOCYTES # BLD AUTO: 27 % — SIGNIFICANT CHANGE UP (ref 13–44)
MAGNESIUM SERPL-MCNC: 2 MG/DL — SIGNIFICANT CHANGE UP (ref 1.6–2.6)
MCHC RBC-ENTMCNC: 29.4 PG — SIGNIFICANT CHANGE UP (ref 27–34)
MCHC RBC-ENTMCNC: 34.4 G/DL — SIGNIFICANT CHANGE UP (ref 32–36)
MCV RBC AUTO: 85.5 FL — SIGNIFICANT CHANGE UP (ref 80–100)
MONOCYTES # BLD AUTO: 0.57 K/UL — SIGNIFICANT CHANGE UP (ref 0–0.9)
MONOCYTES NFR BLD AUTO: 10.6 % — SIGNIFICANT CHANGE UP (ref 2–14)
NEUTROPHILS # BLD AUTO: 2.79 K/UL — SIGNIFICANT CHANGE UP (ref 1.8–7.4)
NEUTROPHILS NFR BLD AUTO: 51.9 % — SIGNIFICANT CHANGE UP (ref 43–77)
NRBC # BLD AUTO: 0 K/UL — SIGNIFICANT CHANGE UP (ref 0–0)
NRBC # FLD: 0 K/UL — SIGNIFICANT CHANGE UP (ref 0–0)
NRBC BLD AUTO-RTO: 0 /100 WBCS — SIGNIFICANT CHANGE UP (ref 0–0)
PHOSPHATE SERPL-MCNC: 4 MG/DL — SIGNIFICANT CHANGE UP (ref 2.5–4.5)
PLATELET # BLD AUTO: 175 K/UL — SIGNIFICANT CHANGE UP (ref 150–400)
POTASSIUM SERPL-MCNC: 4.4 MMOL/L — SIGNIFICANT CHANGE UP (ref 3.5–5.3)
POTASSIUM SERPL-SCNC: 4.4 MMOL/L — SIGNIFICANT CHANGE UP (ref 3.5–5.3)
PROT SERPL-MCNC: 6.7 G/DL — SIGNIFICANT CHANGE UP (ref 6–8.3)
RBC # BLD: 3.44 M/UL — LOW (ref 4.2–5.8)
RBC # FLD: 13.2 % — SIGNIFICANT CHANGE UP (ref 10.3–14.5)
RH IG SCN BLD-IMP: POSITIVE — SIGNIFICANT CHANGE UP
RSV RNA NPH QL NAA+NON-PROBE: SIGNIFICANT CHANGE UP
SARS-COV-2 RNA SPEC QL NAA+PROBE: SIGNIFICANT CHANGE UP
SODIUM SERPL-SCNC: 138 MMOL/L — SIGNIFICANT CHANGE UP (ref 135–145)
SOURCE RESPIRATORY: SIGNIFICANT CHANGE UP
WBC # BLD: 5.37 K/UL — SIGNIFICANT CHANGE UP (ref 3.8–10.5)
WBC # FLD AUTO: 5.37 K/UL — SIGNIFICANT CHANGE UP (ref 3.8–10.5)

## 2025-05-30 PROCEDURE — 99233 SBSQ HOSP IP/OBS HIGH 50: CPT | Mod: GC

## 2025-05-30 PROCEDURE — 99233 SBSQ HOSP IP/OBS HIGH 50: CPT

## 2025-05-30 RX ORDER — CLOPIDOGREL BISULFATE 75 MG/1
300 TABLET, FILM COATED ORAL ONCE
Refills: 0 | Status: COMPLETED | OUTPATIENT
Start: 2025-05-30 | End: 2025-05-30

## 2025-05-30 RX ORDER — ATORVASTATIN CALCIUM 80 MG/1
80 TABLET, FILM COATED ORAL AT BEDTIME
Refills: 0 | Status: DISCONTINUED | OUTPATIENT
Start: 2025-05-30 | End: 2025-05-31

## 2025-05-30 RX ORDER — CLOPIDOGREL BISULFATE 75 MG/1
75 TABLET, FILM COATED ORAL DAILY
Refills: 0 | Status: DISCONTINUED | OUTPATIENT
Start: 2025-05-31 | End: 2025-05-31

## 2025-05-30 RX ORDER — BENZONATATE 100 MG
100 CAPSULE ORAL THREE TIMES A DAY
Refills: 0 | Status: DISCONTINUED | OUTPATIENT
Start: 2025-05-30 | End: 2025-05-31

## 2025-05-30 RX ORDER — APIXABAN 2.5 MG/1
2.5 TABLET, FILM COATED ORAL
Refills: 0 | Status: DISCONTINUED | OUTPATIENT
Start: 2025-05-30 | End: 2025-05-31

## 2025-05-30 RX ORDER — METOPROLOL SUCCINATE 50 MG/1
25 TABLET, EXTENDED RELEASE ORAL DAILY
Refills: 0 | Status: DISCONTINUED | OUTPATIENT
Start: 2025-05-30 | End: 2025-05-31

## 2025-05-30 RX ADMIN — APIXABAN 2.5 MILLIGRAM(S): 2.5 TABLET, FILM COATED ORAL at 18:17

## 2025-05-30 RX ADMIN — METOPROLOL SUCCINATE 25 MILLIGRAM(S): 50 TABLET, EXTENDED RELEASE ORAL at 21:32

## 2025-05-30 RX ADMIN — CEFTRIAXONE 100 MILLIGRAM(S): 500 INJECTION, POWDER, FOR SOLUTION INTRAMUSCULAR; INTRAVENOUS at 04:39

## 2025-05-30 RX ADMIN — ATORVASTATIN CALCIUM 80 MILLIGRAM(S): 80 TABLET, FILM COATED ORAL at 21:31

## 2025-05-30 RX ADMIN — HEPARIN SODIUM 700 UNIT(S)/HR: 1000 INJECTION INTRAVENOUS; SUBCUTANEOUS at 09:19

## 2025-05-30 RX ADMIN — CLOPIDOGREL BISULFATE 300 MILLIGRAM(S): 75 TABLET, FILM COATED ORAL at 16:38

## 2025-05-30 RX ADMIN — HEPARIN SODIUM 700 UNIT(S)/HR: 1000 INJECTION INTRAVENOUS; SUBCUTANEOUS at 07:24

## 2025-05-30 RX ADMIN — HEPARIN SODIUM 700 UNIT(S)/HR: 1000 INJECTION INTRAVENOUS; SUBCUTANEOUS at 01:43

## 2025-05-30 RX ADMIN — TAMSULOSIN HYDROCHLORIDE 0.4 MILLIGRAM(S): 0.4 CAPSULE ORAL at 21:26

## 2025-05-31 ENCOUNTER — TRANSCRIPTION ENCOUNTER (OUTPATIENT)
Age: 85
End: 2025-05-31

## 2025-05-31 VITALS
RESPIRATION RATE: 18 BRPM | TEMPERATURE: 98 F | OXYGEN SATURATION: 100 % | HEART RATE: 66 BPM | SYSTOLIC BLOOD PRESSURE: 124 MMHG | DIASTOLIC BLOOD PRESSURE: 58 MMHG

## 2025-05-31 LAB
ANION GAP SERPL CALC-SCNC: 15 MMOL/L — HIGH (ref 7–14)
APTT BLD: 37.6 SEC — HIGH (ref 26.1–36.8)
BUN SERPL-MCNC: 43 MG/DL — HIGH (ref 7–23)
CALCIUM SERPL-MCNC: 8.9 MG/DL — SIGNIFICANT CHANGE UP (ref 8.4–10.5)
CHLORIDE SERPL-SCNC: 103 MMOL/L — SIGNIFICANT CHANGE UP (ref 98–107)
CO2 SERPL-SCNC: 20 MMOL/L — LOW (ref 22–31)
CREAT SERPL-MCNC: 2.28 MG/DL — HIGH (ref 0.5–1.3)
EGFR: 28 ML/MIN/1.73M2 — LOW
EGFR: 28 ML/MIN/1.73M2 — LOW
GLUCOSE SERPL-MCNC: 79 MG/DL — SIGNIFICANT CHANGE UP (ref 70–99)
HCT VFR BLD CALC: 30.5 % — LOW (ref 39–50)
HGB BLD-MCNC: 10.2 G/DL — LOW (ref 13–17)
INR BLD: 1.1 RATIO — SIGNIFICANT CHANGE UP (ref 0.85–1.16)
MAGNESIUM SERPL-MCNC: 1.7 MG/DL — SIGNIFICANT CHANGE UP (ref 1.6–2.6)
MCHC RBC-ENTMCNC: 29.5 PG — SIGNIFICANT CHANGE UP (ref 27–34)
MCHC RBC-ENTMCNC: 33.4 G/DL — SIGNIFICANT CHANGE UP (ref 32–36)
MCV RBC AUTO: 88.2 FL — SIGNIFICANT CHANGE UP (ref 80–100)
NRBC # BLD AUTO: 0 K/UL — SIGNIFICANT CHANGE UP (ref 0–0)
NRBC # FLD: 0 K/UL — SIGNIFICANT CHANGE UP (ref 0–0)
NRBC BLD AUTO-RTO: 0 /100 WBCS — SIGNIFICANT CHANGE UP (ref 0–0)
PHOSPHATE SERPL-MCNC: 3.8 MG/DL — SIGNIFICANT CHANGE UP (ref 2.5–4.5)
PLATELET # BLD AUTO: 171 K/UL — SIGNIFICANT CHANGE UP (ref 150–400)
POTASSIUM SERPL-MCNC: 4.4 MMOL/L — SIGNIFICANT CHANGE UP (ref 3.5–5.3)
POTASSIUM SERPL-SCNC: 4.4 MMOL/L — SIGNIFICANT CHANGE UP (ref 3.5–5.3)
PROTHROM AB SERPL-ACNC: 12.8 SEC — SIGNIFICANT CHANGE UP (ref 9.9–13.4)
RBC # BLD: 3.46 M/UL — LOW (ref 4.2–5.8)
RBC # FLD: 13.4 % — SIGNIFICANT CHANGE UP (ref 10.3–14.5)
SODIUM SERPL-SCNC: 138 MMOL/L — SIGNIFICANT CHANGE UP (ref 135–145)
WBC # BLD: 4.42 K/UL — SIGNIFICANT CHANGE UP (ref 3.8–10.5)
WBC # FLD AUTO: 4.42 K/UL — SIGNIFICANT CHANGE UP (ref 3.8–10.5)

## 2025-05-31 PROCEDURE — 99239 HOSP IP/OBS DSCHRG MGMT >30: CPT

## 2025-05-31 RX ORDER — NIFEDIPINE 30 MG
1 TABLET, EXTENDED RELEASE 24 HR ORAL
Refills: 0 | DISCHARGE

## 2025-05-31 RX ORDER — MAGNESIUM SULFATE 500 MG/ML
2 SYRINGE (ML) INJECTION ONCE
Refills: 0 | Status: COMPLETED | OUTPATIENT
Start: 2025-05-31 | End: 2025-05-31

## 2025-05-31 RX ORDER — ATORVASTATIN CALCIUM 80 MG/1
1 TABLET, FILM COATED ORAL
Qty: 0 | Refills: 0 | DISCHARGE
Start: 2025-05-31

## 2025-05-31 RX ORDER — FERROUS SULFATE 137(45) MG
1 TABLET, EXTENDED RELEASE ORAL
Qty: 0 | Refills: 0 | DISCHARGE
Start: 2025-05-31

## 2025-05-31 RX ORDER — APIXABAN 2.5 MG/1
1 TABLET, FILM COATED ORAL
Qty: 0 | Refills: 0 | DISCHARGE
Start: 2025-05-31

## 2025-05-31 RX ORDER — LISINOPRIL 5 MG/1
1 TABLET ORAL
Refills: 0 | DISCHARGE

## 2025-05-31 RX ORDER — CLOPIDOGREL BISULFATE 75 MG/1
1 TABLET, FILM COATED ORAL
Qty: 0 | Refills: 0 | DISCHARGE
Start: 2025-05-31

## 2025-05-31 RX ORDER — DAPAGLIFLOZIN 5 MG/1
1 TABLET, FILM COATED ORAL
Refills: 0 | DISCHARGE

## 2025-05-31 RX ADMIN — CLOPIDOGREL BISULFATE 75 MILLIGRAM(S): 75 TABLET, FILM COATED ORAL at 09:09

## 2025-05-31 RX ADMIN — Medication 25 GRAM(S): at 09:09

## 2025-05-31 RX ADMIN — APIXABAN 2.5 MILLIGRAM(S): 2.5 TABLET, FILM COATED ORAL at 16:30

## 2025-05-31 RX ADMIN — APIXABAN 2.5 MILLIGRAM(S): 2.5 TABLET, FILM COATED ORAL at 05:19

## 2025-05-31 RX ADMIN — METOPROLOL SUCCINATE 25 MILLIGRAM(S): 50 TABLET, EXTENDED RELEASE ORAL at 05:19

## 2025-06-01 LAB
CULTURE RESULTS: SIGNIFICANT CHANGE UP
CULTURE RESULTS: SIGNIFICANT CHANGE UP
SPECIMEN SOURCE: SIGNIFICANT CHANGE UP
SPECIMEN SOURCE: SIGNIFICANT CHANGE UP

## 2025-06-18 ENCOUNTER — INPATIENT (INPATIENT)
Facility: HOSPITAL | Age: 85
LOS: 18 days | Discharge: HOME HEALTH | End: 2025-07-07
Attending: INTERNAL MEDICINE | Admitting: INTERNAL MEDICINE
Payer: MEDICARE

## 2025-06-18 VITALS
HEIGHT: 65 IN | OXYGEN SATURATION: 98 % | RESPIRATION RATE: 18 BRPM | SYSTOLIC BLOOD PRESSURE: 117 MMHG | WEIGHT: 135.14 LBS | HEART RATE: 69 BPM | DIASTOLIC BLOOD PRESSURE: 72 MMHG

## 2025-06-18 DIAGNOSIS — Z95.818 PRESENCE OF OTHER CARDIAC IMPLANTS AND GRAFTS: Chronic | ICD-10-CM

## 2025-06-18 DIAGNOSIS — Z98.890 OTHER SPECIFIED POSTPROCEDURAL STATES: Chronic | ICD-10-CM

## 2025-06-18 LAB
ALBUMIN SERPL ELPH-MCNC: 3 G/DL — LOW (ref 3.3–5)
ALP SERPL-CCNC: 135 U/L — HIGH (ref 40–120)
ALT FLD-CCNC: 69 U/L — SIGNIFICANT CHANGE UP (ref 12–78)
ANION GAP SERPL CALC-SCNC: 6 MMOL/L — SIGNIFICANT CHANGE UP (ref 5–17)
APTT BLD: 43.9 SEC — HIGH (ref 26.1–36.8)
AST SERPL-CCNC: 55 U/L — HIGH (ref 15–37)
BILIRUB SERPL-MCNC: 0.7 MG/DL — SIGNIFICANT CHANGE UP (ref 0.2–1.2)
BUN SERPL-MCNC: 48 MG/DL — HIGH (ref 7–23)
CALCIUM SERPL-MCNC: 9.4 MG/DL — SIGNIFICANT CHANGE UP (ref 8.5–10.1)
CHLORIDE SERPL-SCNC: 111 MMOL/L — HIGH (ref 96–108)
CO2 SERPL-SCNC: 20 MMOL/L — LOW (ref 22–31)
CREAT SERPL-MCNC: 2.06 MG/DL — HIGH (ref 0.5–1.3)
EGFR: 31 ML/MIN/1.73M2 — LOW
EGFR: 31 ML/MIN/1.73M2 — LOW
FLUAV AG NPH QL: SIGNIFICANT CHANGE UP
FLUBV AG NPH QL: SIGNIFICANT CHANGE UP
GLUCOSE SERPL-MCNC: 95 MG/DL — SIGNIFICANT CHANGE UP (ref 70–99)
HCT VFR BLD CALC: 28.4 % — LOW (ref 39–50)
HGB BLD-MCNC: 9.6 G/DL — LOW (ref 13–17)
INR BLD: 1.53 RATIO — HIGH (ref 0.85–1.16)
LACTATE SERPL-SCNC: 1.1 MMOL/L — SIGNIFICANT CHANGE UP (ref 0.7–2)
MCHC RBC-ENTMCNC: 30 PG — SIGNIFICANT CHANGE UP (ref 27–34)
MCHC RBC-ENTMCNC: 33.8 G/DL — SIGNIFICANT CHANGE UP (ref 32–36)
MCV RBC AUTO: 88.8 FL — SIGNIFICANT CHANGE UP (ref 80–100)
PLATELET # BLD AUTO: 154 K/UL — SIGNIFICANT CHANGE UP (ref 150–400)
POTASSIUM SERPL-MCNC: 5.4 MMOL/L — HIGH (ref 3.5–5.3)
POTASSIUM SERPL-SCNC: 5.4 MMOL/L — HIGH (ref 3.5–5.3)
PROT SERPL-MCNC: 7.5 GM/DL — SIGNIFICANT CHANGE UP (ref 6–8.3)
PROTHROM AB SERPL-ACNC: 17.2 SEC — HIGH (ref 9.9–13.4)
RBC # BLD: 3.2 M/UL — LOW (ref 4.2–5.8)
RBC # FLD: 15.2 % — HIGH (ref 10.3–14.5)
RSV RNA NPH QL NAA+NON-PROBE: SIGNIFICANT CHANGE UP
SARS-COV-2 RNA SPEC QL NAA+PROBE: SIGNIFICANT CHANGE UP
SODIUM SERPL-SCNC: 137 MMOL/L — SIGNIFICANT CHANGE UP (ref 135–145)
SOURCE RESPIRATORY: SIGNIFICANT CHANGE UP
TROPONIN I, HIGH SENSITIVITY RESULT: 488.7 NG/L — HIGH

## 2025-06-18 PROCEDURE — 70450 CT HEAD/BRAIN W/O DYE: CPT | Mod: 26

## 2025-06-18 PROCEDURE — 99291 CRITICAL CARE FIRST HOUR: CPT

## 2025-06-18 PROCEDURE — 93010 ELECTROCARDIOGRAM REPORT: CPT

## 2025-06-18 PROCEDURE — 71250 CT THORAX DX C-: CPT | Mod: 26

## 2025-06-18 PROCEDURE — 71045 X-RAY EXAM CHEST 1 VIEW: CPT | Mod: 26

## 2025-06-18 RX ORDER — VANCOMYCIN HCL IN 5 % DEXTROSE 1.5G/250ML
1000 PLASTIC BAG, INJECTION (ML) INTRAVENOUS ONCE
Refills: 0 | Status: COMPLETED | OUTPATIENT
Start: 2025-06-18 | End: 2025-06-18

## 2025-06-18 RX ORDER — PIPERACILLIN-TAZO-DEXTROSE,ISO 3.375G/5
3.38 IV SOLUTION, PIGGYBACK PREMIX FROZEN(ML) INTRAVENOUS ONCE
Refills: 0 | Status: COMPLETED | OUTPATIENT
Start: 2025-06-18 | End: 2025-06-18

## 2025-06-18 RX ORDER — PIPERACILLIN-TAZO-DEXTROSE,ISO 3.375G/5
3.38 IV SOLUTION, PIGGYBACK PREMIX FROZEN(ML) INTRAVENOUS ONCE
Refills: 0 | Status: DISCONTINUED | OUTPATIENT
Start: 2025-06-19 | End: 2025-06-19

## 2025-06-18 RX ADMIN — Medication 1900 MILLILITER(S): at 22:55

## 2025-06-18 RX ADMIN — Medication 250 MILLIGRAM(S): at 23:35

## 2025-06-18 RX ADMIN — Medication 3.38 GRAM(S): at 23:00

## 2025-06-18 RX ADMIN — Medication 200 GRAM(S): at 22:55

## 2025-06-18 NOTE — ED PROVIDER NOTE - OBJECTIVE STATEMENT
86 yo M with L sided chest pain, pt. complained about 4 hours ago at [facility] rehab.  Pt. also had R sided chest pain, "when you press on it [chest]."  No known trauma.  Pt. is hypothermic in ER    ROS: negative for fever, cough, headache, shortness of breath, abd pain, nausea, vomiting, diarrhea, rash, paresthesia, and focal weakness--all other systems reviewed are negative.   PMH: paroxysmal A-fib, diabetes type 2, CKD3, hyperlipidemia, bradycardia status post cardiac pacemaker, chronic HFrEF with severely reduced EF, ischemic cardiomyopathy; Meds: See EMR for list; SH: Denies smoking/drinking/drug use

## 2025-06-18 NOTE — ED PROVIDER NOTE - CLINICAL SUMMARY MEDICAL DECISION MAKING FREE TEXT BOX
86 yo M  with cod sepsis, hypothermia, atypical chest pain, also concerning for ACS  -cbc, cmp, flu/covid, blood cx, lactate, trop, ua/rflx, CT chest, ekg, iv, vanc/zosyn coverage for bacteremia, hydration bolus,  ,monitor, npo  -f/u results, reeval

## 2025-06-18 NOTE — ED ADULT NURSE NOTE - OBJECTIVE STATEMENT
Pt AOx1, brought in by [facility] staff. Per [facility] RN Jayy, pt c/o pain to L chest area, pt only able to point to L chest area to indicate pain. PMH CVA, HTN, BPH, DM. Pt placed on cardiac and spo2 monitor. EKG completed and given to ED provider.

## 2025-06-18 NOTE — ED PROVIDER NOTE - CARE PLAN
1 Principal Discharge DX:	Other specified sepsis  Secondary Diagnosis:	Atypical chest pain   Principal Discharge DX:	Other specified sepsis  Secondary Diagnosis:	Atypical chest pain  Secondary Diagnosis:	Multifocal pneumonia  Secondary Diagnosis:	Elevated troponin

## 2025-06-18 NOTE — ED ADULT NURSE REASSESSMENT NOTE - NS ED NURSE REASSESS COMMENT FT1
Pt currently resting in stretcher w/ cardiac monitor and SPO2 monitor on. Pt placed on edison hugger. Dr. Jansen informed of rectal temp and VS. Pt remains pending assessment by ED physician at this time. BUSTER Howe informed.

## 2025-06-18 NOTE — ED ADULT NURSE NOTE - CHIEF COMPLAINT QUOTE
pt from [facility], as per RN Jayy, c/o left sided chest pain @1845.  Pt endorses pain only when "you press on it" pointing to area right below the nipple area. Pt denies any fall or injury, able to move arms. EKG was done @[facility] showing  Ventricular paced rhythms w/ PVC.    hx of CVA, HTN, BPH, DM. nkda

## 2025-06-18 NOTE — ED ADULT NURSE NOTE - ED STAT RN HANDOFF DETAILS
report given ED Hold KHARI Dalal. Report endorsed to oncoming RN. Safety checks compld this shift/Safety rounds completed hourly.  IV site checked Q2+remains WDL. Meds given as ord with no s/s of adverse RXNs. Fall +skin precs in place. Any issues endorsed to oncoming RN for follow up.

## 2025-06-18 NOTE — ED PROVIDER NOTE - WR ORDER ID 1
"Chief Complaint   Patient presents with    Postpartum Care     Patient here for B/P check        SUBJECTIVE:   Jessica Chin is a 28 y.o.  who presents s/p  Spontaneous Vaginal Delivery on 12/15/23 following induction of labor for preeclampsia without severe features. Her pregnancy was also complicated by GBS positive status. She was started on labetalol 100 mg BID following delivery for management of blood pressures. She reports doing well today. Denies headaches, vision changes, chest pain, shortness of breath, RUQ pain. Bowel and bladder function have returned to normal. She reports vaginal bleeding is lessening. Denies mood changes. She is breast feeding.     Past Medical History:   Diagnosis Date    Abnormal Pap smear of cervix     Preeclampsia      Past Surgical History:   Procedure Laterality Date    TONSILLECTOMY      WISDOM TOOTH EXTRACTION       Social History     Tobacco Use    Smoking status: Never    Smokeless tobacco: Never   Vaping Use    Vaping Use: Some days    Substances: Nicotine   Substance Use Topics    Alcohol use: No    Drug use: Not Currently     Types: Marijuana     Family History   Problem Relation Age of Onset    Polycystic ovary syndrome Sister     Diabetes Paternal Grandmother     Diabetes Paternal Grandfather        Patient Active Problem List   Diagnosis    Mild pre-eclampsia in third trimester     (spontaneous vaginal delivery)        OBJECTIVE:   /81   Ht 165.1 cm (65\")   Wt 74.8 kg (165 lb)   LMP 2023   Breastfeeding Yes   BMI 27.46 kg/m²    Physical Examination:   General appearance - alert, well appearing, and in no distress  Chest - no tachypnea, retractions or cyanosis  Abdomen - soft, nontender, nondistended, no masses or organomegaly   Pelvic - deferred  Neurological - screening mental status exam normal, DTRs 2+, no clonus   Musculoskeletal - no joint tenderness, deformity or swelling, no lower extremity edema   Psychiatric - normal mood and " affect    Lab Results   Component Value Date    WBC 14.86 (H) 2023    HGB 11.1 (L) 2023    HCT 31.7 (L) 2023    MCV 91.1 2023     2023        ASSESSMENT:   S/p   Preeclampsia without severe features   BP check     PLAN:   Doing well postpartum and blood pressure today remains slightly elevated. Recommend she continue on Labetalol 100 mg BID at this time and hopefully we can discontinue this next week. I do not suspect that she will be on this long term. Reviewed preeclampsia precautions and to call with concerns. Also reviewed hypotension precautions and to hold dose of labetalol if BP becomes to low or she develops concerning signs/symptoms.   Baby is doing well.   Contraception - TBD  At this time, continue pelvic rest and lifting precautions.  Reviewed last pap smear - 23- NILM  Return in about 1 week (around 2024) for BP check .    Kimberly Lion MD           [id number]

## 2025-06-18 NOTE — ED PROVIDER NOTE - PHYSICAL EXAMINATION
Vitals: hypothermic at 92.7  Gen: responsive to questions, NAD, laying comfortably in stretcher, slow to respond,   Head: ncat, perrla, eomi b/l  Neck: supple, no lymphadenopathy, no midline deviation  Heart: rrr, no m/r/g  Lungs: CTA b/l, no rales/ronchi/wheezes  Abd: soft, nontender, non-distended, no rebound or guarding  Ext: no clubbing/cyanosis/edema  Neuro: sensation and muscle strength intact b/l, no focal weakness or sensory loss

## 2025-06-18 NOTE — ED PROVIDER NOTE - CRITICAL CARE ATTENDING CONTRIBUTION TO CARE
Pt. was critically ill and suffered potentially life threatening medical problems.  I personally consulted other physicians,  performed emergent diagnostic procedures and lab work, performed procedures at bedside.

## 2025-06-18 NOTE — ED ADULT TRIAGE NOTE - CHIEF COMPLAINT QUOTE
pt from [facility], as per RN Jayy, c/o left sided chest pain @1845.  Pt endorses pain only when "you press on it" pointing to area right below the nipple area. Pt denies any fall or injury, able to move arms. EKG was done @[facility] showing  Ventricular paced rhythms w/ PVC.    hx of CVA, HTN, BPH, DM. nkda

## 2025-06-19 DIAGNOSIS — E11.9 TYPE 2 DIABETES MELLITUS WITHOUT COMPLICATIONS: ICD-10-CM

## 2025-06-19 DIAGNOSIS — A41.9 SEPSIS, UNSPECIFIED ORGANISM: ICD-10-CM

## 2025-06-19 DIAGNOSIS — R79.89 OTHER SPECIFIED ABNORMAL FINDINGS OF BLOOD CHEMISTRY: ICD-10-CM

## 2025-06-19 DIAGNOSIS — E87.5 HYPERKALEMIA: ICD-10-CM

## 2025-06-19 DIAGNOSIS — Z29.9 ENCOUNTER FOR PROPHYLACTIC MEASURES, UNSPECIFIED: ICD-10-CM

## 2025-06-19 DIAGNOSIS — N40.0 BENIGN PROSTATIC HYPERPLASIA WITHOUT LOWER URINARY TRACT SYMPTOMS: ICD-10-CM

## 2025-06-19 DIAGNOSIS — J18.9 PNEUMONIA, UNSPECIFIED ORGANISM: ICD-10-CM

## 2025-06-19 DIAGNOSIS — I48.0 PAROXYSMAL ATRIAL FIBRILLATION: ICD-10-CM

## 2025-06-19 DIAGNOSIS — N18.4 CHRONIC KIDNEY DISEASE, STAGE 4 (SEVERE): ICD-10-CM

## 2025-06-19 DIAGNOSIS — R07.9 CHEST PAIN, UNSPECIFIED: ICD-10-CM

## 2025-06-19 LAB
ALBUMIN SERPL ELPH-MCNC: 2.8 G/DL — LOW (ref 3.3–5)
ALP SERPL-CCNC: 137 U/L — HIGH (ref 40–120)
ALT FLD-CCNC: 60 U/L — SIGNIFICANT CHANGE UP (ref 12–78)
AMMONIA BLD-MCNC: 34 UMOL/L — HIGH (ref 11–32)
ANION GAP SERPL CALC-SCNC: 8 MMOL/L — SIGNIFICANT CHANGE UP (ref 5–17)
ANISOCYTOSIS BLD QL: SLIGHT — SIGNIFICANT CHANGE UP
APPEARANCE UR: CLEAR — SIGNIFICANT CHANGE UP
AST SERPL-CCNC: 29 U/L — SIGNIFICANT CHANGE UP (ref 15–37)
BASOPHILS # BLD AUTO: 0 K/UL — SIGNIFICANT CHANGE UP (ref 0–0.2)
BASOPHILS # BLD AUTO: 0.03 K/UL — SIGNIFICANT CHANGE UP (ref 0–0.2)
BASOPHILS NFR BLD AUTO: 0 % — SIGNIFICANT CHANGE UP (ref 0–2)
BASOPHILS NFR BLD AUTO: 0.3 % — SIGNIFICANT CHANGE UP (ref 0–2)
BILIRUB SERPL-MCNC: 0.8 MG/DL — SIGNIFICANT CHANGE UP (ref 0.2–1.2)
BILIRUB UR-MCNC: NEGATIVE — SIGNIFICANT CHANGE UP
BLD GP AB SCN SERPL QL: SIGNIFICANT CHANGE UP
BUN SERPL-MCNC: 47 MG/DL — HIGH (ref 7–23)
BURR CELLS BLD QL SMEAR: PRESENT — SIGNIFICANT CHANGE UP
CALCIUM SERPL-MCNC: 8.8 MG/DL — SIGNIFICANT CHANGE UP (ref 8.5–10.1)
CHLORIDE SERPL-SCNC: 113 MMOL/L — HIGH (ref 96–108)
CO2 SERPL-SCNC: 18 MMOL/L — LOW (ref 22–31)
COLOR SPEC: YELLOW — SIGNIFICANT CHANGE UP
CREAT SERPL-MCNC: 2.16 MG/DL — HIGH (ref 0.5–1.3)
DIFF PNL FLD: NEGATIVE — SIGNIFICANT CHANGE UP
EGFR: 30 ML/MIN/1.73M2 — LOW
EGFR: 30 ML/MIN/1.73M2 — LOW
ELLIPTOCYTES BLD QL SMEAR: SLIGHT — SIGNIFICANT CHANGE UP
EOSINOPHIL # BLD AUTO: 0.08 K/UL — SIGNIFICANT CHANGE UP (ref 0–0.5)
EOSINOPHIL # BLD AUTO: 0.4 K/UL — SIGNIFICANT CHANGE UP (ref 0–0.5)
EOSINOPHIL NFR BLD AUTO: 1 % — SIGNIFICANT CHANGE UP (ref 0–6)
EOSINOPHIL NFR BLD AUTO: 4.6 % — SIGNIFICANT CHANGE UP (ref 0–6)
GLUCOSE BLDC GLUCOMTR-MCNC: 107 MG/DL — HIGH (ref 70–99)
GLUCOSE BLDC GLUCOMTR-MCNC: 113 MG/DL — HIGH (ref 70–99)
GLUCOSE BLDC GLUCOMTR-MCNC: 146 MG/DL — HIGH (ref 70–99)
GLUCOSE BLDC GLUCOMTR-MCNC: 68 MG/DL — LOW (ref 70–99)
GLUCOSE BLDC GLUCOMTR-MCNC: 75 MG/DL — SIGNIFICANT CHANGE UP (ref 70–99)
GLUCOSE BLDC GLUCOMTR-MCNC: 82 MG/DL — SIGNIFICANT CHANGE UP (ref 70–99)
GLUCOSE SERPL-MCNC: 74 MG/DL — SIGNIFICANT CHANGE UP (ref 70–99)
GLUCOSE UR QL: NEGATIVE MG/DL — SIGNIFICANT CHANGE UP
HCT VFR BLD CALC: 26.3 % — LOW (ref 39–50)
HGB BLD-MCNC: 8.9 G/DL — LOW (ref 13–17)
IMM GRANULOCYTES NFR BLD AUTO: 0.8 % — SIGNIFICANT CHANGE UP (ref 0–0.9)
KETONES UR QL: NEGATIVE MG/DL — SIGNIFICANT CHANGE UP
LEGIONELLA AG UR QL: NEGATIVE — SIGNIFICANT CHANGE UP
LEUKOCYTE ESTERASE UR-ACNC: NEGATIVE — SIGNIFICANT CHANGE UP
LG PLATELETS BLD QL AUTO: SLIGHT — SIGNIFICANT CHANGE UP
LYMPHOCYTES # BLD AUTO: 0.53 K/UL — LOW (ref 1–3.3)
LYMPHOCYTES # BLD AUTO: 0.6 K/UL — LOW (ref 1–3.3)
LYMPHOCYTES # BLD AUTO: 6.9 % — LOW (ref 13–44)
LYMPHOCYTES # BLD AUTO: 7 % — LOW (ref 13–44)
M PNEUMO IGM SER-ACNC: 0.09 INDEX — SIGNIFICANT CHANGE UP (ref 0–0.9)
MACROCYTES BLD QL: SLIGHT — SIGNIFICANT CHANGE UP
MANUAL SMEAR VERIFICATION: SIGNIFICANT CHANGE UP
MCHC RBC-ENTMCNC: 29.6 PG — SIGNIFICANT CHANGE UP (ref 27–34)
MCHC RBC-ENTMCNC: 33.8 G/DL — SIGNIFICANT CHANGE UP (ref 32–36)
MCV RBC AUTO: 87.4 FL — SIGNIFICANT CHANGE UP (ref 80–100)
MONOCYTES # BLD AUTO: 0.61 K/UL — SIGNIFICANT CHANGE UP (ref 0–0.9)
MONOCYTES # BLD AUTO: 0.67 K/UL — SIGNIFICANT CHANGE UP (ref 0–0.9)
MONOCYTES NFR BLD AUTO: 7.7 % — SIGNIFICANT CHANGE UP (ref 2–14)
MONOCYTES NFR BLD AUTO: 8 % — SIGNIFICANT CHANGE UP (ref 2–14)
MRSA PCR RESULT.: DETECTED
MYCOPLASMA AG SPEC QL: NEGATIVE — SIGNIFICANT CHANGE UP
NEUTROPHILS # BLD AUTO: 6.39 K/UL — SIGNIFICANT CHANGE UP (ref 1.8–7.4)
NEUTROPHILS # BLD AUTO: 6.91 K/UL — SIGNIFICANT CHANGE UP (ref 1.8–7.4)
NEUTROPHILS NFR BLD AUTO: 79.7 % — HIGH (ref 43–77)
NEUTROPHILS NFR BLD AUTO: 84 % — HIGH (ref 43–77)
NITRITE UR-MCNC: NEGATIVE — SIGNIFICANT CHANGE UP
NRBC # BLD: 0 /100 WBCS — SIGNIFICANT CHANGE UP (ref 0–0)
NRBC BLD AUTO-RTO: 0 /100 WBCS — SIGNIFICANT CHANGE UP (ref 0–0)
NRBC BLD AUTO-RTO: SIGNIFICANT CHANGE UP /100 WBCS (ref 0–0)
NRBC BLD-RTO: 0 /100 WBCS — SIGNIFICANT CHANGE UP (ref 0–0)
NT-PROBNP SERPL-SCNC: 7166 PG/ML — HIGH (ref 0–450)
PH UR: 5.5 — SIGNIFICANT CHANGE UP (ref 5–8)
PLAT MORPH BLD: NORMAL — SIGNIFICANT CHANGE UP
PLATELET # BLD AUTO: 141 K/UL — LOW (ref 150–400)
POIKILOCYTOSIS BLD QL AUTO: SLIGHT — SIGNIFICANT CHANGE UP
POTASSIUM SERPL-MCNC: 4.8 MMOL/L — SIGNIFICANT CHANGE UP (ref 3.5–5.3)
POTASSIUM SERPL-SCNC: 4.8 MMOL/L — SIGNIFICANT CHANGE UP (ref 3.5–5.3)
PROT SERPL-MCNC: 7 GM/DL — SIGNIFICANT CHANGE UP (ref 6–8.3)
PROT UR-MCNC: 100 MG/DL
RBC # BLD: 3.01 M/UL — LOW (ref 4.2–5.8)
RBC # FLD: 14.8 % — HIGH (ref 10.3–14.5)
RBC BLD AUTO: NORMAL — SIGNIFICANT CHANGE UP
RBC CASTS # UR COMP ASSIST: 1 /HPF — SIGNIFICANT CHANGE UP (ref 0–4)
S AUREUS DNA NOSE QL NAA+PROBE: DETECTED
S PNEUM AG UR QL: NEGATIVE — SIGNIFICANT CHANGE UP
SODIUM SERPL-SCNC: 139 MMOL/L — SIGNIFICANT CHANGE UP (ref 135–145)
SP GR SPEC: 1.01 — SIGNIFICANT CHANGE UP (ref 1–1.03)
TARGETS BLD QL SMEAR: SLIGHT — SIGNIFICANT CHANGE UP
TROPONIN I, HIGH SENSITIVITY RESULT: 521.7 NG/L — HIGH
TSH SERPL-MCNC: 3.11 UU/ML — SIGNIFICANT CHANGE UP (ref 0.36–3.74)
UROBILINOGEN FLD QL: 0.2 MG/DL — SIGNIFICANT CHANGE UP (ref 0.2–1)
VANCOMYCIN FLD-MCNC: 12.9 UG/ML — SIGNIFICANT CHANGE UP
WBC # BLD: 7.61 K/UL — SIGNIFICANT CHANGE UP (ref 3.8–10.5)
WBC # BLD: 8.68 K/UL — SIGNIFICANT CHANGE UP (ref 3.8–10.5)
WBC # FLD AUTO: 7.61 K/UL — SIGNIFICANT CHANGE UP (ref 3.8–10.5)
WBC # FLD AUTO: 8.68 K/UL — SIGNIFICANT CHANGE UP (ref 3.8–10.5)
WBC UR QL: 0 /HPF — SIGNIFICANT CHANGE UP (ref 0–5)

## 2025-06-19 PROCEDURE — 93010 ELECTROCARDIOGRAM REPORT: CPT

## 2025-06-19 PROCEDURE — 99222 1ST HOSP IP/OBS MODERATE 55: CPT

## 2025-06-19 PROCEDURE — 99024 POSTOP FOLLOW-UP VISIT: CPT

## 2025-06-19 PROCEDURE — 99232 SBSQ HOSP IP/OBS MODERATE 35: CPT

## 2025-06-19 RX ORDER — INSULIN LISPRO 100 U/ML
INJECTION, SOLUTION INTRAVENOUS; SUBCUTANEOUS
Refills: 0 | Status: DISCONTINUED | OUTPATIENT
Start: 2025-06-19 | End: 2025-07-07

## 2025-06-19 RX ORDER — DEXTROSE 50 % IN WATER 50 %
15 SYRINGE (ML) INTRAVENOUS ONCE
Refills: 0 | Status: COMPLETED | OUTPATIENT
Start: 2025-06-19 | End: 2025-06-19

## 2025-06-19 RX ORDER — TAMSULOSIN HYDROCHLORIDE 0.4 MG/1
0.4 CAPSULE ORAL AT BEDTIME
Refills: 0 | Status: DISCONTINUED | OUTPATIENT
Start: 2025-06-19 | End: 2025-07-07

## 2025-06-19 RX ORDER — DEXTROSE 50 % IN WATER 50 %
25 SYRINGE (ML) INTRAVENOUS ONCE
Refills: 0 | Status: DISCONTINUED | OUTPATIENT
Start: 2025-06-19 | End: 2025-06-24

## 2025-06-19 RX ORDER — MELATONIN 5 MG
3 TABLET ORAL AT BEDTIME
Refills: 0 | Status: DISCONTINUED | OUTPATIENT
Start: 2025-06-19 | End: 2025-07-07

## 2025-06-19 RX ORDER — ATORVASTATIN CALCIUM 80 MG/1
80 TABLET, FILM COATED ORAL AT BEDTIME
Refills: 0 | Status: DISCONTINUED | OUTPATIENT
Start: 2025-06-19 | End: 2025-07-07

## 2025-06-19 RX ORDER — IPRATROPIUM BROMIDE AND ALBUTEROL SULFATE .5; 2.5 MG/3ML; MG/3ML
3 SOLUTION RESPIRATORY (INHALATION) EVERY 6 HOURS
Refills: 0 | Status: DISCONTINUED | OUTPATIENT
Start: 2025-06-19 | End: 2025-06-27

## 2025-06-19 RX ORDER — DEXTROSE 50 % IN WATER 50 %
15 SYRINGE (ML) INTRAVENOUS ONCE
Refills: 0 | Status: DISCONTINUED | OUTPATIENT
Start: 2025-06-19 | End: 2025-06-24

## 2025-06-19 RX ORDER — VANCOMYCIN HCL IN 5 % DEXTROSE 1.5G/250ML
750 PLASTIC BAG, INJECTION (ML) INTRAVENOUS ONCE
Refills: 0 | Status: COMPLETED | OUTPATIENT
Start: 2025-06-19 | End: 2025-06-19

## 2025-06-19 RX ORDER — SODIUM CHLORIDE 9 G/1000ML
1000 INJECTION, SOLUTION INTRAVENOUS
Refills: 0 | Status: DISCONTINUED | OUTPATIENT
Start: 2025-06-19 | End: 2025-06-20

## 2025-06-19 RX ORDER — CALCITRIOL 0.5 UG/1
0.25 CAPSULE, GELATIN COATED ORAL DAILY
Refills: 0 | Status: DISCONTINUED | OUTPATIENT
Start: 2025-06-19 | End: 2025-07-07

## 2025-06-19 RX ORDER — FERROUS SULFATE 137(45) MG
325 TABLET, EXTENDED RELEASE ORAL DAILY
Refills: 0 | Status: DISCONTINUED | OUTPATIENT
Start: 2025-06-19 | End: 2025-07-07

## 2025-06-19 RX ORDER — CLOPIDOGREL BISULFATE 75 MG/1
75 TABLET, FILM COATED ORAL DAILY
Refills: 0 | Status: DISCONTINUED | OUTPATIENT
Start: 2025-06-19 | End: 2025-07-07

## 2025-06-19 RX ORDER — SODIUM ZIRCONIUM CYCLOSILICATE 5 G/5G
10 POWDER, FOR SUSPENSION ORAL ONCE
Refills: 0 | Status: COMPLETED | OUTPATIENT
Start: 2025-06-19 | End: 2025-06-19

## 2025-06-19 RX ORDER — PIPERACILLIN-TAZO-DEXTROSE,ISO 3.375G/5
3.38 IV SOLUTION, PIGGYBACK PREMIX FROZEN(ML) INTRAVENOUS EVERY 8 HOURS
Refills: 0 | Status: DISCONTINUED | OUTPATIENT
Start: 2025-06-19 | End: 2025-06-24

## 2025-06-19 RX ORDER — VANCOMYCIN HCL IN 5 % DEXTROSE 1.5G/250ML
1000 PLASTIC BAG, INJECTION (ML) INTRAVENOUS ONCE
Refills: 0 | Status: DISCONTINUED | OUTPATIENT
Start: 2025-06-19 | End: 2025-06-19

## 2025-06-19 RX ORDER — SODIUM CHLORIDE 9 G/1000ML
1000 INJECTION, SOLUTION INTRAVENOUS
Refills: 0 | Status: DISCONTINUED | OUTPATIENT
Start: 2025-06-19 | End: 2025-06-24

## 2025-06-19 RX ORDER — APIXABAN 5 MG/1
2.5 TABLET, FILM COATED ORAL EVERY 12 HOURS
Refills: 0 | Status: DISCONTINUED | OUTPATIENT
Start: 2025-06-19 | End: 2025-07-07

## 2025-06-19 RX ORDER — DEXTROSE 50 % IN WATER 50 %
12.5 SYRINGE (ML) INTRAVENOUS ONCE
Refills: 0 | Status: DISCONTINUED | OUTPATIENT
Start: 2025-06-19 | End: 2025-06-24

## 2025-06-19 RX ORDER — METOPROLOL SUCCINATE 50 MG/1
25 TABLET, EXTENDED RELEASE ORAL DAILY
Refills: 0 | Status: DISCONTINUED | OUTPATIENT
Start: 2025-06-19 | End: 2025-06-23

## 2025-06-19 RX ORDER — VANCOMYCIN HCL IN 5 % DEXTROSE 1.5G/250ML
1000 PLASTIC BAG, INJECTION (ML) INTRAVENOUS EVERY 24 HOURS
Refills: 0 | Status: DISCONTINUED | OUTPATIENT
Start: 2025-06-19 | End: 2025-06-19

## 2025-06-19 RX ORDER — ACETAMINOPHEN 500 MG/5ML
650 LIQUID (ML) ORAL EVERY 6 HOURS
Refills: 0 | Status: DISCONTINUED | OUTPATIENT
Start: 2025-06-19 | End: 2025-07-07

## 2025-06-19 RX ORDER — GLUCAGON 3 MG/1
1 POWDER NASAL ONCE
Refills: 0 | Status: DISCONTINUED | OUTPATIENT
Start: 2025-06-19 | End: 2025-06-25

## 2025-06-19 RX ORDER — LOSARTAN POTASSIUM 100 MG/1
25 TABLET, FILM COATED ORAL DAILY
Refills: 0 | Status: DISCONTINUED | OUTPATIENT
Start: 2025-06-19 | End: 2025-06-21

## 2025-06-19 RX ADMIN — Medication 250 MILLIGRAM(S): at 23:20

## 2025-06-19 RX ADMIN — Medication 15 GRAM(S): at 08:11

## 2025-06-19 RX ADMIN — Medication 1000 MILLIGRAM(S): at 00:35

## 2025-06-19 RX ADMIN — APIXABAN 2.5 MILLIGRAM(S): 5 TABLET, FILM COATED ORAL at 06:20

## 2025-06-19 RX ADMIN — Medication 25 GRAM(S): at 06:19

## 2025-06-19 RX ADMIN — Medication 25 GRAM(S): at 16:29

## 2025-06-19 RX ADMIN — SODIUM ZIRCONIUM CYCLOSILICATE 10 GRAM(S): 5 POWDER, FOR SUSPENSION ORAL at 06:20

## 2025-06-19 RX ADMIN — CALCITRIOL 0.25 MICROGRAM(S): 0.5 CAPSULE, GELATIN COATED ORAL at 11:21

## 2025-06-19 RX ADMIN — APIXABAN 2.5 MILLIGRAM(S): 5 TABLET, FILM COATED ORAL at 18:45

## 2025-06-19 RX ADMIN — ATORVASTATIN CALCIUM 80 MILLIGRAM(S): 80 TABLET, FILM COATED ORAL at 21:58

## 2025-06-19 RX ADMIN — METOPROLOL SUCCINATE 25 MILLIGRAM(S): 50 TABLET, EXTENDED RELEASE ORAL at 06:19

## 2025-06-19 RX ADMIN — Medication 1900 MILLILITER(S): at 00:15

## 2025-06-19 RX ADMIN — SODIUM CHLORIDE 60 MILLILITER(S): 9 INJECTION, SOLUTION INTRAVENOUS at 04:14

## 2025-06-19 RX ADMIN — Medication 325 MILLIGRAM(S): at 11:21

## 2025-06-19 RX ADMIN — IPRATROPIUM BROMIDE AND ALBUTEROL SULFATE 3 MILLILITER(S): .5; 2.5 SOLUTION RESPIRATORY (INHALATION) at 18:54

## 2025-06-19 RX ADMIN — TAMSULOSIN HYDROCHLORIDE 0.4 MILLIGRAM(S): 0.4 CAPSULE ORAL at 21:57

## 2025-06-19 RX ADMIN — CLOPIDOGREL BISULFATE 75 MILLIGRAM(S): 75 TABLET, FILM COATED ORAL at 11:21

## 2025-06-19 RX ADMIN — IPRATROPIUM BROMIDE AND ALBUTEROL SULFATE 3 MILLILITER(S): .5; 2.5 SOLUTION RESPIRATORY (INHALATION) at 23:42

## 2025-06-19 NOTE — PROGRESS NOTE ADULT - SUBJECTIVE AND OBJECTIVE BOX
Patient is a 85y old  Male who presents with a chief complaint of Sepsis likely 2/2 pna (19 Jun 2025 15:05)      INTERVAL HPI/OVERNIGHT EVENTS:  Patient seen and examined at bedside. He reports feeling, 'good'. Denies SOB, chest pain, nausea, vomiting, palpitations, abdominal pain.    MEDICATIONS  (STANDING):  apixaban 2.5 milliGRAM(s) Oral every 12 hours  atorvastatin 80 milliGRAM(s) Oral at bedtime  calcitriol   Capsule 0.25 MICROGram(s) Oral daily  clopidogrel Tablet 75 milliGRAM(s) Oral daily  dextrose 5%. 1000 milliLiter(s) (50 mL/Hr) IV Continuous <Continuous>  dextrose 5%. 1000 milliLiter(s) (100 mL/Hr) IV Continuous <Continuous>  dextrose 50% Injectable 25 Gram(s) IV Push once  dextrose 50% Injectable 12.5 Gram(s) IV Push once  dextrose 50% Injectable 25 Gram(s) IV Push once  ferrous    sulfate 325 milliGRAM(s) Oral daily  glucagon  Injectable 1 milliGRAM(s) IntraMuscular once  insulin lispro (ADMELOG) corrective regimen sliding scale   SubCutaneous three times a day before meals  lactated ringers. 1000 milliLiter(s) (60 mL/Hr) IV Continuous <Continuous>  metoprolol succinate ER 25 milliGRAM(s) Oral daily  piperacillin/tazobactam IVPB.. 3.375 Gram(s) IV Intermittent every 8 hours  tamsulosin 0.4 milliGRAM(s) Oral at bedtime    MEDICATIONS  (PRN):  acetaminophen     Tablet .. 650 milliGRAM(s) Oral every 6 hours PRN Temp greater or equal to 38C (100.4F), Mild Pain (1 - 3)  dextrose Oral Gel 15 Gram(s) Oral once PRN Blood Glucose LESS THAN 70 milliGRAM(s)/deciliter  melatonin 3 milliGRAM(s) Oral at bedtime PRN Insomnia      Allergies    No Known Allergies    Intolerances        REVIEW OF SYSTEMS:  as above    Vital Signs Last 24 Hrs  T(C): 36.7 (19 Jun 2025 12:32), Max: 36.8 (19 Jun 2025 04:09)  T(F): 98 (19 Jun 2025 12:32), Max: 98.3 (19 Jun 2025 05:00)  HR: 72 (19 Jun 2025 12:32) (69 - 88)  BP: 130/66 (19 Jun 2025 12:32) (103/80 - 145/77)  BP(mean): --  RR: 19 (19 Jun 2025 12:32) (18 - 23)  SpO2: 99% (19 Jun 2025 12:32) (98% - 99%)    Parameters below as of 19 Jun 2025 12:32  Patient On (Oxygen Delivery Method): nasal cannula  O2 Flow (L/min): 2      Physical Exam: GENERAL: NAD  HEAD:  Atraumatic, normocephalic  EYES: EOMI, PERRL  NECK: Supple, trachea midline  HEART: Regular rate and rhythm  LUNGS: Unlabored respirations. Clear to auscultation bilaterally, no crackles, wheezing, or rhonchi  ABDOMEN: Soft, nontender, nondistended, +BS  EXTREMITIES: 2+ peripheral pulses bilaterally. No clubbing, cyanosis, or edema  NERVOUS SYSTEM:  A&O, moving all extremities, no focal deficits        LABS:                        8.9    8.68  )-----------( 141      ( 19 Jun 2025 05:50 )             26.3     19 Jun 2025 05:50    139    |  113    |  47     ----------------------------<  74     4.8     |  18     |  2.16     Ca    8.8        19 Jun 2025 05:50    TPro  7.0    /  Alb  2.8    /  TBili  0.8    /  DBili  x      /  AST  29     /  ALT  60     /  AlkPhos  137    19 Jun 2025 05:50    PT/INR - ( 18 Jun 2025 22:45 )   PT: 17.2 sec;   INR: 1.53 ratio         PTT - ( 18 Jun 2025 22:45 )  PTT:43.9 sec  Urinalysis Basic - ( 19 Jun 2025 05:50 )    Color: x / Appearance: x / SG: x / pH: x  Gluc: 74 mg/dL / Ketone: x  / Bili: x / Urobili: x   Blood: x / Protein: x / Nitrite: x   Leuk Esterase: x / RBC: x / WBC x   Sq Epi: x / Non Sq Epi: x / Bacteria: x      CAPILLARY BLOOD GLUCOSE      POCT Blood Glucose.: 107 mg/dL (19 Jun 2025 11:22)  POCT Blood Glucose.: 113 mg/dL (19 Jun 2025 08:30)  POCT Blood Glucose.: 75 mg/dL (19 Jun 2025 08:01)  POCT Blood Glucose.: 68 mg/dL (19 Jun 2025 07:43)

## 2025-06-19 NOTE — H&P ADULT - PROBLEM SELECTOR PLAN 1
- Atypical chest pain  - Troponin 491>>488; NSTEMI (last admission trop was >39570), managed conservatively  - Limited echo 5/29: Left ventricular systolic function is severely decreased with an ejection fraction of 21 % by Watters's method of disks. Global left ventricular hypokinesis.  - EKG reviewed: unchanged from previous  - stat EKG for chest pain  - Monitor on telemetry  - c/w home meds: Plavix, Eliquis, metoprolol, Lipitor

## 2025-06-19 NOTE — PROGRESS NOTE ADULT - PROBLEM SELECTOR PLAN 3
- likely due to pna  - hypothermic, leukopenic  - Lactic acid 1.1  - CT chest: small focal airspace opacity in the right upper lobe with associated cavitation and scattered patchy groundglass opacities throughout the lungs with a predominance in the right upper lobe likely infectious in etiology. Small bilateral pleural effusions with adjacent compressive atelectasis.  - COVID/flu negative  - c/w Abx: vancomycin and zosyn, Vanc random to be check daily and readjusted per pharmacy as pt Cr Cl is low  - Gentle IVF for 24 hrs given CHF  - Follow up cultures and deescalate as needed   - F/u sputum culture, urine legionella, MRSA screen, mycoplasma  - Tylenol for fever  -Called ID

## 2025-06-19 NOTE — H&P ADULT - NSICDXPASTSURGICALHX_GEN_ALL_CORE_FT
RT Note    Baby remains on HFNC 2 LPM 21% throughout the day. RT will continue to follow.     Fabienne Bailey, RT     PAST SURGICAL HISTORY:  H/O carpal tunnel repair     Status post placement of implantable loop recorder

## 2025-06-19 NOTE — PATIENT PROFILE ADULT - FALL HARM RISK - HARM RISK INTERVENTIONS

## 2025-06-19 NOTE — PROGRESS NOTE ADULT - PROBLEM SELECTOR PLAN 1
- Atypical chest pain  - Troponin 491>>488; NSTEMI (last admission trop was >30357), managed conservatively  - Limited echo 5/29: Left ventricular systolic function is severely decreased with an ejection fraction of 21 % by Watters's method of disks. Global left ventricular hypokinesis.  - EKG reviewed: unchanged from previous  - Monitor on telemetry  - c/w home meds: Plavix, Eliquis, metoprolol, Lipitor  -cardiology called - Atypical chest pain  - Troponin 491>>488; NSTEMI (last admission trop was >97334), managed conservatively  - Limited echo 5/29: Left ventricular systolic function is severely decreased with an ejection fraction of 21 % by Watters's method of disks. Global left ventricular hypokinesis.  - EKG reviewed: unchanged from previous  - Monitor on telemetry  - c/w home meds: Plavix, Eliquis, metoprolol, Lipitor  -Added losartan per cardio recs, if BP permits will add spironolactone  -cardiology called

## 2025-06-19 NOTE — H&P ADULT - PROBLEM SELECTOR PLAN 3
- likely due to pna  - hypothermic, leukopenic  - Lactic acid 1.1  - CT chest: small focal airspace opacity in the right upper lobe with associated cavitation and scattered patchy groundglass opacities throughout the lungs with a predominance in the right upper lobe likely infectious in etiology. Small bilateral pleural effusions with adjacent compressive atelectasis.  - c/w Abx: vancomycin, zosyn  - Gentle IVF for 24 hrs given CHF  - Follow up cultures and deescalate as needed   - F/u sputum culture, urine legionella, MRSA screen, mycoplasma  - Tylenol for fever - likely due to pna  - hypothermic, leukopenic  - Lactic acid 1.1  - CT chest: small focal airspace opacity in the right upper lobe with associated cavitation and scattered patchy groundglass opacities throughout the lungs with a predominance in the right upper lobe likely infectious in etiology. Small bilateral pleural effusions with adjacent compressive atelectasis.  - COVID/flu negative  - c/w Abx: vancomycin, zosyn  - Gentle IVF for 24 hrs given CHF  - Follow up cultures and deescalate as needed   - F/u sputum culture, urine legionella, MRSA screen, mycoplasma  - Tylenol for fever

## 2025-06-19 NOTE — CONSULT NOTE ADULT - SUBJECTIVE AND OBJECTIVE BOX
Reason for Admission: Sepsis likely 2/2 pna    History of Present Illness:   85-year-old male with past medical history of hypertension, stage IV CKD, CVA, type 2 diabetes, A-fib, iron deficiency anemia, bradycardia status post pacemaker, recent NSTEMI, ischemic cardiomyopathy who presented to the ED from [facility] due to left-sided chest pain. He denies any fevers, chills, coughing, heart palpitations, shortness of breath, or other complaints.   On presentation, the patient was hypothermic 92.7 otherwise stable vital signs. EKG with paced rhythm. Labs notable for WBC 2.2, hemoglobin 9.6, troponin 488, potassium 5.4, creatinine 2.6 (around baseline), lactate 1.1, proBNP 7,166, UA negative for UTI, COVID/flu negative. CT chest showed small focal airspace opacity in the right upper lobe with associated cavitation and scattered patchy groundglass opacities throughout the   lungs with a predominance in the right upper lobe likely infectious in etiology. Small bilateral pleural effusions with adjacent compressive atelectasis.Home Medications:   * Patient Currently Takes Medications as of 31-May-2025 13:40 documented in Structured Notes  · 	FeroSul 325 mg (65 mg elemental iron) oral tablet: 1 tab(s) orally once a day  · 	clopidogrel 75 mg oral tablet: 1 tab(s) orally once a day  · 	atorvastatin 80 mg oral tablet: 1 tab(s) orally once a day (at bedtime)  · 	apixaban 2.5 mg oral tablet: 1 tab(s) orally 2 times a day  · 	calcitriol 0.25 mcg oral capsule: 1 cap(s) orally once a day  · 	metoprolol succinate 25 mg oral tablet, extended release: 1 tab(s) orally once a day  · 	tamsulosin 0.4 mg oral capsule: 1 cap(s) orally once a day (at bedtime)  · 	glipiZIDE 5 mg oral tablet: 1 tab(s) orally once a day (in the morning)  · 	glipiZIDE 10 mg oral tablet: 1 tab(s) orally once a day (at bedtime)Past Medical, Past Surgical, and Family History:  PAST MEDICAL HISTORY:  CAD (coronary artery disease)     CKD (chronic kidney disease), stage III     CVA (cerebral vascular accident)     HLD (hyperlipidemia)     HTN (hypertension)     Non-ST elevation (NSTEMI) myocardial infarction     Type 2 diabetes mellitus without complication. Status post placement of implantable loop recorder. Physical Exam:   Physical Exam: GENERAL: NAD  HEAD:  Atraumatic, normocephalic  EYES: EOMI, PERRL  NECK: Supple, trachea midline  HEART: Regular rate and rhythm  LUNGS: Unlabored respirations. Clear to auscultation bilaterally, no crackles, wheezing, or rhonchi  ABDOMEN: Soft, nontender, nondistended, +BS  EXTREMITIES: 2+ peripheral pulses bilaterally. No clubbing, cyanosis, or edema  NERVOUS SYSTEM:  A&O, moving all extremities, no focal deficits  Labs and Results:  Labs, Radiology, Cardiology, and Other Results: LABS:  cret                        9.6    7.61  )-----------( 154      ( 18 Jun 2025 22:45 )             28.4     06-18    137  |  111[H]  |  48[H]  ----------------------------<  95  5.4[H]   |  20[L]  |  2.06[H]    Ca    9.4      18 Jun 2025 22:45    TPro  7.5  /  Alb  3.0[L]  /  TBili  0.7  /  DBili  x   /  AST  55[H]  /  ALT  69  /  AlkPhos  135[H]  06-18    PT/INR - ( 18 Jun 2025 22:45 )   PT: 17.2 sec;   INR: 1.53 ratio         PTT - ( 18 Jun 2025 22:45 )  PTT:43.9 sec      < from: CT Chest No Cont (06.18.25 @ 23:58) >      IMPRESSION:  1. Small focal airspace opacity in the right upper lobe with associated   cavitation and scattered patchy groundglass opacities throughout the   lungs with a predominance in the right upper lobe likely infectious in   etiology.  2. Small bilateral pleural effusions with adjacent compressive   atelectasis.    < end of copied text >    < from: CT Head No Cont (06.18.25 @ 23:56) >      IMPRESSION:    No intracranial mass effect, acute hemorrhage or midline shift.    If the patient's symptoms persist, consider short interval follow-up head   CT or brain MRI if there are no MRI contraindications.    --- End of Report ---    < end of copied text >  < from: 12 Lead ECG (06.19.25 @ 02:20) >  Ventricular Rate 84 BPM    Atrial Rate 84 BPM    P-R Interval 170 ms    QRS Duration 150 ms    Q-T Interval 416 ms    QTC Calculation(Bazett) 491 ms    P Axis 61 degrees    R Axis -18 degrees    T Axis 95 degrees    Diagnosis Line Atrial-sensed ventricular-paced rhythm  Abnormal ECG  When compared with ECG of 18-JUN-2025 20:27,  Vent. rate has increased BY  21 BPM    < end of copied text >  < from: TTE Limited W or WO Ultrasound Enhancing Agent (05.29.25 @ 09:24) >    CONCLUSIONS:      1. Left ventricular systolic function is severely decreased with an ejection fraction of 21 % by Watters's method of disks. Global left ventricular hypokinesis.   2. Compared to the transthoracic echocardiogram performed on 5/1/2025, Left ventricular function is significantly worse on today's study, also degree of mitral regurgitation is worse as well.    < end of copied text >

## 2025-06-19 NOTE — H&P ADULT - NSHPPHYSICALEXAM_GEN_ALL_CORE
GENERAL: NAD  HEAD:  Atraumatic, normocephalic  EYES: EOMI, PERRL  NECK: Supple, trachea midline  HEART: Regular rate and rhythm  LUNGS: Unlabored respirations. Clear to auscultation bilaterally, no crackles, wheezing, or rhonchi  ABDOMEN: Soft, nontender, nondistended, +BS  EXTREMITIES: 2+ peripheral pulses bilaterally. No clubbing, cyanosis, or edema  NERVOUS SYSTEM:  A&O, moving all extremities, no focal deficits

## 2025-06-19 NOTE — H&P ADULT - PROBLEM SELECTOR PLAN 7
- Cr at baseline  - Avoid nephrotoxin  - i/os  - f/u am labs, monitor electrolyte closely and replete as needed

## 2025-06-19 NOTE — PATIENT PROFILE ADULT - AVIAN FLU SYMPTOMS
Spoke to patient regarding TSH and levothyroxine.    Patient confirms that he is taking 50 mcg of levothyroxine every day (he will need a refill as he has enough to get him to Thursday this week).     ,  Please advise with any new recommendations/new orders due to recent TSH results.  Thank you.       No

## 2025-06-19 NOTE — CONSULT NOTE ADULT - ASSESSMENT
85 M HFrEF LVEF 21% TTE 5/25 BNP 7k  admit with C/P trop 448 static likely renal retention  no interpretable EKG, V paced, trend trop  ACS unlikely no DAPT, no UFH  OPD ischemia workup  Cr 2.16 Consider add losartan 25/d, if SBP toelrates spironolactone 25/d continue metoprolol

## 2025-06-19 NOTE — PROGRESS NOTE ADULT - PROBLEM SELECTOR PLAN 9
- Home meds: glipizide  - start  ISS, POC BG achs>> calculate basal bolus based on need  - hypoglycemia protocol in place  - carb control diet

## 2025-06-19 NOTE — PROGRESS NOTE ADULT - ASSESSMENT
85-year-old male with past medical history of hypertension, stage IV CKD, CVA, type 2 diabetes, A-fib, iron deficiency anemia, bradycardia status post pacemaker, recent NSTEMI, ischemic cardiomyopathy who presented to the ED from [facility] due to left-sided chest pain. Found with elevated troponin(downtrending), sepsis likely 2/2 pna. Admit to telemetry.

## 2025-06-19 NOTE — H&P ADULT - PROBLEM SELECTOR PLAN 9
- Home meds: glipizide  - start  ISS, POC BG achs>> calculate basal bolus based on need  - hypoglycemia protocol in place  - carb control diet  - f/u A1c

## 2025-06-19 NOTE — H&P ADULT - NSHPLABSRESULTS_GEN_ALL_CORE
LABS:  cret                        9.6    7.61  )-----------( 154      ( 18 Jun 2025 22:45 )             28.4     06-18    137  |  111[H]  |  48[H]  ----------------------------<  95  5.4[H]   |  20[L]  |  2.06[H]    Ca    9.4      18 Jun 2025 22:45    TPro  7.5  /  Alb  3.0[L]  /  TBili  0.7  /  DBili  x   /  AST  55[H]  /  ALT  69  /  AlkPhos  135[H]  06-18    PT/INR - ( 18 Jun 2025 22:45 )   PT: 17.2 sec;   INR: 1.53 ratio         PTT - ( 18 Jun 2025 22:45 )  PTT:43.9 sec      < from: CT Chest No Cont (06.18.25 @ 23:58) >      IMPRESSION:  1. Small focal airspace opacity in the right upper lobe with associated   cavitation and scattered patchy groundglass opacities throughout the   lungs with a predominance in the right upper lobe likely infectious in   etiology.  2. Small bilateral pleural effusions with adjacent compressive   atelectasis.    < end of copied text >    < from: CT Head No Cont (06.18.25 @ 23:56) >      IMPRESSION:    No intracranial mass effect, acute hemorrhage or midline shift.    If the patient's symptoms persist, consider short interval follow-up head   CT or brain MRI if there are no MRI contraindications.    --- End of Report ---    < end of copied text >

## 2025-06-19 NOTE — H&P ADULT - HISTORY OF PRESENT ILLNESS
85-year-old male with past medical history of hypertension, stage IV CKD, CVA, type 2 diabetes, A-fib, iron deficiency anemia, bradycardia status post pacemaker, recent NSTEMI, ischemic cardiomyopathy who presented to the ED from [facility] due to left-sided chest pain. He denies any fevers, chills, coughing, heart palpitations, shortness of breath, or other complaints.   On presentation, the patient was hypothermic 92.7 otherwise stable vital signs. EKG with paced rhythm. Labs notable for WBC 2.2, hemoglobin 9.6, troponin 488, potassium 5.4, creatinine 2.6 (around baseline), lactate 1.1, proBNP 7,166, UA negative for UTI, COVID/flu negative. CT chest showed small focal airspace opacity in the right upper lobe with associated cavitation and scattered patchy groundglass opacities throughout the   lungs with a predominance in the right upper lobe likely infectious in etiology. Small bilateral pleural effusions with adjacent compressive atelectasis.

## 2025-06-19 NOTE — H&P ADULT - ASSESSMENT
85-year-old male with past medical history of hypertension, stage IV CKD, CVA, type 2 diabetes, A-fib, iron deficiency anemia, bradycardia status post pacemaker, recent NSTEMI, ischemic cardiomyopathy who presented to the ED from [facility] due to left-sided chest pain. Found with elevated troponin(downtrending), sepsis likely 2/2 pna. Admit to telemetry.

## 2025-06-20 LAB
ANION GAP SERPL CALC-SCNC: 9 MMOL/L — SIGNIFICANT CHANGE UP (ref 5–17)
BUN SERPL-MCNC: 47 MG/DL — HIGH (ref 7–23)
CALCIUM SERPL-MCNC: 8.4 MG/DL — LOW (ref 8.5–10.1)
CHLORIDE SERPL-SCNC: 109 MMOL/L — HIGH (ref 96–108)
CO2 SERPL-SCNC: 20 MMOL/L — LOW (ref 22–31)
CREAT SERPL-MCNC: 2.67 MG/DL — HIGH (ref 0.5–1.3)
EGFR: 23 ML/MIN/1.73M2 — LOW
EGFR: 23 ML/MIN/1.73M2 — LOW
GLUCOSE BLDC GLUCOMTR-MCNC: 139 MG/DL — HIGH (ref 70–99)
GLUCOSE BLDC GLUCOMTR-MCNC: 149 MG/DL — HIGH (ref 70–99)
GLUCOSE BLDC GLUCOMTR-MCNC: 163 MG/DL — HIGH (ref 70–99)
GLUCOSE BLDC GLUCOMTR-MCNC: 198 MG/DL — HIGH (ref 70–99)
GLUCOSE SERPL-MCNC: 122 MG/DL — HIGH (ref 70–99)
HCT VFR BLD CALC: 25.2 % — LOW (ref 39–50)
HGB BLD-MCNC: 8.6 G/DL — LOW (ref 13–17)
MCHC RBC-ENTMCNC: 30.4 PG — SIGNIFICANT CHANGE UP (ref 27–34)
MCHC RBC-ENTMCNC: 34.1 G/DL — SIGNIFICANT CHANGE UP (ref 32–36)
MCV RBC AUTO: 89 FL — SIGNIFICANT CHANGE UP (ref 80–100)
NRBC BLD AUTO-RTO: 0 /100 WBCS — SIGNIFICANT CHANGE UP (ref 0–0)
PLATELET # BLD AUTO: 123 K/UL — LOW (ref 150–400)
POTASSIUM SERPL-MCNC: 4.2 MMOL/L — SIGNIFICANT CHANGE UP (ref 3.5–5.3)
POTASSIUM SERPL-SCNC: 4.2 MMOL/L — SIGNIFICANT CHANGE UP (ref 3.5–5.3)
RBC # BLD: 2.83 M/UL — LOW (ref 4.2–5.8)
RBC # FLD: 15.3 % — HIGH (ref 10.3–14.5)
SODIUM SERPL-SCNC: 138 MMOL/L — SIGNIFICANT CHANGE UP (ref 135–145)
VANCOMYCIN FLD-MCNC: 18.1 UG/ML — SIGNIFICANT CHANGE UP
WBC # BLD: 8.71 K/UL — SIGNIFICANT CHANGE UP (ref 3.8–10.5)
WBC # FLD AUTO: 8.71 K/UL — SIGNIFICANT CHANGE UP (ref 3.8–10.5)

## 2025-06-20 PROCEDURE — G0545: CPT

## 2025-06-20 PROCEDURE — 99223 1ST HOSP IP/OBS HIGH 75: CPT

## 2025-06-20 PROCEDURE — 99233 SBSQ HOSP IP/OBS HIGH 50: CPT

## 2025-06-20 RX ORDER — IPRATROPIUM BROMIDE AND ALBUTEROL SULFATE .5; 2.5 MG/3ML; MG/3ML
3 SOLUTION RESPIRATORY (INHALATION) ONCE
Refills: 0 | Status: COMPLETED | OUTPATIENT
Start: 2025-06-20 | End: 2025-06-20

## 2025-06-20 RX ORDER — MIDODRINE HYDROCHLORIDE 5 MG/1
10 TABLET ORAL ONCE
Refills: 0 | Status: COMPLETED | OUTPATIENT
Start: 2025-06-20 | End: 2025-06-20

## 2025-06-20 RX ORDER — FUROSEMIDE 10 MG/ML
40 INJECTION INTRAMUSCULAR; INTRAVENOUS ONCE
Refills: 0 | Status: COMPLETED | OUTPATIENT
Start: 2025-06-20 | End: 2025-06-20

## 2025-06-20 RX ORDER — VANCOMYCIN HCL IN 5 % DEXTROSE 1.5G/250ML
500 PLASTIC BAG, INJECTION (ML) INTRAVENOUS ONCE
Refills: 0 | Status: COMPLETED | OUTPATIENT
Start: 2025-06-20 | End: 2025-06-20

## 2025-06-20 RX ADMIN — IPRATROPIUM BROMIDE AND ALBUTEROL SULFATE 3 MILLILITER(S): .5; 2.5 SOLUTION RESPIRATORY (INHALATION) at 06:43

## 2025-06-20 RX ADMIN — METOPROLOL SUCCINATE 25 MILLIGRAM(S): 50 TABLET, EXTENDED RELEASE ORAL at 05:54

## 2025-06-20 RX ADMIN — Medication 25 GRAM(S): at 17:02

## 2025-06-20 RX ADMIN — INSULIN LISPRO 1: 100 INJECTION, SOLUTION INTRAVENOUS; SUBCUTANEOUS at 11:42

## 2025-06-20 RX ADMIN — APIXABAN 2.5 MILLIGRAM(S): 5 TABLET, FILM COATED ORAL at 05:52

## 2025-06-20 RX ADMIN — Medication 25 GRAM(S): at 10:26

## 2025-06-20 RX ADMIN — IPRATROPIUM BROMIDE AND ALBUTEROL SULFATE 3 MILLILITER(S): .5; 2.5 SOLUTION RESPIRATORY (INHALATION) at 17:30

## 2025-06-20 RX ADMIN — ATORVASTATIN CALCIUM 80 MILLIGRAM(S): 80 TABLET, FILM COATED ORAL at 22:55

## 2025-06-20 RX ADMIN — CLOPIDOGREL BISULFATE 75 MILLIGRAM(S): 75 TABLET, FILM COATED ORAL at 12:48

## 2025-06-20 RX ADMIN — Medication 100 MILLIGRAM(S): at 23:03

## 2025-06-20 RX ADMIN — Medication 325 MILLIGRAM(S): at 12:47

## 2025-06-20 RX ADMIN — LOSARTAN POTASSIUM 25 MILLIGRAM(S): 100 TABLET, FILM COATED ORAL at 05:53

## 2025-06-20 RX ADMIN — SODIUM CHLORIDE 60 MILLILITER(S): 9 INJECTION, SOLUTION INTRAVENOUS at 01:00

## 2025-06-20 RX ADMIN — Medication 25 GRAM(S): at 00:55

## 2025-06-20 RX ADMIN — TAMSULOSIN HYDROCHLORIDE 0.4 MILLIGRAM(S): 0.4 CAPSULE ORAL at 22:54

## 2025-06-20 RX ADMIN — MIDODRINE HYDROCHLORIDE 10 MILLIGRAM(S): 5 TABLET ORAL at 12:47

## 2025-06-20 RX ADMIN — IPRATROPIUM BROMIDE AND ALBUTEROL SULFATE 3 MILLILITER(S): .5; 2.5 SOLUTION RESPIRATORY (INHALATION) at 23:44

## 2025-06-20 RX ADMIN — FUROSEMIDE 40 MILLIGRAM(S): 10 INJECTION INTRAMUSCULAR; INTRAVENOUS at 12:47

## 2025-06-20 RX ADMIN — IPRATROPIUM BROMIDE AND ALBUTEROL SULFATE 3 MILLILITER(S): .5; 2.5 SOLUTION RESPIRATORY (INHALATION) at 11:20

## 2025-06-20 RX ADMIN — CALCITRIOL 0.25 MICROGRAM(S): 0.5 CAPSULE, GELATIN COATED ORAL at 12:48

## 2025-06-20 RX ADMIN — APIXABAN 2.5 MILLIGRAM(S): 5 TABLET, FILM COATED ORAL at 17:02

## 2025-06-20 NOTE — CONSULT NOTE ADULT - SUBJECTIVE AND OBJECTIVE BOX
Patient chart reviewed, full consult to follow.     Increasing SOB  TAYLOR CKD EF 21%  CXR reviewed   D/C IVF  Trial lasix  Repeat duoneb  Will follow     Thank you for the courtesy of this consultation. [facility] NEPHROLOGY SERVICES, [facility]  NEPHROLOGY AND HYPERTENSION  [address]  [address]  [place] [address]  [phone]    MD COLTON WORLEY MD YELENA ROSENBERG, MD BINNY KOSHY, MD CHRISTOPHER CAPUTO, MD EDWARD BOVER, MD      Information from chart:  "Patient is a 85y old  Male who presents with a chief complaint of Sepsis likely 2/2 pna (2025 15:29)    HPI:  85-year-old male with past medical history of hypertension, stage IV CKD, CVA, type 2 diabetes, A-fib, iron deficiency anemia, bradycardia status post pacemaker, recent NSTEMI, ischemic cardiomyopathy who presented to the ED from [facility] due to left-sided chest pain. He denies any fevers, chills, coughing, heart palpitations, shortness of breath, or other complaints.   On presentation, the patient was hypothermic 92.7 otherwise stable vital signs. EKG with paced rhythm. Labs notable for WBC 2.2, hemoglobin 9.6, troponin 488, potassium 5.4, creatinine 2.6 (around baseline), lactate 1.1, proBNP 7,166, UA negative for UTI, COVID/flu negative. CT chest showed small focal airspace opacity in the right upper lobe with associated cavitation and scattered patchy groundglass opacities throughout the   lungs with a predominance in the right upper lobe likely infectious in etiology. Small bilateral pleural effusions with adjacent compressive atelectasis.   (2025 03:46)   "    Increasing sob and rales       PAST MEDICAL & SURGICAL HISTORY:  Type 2 diabetes mellitus without complication      CVA (cerebral vascular accident)      Non-ST elevation (NSTEMI) myocardial infarction      CAD (coronary artery disease)      CKD (chronic kidney disease), stage III      HTN (hypertension)      HLD (hyperlipidemia)      Status post placement of implantable loop recorder      H/O carpal tunnel repair        FAMILY HISTORY:  Family history of heart disease    Family history of uterine cancer (Sibling)      Allergies    No Known Allergies    Intolerances      Home Medications:  apixaban 2.5 mg oral tablet: 1 tab(s) orally 2 times a day (31 May 2025 13:14)  atorvastatin 80 mg oral tablet: 1 tab(s) orally once a day (at bedtime) (31 May 2025 13:14)  calcitriol 0.25 mcg oral capsule: 1 cap(s) orally once a day (27 May 2025 16:49)  clopidogrel 75 mg oral tablet: 1 tab(s) orally once a day (31 May 2025 13:14)  FeroSul 325 mg (65 mg elemental iron) oral tablet: 1 tab(s) orally once a day (31 May 2025 13:40)  glipiZIDE 10 mg oral tablet: 1 tab(s) orally once a day (at bedtime) (27 May 2025 16:49)  metoprolol succinate 25 mg oral tablet, extended release: 1 tab(s) orally once a day (27 May 2025 16:49)  tamsulosin 0.4 mg oral capsule: 1 cap(s) orally once a day (at bedtime) (27 May 2025 16:49)    MEDICATIONS  (STANDING):  albuterol/ipratropium for Nebulization 3 milliLiter(s) Nebulizer every 6 hours  apixaban 2.5 milliGRAM(s) Oral every 12 hours  atorvastatin 80 milliGRAM(s) Oral at bedtime  calcitriol   Capsule 0.25 MICROGram(s) Oral daily  clopidogrel Tablet 75 milliGRAM(s) Oral daily  dextrose 5%. 1000 milliLiter(s) (50 mL/Hr) IV Continuous <Continuous>  dextrose 5%. 1000 milliLiter(s) (100 mL/Hr) IV Continuous <Continuous>  dextrose 50% Injectable 25 Gram(s) IV Push once  dextrose 50% Injectable 12.5 Gram(s) IV Push once  dextrose 50% Injectable 25 Gram(s) IV Push once  ferrous    sulfate 325 milliGRAM(s) Oral daily  glucagon  Injectable 1 milliGRAM(s) IntraMuscular once  insulin lispro (ADMELOG) corrective regimen sliding scale   SubCutaneous three times a day before meals  losartan 25 milliGRAM(s) Oral daily  metoprolol succinate ER 25 milliGRAM(s) Oral daily  piperacillin/tazobactam IVPB.. 3.375 Gram(s) IV Intermittent every 8 hours  tamsulosin 0.4 milliGRAM(s) Oral at bedtime  vancomycin  IVPB 500 milliGRAM(s) IV Intermittent once    MEDICATIONS  (PRN):  acetaminophen     Tablet .. 650 milliGRAM(s) Oral every 6 hours PRN Temp greater or equal to 38C (100.4F), Mild Pain (1 - 3)  dextrose Oral Gel 15 Gram(s) Oral once PRN Blood Glucose LESS THAN 70 milliGRAM(s)/deciliter  melatonin 3 milliGRAM(s) Oral at bedtime PRN Insomnia    Vital Signs Last 24 Hrs  T(C): 36.7 (2025 17:20), Max: 36.9 (2025 00:18)  T(F): 98 (2025 17:20), Max: 98.4 (2025 00:18)  HR: 60 (2025 17:20) (60 - 89)  BP: 100/64 (2025 17:20) (98/60 - 115/67)  BP(mean): --  RR: 18 (2025 17:20) (18 - 18)  SpO2: 95% (2025 17:20) (93% - 98%)    Parameters below as of 2025 17:20  Patient On (Oxygen Delivery Method): nasal cannula        Daily     Daily Weight in k.4 (2025 04:49)    25 @ 07:01  -  25 @ 07:00  --------------------------------------------------------  IN: 720 mL / OUT: 200 mL / NET: 520 mL      CAPILLARY BLOOD GLUCOSE      POCT Blood Glucose.: 149 mg/dL (2025 16:28)  POCT Blood Glucose.: 163 mg/dL (2025 11:20)  POCT Blood Glucose.: 139 mg/dL (2025 08:01)  POCT Blood Glucose.: 146 mg/dL (2025 21:14)    PHYSICAL EXAM:      T(C): 36.7 (25 @ 17:20), Max: 36.9 (25 @ 00:18)  HR: 60 (25 @ 17:20) (60 - 89)  BP: 100/64 (25 @ 17:20) (98/60 - 115/67)  RR: 18 (25 @ 17:20) (18 - 18)  SpO2: 95% (06-20-25 @ 17:20) (93% - 98%)  Wt(kg): --  Lungs anterior rhonchi   Heart S1S2  Abd soft NT ND  Extremities:   tr edema                  138  |  109[H]  |  47[H]  ----------------------------<  122[H]  4.2   |  20[L]  |  2.67[H]    Ca    8.4[L]      2025 06:45    TPro  7.0  /  Alb  2.8[L]  /  TBili  0.8  /  DBili  x   /  AST  29  /  ALT  60  /  AlkPhos  137[H]                            8.6    8.71  )-----------( 123      ( 2025 06:45 )             25.2     Creatinine Trend: 2.67<--, 2.16<--, 2.06<--, 2.28<--, 2.33<--, 2.12<--  Urinalysis Basic - ( 2025 06:45 )    Color: x / Appearance: x / SG: x / pH: x  Gluc: 122 mg/dL / Ketone: x  / Bili: x / Urobili: x   Blood: x / Protein: x / Nitrite: x   Leuk Esterase: x / RBC: x / WBC x   Sq Epi: x / Non Sq Epi: x / Bacteria: x            Assessment   TAYLOR CKD EF 21%, CRS  Increasing SOB  CXR reviewed     Plan    D/C IVF  Trial lasix  Repeat duoneb  Will follow       Yuriy Guillermo MD

## 2025-06-20 NOTE — PROGRESS NOTE ADULT - SUBJECTIVE AND OBJECTIVE BOX
Patient is a 85y old  Male who presents with  Sepsis likely 2/2 pna (19 Jun 2025 15:14)    PAST MEDICAL & SURGICAL HISTORY:  Type 2 diabetes mellitus without complication    CVA (cerebral vascular accident)    afib    iron deficiency anemia    ischemic cardiomyopathy    Non-ST elevation (NSTEMI) myocardial infarction    CAD (coronary artery disease)    CKD (chronic kidney disease), stage III    HTN (hypertension)    HLD (hyperlipidemia)    s/p leadless PM    H/O carpal tunnel repair    INTERVAL HISTORY:  	  MEDICATIONS:  MEDICATIONS  (STANDING):  albuterol/ipratropium for Nebulization 3 milliLiter(s) Nebulizer every 6 hours  apixaban 2.5 milliGRAM(s) Oral every 12 hours  atorvastatin 80 milliGRAM(s) Oral at bedtime  calcitriol   Capsule 0.25 MICROGram(s) Oral daily  clopidogrel Tablet 75 milliGRAM(s) Oral daily  ferrous    sulfate 325 milliGRAM(s) Oral daily  insulin lispro (ADMELOG) corrective regimen sliding scale   SubCutaneous three times a day before meals  losartan 25 milliGRAM(s) Oral daily  metoprolol succinate ER 25 milliGRAM(s) Oral daily  piperacillin/tazobactam IVPB.. 3.375 Gram(s) IV Intermittent every 8 hours  tamsulosin 0.4 milliGRAM(s) Oral at bedtime    MEDICATIONS  (PRN):  acetaminophen     Tablet .. 650 milliGRAM(s) Oral every 6 hours PRN Temp greater or equal to 38C (100.4F), Mild Pain (1 - 3)  dextrose Oral Gel 15 Gram(s) Oral once PRN Blood Glucose LESS THAN 70 milliGRAM(s)/deciliter  melatonin 3 milliGRAM(s) Oral at bedtime PRN Insomnia    Vitals:  T(F): 97.8 (06-20-25 @ 04:49), Max: 98.4 (06-20-25 @ 00:18)  HR: 66 (06-20-25 @ 07:00) (66 - 86)  BP: 113/70 (06-20-25 @ 04:49) (113/70 - 149/80)  RR: 18 (06-20-25 @ 07:00) (18 - 20)  SpO2: 95% (06-20-25 @ 07:00) (93% - 99%)    06-19 @ 07:01  -  06-20 @ 07:00  --------------------------------------------------------  IN:    Lactated Ringers: 720 mL  Total IN: 720 mL    OUT:    Voided (mL): 200 mL  Total OUT: 200 mL    Total NET: 520 mL    Weight (kg): 64.8 (06-19 @ 13:30)  BMI (kg/m2): 23.8 (06-19 @ 13:30)    PHYSICAL EXAM:  Neuro: Awake, responsive  CV: S1 S2 RRR  Lungs: CTABL  GI: Soft, BS +, ND, NT  Extremities: No edema    TELEMETRY: paced    RADIOLOGY: < from: Xray Chest 1 View- PORTABLE-Urgent (06.18.25 @ 22:56) >  IMPRESSION: Unchanged cardiomegaly despite the portable technique.   Increasing opacities are noted over the lower lung zones and developing   bibasilar multifocal pneumonia versus asymmetric pulmonary edema. No   large pleural effusions or pneumothorax. Short interval surveillance   two-view chest to confirm resolution, as clinically indicated is   suggested.    < end of copied text >    DIAGNOSTIC TESTING:    [x ] Echocardiogram: < from: TTE Limited W or WO Ultrasound Enhancing Agent (05.29.25 @ 09:24) >   1. Left ventricular systolic function is severely decreased with an ejection fraction of 21 % by Watters's method of disks. Global left ventricular hypokinesis.   2. Compared to the transthoracic echocardiogram performed on 5/1/2025, Left ventricular function is significantly worse on today's study, also degree of mitral regurgitation is worse as well.    < end of copied text >    LABS:	 	    CARDIAC MARKERS:  Troponin I, High Sensitivity Result: 521.7 ng/L (06-19 @ 15:05)  Troponin I, High Sensitivity Result: 449.7 ng/L (06-19 @ 00:46)  Troponin I, High Sensitivity Result: 488.7 ng/L (06-18 @ 22:45)    20 Jun 2025 06:45    138    |  109    |  47     ----------------------------<  122    4.2     |  20     |  2.67   19 Jun 2025 05:50    139    |  113    |  47     ----------------------------<  74     4.8     |  18     |  2.16   18 Jun 2025 22:45    137    |  111    |  48     ----------------------------<  95     5.4     |  20     |  2.06     Ca    8.4        20 Jun 2025 06:45    TPro  7.0    /  Alb  2.8    /  TBili  0.8    /  DBili  x      /  AST  29     /  ALT  60     /  AlkPhos  137    19 Jun 2025 05:50                        8.6    8.71  )-----------( 123      ( 20 Jun 2025 06:45 )             25.2 ,                       8.9    8.68  )-----------( 141      ( 19 Jun 2025 05:50 )             26.3 ,                       9.6    7.61  )-----------( 154      ( 18 Jun 2025 22:45 )             28.4   pro-BNP: Pro-Brain Natriuretic Peptide: 7166 pg/mL (06.19.25 @ 00:46)    TSH: Thyroid Stimulating Hormone, Serum: 3.110 uU/mL (06-19 @ 00:46)    INR: 1.53 ratio (06-18 @ 22:45)           Patient is a 85y old  Male who presents with  Sepsis likely 2/2 pna (19 Jun 2025 15:14)    PAST MEDICAL & SURGICAL HISTORY:  Type 2 diabetes mellitus without complication    CVA (cerebral vascular accident)    afib    iron deficiency anemia    ischemic cardiomyopathy    Non-ST elevation (NSTEMI) myocardial infarction    CAD (coronary artery disease)    CKD (chronic kidney disease), stage III    HTN (hypertension)    HLD (hyperlipidemia)    s/p Micra PM    H/O carpal tunnel repair    status post Robotic assisted MIDCAB x 1 (LIMA->LAD)    INTERVAL HISTORY: in no acute distress, denies any chest pain, admits to having sob at times   	  MEDICATIONS:  MEDICATIONS  (STANDING):  albuterol/ipratropium for Nebulization 3 milliLiter(s) Nebulizer every 6 hours  apixaban 2.5 milliGRAM(s) Oral every 12 hours  atorvastatin 80 milliGRAM(s) Oral at bedtime  calcitriol   Capsule 0.25 MICROGram(s) Oral daily  clopidogrel Tablet 75 milliGRAM(s) Oral daily  ferrous    sulfate 325 milliGRAM(s) Oral daily  insulin lispro (ADMELOG) corrective regimen sliding scale   SubCutaneous three times a day before meals  losartan 25 milliGRAM(s) Oral daily  metoprolol succinate ER 25 milliGRAM(s) Oral daily  piperacillin/tazobactam IVPB.. 3.375 Gram(s) IV Intermittent every 8 hours  tamsulosin 0.4 milliGRAM(s) Oral at bedtime    MEDICATIONS  (PRN):  acetaminophen     Tablet .. 650 milliGRAM(s) Oral every 6 hours PRN Temp greater or equal to 38C (100.4F), Mild Pain (1 - 3)  dextrose Oral Gel 15 Gram(s) Oral once PRN Blood Glucose LESS THAN 70 milliGRAM(s)/deciliter  melatonin 3 milliGRAM(s) Oral at bedtime PRN Insomnia    Vitals:  T(F): 97.8 (06-20-25 @ 04:49), Max: 98.4 (06-20-25 @ 00:18)  HR: 66 (06-20-25 @ 07:00) (66 - 86)  BP: 113/70 (06-20-25 @ 04:49) (113/70 - 149/80)  RR: 18 (06-20-25 @ 07:00) (18 - 20)  SpO2: 95% (06-20-25 @ 07:00) (93% - 99%)    06-19 @ 07:01  -  06-20 @ 07:00  --------------------------------------------------------  IN:    Lactated Ringers: 720 mL  Total IN: 720 mL    OUT:    Voided (mL): 200 mL  Total OUT: 200 mL    Total NET: 520 mL    Weight (kg): 64.8 (06-19 @ 13:30)  BMI (kg/m2): 23.8 (06-19 @ 13:30)    PHYSICAL EXAM:  Neuro: Awake, responsive  CV: S1 S2 RRR +SM  Lungs: mild exp wheezing   GI: Soft, BS +, ND, NT  Extremities: No edema    TELEMETRY: paced    RADIOLOGY: < from: Xray Chest 1 View- PORTABLE-Urgent (06.18.25 @ 22:56) >  IMPRESSION: Unchanged cardiomegaly despite the portable technique.   Increasing opacities are noted over the lower lung zones and developing   bibasilar multifocal pneumonia versus asymmetric pulmonary edema. No   large pleural effusions or pneumothorax. Short interval surveillance   two-view chest to confirm resolution, as clinically indicated is   suggested.    < end of copied text >    DIAGNOSTIC TESTING:    [x ] Echocardiogram: < from: TTE Limited W or WO Ultrasound Enhancing Agent (05.29.25 @ 09:24) >   1. Left ventricular systolic function is severely decreased with an ejection fraction of 21 % by Watters's method of disks. Global left ventricular hypokinesis.   2. Compared to the transthoracic echocardiogram performed on 5/1/2025, Left ventricular function is significantly worse on today's study, also degree of mitral regurgitation is worse as well.    < end of copied text >  < from: TTE W or WO Ultrasound Enhancing Agent (05.01.25 @ 10:29) >   1. The left ventricular cavity is normal in size. Left ventricular wall thickness is normal. Left ventricular systolic function is mildly decreased with an ejection fraction visually estimated at 45 to 50%. There are regional wall motion abnormalities present. Hypokinesis of the basal to mid inferior wall and basal inferolateral wall. There is mild (grade 1) left ventricular diastolic dysfunction.   2. Normal right ventricular cavity size and normal right ventricular systolic function. Tricuspid annular plane systolic excursion (TAPSE) is 2.6 cm (normal >=1.7 cm).   3. Structurally normal mitral valve with normal leaflet excursion. There is calcification of the mitral valve annulus. There is mild mitral regurgitation.    < end of copied text >    Stress Testing:    -- Obtained from OSH Record --   Pharmacologic NM stress test 4/23/2025  Stress ECG: Occasional PVCs, ECG negative for ischemia  Perfusion: There is a medium to large, predominantly fixed, myocardial perfusion defect in the infero-lateral wall consistent with prior infarct  Stress function: Stress ejection fraction is 40%    LABS:	 	    CARDIAC MARKERS:  Troponin I, High Sensitivity Result: 521.7 ng/L (06-19 @ 15:05)  Troponin I, High Sensitivity Result: 449.7 ng/L (06-19 @ 00:46)  Troponin I, High Sensitivity Result: 488.7 ng/L (06-18 @ 22:45)    20 Jun 2025 06:45    138    |  109    |  47     ----------------------------<  122    4.2     |  20     |  2.67   19 Jun 2025 05:50    139    |  113    |  47     ----------------------------<  74     4.8     |  18     |  2.16   18 Jun 2025 22:45    137    |  111    |  48     ----------------------------<  95     5.4     |  20     |  2.06     Ca    8.4        20 Jun 2025 06:45    TPro  7.0    /  Alb  2.8    /  TBili  0.8    /  DBili  x      /  AST  29     /  ALT  60     /  AlkPhos  137    19 Jun 2025 05:50                        8.6    8.71  )-----------( 123      ( 20 Jun 2025 06:45 )             25.2 ,                       8.9    8.68  )-----------( 141      ( 19 Jun 2025 05:50 )             26.3 ,                       9.6    7.61  )-----------( 154      ( 18 Jun 2025 22:45 )             28.4   pro-BNP: Pro-Brain Natriuretic Peptide: 7166 pg/mL (06.19.25 @ 00:46)    TSH: Thyroid Stimulating Hormone, Serum: 3.110 uU/mL (06-19 @ 00:46)    INR: 1.53 ratio (06-18 @ 22:45)           Patient is a 85y old  Male who presents with  Sepsis likely 2/2 pna (19 Jun 2025 15:14)    PAST MEDICAL & SURGICAL HISTORY:  Type 2 diabetes mellitus without complication    CVA (cerebral vascular accident)    afib    iron deficiency anemia    ischemic cardiomyopathy    Non-ST elevation (NSTEMI) myocardial infarction    CAD (coronary artery disease)    CKD (chronic kidney disease), stage III    HTN (hypertension)    HLD (hyperlipidemia)    s/p Micra PM    H/O carpal tunnel repair    status post Robotic assisted MIDCAB x 1 (LIMA->LAD)    INTERVAL HISTORY: in no acute distress, denies any chest pain, admits to having sob at times   	  MEDICATIONS:  MEDICATIONS  (STANDING):  albuterol/ipratropium for Nebulization 3 milliLiter(s) Nebulizer every 6 hours  apixaban 2.5 milliGRAM(s) Oral every 12 hours  atorvastatin 80 milliGRAM(s) Oral at bedtime  calcitriol   Capsule 0.25 MICROGram(s) Oral daily  clopidogrel Tablet 75 milliGRAM(s) Oral daily  ferrous    sulfate 325 milliGRAM(s) Oral daily  insulin lispro (ADMELOG) corrective regimen sliding scale   SubCutaneous three times a day before meals  losartan 25 milliGRAM(s) Oral daily  metoprolol succinate ER 25 milliGRAM(s) Oral daily  piperacillin/tazobactam IVPB.. 3.375 Gram(s) IV Intermittent every 8 hours  tamsulosin 0.4 milliGRAM(s) Oral at bedtime    MEDICATIONS  (PRN):  acetaminophen     Tablet .. 650 milliGRAM(s) Oral every 6 hours PRN Temp greater or equal to 38C (100.4F), Mild Pain (1 - 3)  dextrose Oral Gel 15 Gram(s) Oral once PRN Blood Glucose LESS THAN 70 milliGRAM(s)/deciliter  melatonin 3 milliGRAM(s) Oral at bedtime PRN Insomnia    Vitals:  T(F): 97.8 (06-20-25 @ 04:49), Max: 98.4 (06-20-25 @ 00:18)  HR: 66 (06-20-25 @ 07:00) (66 - 86)  BP: 113/70 (06-20-25 @ 04:49) (113/70 - 149/80)  RR: 18 (06-20-25 @ 07:00) (18 - 20)  SpO2: 95% (06-20-25 @ 07:00) (93% - 99%)    06-19 @ 07:01  -  06-20 @ 07:00  --------------------------------------------------------  IN:    Lactated Ringers: 720 mL  Total IN: 720 mL    OUT:    Voided (mL): 200 mL  Total OUT: 200 mL    Total NET: 520 mL    Weight (kg): 64.8 (06-19 @ 13:30)  BMI (kg/m2): 23.8 (06-19 @ 13:30)    PHYSICAL EXAM:  Neuro: Awake, responsive  CV: S1 S2 RRR +SM  Lungs: mild exp wheezing, diminished to bases   GI: Soft, BS +, ND, NT  Extremities: No edema    TELEMETRY: paced    RADIOLOGY: < from: Xray Chest 1 View- PORTABLE-Urgent (06.18.25 @ 22:56) >  IMPRESSION: Unchanged cardiomegaly despite the portable technique.   Increasing opacities are noted over the lower lung zones and developing   bibasilar multifocal pneumonia versus asymmetric pulmonary edema. No   large pleural effusions or pneumothorax. Short interval surveillance   two-view chest to confirm resolution, as clinically indicated is   suggested.    < end of copied text >  < from: CT Chest No Cont (06.18.25 @ 23:58) >  1. Small focal airspace opacity in the right upper lobe with associated   cavitation and scattered patchy groundglass opacities throughout the   lungs with a predominance in the right upper lobe likely infectious in   etiology.  2. Small bilateral pleural effusions with adjacent compressive   atelectasis.    < end of copied text >    DIAGNOSTIC TESTING:    [x ] Echocardiogram: < from: TTE Limited W or WO Ultrasound Enhancing Agent (05.29.25 @ 09:24) >   1. Left ventricular systolic function is severely decreased with an ejection fraction of 21 % by Watters's method of disks. Global left ventricular hypokinesis.   2. Compared to the transthoracic echocardiogram performed on 5/1/2025, Left ventricular function is significantly worse on today's study, also degree of mitral regurgitation is worse as well.    < end of copied text >  < from: TTE W or WO Ultrasound Enhancing Agent (05.01.25 @ 10:29) >   1. The left ventricular cavity is normal in size. Left ventricular wall thickness is normal. Left ventricular systolic function is mildly decreased with an ejection fraction visually estimated at 45 to 50%. There are regional wall motion abnormalities present. Hypokinesis of the basal to mid inferior wall and basal inferolateral wall. There is mild (grade 1) left ventricular diastolic dysfunction.   2. Normal right ventricular cavity size and normal right ventricular systolic function. Tricuspid annular plane systolic excursion (TAPSE) is 2.6 cm (normal >=1.7 cm).   3. Structurally normal mitral valve with normal leaflet excursion. There is calcification of the mitral valve annulus. There is mild mitral regurgitation.    < end of copied text >    Stress Testing:    -- Obtained from OSH Record --   Pharmacologic NM stress test 4/23/2025  Stress ECG: Occasional PVCs, ECG negative for ischemia  Perfusion: There is a medium to large, predominantly fixed, myocardial perfusion defect in the infero-lateral wall consistent with prior infarct  Stress function: Stress ejection fraction is 40%    LABS:	 	    CARDIAC MARKERS:  Troponin I, High Sensitivity Result: 521.7 ng/L (06-19 @ 15:05)  Troponin I, High Sensitivity Result: 449.7 ng/L (06-19 @ 00:46)  Troponin I, High Sensitivity Result: 488.7 ng/L (06-18 @ 22:45)    20 Jun 2025 06:45    138    |  109    |  47     ----------------------------<  122    4.2     |  20     |  2.67   19 Jun 2025 05:50    139    |  113    |  47     ----------------------------<  74     4.8     |  18     |  2.16   18 Jun 2025 22:45    137    |  111    |  48     ----------------------------<  95     5.4     |  20     |  2.06     Ca    8.4        20 Jun 2025 06:45    TPro  7.0    /  Alb  2.8    /  TBili  0.8    /  DBili  x      /  AST  29     /  ALT  60     /  AlkPhos  137    19 Jun 2025 05:50                        8.6    8.71  )-----------( 123      ( 20 Jun 2025 06:45 )             25.2 ,                       8.9    8.68  )-----------( 141      ( 19 Jun 2025 05:50 )             26.3 ,                       9.6    7.61  )-----------( 154      ( 18 Jun 2025 22:45 )             28.4   pro-BNP: Pro-Brain Natriuretic Peptide: 7166 pg/mL (06.19.25 @ 00:46)    TSH: Thyroid Stimulating Hormone, Serum: 3.110 uU/mL (06-19 @ 00:46)    INR: 1.53 ratio (06-18 @ 22:45)

## 2025-06-20 NOTE — DIETITIAN INITIAL EVALUATION ADULT - PERTINENT LABORATORY DATA
06-20    138  |  109[H]  |  47[H]  ----------------------------<  122[H]  4.2   |  20[L]  |  2.67[H]    Ca    8.4[L]      20 Jun 2025 06:45    TPro  7.0  /  Alb  2.8[L]  /  TBili  0.8  /  DBili  x   /  AST  29  /  ALT  60  /  AlkPhos  137[H]  06-19  POCT Blood Glucose.: 163 mg/dL (06-20-25 @ 11:20)  A1C with Estimated Average Glucose Result: 7.8 % (05-26-25 @ 08:02)  A1C with Estimated Average Glucose Result: 7.8 % (04-28-25 @ 06:47)

## 2025-06-20 NOTE — DIETITIAN INITIAL EVALUATION ADULT - PERTINENT MEDS FT
MEDICATIONS  (STANDING):  albuterol/ipratropium for Nebulization 3 milliLiter(s) Nebulizer every 6 hours  albuterol/ipratropium for Nebulization. 3 milliLiter(s) Nebulizer once  apixaban 2.5 milliGRAM(s) Oral every 12 hours  atorvastatin 80 milliGRAM(s) Oral at bedtime  calcitriol   Capsule 0.25 MICROGram(s) Oral daily  clopidogrel Tablet 75 milliGRAM(s) Oral daily  dextrose 5%. 1000 milliLiter(s) (50 mL/Hr) IV Continuous <Continuous>  dextrose 5%. 1000 milliLiter(s) (100 mL/Hr) IV Continuous <Continuous>  dextrose 50% Injectable 25 Gram(s) IV Push once  dextrose 50% Injectable 12.5 Gram(s) IV Push once  dextrose 50% Injectable 25 Gram(s) IV Push once  ferrous    sulfate 325 milliGRAM(s) Oral daily  glucagon  Injectable 1 milliGRAM(s) IntraMuscular once  insulin lispro (ADMELOG) corrective regimen sliding scale   SubCutaneous three times a day before meals  losartan 25 milliGRAM(s) Oral daily  metoprolol succinate ER 25 milliGRAM(s) Oral daily  piperacillin/tazobactam IVPB.. 3.375 Gram(s) IV Intermittent every 8 hours  tamsulosin 0.4 milliGRAM(s) Oral at bedtime  vancomycin  IVPB 500 milliGRAM(s) IV Intermittent once    MEDICATIONS  (PRN):  acetaminophen     Tablet .. 650 milliGRAM(s) Oral every 6 hours PRN Temp greater or equal to 38C (100.4F), Mild Pain (1 - 3)  dextrose Oral Gel 15 Gram(s) Oral once PRN Blood Glucose LESS THAN 70 milliGRAM(s)/deciliter  melatonin 3 milliGRAM(s) Oral at bedtime PRN Insomnia

## 2025-06-20 NOTE — PROGRESS NOTE ADULT - ASSESSMENT
85 M CAD s/p Robotic assisted MIDCAB x 1 2019 (LIMA->LAD) HFrEF, afib, bradycardia s/p micra pacemaker, DM, CKD, CVA, iron deficiency anemia, recent NSTEMI p/w cp, Sepsis likely 2/2 pna  LVEF 21% TTE 5/25 BNP 7k  trop 448 ->520, likely renal retention  no interpretable EKG, V paced ACS unlikely   OPD ischemia workup  Cr 2.16 Consider add losartan 25/d, if SBP toelrates spironolactone 25/d continue metoprolol    We would recommend to continue medical therapy as noted above and add Plavix for the recent NSTEMI. 85 M CAD s/p Robotic assisted MIDCAB x 1 2019 (LIMA->LAD) HFrEF, afib, bradycardia s/p micra pacemaker, DM, CKD, CVA, iron deficiency anemia, recent NSTEMI p/w cp, Sepsis likely 2/2 pna  LVEF 21% TTE 5/25  BNP 7k  trop 448 ->520, likely renal retention  EKG V paced, ACS unlikely   creat 2.16->2.67    pt denies any chest pain, +mild dyspnea with mild wheezing     -cont on Toprol 25/d  -supplemental O2 as needed, cont Duoneb for wheezing, Abx as per ID  -dc IVF in setting of low EF, lasix dosing as per renal, monitor renal function and electrolytes   -will hold losartan if creat cont to rise   -pt was recently here with NSTEMI and was transferred to [facility] for cardiac cath ->recommended to continue medical therapy, no cardiac cath performed 2/2 worsening renal function, Plavix was then added  -cont Eliquis 2.5 bid, Plavix 75/d, monitor h/h closely  -cont statin   will follow

## 2025-06-20 NOTE — PHYSICAL THERAPY INITIAL EVALUATION ADULT - MD/RN NOTIFIED
Constitutional: (+) fever  (+)chills  (-)sweats  Eyes/ENT: (-) blurry vision, (-) epistaxis  (-)rhinorrhea   (-) sore throat    Cardiovascular: (-) chest pain, (-) palpitations (-) edema   Respiratory: (-) cough, (-) shortness of breath   Gastrointestinal: (-)nausea  (-)vomiting, (-) diarrhea  (-) abdominal pain   :  (-)dysuria, (-)frequency, (-)urgency, (-)hematuria  Musculoskeletal: (-) neck pain, (-) back pain, (-) joint pain  Integumentary: (-) rash, (-) edema  Neurological: (-) headache, (-) altered mental status  (-)LOC
yes

## 2025-06-20 NOTE — PHYSICAL THERAPY INITIAL EVALUATION ADULT - FOLLOWS COMMANDS/ANSWERS QUESTIONS, REHAB EVAL
able to follow single-step instructions/unable to follow multi-step instructions/unable to answer questions

## 2025-06-20 NOTE — PHYSICAL THERAPY INITIAL EVALUATION ADULT - ADDITIONAL COMMENTS
Patient is currently a [facility] Prior to rehab patient lived in a private house with daughter and son in law and grand children. Reports 6 steps with rails to enter the house, another 4 steps with rails inside the house. Patient ambulatory with a cane.    Patient left in bed, NAD, informed RN condom cath fell off following mobility, bed alarm on, call bell within reach, head of bed elevated >30 degrees, no complaints of chest pain and or discomfort throughout. KHARI Ibrahim aware of PT

## 2025-06-20 NOTE — DIETITIAN INITIAL EVALUATION ADULT - OTHER INFO
Pt visited at bedside, family member present at the time of visit. Pt reports poor PO intake x 3 days. Reports following a low sodium/ consistent carbohydrate and taking Suplena PTA. Reports NKFA. Reports wearing upper and lower dentures. Swallow evaluation completed on 06/20 with SLP recommendations of puree/mildly thick liquids. Reports usual body weight ~ 132 lbs. Pt currently weighs ~ 142 lbs (? accuracy, no edema noted). wt trend reflects ~7 % wt gain (unknown time frame) Pt denies any N/V/D/C and abdominal pain at this time. Pt made aware RD remains available.

## 2025-06-20 NOTE — PHYSICAL THERAPY INITIAL EVALUATION ADULT - PERTINENT HX OF CURRENT PROBLEM, REHAB EVAL
85-year-old male with past medical history of hypertension, stage IV CKD, CVA, type 2 diabetes, A-fib, iron deficiency anemia, bradycardia status post pacemaker, recent NSTEMI, ischemic cardiomyopathy who presented to the ED from [facility] due to left-sided chest pain. He denies any fevers, chills, coughing, heart palpitations, shortness of breath, or other complaints.   On presentation, the patient was hypothermic 92.7 otherwise stable vital signs. EKG with paced rhythm. Labs notable for WBC 2.2, hemoglobin 9.6, troponin 488, potassium 5.4, creatinine 2.6 (around baseline), lactate 1.1, proBNP 7,166, UA negative for UTI, COVID/flu negative. CT chest showed small focal airspace opacity in the right upper lobe with associated cavitation and scattered patchy groundglass opacities throughout the lungs with a predominance in the right upper lobe likely infectious in etiology. Small bilateral pleural effusions with adjacent compressive atelectasis.

## 2025-06-20 NOTE — CONSULT NOTE ADULT - ASSESSMENT
85-year-old male with past medical history of hypertension, stage IV CKD, CVA, type 2 diabetes, A-fib, iron deficiency anemia, bradycardia status post pacemaker, recent NSTEMI, ischemic cardiomyopathy who presented to the ED from [facility] due to left-sided chest pain. Found with elevated troponin(downtrending), sepsis likely 2/2 pna.  CT 1. Small focal airspace opacity in the right upper lobe with associated   cavitation and scattered patchy groundglass opacities throughout the   lungs with a predominance in the right upper lobe likely infectious in   etiology.  2. Small bilateral pleural effusions with adjacent compressive   atelectasis.  last admission was for UTI and we treated with ceftriaxone   would give broader coverage  He is MRSA pcr positive and there is higher likelihood that he has a MRSA pneumonia   PAtient on oxygen and coughing during my exam     Plan:  start IV vancomycin   send vancomycin trough with target AUC/LORELEI 400-600   (therapeutic drug monitoring required with IV vancomycin)  continue Zosyn   monitor creatinine and appreciate nephrology   if sputum production send sputum culture  follow blood cultures   Streptococcus pneumonia na legionella urine ag  good control of his HR and his Afib   monitor ins and outs and perform bladder scans at least once a day       Discussed plan with Dr Bennie Pickens, DO  Chief, Infectious Disease at [facility]  Reachable via [employer] Teams or ID office: [phone]  Weekdays: After 5pm, please call [phone] for all inquiries and new consults  Weekends: Message on-call infectious disease physician via teams (see [place])

## 2025-06-20 NOTE — DIETITIAN INITIAL EVALUATION ADULT - NS FNS DIET ORDER
see chief complaint quote Diet, Pureed:   Consistent Carbohydrate {Evening Snack}  Mildly Thick Liquids (MILDTHICKLIQS) (06-20-25 @ 15:15)

## 2025-06-20 NOTE — DIETITIAN INITIAL EVALUATION ADULT - ADD RECOMMEND
1. Recommend multivitamin to optimize micronutrient intake   2. Antibiotic regimen in place, consider probiotics per MD discretion

## 2025-06-20 NOTE — PROGRESS NOTE ADULT - ASSESSMENT
85-year-old male with past medical history of hypertension, stage IV CKD, CVA, type 2 diabetes, A-fib, iron deficiency anemia, bradycardia status post pacemaker, recent NSTEMI, ischemic cardiomyopathy who presented to the ED from [facility] due to left-sided chest pain. Found with elevated troponin(downtrending), sepsis likely 2/2 pna. Admit to telemetry.       Problem/Plan - 1:  ·  Problem: Chest pain.   ·  Plan: - Atypical chest pain  - Troponin 491>>488; NSTEMI (last admission trop was >92899), managed conservatively  - Limited echo 5/29: Left ventricular systolic function is severely decreased with an ejection fraction of 21 % by Watters's method of disks. Global left ventricular hypokinesis.  - EKG reviewed: unchanged from previous  - Monitor on telemetry  - c/w home meds: Plavix, Eliquis, metoprolol, Lipitor  -Added losartan per cardio recs, if BP permits will add spironolactone  -Monitor creatinine closely.     2. AOCD. No active gross bleeding. Trend CBC.     Problem/Plan - 3:  ·  Problem: Sepsis.   ·  Plan: - likely due to bilateral pna  - hypothermic, leukopenic  - Lactic acid 1.1  - CT chest: small focal airspace opacity in the right upper lobe with associated cavitation and scattered patchy groundglass opacities throughout the lungs with a predominance in the right upper lobe likely infectious in etiology. Small bilateral pleural effusions with adjacent compressive atelectasis.  - COVID/flu negative  - c/w Abx: vancomycin and zosyn. MRSA and staph PCR positive. Vancomycin level reviewed (Needs monitoring for therapeutic levels). Consulted and discussed with ID Dr Christianson about it. Follow further recs.    stop IVF.       Problem/Plan - 5:  ·  Problem: Acute kidney injury superimposed on CKD stage 4. on losartan. Monitor creatinine closely. Might need renal consult if continues to worsen.     Problem/Plan - 6:  ·  Problem: Hyperkalemia.   ·  Plan: - k 5.4  - given lokelma  improved.      Problem/Plan - 8:  ·  Problem: Chronic atrial fibrillation.   ·  Plan: - Eliquis and metoprolol. Rate controlled. Watch for any active gross bleeding.     Problem/Plan - 9:  ·  Problem: DM2 (diabetes mellitus, type 2).   ·  Plan: - Home meds: glipizide  cont current management. Follow finger sticks.     Problem/Plan - 10:  ·  Problem: BPH (benign prostatic hyperplasia).   ·  Plan; resumed tamsulosin.    Problem/Plan - 11:  ·  Problem: Prophylactic measure.   ·  Plan: - DVT ppx: On Eliquis for Afib    FC  Called daughter at [phone] and updated her about her father's current condition.     Time spent by me managing the patient including but not limited to reviewing the chart, discussion with the IDR team (nurse//care manager/ACP), physical exam and assessment and plan is 54 mins

## 2025-06-20 NOTE — DIETITIAN INITIAL EVALUATION ADULT - OTHER CALCULATIONS
Ht: 5'2" (per pt family report/ chart review)   Current wt: (06/19) 64.8 kg (? Accuracy, pt family reports wt of ~132 lbs - Suggest obtaining new wt)   Ideal Body Weight: ~54 kg +/- 10%

## 2025-06-20 NOTE — DIETITIAN INITIAL EVALUATION ADULT - PROBLEM SELECTOR PLAN 3
- likely due to pna  - hypothermic, leukopenic  - Lactic acid 1.1  - CT chest: small focal airspace opacity in the right upper lobe with associated cavitation and scattered patchy groundglass opacities throughout the lungs with a predominance in the right upper lobe likely infectious in etiology. Small bilateral pleural effusions with adjacent compressive atelectasis.  - COVID/flu negative  - c/w Abx: vancomycin, zosyn  - Gentle IVF for 24 hrs given CHF  - Follow up cultures and deescalate as needed   - F/u sputum culture, urine legionella, MRSA screen, mycoplasma  - Tylenol for fever

## 2025-06-20 NOTE — CONSULT NOTE ADULT - SUBJECTIVE AND OBJECTIVE BOX
[facility]  INFECTIOUS DISEASES   [address] [place] [address]  Tel: [phone]     Fax: [phone]  ==============================================================================  DO Gale Gross MD Sehrish Shahid, MD Alexandra Gutman, NP   ==============================================================================    JOSEFINA KUMAR  MRN-[id number]  Male  85y ([birth year])        Patient is a 85y old  Male who presents with a chief complaint of Sepsis likely 2/2 pna (20 Jun 2025 13:21)      HPI:  85-year-old male with past medical history of hypertension, stage IV CKD, CVA, type 2 diabetes, A-fib, iron deficiency anemia, bradycardia status post pacemaker, recent NSTEMI, ischemic cardiomyopathy who presented to the ED from [facility] due to left-sided chest pain. He denies any fevers, chills, coughing, heart palpitations, shortness of breath, or other complaints.   On presentation, the patient was hypothermic 92.7 otherwise stable vital signs. EKG with paced rhythm. Labs notable for WBC 2.2, hemoglobin 9.6, troponin 488, potassium 5.4, creatinine 2.6 (around baseline), lactate 1.1, proBNP 7,166, UA negative for UTI, COVID/flu negative. CT chest showed small focal airspace opacity in the right upper lobe with associated cavitation and scattered patchy groundglass opacities throughout the   lungs with a predominance in the right upper lobe likely infectious in etiology. Small bilateral pleural effusions with adjacent compressive atelectasis.   (19 Jun 2025 03:46)      ID consulted for workup and antibiotic management     PAST MEDICAL & SURGICAL HISTORY:  Type 2 diabetes mellitus without complication      CVA (cerebral vascular accident)      Non-ST elevation (NSTEMI) myocardial infarction      CAD (coronary artery disease)      CKD (chronic kidney disease), stage III      HTN (hypertension)      HLD (hyperlipidemia)      Status post placement of implantable loop recorder      H/O carpal tunnel repair          Allergies  No Known Allergies        ANTIMICROBIALS:  piperacillin/tazobactam IVPB.. 3.375 every 8 hours  vancomycin  IVPB 500 once      MEDICATIONS  (STANDING):  piperacillin/tazobactam IVPB.   200 mL/Hr IV Intermittent (06-18-25 @ 22:55)    piperacillin/tazobactam IVPB..   25 mL/Hr IV Intermittent (06-20-25 @ 10:26)   25 mL/Hr IV Intermittent (06-20-25 @ 00:55)   25 mL/Hr IV Intermittent (06-19-25 @ 16:29)   25 mL/Hr IV Intermittent (06-19-25 @ 06:19)    vancomycin  IVPB   250 mL/Hr IV Intermittent (06-19-25 @ 23:20)    vancomycin  IVPB.   250 mL/Hr IV Intermittent (06-18-25 @ 23:35)        OTHER MEDS: MEDICATIONS  (STANDING):  acetaminophen     Tablet .. 650 every 6 hours PRN  albuterol/ipratropium for Nebulization 3 every 6 hours  albuterol/ipratropium for Nebulization. 3 once  apixaban 2.5 every 12 hours  atorvastatin 80 at bedtime  clopidogrel Tablet 75 daily  dextrose 50% Injectable 25 once  dextrose 50% Injectable 12.5 once  dextrose 50% Injectable 25 once  dextrose Oral Gel 15 once PRN  glucagon  Injectable 1 once  insulin lispro (ADMELOG) corrective regimen sliding scale  three times a day before meals  losartan 25 daily  melatonin 3 at bedtime PRN  metoprolol succinate ER 25 daily  tamsulosin 0.4 at bedtime      SOCIAL HISTORY:     Smoking Cigarettes [ ]Active [ ] Former [ ]Denies   ETOH [ ]denies [ ]Former [ ]Current Use denies   Drug Use [ ]Never [ ] Former [ ] Active     FAMILY HISTORY:  Family history of heart disease    Family history of uterine cancer (Sibling)        REVIEW OF SYSTEMS  [  ] ROS unobtainable because:    [  ] All other systems negative except as noted below:	    Constitutional:  [ ] fever [ ] chills  [ ] weight loss  [ ] weakness  Skin:  [ ] rash [ ] phlebitis	  Eyes: [ ] icterus [ ] pain  [ ] discharge	  ENMT: [ ] sore throat  [ ] thrush [ ] ulcers [ ] exudates  Respiratory: [ ] dyspnea [ ] hemoptysis [ ] cough [ ] sputum	  Cardiovascular:  [ ] chest pain [ ] palpitations [ ] edema	  Gastrointestinal:  [ ] nausea [ ] vomiting [ ] diarrhea [ ] constipation [ ] pain	  Genitourinary:  [ ] dysuria [ ] frequency [ ] hematuria [ ] discharge [ ] flank pain  [ ] incontinence  Musculoskeletal:  [ ] myalgias [ ] arthralgias [ ] arthritis  [ ] back pain  Neurological:  [ ] headache [ ] seizures  [ ] confusion/altered mental status  Psychiatric:  [ ] anxiety [ ] depression	  Hematology/Lymphatics:  [ ] lymphadenopathy  Endocrine:  [ ] adrenal [ ] thyroid  Allergic/Immunologic:	 [ ] transplant [ ] seasonal    Vital Signs Last 24 Hrs  T(F): 98.4 (06-20-25 @ 10:35), Max: 98.4 (06-20-25 @ 00:18)    Vital Signs Last 24 Hrs  HR: 89 (06-20-25 @ 10:35) (66 - 89)  BP: 115/67 (06-20-25 @ 10:35) (113/70 - 149/80)  RR: 18 (06-20-25 @ 10:35)  SpO2: 97% (06-20-25 @ 10:35) (93% - 98%)  Wt(kg): --    PHYSICAL EXAM:  Constitutional: non-toxic, no distress  HEAD/EYES: anicteric, no conjunctival injection  ENT:  supple, no thrush  Cardiovascular:   normal S1, S2, no murmur, no edema  Respiratory:  clear BS bilaterally, no wheezes, no rales  GI:  soft, non-tender, normal bowel sounds  :  no reynolds, no CVA tenderness  Musculoskeletal:  no synovitis, normal ROM  Neurologic: awake and alert, normal strength, no focal findings  Skin:  no rash, no erythema, no phlebitis  Heme/Onc: no lymphadenopathy   Psychiatric:  awake, alert, appropriate mood        WBC  WBC Count: 8.71 K/uL (06-20 @ 06:45)  WBC Count: 8.68 K/uL (06-19 @ 05:50)  WBC Count: 7.61 K/uL (06-18 @ 22:45)        CBC                        8.6    8.71  )-----------( 123      ( 20 Jun 2025 06:45 )             25.2     BMP/CMP  06-20    138  |  109[H]  |  47[H]  ----------------------------<  122[H]  4.2   |  20[L]  |  2.67[H]    Ca    8.4[L]      20 Jun 2025 06:45    TPro  7.0  /  Alb  2.8[L]  /  TBili  0.8  /  DBili  x   /  AST  29  /  ALT  60  /  AlkPhos  137[H]  06-19    Creatinine Trend  Creatinine Trend: 2.67<--, 2.16<--, 2.06<--, 2.28<--, 2.33<--, 2.12<--        Lactate, Blood: 1.1 mmol/L (06-18-25 @ 22:45)            MICROBIOLOGY:  Blood Blood-Peripheral  06-18-25   No growth at 24 hours  --  --      Blood Blood-Peripheral  05-27-25   No growth at 5 days  --  --      Blood Blood-Peripheral  05-27-25   No growth at 5 days  --  --      Catheterized  05-24-25   >100,000 CFU/ml Klebsiella pneumoniae  --  Klebsiella pneumoniae      Blood Blood-Peripheral  05-24-25   No growth at 5 days  --  --      Blood Blood-Peripheral  05-24-25   No growth at 5 days  --  --              Vancomycin Level, Random: 18.1 ug/mL (06-20-25 @ 06:45)  Vancomycin Level, Random: 12.9 ug/mL (06-19-25 @ 15:05)  v              RADIOLOGY:      ACC: [id number] EXAM:  XR CHEST PORTABLE URGENT 1V   ORDERED BY: JOSEFINA NOWAK     PROCEDURE DATE:  06/18/2025          INTERPRETATION:  XR CHEST URGENT dated 6/18/2025 10:56 PM    CLINICAL INFORMATION: Male, 85 years old.  Sepsis.    PRIOR STUDIES: 5/24/2025    FINDINGS/  IMPRESSION: Unchanged cardiomegaly despite the portable technique.   Increasing opacities are noted over the lower lung zones and developing   bibasilar multifocal pneumonia versus asymmetric pulmonary edema. No   large pleural effusions or pneumothorax. Short interval surveillance   two-view chest to confirm resolution, as clinically indicated is   suggested.    --- End of Report ---            NELLI PETTY MD  This document has been electronically signed. Jun 19 2025 10:46AM      ACC: [id number] EXAM:  CT BRAIN   ORDERED BY: JOSEFINA WEISHERRY     PROCEDURE DATE:  06/18/2025          INTERPRETATION:  CLINICAL INDICATION:  hypothermia, ?AMS, sepsis    TECHNIQUE: Noncontrast CT examination of the head was performed.  Coronal and sagittal reformats were also obtained.    COMPARISON: 05/24/2025    FINDINGS:  Diagnostic accuracy is limited secondary to patient motion.  INTRA-AXIAL: No intracranial mass effect, acute hemorrhage or midline   shift.  There are periventricular and subcortical white matter   hypodensities, consistent with microvascular type changes.   Encephalomalacia/gliosis in the left posterior parietal lobe.  EXTRA-AXIAL: No extra-axial fluid collection is present.  VENTRICLES AND SULCI: No hydrocephalus.  VISUALIZED SINUSES: Mild mucosal thickening.  VISUALIZED MASTOIDS: Clear.  CALVARIUM: Normal.    IMPRESSION:    No intracranial mass effect, acute hemorrhage or midline shift.    If the patient's symptoms persist, consider short interval follow-up head   CT or brain MRI if there are no MRI contraindications.    --- End of Report ---            KAMLA CROWDER MD; Attending Radiologist  This document has been electronically signed. Jun 19 2025  1:11AM    ACC: [id number] EXAM:  CT CHEST   ORDERED BY: JOSEFINA ELIU     PROCEDURE DATE:  06/18/2025          INTERPRETATION:  CLINICAL INFORMATION: Bilateral chest pain. Hypothermia.   Code sepsis.    COMPARISON: CT of the chest from 10/16/2015.    CONTRAST/COMPLICATIONS:  IV Contrast: NONE  Oral Contrast: NONE.    PROCEDURE:  CT of the Chest was performed.  Sagittal and coronal reformats were performed.    FINDINGS:    LUNGS AND LARGE AIRWAYS: Patent central airways. Small focal airspace   opacity in the right upper lobe with associated cavitation. Scattered   patchy groundglass opacities in the right upper lobe and, to a lesser   extent, in the left upper and bilateral lower lobes. Bibasilar   subsegmental atelectasis including bilateral lower lobe compressive   atelectasis. Intrafissural lymph nodes in the minor and right major   fissure.  PLEURA: Small bilateral pleural effusions. Small loculated fluid in the   right major fissure.  VESSELS: Within normal limits.  HEART: Heart size is enlarged. No pericardial effusion. Coronary artery   calcifications. Cardiac conduction device in the right ventricle.  MEDIASTINUM AND SHARLA: No lymphadenopathy. Prominent mediastinal lymph   nodes which measure less than 1 cm in short axis and do not meet the size   criteria for enlargement.  CHEST WALL AND LOWER NECK: Mild bilateral gynecomastia.  VISUALIZED UPPER ABDOMEN: Within normal limits.  BONES: Degenerative changes of the spine.    IMPRESSION:  1. Small focal airspace opacity in the right upper lobe with associated   cavitation and scattered patchy groundglass opacities throughout the   lungs with a predominance in the right upper lobe likely infectious in   etiology.  2. Small bilateral pleural effusions with adjacent compressive   atelectasis.        --- End of Report ---            DEAN MIX MD  This document has been electronically signed. Jun 19 2025  1:00AM      I have personally reviewed the above imaging  [facility]  INFECTIOUS DISEASES   [address] [place] [address]  Tel: [phone]     Fax: [phone]  ==============================================================================  DO Gale Gross MD Sehrish Shahid, MD Alexandra Gutman, NP   ==============================================================================    JOSEFINA KUMAR  MRN-[id number]  Male  85y ([birth year])        Patient is a 85y old  Male who presents with a chief complaint of Sepsis likely 2/2 pna (20 Jun 2025 13:21)      HPI:  85-year-old male with past medical history of hypertension, stage IV CKD, CVA, type 2 diabetes, A-fib, iron deficiency anemia, bradycardia status post pacemaker, recent NSTEMI, ischemic cardiomyopathy who presented to the ED from [facility] due to left-sided chest pain. He denies any fevers, chills, coughing, heart palpitations, shortness of breath, or other complaints.   On presentation, the patient was hypothermic 92.7 otherwise stable vital signs. EKG with paced rhythm. Labs notable for WBC 2.2, hemoglobin 9.6, troponin 488, potassium 5.4, creatinine 2.6 (around baseline), lactate 1.1, proBNP 7,166, UA negative for UTI, COVID/flu negative. CT chest showed small focal airspace opacity in the right upper lobe with associated cavitation and scattered patchy groundglass opacities throughout the   lungs with a predominance in the right upper lobe likely infectious in etiology. Small bilateral pleural effusions with adjacent compressive atelectasis.   (19 Jun 2025 03:46)      ID consulted for workup and antibiotic management     PAST MEDICAL & SURGICAL HISTORY:  Type 2 diabetes mellitus without complication      CVA (cerebral vascular accident)      Non-ST elevation (NSTEMI) myocardial infarction      CAD (coronary artery disease)      CKD (chronic kidney disease), stage III      HTN (hypertension)      HLD (hyperlipidemia)      Status post placement of implantable loop recorder      H/O carpal tunnel repair          Allergies  No Known Allergies        ANTIMICROBIALS:  piperacillin/tazobactam IVPB.. 3.375 every 8 hours  vancomycin  IVPB 500 once      MEDICATIONS  (STANDING):  piperacillin/tazobactam IVPB.   200 mL/Hr IV Intermittent (06-18-25 @ 22:55)    piperacillin/tazobactam IVPB..   25 mL/Hr IV Intermittent (06-20-25 @ 10:26)   25 mL/Hr IV Intermittent (06-20-25 @ 00:55)   25 mL/Hr IV Intermittent (06-19-25 @ 16:29)   25 mL/Hr IV Intermittent (06-19-25 @ 06:19)    vancomycin  IVPB   250 mL/Hr IV Intermittent (06-19-25 @ 23:20)    vancomycin  IVPB.   250 mL/Hr IV Intermittent (06-18-25 @ 23:35)        OTHER MEDS: MEDICATIONS  (STANDING):  acetaminophen     Tablet .. 650 every 6 hours PRN  albuterol/ipratropium for Nebulization 3 every 6 hours  albuterol/ipratropium for Nebulization. 3 once  apixaban 2.5 every 12 hours  atorvastatin 80 at bedtime  clopidogrel Tablet 75 daily  dextrose 50% Injectable 25 once  dextrose 50% Injectable 12.5 once  dextrose 50% Injectable 25 once  dextrose Oral Gel 15 once PRN  glucagon  Injectable 1 once  insulin lispro (ADMELOG) corrective regimen sliding scale  three times a day before meals  losartan 25 daily  melatonin 3 at bedtime PRN  metoprolol succinate ER 25 daily  tamsulosin 0.4 at bedtime      SOCIAL HISTORY:     Smoking Cigarettes [X ]Active [ ] Former [ ]Denies   ETOH [X ]denies [ ]Former [ ]Current Use denies   Drug Use [X ]Never [ ] Former [ ] Active     FAMILY HISTORY:  Family history of heart disease    Family history of uterine cancer (Sibling)        REVIEW OF SYSTEMS  [  ] ROS unobtainable because:    [  X] All other systems negative except as noted below:	    Constitutional:  [ ] fever [ ] chills  [ ] weight loss  [ ] weakness  Skin:  [ ] rash [ ] phlebitis	  Eyes: [ ] icterus [ ] pain  [ ] discharge	  ENMT: [ ] sore throat  [ ] thrush [ ] ulcers [ ] exudates  Respiratory: [x ] dyspnea [ ] hemoptysis [ x] cough [ ] sputum	  Cardiovascular:  [ ] chest pain [ ] palpitations [ ] edema	  Gastrointestinal:  [ ] nausea [ ] vomiting [ ] diarrhea [ ] constipation [ ] pain	  Genitourinary:  [ ] dysuria [ ] frequency [ ] hematuria [ ] discharge [ ] flank pain  [ ] incontinence  Musculoskeletal:  [ ] myalgias [ ] arthralgias [ ] arthritis  [ ] back pain  Neurological:  [ ] headache [ ] seizures  [ ] confusion/altered mental status  Psychiatric:  [ ] anxiety [ ] depression	  Hematology/Lymphatics:  [ ] lymphadenopathy  Endocrine:  [ ] adrenal [ ] thyroid  Allergic/Immunologic:	 [ ] transplant [ ] seasonal    Vital Signs Last 24 Hrs  T(F): 98.4 (06-20-25 @ 10:35), Max: 98.4 (06-20-25 @ 00:18)    Vital Signs Last 24 Hrs  HR: 89 (06-20-25 @ 10:35) (66 - 89)  BP: 115/67 (06-20-25 @ 10:35) (113/70 - 149/80)  RR: 18 (06-20-25 @ 10:35)  SpO2: 97% (06-20-25 @ 10:35) (93% - 98%)  Wt(kg): --    PHYSICAL EXAM:  Constitutional: non-toxic, no distress, on nasal cannula   HEAD/EYES: anicteric, no conjunctival injection  ENT:  supple, no thrush  Cardiovascular:   normal S1, S2, no murmur, no edema  Respiratory:  coarse BS bilaterally, no wheezes, no rales  GI:  soft, non-tender, normal bowel sounds  :  no reynolds, no CVA tenderness  Musculoskeletal:  no synovitis, normal ROM  Neurologic: awake and alert, normal strength, no focal findings  Skin:  no rash, no erythema, no phlebitis  Heme/Onc: no lymphadenopathy   Psychiatric:  awake, alert, appropriate mood        WBC  WBC Count: 8.71 K/uL (06-20 @ 06:45)  WBC Count: 8.68 K/uL (06-19 @ 05:50)  WBC Count: 7.61 K/uL (06-18 @ 22:45)        CBC                        8.6    8.71  )-----------( 123      ( 20 Jun 2025 06:45 )             25.2     BMP/CMP  06-20    138  |  109[H]  |  47[H]  ----------------------------<  122[H]  4.2   |  20[L]  |  2.67[H]    Ca    8.4[L]      20 Jun 2025 06:45    TPro  7.0  /  Alb  2.8[L]  /  TBili  0.8  /  DBili  x   /  AST  29  /  ALT  60  /  AlkPhos  137[H]  06-19    Creatinine Trend  Creatinine Trend: 2.67<--, 2.16<--, 2.06<--, 2.28<--, 2.33<--, 2.12<--        Lactate, Blood: 1.1 mmol/L (06-18-25 @ 22:45)        MICROBIOLOGY:  Blood Blood-Peripheral  06-18-25   No growth at 24 hours  --  --      Blood Blood-Peripheral  05-27-25   No growth at 5 days  --  --      Blood Blood-Peripheral  05-27-25   No growth at 5 days  --  --      Catheterized  05-24-25   >100,000 CFU/ml Klebsiella pneumoniae  --  Klebsiella pneumoniae      Blood Blood-Peripheral  05-24-25   No growth at 5 days  --  --      Blood Blood-Peripheral  05-24-25   No growth at 5 days  --  --      Vancomycin Level, Random: 18.1 ug/mL (06-20-25 @ 06:45)  Vancomycin Level, Random: 12.9 ug/mL (06-19-25 @ 15:05)      RADIOLOGY:      ACC: [id number] EXAM:  XR CHEST PORTABLE URGENT 1V   ORDERED BY: JOSEFINA NOWAK     PROCEDURE DATE:  06/18/2025          INTERPRETATION:  XR CHEST URGENT dated 6/18/2025 10:56 PM    CLINICAL INFORMATION: Male, 85 years old.  Sepsis.    PRIOR STUDIES: 5/24/2025    FINDINGS/  IMPRESSION: Unchanged cardiomegaly despite the portable technique.   Increasing opacities are noted over the lower lung zones and developing   bibasilar multifocal pneumonia versus asymmetric pulmonary edema. No   large pleural effusions or pneumothorax. Short interval surveillance   two-view chest to confirm resolution, as clinically indicated is   suggested.      NELLI PETTY MD  This document has been electronically signed. Jun 19 2025 10:46AM      ACC: [id number] EXAM:  CT BRAIN   ORDERED BY: JOSEFINA WEISHERRY     PROCEDURE DATE:  06/18/2025          INTERPRETATION:  CLINICAL INDICATION:  hypothermia, ?AMS, sepsis    TECHNIQUE: Noncontrast CT examination of the head was performed.  Coronal and sagittal reformats were also obtained.    COMPARISON: 05/24/2025    FINDINGS:  Diagnostic accuracy is limited secondary to patient motion.  INTRA-AXIAL: No intracranial mass effect, acute hemorrhage or midline   shift.  There are periventricular and subcortical white matter   hypodensities, consistent with microvascular type changes.   Encephalomalacia/gliosis in the left posterior parietal lobe.  EXTRA-AXIAL: No extra-axial fluid collection is present.  VENTRICLES AND SULCI: No hydrocephalus.  VISUALIZED SINUSES: Mild mucosal thickening.  VISUALIZED MASTOIDS: Clear.  CALVARIUM: Normal.    IMPRESSION:    No intracranial mass effect, acute hemorrhage or midline shift.    If the patient's symptoms persist, consider short interval follow-up head   CT or brain MRI if there are no MRI contraindications.    --- End of Report ---            KAMLA CROWDER MD; Attending Radiologist  This document has been electronically signed. Jun 19 2025  1:11AM    ACC: [id number] EXAM:  CT CHEST   ORDERED BY: JOSEFINA ELIU     PROCEDURE DATE:  06/18/2025          INTERPRETATION:  CLINICAL INFORMATION: Bilateral chest pain. Hypothermia.   Code sepsis.    COMPARISON: CT of the chest from 10/16/2015.    CONTRAST/COMPLICATIONS:  IV Contrast: NONE  Oral Contrast: NONE.    PROCEDURE:  CT of the Chest was performed.  Sagittal and coronal reformats were performed.    FINDINGS:    LUNGS AND LARGE AIRWAYS: Patent central airways. Small focal airspace   opacity in the right upper lobe with associated cavitation. Scattered   patchy groundglass opacities in the right upper lobe and, to a lesser   extent, in the left upper and bilateral lower lobes. Bibasilar   subsegmental atelectasis including bilateral lower lobe compressive   atelectasis. Intrafissural lymph nodes in the minor and right major   fissure.  PLEURA: Small bilateral pleural effusions. Small loculated fluid in the   right major fissure.  VESSELS: Within normal limits.  HEART: Heart size is enlarged. No pericardial effusion. Coronary artery   calcifications. Cardiac conduction device in the right ventricle.  MEDIASTINUM AND SHARLA: No lymphadenopathy. Prominent mediastinal lymph   nodes which measure less than 1 cm in short axis and do not meet the size   criteria for enlargement.  CHEST WALL AND LOWER NECK: Mild bilateral gynecomastia.  VISUALIZED UPPER ABDOMEN: Within normal limits.  BONES: Degenerative changes of the spine.    IMPRESSION:  1. Small focal airspace opacity in the right upper lobe with associated   cavitation and scattered patchy groundglass opacities throughout the   lungs with a predominance in the right upper lobe likely infectious in   etiology.  2. Small bilateral pleural effusions with adjacent compressive   atelectasis.        DEAN MIX MD  This document has been electronically signed. Jun 19 2025  1:00AM      I have personally reviewed the above imaging

## 2025-06-20 NOTE — PHYSICAL THERAPY INITIAL EVALUATION ADULT - GENERAL OBSERVATIONS, REHAB EVAL
Chart (EMR) reviewed. Pt seen for wound care assessment. Received supine c HOB elevated, NAD. +cardiac monitor donned, +O2 via nasal cannula, +heplock. Lethargic but arousable. Ox2. Able to follow simple commands/directions.

## 2025-06-20 NOTE — SWALLOW BEDSIDE ASSESSMENT ADULT - COMMENTS
pt seen bedside seated upright on 4LNC, alert and pleasantly confused. pt responded to questions for assessment with fair accuracy, verbalized his wants/needs and followed simple 1 step directives.    Swallow history. Per charting, bedside swallow evaluation completed at [facility] 4/27/25 with recommendations for soft & bite sized with thin liquids. Please see full report for details.     CT chest no cont 6/18/25 IMPRESSION: 1. Small focal airspace opacity in the right upper lobe with associated cavitation and scattered patchy groundglass opacities throughout the lungs with a predominance in the right upper lobe likely infectious in etiology. 2. Small bilateral pleural effusions with adjacent compressive atelectasis.    CT Head no cont 6/18/25 IMPRESSION: No intracranial mass effect, acute hemorrhage or midline shift.    CXR 6/18/25 FINDINGS/IMPRESSION: Unchanged cardiomegaly despite the portable technique. Increasing opacities are noted over the lower lung zones and developing bibasilar multifocal pneumonia versus asymmetric pulmonary edema. No large pleural effusions or pneumothorax. Short interval surveillance two-view chest to confirm resolution, as clinically indicated is suggested. Pt with overt cough after thin liquids, resulting in persistent cough and shortness of breathe after thin liquid. KHARI Yanes provided suctioned and took vital signs. Pt spO2 trending 95-99 post overt cough.

## 2025-06-20 NOTE — DIETITIAN INITIAL EVALUATION ADULT - PROBLEM SELECTOR PLAN 1
- Atypical chest pain  - Troponin 491>>488; NSTEMI (last admission trop was >[id number]), managed conservatively  - Limited echo 5/29: Left ventricular systolic function is severely decreased with an ejection fraction of 21 % by Watters's method of disks. Global left ventricular hypokinesis.  - EKG reviewed: unchanged from previous  - stat EKG for chest pain  - Monitor on telemetry  - c/w home meds: Plavix, Eliquis, metoprolol, Lipitor

## 2025-06-20 NOTE — PROGRESS NOTE ADULT - SUBJECTIVE AND OBJECTIVE BOX
CHIEF COMPLAINT: FU of Bilateral pneumonia  no fever   requiring NC oxygen  no report of any chest pain or diarrhea or active gross bleeding.       PHYSICAL EXAM:    GENERAL: Moderately built, no acute distress   CHEST/LUNG:  No wheezing, no crackles   HEART: Irregular and systolic murmur +  ABDOMEN: Soft, Nontender, Nondistended; Bowel sounds present  EXTREMITIES:  No clubbing, cyanosis, or edema        OBJECTIVE DATA:   Vital Signs Last 24 Hrs  T(C): 36.9 (2025 10:35), Max: 36.9 (2025 00:18)  T(F): 98.4 (2025 10:35), Max: 98.4 (2025 00:18)  HR: 89 (2025 10:35) (66 - 89)  BP: 115/67 (2025 10:35) (113/70 - 149/80)  BP(mean): --  RR: 18 (2025 10:35) (18 - 20)  SpO2: 97% (2025 10:35) (93% - 98%)    Parameters below as of 2025 10:35  Patient On (Oxygen Delivery Method): nasal cannula               Daily     Daily Weight in k.4 (2025 04:49)  LABS:                        8.6    8.71  )-----------( 123      ( 2025 06:45 )             25.2             06-20    138  |  109[H]  |  47[H]  ----------------------------<  122[H]  4.2   |  20[L]  |  2.67[H]    Ca    8.4[L]      2025 06:45    TPro  7.0  /  Alb  2.8[L]  /  TBili  0.8  /  DBili  x   /  AST  29  /  ALT  60  /  AlkPhos  137[H]  06-19              PT/INR - ( 2025 22:45 )   PT: 17.2 sec;   INR: 1.53 ratio         PTT - ( 2025 22:45 )  PTT:43.9 sec  Urinalysis Basic - ( 2025 06:45 )    Color: x / Appearance: x / SG: x / pH: x  Gluc: 122 mg/dL / Ketone: x  / Bili: x / Urobili: x   Blood: x / Protein: x / Nitrite: x   Leuk Esterase: x / RBC: x / WBC x   Sq Epi: x / Non Sq Epi: x / Bacteria: x            CAPILLARY BLOOD GLUCOSE      POCT Blood Glucose.: 163 mg/dL (2025 11:20)      Culture - Blood  Source: Blood Blood-Peripheral  Preliminary Report ():    No growth at 24 hours    Culture - Blood  Source: Blood Blood-Peripheral  Preliminary Report ():    No growth at 24 hours         Interval Radiology studies: reviewed by me  < from: CT Chest No Cont (25 @ 23:58) >    1. Small focal airspace opacity in the right upper lobe with associated   cavitation and scattered patchy groundglass opacities throughout the   lungs with a predominance in the right upper lobe likely infectious in   etiology.  2. Small bilateral pleural effusions with adjacent compressive   atelectasis.    < end of copied text >    Tele reviewed by me showed paced rhythm.     MEDICATIONS  (STANDING):  albuterol/ipratropium for Nebulization 3 milliLiter(s) Nebulizer every 6 hours  albuterol/ipratropium for Nebulization. 3 milliLiter(s) Nebulizer once  apixaban 2.5 milliGRAM(s) Oral every 12 hours  atorvastatin 80 milliGRAM(s) Oral at bedtime  calcitriol   Capsule 0.25 MICROGram(s) Oral daily  clopidogrel Tablet 75 milliGRAM(s) Oral daily  dextrose 5%. 1000 milliLiter(s) (50 mL/Hr) IV Continuous <Continuous>  dextrose 5%. 1000 milliLiter(s) (100 mL/Hr) IV Continuous <Continuous>  dextrose 50% Injectable 25 Gram(s) IV Push once  dextrose 50% Injectable 12.5 Gram(s) IV Push once  dextrose 50% Injectable 25 Gram(s) IV Push once  ferrous    sulfate 325 milliGRAM(s) Oral daily  glucagon  Injectable 1 milliGRAM(s) IntraMuscular once  insulin lispro (ADMELOG) corrective regimen sliding scale   SubCutaneous three times a day before meals  losartan 25 milliGRAM(s) Oral daily  metoprolol succinate ER 25 milliGRAM(s) Oral daily  piperacillin/tazobactam IVPB.. 3.375 Gram(s) IV Intermittent every 8 hours  tamsulosin 0.4 milliGRAM(s) Oral at bedtime  vancomycin  IVPB 500 milliGRAM(s) IV Intermittent once    MEDICATIONS  (PRN):  acetaminophen     Tablet .. 650 milliGRAM(s) Oral every 6 hours PRN Temp greater or equal to 38C (100.4F), Mild Pain (1 - 3)  dextrose Oral Gel 15 Gram(s) Oral once PRN Blood Glucose LESS THAN 70 milliGRAM(s)/deciliter  melatonin 3 milliGRAM(s) Oral at bedtime PRN Insomnia       CHIEF COMPLAINT: FU of Bilateral pneumonia  no fever   requiring NC oxygen  no report of any chest pain or diarrhea or active gross bleeding.       PHYSICAL EXAM:    GENERAL: Moderately built, no acute distress   CHEST/LUNG:  scattered crackles bilaterally right >left  HEART: Irregular and systolic murmur +  ABDOMEN: Soft, Nontender, Nondistended; Bowel sounds present  EXTREMITIES:  No clubbing, cyanosis, or edema        OBJECTIVE DATA:   Vital Signs Last 24 Hrs  T(C): 36.9 (2025 10:35), Max: 36.9 (2025 00:18)  T(F): 98.4 (2025 10:35), Max: 98.4 (2025 00:18)  HR: 89 (2025 10:35) (66 - 89)  BP: 115/67 (2025 10:35) (113/70 - 149/80)  BP(mean): --  RR: 18 (2025 10:35) (18 - 20)  SpO2: 97% (2025 10:35) (93% - 98%)    Parameters below as of 2025 10:35  Patient On (Oxygen Delivery Method): nasal cannula               Daily     Daily Weight in k.4 (2025 04:49)  LABS:                        8.6    8.71  )-----------( 123      ( 2025 06:45 )             25.2             06-20    138  |  109[H]  |  47[H]  ----------------------------<  122[H]  4.2   |  20[L]  |  2.67[H]    Ca    8.4[L]      2025 06:45    TPro  7.0  /  Alb  2.8[L]  /  TBili  0.8  /  DBili  x   /  AST  29  /  ALT  60  /  AlkPhos  137[H]  06-19              PT/INR - ( 2025 22:45 )   PT: 17.2 sec;   INR: 1.53 ratio         PTT - ( 2025 22:45 )  PTT:43.9 sec  Urinalysis Basic - ( 2025 06:45 )    Color: x / Appearance: x / SG: x / pH: x  Gluc: 122 mg/dL / Ketone: x  / Bili: x / Urobili: x   Blood: x / Protein: x / Nitrite: x   Leuk Esterase: x / RBC: x / WBC x   Sq Epi: x / Non Sq Epi: x / Bacteria: x            CAPILLARY BLOOD GLUCOSE      POCT Blood Glucose.: 163 mg/dL (2025 11:20)      Culture - Blood  Source: Blood Blood-Peripheral  Preliminary Report ():    No growth at 24 hours    Culture - Blood  Source: Blood Blood-Peripheral  Preliminary Report ():    No growth at 24 hours         Interval Radiology studies: reviewed by me  < from: CT Chest No Cont (25 @ 23:58) >    1. Small focal airspace opacity in the right upper lobe with associated   cavitation and scattered patchy groundglass opacities throughout the   lungs with a predominance in the right upper lobe likely infectious in   etiology.  2. Small bilateral pleural effusions with adjacent compressive   atelectasis.    < end of copied text >    Tele reviewed by me showed paced rhythm.     MEDICATIONS  (STANDING):  albuterol/ipratropium for Nebulization 3 milliLiter(s) Nebulizer every 6 hours  albuterol/ipratropium for Nebulization. 3 milliLiter(s) Nebulizer once  apixaban 2.5 milliGRAM(s) Oral every 12 hours  atorvastatin 80 milliGRAM(s) Oral at bedtime  calcitriol   Capsule 0.25 MICROGram(s) Oral daily  clopidogrel Tablet 75 milliGRAM(s) Oral daily  dextrose 5%. 1000 milliLiter(s) (50 mL/Hr) IV Continuous <Continuous>  dextrose 5%. 1000 milliLiter(s) (100 mL/Hr) IV Continuous <Continuous>  dextrose 50% Injectable 25 Gram(s) IV Push once  dextrose 50% Injectable 12.5 Gram(s) IV Push once  dextrose 50% Injectable 25 Gram(s) IV Push once  ferrous    sulfate 325 milliGRAM(s) Oral daily  glucagon  Injectable 1 milliGRAM(s) IntraMuscular once  insulin lispro (ADMELOG) corrective regimen sliding scale   SubCutaneous three times a day before meals  losartan 25 milliGRAM(s) Oral daily  metoprolol succinate ER 25 milliGRAM(s) Oral daily  piperacillin/tazobactam IVPB.. 3.375 Gram(s) IV Intermittent every 8 hours  tamsulosin 0.4 milliGRAM(s) Oral at bedtime  vancomycin  IVPB 500 milliGRAM(s) IV Intermittent once    MEDICATIONS  (PRN):  acetaminophen     Tablet .. 650 milliGRAM(s) Oral every 6 hours PRN Temp greater or equal to 38C (100.4F), Mild Pain (1 - 3)  dextrose Oral Gel 15 Gram(s) Oral once PRN Blood Glucose LESS THAN 70 milliGRAM(s)/deciliter  melatonin 3 milliGRAM(s) Oral at bedtime PRN Insomnia

## 2025-06-21 LAB
ANION GAP SERPL CALC-SCNC: 9 MMOL/L — SIGNIFICANT CHANGE UP (ref 5–17)
BUN SERPL-MCNC: 58 MG/DL — HIGH (ref 7–23)
CALCIUM SERPL-MCNC: 8.7 MG/DL — SIGNIFICANT CHANGE UP (ref 8.5–10.1)
CHLORIDE SERPL-SCNC: 105 MMOL/L — SIGNIFICANT CHANGE UP (ref 96–108)
CO2 SERPL-SCNC: 21 MMOL/L — LOW (ref 22–31)
CREAT SERPL-MCNC: 3.84 MG/DL — HIGH (ref 0.5–1.3)
EGFR: 15 ML/MIN/1.73M2 — LOW
EGFR: 15 ML/MIN/1.73M2 — LOW
GLUCOSE BLDC GLUCOMTR-MCNC: 112 MG/DL — HIGH (ref 70–99)
GLUCOSE BLDC GLUCOMTR-MCNC: 116 MG/DL — HIGH (ref 70–99)
GLUCOSE BLDC GLUCOMTR-MCNC: 135 MG/DL — HIGH (ref 70–99)
GLUCOSE BLDC GLUCOMTR-MCNC: 157 MG/DL — HIGH (ref 70–99)
GLUCOSE SERPL-MCNC: 127 MG/DL — HIGH (ref 70–99)
POTASSIUM SERPL-MCNC: 4 MMOL/L — SIGNIFICANT CHANGE UP (ref 3.5–5.3)
POTASSIUM SERPL-SCNC: 4 MMOL/L — SIGNIFICANT CHANGE UP (ref 3.5–5.3)
SODIUM SERPL-SCNC: 135 MMOL/L — SIGNIFICANT CHANGE UP (ref 135–145)
VANCOMYCIN FLD-MCNC: 19.8 UG/ML — SIGNIFICANT CHANGE UP

## 2025-06-21 PROCEDURE — 99233 SBSQ HOSP IP/OBS HIGH 50: CPT

## 2025-06-21 PROCEDURE — 76775 US EXAM ABDO BACK WALL LIM: CPT | Mod: 26

## 2025-06-21 RX ORDER — FUROSEMIDE 10 MG/ML
40 INJECTION INTRAMUSCULAR; INTRAVENOUS DAILY
Refills: 0 | Status: DISCONTINUED | OUTPATIENT
Start: 2025-06-21 | End: 2025-06-21

## 2025-06-21 RX ADMIN — APIXABAN 2.5 MILLIGRAM(S): 5 TABLET, FILM COATED ORAL at 05:17

## 2025-06-21 RX ADMIN — Medication 325 MILLIGRAM(S): at 12:03

## 2025-06-21 RX ADMIN — IPRATROPIUM BROMIDE AND ALBUTEROL SULFATE 3 MILLILITER(S): .5; 2.5 SOLUTION RESPIRATORY (INHALATION) at 17:32

## 2025-06-21 RX ADMIN — CALCITRIOL 0.25 MICROGRAM(S): 0.5 CAPSULE, GELATIN COATED ORAL at 12:03

## 2025-06-21 RX ADMIN — LOSARTAN POTASSIUM 25 MILLIGRAM(S): 100 TABLET, FILM COATED ORAL at 05:18

## 2025-06-21 RX ADMIN — Medication 25 GRAM(S): at 17:33

## 2025-06-21 RX ADMIN — IPRATROPIUM BROMIDE AND ALBUTEROL SULFATE 3 MILLILITER(S): .5; 2.5 SOLUTION RESPIRATORY (INHALATION) at 11:14

## 2025-06-21 RX ADMIN — ATORVASTATIN CALCIUM 80 MILLIGRAM(S): 80 TABLET, FILM COATED ORAL at 21:50

## 2025-06-21 RX ADMIN — IPRATROPIUM BROMIDE AND ALBUTEROL SULFATE 3 MILLILITER(S): .5; 2.5 SOLUTION RESPIRATORY (INHALATION) at 06:23

## 2025-06-21 RX ADMIN — CLOPIDOGREL BISULFATE 75 MILLIGRAM(S): 75 TABLET, FILM COATED ORAL at 12:03

## 2025-06-21 RX ADMIN — IPRATROPIUM BROMIDE AND ALBUTEROL SULFATE 3 MILLILITER(S): .5; 2.5 SOLUTION RESPIRATORY (INHALATION) at 23:08

## 2025-06-21 RX ADMIN — Medication 25 GRAM(S): at 08:58

## 2025-06-21 RX ADMIN — APIXABAN 2.5 MILLIGRAM(S): 5 TABLET, FILM COATED ORAL at 17:34

## 2025-06-21 RX ADMIN — FUROSEMIDE 40 MILLIGRAM(S): 10 INJECTION INTRAMUSCULAR; INTRAVENOUS at 12:03

## 2025-06-21 RX ADMIN — TAMSULOSIN HYDROCHLORIDE 0.4 MILLIGRAM(S): 0.4 CAPSULE ORAL at 21:50

## 2025-06-21 RX ADMIN — Medication 25 GRAM(S): at 00:52

## 2025-06-21 NOTE — PROGRESS NOTE ADULT - ASSESSMENT
85-year-old man with history of CAD status post LIMA to LAD, chronic HFrEF, A-fib, bradycardia status post leadless Micra pacemaker, DM, CKD, CVA, AIDA, recent NSTEMI.  Admitted with dyspnea and found to have sepsis and most likely pneumonia.    1.  Elevated troponins–myocardial injury without ischemia.  Recent NSTEMI.  Did not get a Due To Worsening Renal Function.  Continue DOAC and clopidogrel.  Continue high intensity statin.    2.  Chronic HFrEF–appears to be euvolemic.  Lasix dosing and IV fluids as per nephrology.  Continue Toprol for GDMT.  Losartan discontinued due to continued rise in creatinine.  Add hydralazine 25 mg twice daily and isosorbide 30 mg daily.  Consider SGLT2 inhibitor on discharge.  No MRA  due to worsening creatinine.    3.  A-fib–status post Micra.  Patient is V paced.  Continue Eliquis at reduced dose due to renal function and age. 85-year-old man with history of CAD status post LIMA to LAD, chronic HFrEF, A-fib, bradycardia status post leadless Micra pacemaker, DM, CKD, CVA, AIDA, recent NSTEMI.  Admitted with dyspnea and found to have sepsis and most likely pneumonia.    1.  Elevated troponins–myocardial injury without ischemia.  Recent NSTEMI.  Did not get a Due To Worsening Renal Function.  Continue DOAC and clopidogrel.  Continue high intensity statin.    2.  Chronic HFrEF–appears to be euvolemic.  Lasix dosing and IV fluids as per nephrology.  Continue Toprol for GDMT.  Losartan discontinued due to continued rise in creatinine.  Add hydralazine 25 mg twice daily and isosorbide 30 mg daily.  Consider SGLT2 inhibitor on discharge.  No MRA  due to worsening creatinine.    3.  A-fib–status post Micra.  Patient is V paced.  Continue Eliquis at reduced dose due to renal function and age.    Please call back with any questions    Don Sweet MD FACC, FICA  , Cardiology Fellowship  Faculty Non-Invasive Cardiology [facility] [place]  I can be reached on Teams

## 2025-06-21 NOTE — PROGRESS NOTE ADULT - SUBJECTIVE AND OBJECTIVE BOX
HPI:  85-year-old male with past medical history of hypertension, stage IV CKD, CVA, type 2 diabetes, A-fib, iron deficiency anemia, bradycardia status post pacemaker, recent NSTEMI, ischemic cardiomyopathy who presented to the ED from [facility] due to left-sided chest pain. He denies any fevers, chills, coughing, heart palpitations, shortness of breath, or other complaints.   On presentation, the patient was hypothermic 92.7 otherwise stable vital signs. EKG with paced rhythm. Labs notable for WBC 2.2, hemoglobin 9.6, troponin 488, potassium 5.4, creatinine 2.6 (around baseline), lactate 1.1, proBNP 7,166, UA negative for UTI, COVID/flu negative. CT chest showed small focal airspace opacity in the right upper lobe with associated cavitation and scattered patchy groundglass opacities throughout the lungs with a predominance in the right upper lobe likely infectious in etiology. Small bilateral pleural effusions with adjacent compressive atelectasis.   (19 Jun 2025 03:46)    Interval history: Patient is still complaining of significant dyspnea.    MEDICATIONS  (STANDING):  albuterol/ipratropium for Nebulization 3 milliLiter(s) Nebulizer every 6 hours  apixaban 2.5 milliGRAM(s) Oral every 12 hours  atorvastatin 80 milliGRAM(s) Oral at bedtime  calcitriol   Capsule 0.25 MICROGram(s) Oral daily  clopidogrel Tablet 75 milliGRAM(s) Oral daily  dextrose 5%. 1000 milliLiter(s) (50 mL/Hr) IV Continuous <Continuous>  dextrose 5%. 1000 milliLiter(s) (100 mL/Hr) IV Continuous <Continuous>  dextrose 50% Injectable 25 Gram(s) IV Push once  dextrose 50% Injectable 12.5 Gram(s) IV Push once  dextrose 50% Injectable 25 Gram(s) IV Push once  ferrous    sulfate 325 milliGRAM(s) Oral daily  furosemide    Tablet 40 milliGRAM(s) Oral daily  glucagon  Injectable 1 milliGRAM(s) IntraMuscular once  insulin lispro (ADMELOG) corrective regimen sliding scale   SubCutaneous three times a day before meals  metoprolol succinate ER 25 milliGRAM(s) Oral daily  piperacillin/tazobactam IVPB.. 3.375 Gram(s) IV Intermittent every 8 hours  tamsulosin 0.4 milliGRAM(s) Oral at bedtime    MEDICATIONS  (PRN):  acetaminophen     Tablet .. 650 milliGRAM(s) Oral every 6 hours PRN Temp greater or equal to 38C (100.4F), Mild Pain (1 - 3)  dextrose Oral Gel 15 Gram(s) Oral once PRN Blood Glucose LESS THAN 70 milliGRAM(s)/deciliter  melatonin 3 milliGRAM(s) Oral at bedtime PRN Insomnia    Vital Signs Last 24 Hrs  T(C): 36.6 (21 Jun 2025 12:04), Max: 36.7 (20 Jun 2025 17:20)  T(F): 97.9 (21 Jun 2025 12:04), Max: 98 (20 Jun 2025 17:20)  HR: 97 (21 Jun 2025 12:04) (60 - 97)  BP: 126/68 (21 Jun 2025 12:04) (98/60 - 126/68)  BP(mean): --  RR: 20 (21 Jun 2025 12:04) (18 - 20)  SpO2: 98% (21 Jun 2025 12:04) (94% - 100%)    Parameters below as of 21 Jun 2025 11:45  Patient On (Oxygen Delivery Method): nasal cannula      I&O's Detail    20 Jun 2025 07:01  -  21 Jun 2025 07:00  --------------------------------------------------------  IN:  Total IN: 0 mL    OUT:    Voided (mL): 600 mL  Total OUT: 600 mL    Total NET: -600 mL    PE:  GENERAL: Moderately built, no acute distress   CHEST/LUNG:  improved air entry bilaterally and minimal crackles at bases   HEART: Irregular and systolic murmur +  ABDOMEN: Soft, Nontender, Nondistended; Bowel sounds present  EXTREMITIES:  No clubbing, cyanosis, or edema    DIAGNOSTIC TESTING:      ECHO  FINDINGS:    STRESS  FINDINGS:    INTERPRETATION OF TELEMETRY:    ECG:    LABS:                        8.6    8.71  )-----------( 123      ( 20 Jun 2025 06:45 )             25.2     06-21    135  |  105  |  58[H]  ----------------------------<  127[H]  4.0   |  21[L]  |  3.84[H]    Ca    8.7      21 Jun 2025 05:56    Urinalysis Basic - ( 21 Jun 2025 05:56 )    Color: x / Appearance: x / SG: x / pH: x  Gluc: 127 mg/dL / Ketone: x  / Bili: x / Urobili: x   Blood: x / Protein: x / Nitrite: x   Leuk Esterase: x / RBC: x / WBC x   Sq Epi: x / Non Sq Epi: x / Bacteria: x

## 2025-06-21 NOTE — PROGRESS NOTE ADULT - SUBJECTIVE AND OBJECTIVE BOX
CHIEF COMPLAINT: FU of Bilateral pneumonia  no fever   no report of any chest pain or diarrhea or active gross bleeding.       PHYSICAL EXAM:    GENERAL: Moderately built, no acute distress   CHEST/LUNG:  improved air entry bilaterally and minimal crackles at bases   HEART: Irregular and systolic murmur +  ABDOMEN: Soft, Nontender, Nondistended; Bowel sounds present  EXTREMITIES:  No clubbing, cyanosis, or edema    OBJECTIVE DATA:     Vital Signs Last 24 Hrs  T(C): 36.3 (21 Jun 2025 04:26), Max: 36.7 (20 Jun 2025 17:20)  T(F): 97.3 (21 Jun 2025 04:26), Max: 98 (20 Jun 2025 17:20)  HR: 65 (21 Jun 2025 11:45) (60 - 75)  BP: 100/58 (21 Jun 2025 04:26) (98/60 - 107/69)  BP(mean): --  RR: 18 (21 Jun 2025 04:26) (18 - 18)  SpO2: 96% (21 Jun 2025 11:45) (94% - 100%)    Parameters below as of 21 Jun 2025 11:45  Patient On (Oxygen Delivery Method): nasal cannula               Daily     Daily   LABS:                        8.6    8.71  )-----------( 123      ( 20 Jun 2025 06:45 )             25.2             06-21    135  |  105  |  58[H]  ----------------------------<  127[H]  4.0   |  21[L]  |  3.84[H]    Ca    8.7      21 Jun 2025 05:56                  Urinalysis Basic - ( 21 Jun 2025 05:56 )    Color: x / Appearance: x / SG: x / pH: x  Gluc: 127 mg/dL / Ketone: x  / Bili: x / Urobili: x   Blood: x / Protein: x / Nitrite: x   Leuk Esterase: x / RBC: x / WBC x   Sq Epi: x / Non Sq Epi: x / Bacteria: x           CAPILLARY BLOOD GLUCOSE      POCT Blood Glucose.: 116 mg/dL (21 Jun 2025 12:10)      Culture - Blood (collected 06-18)  Source: Blood Blood-Peripheral  Preliminary Report (06-21):    No growth at 48 Hours    Culture - Blood (collected 06-18)  Source: Blood Blood-Peripheral  Preliminary Report (06-21):    No growth at 48 Hours        MEDICATIONS  (STANDING):  albuterol/ipratropium for Nebulization 3 milliLiter(s) Nebulizer every 6 hours  apixaban 2.5 milliGRAM(s) Oral every 12 hours  atorvastatin 80 milliGRAM(s) Oral at bedtime  calcitriol   Capsule 0.25 MICROGram(s) Oral daily  clopidogrel Tablet 75 milliGRAM(s) Oral daily  dextrose 5%. 1000 milliLiter(s) (50 mL/Hr) IV Continuous <Continuous>  dextrose 5%. 1000 milliLiter(s) (100 mL/Hr) IV Continuous <Continuous>  dextrose 50% Injectable 25 Gram(s) IV Push once  dextrose 50% Injectable 12.5 Gram(s) IV Push once  dextrose 50% Injectable 25 Gram(s) IV Push once  ferrous    sulfate 325 milliGRAM(s) Oral daily  furosemide    Tablet 40 milliGRAM(s) Oral daily  glucagon  Injectable 1 milliGRAM(s) IntraMuscular once  insulin lispro (ADMELOG) corrective regimen sliding scale   SubCutaneous three times a day before meals  losartan 25 milliGRAM(s) Oral daily  metoprolol succinate ER 25 milliGRAM(s) Oral daily  piperacillin/tazobactam IVPB.. 3.375 Gram(s) IV Intermittent every 8 hours  tamsulosin 0.4 milliGRAM(s) Oral at bedtime    MEDICATIONS  (PRN):  acetaminophen     Tablet .. 650 milliGRAM(s) Oral every 6 hours PRN Temp greater or equal to 38C (100.4F), Mild Pain (1 - 3)  dextrose Oral Gel 15 Gram(s) Oral once PRN Blood Glucose LESS THAN 70 milliGRAM(s)/deciliter  melatonin 3 milliGRAM(s) Oral at bedtime PRN Insomnia

## 2025-06-21 NOTE — PROGRESS NOTE ADULT - SUBJECTIVE AND OBJECTIVE BOX
[facility]                                                       Dr. Paredes, Dr. Guillermo, Dr. Singh, Dr. Chauhan, Dr. Sweet                                      [facility], [facility], [address]                                                 [address] [place] [address]                                      Ph: [phone]  Fax: [phone]                                         Ph: [phone]  Fax: [phone]      Patient is a 85y old  Male who presents with a chief complaint of Sepsis likely 2/2 pna (21 Jun 2025 15:00)    Patient seen in follow up for TAYLOR.        PAST MEDICAL HISTORY:  Type 2 diabetes mellitus without complication    CVA (cerebral vascular accident)    Non-ST elevation (NSTEMI) myocardial infarction    CAD (coronary artery disease)    CKD (chronic kidney disease), stage III    HTN (hypertension)    HLD (hyperlipidemia)      MEDICATIONS  (STANDING):  albuterol/ipratropium for Nebulization 3 milliLiter(s) Nebulizer every 6 hours  apixaban 2.5 milliGRAM(s) Oral every 12 hours  atorvastatin 80 milliGRAM(s) Oral at bedtime  calcitriol   Capsule 0.25 MICROGram(s) Oral daily  clopidogrel Tablet 75 milliGRAM(s) Oral daily  dextrose 5%. 1000 milliLiter(s) (50 mL/Hr) IV Continuous <Continuous>  dextrose 5%. 1000 milliLiter(s) (100 mL/Hr) IV Continuous <Continuous>  dextrose 50% Injectable 25 Gram(s) IV Push once  dextrose 50% Injectable 12.5 Gram(s) IV Push once  dextrose 50% Injectable 25 Gram(s) IV Push once  ferrous    sulfate 325 milliGRAM(s) Oral daily  furosemide    Tablet 40 milliGRAM(s) Oral daily  glucagon  Injectable 1 milliGRAM(s) IntraMuscular once  insulin lispro (ADMELOG) corrective regimen sliding scale   SubCutaneous three times a day before meals  metoprolol succinate ER 25 milliGRAM(s) Oral daily  piperacillin/tazobactam IVPB.. 3.375 Gram(s) IV Intermittent every 8 hours  tamsulosin 0.4 milliGRAM(s) Oral at bedtime    MEDICATIONS  (PRN):  acetaminophen     Tablet .. 650 milliGRAM(s) Oral every 6 hours PRN Temp greater or equal to 38C (100.4F), Mild Pain (1 - 3)  dextrose Oral Gel 15 Gram(s) Oral once PRN Blood Glucose LESS THAN 70 milliGRAM(s)/deciliter  melatonin 3 milliGRAM(s) Oral at bedtime PRN Insomnia    T(C): 36.4 (06-21-25 @ 16:28), Max: 36.9 (06-20-25 @ 10:35)  HR: 81 (06-21-25 @ 16:28) (60 - 97)  BP: 124/75 (06-21-25 @ 16:28) (98/60 - 126/68)  RR: 19 (06-21-25 @ 16:28) (18 - 20)  SpO2: 97% (06-21-25 @ 16:28) (93% - 100%)  Wt(kg): --  I&O's Detail    20 Jun 2025 07:01  -  21 Jun 2025 07:00  --------------------------------------------------------  IN:  Total IN: 0 mL    OUT:    Voided (mL): 600 mL  Total OUT: 600 mL    Total NET: -600 mL          PHYSICAL EXAM:  General: No distress  Respiratory: b/l air entry  Cardiovascular: S1 S2  Gastrointestinal: soft  Extremities:  edema                              8.6    8.71  )-----------( 123      ( 20 Jun 2025 06:45 )             25.2     06-21    135  |  105  |  58[H]  ----------------------------<  127[H]  4.0   |  21[L]  |  3.84[H]    Ca    8.7      21 Jun 2025 05:56            Urinalysis Basic - ( 21 Jun 2025 05:56 )    Color: x / Appearance: x / SG: x / pH: x  Gluc: 127 mg/dL / Ketone: x  / Bili: x / Urobili: x   Blood: x / Protein: x / Nitrite: x   Leuk Esterase: x / RBC: x / WBC x   Sq Epi: x / Non Sq Epi: x / Bacteria: x        Sodium, Serum: 135 (06-21 @ 05:56)  Sodium, Serum: 138 (06-20 @ 06:45)  Sodium, Serum: 139 (06-19 @ 05:50)  Sodium, Serum: 137 (06-18 @ 22:45)    Creatinine, Serum: 3.84 (06-21 @ 05:56)  Creatinine, Serum: 2.67 (06-20 @ 06:45)  Creatinine, Serum: 2.16 (06-19 @ 05:50)  Creatinine, Serum: 2.06 (06-18 @ 22:45)    Potassium, Serum: 4.0 (06-21 @ 05:56)  Potassium, Serum: 4.2 (06-20 @ 06:45)  Potassium, Serum: 4.8 (06-19 @ 05:50)  Potassium, Serum: 5.4 (06-18 @ 22:45)    Hemoglobin: 8.6 (06-20 @ 06:45)  Hemoglobin: 8.9 (06-19 @ 05:50)  Hemoglobin: 9.6 (06-18 @ 22:45)    < from: US Renal (06.21.25 @ 14:16) >    ACC: [id number] EXAM:  US KIDNEY(S)   ORDERED BY: AMBROSE SANCHEZ     PROCEDURE DATE:  06/21/2025          INTERPRETATION:  CLINICAL INFORMATION: Acute renal sufficiency.    COMPARISON: Renal ultrasound dated 04/26/2025 and chest CT dated   06/19/2025    TECHNIQUE: Sonography of the kidneys and bladder.    FINDINGS:  Right kidney: 8.8 cm. No suspicious renal mass, hydronephrosis or   calculi. Scattered simple appearing subcentimeter renal cysts measuring   up to 9 mm    Left kidney: 9.6 cm. No suspicious renal mass, hydronephrosis or calculi.   Scattered simple appearing renal cysts measuring up to 1.3 cm.    Urinary bladder: Diffuse mild bladder wall thickening. No stones or   lesions identified. Prevoid volume of 116 cc. Mildly enlarged prostate   gland with calculated volume of 30 cc.    There is mild right pleural effusion.    IMPRESSION:  No hydronephrosis.    Bilateral simple appearing small renal cysts.    Mildly enlarged prostate gland with diffuse mild wall thickening   suggestive of bladder wall hypertrophy.        --- End of Report ---            SONIDO ALBA MD; Attending Radiologist  This document has been electronically signed. Jun 21 2025  2:21PM    < end of copied text >

## 2025-06-21 NOTE — PROGRESS NOTE ADULT - ASSESSMENT
85-year-old male with past medical history of hypertension, stage IV CKD, CVA, type 2 diabetes, A-fib, iron deficiency anemia, bradycardia status post pacemaker, recent NSTEMI, ischemic cardiomyopathy who presented to the ED from [facility] due to left-sided chest pain. Found with elevated troponin(downtrending), sepsis likely 2/2 pna. Admit to telemetry.       Problem/Plan - 1:  ·  Problem: Chest pain.   ·  Plan: - Atypical chest pain  - Troponin 491>>488; NSTEMI (last admission trop was >32270), managed conservatively  - Limited echo 5/29: Left ventricular systolic function is severely decreased with an ejection fraction of 21 % by Watters's method of disks. Global left ventricular hypokinesis.  - EKG reviewed: unchanged from previous  - Monitor on telemetry  - c/w home meds: Plavix, Eliquis, metoprolol, Lipitor  -- Discontinue losartan due to worsening creatinine    2. AOCD. No active gross bleeding. Trend CBC.     Problem/Plan - 3:  ·  Problem: Sepsis.·  likely due to bilateral pna  - hypothermic, leukopenic  - Lactic acid 1.1  - CT chest: small focal airspace opacity in the right upper lobe with associated cavitation and scattered patchy groundglass opacities throughout the lungs with a predominance in the right upper lobe likely infectious in etiology. Small bilateral pleural effusions with adjacent compressive atelectasis.  - COVID/flu negative  - c/w Abx: vancomycin and zosyn. MRSA and staph PCR positive. Vancomycin level reviewed (Needs monitoring for therapeutic levels). Consulted and discussed with ID Dr Moon about it.       Problem/Plan - 5:  ·  Problem: Acute kidney injury superimposed on CKD stage 4. worsening creatinine. started lasix per my discussion with Dr Guillermo. check US renal. check PVR. Follow creatinine.     Problem/Plan - 6:  ·  Problem: Hyperkalemia.   ·  Plan: - k 5.4  - given lokelma  improved.      Problem/Plan - 8:  ·  Problem: Chronic atrial fibrillation.   ·  Plan: - Eliquis and metoprolol. Rate controlled. Watch for any active gross bleeding.     Problem/Plan - 9:  ·  Problem: DM2 (diabetes mellitus, type 2).   ·  Plan: - Home meds: glipizide  cont current management. Follow finger sticks.     Problem/Plan - 10:  ·  Problem: BPH (benign prostatic hyperplasia).   ·  Plan; resumed tamsulosin.    Problem/Plan - 11:  ·  Problem: Prophylactic measure.   ·  Plan: - DVT ppx: On Eliquis for Afib    FC  Called daughter at [phone] and updated her about her father's current condition.     Time spent by me managing the patient including but not limited to reviewing the chart, discussion with the IDR team (nurse//care manager/ACP), physical exam and assessment and plan is 55 mins

## 2025-06-21 NOTE — PROGRESS NOTE ADULT - ASSESSMENT
TAYLOR CKD EF 21%, CRS  Dyspnea    Worsening renal indices. Renal sonogram results noted. Will hold lasix.   No objection to PRN IV diuretics. Monitor h/h trend. Transfuse PRBC's PRN.   D/w patients family at bedside. Avoid nephrotoxic meds as possible.

## 2025-06-22 LAB
ALBUMIN SERPL ELPH-MCNC: 2.2 G/DL — LOW (ref 3.3–5)
ALP SERPL-CCNC: 110 U/L — SIGNIFICANT CHANGE UP (ref 40–120)
ALT FLD-CCNC: 48 U/L — SIGNIFICANT CHANGE UP (ref 12–78)
ANION GAP SERPL CALC-SCNC: 12 MMOL/L — SIGNIFICANT CHANGE UP (ref 5–17)
ANION GAP SERPL CALC-SCNC: 13 MMOL/L — SIGNIFICANT CHANGE UP (ref 5–17)
AST SERPL-CCNC: 24 U/L — SIGNIFICANT CHANGE UP (ref 15–37)
BILIRUB SERPL-MCNC: 0.8 MG/DL — SIGNIFICANT CHANGE UP (ref 0.2–1.2)
BUN SERPL-MCNC: 72 MG/DL — HIGH (ref 7–23)
BUN SERPL-MCNC: 76 MG/DL — HIGH (ref 7–23)
CALCIUM SERPL-MCNC: 7.9 MG/DL — LOW (ref 8.5–10.1)
CALCIUM SERPL-MCNC: 7.9 MG/DL — LOW (ref 8.5–10.1)
CHLORIDE SERPL-SCNC: 105 MMOL/L — SIGNIFICANT CHANGE UP (ref 96–108)
CHLORIDE SERPL-SCNC: 105 MMOL/L — SIGNIFICANT CHANGE UP (ref 96–108)
CO2 SERPL-SCNC: 16 MMOL/L — LOW (ref 22–31)
CO2 SERPL-SCNC: 17 MMOL/L — LOW (ref 22–31)
CREAT SERPL-MCNC: 5.08 MG/DL — HIGH (ref 0.5–1.3)
CREAT SERPL-MCNC: 5.51 MG/DL — HIGH (ref 0.5–1.3)
EGFR: 10 ML/MIN/1.73M2 — LOW
EGFR: 10 ML/MIN/1.73M2 — LOW
EGFR: 11 ML/MIN/1.73M2 — LOW
EGFR: 11 ML/MIN/1.73M2 — LOW
GLUCOSE BLDC GLUCOMTR-MCNC: 138 MG/DL — HIGH (ref 70–99)
GLUCOSE BLDC GLUCOMTR-MCNC: 147 MG/DL — HIGH (ref 70–99)
GLUCOSE BLDC GLUCOMTR-MCNC: 183 MG/DL — HIGH (ref 70–99)
GLUCOSE BLDC GLUCOMTR-MCNC: 192 MG/DL — HIGH (ref 70–99)
GLUCOSE SERPL-MCNC: 135 MG/DL — HIGH (ref 70–99)
GLUCOSE SERPL-MCNC: 135 MG/DL — HIGH (ref 70–99)
HCT VFR BLD CALC: 24.2 % — LOW (ref 39–50)
HGB BLD-MCNC: 8.2 G/DL — LOW (ref 13–17)
MCHC RBC-ENTMCNC: 29.2 PG — SIGNIFICANT CHANGE UP (ref 27–34)
MCHC RBC-ENTMCNC: 33.9 G/DL — SIGNIFICANT CHANGE UP (ref 32–36)
MCV RBC AUTO: 86.1 FL — SIGNIFICANT CHANGE UP (ref 80–100)
NRBC BLD AUTO-RTO: 0 /100 WBCS — SIGNIFICANT CHANGE UP (ref 0–0)
OSMOLALITY SERPL: 292 MOSMOL/KG — SIGNIFICANT CHANGE UP (ref 280–301)
PLATELET # BLD AUTO: 123 K/UL — LOW (ref 150–400)
POTASSIUM SERPL-MCNC: 3.9 MMOL/L — SIGNIFICANT CHANGE UP (ref 3.5–5.3)
POTASSIUM SERPL-MCNC: 4.2 MMOL/L — SIGNIFICANT CHANGE UP (ref 3.5–5.3)
POTASSIUM SERPL-SCNC: 3.9 MMOL/L — SIGNIFICANT CHANGE UP (ref 3.5–5.3)
POTASSIUM SERPL-SCNC: 4.2 MMOL/L — SIGNIFICANT CHANGE UP (ref 3.5–5.3)
PROT SERPL-MCNC: 6.5 GM/DL — SIGNIFICANT CHANGE UP (ref 6–8.3)
RBC # BLD: 2.81 M/UL — LOW (ref 4.2–5.8)
RBC # FLD: 14.4 % — SIGNIFICANT CHANGE UP (ref 10.3–14.5)
SODIUM SERPL-SCNC: 134 MMOL/L — LOW (ref 135–145)
SODIUM SERPL-SCNC: 134 MMOL/L — LOW (ref 135–145)
VANCOMYCIN FLD-MCNC: 16.8 UG/ML — SIGNIFICANT CHANGE UP
WBC # BLD: 8.03 K/UL — SIGNIFICANT CHANGE UP (ref 3.8–10.5)
WBC # FLD AUTO: 8.03 K/UL — SIGNIFICANT CHANGE UP (ref 3.8–10.5)

## 2025-06-22 PROCEDURE — 99233 SBSQ HOSP IP/OBS HIGH 50: CPT

## 2025-06-22 PROCEDURE — 36000 PLACE NEEDLE IN VEIN: CPT

## 2025-06-22 RX ORDER — ALBUMIN (HUMAN) 12.5 G/50ML
50 INJECTION, SOLUTION INTRAVENOUS EVERY 12 HOURS
Refills: 0 | Status: DISCONTINUED | OUTPATIENT
Start: 2025-06-22 | End: 2025-06-22

## 2025-06-22 RX ORDER — SODIUM CHLORIDE 9 G/1000ML
1000 INJECTION, SOLUTION INTRAVENOUS
Refills: 0 | Status: DISCONTINUED | OUTPATIENT
Start: 2025-06-22 | End: 2025-06-22

## 2025-06-22 RX ORDER — AMMONIUM LACTATE 12 %
1 LOTION (ML) TOPICAL ONCE
Refills: 0 | Status: COMPLETED | OUTPATIENT
Start: 2025-06-22 | End: 2025-06-22

## 2025-06-22 RX ORDER — SODIUM BICARBONATE 1 MEQ/ML
0.06 SYRINGE (ML) INTRAVENOUS
Qty: 75 | Refills: 0 | Status: DISCONTINUED | OUTPATIENT
Start: 2025-06-22 | End: 2025-06-24

## 2025-06-22 RX ADMIN — Medication 1 APPLICATION(S): at 17:50

## 2025-06-22 RX ADMIN — Medication 50 MEQ/KG/HR: at 13:07

## 2025-06-22 RX ADMIN — IPRATROPIUM BROMIDE AND ALBUTEROL SULFATE 3 MILLILITER(S): .5; 2.5 SOLUTION RESPIRATORY (INHALATION) at 11:09

## 2025-06-22 RX ADMIN — ATORVASTATIN CALCIUM 80 MILLIGRAM(S): 80 TABLET, FILM COATED ORAL at 21:52

## 2025-06-22 RX ADMIN — IPRATROPIUM BROMIDE AND ALBUTEROL SULFATE 3 MILLILITER(S): .5; 2.5 SOLUTION RESPIRATORY (INHALATION) at 05:19

## 2025-06-22 RX ADMIN — Medication 3 MILLIGRAM(S): at 21:52

## 2025-06-22 RX ADMIN — IPRATROPIUM BROMIDE AND ALBUTEROL SULFATE 3 MILLILITER(S): .5; 2.5 SOLUTION RESPIRATORY (INHALATION) at 23:59

## 2025-06-22 RX ADMIN — APIXABAN 2.5 MILLIGRAM(S): 5 TABLET, FILM COATED ORAL at 17:50

## 2025-06-22 RX ADMIN — Medication 325 MILLIGRAM(S): at 13:06

## 2025-06-22 RX ADMIN — Medication 25 GRAM(S): at 00:29

## 2025-06-22 RX ADMIN — TAMSULOSIN HYDROCHLORIDE 0.4 MILLIGRAM(S): 0.4 CAPSULE ORAL at 21:52

## 2025-06-22 RX ADMIN — INSULIN LISPRO 1: 100 INJECTION, SOLUTION INTRAVENOUS; SUBCUTANEOUS at 12:34

## 2025-06-22 RX ADMIN — IPRATROPIUM BROMIDE AND ALBUTEROL SULFATE 3 MILLILITER(S): .5; 2.5 SOLUTION RESPIRATORY (INHALATION) at 17:21

## 2025-06-22 RX ADMIN — APIXABAN 2.5 MILLIGRAM(S): 5 TABLET, FILM COATED ORAL at 06:05

## 2025-06-22 RX ADMIN — Medication 50 MEQ/KG/HR: at 21:52

## 2025-06-22 RX ADMIN — CALCITRIOL 0.25 MICROGRAM(S): 0.5 CAPSULE, GELATIN COATED ORAL at 13:07

## 2025-06-22 RX ADMIN — Medication 25 GRAM(S): at 08:46

## 2025-06-22 RX ADMIN — Medication 25 GRAM(S): at 17:51

## 2025-06-22 RX ADMIN — CLOPIDOGREL BISULFATE 75 MILLIGRAM(S): 75 TABLET, FILM COATED ORAL at 13:06

## 2025-06-22 NOTE — PROGRESS NOTE ADULT - ASSESSMENT
TAYLOR CKD EF 21%, CRS  Dyspnea    06/21/25: Worsening renal indices. Renal sonogram results noted. Will hold lasix.   No objection to PRN IV diuretics. Monitor h/h trend. Transfuse PRBC's PRN.   D/w patients family at bedside. Avoid nephrotoxic meds as possible.   06/22/25: Renal indices continue to worsen. Renal sonogram results noted. No urinary retention. Lasix on hold.   Gentle IV hydration. Will follow electrolytes and renal function trend.

## 2025-06-22 NOTE — PROGRESS NOTE ADULT - SUBJECTIVE AND OBJECTIVE BOX
CHIEF COMPLAINT: FU of Bilateral pneumonia  no fever   no report of any chest pain or diarrhea or active gross bleeding.   low urine output reported by nurse  no urinary retention.     PHYSICAL EXAM:    GENERAL: Moderately built, no acute distress   CHEST/LUNG:  improved air entry bilaterally. no wheezing and crackles.   HEART: Irregular and systolic murmur +  ABDOMEN: Soft, Nontender, Nondistended; Bowel sounds present  EXTREMITIES:  No clubbing, cyanosis    OBJECTIVE DATA:   Vital Signs Last 24 Hrs  T(C): 36.3 (22 Jun 2025 04:45), Max: 36.6 (21 Jun 2025 12:04)  T(F): 97.3 (22 Jun 2025 04:45), Max: 97.9 (21 Jun 2025 12:04)  HR: 66 (22 Jun 2025 06:19) (61 - 97)  BP: 93/64 (22 Jun 2025 04:45) (93/64 - 126/68)  BP(mean): --  RR: 18 (22 Jun 2025 04:45) (18 - 20)  SpO2: 97% (22 Jun 2025 06:19) (96% - 98%)    Parameters below as of 22 Jun 2025 06:19  Patient On (Oxygen Delivery Method): nasal cannula               Daily     Daily   LABS:                        8.2    8.03  )-----------( 123      ( 22 Jun 2025 07:20 )             24.2             06-22    134[L]  |  105  |  72[H]  ----------------------------<  135[H]  4.2   |  16[L]  |  5.08[H]    Ca    7.9[L]      22 Jun 2025 07:20    TPro  6.5  /  Alb  2.2[L]  /  TBili  0.8  /  DBili  x   /  AST  24  /  ALT  48  /  AlkPhos  110  06-22                Urinalysis Basic - ( 22 Jun 2025 07:20 )    Color: x / Appearance: x / SG: x / pH: x  Gluc: 135 mg/dL / Ketone: x  / Bili: x / Urobili: x   Blood: x / Protein: x / Nitrite: x   Leuk Esterase: x / RBC: x / WBC x   Sq Epi: x / Non Sq Epi: x / Bacteria: x           CAPILLARY BLOOD GLUCOSE      POCT Blood Glucose.: 147 mg/dL (22 Jun 2025 08:23)      Culture - Blood (collected 06-18)  Source: Blood Blood-Peripheral  Preliminary Report (06-22):    No growth at 72 Hours    Culture - Blood (collected 06-18)  Source: Blood Blood-Peripheral  Preliminary Report (06-22):    No growth at 72 Hours          MEDICATIONS  (STANDING):  albuterol/ipratropium for Nebulization 3 milliLiter(s) Nebulizer every 6 hours  apixaban 2.5 milliGRAM(s) Oral every 12 hours  atorvastatin 80 milliGRAM(s) Oral at bedtime  calcitriol   Capsule 0.25 MICROGram(s) Oral daily  clopidogrel Tablet 75 milliGRAM(s) Oral daily  dextrose 5%. 1000 milliLiter(s) (50 mL/Hr) IV Continuous <Continuous>  dextrose 5%. 1000 milliLiter(s) (100 mL/Hr) IV Continuous <Continuous>  dextrose 50% Injectable 25 Gram(s) IV Push once  dextrose 50% Injectable 12.5 Gram(s) IV Push once  dextrose 50% Injectable 25 Gram(s) IV Push once  ferrous    sulfate 325 milliGRAM(s) Oral daily  glucagon  Injectable 1 milliGRAM(s) IntraMuscular once  insulin lispro (ADMELOG) corrective regimen sliding scale   SubCutaneous three times a day before meals  metoprolol succinate ER 25 milliGRAM(s) Oral daily  piperacillin/tazobactam IVPB.. 3.375 Gram(s) IV Intermittent every 8 hours  sodium bicarbonate  Infusion 0.058 mEq/kG/Hr (50 mL/Hr) IV Continuous <Continuous>  sodium chloride 0.45% 1000 milliLiter(s) (50 mL/Hr) IV Continuous <Continuous>  tamsulosin 0.4 milliGRAM(s) Oral at bedtime    MEDICATIONS  (PRN):  acetaminophen     Tablet .. 650 milliGRAM(s) Oral every 6 hours PRN Temp greater or equal to 38C (100.4F), Mild Pain (1 - 3)  dextrose Oral Gel 15 Gram(s) Oral once PRN Blood Glucose LESS THAN 70 milliGRAM(s)/deciliter  melatonin 3 milliGRAM(s) Oral at bedtime PRN Insomnia

## 2025-06-22 NOTE — PHARMACOTHERAPY INTERVENTION NOTE - NSPHARMCOMMASP
ASP - Dose optimization/Non-Renal dose adjustment
ASP - Lab/ test recommended
ASP - Lab/ test recommended
ASP - Renal dose adjustment

## 2025-06-22 NOTE — PROGRESS NOTE ADULT - SUBJECTIVE AND OBJECTIVE BOX
[facility]                                                       Dr. Paredes, Dr. Guillermo, Dr. Singh, Dr. Chauhan, Dr. Sweet                                      [facility], [facility], [address]                                                 [address] [place] [address]                                      Ph: [phone]  Fax: [phone]                                         Ph: [phone]  Fax: [phone]      Patient is a 85y old  Male who presents with a chief complaint of Sepsis likely 2/2 pna (21 Jun 2025 15:00)    Patient seen in follow up for TAYLOR.        PAST MEDICAL HISTORY:  Type 2 diabetes mellitus without complication    CVA (cerebral vascular accident)    Non-ST elevation (NSTEMI) myocardial infarction    CAD (coronary artery disease)    CKD (chronic kidney disease), stage III    HTN (hypertension)    HLD (hyperlipidemia)      MEDICATIONS  (STANDING):  albuterol/ipratropium for Nebulization 3 milliLiter(s) Nebulizer every 6 hours  ammonium lactate 12% Lotion 1 Application(s) Topical once  apixaban 2.5 milliGRAM(s) Oral every 12 hours  atorvastatin 80 milliGRAM(s) Oral at bedtime  calcitriol   Capsule 0.25 MICROGram(s) Oral daily  clopidogrel Tablet 75 milliGRAM(s) Oral daily  dextrose 5%. 1000 milliLiter(s) (50 mL/Hr) IV Continuous <Continuous>  dextrose 5%. 1000 milliLiter(s) (100 mL/Hr) IV Continuous <Continuous>  dextrose 50% Injectable 25 Gram(s) IV Push once  dextrose 50% Injectable 12.5 Gram(s) IV Push once  dextrose 50% Injectable 25 Gram(s) IV Push once  ferrous    sulfate 325 milliGRAM(s) Oral daily  glucagon  Injectable 1 milliGRAM(s) IntraMuscular once  insulin lispro (ADMELOG) corrective regimen sliding scale   SubCutaneous three times a day before meals  metoprolol succinate ER 25 milliGRAM(s) Oral daily  piperacillin/tazobactam IVPB.. 3.375 Gram(s) IV Intermittent every 8 hours  sodium bicarbonate  Infusion 0.058 mEq/kG/Hr (50 mL/Hr) IV Continuous <Continuous>  tamsulosin 0.4 milliGRAM(s) Oral at bedtime    MEDICATIONS  (PRN):  acetaminophen     Tablet .. 650 milliGRAM(s) Oral every 6 hours PRN Temp greater or equal to 38C (100.4F), Mild Pain (1 - 3)  dextrose Oral Gel 15 Gram(s) Oral once PRN Blood Glucose LESS THAN 70 milliGRAM(s)/deciliter  melatonin 3 milliGRAM(s) Oral at bedtime PRN Insomnia    T(C): 36.3 (06-22-25 @ 10:52), Max: 36.7 (06-20-25 @ 17:20)  HR: 79 (06-22-25 @ 11:30) (60 - 97)  BP: 101/66 (06-22-25 @ 10:52) (93/64 - 126/68)  RR: 20 (06-22-25 @ 10:52)  SpO2: 96% (06-22-25 @ 11:30)  Wt(kg): --  I&O's Detail    21 Jun 2025 07:01  -  22 Jun 2025 07:00  --------------------------------------------------------  IN:  Total IN: 0 mL    OUT:    Voided (mL): 100 mL  Total OUT: 100 mL    Total NET: -100 mL              PHYSICAL EXAM:  General: No distress  Respiratory: b/l air entry  Cardiovascular: S1 S2  Gastrointestinal: soft  Extremities:  edema                              LABORATORY:                        8.2    8.03  )-----------( 123      ( 22 Jun 2025 07:20 )             24.2     06-22    134[L]  |  105  |  72[H]  ----------------------------<  135[H]  4.2   |  16[L]  |  5.08[H]    Ca    7.9[L]      22 Jun 2025 07:20    TPro  6.5  /  Alb  2.2[L]  /  TBili  0.8  /  DBili  x   /  AST  24  /  ALT  48  /  AlkPhos  110  06-22    Sodium: 134 mmol/L (06-22 @ 07:20)  Sodium: 135 mmol/L (06-21 @ 05:56)    Potassium: 4.2 mmol/L (06-22 @ 07:20)  Potassium: 4.0 mmol/L (06-21 @ 05:56)    Hemoglobin: 8.2 g/dL (06-22 @ 07:20)  Hemoglobin: 8.6 g/dL (06-20 @ 06:45)    Creatinine, Serum 5.08 (06-22 @ 07:20)  Creatinine, Serum 3.84 (06-21 @ 05:56)  Creatinine, Serum 2.67 (06-20 @ 06:45)        LIVER FUNCTIONS - ( 22 Jun 2025 07:20 )  Alb: 2.2 g/dL / Pro: 6.5 gm/dL / ALK PHOS: 110 U/L / ALT: 48 U/L / AST: 24 U/L / GGT: x           Urinalysis Basic - ( 22 Jun 2025 07:20 )    Color: x / Appearance: x / SG: x / pH: x  Gluc: 135 mg/dL / Ketone: x  / Bili: x / Urobili: x   Blood: x / Protein: x / Nitrite: x   Leuk Esterase: x / RBC: x / WBC x   Sq Epi: x / Non Sq Epi: x / Bacteria: x

## 2025-06-22 NOTE — PHARMACOTHERAPY INTERVENTION NOTE - COMMENTS
As per policy, adjusted vancomycin dose from 1000 mg to 750 mg IV x once (at 11pm) since the vancomycin level today, 6/19 was 12.9 mg/L. As per [employer] calculations, the vancomycin AUC/LORELEI on the former dose is greater than the therapeutic range of 400 - 600 mg/L*h. Decreasing the dose is predicted to yield an AUC/LORELEI of 458 mg/L*h.    Elizabeth Epstein, PharmD, BCIDP  Clinical Pharmacy Specialist, Infectious Diseases  Supervisor, Tele-Antimicrobial Stewardship Program (Tele-ASP)  Available on [employer] Teams  
Per policy, discontinued Vancomycin 1g IVPB q24h because patient's calculated CrCl < 30, and placed a random Vancomycin lab level order to guide further dosing. 
As per pharmacy policy vancomycin level ordered.    Due to the patient's poor renal function, use the results of this order and clinical status to determine the next dose of vancomycin.
As per policy, vancomycin level ordered.

## 2025-06-22 NOTE — PROGRESS NOTE ADULT - ASSESSMENT
85-year-old male with past medical history of hypertension, stage IV CKD, CVA, type 2 diabetes, A-fib, iron deficiency anemia, bradycardia status post pacemaker, recent NSTEMI, ischemic cardiomyopathy who presented to the ED from [facility] due to left-sided chest pain. Found with elevated troponin(downtrending), sepsis likely 2/2 pna. Admit to telemetry.       Problem/Plan - 1:  ·  Problem: Chest pain.   ·  Plan: - Atypical chest pain  - Troponin 491>>488; NSTEMI (last admission trop was >95861), managed conservatively  - Limited echo 5/29: Left ventricular systolic function is severely decreased with an ejection fraction of 21 % by Watters's method of disks. Global left ventricular hypokinesis.  - c/w home meds: Plavix, Eliquis, metoprolol, Lipitor  -- Discontinue losartan due to worsening creatinine    2. AOCD. No active gross bleeding.  stable Hb    Problem/Plan - 3:  ·  Problem: Sepsis.·  likely due to bilateral pna  - hypothermic, leukopenic  - Lactic acid 1.1  - CT chest: small focal airspace opacity in the right upper lobe with associated cavitation and scattered patchy groundglass opacities throughout the lungs with a predominance in the right upper lobe likely infectious in etiology. Small bilateral pleural effusions with adjacent compressive atelectasis.  - COVID/flu negative  - con iv zosyn. ? vancomycin induced nephrotoxicity. Updated Dr Christianson about worsening creatinine. Likely no more vancomycin. vancomycin level today 16.8.   Taper oxygen to keep sat > 94%      Problem/Plan - 5:  ·  Problem: Acute kidney injury superimposed on CKD stage 4. Pre-renal with CRS vs hypovolemia vs vancomycin induced nephrotoxicity. Discussed with nephrologist on call>> recommended gentle hydration. strict I and Os. Follow BMP in the evening and in am. check urine electrolytes. Updated cardiologist about starting gentle hydration. Follow recs.     Problem/Plan - 6:  ·  Problem: Hyperkalemia.   ·  Plan: - k 5.4  - given lokelma  improved.      Problem/Plan - 8:  ·  Problem: Chronic atrial fibrillation.   ·  Plan: - Eliquis and metoprolol. Rate controlled. Watch for any active gross bleeding.     Problem/Plan - 9:  ·  Problem: DM2 (diabetes mellitus, type 2).   ·  Plan: - Home meds: glipizide  cont current management. Follow finger sticks.     Problem/Plan - 10:  ·  Problem: BPH (benign prostatic hyperplasia).   ·  Plan; resumed tamsulosin.    Problem/Plan - 11:  ·  Problem: Prophylactic measure.   ·  Plan: - DVT ppx: On Eliquis for Afib    FC  Called daughter at [phone] and updated her about her father's current condition including worsening creatinine today.     Time spent by me managing the patient including but not limited to reviewing the chart, discussion with the IDR team (nurse//care manager/ACP), physical exam and assessment and plan is 58 mins

## 2025-06-23 ENCOUNTER — RESULT REVIEW (OUTPATIENT)
Age: 85
End: 2025-06-23

## 2025-06-23 DIAGNOSIS — G93.41 METABOLIC ENCEPHALOPATHY: ICD-10-CM

## 2025-06-23 DIAGNOSIS — N17.9 ACUTE KIDNEY FAILURE, UNSPECIFIED: ICD-10-CM

## 2025-06-23 DIAGNOSIS — Z51.5 ENCOUNTER FOR PALLIATIVE CARE: ICD-10-CM

## 2025-06-23 DIAGNOSIS — I50.9 HEART FAILURE, UNSPECIFIED: ICD-10-CM

## 2025-06-23 DIAGNOSIS — R53.81 OTHER MALAISE: ICD-10-CM

## 2025-06-23 LAB
ALBUMIN SERPL ELPH-MCNC: 2 G/DL — LOW (ref 3.3–5)
ALP SERPL-CCNC: 129 U/L — HIGH (ref 40–120)
ALT FLD-CCNC: 54 U/L — SIGNIFICANT CHANGE UP (ref 12–78)
ANION GAP SERPL CALC-SCNC: 12 MMOL/L — SIGNIFICANT CHANGE UP (ref 5–17)
ANION GAP SERPL CALC-SCNC: 13 MMOL/L — SIGNIFICANT CHANGE UP (ref 5–17)
ANISOCYTOSIS BLD QL: SLIGHT — SIGNIFICANT CHANGE UP
AST SERPL-CCNC: 38 U/L — HIGH (ref 15–37)
BASOPHILS # BLD AUTO: 0 K/UL — SIGNIFICANT CHANGE UP (ref 0–0.2)
BASOPHILS NFR BLD AUTO: 0 % — SIGNIFICANT CHANGE UP (ref 0–2)
BILIRUB SERPL-MCNC: 0.7 MG/DL — SIGNIFICANT CHANGE UP (ref 0.2–1.2)
BUN SERPL-MCNC: 78 MG/DL — HIGH (ref 7–23)
BUN SERPL-MCNC: 78 MG/DL — HIGH (ref 7–23)
BURR CELLS BLD QL SMEAR: PRESENT — SIGNIFICANT CHANGE UP
CALCIUM SERPL-MCNC: 7.7 MG/DL — LOW (ref 8.5–10.1)
CALCIUM SERPL-MCNC: 7.8 MG/DL — LOW (ref 8.5–10.1)
CHLORIDE SERPL-SCNC: 106 MMOL/L — SIGNIFICANT CHANGE UP (ref 96–108)
CHLORIDE SERPL-SCNC: 106 MMOL/L — SIGNIFICANT CHANGE UP (ref 96–108)
CO2 SERPL-SCNC: 17 MMOL/L — LOW (ref 22–31)
CO2 SERPL-SCNC: 17 MMOL/L — LOW (ref 22–31)
CREAT SERPL-MCNC: 5.81 MG/DL — HIGH (ref 0.5–1.3)
CREAT SERPL-MCNC: 6 MG/DL — HIGH (ref 0.5–1.3)
EGFR: 9 ML/MIN/1.73M2 — LOW
EOSINOPHIL # BLD AUTO: 0.29 K/UL — SIGNIFICANT CHANGE UP (ref 0–0.5)
EOSINOPHIL NFR BLD AUTO: 4 % — SIGNIFICANT CHANGE UP (ref 0–6)
GIANT PLATELETS BLD QL SMEAR: PRESENT — SIGNIFICANT CHANGE UP
GLUCOSE BLDC GLUCOMTR-MCNC: 142 MG/DL — HIGH (ref 70–99)
GLUCOSE BLDC GLUCOMTR-MCNC: 161 MG/DL — HIGH (ref 70–99)
GLUCOSE BLDC GLUCOMTR-MCNC: 163 MG/DL — HIGH (ref 70–99)
GLUCOSE BLDC GLUCOMTR-MCNC: 172 MG/DL — HIGH (ref 70–99)
GLUCOSE SERPL-MCNC: 127 MG/DL — HIGH (ref 70–99)
GLUCOSE SERPL-MCNC: 150 MG/DL — HIGH (ref 70–99)
HCT VFR BLD CALC: 22.5 % — LOW (ref 39–50)
HGB BLD-MCNC: 7.9 G/DL — LOW (ref 13–17)
HYPOCHROMIA BLD QL: SLIGHT — SIGNIFICANT CHANGE UP
HYPOSEGMENTATION: PRESENT — SIGNIFICANT CHANGE UP
LACTATE SERPL-SCNC: 1.8 MMOL/L — SIGNIFICANT CHANGE UP (ref 0.7–2)
LG PLATELETS BLD QL AUTO: SLIGHT — SIGNIFICANT CHANGE UP
LYMPHOCYTES # BLD AUTO: 0.37 K/UL — LOW (ref 1–3.3)
LYMPHOCYTES # BLD AUTO: 5 % — LOW (ref 13–44)
MANUAL SMEAR VERIFICATION: SIGNIFICANT CHANGE UP
MCHC RBC-ENTMCNC: 30.3 PG — SIGNIFICANT CHANGE UP (ref 27–34)
MCHC RBC-ENTMCNC: 35.1 G/DL — SIGNIFICANT CHANGE UP (ref 32–36)
MCV RBC AUTO: 86.2 FL — SIGNIFICANT CHANGE UP (ref 80–100)
MICROCYTES BLD QL: SLIGHT — SIGNIFICANT CHANGE UP
MONOCYTES # BLD AUTO: 0.29 K/UL — SIGNIFICANT CHANGE UP (ref 0–0.9)
MONOCYTES NFR BLD AUTO: 4 % — SIGNIFICANT CHANGE UP (ref 2–14)
NEUTROPHILS # BLD AUTO: 6.38 K/UL — SIGNIFICANT CHANGE UP (ref 1.8–7.4)
NEUTROPHILS NFR BLD AUTO: 84 % — HIGH (ref 43–77)
NEUTS BAND # BLD: 3 % — SIGNIFICANT CHANGE UP (ref 0–8)
NEUTS BAND NFR BLD: 3 % — SIGNIFICANT CHANGE UP (ref 0–8)
NRBC # BLD: 0 /100 WBCS — SIGNIFICANT CHANGE UP (ref 0–0)
NRBC BLD AUTO-RTO: SIGNIFICANT CHANGE UP /100 WBCS (ref 0–0)
NRBC BLD-RTO: 0 /100 WBCS — SIGNIFICANT CHANGE UP (ref 0–0)
PLAT MORPH BLD: ABNORMAL
PLATELET # BLD AUTO: 127 K/UL — LOW (ref 150–400)
POIKILOCYTOSIS BLD QL AUTO: SIGNIFICANT CHANGE UP
POTASSIUM SERPL-MCNC: 3.8 MMOL/L — SIGNIFICANT CHANGE UP (ref 3.5–5.3)
POTASSIUM SERPL-MCNC: 4 MMOL/L — SIGNIFICANT CHANGE UP (ref 3.5–5.3)
POTASSIUM SERPL-SCNC: 3.8 MMOL/L — SIGNIFICANT CHANGE UP (ref 3.5–5.3)
POTASSIUM SERPL-SCNC: 4 MMOL/L — SIGNIFICANT CHANGE UP (ref 3.5–5.3)
PROT SERPL-MCNC: 6.2 GM/DL — SIGNIFICANT CHANGE UP (ref 6–8.3)
RBC # BLD: 2.61 M/UL — LOW (ref 4.2–5.8)
RBC # FLD: 14.9 % — HIGH (ref 10.3–14.5)
RBC BLD AUTO: ABNORMAL
SODIUM SERPL-SCNC: 135 MMOL/L — SIGNIFICANT CHANGE UP (ref 135–145)
SODIUM SERPL-SCNC: 136 MMOL/L — SIGNIFICANT CHANGE UP (ref 135–145)
VANCOMYCIN FLD-MCNC: 15.1 UG/ML — SIGNIFICANT CHANGE UP
WBC # BLD: 7.33 K/UL — SIGNIFICANT CHANGE UP (ref 3.8–10.5)
WBC # FLD AUTO: 7.33 K/UL — SIGNIFICANT CHANGE UP (ref 3.8–10.5)

## 2025-06-23 PROCEDURE — 99291 CRITICAL CARE FIRST HOUR: CPT

## 2025-06-23 PROCEDURE — 99497 ADVNCD CARE PLAN 30 MIN: CPT | Mod: 25

## 2025-06-23 PROCEDURE — 99223 1ST HOSP IP/OBS HIGH 75: CPT | Mod: 25

## 2025-06-23 PROCEDURE — 93306 TTE W/DOPPLER COMPLETE: CPT | Mod: 26

## 2025-06-23 PROCEDURE — G0545: CPT

## 2025-06-23 PROCEDURE — 99233 SBSQ HOSP IP/OBS HIGH 50: CPT

## 2025-06-23 RX ORDER — NOREPINEPHRINE BITARTRATE 8 MG
0.05 SOLUTION INTRAVENOUS
Qty: 8 | Refills: 0 | Status: DISCONTINUED | OUTPATIENT
Start: 2025-06-23 | End: 2025-06-26

## 2025-06-23 RX ORDER — MIDODRINE HYDROCHLORIDE 5 MG/1
10 TABLET ORAL ONCE
Refills: 0 | Status: COMPLETED | OUTPATIENT
Start: 2025-06-23 | End: 2025-06-23

## 2025-06-23 RX ORDER — SODIUM CHLORIDE 9 G/1000ML
1000 INJECTION, SOLUTION INTRAVENOUS
Refills: 0 | Status: DISCONTINUED | OUTPATIENT
Start: 2025-06-23 | End: 2025-06-23

## 2025-06-23 RX ORDER — SODIUM CHLORIDE 9 G/1000ML
500 INJECTION, SOLUTION INTRAVENOUS ONCE
Refills: 0 | Status: COMPLETED | OUTPATIENT
Start: 2025-06-23 | End: 2025-06-23

## 2025-06-23 RX ORDER — MIDODRINE HYDROCHLORIDE 5 MG/1
10 TABLET ORAL EVERY 8 HOURS
Refills: 0 | Status: DISCONTINUED | OUTPATIENT
Start: 2025-06-23 | End: 2025-06-24

## 2025-06-23 RX ORDER — SODIUM CHLORIDE 9 G/1000ML
250 INJECTION, SOLUTION INTRAVENOUS ONCE
Refills: 0 | Status: COMPLETED | OUTPATIENT
Start: 2025-06-23 | End: 2025-06-23

## 2025-06-23 RX ORDER — VANCOMYCIN HCL IN 5 % DEXTROSE 1.5G/250ML
500 PLASTIC BAG, INJECTION (ML) INTRAVENOUS ONCE
Refills: 0 | Status: DISCONTINUED | OUTPATIENT
Start: 2025-06-23 | End: 2025-06-23

## 2025-06-23 RX ORDER — BUMETANIDE 1 MG/1
2 TABLET ORAL ONCE
Refills: 0 | Status: COMPLETED | OUTPATIENT
Start: 2025-06-23 | End: 2025-06-23

## 2025-06-23 RX ADMIN — INSULIN LISPRO 1: 100 INJECTION, SOLUTION INTRAVENOUS; SUBCUTANEOUS at 11:42

## 2025-06-23 RX ADMIN — NOREPINEPHRINE BITARTRATE 6.08 MICROGRAM(S)/KG/MIN: 8 SOLUTION at 07:53

## 2025-06-23 RX ADMIN — Medication 325 MILLIGRAM(S): at 11:42

## 2025-06-23 RX ADMIN — INSULIN LISPRO 1: 100 INJECTION, SOLUTION INTRAVENOUS; SUBCUTANEOUS at 07:50

## 2025-06-23 RX ADMIN — Medication 1 APPLICATION(S): at 06:12

## 2025-06-23 RX ADMIN — MIDODRINE HYDROCHLORIDE 10 MILLIGRAM(S): 5 TABLET ORAL at 21:23

## 2025-06-23 RX ADMIN — MIDODRINE HYDROCHLORIDE 10 MILLIGRAM(S): 5 TABLET ORAL at 00:48

## 2025-06-23 RX ADMIN — MIDODRINE HYDROCHLORIDE 10 MILLIGRAM(S): 5 TABLET ORAL at 08:29

## 2025-06-23 RX ADMIN — Medication 50 MEQ/KG/HR: at 04:33

## 2025-06-23 RX ADMIN — INSULIN LISPRO 1: 100 INJECTION, SOLUTION INTRAVENOUS; SUBCUTANEOUS at 17:16

## 2025-06-23 RX ADMIN — CLOPIDOGREL BISULFATE 75 MILLIGRAM(S): 75 TABLET, FILM COATED ORAL at 11:42

## 2025-06-23 RX ADMIN — SODIUM CHLORIDE 500 MILLILITER(S): 9 INJECTION, SOLUTION INTRAVENOUS at 02:25

## 2025-06-23 RX ADMIN — BUMETANIDE 2 MILLIGRAM(S): 1 TABLET ORAL at 11:41

## 2025-06-23 RX ADMIN — IPRATROPIUM BROMIDE AND ALBUTEROL SULFATE 3 MILLILITER(S): .5; 2.5 SOLUTION RESPIRATORY (INHALATION) at 22:54

## 2025-06-23 RX ADMIN — CALCITRIOL 0.25 MICROGRAM(S): 0.5 CAPSULE, GELATIN COATED ORAL at 12:56

## 2025-06-23 RX ADMIN — SODIUM CHLORIDE 75 MILLILITER(S): 9 INJECTION, SOLUTION INTRAVENOUS at 11:56

## 2025-06-23 RX ADMIN — SODIUM CHLORIDE 250 MILLILITER(S): 9 INJECTION, SOLUTION INTRAVENOUS at 00:48

## 2025-06-23 RX ADMIN — Medication 25 GRAM(S): at 17:15

## 2025-06-23 RX ADMIN — MIDODRINE HYDROCHLORIDE 10 MILLIGRAM(S): 5 TABLET ORAL at 14:21

## 2025-06-23 RX ADMIN — IPRATROPIUM BROMIDE AND ALBUTEROL SULFATE 3 MILLILITER(S): .5; 2.5 SOLUTION RESPIRATORY (INHALATION) at 17:49

## 2025-06-23 RX ADMIN — IPRATROPIUM BROMIDE AND ALBUTEROL SULFATE 3 MILLILITER(S): .5; 2.5 SOLUTION RESPIRATORY (INHALATION) at 11:06

## 2025-06-23 RX ADMIN — APIXABAN 2.5 MILLIGRAM(S): 5 TABLET, FILM COATED ORAL at 17:16

## 2025-06-23 RX ADMIN — Medication 50 MEQ/KG/HR: at 07:53

## 2025-06-23 RX ADMIN — ATORVASTATIN CALCIUM 80 MILLIGRAM(S): 80 TABLET, FILM COATED ORAL at 21:23

## 2025-06-23 RX ADMIN — IPRATROPIUM BROMIDE AND ALBUTEROL SULFATE 3 MILLILITER(S): .5; 2.5 SOLUTION RESPIRATORY (INHALATION) at 06:19

## 2025-06-23 RX ADMIN — NOREPINEPHRINE BITARTRATE 6.08 MICROGRAM(S)/KG/MIN: 8 SOLUTION at 04:33

## 2025-06-23 RX ADMIN — Medication 25 GRAM(S): at 03:38

## 2025-06-23 RX ADMIN — NOREPINEPHRINE BITARTRATE 6.08 MICROGRAM(S)/KG/MIN: 8 SOLUTION at 19:14

## 2025-06-23 RX ADMIN — APIXABAN 2.5 MILLIGRAM(S): 5 TABLET, FILM COATED ORAL at 08:29

## 2025-06-23 RX ADMIN — TAMSULOSIN HYDROCHLORIDE 0.4 MILLIGRAM(S): 0.4 CAPSULE ORAL at 21:24

## 2025-06-23 RX ADMIN — Medication 25 GRAM(S): at 23:48

## 2025-06-23 NOTE — CONSULT NOTE ADULT - PROBLEM/RECOMMENDATION-4
Look at calories, 1500 per day, snacks 150 calories or less  Soup is very good  Use slow cooker for soups, freeze leftover  Plan meals  Keep food intake records  Weigh weekly    Exercise is priority     Listen to your body    Plan/shop    [email or web address]  
DISPLAY PLAN FREE TEXT

## 2025-06-23 NOTE — PROGRESS NOTE ADULT - ASSESSMENT
85-year-old male with past medical history of hypertension, stage IV CKD, CVA, type 2 diabetes, A-fib, iron deficiency anemia, bradycardia status post pacemaker, recent NSTEMI, ischemic cardiomyopathy who presented to the ED from [facility] due to left-sided chest pain. Found with elevated troponin(downtrending), sepsis likely 2/2 pna.  CT 1. Small focal airspace opacity in the right upper lobe with associated   cavitation and scattered patchy groundglass opacities throughout the   lungs with a predominance in the right upper lobe likely infectious in   etiology.  2. Small bilateral pleural effusions with adjacent compressive   atelectasis.  last admission was for UTI and we treated with ceftriaxone   would give broader coverage  He is MRSA pcr positive and there is higher likelihood that he has a MRSA pneumonia   PAtient on oxygen and coughing during my exam     6/23: in ICU, low BP, on pressors, can continue antibiotics, monitor creatinine and urine output     Plan:  continue IV vancomycin   send vancomycin level with target AUC/LORELEI 400-600   (therapeutic drug monitoring required with IV vancomycin)  continue Zosyn   monitor creatinine and appreciate nephrology   if sputum production send sputum culture  follow blood cultures until final   Streptococcus pneumonia na legionella urine ag negative   good control of his HR and his Afib   monitor ins and outs and reynolds management per icu       Discussed plan with CCM team     Delmer Pickens, DO  Chief, Infectious Disease at [facility]  Reachable via [employer] Teams or ID office: [phone]  Weekdays: After 5pm, please call [phone] for all inquiries and new consults  Weekends: Message on-call infectious disease physician via teams (see Waldo)

## 2025-06-23 NOTE — CONSULT NOTE ADULT - PROBLEM SELECTOR RECOMMENDATION 4
recent NSTEMI suspected secondary to sepsis marked change in EF from prior echo  last echo on file from May of 2025 with EF of 21% global left ventricular hypokinesis, cards had seen prior recommended repeat echo to assess for improvement in EF   cautious IVF use  s/p bumex, abdomen and hands painful suspect related to volume, elevate extremities

## 2025-06-23 NOTE — CONSULT NOTE ADULT - PROBLEM SELECTOR RECOMMENDATION 5
prior to April was ambulatory with cane and independent with ADLs  now requiring more help was at rehab prior and was about to be discharged home before he came back to the hospital.   family likely in favor of him returning home over rehab, aware his needs have increased  turn and position q 2 hours

## 2025-06-23 NOTE — CONSULT NOTE ADULT - CONVERSATION DETAILS
Spoke with patient and 2 of his daughters Gloria and Audrey in person.  Introduced self and palliative medicine as well as our role in the care team.  Patient has 6 children, a few family members also privy to conversation via phone call as well.  Patient awake and alert during conversation but confused and limited insight for complex decision making at present.  Patient's daughters said he has been confused as he has been in and out of the hospital and rehab since April.  Patient was ambulatory with a cane and independent with ADLs but has been declining physically since April's hospitalization.  Discussed overall issues including HF, septic shock, worsening renal function, confusion and debility.  They are aware of patient's worsened EF noted on echo from May and they are also aware of his worsening renal function.  Explained that patient was given some IVF for hypotension, but they have to be cautious in IVF administration given poor EF and therefore he needed BP support with pressors which is why he is in the ICU.  Explained he has chronic kidney issues and while he has HD indication at present, HD would likely be overly burdensome for him given overall decline physically and mentally and in the setting of multiple chronic medical issues.  Expressed concern about how patient is doing overall given chronic and acute issues as well as worsening cognition and debility.  Patient's daughter, Gloria is the HCP and said she would look for a copy for the chart.  She said patient had not been very clear about treatment preferences regarding LST but she does know he would not want to be sustained on machines.  Explained interventions such as CPR may be more harmful than beneficial given low EF, overall decline and worsening renal function.  Further intubation often requires patients to be sedated and may need physical restraints to keep him from pulling out the tube.  Explained DNR means allowing for natural death but does not mean do not treat.  While they don't need to make a decision about these things today encouraged they speak ahead of an emergent situation.  Offered family meeting if that would be helpful as well.  No decisions made at this time.  Gloria to look for HCP to provide for the chart.

## 2025-06-23 NOTE — CONSULT NOTE ADULT - PROBLEM SELECTOR RECOMMENDATION 6
See GOC conversation above.  Patient with HCP reportedly daughter Gloria, requested copy for chart.  Encouraged further conversations about GOC and wishes about LST as patient is high risk for further decline now and in the near future.  Patient remains FULL code at present.     Discussed with CAMPOS Elder

## 2025-06-23 NOTE — PROGRESS NOTE ADULT - ASSESSMENT
85M PMH CKD (Stage IV), H/O CVA, DM2, AF (on apixaban), HFrEF (21% s/p PPM a/w moderate MR) initially p/w possible multifocal pneumonia, with progressively worsening TAYLOR and now hypotensive. Transferred to ICU  - Troponins stable 480–520 range  - Remains on Levophed @0.17 + midodrine  - Anuric (likely progression of CKD in setting of hypotension), will attempt to diurese with Bumex, Na 134  - c/w empiric antibiotics, currently no positive cultures  - POCU/S showed better contractility than prior official echo, will repeat official to evaluate interval change  - c/w apixaban

## 2025-06-23 NOTE — CONSULT NOTE ADULT - PROBLEM SELECTOR RECOMMENDATION 2
rising creatinine, septic shock  avoid nephrotoxic agents and monitor urine output h/o CKD, rising creatinine, septic shock on bicarb gtt  avoid nephrotoxic agents and monitor urine output

## 2025-06-23 NOTE — CONSULT NOTE ADULT - ASSESSMENT
85 year old male PMH of CKD, HTN, CVA, DM, atrial fib, PPM secondary to bradycardia, recent NSTEMI with reduced EF on most recent echo from May 2025 presented to the ED from [facility] rehab for left sided chest pain.  Patient admitted for multifocal pna and upgraded to ICU for hypotension not substantially improved with IVF and now on pressors.  Palliative consulted for [facility].  85 year old male PMH of CKD, HTN, CVA, DM, atrial fib, PPM secondary to bradycardia, recent NSTEMI with reduced EF on most recent echo from May 2025 presented to the ED from [facility] rehab for left sided chest pain.  Patient admitted for pna and upgraded to ICU after RRT for hypotension not substantially improved with IVF and now on pressors.  Palliative consulted for GOC.

## 2025-06-23 NOTE — PROGRESS NOTE ADULT - SUBJECTIVE AND OBJECTIVE BOX
[facility] NEPHROLOGY SERVICES, [facility]  NEPHROLOGY AND HYPERTENSION  [address]  [address]  [place] [address]  [phone]    MD COLTON WORLEY MD YELENA ROSENBERG, MD BINNY KOSHY, MD CHRISTOPHER CAPUTO, MD EDWARD BOVER, MD          Patient events noted  No distress  Events noted    MEDICATIONS  (STANDING):  albuterol/ipratropium for Nebulization 3 milliLiter(s) Nebulizer every 6 hours  apixaban 2.5 milliGRAM(s) Oral every 12 hours  atorvastatin 80 milliGRAM(s) Oral at bedtime  calcitriol   Capsule 0.25 MICROGram(s) Oral daily  chlorhexidine 2% Cloths 1 Application(s) Topical <User Schedule>  clopidogrel Tablet 75 milliGRAM(s) Oral daily  dextrose 5%. 1000 milliLiter(s) (100 mL/Hr) IV Continuous <Continuous>  dextrose 5%. 1000 milliLiter(s) (50 mL/Hr) IV Continuous <Continuous>  dextrose 50% Injectable 25 Gram(s) IV Push once  dextrose 50% Injectable 12.5 Gram(s) IV Push once  dextrose 50% Injectable 25 Gram(s) IV Push once  ferrous    sulfate 325 milliGRAM(s) Oral daily  glucagon  Injectable 1 milliGRAM(s) IntraMuscular once  insulin lispro (ADMELOG) corrective regimen sliding scale   SubCutaneous three times a day before meals  midodrine 10 milliGRAM(s) Oral every 8 hours  norepinephrine Infusion 0.05 MICROgram(s)/kG/Min (6.08 mL/Hr) IV Continuous <Continuous>  piperacillin/tazobactam IVPB.. 3.375 Gram(s) IV Intermittent every 8 hours  sodium bicarbonate  Infusion 0.058 mEq/kG/Hr (50 mL/Hr) IV Continuous <Continuous>  tamsulosin 0.4 milliGRAM(s) Oral at bedtime    MEDICATIONS  (PRN):  acetaminophen     Tablet .. 650 milliGRAM(s) Oral every 6 hours PRN Temp greater or equal to 38C (100.4F), Mild Pain (1 - 3)  dextrose Oral Gel 15 Gram(s) Oral once PRN Blood Glucose LESS THAN 70 milliGRAM(s)/deciliter  melatonin 3 milliGRAM(s) Oral at bedtime PRN Insomnia      06-22-25 @ 07:01  -  06-23-25 @ 07:00  --------------------------------------------------------  IN: 252 mL / OUT: 0 mL / NET: 252 mL    06-23-25 @ 07:01  -  06-23-25 @ 17:19  --------------------------------------------------------  IN: 454.1 mL / OUT: 185 mL / NET: 269.1 mL      PHYSICAL EXAM:      T(C): 35 (06-23-25 @ 04:04), Max: 36.3 (06-22-25 @ 23:58)  HR: 78 (06-23-25 @ 16:30) (57 - 85)  BP: 113/52 (06-23-25 @ 16:30) (70/41 - 137/52)  RR: 21 (06-23-25 @ 16:30) (15 - 23)  SpO2: 100% (06-23-25 @ 16:30) (98% - 100%)  Wt(kg): --  Lungs clear ant decreased BS bases   Heart S1S2  Abd soft NT ND  Extremities:   tr edema                                    7.9    7.33  )-----------( 127      ( 23 Jun 2025 00:35 )             22.5     06-23    135  |  106  |  78[H]  ----------------------------<  150[H]  4.0   |  17[L]  |  6.00[H]    Ca    7.8[L]      23 Jun 2025 07:00    TPro  6.2  /  Alb  2.0[L]  /  TBili  0.7  /  DBili  x   /  AST  38[H]  /  ALT  54  /  AlkPhos  129[H]  06-23      LIVER FUNCTIONS - ( 23 Jun 2025 00:35 )  Alb: 2.0 g/dL / Pro: 6.2 gm/dL / ALK PHOS: 129 U/L / ALT: 54 U/L / AST: 38 U/L / GGT: x           Creatinine Trend: 6.00<--, 5.81<--, 5.51<--, 5.08<--, 3.84<--, 2.67<--        Assessment   TAYLOR CKD EF 21%, CRS  Hypotension, suspected ischemic ATN  Upgraded to CCU     Plan  Holding diuresis, judicious IVF  Will follow       Yuriy Guillermo MD

## 2025-06-23 NOTE — RAPID RESPONSE TEAM SUMMARY - NSSITUATIONBACKGROUNDRRT_GEN_ALL_CORE
85-year-old male with past medical history of hypertension, stage IV CKD, CVA, type 2 diabetes, A-fib, iron deficiency anemia, bradycardia status post pacemaker, recent NSTEMI, ischemic cardiomyopathy who presented to the ED from [facility] due to left-sided chest pain. He denies any fevers, chills, coughing, heart palpitations, shortness of breath, or other complaints. On presentation, the patient was hypothermic 92.7 otherwise stable vital signs. EKG with paced rhythm. Labs notable for WBC 2.2, hemoglobin 9.6, troponin 488, potassium 5.4, creatinine 2.6 (around baseline), lactate 1.1, proBNP 7,166, UA negative for UTI, COVID/flu negative. CT chest showed small focal airspace opacity in the right upper lobe with associated cavitation and scattered patchy groundglass opacities throughout the lungs with a predominance in the right upper lobe likely infectious in etiology. Small bilateral pleural effusions with adjacent compressive atelectasis. RRT called for Hypotension.       .  . 85-year-old male with past medical history of hypertension, stage IV CKD, CVA, type 2 diabetes, A-fib, iron deficiency anemia, bradycardia status post pacemaker, recent NSTEMI, ischemic cardiomyopathy who presented to the ED from [facility] due to left-sided chest pain. He denies any fevers, chills, coughing, heart palpitations, shortness of breath, or other complaints. On presentation, the patient was hypothermic 92.7 otherwise stable vital signs. EKG with paced rhythm. Labs notable for WBC 2.2, hemoglobin 9.6, troponin 488, potassium 5.4, creatinine 2.6 (around baseline), lactate 1.1, proBNP 7,166, UA negative for UTI, COVID/flu negative. CT chest showed small focal airspace opacity in the right upper lobe with associated cavitation and scattered patchy groundglass opacities throughout the lungs with a predominance in the right upper lobe likely infectious in etiology. Small bilateral pleural effusions with adjacent compressive atelectasis. RRT called for Hypotension. 83/44 60       .  . 85-year-old male with past medical history of hypertension, stage IV CKD, CVA, type 2 diabetes, A-fib, iron deficiency anemia, bradycardia status post pacemaker, recent NSTEMI, ischemic cardiomyopathy who [facility]. He denies any fevers, chills, coughing, heart palpitations, shortness of breath, or other complaints. On presentation, the patient was hypothermic 92.7 otherwise stable vital signs. EKG with paced rhythm. Labs notable for WBC 2.2, hemoglobin 9.6, troponin 488, potassium 5.4, creatinine 2.6 (around baseline), lactate 1.1, proBNP 7,166, UA negative for UTI, COVID/flu negative. CT chest showed small focal airspace opacity in the right upper lobe with associated cavitation and scattered patchy groundglass opacities throughout the lungs with a predominance in the right upper lobe likely infectious in etiology. Small bilateral pleural effusions with adjacent compressive atelectasis. RRT called for Hypotension. 83/44 , 60, 22, 98% 2 L NC , 97.3   repeat BP 85/45 74      .  . 85-year-old male with past medical history of hypertension, stage IV CKD, CVA, type 2 diabetes, A-fib, iron deficiency anemia, bradycardia status post pacemaker, recent NSTEMI, ischemic cardiomyopathy who presented to the ED from [facility] due to left-sided chest pain. He denies any fevers, chills, coughing, heart palpitations, shortness of breath, or other complaints. On presentation, the patient was hypothermic 92.7 otherwise stable vital signs. EKG with paced rhythm. Labs notable for WBC 2.2, hemoglobin 9.6, troponin 488, potassium 5.4, creatinine 2.6 (around baseline), lactate 1.1, proBNP 7,166, UA negative for UTI, COVID/flu negative. CT chest showed small focal airspace opacity in the right upper lobe with associated cavitation and scattered patchy groundglass opacities throughout the lungs with a predominance in the right upper lobe likely infectious in etiology. Small bilateral pleural effusions with adjacent compressive atelectasis. RRT called for Hypotension. 83/44 , 60, 22, 98% 2 L NC , 97.3   repeat BP 85/45 74 cbc, cmp, lactate       .  .

## 2025-06-23 NOTE — PROGRESS NOTE ADULT - SUBJECTIVE AND OBJECTIVE BOX
# CC: Patient is a 85y old  Male who presents with a chief complaint of Sepsis likely 2/2 pna (24 Jun 2025 01:25)      ## HPI:  85-year-old male with past medical history of hypertension, stage IV CKD, CVA, type 2 diabetes, A-fib, iron deficiency anemia, bradycardia status post pacemaker, recent NSTEMI, ischemic cardiomyopathy who presented to the ED from [facility] due to left-sided chest pain. He denies any fevers, chills, coughing, heart palpitations, shortness of breath, or other complaints.   On presentation, the patient was hypothermic 92.7 otherwise stable vital signs. EKG with paced rhythm. Labs notable for WBC 2.2, hemoglobin 9.6, troponin 488, potassium 5.4, creatinine 2.6 (around baseline), lactate 1.1, proBNP 7,166, UA negative for UTI, COVID/flu negative. CT chest showed small focal airspace opacity in the right upper lobe with associated cavitation and scattered patchy groundglass opacities throughout the   lungs with a predominance in the right upper lobe likely infectious in etiology. Small bilateral pleural effusions with adjacent compressive atelectasis.   (19 Jun 2025 03:46)      **O/N:**    ## ROS:    ## Labs:  ** CBC: **                        9.2    10.64 )-----------( 154      ( 24 Jun 2025 04:34 )             26.1     ** Chem:  **  06-24    134[L]  |  104  |  84[H]  ----------------------------<  104[H]  4.0   |  16[L]  |  6.64[H]    Ca    8.2[L]      24 Jun 2025 04:34  Phos  5.8     06-24  Mg     2.3     06-24    TPro  6.9  /  Alb  2.1[L]  /  TBili  0.9  /  DBili  x   /  AST  42[H]  /  ALT  78  /  AlkPhos  198[H]  06-24    ** Coags: **      CAPILLARY BLOOD GLUCOSE      POCT Blood Glucose.: 163 mg/dL (23 Jun 2025 16:53)  POCT Blood Glucose.: 172 mg/dL (23 Jun 2025 11:32)  POCT Blood Glucose.: 161 mg/dL (23 Jun 2025 07:33)        Urinalysis with Rflx Culture (collected 19 Jun 2025 01:25)    Culture - Blood (collected 18 Jun 2025 22:45)  Source: Blood Blood-Peripheral  Preliminary Report (23 Jun 2025 10:01):    No growth at 4 days    Culture - Blood (collected 18 Jun 2025 22:45)  Source: Blood Blood-Peripheral  Preliminary Report (23 Jun 2025 10:01):    No growth at 4 days        ## Imaging:    ## Medications:  midodrine 10 milliGRAM(s) Oral every 8 hours  norepinephrine Infusion 0.05 MICROgram(s)/kG/Min IV Continuous <Continuous>    piperacillin/tazobactam IVPB.. 3.375 Gram(s) IV Intermittent every 8 hours    albuterol/ipratropium for Nebulization 3 milliLiter(s) Nebulizer every 6 hours    acetaminophen     Tablet .. 650 milliGRAM(s) Oral every 6 hours PRN  melatonin 3 milliGRAM(s) Oral at bedtime PRN      apixaban 2.5 milliGRAM(s) Oral every 12 hours  clopidogrel Tablet 75 milliGRAM(s) Oral daily      tamsulosin 0.4 milliGRAM(s) Oral at bedtime    atorvastatin 80 milliGRAM(s) Oral at bedtime  dextrose 50% Injectable 25 Gram(s) IV Push once  dextrose 50% Injectable 12.5 Gram(s) IV Push once  dextrose 50% Injectable 25 Gram(s) IV Push once  dextrose Oral Gel 15 Gram(s) Oral once PRN  glucagon  Injectable 1 milliGRAM(s) IntraMuscular once  insulin lispro (ADMELOG) corrective regimen sliding scale   SubCutaneous three times a day before meals          ## O/E:  ICU Vital Signs Last 24 Hrs  T(C): 36.1 (24 Jun 2025 07:18), Max: 36.6 (24 Jun 2025 05:14)  T(F): 96.9 (24 Jun 2025 07:18), Max: 97.9 (24 Jun 2025 05:14)  HR: 71 (24 Jun 2025 06:30) (61 - 86)  BP: 98/45 (24 Jun 2025 06:30) (88/47 - 137/52)  BP(mean): 59 (24 Jun 2025 06:30) (59 - 106)  ABP: --  ABP(mean): --  RR: 24 (24 Jun 2025 06:30) (15 - 26)  SpO2: 100% (24 Jun 2025 06:00) (98% - 100%)    O2 Parameters below as of 24 Jun 2025 05:04  Patient On (Oxygen Delivery Method): room air          I&O's Summary    23 Jun 2025 07:01  -  24 Jun 2025 07:00  --------------------------------------------------------  IN: 1228.5 mL / OUT: 378 mL / NET: 850.5 mL        Gen: lying comfortably in bed in no apparent distress  HEENT: PERRL, EOMI  Resp: CTA B/L no c/r/w  CVS: S1S2 no m/r/g  Abd: soft NT/ND +BS  Ext: no c/c/e  Neuro: A&Ox3    ## Code status:  Goals of care discussion: [x] yes [ ] no  [x] full code  [ ] DNR  [ ] DNI  [ ] JENNIE # CC: Patient is a 85y old  Male who presents with a chief complaint of Sepsis likely 2/2 pna (24 Jun 2025 01:25)      ## HPI:  85-year-old male with past medical history of hypertension, stage IV CKD, CVA, type 2 diabetes, A-fib, iron deficiency anemia, bradycardia status post pacemaker, recent NSTEMI, ischemic cardiomyopathy who presented to the ED from [facility] due to left-sided chest pain. He denies any fevers, chills, coughing, heart palpitations, shortness of breath, or other complaints.   On presentation, the patient was hypothermic 92.7 otherwise stable vital signs. EKG with paced rhythm. Labs notable for WBC 2.2, hemoglobin 9.6, troponin 488, potassium 5.4, creatinine 2.6 (around baseline), lactate 1.1, proBNP 7,166, UA negative for UTI, COVID/flu negative. CT chest showed small focal airspace opacity in the right upper lobe with associated cavitation and scattered patchy groundglass opacities throughout the   lungs with a predominance in the right upper lobe likely infectious in etiology. Small bilateral pleural effusions with adjacent compressive atelectasis.   (19 Jun 2025 03:46)      **O/N:**  Transferred to ICU for hypotension    ## ROS:    ## Labs:  ** CBC: **                        9.2    10.64 )-----------( 154      ( 24 Jun 2025 04:34 )             26.1     ** Chem:  **  06-24    134[L]  |  104  |  84[H]  ----------------------------<  104[H]  4.0   |  16[L]  |  6.64[H]    Ca    8.2[L]      24 Jun 2025 04:34  Phos  5.8     06-24  Mg     2.3     06-24    TPro  6.9  /  Alb  2.1[L]  /  TBili  0.9  /  DBili  x   /  AST  42[H]  /  ALT  78  /  AlkPhos  198[H]  06-24    ** Coags: **      CAPILLARY BLOOD GLUCOSE      POCT Blood Glucose.: 163 mg/dL (23 Jun 2025 16:53)  POCT Blood Glucose.: 172 mg/dL (23 Jun 2025 11:32)  POCT Blood Glucose.: 161 mg/dL (23 Jun 2025 07:33)        Urinalysis with Rflx Culture (collected 19 Jun 2025 01:25)    Culture - Blood (collected 18 Jun 2025 22:45)  Source: Blood Blood-Peripheral  Preliminary Report (23 Jun 2025 10:01):    No growth at 4 days    Culture - Blood (collected 18 Jun 2025 22:45)  Source: Blood Blood-Peripheral  Preliminary Report (23 Jun 2025 10:01):    No growth at 4 days        ## Imaging:    ## Medications:  midodrine 10 milliGRAM(s) Oral every 8 hours  norepinephrine Infusion 0.05 MICROgram(s)/kG/Min IV Continuous <Continuous>    piperacillin/tazobactam IVPB.. 3.375 Gram(s) IV Intermittent every 8 hours    albuterol/ipratropium for Nebulization 3 milliLiter(s) Nebulizer every 6 hours    acetaminophen     Tablet .. 650 milliGRAM(s) Oral every 6 hours PRN  melatonin 3 milliGRAM(s) Oral at bedtime PRN      apixaban 2.5 milliGRAM(s) Oral every 12 hours  clopidogrel Tablet 75 milliGRAM(s) Oral daily      tamsulosin 0.4 milliGRAM(s) Oral at bedtime    atorvastatin 80 milliGRAM(s) Oral at bedtime  dextrose 50% Injectable 25 Gram(s) IV Push once  dextrose 50% Injectable 12.5 Gram(s) IV Push once  dextrose 50% Injectable 25 Gram(s) IV Push once  dextrose Oral Gel 15 Gram(s) Oral once PRN  glucagon  Injectable 1 milliGRAM(s) IntraMuscular once  insulin lispro (ADMELOG) corrective regimen sliding scale   SubCutaneous three times a day before meals          ## O/E:  ICU Vital Signs Last 24 Hrs  T(C): 36.1 (24 Jun 2025 07:18), Max: 36.6 (24 Jun 2025 05:14)  T(F): 96.9 (24 Jun 2025 07:18), Max: 97.9 (24 Jun 2025 05:14)  HR: 71 (24 Jun 2025 06:30) (61 - 86)  BP: 98/45 (24 Jun 2025 06:30) (88/47 - 137/52)  BP(mean): 59 (24 Jun 2025 06:30) (59 - 106)  ABP: --  ABP(mean): --  RR: 24 (24 Jun 2025 06:30) (15 - 26)  SpO2: 100% (24 Jun 2025 06:00) (98% - 100%)    O2 Parameters below as of 24 Jun 2025 05:04  Patient On (Oxygen Delivery Method): room air          I&O's Summary    23 Jun 2025 07:01  -  24 Jun 2025 07:00  --------------------------------------------------------  IN: 1228.5 mL / OUT: 378 mL / NET: 850.5 mL        Gen: lying comfortably in bed in no apparent distress  HEENT: PERRL, EOMI  Resp: CTA B/L no c/r/w  CVS: S1S2 no m/r/g  Abd: soft NT/ND +BS  Ext: no c/c/e  Neuro: A&Ox3    ## Code status:  Goals of care discussion: [x] yes [ ] no  [x] full code  [ ] DNR  [ ] DNI  [ ] JENNIE

## 2025-06-23 NOTE — CONSULT NOTE ADULT - SUBJECTIVE AND OBJECTIVE BOX
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      BP has been dropping overnight to 70's.  Some recovery with small fluid bolus' but still too low for the floors    Comforting that his lactate is normal and he is otherwise warm and perfused with a cap refill < 3 seconds       POCUS on arrival to the unit  Proamatine not great long term due to low EF but will use for now.  Cautious fluid with his low EF and valvulopathy   Pressor if needed   Pip/tazo for MF PNA.  Check vanco level random due to TAYLOR.  Mycoplasma/Strep AG/JANET all negative     CCT 33 min

## 2025-06-23 NOTE — PROGRESS NOTE ADULT - SUBJECTIVE AND OBJECTIVE BOX
[facility]  INFECTIOUS DISEASES   [address] [place] [address]  Tel: [phone]     Fax: [phone]  ==============================================================================  DO Gale Gross MD Alexandra Gutman, NP   ==============================================================================      JOSEFINA KUMAR  MRN-[id number]  85y ([birth year])      Interval History: patient seen and examined. Had RRT for hypotension, now in ICU on pressors, off nasal cannula and 100% on room air. Patient reports overall feeling better. Had reynolds placed     ROS:    [ ] Unobtainable because:  [ x] All other systems negative except as noted    Constitutional: no fever, no chills  Head: no trauma  Eyes: no vision changes, no eye pain  ENT:  no sore throat, no rhinorrhea  Cardiovascular:  no chest pain, no palpitation  Respiratory:  no SOB, no cough  GI:  no abd pain, no vomiting, no diarrhea  urinary: no dysuria, no hematuria, no flank pain  musculoskeletal:  no joint pain, no joint swelling  skin:  no rash  neurology:  no headache, no seizure, no change in mental status  psych: no anxiety, no depression         Allergies  No Known Allergies        ANTIMICROBIALS:  piperacillin/tazobactam IVPB.. 3.375 every 8 hours      OTHER MEDS:  acetaminophen     Tablet .. 650 milliGRAM(s) Oral every 6 hours PRN  albuterol/ipratropium for Nebulization 3 milliLiter(s) Nebulizer every 6 hours  apixaban 2.5 milliGRAM(s) Oral every 12 hours  atorvastatin 80 milliGRAM(s) Oral at bedtime  calcitriol   Capsule 0.25 MICROGram(s) Oral daily  chlorhexidine 2% Cloths 1 Application(s) Topical <User Schedule>  clopidogrel Tablet 75 milliGRAM(s) Oral daily  dextrose 5%. 1000 milliLiter(s) IV Continuous <Continuous>  dextrose 5%. 1000 milliLiter(s) IV Continuous <Continuous>  dextrose 50% Injectable 25 Gram(s) IV Push once  dextrose 50% Injectable 12.5 Gram(s) IV Push once  dextrose 50% Injectable 25 Gram(s) IV Push once  dextrose Oral Gel 15 Gram(s) Oral once PRN  ferrous    sulfate 325 milliGRAM(s) Oral daily  glucagon  Injectable 1 milliGRAM(s) IntraMuscular once  insulin lispro (ADMELOG) corrective regimen sliding scale   SubCutaneous three times a day before meals  melatonin 3 milliGRAM(s) Oral at bedtime PRN  midodrine 10 milliGRAM(s) Oral every 8 hours  norepinephrine Infusion 0.05 MICROgram(s)/kG/Min IV Continuous <Continuous>  sodium bicarbonate  Infusion 0.058 mEq/kG/Hr IV Continuous <Continuous>  tamsulosin 0.4 milliGRAM(s) Oral at bedtime      Physical Exam:  Vital Signs Last 24 Hrs  T(C): 35 (23 Jun 2025 04:04), Max: 36.3 (22 Jun 2025 17:16)  T(F): 95 (23 Jun 2025 04:04), Max: 97.3 (22 Jun 2025 17:16)  HR: 78 (23 Jun 2025 16:30) (57 - 85)  BP: 113/52 (23 Jun 2025 16:30) (70/41 - 137/52)  BP(mean): 71 (23 Jun 2025 16:30) (55 - 106)  RR: 21 (23 Jun 2025 16:30) (15 - 23)  SpO2: 100% (23 Jun 2025 16:30) (98% - 100%)    Parameters below as of 23 Jun 2025 11:30  Patient On (Oxygen Delivery Method): room air        General:    NAD,  non toxic  Head: atraumatic, normocephalic  Eye: normal sclera and conjunctiva  ENT:    no oral lesions, neck supple  Cardio:     regular S1, S2,  no murmur  Respiratory:    somewhat coarse BS but better ,    no wheezing  abd:     soft,   BS +,   no tenderness  :   no CVAT,  no suprapubic tenderness,   +reynolds  Musculoskeletal:   no joint swelling,   no edema  vascular: no central lines, +PIV   Skin:    no rash  Neurologic:     no focal deficit  psych: normal affect    WBC Count: 7.33 K/uL (06-23 @ 00:35)  WBC Count: 8.03 K/uL (06-22 @ 07:20)  WBC Count: 8.71 K/uL (06-20 @ 06:45)  WBC Count: 8.68 K/uL (06-19 @ 05:50)  WBC Count: 7.61 K/uL (06-18 @ 22:45)                            7.9    7.33  )-----------( 127      ( 23 Jun 2025 00:35 )             22.5       06-23    135  |  106  |  78[H]  ----------------------------<  150[H]  4.0   |  17[L]  |  6.00[H]    Ca    7.8[L]      23 Jun 2025 07:00    TPro  6.2  /  Alb  2.0[L]  /  TBili  0.7  /  DBili  x   /  AST  38[H]  /  ALT  54  /  AlkPhos  129[H]  06-23      Urinalysis Basic - ( 23 Jun 2025 07:00 )    Color: x / Appearance: x / SG: x / pH: x  Gluc: 150 mg/dL / Ketone: x  / Bili: x / Urobili: x   Blood: x / Protein: x / Nitrite: x   Leuk Esterase: x / RBC: x / WBC x   Sq Epi: x / Non Sq Epi: x / Bacteria: x          Creatinine Trend: 6.00<--, 5.81<--, 5.51<--, 5.08<--, 3.84<--, 2.67<--  Lactate, Blood: 1.8 mmol/L (06-23-25 @ 00:35)      MICROBIOLOGY:  Vancomycin Level, Random: 15.1 ug/mL (06-23-25 @ 07:00)  v  Blood Blood-Peripheral  06-18-25   No growth at 4 days  --  --      Blood Blood-Peripheral  05-27-25   No growth at 5 days  --  --      Blood Blood-Peripheral  05-27-25   No growth at 5 days  --  --    SARS-CoV-2 Result: NotDetec (06-18-25 @ 22:45)        RADIOLOGY:  < from: US Renal (06.21.25 @ 14:16) >  IMPRESSION:  No hydronephrosis.    Bilateral simple appearing small renal cysts.    Mildly enlarged prostate gland with diffuse mild wall thickening   suggestive of bladder wall hypertrophy.          SONIDO ALBA MD; Attending Radiologist  This document has been electronically signed. Jun 21 2025  2:21PM    < end of copied text >

## 2025-06-23 NOTE — CONSULT NOTE ADULT - SUBJECTIVE AND OBJECTIVE BOX
HPI:  85-year-old male with past medical history of hypertension, stage IV CKD, CVA, type 2 diabetes, A-fib, iron deficiency anemia, bradycardia status post pacemaker, recent NSTEMI, ischemic cardiomyopathy who presented to the ED from [facility] due to left-sided chest pain. He denies any fevers, chills, coughing, heart palpitations, shortness of breath, or other complaints.   On presentation, the patient was hypothermic 92.7 otherwise stable vital signs. EKG with paced rhythm. Labs notable for WBC 2.2, hemoglobin 9.6, troponin 488, potassium 5.4, creatinine 2.6 (around baseline), lactate 1.1, proBNP 7,166, UA negative for UTI, COVID/flu negative. CT chest showed small focal airspace opacity in the right upper lobe with associated cavitation and scattered patchy groundglass opacities throughout the   lungs with a predominance in the right upper lobe likely infectious in etiology. Small bilateral pleural effusions with adjacent compressive atelectasis.   (19 Jun 2025 03:46)    PERTINENT PM/SXH:   Type 2 diabetes mellitus without complication    CVA (cerebral vascular accident)    Non-ST elevation (NSTEMI) myocardial infarction    CAD (coronary artery disease)    CKD (chronic kidney disease), stage III    HTN (hypertension)    HLD (hyperlipidemia)      No significant past surgical history    Status post placement of implantable loop recorder    H/O carpal tunnel repair    S/P CABG (coronary artery bypass graft)      FAMILY HISTORY:  Family history of heart disease    Family history of uterine cancer (Sibling)      ITEMS NOT CHECKED ARE NOT PRESENT    SOCIAL HISTORY:   Significant other/partner:  [ ]  Children: yes [ ]  Shinto/Spirituality: Nondenominational  Substance hx:  [ ]   Tobacco hx:  [ ]   Alcohol hx: [ ]   Home Opioid hx:  [ ] I-Stop Reference No:  Living Situation: [ ]Home  [ ]Long term care  [ x]Rehab [ ]Other    ADVANCE DIRECTIVES:    DNR  MOLST  [ ]  Living Will  [ ]   DECISION MAKER(s):  [ ] Health Care Proxy(s)  [x ] Surrogate(s)  [ ] Guardian           Name(s): Phone Number(s): Gloria Kaplan (daughter) [phone]    BASELINE (I)ADL(s) (prior to admission):  [place]: [ ]Total  [ ] Moderate [ ]Dependent    Allergies    No Known Allergies    Intolerances    MEDICATIONS  (STANDING):  albuterol/ipratropium for Nebulization 3 milliLiter(s) Nebulizer every 6 hours  apixaban 2.5 milliGRAM(s) Oral every 12 hours  atorvastatin 80 milliGRAM(s) Oral at bedtime  calcitriol   Capsule 0.25 MICROGram(s) Oral daily  chlorhexidine 2% Cloths 1 Application(s) Topical <User Schedule>  clopidogrel Tablet 75 milliGRAM(s) Oral daily  dextrose 5%. 1000 milliLiter(s) (100 mL/Hr) IV Continuous <Continuous>  dextrose 5%. 1000 milliLiter(s) (50 mL/Hr) IV Continuous <Continuous>  dextrose 50% Injectable 25 Gram(s) IV Push once  dextrose 50% Injectable 12.5 Gram(s) IV Push once  dextrose 50% Injectable 25 Gram(s) IV Push once  ferrous    sulfate 325 milliGRAM(s) Oral daily  glucagon  Injectable 1 milliGRAM(s) IntraMuscular once  insulin lispro (ADMELOG) corrective regimen sliding scale   SubCutaneous three times a day before meals  midodrine 10 milliGRAM(s) Oral every 8 hours  norepinephrine Infusion 0.05 MICROgram(s)/kG/Min (6.08 mL/Hr) IV Continuous <Continuous>  piperacillin/tazobactam IVPB.. 3.375 Gram(s) IV Intermittent every 8 hours  sodium bicarbonate  Infusion 0.058 mEq/kG/Hr (50 mL/Hr) IV Continuous <Continuous>  tamsulosin 0.4 milliGRAM(s) Oral at bedtime    MEDICATIONS  (PRN):  acetaminophen     Tablet .. 650 milliGRAM(s) Oral every 6 hours PRN Temp greater or equal to 38C (100.4F), Mild Pain (1 - 3)  dextrose Oral Gel 15 Gram(s) Oral once PRN Blood Glucose LESS THAN 70 milliGRAM(s)/deciliter  melatonin 3 milliGRAM(s) Oral at bedtime PRN Insomnia    PRESENT SYMPTOMS: [ ]Unable to obtain due to poor mentation   Source if other than patient:  [ ]Family   [ ]Team     Pain: [ ] yes [ ] no  QOL impact -   Location -                    Aggravating factors -  Quality -  Radiation -  Timing-  Severity (0-10 scale):  Minimal acceptable level (0-10 scale):     PAIN AD Score:     [email or web address] (press ctrl +  left click to view)    Dyspnea:                           [ ]Mild [ ]Moderate [ ]Severe  Anxiety:                             [ ]Mild [ ]Moderate [ ]Severe  Fatigue:                             [ ]Mild [ ]Moderate [ ]Severe  Nausea:                             [ ]Mild [ ]Moderate [ ]Severe  Loss of appetite:              [ ]Mild [ ]Moderate [ ]Severe  Constipation:                    [ ]Mild [ ]Moderate [ ]Severe    Other Symptoms:  [ ]All other review of systems negative     Karnofsky Performance Score/Palliative Performance Status Version 2:         %    http://npcrc.org/files/news/palliative_performance_scale_ppsv2.pdf  PHYSICAL EXAM:  Vital Signs Last 24 Hrs  T(C): 35 (23 Jun 2025 04:04), Max: 36.3 (22 Jun 2025 17:16)  T(F): 95 (23 Jun 2025 04:04), Max: 97.3 (22 Jun 2025 17:16)  HR: 82 (23 Jun 2025 12:00) (57 - 84)  BP: 137/52 (23 Jun 2025 12:00) (70/41 - 137/52)  BP(mean): 78 (23 Jun 2025 12:00) (55 - 99)  RR: 20 (23 Jun 2025 12:00) (15 - 23)  SpO2: 100% (23 Jun 2025 12:00) (98% - 100%)    Parameters below as of 23 Jun 2025 11:30  Patient On (Oxygen Delivery Method): room air     I&O's Summary    22 Jun 2025 07:01  -  23 Jun 2025 07:00  --------------------------------------------------------  IN: 252 mL / OUT: 0 mL / NET: 252 mL    GENERAL:  [ ]Alert  [ ]Oriented x   [ ]Lethargic  [ ]Cachexia  [ ]Unarousable  [ ]Verbal  [ ]Non-Verbal  Behavioral:   [ ] Anxiety  [ ] Delirium [ ] Agitation [ ] Other  HEENT:  [ ]Normal   [ ]Dry mouth   [ ]ET Tube/Trach  [ ]Oral lesions  PULMONARY:   [ ]Clear [ ]Tachypnea  [ ]Audible excessive secretions   [ ]Rhonchi        [ ]Right [ ]Left [ ]Bilateral  [ ]Crackles        [ ]Right [ ]Left [ ]Bilateral  [ ]Wheezing     [ ]Right [ ]Left [ ]Bilateral  CARDIOVASCULAR:    [x ]Regular [ ]Irregular [ ]Tachy  [ ]Vijay [ ]Murmur [ ]Other  GASTROINTESTINAL:  [ ]Soft  [ ]Distended   [ ]+BS  [ ]Non tender [ ]Tender  [ ]PEG [ ]OGT/ NGT  Last BM: 6/23/25  GENITOURINARY:  [ ]Normal [ ] Incontinent   [ ]Oliguria/Anuria   [ ]Mosher  MUSCULOSKELETAL:   [ ]Normal   [ ]Weakness  [ ]Bed/Wheelchair bound [ ]Edema  NEUROLOGIC:   [ ]No focal deficits  [ ] Cognitive impairment  [ ] Dysphagia [ ]Dysarthria [ ] Paresis [ ]Other   SKIN:   [ ]Normal   [x ]Pressure ulcer(s) left heel suspected DTI [ ]Rash    CRITICAL CARE:  [ ] Shock Present  [ ]Septic [ ]Cardiogenic [ ]Neurologic [ ]Hypovolemic  [ ]  Vasopressors [ ]  Inotropes   [ ] Respiratory failure present [ ] mechanical ventilation [ ] non-invasive ventilatory support [ ] High flow  [ ] Acute  [ ] Chronic [ ] Hypoxic  [ ] Hypercarbic [ ] Other  [ ] Other organ failure     LABS:                        7.9    7.33  )-----------( 127      ( 23 Jun 2025 00:35 )             22.5   06-23    135  |  106  |  78[H]  ----------------------------<  150[H]  4.0   |  17[L]  |  6.00[H]    Ca    7.8[L]      23 Jun 2025 07:00    TPro  6.2  /  Alb  2.0[L]  /  TBili  0.7  /  DBili  x   /  AST  38[H]  /  ALT  54  /  AlkPhos  129[H]  06-23      Urinalysis with Rflx Culture (06.19.25 @ 01:25)    Urine Appearance: Clear   Color: Yellow   Specific Gravity: 1.014   pH Urine: 5.5   Protein, Urine: 100 mg/dL   Glucose Qualitative, Urine: Negative mg/dL   Ketone , Urine: Negative mg/dL   Blood, Urine: Negative   Bilirubin: Negative   Urobilinogen: 0.2 mg/dL   Leukocyte Esterase Concentration: Negative   Nitrite: Negative    Culture - Blood (06.18.25 @ 22:45)    Specimen Source: Blood Blood-Peripheral   Culture Results:   No growth at 4 days    RADIOLOGY & ADDITIONAL STUDIES:  < from: US Renal (06.21.25 @ 14:16) >  IMPRESSION:  No hydronephrosis.  Bilateral simple appearing small renal cysts.  Mildly enlarged prostate gland with diffuse mild wall thickening   suggestive of bladder wall hypertrophy.  --- End of Report ---  < end of copied text >    < from: CT Chest No Cont (06.18.25 @ 23:58) >  IMPRESSION:  1. Small focal airspace opacity in the right upper lobe with associated   cavitation and scattered patchy groundglass opacities throughout the   lungs with a predominance in the right upper lobe likely infectious in   etiology.  2. Small bilateral pleural effusions with adjacent compressive   atelectasis.  --- End of Report ---  < end of copied text >    < from: CT Head No Cont (06.18.25 @ 23:56) >  IMPRESSION:  No intracranial mass effect, acute hemorrhage or midline shift.  If the patient's symptoms persist, consider short interval follow-up head   CT or brain MRI if there are no MRI contraindications.  --- End of Report ---  < end of copied text >    < from: Xray Chest 1 View- PORTABLE-Urgent (06.18.25 @ 22:56) >  IMPRESSION: Unchanged cardiomegaly despite the portable technique.   Increasing opacities are noted over the lower lung zones and developing   bibasilar multifocal pneumonia versus asymmetric pulmonary edema. No   large pleural effusions or pneumothorax. Short interval surveillance   two-view chest to confirm resolution, as clinically indicated is   suggested.  --- End of Report ---  < end of copied text >    < from: TTE Limited W or WO Ultrasound Enhancing Agent (05.29.25 @ 09:24) >   CONCLUSIONS:    1. Left ventricular systolic function is severely decreased with an ejection fraction of 21 % by Watters's method of disks. Global left ventricular hypokinesis.   2. Compared to the transthoracic echocardiogram performed on 5/1/2025, Left ventricular function is significantly worse on today's study, also degree of mitral regurgitation is worse as well.  ________________________________________________________________________________________  < end of copied text >    PROTEIN CALORIE MALNUTRITION PRESENT: [ ] Yes [ ] No  [ ] PPSV2 < or = to 30% [ ] significant weight loss  [ ] poor nutritional intake [ ] catabolic state [ ] anasarca     Artificial Nutrition [ ]     REFERRALS:   [ ]Chaplaincy  [ ] Hospice  [ ]Child Life  [ ]Social Work  [ ]Case management [ ]Holistic Therapy     Goals of Care Document:   Care Coordination Assessment 201 [C. Provider] (06-20-25 @ 16:03)    HPI:  85-year-old male with past medical history of hypertension, stage IV CKD, CVA, type 2 diabetes, A-fib, iron deficiency anemia, bradycardia status post pacemaker, recent NSTEMI, ischemic cardiomyopathy who presented to the ED from [facility] due to left-sided chest pain. He denies any fevers, chills, coughing, heart palpitations, shortness of breath, or other complaints.   On presentation, the patient was hypothermic 92.7 otherwise stable vital signs. EKG with paced rhythm. Labs notable for WBC 2.2, hemoglobin 9.6, troponin 488, potassium 5.4, creatinine 2.6 (around baseline), lactate 1.1, proBNP 7,166, UA negative for UTI, COVID/flu negative. CT chest showed small focal airspace opacity in the right upper lobe with associated cavitation and scattered patchy groundglass opacities throughout the   lungs with a predominance in the right upper lobe likely infectious in etiology. Small bilateral pleural effusions with adjacent compressive atelectasis.   (19 Jun 2025 03:46)    PERTINENT PM/SXH:   Type 2 diabetes mellitus without complication    CVA (cerebral vascular accident)    Non-ST elevation (NSTEMI) myocardial infarction    CAD (coronary artery disease)    CKD (chronic kidney disease), stage III    HTN (hypertension)    HLD (hyperlipidemia)      No significant past surgical history    Status post placement of implantable loop recorder    H/O carpal tunnel repair    S/P CABG (coronary artery bypass graft)      FAMILY HISTORY:  Family history of heart disease    Family history of uterine cancer (Sibling)      ITEMS NOT CHECKED ARE NOT PRESENT    SOCIAL HISTORY:   Significant other/partner:  [ ]  Children: yes [ ]  Oriental orthodox/Spirituality: Adventist  Substance hx:  [ ]   Tobacco hx:  [ ]   Alcohol hx: [ ]   Home Opioid hx:  [ ] I-Stop Reference No:  Living Situation: [ ]Home  [ ]Long term care  [ x]Rehab [ ]Other    ADVANCE DIRECTIVES:    DNR  MOLST  [ ]  Living Will  [ ]   DECISION MAKER(s):  [ ] Health Care Proxy(s)  [x ] Surrogate(s)  [ ] Guardian           Name(s): Phone Number(s): Gloria Kaplan (daughter) [phone]    BASELINE (I)ADL(s) (prior to admission):  [place]: [ ]Total  [x ] Moderate [ ]Dependent    Allergies    No Known Allergies    Intolerances    MEDICATIONS  (STANDING):  albuterol/ipratropium for Nebulization 3 milliLiter(s) Nebulizer every 6 hours  apixaban 2.5 milliGRAM(s) Oral every 12 hours  atorvastatin 80 milliGRAM(s) Oral at bedtime  calcitriol   Capsule 0.25 MICROGram(s) Oral daily  chlorhexidine 2% Cloths 1 Application(s) Topical <User Schedule>  clopidogrel Tablet 75 milliGRAM(s) Oral daily  dextrose 5%. 1000 milliLiter(s) (100 mL/Hr) IV Continuous <Continuous>  dextrose 5%. 1000 milliLiter(s) (50 mL/Hr) IV Continuous <Continuous>  dextrose 50% Injectable 25 Gram(s) IV Push once  dextrose 50% Injectable 12.5 Gram(s) IV Push once  dextrose 50% Injectable 25 Gram(s) IV Push once  ferrous    sulfate 325 milliGRAM(s) Oral daily  glucagon  Injectable 1 milliGRAM(s) IntraMuscular once  insulin lispro (ADMELOG) corrective regimen sliding scale   SubCutaneous three times a day before meals  midodrine 10 milliGRAM(s) Oral every 8 hours  norepinephrine Infusion 0.05 MICROgram(s)/kG/Min (6.08 mL/Hr) IV Continuous <Continuous>  piperacillin/tazobactam IVPB.. 3.375 Gram(s) IV Intermittent every 8 hours  sodium bicarbonate  Infusion 0.058 mEq/kG/Hr (50 mL/Hr) IV Continuous <Continuous>  tamsulosin 0.4 milliGRAM(s) Oral at bedtime    MEDICATIONS  (PRN):  acetaminophen     Tablet .. 650 milliGRAM(s) Oral every 6 hours PRN Temp greater or equal to 38C (100.4F), Mild Pain (1 - 3)  dextrose Oral Gel 15 Gram(s) Oral once PRN Blood Glucose LESS THAN 70 milliGRAM(s)/deciliter  melatonin 3 milliGRAM(s) Oral at bedtime PRN Insomnia    PRESENT SYMPTOMS: [x ]Unable to obtain due to poor mentation   Source if other than patient:  [ ]Family   [ ]Team     Pain: [x ] yes [ ] no  QOL impact -   Location - abdomen and hands                   Aggravating factors -  Quality -  Radiation -  Timing-  Severity (0-10 scale):  Minimal acceptable level (0-10 scale):     PAIN AD Score:     [email or web address] (press ctrl +  left click to view)    Dyspnea:                           [ ]Mild [ ]Moderate [ ]Severe  Anxiety:                             [ ]Mild [ ]Moderate [ ]Severe  Fatigue:                             [ ]Mild [ ]Moderate [ ]Severe  Nausea:                             [ ]Mild [ ]Moderate [ ]Severe  Loss of appetite:              [ ]Mild [ ]Moderate [ ]Severe  Constipation:                    [ ]Mild [ ]Moderate [ ]Severe    Other Symptoms:  [ ]All other review of systems negative     Karnofsky Performance Score/Palliative Performance Status Version 2:     30-40    %    http://npcrc.org/files/news/palliative_performance_scale_ppsv2.pdf  PHYSICAL EXAM:  Vital Signs Last 24 Hrs  T(C): 35 (23 Jun 2025 04:04), Max: 36.3 (22 Jun 2025 17:16)  T(F): 95 (23 Jun 2025 04:04), Max: 97.3 (22 Jun 2025 17:16)  HR: 82 (23 Jun 2025 12:00) (57 - 84)  BP: 137/52 (23 Jun 2025 12:00) (70/41 - 137/52)  BP(mean): 78 (23 Jun 2025 12:00) (55 - 99)  RR: 20 (23 Jun 2025 12:00) (15 - 23)  SpO2: 100% (23 Jun 2025 12:00) (98% - 100%)    Parameters below as of 23 Jun 2025 11:30  Patient On (Oxygen Delivery Method): room air     I&O's Summary    22 Jun 2025 07:01  -  23 Jun 2025 07:00  --------------------------------------------------------  IN: 252 mL / OUT: 0 mL / NET: 252 mL    GENERAL:  [x ]Alert  [ ]Oriented x   [ ]Lethargic  [ ]Cachexia  [ ]Unarousable  [ x]Verbal speech is slurred  [ ]Non-Verbal  Behavioral:   [ ] Anxiety  [ ] Delirium [ ] Agitation [ ] Other  HEENT:  [ ]Normal   [ ]Dry mouth   [ ]ET Tube/Trach  [ ]Oral lesions  PULMONARY:   [ ]Clear [ ]Tachypnea  [ ]Audible excessive secretions   [ ]Rhonchi        [ ]Right [ ]Left [ ]Bilateral  [ ]Crackles        [ ]Right [ ]Left [ ]Bilateral  [ ]Wheezing     [ ]Right [ ]Left [ ]Bilateral  CARDIOVASCULAR:    [x ]Regular [ ]Irregular [ ]Tachy  [ ]Vijay [ ]Murmur [ ]Other  GASTROINTESTINAL:  [ ]Soft  [x ]Distended   [ ]+BS  [x ]Non tender [ ]Tender  [ ]PEG [ ]OGT/ NGT  Last BM: 6/23/25  GENITOURINARY:  [ ]Normal [x ] Incontinent   [ ]Oliguria/Anuria   [ ]Mosher  MUSCULOSKELETAL:   [ ]Normal   [ x]Weakness  [ ]Bed/Wheelchair bound [ ]Edema  NEUROLOGIC:   [ ]No focal deficits  [x ] Cognitive impairment  [ ] Dysphagia [ ]Dysarthria [ ] Paresis [ ]Other   SKIN:   [ ]Normal   [x ]Pressure ulcer(s) left heel suspected DTI per flow sheet [ ]Rash    CRITICAL CARE:  [ ] Shock Present  [ ]Septic [ ]Cardiogenic [ ]Neurologic [ ]Hypovolemic  [ ]  Vasopressors [ ]  Inotropes   [ ] Respiratory failure present [ ] mechanical ventilation [ ] non-invasive ventilatory support [ ] High flow  [ ] Acute  [ ] Chronic [ ] Hypoxic  [ ] Hypercarbic [ ] Other  [ ] Other organ failure     LABS:                        7.9    7.33  )-----------( 127      ( 23 Jun 2025 00:35 )             22.5   06-23    135  |  106  |  78[H]  ----------------------------<  150[H]  4.0   |  17[L]  |  6.00[H]    Ca    7.8[L]      23 Jun 2025 07:00    TPro  6.2  /  Alb  2.0[L]  /  TBili  0.7  /  DBili  x   /  AST  38[H]  /  ALT  54  /  AlkPhos  129[H]  06-23      Urinalysis with Rflx Culture (06.19.25 @ 01:25)    Urine Appearance: Clear   Color: Yellow   Specific Gravity: 1.014   pH Urine: 5.5   Protein, Urine: 100 mg/dL   Glucose Qualitative, Urine: Negative mg/dL   Ketone , Urine: Negative mg/dL   Blood, Urine: Negative   Bilirubin: Negative   Urobilinogen: 0.2 mg/dL   Leukocyte Esterase Concentration: Negative   Nitrite: Negative    Culture - Blood (06.18.25 @ 22:45)    Specimen Source: Blood Blood-Peripheral   Culture Results:   No growth at 4 days    RADIOLOGY & ADDITIONAL STUDIES:  < from: US Renal (06.21.25 @ 14:16) >  IMPRESSION:  No hydronephrosis.  Bilateral simple appearing small renal cysts.  Mildly enlarged prostate gland with diffuse mild wall thickening   suggestive of bladder wall hypertrophy.  --- End of Report ---  < end of copied text >    < from: CT Chest No Cont (06.18.25 @ 23:58) >  IMPRESSION:  1. Small focal airspace opacity in the right upper lobe with associated   cavitation and scattered patchy groundglass opacities throughout the   lungs with a predominance in the right upper lobe likely infectious in   etiology.  2. Small bilateral pleural effusions with adjacent compressive   atelectasis.  --- End of Report ---  < end of copied text >    < from: CT Head No Cont (06.18.25 @ 23:56) >  IMPRESSION:  No intracranial mass effect, acute hemorrhage or midline shift.  If the patient's symptoms persist, consider short interval follow-up head   CT or brain MRI if there are no MRI contraindications.  --- End of Report ---  < end of copied text >    < from: Xray Chest 1 View- PORTABLE-Urgent (06.18.25 @ 22:56) >  IMPRESSION: Unchanged cardiomegaly despite the portable technique.   Increasing opacities are noted over the lower lung zones and developing   bibasilar multifocal pneumonia versus asymmetric pulmonary edema. No   large pleural effusions or pneumothorax. Short interval surveillance   two-view chest to confirm resolution, as clinically indicated is   suggested.  --- End of Report ---  < end of copied text >    < from: TTE Limited W or WO Ultrasound Enhancing Agent (05.29.25 @ 09:24) >   CONCLUSIONS:    1. Left ventricular systolic function is severely decreased with an ejection fraction of 21 % by Watters's method of disks. Global left ventricular hypokinesis.   2. Compared to the transthoracic echocardiogram performed on 5/1/2025, Left ventricular function is significantly worse on today's study, also degree of mitral regurgitation is worse as well.  ________________________________________________________________________________________  < end of copied text >    PROTEIN CALORIE MALNUTRITION PRESENT: [ ] Yes [ ] No  [ ] PPSV2 < or = to 30% [ ] significant weight loss  [ ] poor nutritional intake [ ] catabolic state [ ] anasarca     Artificial Nutrition [ ]     REFERRALS:   [ ]Chaplaincy  [ ] Hospice  [ ]Child Life  [ ]Social Work  [ ]Case management [ ]Holistic Therapy     Goals of Care Document:   Care Coordination Assessment 201 [C. Provider] (06-20-25 @ 16:03)    HPI:  85-year-old male with past medical history of hypertension, stage IV CKD, CVA, type 2 diabetes, A-fib, iron deficiency anemia, bradycardia status post pacemaker, recent NSTEMI, ischemic cardiomyopathy who presented to the ED from [facility] due to left-sided chest pain. He denies any fevers, chills, coughing, heart palpitations, shortness of breath, or other complaints.   On presentation, the patient was hypothermic 92.7 otherwise stable vital signs. EKG with paced rhythm. Labs notable for WBC 2.2, hemoglobin 9.6, troponin 488, potassium 5.4, creatinine 2.6 (around baseline), lactate 1.1, proBNP 7,166, UA negative for UTI, COVID/flu negative. CT chest showed small focal airspace opacity in the right upper lobe with associated cavitation and scattered patchy groundglass opacities throughout the   lungs with a predominance in the right upper lobe likely infectious in etiology. Small bilateral pleural effusions with adjacent compressive atelectasis.   (19 Jun 2025 03:46)    PERTINENT PM/SXH:   Type 2 diabetes mellitus without complication    CVA (cerebral vascular accident)    Non-ST elevation (NSTEMI) myocardial infarction    CAD (coronary artery disease)    CKD (chronic kidney disease), stage III    HTN (hypertension)    HLD (hyperlipidemia)      No significant past surgical history    Status post placement of implantable loop recorder    H/O carpal tunnel repair    S/P CABG (coronary artery bypass graft)      FAMILY HISTORY:  Family history of heart disease    Family history of uterine cancer (Sibling)      ITEMS NOT CHECKED ARE NOT PRESENT    SOCIAL HISTORY:   Significant other/partner:  [ ]  Children: yes [ ]  Faith/Spirituality: Anglican  Substance hx:  [ ]   Tobacco hx:  [ ]   Alcohol hx: [ ]   Home Opioid hx:  [ ] I-Stop Reference No: Reference #: [id number]  Living Situation: [ ]Home  [ ]Long term care  [ x]Rehab [ ]Other    ADVANCE DIRECTIVES:    DNR  MOLST  [ ]  Living Will  [ ]   DECISION MAKER(s):  [ ] Health Care Proxy(s)  [x ] Surrogate(s)  [ ] Guardian           Name(s): Phone Number(s): Gloria Kaplan (daughter) [phone]    BASELINE (I)ADL(s) (prior to admission):  [place]: [ ]Total  [x ] Moderate [ ]Dependent    Allergies    No Known Allergies    Intolerances    MEDICATIONS  (STANDING):  albuterol/ipratropium for Nebulization 3 milliLiter(s) Nebulizer every 6 hours  apixaban 2.5 milliGRAM(s) Oral every 12 hours  atorvastatin 80 milliGRAM(s) Oral at bedtime  calcitriol   Capsule 0.25 MICROGram(s) Oral daily  chlorhexidine 2% Cloths 1 Application(s) Topical <User Schedule>  clopidogrel Tablet 75 milliGRAM(s) Oral daily  dextrose 5%. 1000 milliLiter(s) (100 mL/Hr) IV Continuous <Continuous>  dextrose 5%. 1000 milliLiter(s) (50 mL/Hr) IV Continuous <Continuous>  dextrose 50% Injectable 25 Gram(s) IV Push once  dextrose 50% Injectable 12.5 Gram(s) IV Push once  dextrose 50% Injectable 25 Gram(s) IV Push once  ferrous    sulfate 325 milliGRAM(s) Oral daily  glucagon  Injectable 1 milliGRAM(s) IntraMuscular once  insulin lispro (ADMELOG) corrective regimen sliding scale   SubCutaneous three times a day before meals  midodrine 10 milliGRAM(s) Oral every 8 hours  norepinephrine Infusion 0.05 MICROgram(s)/kG/Min (6.08 mL/Hr) IV Continuous <Continuous>  piperacillin/tazobactam IVPB.. 3.375 Gram(s) IV Intermittent every 8 hours  sodium bicarbonate  Infusion 0.058 mEq/kG/Hr (50 mL/Hr) IV Continuous <Continuous>  tamsulosin 0.4 milliGRAM(s) Oral at bedtime    MEDICATIONS  (PRN):  acetaminophen     Tablet .. 650 milliGRAM(s) Oral every 6 hours PRN Temp greater or equal to 38C (100.4F), Mild Pain (1 - 3)  dextrose Oral Gel 15 Gram(s) Oral once PRN Blood Glucose LESS THAN 70 milliGRAM(s)/deciliter  melatonin 3 milliGRAM(s) Oral at bedtime PRN Insomnia    PRESENT SYMPTOMS: [x ]Unable to obtain due to poor mentation   Source if other than patient:  [ ]Family   [ ]Team     Pain: [x ] yes [ ] no  QOL impact -   Location - abdomen and hands                   Aggravating factors -  Quality -  Radiation -  Timing-  Severity (0-10 scale):  Minimal acceptable level (0-10 scale):     PAIN AD Score:     http://[email or web address]/cog/[email or web address] (press ctrl +  left click to view)    Dyspnea:                           [ ]Mild [ ]Moderate [ ]Severe  Anxiety:                             [ ]Mild [ ]Moderate [ ]Severe  Fatigue:                             [ ]Mild [ ]Moderate [ ]Severe  Nausea:                             [ ]Mild [ ]Moderate [ ]Severe  Loss of appetite:              [ ]Mild [ ]Moderate [ ]Severe  Constipation:                    [ ]Mild [ ]Moderate [ ]Severe    Other Symptoms:  [ ]All other review of systems negative     Karnofsky Performance Score/Palliative Performance Status Version 2:     30-40    %    http://npcrc.org/files/news/palliative_performance_scale_ppsv2.pdf  PHYSICAL EXAM:  Vital Signs Last 24 Hrs  T(C): 35 (23 Jun 2025 04:04), Max: 36.3 (22 Jun 2025 17:16)  T(F): 95 (23 Jun 2025 04:04), Max: 97.3 (22 Jun 2025 17:16)  HR: 82 (23 Jun 2025 12:00) (57 - 84)  BP: 137/52 (23 Jun 2025 12:00) (70/41 - 137/52)  BP(mean): 78 (23 Jun 2025 12:00) (55 - 99)  RR: 20 (23 Jun 2025 12:00) (15 - 23)  SpO2: 100% (23 Jun 2025 12:00) (98% - 100%)    Parameters below as of 23 Jun 2025 11:30  Patient On (Oxygen Delivery Method): room air     I&O's Summary    22 Jun 2025 07:01  -  23 Jun 2025 07:00  --------------------------------------------------------  IN: 252 mL / OUT: 0 mL / NET: 252 mL    GENERAL:  [x ]Alert  [ ]Oriented x   [ ]Lethargic  [ ]Cachexia  [ ]Unarousable  [ x]Verbal speech is slurred  [ ]Non-Verbal  Behavioral:   [ ] Anxiety  [ ] Delirium [ ] Agitation [ ] Other  HEENT:  [ ]Normal   [ ]Dry mouth   [ ]ET Tube/Trach  [ ]Oral lesions  PULMONARY:   [ ]Clear [ ]Tachypnea  [ ]Audible excessive secretions   [ ]Rhonchi        [ ]Right [ ]Left [ ]Bilateral  [ ]Crackles        [ ]Right [ ]Left [ ]Bilateral  [ ]Wheezing     [ ]Right [ ]Left [ ]Bilateral  CARDIOVASCULAR:    [x ]Regular [ ]Irregular [ ]Tachy  [ ]Vijay [ ]Murmur [ ]Other  GASTROINTESTINAL:  [ ]Soft  [x ]Distended   [ ]+BS  [x ]Non tender [ ]Tender  [ ]PEG [ ]OGT/ NGT  Last BM: 6/23/25  GENITOURINARY:  [ ]Normal [x ] Incontinent   [ ]Oliguria/Anuria   [ ]Mosher  MUSCULOSKELETAL:   [ ]Normal   [ x]Weakness  [ ]Bed/Wheelchair bound [ ]Edema  NEUROLOGIC:   [ ]No focal deficits  [x ] Cognitive impairment  [ ] Dysphagia [ ]Dysarthria [ ] Paresis [ ]Other   SKIN:   [ ]Normal   [x ]Pressure ulcer(s) left heel suspected DTI per flow sheet [ ]Rash    CRITICAL CARE:  [ ] Shock Present  [ ]Septic [ ]Cardiogenic [ ]Neurologic [ ]Hypovolemic  [ ]  Vasopressors [ ]  Inotropes   [ ] Respiratory failure present [ ] mechanical ventilation [ ] non-invasive ventilatory support [ ] High flow  [ ] Acute  [ ] Chronic [ ] Hypoxic  [ ] Hypercarbic [ ] Other  [ ] Other organ failure     LABS:                        7.9    7.33  )-----------( 127      ( 23 Jun 2025 00:35 )             22.5   06-23    135  |  106  |  78[H]  ----------------------------<  150[H]  4.0   |  17[L]  |  6.00[H]    Ca    7.8[L]      23 Jun 2025 07:00    TPro  6.2  /  Alb  2.0[L]  /  TBili  0.7  /  DBili  x   /  AST  38[H]  /  ALT  54  /  AlkPhos  129[H]  06-23      Urinalysis with Rflx Culture (06.19.25 @ 01:25)    Urine Appearance: Clear   Color: Yellow   Specific Gravity: 1.014   pH Urine: 5.5   Protein, Urine: 100 mg/dL   Glucose Qualitative, Urine: Negative mg/dL   Ketone , Urine: Negative mg/dL   Blood, Urine: Negative   Bilirubin: Negative   Urobilinogen: 0.2 mg/dL   Leukocyte Esterase Concentration: Negative   Nitrite: Negative    Culture - Blood (06.18.25 @ 22:45)    Specimen Source: Blood Blood-Peripheral   Culture Results:   No growth at 4 days    RADIOLOGY & ADDITIONAL STUDIES:  < from: US Renal (06.21.25 @ 14:16) >  IMPRESSION:  No hydronephrosis.  Bilateral simple appearing small renal cysts.  Mildly enlarged prostate gland with diffuse mild wall thickening   suggestive of bladder wall hypertrophy.  --- End of Report ---  < end of copied text >    < from: CT Chest No Cont (06.18.25 @ 23:58) >  IMPRESSION:  1. Small focal airspace opacity in the right upper lobe with associated   cavitation and scattered patchy groundglass opacities throughout the   lungs with a predominance in the right upper lobe likely infectious in   etiology.  2. Small bilateral pleural effusions with adjacent compressive   atelectasis.  --- End of Report ---  < end of copied text >    < from: CT Head No Cont (06.18.25 @ 23:56) >  IMPRESSION:  No intracranial mass effect, acute hemorrhage or midline shift.  If the patient's symptoms persist, consider short interval follow-up head   CT or brain MRI if there are no MRI contraindications.  --- End of Report ---  < end of copied text >    < from: Xray Chest 1 View- PORTABLE-Urgent (06.18.25 @ 22:56) >  IMPRESSION: Unchanged cardiomegaly despite the portable technique.   Increasing opacities are noted over the lower lung zones and developing   bibasilar multifocal pneumonia versus asymmetric pulmonary edema. No   large pleural effusions or pneumothorax. Short interval surveillance   two-view chest to confirm resolution, as clinically indicated is   suggested.  --- End of Report ---  < end of copied text >    < from: TTE Limited W or WO Ultrasound Enhancing Agent (05.29.25 @ 09:24) >   CONCLUSIONS:    1. Left ventricular systolic function is severely decreased with an ejection fraction of 21 % by Watters's method of disks. Global left ventricular hypokinesis.   2. Compared to the transthoracic echocardiogram performed on 5/1/2025, Left ventricular function is significantly worse on today's study, also degree of mitral regurgitation is worse as well.  ________________________________________________________________________________________  < end of copied text >    PROTEIN CALORIE MALNUTRITION PRESENT: [ ] Yes [ ] No  [ ] PPSV2 < or = to 30% [ ] significant weight loss  [ ] poor nutritional intake [ ] catabolic state [ ] anasarca     Artificial Nutrition [ ]     REFERRALS:   [ ]Chaplaincy  [ ] Hospice  [ ]Child Life  [ ]Social Work  [ ]Case management [ ]Holistic Therapy     Goals of Care Document:   Care Coordination Assessment 201 [C. Provider] (06-20-25 @ 16:03)

## 2025-06-23 NOTE — CONSULT NOTE ADULT - PROBLEM SELECTOR RECOMMENDATION 3
recent NSTEMi suspected secondary to sepsis marked change in EF from prior echo  last echo on file from May of 2025 with EF of 21% global left ventricular hypokinesis, cards had seen prior recommended repeat echo to assess for improvement in EF   cautious IVF use cognitive changes since April as he has been in and out of rehab during that time  acute infection   promote sleep/wake cycles, family presence and cluster care

## 2025-06-23 NOTE — CONSULT NOTE ADULT - PROBLEM SELECTOR RECOMMENDATION 9
on zosyn, RRT for hypotension s/p IVF bolus now upgraded to ICU for pressor support on zosyn, RRT early 6/23 for hypotension s/p IVF bolus now upgraded to ICU for pressor support and on midodrine   on room air no dyspnea on exam  duonebs ATC q 6 hours

## 2025-06-24 LAB
ALBUMIN SERPL ELPH-MCNC: 2.1 G/DL — LOW (ref 3.3–5)
ALP SERPL-CCNC: 198 U/L — HIGH (ref 40–120)
ALT FLD-CCNC: 78 U/L — SIGNIFICANT CHANGE UP (ref 12–78)
ANION GAP SERPL CALC-SCNC: 14 MMOL/L — SIGNIFICANT CHANGE UP (ref 5–17)
AST SERPL-CCNC: 42 U/L — HIGH (ref 15–37)
BASOPHILS # BLD AUTO: 0.03 K/UL — SIGNIFICANT CHANGE UP (ref 0–0.2)
BASOPHILS NFR BLD AUTO: 0.3 % — SIGNIFICANT CHANGE UP (ref 0–2)
BILIRUB SERPL-MCNC: 0.9 MG/DL — SIGNIFICANT CHANGE UP (ref 0.2–1.2)
BUN SERPL-MCNC: 84 MG/DL — HIGH (ref 7–23)
CALCIUM SERPL-MCNC: 8.2 MG/DL — LOW (ref 8.5–10.1)
CHLORIDE SERPL-SCNC: 104 MMOL/L — SIGNIFICANT CHANGE UP (ref 96–108)
CHLORIDE UR-SCNC: <20 MMOL/L — SIGNIFICANT CHANGE UP
CO2 SERPL-SCNC: 16 MMOL/L — LOW (ref 22–31)
CREAT ?TM UR-MCNC: 158 MG/DL — SIGNIFICANT CHANGE UP
CREAT SERPL-MCNC: 6.64 MG/DL — HIGH (ref 0.5–1.3)
CULTURE RESULTS: SIGNIFICANT CHANGE UP
CULTURE RESULTS: SIGNIFICANT CHANGE UP
EGFR: 8 ML/MIN/1.73M2 — LOW
EGFR: 8 ML/MIN/1.73M2 — LOW
EOSINOPHIL # BLD AUTO: 0.79 K/UL — HIGH (ref 0–0.5)
EOSINOPHIL NFR BLD AUTO: 7.4 % — HIGH (ref 0–6)
GLUCOSE BLDC GLUCOMTR-MCNC: 115 MG/DL — HIGH (ref 70–99)
GLUCOSE BLDC GLUCOMTR-MCNC: 128 MG/DL — HIGH (ref 70–99)
GLUCOSE BLDC GLUCOMTR-MCNC: 205 MG/DL — HIGH (ref 70–99)
GLUCOSE SERPL-MCNC: 104 MG/DL — HIGH (ref 70–99)
HCT VFR BLD CALC: 26.1 % — LOW (ref 39–50)
HGB BLD-MCNC: 9.2 G/DL — LOW (ref 13–17)
IMM GRANULOCYTES NFR BLD AUTO: 1.5 % — HIGH (ref 0–0.9)
LYMPHOCYTES # BLD AUTO: 0.67 K/UL — LOW (ref 1–3.3)
LYMPHOCYTES # BLD AUTO: 6.3 % — LOW (ref 13–44)
MAGNESIUM SERPL-MCNC: 2.3 MG/DL — SIGNIFICANT CHANGE UP (ref 1.6–2.6)
MCHC RBC-ENTMCNC: 29.7 PG — SIGNIFICANT CHANGE UP (ref 27–34)
MCHC RBC-ENTMCNC: 35.2 G/DL — SIGNIFICANT CHANGE UP (ref 32–36)
MCV RBC AUTO: 84.2 FL — SIGNIFICANT CHANGE UP (ref 80–100)
MONOCYTES # BLD AUTO: 0.48 K/UL — SIGNIFICANT CHANGE UP (ref 0–0.9)
MONOCYTES NFR BLD AUTO: 4.5 % — SIGNIFICANT CHANGE UP (ref 2–14)
NEUTROPHILS # BLD AUTO: 8.51 K/UL — HIGH (ref 1.8–7.4)
NEUTROPHILS NFR BLD AUTO: 80 % — HIGH (ref 43–77)
NRBC BLD AUTO-RTO: 0 /100 WBCS — SIGNIFICANT CHANGE UP (ref 0–0)
OSMOLALITY UR: 292 MOSM/KG — SIGNIFICANT CHANGE UP (ref 50–1200)
PHOSPHATE SERPL-MCNC: 5.8 MG/DL — HIGH (ref 2.5–4.5)
PLATELET # BLD AUTO: 154 K/UL — SIGNIFICANT CHANGE UP (ref 150–400)
POTASSIUM SERPL-MCNC: 4 MMOL/L — SIGNIFICANT CHANGE UP (ref 3.5–5.3)
POTASSIUM SERPL-SCNC: 4 MMOL/L — SIGNIFICANT CHANGE UP (ref 3.5–5.3)
PROT SERPL-MCNC: 6.9 GM/DL — SIGNIFICANT CHANGE UP (ref 6–8.3)
RBC # BLD: 3.1 M/UL — LOW (ref 4.2–5.8)
RBC # FLD: 14.2 % — SIGNIFICANT CHANGE UP (ref 10.3–14.5)
SODIUM SERPL-SCNC: 134 MMOL/L — LOW (ref 135–145)
SODIUM UR-SCNC: <20 MMOL/L — SIGNIFICANT CHANGE UP
SPECIMEN SOURCE: SIGNIFICANT CHANGE UP
SPECIMEN SOURCE: SIGNIFICANT CHANGE UP
VANCOMYCIN FLD-MCNC: 16.1 UG/ML — SIGNIFICANT CHANGE UP
WBC # BLD: 10.64 K/UL — HIGH (ref 3.8–10.5)
WBC # FLD AUTO: 10.64 K/UL — HIGH (ref 3.8–10.5)

## 2025-06-24 PROCEDURE — 71045 X-RAY EXAM CHEST 1 VIEW: CPT | Mod: 26

## 2025-06-24 PROCEDURE — 99232 SBSQ HOSP IP/OBS MODERATE 35: CPT

## 2025-06-24 PROCEDURE — G0545: CPT

## 2025-06-24 PROCEDURE — 99291 CRITICAL CARE FIRST HOUR: CPT

## 2025-06-24 RX ORDER — PIPERACILLIN-TAZO-DEXTROSE,ISO 3.375G/5
3.38 IV SOLUTION, PIGGYBACK PREMIX FROZEN(ML) INTRAVENOUS EVERY 12 HOURS
Refills: 0 | Status: DISCONTINUED | OUTPATIENT
Start: 2025-06-24 | End: 2025-06-26

## 2025-06-24 RX ORDER — MIDODRINE HYDROCHLORIDE 5 MG/1
20 TABLET ORAL EVERY 8 HOURS
Refills: 0 | Status: DISCONTINUED | OUTPATIENT
Start: 2025-06-24 | End: 2025-06-27

## 2025-06-24 RX ADMIN — CALCITRIOL 0.25 MICROGRAM(S): 0.5 CAPSULE, GELATIN COATED ORAL at 11:27

## 2025-06-24 RX ADMIN — NOREPINEPHRINE BITARTRATE 6.08 MICROGRAM(S)/KG/MIN: 8 SOLUTION at 19:46

## 2025-06-24 RX ADMIN — CLOPIDOGREL BISULFATE 75 MILLIGRAM(S): 75 TABLET, FILM COATED ORAL at 11:27

## 2025-06-24 RX ADMIN — IPRATROPIUM BROMIDE AND ALBUTEROL SULFATE 3 MILLILITER(S): .5; 2.5 SOLUTION RESPIRATORY (INHALATION) at 17:32

## 2025-06-24 RX ADMIN — IPRATROPIUM BROMIDE AND ALBUTEROL SULFATE 3 MILLILITER(S): .5; 2.5 SOLUTION RESPIRATORY (INHALATION) at 11:21

## 2025-06-24 RX ADMIN — MIDODRINE HYDROCHLORIDE 20 MILLIGRAM(S): 5 TABLET ORAL at 22:08

## 2025-06-24 RX ADMIN — IPRATROPIUM BROMIDE AND ALBUTEROL SULFATE 3 MILLILITER(S): .5; 2.5 SOLUTION RESPIRATORY (INHALATION) at 05:01

## 2025-06-24 RX ADMIN — MIDODRINE HYDROCHLORIDE 10 MILLIGRAM(S): 5 TABLET ORAL at 06:22

## 2025-06-24 RX ADMIN — APIXABAN 2.5 MILLIGRAM(S): 5 TABLET, FILM COATED ORAL at 06:22

## 2025-06-24 RX ADMIN — Medication 25 GRAM(S): at 07:57

## 2025-06-24 RX ADMIN — MIDODRINE HYDROCHLORIDE 20 MILLIGRAM(S): 5 TABLET ORAL at 14:45

## 2025-06-24 RX ADMIN — Medication 25 GRAM(S): at 17:19

## 2025-06-24 RX ADMIN — ATORVASTATIN CALCIUM 80 MILLIGRAM(S): 80 TABLET, FILM COATED ORAL at 22:08

## 2025-06-24 RX ADMIN — Medication 325 MILLIGRAM(S): at 11:27

## 2025-06-24 RX ADMIN — INSULIN LISPRO 2: 100 INJECTION, SOLUTION INTRAVENOUS; SUBCUTANEOUS at 17:19

## 2025-06-24 RX ADMIN — Medication 1 APPLICATION(S): at 06:39

## 2025-06-24 RX ADMIN — NOREPINEPHRINE BITARTRATE 6.08 MICROGRAM(S)/KG/MIN: 8 SOLUTION at 07:23

## 2025-06-24 RX ADMIN — APIXABAN 2.5 MILLIGRAM(S): 5 TABLET, FILM COATED ORAL at 17:19

## 2025-06-24 NOTE — PROGRESS NOTE ADULT - ASSESSMENT
85-year-old male with past medical history of hypertension, stage IV CKD, CVA, type 2 diabetes, A-fib, iron deficiency anemia, bradycardia status post pacemaker, recent NSTEMI, ischemic cardiomyopathy who presented to the ED from [facility] due to left-sided chest pain. Found with elevated troponin(downtrending), sepsis likely 2/2 pna.  CT 1. Small focal airspace opacity in the right upper lobe with associated   cavitation and scattered patchy groundglass opacities throughout the   lungs with a predominance in the right upper lobe likely infectious in   etiology.  2. Small bilateral pleural effusions with adjacent compressive   atelectasis.  last admission was for UTI and we treated with ceftriaxone   would give broader coverage  He is MRSA pcr positive and there is higher likelihood that he has a MRSA pneumonia   PAtient on oxygen and coughing during my exam     6/23: in ICU, low BP, on pressors, can continue antibiotics, monitor creatinine and urine output   6/24: remains on pressors and low kidney output, will finish antibiotics on renally dosed zosyn     Plan:  can stop IV vancomycin   (therapeutic drug monitoring required with IV vancomycin)  continue (Zosyn) Piperacillin/tazobactam 3.375 grams every 12 hours renally dosed    monitor creatinine and appreciate nephrology   follow blood cultures until final   Streptococcus pneumonia na legionella urine ag negative   good control of his HR and his Afib   monitor ins and outs and reynolds management per icu   may need hemodialysis if continues to worsen       Discussed plan with CCM team     Delmer Pickens DO  Chief, Infectious Disease at [facility]  Reachable via [employer] Teams or ID office: [phone]  Weekdays: After 5pm, please call [phone] for all inquiries and new consults  Weekends: Message on-call infectious disease physician via teams (see Waldo)

## 2025-06-24 NOTE — PROGRESS NOTE ADULT - SUBJECTIVE AND OBJECTIVE BOX
# CC: Patient is a 85y old  Male who presents with a chief complaint of Sepsis likely 2/2 pna (24 Jun 2025 01:25)      ## HPI:  85-year-old male with past medical history of hypertension, stage IV CKD, CVA, type 2 diabetes, A-fib, iron deficiency anemia, bradycardia status post pacemaker, recent NSTEMI, ischemic cardiomyopathy who presented to the ED from [facility] due to left-sided chest pain. He denies any fevers, chills, coughing, heart palpitations, shortness of breath, or other complaints.   On presentation, the patient was hypothermic 92.7 otherwise stable vital signs. EKG with paced rhythm. Labs notable for WBC 2.2, hemoglobin 9.6, troponin 488, potassium 5.4, creatinine 2.6 (around baseline), lactate 1.1, proBNP 7,166, UA negative for UTI, COVID/flu negative. CT chest showed small focal airspace opacity in the right upper lobe with associated cavitation and scattered patchy groundglass opacities throughout the   lungs with a predominance in the right upper lobe likely infectious in etiology. Small bilateral pleural effusions with adjacent compressive atelectasis.   (19 Jun 2025 03:46)      **O/N:**    ## ROS:    ## Labs:  ** CBC: **                        9.2    10.64 )-----------( 154      ( 24 Jun 2025 04:34 )             26.1     ** Chem:  **  06-24    134[L]  |  104  |  84[H]  ----------------------------<  104[H]  4.0   |  16[L]  |  6.64[H]    Ca    8.2[L]      24 Jun 2025 04:34  Phos  5.8     06-24  Mg     2.3     06-24    TPro  6.9  /  Alb  2.1[L]  /  TBili  0.9  /  DBili  x   /  AST  42[H]  /  ALT  78  /  AlkPhos  198[H]  06-24    ** Coags: **      CAPILLARY BLOOD GLUCOSE      POCT Blood Glucose.: 128 mg/dL (24 Jun 2025 11:15)  POCT Blood Glucose.: 115 mg/dL (24 Jun 2025 07:52)  POCT Blood Glucose.: 163 mg/dL (23 Jun 2025 16:53)        Urinalysis with Rflx Culture (collected 19 Jun 2025 01:25)    Culture - Blood (collected 18 Jun 2025 22:45)  Source: Blood Blood-Peripheral  Final Report (24 Jun 2025 10:00):    No growth at 5 days    Culture - Blood (collected 18 Jun 2025 22:45)  Source: Blood Blood-Peripheral  Final Report (24 Jun 2025 10:00):    No growth at 5 days        ## Imaging:    ## Medications:  midodrine 20 milliGRAM(s) Oral every 8 hours  norepinephrine Infusion 0.05 MICROgram(s)/kG/Min IV Continuous <Continuous>    piperacillin/tazobactam IVPB.. 3.375 Gram(s) IV Intermittent every 12 hours    albuterol/ipratropium for Nebulization 3 milliLiter(s) Nebulizer every 6 hours    acetaminophen     Tablet .. 650 milliGRAM(s) Oral every 6 hours PRN  melatonin 3 milliGRAM(s) Oral at bedtime PRN      apixaban 2.5 milliGRAM(s) Oral every 12 hours  clopidogrel Tablet 75 milliGRAM(s) Oral daily      tamsulosin 0.4 milliGRAM(s) Oral at bedtime    atorvastatin 80 milliGRAM(s) Oral at bedtime  glucagon  Injectable 1 milliGRAM(s) IntraMuscular once  insulin lispro (ADMELOG) corrective regimen sliding scale   SubCutaneous three times a day before meals          ## O/E:  ICU Vital Signs Last 24 Hrs  T(C): 36.1 (24 Jun 2025 07:18), Max: 36.6 (24 Jun 2025 05:14)  T(F): 96.9 (24 Jun 2025 07:18), Max: 97.9 (24 Jun 2025 05:14)  HR: 87 (24 Jun 2025 15:00) (61 - 94)  BP: 122/64 (24 Jun 2025 15:00) (88/47 - 135/109)  BP(mean): 81 (24 Jun 2025 15:00) (59 - 122)  ABP: --  ABP(mean): --  RR: 23 (24 Jun 2025 15:00) (18 - 30)  SpO2: 97% (24 Jun 2025 11:57) (97% - 100%)    O2 Parameters below as of 24 Jun 2025 11:57  Patient On (Oxygen Delivery Method): nasal cannula          I&O's Summary    23 Jun 2025 07:01  -  24 Jun 2025 07:00  --------------------------------------------------------  IN: 1228.5 mL / OUT: 378 mL / NET: 850.5 mL    24 Jun 2025 07:01  -  24 Jun 2025 16:04  --------------------------------------------------------  IN: 34 mL / OUT: 30 mL / NET: 4 mL        Gen: lying comfortably in bed in no apparent distress  HEENT: PERRL, EOMI  Resp: CTA B/L no c/r/w  CVS: S1S2 no m/r/g  Abd: soft NT/ND +BS  Ext: no c/c/e  Neuro: A&Ox3    ## Code status:  Goals of care discussion: [x] yes [ ] no  [x] full code  [ ] DNR  [ ] DNI  [ ] JENNIE # CC: Patient is a 85y old  Male who presents with a chief complaint of Sepsis likely 2/2 pna (24 Jun 2025 01:25)      ## HPI:  85-year-old male with past medical history of hypertension, stage IV CKD, CVA, type 2 diabetes, A-fib, iron deficiency anemia, bradycardia status post pacemaker, recent NSTEMI, ischemic cardiomyopathy who presented to the ED from [facility] due to left-sided chest pain. He denies any fevers, chills, coughing, heart palpitations, shortness of breath, or other complaints.   On presentation, the patient was hypothermic 92.7 otherwise stable vital signs. EKG with paced rhythm. Labs notable for WBC 2.2, hemoglobin 9.6, troponin 488, potassium 5.4, creatinine 2.6 (around baseline), lactate 1.1, proBNP 7,166, UA negative for UTI, COVID/flu negative. CT chest showed small focal airspace opacity in the right upper lobe with associated cavitation and scattered patchy groundglass opacities throughout the   lungs with a predominance in the right upper lobe likely infectious in etiology. Small bilateral pleural effusions with adjacent compressive atelectasis.   (19 Jun 2025 03:46)      **O/N:**  Remains on pressors    ## Labs:  ** CBC: **                        9.2    10.64 )-----------( 154      ( 24 Jun 2025 04:34 )             26.1     ** Chem:  **  06-24    134[L]  |  104  |  84[H]  ----------------------------<  104[H]  4.0   |  16[L]  |  6.64[H]    ### BUN / Creatinine:  06-24 @ 04:34: 84 mg/dL / 6.64 mg/dL  06-23 @ 07:00: 78 mg/dL / 6.00 mg/dL      Ca    8.2[L]      24 Jun 2025 04:34  Phos  5.8     06-24  Mg     2.3     06-24    TPro  6.9  /  Alb  2.1[L]  /  TBili  0.9  /  DBili  x   /  AST  42[H]  /  ALT  78  /  AlkPhos  198[H]  06-24    ** Coags: **      CAPILLARY BLOOD GLUCOSE      POCT Blood Glucose.: 128 mg/dL (24 Jun 2025 11:15)  POCT Blood Glucose.: 115 mg/dL (24 Jun 2025 07:52)  POCT Blood Glucose.: 163 mg/dL (23 Jun 2025 16:53)        Urinalysis with Rflx Culture (collected 19 Jun 2025 01:25)    Culture - Blood (collected 18 Jun 2025 22:45)  Source: Blood Blood-Peripheral  Final Report (24 Jun 2025 10:00):    No growth at 5 days    Culture - Blood (collected 18 Jun 2025 22:45)  Source: Blood Blood-Peripheral  Final Report (24 Jun 2025 10:00):    No growth at 5 days        ## Imaging:    ## Medications:  midodrine 20 milliGRAM(s) Oral every 8 hours  norepinephrine Infusion 0.05 MICROgram(s)/kG/Min IV Continuous <Continuous>    piperacillin/tazobactam IVPB.. 3.375 Gram(s) IV Intermittent every 12 hours    albuterol/ipratropium for Nebulization 3 milliLiter(s) Nebulizer every 6 hours    acetaminophen     Tablet .. 650 milliGRAM(s) Oral every 6 hours PRN  melatonin 3 milliGRAM(s) Oral at bedtime PRN      apixaban 2.5 milliGRAM(s) Oral every 12 hours  clopidogrel Tablet 75 milliGRAM(s) Oral daily      tamsulosin 0.4 milliGRAM(s) Oral at bedtime    atorvastatin 80 milliGRAM(s) Oral at bedtime  glucagon  Injectable 1 milliGRAM(s) IntraMuscular once  insulin lispro (ADMELOG) corrective regimen sliding scale   SubCutaneous three times a day before meals          ## O/E:  ICU Vital Signs Last 24 Hrs  T(C): 36.1 (24 Jun 2025 07:18), Max: 36.6 (24 Jun 2025 05:14)  T(F): 96.9 (24 Jun 2025 07:18), Max: 97.9 (24 Jun 2025 05:14)  HR: 87 (24 Jun 2025 15:00) (61 - 94)  BP: 122/64 (24 Jun 2025 15:00) (88/47 - 135/109)  BP(mean): 81 (24 Jun 2025 15:00) (59 - 122)  ABP: --  ABP(mean): --  RR: 23 (24 Jun 2025 15:00) (18 - 30)  SpO2: 97% (24 Jun 2025 11:57) (97% - 100%)    O2 Parameters below as of 24 Jun 2025 11:57  Patient On (Oxygen Delivery Method): nasal cannula          I&O's Summary    23 Jun 2025 07:01  -  24 Jun 2025 07:00  --------------------------------------------------------  IN: 1228.5 mL / OUT: 378 mL / NET: 850.5 mL    24 Jun 2025 07:01  -  24 Jun 2025 16:04  --------------------------------------------------------  IN: 34 mL / OUT: 30 mL / NET: 4 mL        Gen: lying comfortably in bed in no apparent distress  HEENT: PERRL  Resp: CTA B/L no c/r/w  CVS: S1S2 no m/r/g  Abd: soft NT/ND +BS  Ext: no c/c; trace edema    ## Code status:  Goals of care discussion: [x] yes [ ] no  [x] full code  [ ] DNR  [ ] DNI  [ ] JENNIE

## 2025-06-24 NOTE — PROGRESS NOTE ADULT - SUBJECTIVE AND OBJECTIVE BOX
[facility]  INFECTIOUS DISEASES   [address] [place] [address]  Tel: [phone]     Fax: [phone]  ==============================================================================  DO Gale Gross MD Alexandra Gutman, NP   ==============================================================================      JOSEFINA KUMAR  [id number]  85y ([birth year])      Interval History: patient seen and examined. remains in ICU on low dose pressors with very little urine output and worsening kidney function, pneumonia seems to be resolving but his main issue right now is hypotension with worsening kidney function      [ ] Unobtainable because:  [ x] All other systems negative except as noted    Constitutional: no fever, no chills  Head: no trauma  Eyes: no vision changes, no eye pain  ENT:  no sore throat, no rhinorrhea  Cardiovascular:  no chest pain, no palpitation  Respiratory:  no SOB, no cough  GI:  no abd pain, no vomiting, no diarrhea  urinary: no dysuria, no hematuria, no flank pain  musculoskeletal:  no joint pain, no joint swelling  skin:  no rash  neurology:  no headache, no seizure, no change in mental status  psych: no anxiety, no depression         Allergies  No Known Allergies        ANTIMICROBIALS:    piperacillin/tazobactam IVPB.. 3.375 every 12 hours      MEDICATIONS  (STANDING):  albuterol/ipratropium for Nebulization 3 every 6 hours  apixaban 2.5 every 12 hours  atorvastatin 80 at bedtime  clopidogrel Tablet 75 daily  glucagon  Injectable 1 once  insulin lispro (ADMELOG) corrective regimen sliding scale  three times a day before meals  midodrine 20 every 8 hours  norepinephrine Infusion 0.05 <Continuous>  tamsulosin 0.4 at bedtime      PRN  acetaminophen     Tablet .. 650 milliGRAM(s) Oral every 6 hours PRN  melatonin 3 milliGRAM(s) Oral at bedtime PRN      Physical Exam:  Vital Signs Last 24 Hrs  T(F): 96.9 (06-24-25 @ 07:18), Max: 97.9 (06-24-25 @ 05:14)  HR: 87 (06-24-25 @ 15:00)  BP: 122/64 (06-24-25 @ 15:00)  RR: 23 (06-24-25 @ 15:00)  SpO2: 97% (06-24-25 @ 11:57) (97% - 100%)  Wt(kg): --    General:    NAD,  non toxic, on room air   Head: atraumatic, normocephalic  Eye: normal sclera and conjunctiva  ENT:    no oral lesions, neck supple  Cardio:     regular S1, S2,  no murmur  Respiratory:    less coarse BS,    no wheezing  abd:     soft,   BS +,   no tenderness  :   no CVAT,  no suprapubic tenderness,   +reynolds with very little urine   Musculoskeletal:   no joint swelling,   no edema  vascular: no central lines, +PIV   Skin:    no rash  Neurologic:     no focal deficit  psych: normal affect    CBC  WBC Count: 10.64 K/uL (06-24 @ 04:34)  WBC Count: 7.33 K/uL (06-23 @ 00:35)  WBC Count: 8.03 K/uL (06-22 @ 07:20)  WBC Count: 8.71 K/uL (06-20 @ 06:45)  WBC Count: 8.68 K/uL (06-19 @ 05:50)  WBC Count: 7.61 K/uL (06-18 @ 22:45)                            9.2    10.64 )-----------( 154      ( 24 Jun 2025 04:34 )             26.1       BMP/CMP  06-24    134[L]  |  104  |  84[H]  ----------------------------<  104[H]  4.0   |  16[L]  |  6.64[H]    Ca    8.2[L]      24 Jun 2025 04:34  Phos  5.8     06-24  Mg     2.3     06-24    TPro  6.9  /  Alb  2.1[L]  /  TBili  0.9  /  DBili  x   /  AST  42[H]  /  ALT  78  /  AlkPhos  198[H]  06-24      Creatinine Trend: 6.64<--, 6.00<--, 5.81<--, 5.51<--, 5.08<--, 3.84<--        Lactate, Blood: 1.8 mmol/L (06-23-25 @ 00:35)        MICROBIOLOGY:  Vancomycin Level, Random: 15.1 ug/mL (06-23-25 @ 07:00)  v  Blood Blood-Peripheral  06-18-25   No growth at 4 days  --  --      Blood Blood-Peripheral  05-27-25   No growth at 5 days  --  --      Blood Blood-Peripheral  05-27-25   No growth at 5 days  --  --    SARS-CoV-2 Result: NotDetec (06-18-25 @ 22:45)        RADIOLOGY:  < from: US Renal (06.21.25 @ 14:16) >  IMPRESSION:  No hydronephrosis.    Bilateral simple appearing small renal cysts.    Mildly enlarged prostate gland with diffuse mild wall thickening   suggestive of bladder wall hypertrophy.          SONIDO ALBA MD; Attending Radiologist  This document has been electronically signed. Jun 21 2025  2:21PM    < end of copied text >

## 2025-06-24 NOTE — PROGRESS NOTE ADULT - SUBJECTIVE AND OBJECTIVE BOX
[facility] NEPHROLOGY SERVICES, [facility]  NEPHROLOGY AND HYPERTENSION  [address]  [address]  [place] [address]  [phone]    MD COLTON WORLEY MD YELENA ROSENBERG, MD BINNY KOSHY, MD CHRISTOPHER CAPUTO, MD EDWARD BOVER, MD          Patient events noted    MEDICATIONS  (STANDING):  albuterol/ipratropium for Nebulization 3 milliLiter(s) Nebulizer every 6 hours  apixaban 2.5 milliGRAM(s) Oral every 12 hours  atorvastatin 80 milliGRAM(s) Oral at bedtime  calcitriol   Capsule 0.25 MICROGram(s) Oral daily  chlorhexidine 2% Cloths 1 Application(s) Topical <User Schedule>  clopidogrel Tablet 75 milliGRAM(s) Oral daily  ferrous    sulfate 325 milliGRAM(s) Oral daily  glucagon  Injectable 1 milliGRAM(s) IntraMuscular once  insulin lispro (ADMELOG) corrective regimen sliding scale   SubCutaneous three times a day before meals  midodrine 20 milliGRAM(s) Oral every 8 hours  norepinephrine Infusion 0.05 MICROgram(s)/kG/Min (6.08 mL/Hr) IV Continuous <Continuous>  piperacillin/tazobactam IVPB.. 3.375 Gram(s) IV Intermittent every 12 hours  tamsulosin 0.4 milliGRAM(s) Oral at bedtime    MEDICATIONS  (PRN):  acetaminophen     Tablet .. 650 milliGRAM(s) Oral every 6 hours PRN Temp greater or equal to 38C (100.4F), Mild Pain (1 - 3)  melatonin 3 milliGRAM(s) Oral at bedtime PRN Insomnia      06-23-25 @ 07:01 - 06-24-25 @ 07:00  --------------------------------------------------------  IN: 1228.5 mL / OUT: 378 mL / NET: 850.5 mL    06-24-25 @ 07:01  - 06-24-25 @ 23:46  --------------------------------------------------------  IN: 67 mL / OUT: 115 mL / NET: -48 mL      PHYSICAL EXAM:      T(C): 35.9 (06-24-25 @ 19:29), Max: 36.6 (06-24-25 @ 05:14)  HR: 77 (06-24-25 @ 23:00) (61 - 94)  BP: 121/45 (06-24-25 @ 23:00) (88/47 - 136/62)  RR: 21 (06-24-25 @ 23:00) (16 - 30)  SpO2: 100% (06-24-25 @ 22:30) (97% - 100%)  Wt(kg): --  Lungs clear  Heart S1S2  Abd soft NT ND  Extremities:   tr edema                                    9.2    10.64 )-----------( 154      ( 24 Jun 2025 04:34 )             26.1     06-24    134[L]  |  104  |  84[H]  ----------------------------<  104[H]  4.0   |  16[L]  |  6.64[H]    Ca    8.2[L]      24 Jun 2025 04:34  Phos  5.8     06-24  Mg     2.3     06-24    TPro  6.9  /  Alb  2.1[L]  /  TBili  0.9  /  DBili  x   /  AST  42[H]  /  ALT  78  /  AlkPhos  198[H]  06-24      LIVER FUNCTIONS - ( 24 Jun 2025 04:34 )  Alb: 2.1 g/dL / Pro: 6.9 gm/dL / ALK PHOS: 198 U/L / ALT: 78 U/L / AST: 42 U/L / GGT: x           Creatinine Trend: 6.64<--, 6.00<--, 5.81<--, 5.51<--, 5.08<--, 3.84<--        Assessment   TAYLOR CKD EF 21%, CRS  Hypotension, suspected ischemic ATN       Plan  Holding diuresis, judicious IVF if clinically feasible  Palliative care appropriate   Will follow     Yuriy Guillermo MD

## 2025-06-24 NOTE — PROGRESS NOTE ADULT - ASSESSMENT
85M PMH CKD (Stage IV), H/O CVA, DM2, AF (on apixaban), HFrEF (21% s/p PPM a/w moderate MR) initially p/w possible multifocal pneumonia, with progressively worsening TAYLOR and now hypotensive. Transferred to ICU  - Troponins elevated but stable 480–520 range, will stop trending  - Remains on Levophed @0.08, increase midodrine  - Anuric (TAYLOR on CKD in setting of hypotension), c/w attempts to diurese with Bumex, POCU/S showing B/L pleural effusions and scattered B-lines anteriorly  - c/w empiric antibiotics, currently no positive cultures  - c/w apixaban

## 2025-06-24 NOTE — PROGRESS NOTE ADULT - SUBJECTIVE AND OBJECTIVE BOX
85M hx HTN  stage IV CKD, CVA, type 2 diabetes (2) , A-fib, on apixaban HFrEF 21% moderate MR PPM      Admit to medicine 6/19 with MF PNA  rx with pip/tazo and vancomycin per level     Has developed progressive TAYLOR over the last few days       Moved to ICU 6/23 with persistent hypotension  despite fluid therapy and started on pressors.    He is now anuric and not responsive to diuretic therapy    His MRSA PCR is + and he has a cavitary PNA on CT concerning for MRSA PNA as the cause of his septic shock state.    He had a 2D ech which is not read yet, but his estmated EF was > 40%       Pip/tazo for MF PNA.  Check vanco level random due to TAYLOR.  Mycoplasma/Strep AG/JANET all negative     Stareting to get tachypneic with scattered B lines .  Will d/c bicarb drip for now.      CCT 31  min

## 2025-06-25 LAB
ALBUMIN SERPL ELPH-MCNC: 2 G/DL — LOW (ref 3.3–5)
ALP SERPL-CCNC: 174 U/L — HIGH (ref 40–120)
ALT FLD-CCNC: 58 U/L — SIGNIFICANT CHANGE UP (ref 12–78)
ANION GAP SERPL CALC-SCNC: 12 MMOL/L — SIGNIFICANT CHANGE UP (ref 5–17)
AST SERPL-CCNC: 24 U/L — SIGNIFICANT CHANGE UP (ref 15–37)
BASOPHILS # BLD AUTO: 0.03 K/UL — SIGNIFICANT CHANGE UP (ref 0–0.2)
BASOPHILS NFR BLD AUTO: 0.3 % — SIGNIFICANT CHANGE UP (ref 0–2)
BILIRUB SERPL-MCNC: 0.8 MG/DL — SIGNIFICANT CHANGE UP (ref 0.2–1.2)
BUN SERPL-MCNC: 94 MG/DL — HIGH (ref 7–23)
CALCIUM SERPL-MCNC: 7.6 MG/DL — LOW (ref 8.5–10.1)
CHLORIDE SERPL-SCNC: 106 MMOL/L — SIGNIFICANT CHANGE UP (ref 96–108)
CO2 SERPL-SCNC: 18 MMOL/L — LOW (ref 22–31)
CREAT SERPL-MCNC: 7.2 MG/DL — HIGH (ref 0.5–1.3)
EGFR: 7 ML/MIN/1.73M2 — LOW
EGFR: 7 ML/MIN/1.73M2 — LOW
EOSINOPHIL # BLD AUTO: 1.2 K/UL — HIGH (ref 0–0.5)
EOSINOPHIL NFR BLD AUTO: 10.2 % — HIGH (ref 0–6)
GLUCOSE BLDC GLUCOMTR-MCNC: 126 MG/DL — HIGH (ref 70–99)
GLUCOSE BLDC GLUCOMTR-MCNC: 133 MG/DL — HIGH (ref 70–99)
GLUCOSE BLDC GLUCOMTR-MCNC: 161 MG/DL — HIGH (ref 70–99)
GLUCOSE SERPL-MCNC: 112 MG/DL — HIGH (ref 70–99)
HCT VFR BLD CALC: 23.8 % — LOW (ref 39–50)
HGB BLD-MCNC: 8.2 G/DL — LOW (ref 13–17)
IMM GRANULOCYTES NFR BLD AUTO: 1.7 % — HIGH (ref 0–0.9)
LYMPHOCYTES # BLD AUTO: 0.74 K/UL — LOW (ref 1–3.3)
LYMPHOCYTES # BLD AUTO: 6.3 % — LOW (ref 13–44)
MAGNESIUM SERPL-MCNC: 2.4 MG/DL — SIGNIFICANT CHANGE UP (ref 1.6–2.6)
MCHC RBC-ENTMCNC: 28.8 PG — SIGNIFICANT CHANGE UP (ref 27–34)
MCHC RBC-ENTMCNC: 34.5 G/DL — SIGNIFICANT CHANGE UP (ref 32–36)
MCV RBC AUTO: 83.5 FL — SIGNIFICANT CHANGE UP (ref 80–100)
MONOCYTES # BLD AUTO: 0.54 K/UL — SIGNIFICANT CHANGE UP (ref 0–0.9)
MONOCYTES NFR BLD AUTO: 4.6 % — SIGNIFICANT CHANGE UP (ref 2–14)
NEUTROPHILS # BLD AUTO: 9.05 K/UL — HIGH (ref 1.8–7.4)
NEUTROPHILS NFR BLD AUTO: 76.9 % — SIGNIFICANT CHANGE UP (ref 43–77)
NRBC BLD AUTO-RTO: 0 /100 WBCS — SIGNIFICANT CHANGE UP (ref 0–0)
PHOSPHATE SERPL-MCNC: 6.6 MG/DL — HIGH (ref 2.5–4.5)
PLATELET # BLD AUTO: 159 K/UL — SIGNIFICANT CHANGE UP (ref 150–400)
POTASSIUM SERPL-MCNC: 4.2 MMOL/L — SIGNIFICANT CHANGE UP (ref 3.5–5.3)
POTASSIUM SERPL-SCNC: 4.2 MMOL/L — SIGNIFICANT CHANGE UP (ref 3.5–5.3)
PROT SERPL-MCNC: 6.6 GM/DL — SIGNIFICANT CHANGE UP (ref 6–8.3)
RBC # BLD: 2.85 M/UL — LOW (ref 4.2–5.8)
RBC # FLD: 14.2 % — SIGNIFICANT CHANGE UP (ref 10.3–14.5)
SODIUM SERPL-SCNC: 136 MMOL/L — SIGNIFICANT CHANGE UP (ref 135–145)
VANCOMYCIN FLD-MCNC: 16.1 UG/ML — SIGNIFICANT CHANGE UP
WBC # BLD: 11.76 K/UL — HIGH (ref 3.8–10.5)
WBC # FLD AUTO: 11.76 K/UL — HIGH (ref 3.8–10.5)

## 2025-06-25 PROCEDURE — 99291 CRITICAL CARE FIRST HOUR: CPT

## 2025-06-25 RX ORDER — HYDROCORTISONE 20 MG
100 TABLET ORAL EVERY 8 HOURS
Refills: 0 | Status: DISCONTINUED | OUTPATIENT
Start: 2025-06-25 | End: 2025-06-27

## 2025-06-25 RX ORDER — ONDANSETRON HCL/PF 4 MG/2 ML
4 VIAL (ML) INJECTION ONCE
Refills: 0 | Status: COMPLETED | OUTPATIENT
Start: 2025-06-25 | End: 2025-06-25

## 2025-06-25 RX ORDER — BUMETANIDE 1 MG/1
4 TABLET ORAL ONCE
Refills: 0 | Status: DISCONTINUED | OUTPATIENT
Start: 2025-06-25 | End: 2025-06-25

## 2025-06-25 RX ORDER — BUMETANIDE 1 MG/1
2 TABLET ORAL ONCE
Refills: 0 | Status: COMPLETED | OUTPATIENT
Start: 2025-06-25 | End: 2025-06-25

## 2025-06-25 RX ADMIN — IPRATROPIUM BROMIDE AND ALBUTEROL SULFATE 3 MILLILITER(S): .5; 2.5 SOLUTION RESPIRATORY (INHALATION) at 05:20

## 2025-06-25 RX ADMIN — MIDODRINE HYDROCHLORIDE 20 MILLIGRAM(S): 5 TABLET ORAL at 22:50

## 2025-06-25 RX ADMIN — NOREPINEPHRINE BITARTRATE 6.08 MICROGRAM(S)/KG/MIN: 8 SOLUTION at 05:01

## 2025-06-25 RX ADMIN — APIXABAN 2.5 MILLIGRAM(S): 5 TABLET, FILM COATED ORAL at 05:02

## 2025-06-25 RX ADMIN — Medication 25 GRAM(S): at 05:01

## 2025-06-25 RX ADMIN — Medication 100 MILLIGRAM(S): at 22:50

## 2025-06-25 RX ADMIN — APIXABAN 2.5 MILLIGRAM(S): 5 TABLET, FILM COATED ORAL at 17:14

## 2025-06-25 RX ADMIN — Medication 4 MILLIGRAM(S): at 13:11

## 2025-06-25 RX ADMIN — Medication 1 APPLICATION(S): at 05:17

## 2025-06-25 RX ADMIN — MIDODRINE HYDROCHLORIDE 20 MILLIGRAM(S): 5 TABLET ORAL at 13:29

## 2025-06-25 RX ADMIN — IPRATROPIUM BROMIDE AND ALBUTEROL SULFATE 3 MILLILITER(S): .5; 2.5 SOLUTION RESPIRATORY (INHALATION) at 17:18

## 2025-06-25 RX ADMIN — IPRATROPIUM BROMIDE AND ALBUTEROL SULFATE 3 MILLILITER(S): .5; 2.5 SOLUTION RESPIRATORY (INHALATION) at 00:05

## 2025-06-25 RX ADMIN — Medication 325 MILLIGRAM(S): at 11:13

## 2025-06-25 RX ADMIN — CALCITRIOL 0.25 MICROGRAM(S): 0.5 CAPSULE, GELATIN COATED ORAL at 11:13

## 2025-06-25 RX ADMIN — IPRATROPIUM BROMIDE AND ALBUTEROL SULFATE 3 MILLILITER(S): .5; 2.5 SOLUTION RESPIRATORY (INHALATION) at 12:12

## 2025-06-25 RX ADMIN — CLOPIDOGREL BISULFATE 75 MILLIGRAM(S): 75 TABLET, FILM COATED ORAL at 11:13

## 2025-06-25 RX ADMIN — INSULIN LISPRO 1: 100 INJECTION, SOLUTION INTRAVENOUS; SUBCUTANEOUS at 11:13

## 2025-06-25 RX ADMIN — ATORVASTATIN CALCIUM 80 MILLIGRAM(S): 80 TABLET, FILM COATED ORAL at 22:50

## 2025-06-25 RX ADMIN — IPRATROPIUM BROMIDE AND ALBUTEROL SULFATE 3 MILLILITER(S): .5; 2.5 SOLUTION RESPIRATORY (INHALATION) at 23:17

## 2025-06-25 RX ADMIN — BUMETANIDE 2 MILLIGRAM(S): 1 TABLET ORAL at 12:23

## 2025-06-25 RX ADMIN — Medication 25 GRAM(S): at 17:14

## 2025-06-25 RX ADMIN — MIDODRINE HYDROCHLORIDE 20 MILLIGRAM(S): 5 TABLET ORAL at 05:02

## 2025-06-25 RX ADMIN — Medication 100 MILLIGRAM(S): at 13:29

## 2025-06-25 NOTE — PROGRESS NOTE ADULT - SUBJECTIVE AND OBJECTIVE BOX
[facility] NEPHROLOGY SERVICES, [facility]  NEPHROLOGY AND HYPERTENSION  [address]  [address]  [place] [address]  [phone]    MD COLTON WORLEY, MD JD MORRISSEY MD CHRISTOPHER CAPUTO, MD DON ALEMAN MD          Patient events noted  No distress    MEDICATIONS  (STANDING):  albuterol/ipratropium for Nebulization 3 milliLiter(s) Nebulizer every 6 hours  apixaban 2.5 milliGRAM(s) Oral every 12 hours  atorvastatin 80 milliGRAM(s) Oral at bedtime  calcitriol   Capsule 0.25 MICROGram(s) Oral daily  chlorhexidine 2% Cloths 1 Application(s) Topical <User Schedule>  clopidogrel Tablet 75 milliGRAM(s) Oral daily  ferrous    sulfate 325 milliGRAM(s) Oral daily  hydrocortisone sodium succinate Injectable 100 milliGRAM(s) IV Push every 8 hours  insulin lispro (ADMELOG) corrective regimen sliding scale   SubCutaneous three times a day before meals  midodrine 20 milliGRAM(s) Oral every 8 hours  norepinephrine Infusion 0.05 MICROgram(s)/kG/Min (6.08 mL/Hr) IV Continuous <Continuous>  piperacillin/tazobactam IVPB.. 3.375 Gram(s) IV Intermittent every 12 hours  tamsulosin 0.4 milliGRAM(s) Oral at bedtime    MEDICATIONS  (PRN):  acetaminophen     Tablet .. 650 milliGRAM(s) Oral every 6 hours PRN Temp greater or equal to 38C (100.4F), Mild Pain (1 - 3)  melatonin 3 milliGRAM(s) Oral at bedtime PRN Insomnia      06-24-25 @ 07:01  -  06-25-25 @ 07:00  --------------------------------------------------------  IN: 138.5 mL / OUT: 270 mL / NET: -131.5 mL    06-25-25 @ 07:01  -  06-25-25 @ 18:46  --------------------------------------------------------  IN: 132.6 mL / OUT: 305 mL / NET: -172.4 mL      PHYSICAL EXAM:      T(C): 36.9 (06-25-25 @ 16:00), Max: 36.9 (06-25-25 @ 16:00)  HR: 84 (06-25-25 @ 18:14) (58 - 97)  BP: 122/50 (06-25-25 @ 18:00) (89/48 - 158/64)  RR: 16 (06-25-25 @ 18:00) (16 - 39)  SpO2: 100% (06-25-25 @ 18:14) (99% - 100%)  Wt(kg): --  Lungs decreased BS at bases   Heart S1S2  Abd soft NT ND  Extremities:   2 edema                                    8.2    11.76 )-----------( 159      ( 25 Jun 2025 03:20 )             23.8     06-25    136  |  106  |  94[H]  ----------------------------<  112[H]  4.2   |  18[L]  |  7.20[H]    Ca    7.6[L]      25 Jun 2025 03:20  Phos  6.6     06-25  Mg     2.4     06-25    TPro  6.6  /  Alb  2.0[L]  /  TBili  0.8  /  DBili  x   /  AST  24  /  ALT  58  /  AlkPhos  174[H]  06-25      LIVER FUNCTIONS - ( 25 Jun 2025 03:20 )  Alb: 2.0 g/dL / Pro: 6.6 gm/dL / ALK PHOS: 174 U/L / ALT: 58 U/L / AST: 24 U/L / GGT: x           Creatinine Trend: 7.20<--, 6.64<--, 6.00<--, 5.81<--, 5.51<--, 5.08<--      < from: TTE Echo Complete w/o Contrast w/ Doppler (06.23.25 @ 15:29) >  1. Technically very difficult image quality.   2. Left ventricular systolic function is moderately decreased with an   ejection fraction visually estimated at 30 to 35 %. Global left   ventricular hypokinesis.   3. Left atrium is normal in size.   4.Normal right ventricular cavity size and mildly reduced right   ventricular systolic function.   5. There is mild calcification of the mitral valve annulus.   6. At least moderate mitral regurgitation.   7. Trileaflet aortic valve with reduced systolic excursion. There is   moderate calcification of the aortic valve leaflets. Mild aortic stenosis.   8. Moderate to severe tricuspid regurgitation.   9. Moderate pulmonic regurgitation.  10. No pericardial effusion seen.      < end of copied text >      Assessment   TAYLOR CKD EF 30-35% %, CRS  Hypotension, suspected ischemic ATN       Plan  Trial IV diuresis with Bumex drip  Palliative care appropriate   Unclear role of HD in long term outcome as prognosis is poor   Will follow     Yuriy Guillermo MD

## 2025-06-25 NOTE — PROGRESS NOTE ADULT - SUBJECTIVE AND OBJECTIVE BOX
Date of entry of this note is equal to date of services rendered  Pt seen and examined at bedside. 24 hours events noted, recent imaging, labs and medication ordered reviewed.     INTERVAL EVENTS:  -     REVIEW OF SYSTEMS:     [ ] A ten-point review of systems was otherwise negative except as noted.  [ ] Due to altered mental status/intubation, subjective information were not able to be obtained from the patient. History was obtained, to the extent possible, from review of the chart and collateral sources of information.    PAST MEDICAL & SURGICAL HISTORY:  Type 2 diabetes mellitus without complication      CVA (cerebral vascular accident)      Non-ST elevation (NSTEMI) myocardial infarction      CAD (coronary artery disease)      CKD (chronic kidney disease), stage III      HTN (hypertension)      HLD (hyperlipidemia)      Status post placement of implantable loop recorder      H/O carpal tunnel repair        FAMILY HISTORY:  Family history of heart disease    Family history of uterine cancer (Sibling)        Vitals   ICU Vital Signs Last 24 Hrs  T(C): 35.8 (25 Jun 2025 00:05), Max: 36.6 (24 Jun 2025 05:14)  T(F): 96.5 (25 Jun 2025 00:05), Max: 97.9 (24 Jun 2025 05:14)  HR: 63 (25 Jun 2025 01:00) (61 - 94)  BP: 114/48 (25 Jun 2025 01:00) (88/47 - 136/62)  BP(mean): 69 (25 Jun 2025 01:00) (57 - 122)  ABP: --  ABP(mean): --  RR: 22 (25 Jun 2025 01:00) (16 - 30)  SpO2: 100% (25 Jun 2025 01:00) (97% - 100%)    O2 Parameters below as of 24 Jun 2025 19:30  Patient On (Oxygen Delivery Method): nasal cannula  O2 Flow (L/min): 1          PHYSICAL EXAM:  General: ill appearing, frail  HEENT: Pupils equal, reactive to light.  Symmetric.  PULM: Clear to auscultation bilaterally  CVS: Regular rate and rhythm  ABD: Soft, nondistended, nontender  EXT: No edema, nontender, warm  NEURO: Alert, oriented. Sensation intact. No focal deficits.    VENT SETTINGS         I&O's Detail    23 Jun 2025 07:01  -  24 Jun 2025 07:00  --------------------------------------------------------  IN:    IV PiggyBack: 100 mL    Norepinephrine: 228.5 mL    Sodium Bicarbonate: 900 mL  Total IN: 1228.5 mL    OUT:    Voided (mL): 378 mL  Total OUT: 378 mL    Total NET: 850.5 mL      24 Jun 2025 07:01  -  25 Jun 2025 01:23  --------------------------------------------------------  IN:    Norepinephrine: 93.7 mL  Total IN: 93.7 mL    OUT:    Indwelling Catheter - Urethral (mL): 195 mL  Total OUT: 195 mL    Total NET: -101.3 mL          LABS                        9.2    10.64 )-----------( 154      ( 24 Jun 2025 04:34 )             26.1     06-24    134[L]  |  104  |  84[H]  ----------------------------<  104[H]  4.0   |  16[L]  |  6.64[H]    Ca    8.2[L]      24 Jun 2025 04:34  Phos  5.8     06-24  Mg     2.3     06-24    TPro  6.9  /  Alb  2.1[L]  /  TBili  0.9  /  DBili  x   /  AST  42[H]  /  ALT  78  /  AlkPhos  198[H]  06-24   LIVER FUNCTIONS - ( 24 Jun 2025 04:34 )  Alb: 2.1 g/dL / Pro: 6.9 gm/dL / ALK PHOS: 198 U/L / ALT: 78 U/L / AST: 42 U/L / GGT: x                 Urinalysis Basic - ( 24 Jun 2025 04:34 )    Color: x / Appearance: x / SG: x / pH: x  Gluc: 104 mg/dL / Ketone: x  / Bili: x / Urobili: x   Blood: x / Protein: x / Nitrite: x   Leuk Esterase: x / RBC: x / WBC x   Sq Epi: x / Non Sq Epi: x / Bacteria: x      POCT Blood Glucose.: 205 mg/dL *H* (06-24-25 @ 16:58)  POCT Blood Glucose.: 128 mg/dL *H* (06-24-25 @ 11:15)  POCT Blood Glucose.: 115 mg/dL *H* (06-24-25 @ 07:52)        RADIOLOGY:              Date of entry of this note is equal to date of services rendered  Pt seen and examined at bedside. 24 hours events noted, recent imaging, labs and medication ordered reviewed.     INTERVAL EVENTS:  - levophed infusion to maintain MAP>65  - Oliguric TAYLOR    REVIEW OF SYSTEMS:     [ ] A ten-point review of systems was otherwise negative except as noted.  [X] Due to altered mental status/intubation, subjective information were not able to be obtained from the patient. History was obtained, to the extent possible, from review of the chart and collateral sources of information.    PAST MEDICAL & SURGICAL HISTORY:  Type 2 diabetes mellitus without complication    CVA (cerebral vascular accident)    Non-ST elevation (NSTEMI) myocardial infarction    CAD (coronary artery disease)    CKD (chronic kidney disease), stage III    HTN (hypertension)    HLD (hyperlipidemia)    Status post placement of implantable loop recorder    H/O carpal tunnel repair      FAMILY HISTORY:  Family history of heart disease    Family history of uterine cancer (Sibling)    Vitals   ICU Vital Signs Last 24 Hrs  T(C): 35.8 (25 Jun 2025 00:05), Max: 36.6 (24 Jun 2025 05:14)  T(F): 96.5 (25 Jun 2025 00:05), Max: 97.9 (24 Jun 2025 05:14)  HR: 63 (25 Jun 2025 01:00) (61 - 94)  BP: 114/48 (25 Jun 2025 01:00) (88/47 - 136/62)  BP(mean): 69 (25 Jun 2025 01:00) (57 - 122)  ABP: --  ABP(mean): --  RR: 22 (25 Jun 2025 01:00) (16 - 30)  SpO2: 100% (25 Jun 2025 01:00) (97% - 100%)    O2 Parameters below as of 24 Jun 2025 19:30  Patient On (Oxygen Delivery Method): nasal cannula  O2 Flow (L/min): 1      PHYSICAL EXAM:  General: ill appearing, frail  HEENT: Pupils equal, reactive to light.  Symmetric.  PULM: diminished b/l  CVS: Regular rate and rhythm  ABD: Soft, nondistended  EXT: No edema, nontender, warm  NEURO: Alert, oriented. Sensation intact. No focal deficits.      I&O's Detail    23 Jun 2025 07:01  -  24 Jun 2025 07:00  --------------------------------------------------------  IN:    IV PiggyBack: 100 mL    Norepinephrine: 228.5 mL    Sodium Bicarbonate: 900 mL  Total IN: 1228.5 mL    OUT:    Voided (mL): 378 mL  Total OUT: 378 mL    Total NET: 850.5 mL      24 Jun 2025 07:01  -  25 Jun 2025 01:23  --------------------------------------------------------  IN:    Norepinephrine: 93.7 mL  Total IN: 93.7 mL    OUT:    Indwelling Catheter - Urethral (mL): 195 mL  Total OUT: 195 mL    Total NET: -101.3 mL          LABS                        9.2    10.64 )-----------( 154      ( 24 Jun 2025 04:34 )             26.1     06-24    134[L]  |  104  |  84[H]  ----------------------------<  104[H]  4.0   |  16[L]  |  6.64[H]    Ca    8.2[L]      24 Jun 2025 04:34  Phos  5.8     06-24  Mg     2.3     06-24    TPro  6.9  /  Alb  2.1[L]  /  TBili  0.9  /  DBili  x   /  AST  42[H]  /  ALT  78  /  AlkPhos  198[H]  06-24   LIVER FUNCTIONS - ( 24 Jun 2025 04:34 )  Alb: 2.1 g/dL / Pro: 6.9 gm/dL / ALK PHOS: 198 U/L / ALT: 78 U/L / AST: 42 U/L / GGT: x                 Urinalysis Basic - ( 24 Jun 2025 04:34 )    Color: x / Appearance: x / SG: x / pH: x  Gluc: 104 mg/dL / Ketone: x  / Bili: x / Urobili: x   Blood: x / Protein: x / Nitrite: x   Leuk Esterase: x / RBC: x / WBC x   Sq Epi: x / Non Sq Epi: x / Bacteria: x      POCT Blood Glucose.: 205 mg/dL *H* (06-24-25 @ 16:58)  POCT Blood Glucose.: 128 mg/dL *H* (06-24-25 @ 11:15)  POCT Blood Glucose.: 115 mg/dL *H* (06-24-25 @ 07:52)      RADIOLOGY:   < from: Xray Chest 1 View- PORTABLE-Urgent (Xray Chest 1 View- PORTABLE-Urgent .) (06.24.25 @ 11:09) >  Mild progression of bilateral airspace disease with increase in pleural   effusions.    IMPRESSION: Mild progression of disease. This could represent pulmonary   vascular congestion versus pneumonia    --- End of Report ---    < end of copied text >

## 2025-06-25 NOTE — PROGRESS NOTE ADULT - SUBJECTIVE AND OBJECTIVE BOX
# CC: Patient is a 85y old  Male who presents with a chief complaint of Sepsis likely 2/2 pna (25 Jun 2025 01:23)      ## HPI:  85-year-old male with past medical history of hypertension, stage IV CKD, CVA, type 2 diabetes, A-fib, iron deficiency anemia, bradycardia status post pacemaker, recent NSTEMI, ischemic cardiomyopathy who presented to the ED from [facility] due to left-sided chest pain. He denies any fevers, chills, coughing, heart palpitations, shortness of breath, or other complaints.   On presentation, the patient was hypothermic 92.7 otherwise stable vital signs. EKG with paced rhythm. Labs notable for WBC 2.2, hemoglobin 9.6, troponin 488, potassium 5.4, creatinine 2.6 (around baseline), lactate 1.1, proBNP 7,166, UA negative for UTI, COVID/flu negative. CT chest showed small focal airspace opacity in the right upper lobe with associated cavitation and scattered patchy groundglass opacities throughout the   lungs with a predominance in the right upper lobe likely infectious in etiology. Small bilateral pleural effusions with adjacent compressive atelectasis.   (19 Jun 2025 03:46)      **O/N:**    ## ROS:    ## Labs:  ** CBC: **                        8.2    11.76 )-----------( 159      ( 25 Jun 2025 03:20 )             23.8     ** Chem:  **  06-25    136  |  106  |  94[H]  ----------------------------<  112[H]  4.2   |  18[L]  |  7.20[H]    Ca    7.6[L]      25 Jun 2025 03:20  Phos  6.6     06-25  Mg     2.4     06-25    TPro  6.6  /  Alb  2.0[L]  /  TBili  0.8  /  DBili  x   /  AST  24  /  ALT  58  /  AlkPhos  174[H]  06-25    ** Coags: **      CAPILLARY BLOOD GLUCOSE      POCT Blood Glucose.: 161 mg/dL (25 Jun 2025 11:09)  POCT Blood Glucose.: 133 mg/dL (25 Jun 2025 08:04)  POCT Blood Glucose.: 205 mg/dL (24 Jun 2025 16:58)        Urinalysis with Rflx Culture (collected 19 Jun 2025 01:25)    Culture - Blood (collected 18 Jun 2025 22:45)  Source: Blood Blood-Peripheral  Final Report (24 Jun 2025 10:00):    No growth at 5 days    Culture - Blood (collected 18 Jun 2025 22:45)  Source: Blood Blood-Peripheral  Final Report (24 Jun 2025 10:00):    No growth at 5 days        ## Imaging:    ## Medications:  midodrine 20 milliGRAM(s) Oral every 8 hours  norepinephrine Infusion 0.05 MICROgram(s)/kG/Min IV Continuous <Continuous>    piperacillin/tazobactam IVPB.. 3.375 Gram(s) IV Intermittent every 12 hours    albuterol/ipratropium for Nebulization 3 milliLiter(s) Nebulizer every 6 hours    acetaminophen     Tablet .. 650 milliGRAM(s) Oral every 6 hours PRN  melatonin 3 milliGRAM(s) Oral at bedtime PRN      apixaban 2.5 milliGRAM(s) Oral every 12 hours  clopidogrel Tablet 75 milliGRAM(s) Oral daily      tamsulosin 0.4 milliGRAM(s) Oral at bedtime    atorvastatin 80 milliGRAM(s) Oral at bedtime  hydrocortisone sodium succinate Injectable 100 milliGRAM(s) IV Push every 8 hours  insulin lispro (ADMELOG) corrective regimen sliding scale   SubCutaneous three times a day before meals          ## O/E:  ICU Vital Signs Last 24 Hrs  T(C): 36.4 (25 Jun 2025 11:04), Max: 36.4 (25 Jun 2025 11:04)  T(F): 97.6 (25 Jun 2025 11:04), Max: 97.6 (25 Jun 2025 11:04)  HR: 82 (25 Jun 2025 15:30) (58 - 97)  BP: 123/52 (25 Jun 2025 15:30) (89/48 - 158/64)  BP(mean): 70 (25 Jun 2025 15:30) (57 - 96)  ABP: --  ABP(mean): --  RR: 21 (25 Jun 2025 15:30) (16 - 39)  SpO2: 100% (25 Jun 2025 15:30) (99% - 100%)    O2 Parameters below as of 25 Jun 2025 07:20  Patient On (Oxygen Delivery Method): room air          I&O's Summary    24 Jun 2025 07:01  -  25 Jun 2025 07:00  --------------------------------------------------------  IN: 138.5 mL / OUT: 270 mL / NET: -131.5 mL    25 Jun 2025 07:01  -  25 Jun 2025 16:03  --------------------------------------------------------  IN: 32.6 mL / OUT: 285 mL / NET: -252.4 mL        Gen: lying comfortably in bed in no apparent distress  HEENT: PERRL, EOMI  Resp: CTA B/L no c/r/w  CVS: S1S2 no m/r/g  Abd: soft NT/ND +BS  Ext: no c/c/e  Neuro: A&Ox3    ## Code status:  Goals of care discussion: [x] yes [ ] no  [x] full code  [ ] DNR  [ ] DNI  [ ] JENNIE # CC: Patient is a 85y old  Male who presents with a chief complaint of Sepsis likely 2/2 pna (25 Jun 2025 01:23)      ## HPI:  85-year-old male with past medical history of hypertension, stage IV CKD, CVA, type 2 diabetes, A-fib, iron deficiency anemia, bradycardia status post pacemaker, recent NSTEMI, ischemic cardiomyopathy who presented to the ED from [facility] due to left-sided chest pain. He denies any fevers, chills, coughing, heart palpitations, shortness of breath, or other complaints.   On presentation, the patient was hypothermic 92.7 otherwise stable vital signs. EKG with paced rhythm. Labs notable for WBC 2.2, hemoglobin 9.6, troponin 488, potassium 5.4, creatinine 2.6 (around baseline), lactate 1.1, proBNP 7,166, UA negative for UTI, COVID/flu negative. CT chest showed small focal airspace opacity in the right upper lobe with associated cavitation and scattered patchy groundglass opacities throughout the   lungs with a predominance in the right upper lobe likely infectious in etiology. Small bilateral pleural effusions with adjacent compressive atelectasis.   (19 Jun 2025 03:46)      **O/N:**  Remains anuric    ## ROS:  No complaints    ## Labs:  ** CBC: **                        8.2    11.76 )-----------( 159      ( 25 Jun 2025 03:20 )             23.8     ** Chem:  **  06-25    136  |  106  |  94[H]  ----------------------------<  112[H]  4.2   |  18[L]  |  7.20[H]    ### BUN / Creatinine:  06-25 @ 03:20: 94 mg/dL / 7.20 mg/dL  06-24 @ 04:34: 84 mg/dL / 6.64 mg/dL  06-23 @ 07:00: 78 mg/dL / 6.00 mg/dL      Ca    7.6[L]      25 Jun 2025 03:20  Phos  6.6     06-25  Mg     2.4     06-25    TPro  6.6  /  Alb  2.0[L]  /  TBili  0.8  /  DBili  x   /  AST  24  /  ALT  58  /  AlkPhos  174[H]  06-25    ** Coags: **      CAPILLARY BLOOD GLUCOSE      POCT Blood Glucose.: 161 mg/dL (25 Jun 2025 11:09)  POCT Blood Glucose.: 133 mg/dL (25 Jun 2025 08:04)  POCT Blood Glucose.: 205 mg/dL (24 Jun 2025 16:58)        Urinalysis with Rflx Culture (collected 19 Jun 2025 01:25)    Culture - Blood (collected 18 Jun 2025 22:45)  Source: Blood Blood-Peripheral  Final Report (24 Jun 2025 10:00):    No growth at 5 days    Culture - Blood (collected 18 Jun 2025 22:45)  Source: Blood Blood-Peripheral  Final Report (24 Jun 2025 10:00):    No growth at 5 days        ## Imaging:    ## Medications:  midodrine 20 milliGRAM(s) Oral every 8 hours  norepinephrine Infusion 0.05 MICROgram(s)/kG/Min IV Continuous <Continuous>    piperacillin/tazobactam IVPB.. 3.375 Gram(s) IV Intermittent every 12 hours    albuterol/ipratropium for Nebulization 3 milliLiter(s) Nebulizer every 6 hours    acetaminophen     Tablet .. 650 milliGRAM(s) Oral every 6 hours PRN  melatonin 3 milliGRAM(s) Oral at bedtime PRN      apixaban 2.5 milliGRAM(s) Oral every 12 hours  clopidogrel Tablet 75 milliGRAM(s) Oral daily      tamsulosin 0.4 milliGRAM(s) Oral at bedtime    atorvastatin 80 milliGRAM(s) Oral at bedtime  hydrocortisone sodium succinate Injectable 100 milliGRAM(s) IV Push every 8 hours  insulin lispro (ADMELOG) corrective regimen sliding scale   SubCutaneous three times a day before meals          ## O/E:  ICU Vital Signs Last 24 Hrs  T(C): 36.4 (25 Jun 2025 11:04), Max: 36.4 (25 Jun 2025 11:04)  T(F): 97.6 (25 Jun 2025 11:04), Max: 97.6 (25 Jun 2025 11:04)  HR: 82 (25 Jun 2025 15:30) (58 - 97)  BP: 123/52 (25 Jun 2025 15:30) (89/48 - 158/64)  BP(mean): 70 (25 Jun 2025 15:30) (57 - 96)  ABP: --  ABP(mean): --  RR: 21 (25 Jun 2025 15:30) (16 - 39)  SpO2: 100% (25 Jun 2025 15:30) (99% - 100%)    O2 Parameters below as of 25 Jun 2025 07:20  Patient On (Oxygen Delivery Method): room air          I&O's Summary    24 Jun 2025 07:01  -  25 Jun 2025 07:00  --------------------------------------------------------  IN: 138.5 mL / OUT: 270 mL / NET: -131.5 mL    25 Jun 2025 07:01  -  25 Jun 2025 16:03  --------------------------------------------------------  IN: 32.6 mL / OUT: 285 mL / NET: -252.4 mL        Gen: lying comfortably in bed in no apparent distress  HEENT: PERRL  Resp: CTA B/L no c/r/w  CVS: S1S2 no m/r/g  Abd: soft NT/ND +BS  Ext: no c/c; trace-1+  Neuro: A&Ox3    ## Code status:  Goals of care discussion: [x] yes [ ] no  [x] full code  [ ] DNR  [ ] DNI  [ ] JENNIE

## 2025-06-25 NOTE — PROGRESS NOTE ADULT - ASSESSMENT
85M PMH CKD (Stage IV), H/O CVA, DM2, AF (on apixaban), HFrEF (21% s/p PPM a/w moderate MR) initially p/w possible multifocal pneumonia, with progressively worsening TAYLOR and now hypotensive. Transferred to ICU  - Remains on Levophed @0.04, c/w midodrine and add stress-dose steroids  - N/V: ?uremia, on Zofran PRN  - Anuric (TAYLOR on CKD in setting of hypotension), c/w attempts to diurese with Bumex 4mg x 1, will transition to gtt if remains unresponsive  - c/w empiric antibiotics, currently no positive cultures  - c/w apixaban

## 2025-06-25 NOTE — PROGRESS NOTE ADULT - ASSESSMENT
85M with PMHx of CKD4, CVA, DM2, Afib on Eliquis with HFrEF of 21% with moderate MR and PPM who was admitted to medicine 6/19 with hypoxia being Rx for multifocal pneumonia with Zosyn and Vanco by level. Pt was transferred to ICU for persistent hypotension despite fluids.     Septic shock  Multifocal pneumonia, suspect MRSA pna  TAYLOR on CKD, oliguric  HFrEF    NEURO: no acute issues  CV: Levophed infusion to maintain MAP>65, Midodrine increased to 20mg TID today in aim to reduce IV vasoactives. Afib on Eliquis with good rate control. HFrEF with Echo on this admission showing EF of 30% with mod TR, mod RI, does have some volume overload with B lines on exam  PULM: no increased WOB,   GI: Protonix, diet as tolerated  RENAL: TAYLOR on CKD with oliguria (10cc/hr) s/p bicarb infusion and diuretic challenge with poor response. Nephro was consulted for concern towards dialysis for volume removal. Monitor electrolytes.   ENDO: DM2 FS with ISS to maintain euglycemia  ID: multifocal pneumonia with imaging concerning for cavitary lesion with MRSA pcr positive. On zosyn, Vanco by level now DC'd by ID. Monitor fever curve.   HEME: on full AC with eliquis. No signs of bleeding.     Dispo: Critically ill requiring ICU level of care, at high risk for decompensation.       CRITICAL CARE TIME SPENT: 34 minutes of critical care time spent providing medical care for patient's acute illness/conditions that impairs at least one vital organ system and/or poses a high risk of imminent or life threatening deterioration in the patient's condition. It includes time spent evaluating and treating the patient's acute illness as well as time spent reviewing labs, radiology, discussing goals of care with patient and/or patient's family, and discussing the case with a multidisciplinary team, in an effort to prevent further life threatening deterioration or end organ damage. This time is independent of any procedures performed.  Date of entry of this note is equal to the date of services rendered.

## 2025-06-25 NOTE — CHART NOTE - NSCHARTNOTEFT_GEN_A_CORE
Pt seen for CCU adm from 2D 06/23.  Pt appeared weak, ? mental status, c N/V at present, anti-nausea meds ordered.  Pt did not eat lunch meal, had breakfast per RN.    Per chart review, pt adm to CCU for persistent hypotesion, c septic shock, pneumonia, TAYLOR on CKD, HF.  PMH include CKD4, CVA, DM2, Afiba being Rx for multifocal pneumonia with Zosyn and Vanco by level. Pt was transferred to ICU for persistent hypotension despite fluids.     Diet, Pureed:   Consistent Carbohydrate {Evening Snack}  Mildly Thick Liquids (MILDTHICKLIQS)  Low Sodium  Liquid Protein Supplement     Qty per Day:  2 (06-20-25 @ 16:07) [Active]  06/20, swallow evaluation c recommendation for puree, mildly thick liquids.    Labs: 06-25 Na136 mmol/L Glu 112 mg/dL[H] K+ 4.2 mmol/L Cr  7.20 mg/dL[H] BUN 94 mg/dL[H] 06-25 Phos 6.6 mg/dL[H] 06-25 Alb 2.0 g/dL[L]  06-25 ALT 58 U/L AST 24 U/L Alkaline Phosphatase 174 U/L[H].    In view of renal indices, recommend change diet to puree, mildly thick liquids, low sodium, no concentrated potassium, no concentrated phosphorus(hold Liquid protein supplement due to elevated renal indices).  Will continue to monitor tolerance to oral feeding. Pt seen for CCU adm from 2D 06/23.  Pt appeared weak, ? mental status, c N/V at present, anti-nausea meds ordered.  Pt did not eat lunch meal, had breakfast per RN.    Per chart review, pt adm to CCU for persistent hypotension, c septic shock, pneumonia, TAYLOR on CKD, HF.  PMH include CKD4, CVA, DM Type 2(glipizide outpatient medication review noted), Afib.    Diet, Pureed:   Consistent Carbohydrate {Evening Snack}  Mildly Thick Liquids (MILDTHICKLIQS)  Low Sodium  Liquid Protein Supplement     Qty per Day:  2 (06-20-25 @ 16:07) [Active]  06/20, swallow evaluation c recommendation for puree, mildly thick liquids.    Labs: 06-25 Na136 mmol/L Glu 112 mg/dL[H] K+ 4.2 mmol/L Cr  7.20 mg/dL[H] BUN 94 mg/dL[H] 06-25 Phos 6.6 mg/dL[H] 06-25 Alb 2.0 g/dL[L]  06-25 ALT 58 U/L AST 24 U/L Alkaline Phosphatase 174 U/L[H]  06/26, A1C=7.8% <H>    In view of renal indices, recommend change diet to puree, mildly thick liquids, low sodium, consistent carbohydrate c evening snack, no concentrated potassium, no concentrated phosphorus(hold Liquid protein supplement due to elevated renal indices).  Will continue to monitor tolerance to oral feeding.

## 2025-06-26 LAB
ALBUMIN SERPL ELPH-MCNC: 1.9 G/DL — LOW (ref 3.3–5)
ALP SERPL-CCNC: 153 U/L — HIGH (ref 40–120)
ALT FLD-CCNC: 50 U/L — SIGNIFICANT CHANGE UP (ref 12–78)
ANION GAP SERPL CALC-SCNC: 13 MMOL/L — SIGNIFICANT CHANGE UP (ref 5–17)
ANION GAP SERPL CALC-SCNC: 15 MMOL/L — SIGNIFICANT CHANGE UP (ref 5–17)
AST SERPL-CCNC: 18 U/L — SIGNIFICANT CHANGE UP (ref 15–37)
BASOPHILS # BLD AUTO: 0.03 K/UL — SIGNIFICANT CHANGE UP (ref 0–0.2)
BASOPHILS NFR BLD AUTO: 0.3 % — SIGNIFICANT CHANGE UP (ref 0–2)
BILIRUB SERPL-MCNC: 0.6 MG/DL — SIGNIFICANT CHANGE UP (ref 0.2–1.2)
BUN SERPL-MCNC: 110 MG/DL — HIGH (ref 7–23)
BUN SERPL-MCNC: 116 MG/DL — HIGH (ref 7–23)
CALCIUM SERPL-MCNC: 7.7 MG/DL — LOW (ref 8.5–10.1)
CALCIUM SERPL-MCNC: 7.8 MG/DL — LOW (ref 8.5–10.1)
CHLORIDE SERPL-SCNC: 105 MMOL/L — SIGNIFICANT CHANGE UP (ref 96–108)
CHLORIDE SERPL-SCNC: 106 MMOL/L — SIGNIFICANT CHANGE UP (ref 96–108)
CO2 SERPL-SCNC: 17 MMOL/L — LOW (ref 22–31)
CO2 SERPL-SCNC: 17 MMOL/L — LOW (ref 22–31)
CREAT SERPL-MCNC: 7.51 MG/DL — HIGH (ref 0.5–1.3)
CREAT SERPL-MCNC: 7.64 MG/DL — HIGH (ref 0.5–1.3)
EGFR: 7 ML/MIN/1.73M2 — LOW
EOSINOPHIL # BLD AUTO: 0.11 K/UL — SIGNIFICANT CHANGE UP (ref 0–0.5)
EOSINOPHIL NFR BLD AUTO: 1 % — SIGNIFICANT CHANGE UP (ref 0–6)
GLUCOSE BLDC GLUCOMTR-MCNC: 210 MG/DL — HIGH (ref 70–99)
GLUCOSE BLDC GLUCOMTR-MCNC: 231 MG/DL — HIGH (ref 70–99)
GLUCOSE BLDC GLUCOMTR-MCNC: 232 MG/DL — HIGH (ref 70–99)
GLUCOSE SERPL-MCNC: 182 MG/DL — HIGH (ref 70–99)
GLUCOSE SERPL-MCNC: 240 MG/DL — HIGH (ref 70–99)
HCT VFR BLD CALC: 21.5 % — LOW (ref 39–50)
HGB BLD-MCNC: 7.4 G/DL — LOW (ref 13–17)
IMM GRANULOCYTES NFR BLD AUTO: 2 % — HIGH (ref 0–0.9)
LYMPHOCYTES # BLD AUTO: 1.32 K/UL — SIGNIFICANT CHANGE UP (ref 1–3.3)
LYMPHOCYTES # BLD AUTO: 11.7 % — LOW (ref 13–44)
MAGNESIUM SERPL-MCNC: 2.7 MG/DL — HIGH (ref 1.6–2.6)
MAGNESIUM SERPL-MCNC: 2.9 MG/DL — HIGH (ref 1.6–2.6)
MCHC RBC-ENTMCNC: 28.6 PG — SIGNIFICANT CHANGE UP (ref 27–34)
MCHC RBC-ENTMCNC: 34.4 G/DL — SIGNIFICANT CHANGE UP (ref 32–36)
MCV RBC AUTO: 83 FL — SIGNIFICANT CHANGE UP (ref 80–100)
MONOCYTES # BLD AUTO: 0.23 K/UL — SIGNIFICANT CHANGE UP (ref 0–0.9)
MONOCYTES NFR BLD AUTO: 2 % — SIGNIFICANT CHANGE UP (ref 2–14)
NEUTROPHILS # BLD AUTO: 9.32 K/UL — HIGH (ref 1.8–7.4)
NEUTROPHILS NFR BLD AUTO: 83 % — HIGH (ref 43–77)
NRBC BLD AUTO-RTO: 0 /100 WBCS — SIGNIFICANT CHANGE UP (ref 0–0)
PHOSPHATE SERPL-MCNC: 8.8 MG/DL — HIGH (ref 2.5–4.5)
PHOSPHATE SERPL-MCNC: 8.9 MG/DL — HIGH (ref 2.5–4.5)
PLATELET # BLD AUTO: 152 K/UL — SIGNIFICANT CHANGE UP (ref 150–400)
POTASSIUM SERPL-MCNC: 4.4 MMOL/L — SIGNIFICANT CHANGE UP (ref 3.5–5.3)
POTASSIUM SERPL-MCNC: 4.7 MMOL/L — SIGNIFICANT CHANGE UP (ref 3.5–5.3)
POTASSIUM SERPL-SCNC: 4.4 MMOL/L — SIGNIFICANT CHANGE UP (ref 3.5–5.3)
POTASSIUM SERPL-SCNC: 4.7 MMOL/L — SIGNIFICANT CHANGE UP (ref 3.5–5.3)
PROT SERPL-MCNC: 6.8 GM/DL — SIGNIFICANT CHANGE UP (ref 6–8.3)
RBC # BLD: 2.59 M/UL — LOW (ref 4.2–5.8)
RBC # FLD: 14.1 % — SIGNIFICANT CHANGE UP (ref 10.3–14.5)
SODIUM SERPL-SCNC: 136 MMOL/L — SIGNIFICANT CHANGE UP (ref 135–145)
SODIUM SERPL-SCNC: 137 MMOL/L — SIGNIFICANT CHANGE UP (ref 135–145)
WBC # BLD: 11.24 K/UL — HIGH (ref 3.8–10.5)
WBC # FLD AUTO: 11.24 K/UL — HIGH (ref 3.8–10.5)

## 2025-06-26 PROCEDURE — G0545: CPT

## 2025-06-26 PROCEDURE — 99291 CRITICAL CARE FIRST HOUR: CPT

## 2025-06-26 PROCEDURE — 99232 SBSQ HOSP IP/OBS MODERATE 35: CPT

## 2025-06-26 RX ORDER — BUMETANIDE 1 MG/1
1 TABLET ORAL
Qty: 20 | Refills: 0 | Status: DISCONTINUED | OUTPATIENT
Start: 2025-06-26 | End: 2025-06-27

## 2025-06-26 RX ADMIN — IPRATROPIUM BROMIDE AND ALBUTEROL SULFATE 3 MILLILITER(S): .5; 2.5 SOLUTION RESPIRATORY (INHALATION) at 05:20

## 2025-06-26 RX ADMIN — Medication 100 MILLIGRAM(S): at 05:57

## 2025-06-26 RX ADMIN — INSULIN LISPRO 2: 100 INJECTION, SOLUTION INTRAVENOUS; SUBCUTANEOUS at 16:14

## 2025-06-26 RX ADMIN — INSULIN LISPRO 2: 100 INJECTION, SOLUTION INTRAVENOUS; SUBCUTANEOUS at 07:58

## 2025-06-26 RX ADMIN — BUMETANIDE 5 MG/HR: 1 TABLET ORAL at 08:51

## 2025-06-26 RX ADMIN — MIDODRINE HYDROCHLORIDE 20 MILLIGRAM(S): 5 TABLET ORAL at 21:42

## 2025-06-26 RX ADMIN — CLOPIDOGREL BISULFATE 75 MILLIGRAM(S): 75 TABLET, FILM COATED ORAL at 11:45

## 2025-06-26 RX ADMIN — MIDODRINE HYDROCHLORIDE 20 MILLIGRAM(S): 5 TABLET ORAL at 05:56

## 2025-06-26 RX ADMIN — IPRATROPIUM BROMIDE AND ALBUTEROL SULFATE 3 MILLILITER(S): .5; 2.5 SOLUTION RESPIRATORY (INHALATION) at 12:49

## 2025-06-26 RX ADMIN — BUMETANIDE 5 MG/HR: 1 TABLET ORAL at 19:29

## 2025-06-26 RX ADMIN — CALCITRIOL 0.25 MICROGRAM(S): 0.5 CAPSULE, GELATIN COATED ORAL at 11:45

## 2025-06-26 RX ADMIN — Medication 100 MILLIGRAM(S): at 21:42

## 2025-06-26 RX ADMIN — MIDODRINE HYDROCHLORIDE 20 MILLIGRAM(S): 5 TABLET ORAL at 14:13

## 2025-06-26 RX ADMIN — Medication 325 MILLIGRAM(S): at 11:45

## 2025-06-26 RX ADMIN — ATORVASTATIN CALCIUM 80 MILLIGRAM(S): 80 TABLET, FILM COATED ORAL at 21:42

## 2025-06-26 RX ADMIN — IPRATROPIUM BROMIDE AND ALBUTEROL SULFATE 3 MILLILITER(S): .5; 2.5 SOLUTION RESPIRATORY (INHALATION) at 18:46

## 2025-06-26 RX ADMIN — BUMETANIDE 5 MG/HR: 1 TABLET ORAL at 21:42

## 2025-06-26 RX ADMIN — INSULIN LISPRO 2: 100 INJECTION, SOLUTION INTRAVENOUS; SUBCUTANEOUS at 11:45

## 2025-06-26 RX ADMIN — Medication 1 APPLICATION(S): at 06:07

## 2025-06-26 RX ADMIN — TAMSULOSIN HYDROCHLORIDE 0.4 MILLIGRAM(S): 0.4 CAPSULE ORAL at 21:42

## 2025-06-26 RX ADMIN — APIXABAN 2.5 MILLIGRAM(S): 5 TABLET, FILM COATED ORAL at 05:57

## 2025-06-26 RX ADMIN — Medication 100 MILLIGRAM(S): at 14:13

## 2025-06-26 RX ADMIN — Medication 25 GRAM(S): at 05:56

## 2025-06-26 RX ADMIN — APIXABAN 2.5 MILLIGRAM(S): 5 TABLET, FILM COATED ORAL at 17:15

## 2025-06-26 NOTE — PROGRESS NOTE ADULT - SUBJECTIVE AND OBJECTIVE BOX
# CC: Patient is a 85y old  Male who presents with a chief complaint of Sepsis likely 2/2 pna (25 Jun 2025 18:46)      ## HPI:  85-year-old male with past medical history of hypertension, stage IV CKD, CVA, type 2 diabetes, A-fib, iron deficiency anemia, bradycardia status post pacemaker, recent NSTEMI, ischemic cardiomyopathy who presented to the ED from [facility] due to left-sided chest pain. He denies any fevers, chills, coughing, heart palpitations, shortness of breath, or other complaints.   On presentation, the patient was hypothermic 92.7 otherwise stable vital signs. EKG with paced rhythm. Labs notable for WBC 2.2, hemoglobin 9.6, troponin 488, potassium 5.4, creatinine 2.6 (around baseline), lactate 1.1, proBNP 7,166, UA negative for UTI, COVID/flu negative. CT chest showed small focal airspace opacity in the right upper lobe with associated cavitation and scattered patchy groundglass opacities throughout the   lungs with a predominance in the right upper lobe likely infectious in etiology. Small bilateral pleural effusions with adjacent compressive atelectasis.   (19 Jun 2025 03:46)      **O/N:**    ## ROS:    ## Labs:  ** CBC: **                        7.4    11.24 )-----------( 152      ( 26 Jun 2025 03:40 )             21.5     ** Chem:  **  06-26    137  |  105  |  110[H]  ----------------------------<  182[H]  4.7   |  17[L]  |  7.64[H]    Ca    7.7[L]      26 Jun 2025 03:40  Phos  8.8     06-26  Mg     2.7     06-26    TPro  6.8  /  Alb  1.9[L]  /  TBili  0.6  /  DBili  x   /  AST  18  /  ALT  50  /  AlkPhos  153[H]  06-26    ** Coags: **      CAPILLARY BLOOD GLUCOSE      POCT Blood Glucose.: 231 mg/dL (26 Jun 2025 11:42)  POCT Blood Glucose.: 210 mg/dL (26 Jun 2025 07:40)  POCT Blood Glucose.: 126 mg/dL (25 Jun 2025 16:20)        Urinalysis with Rflx Culture (collected 19 Jun 2025 01:25)    Culture - Blood (collected 18 Jun 2025 22:45)  Source: Blood Blood-Peripheral  Final Report (24 Jun 2025 10:00):    No growth at 5 days    Culture - Blood (collected 18 Jun 2025 22:45)  Source: Blood Blood-Peripheral  Final Report (24 Jun 2025 10:00):    No growth at 5 days        ## Imaging:    ## Medications:  bumetanide Infusion 1 mG/Hr IV Continuous <Continuous>  midodrine 20 milliGRAM(s) Oral every 8 hours  norepinephrine Infusion 0.05 MICROgram(s)/kG/Min IV Continuous <Continuous>      albuterol/ipratropium for Nebulization 3 milliLiter(s) Nebulizer every 6 hours    acetaminophen     Tablet .. 650 milliGRAM(s) Oral every 6 hours PRN  melatonin 3 milliGRAM(s) Oral at bedtime PRN      apixaban 2.5 milliGRAM(s) Oral every 12 hours  clopidogrel Tablet 75 milliGRAM(s) Oral daily      tamsulosin 0.4 milliGRAM(s) Oral at bedtime    atorvastatin 80 milliGRAM(s) Oral at bedtime  hydrocortisone sodium succinate Injectable 100 milliGRAM(s) IV Push every 8 hours  insulin lispro (ADMELOG) corrective regimen sliding scale   SubCutaneous three times a day before meals          ## O/E:  ICU Vital Signs Last 24 Hrs  T(C): 36.1 (26 Jun 2025 04:00), Max: 37.3 (25 Jun 2025 19:16)  T(F): 97 (26 Jun 2025 04:00), Max: 99.1 (25 Jun 2025 19:16)  HR: 77 (26 Jun 2025 13:00) (71 - 92)  BP: 115/57 (26 Jun 2025 13:00) (98/49 - 144/58)  BP(mean): 75 (26 Jun 2025 13:00) (62 - 96)  ABP: --  ABP(mean): --  RR: 17 (26 Jun 2025 13:00) (12 - 28)  SpO2: 97% (26 Jun 2025 13:00) (97% - 100%)    O2 Parameters below as of 26 Jun 2025 11:05  Patient On (Oxygen Delivery Method): room air          I&O's Summary    25 Jun 2025 07:01  -  26 Jun 2025 07:00  --------------------------------------------------------  IN: 232.6 mL / OUT: 670 mL / NET: -437.4 mL    26 Jun 2025 07:01  -  26 Jun 2025 13:36  --------------------------------------------------------  IN: 20 mL / OUT: 310 mL / NET: -290 mL        Gen: lying comfortably in bed in no apparent distress  HEENT: PERRL, EOMI  Resp: CTA B/L no c/r/w  CVS: S1S2 no m/r/g  Abd: soft NT/ND +BS  Ext: no c/c/e  Neuro: A&Ox3    ## Code status:  Goals of care discussion: [x] yes [ ] no  [x] full code  [ ] DNR  [ ] DNI  [ ] JENNIE # CC: Patient is a 85y old  Male who presents with a chief complaint of Sepsis likely 2/2 pna (25 Jun 2025 18:46)      ## HPI:  85-year-old male with past medical history of hypertension, stage IV CKD, CVA, type 2 diabetes, A-fib, iron deficiency anemia, bradycardia status post pacemaker, recent NSTEMI, ischemic cardiomyopathy who presented to the ED from [facility] due to left-sided chest pain. He denies any fevers, chills, coughing, heart palpitations, shortness of breath, or other complaints.   On presentation, the patient was hypothermic 92.7 otherwise stable vital signs. EKG with paced rhythm. Labs notable for WBC 2.2, hemoglobin 9.6, troponin 488, potassium 5.4, creatinine 2.6 (around baseline), lactate 1.1, proBNP 7,166, UA negative for UTI, COVID/flu negative. CT chest showed small focal airspace opacity in the right upper lobe with associated cavitation and scattered patchy groundglass opacities throughout the   lungs with a predominance in the right upper lobe likely infectious in etiology. Small bilateral pleural effusions with adjacent compressive atelectasis.   (19 Jun 2025 03:46)      **O/N:**  Titrating off pressors    ## ROS:  Offers no complaints    ## Labs:  ** CBC: **                        7.4    11.24 )-----------( 152      ( 26 Jun 2025 03:40 )             21.5     ** Chem:  **  06-26    137  |  105  |  110[H]  ----------------------------<  182[H]  4.7   |  17[L]  |  7.64[H]    Ca    7.7[L]      26 Jun 2025 03:40  Phos  8.8     06-26  Mg     2.7     06-26    TPro  6.8  /  Alb  1.9[L]  /  TBili  0.6  /  DBili  x   /  AST  18  /  ALT  50  /  AlkPhos  153[H]  06-26    ** Coags: **      CAPILLARY BLOOD GLUCOSE      POCT Blood Glucose.: 231 mg/dL (26 Jun 2025 11:42)  POCT Blood Glucose.: 210 mg/dL (26 Jun 2025 07:40)  POCT Blood Glucose.: 126 mg/dL (25 Jun 2025 16:20)        Urinalysis with Rflx Culture (collected 19 Jun 2025 01:25)    Culture - Blood (collected 18 Jun 2025 22:45)  Source: Blood Blood-Peripheral  Final Report (24 Jun 2025 10:00):    No growth at 5 days    Culture - Blood (collected 18 Jun 2025 22:45)  Source: Blood Blood-Peripheral  Final Report (24 Jun 2025 10:00):    No growth at 5 days        ## Imaging:    ## Medications:  bumetanide Infusion 1 mG/Hr IV Continuous <Continuous>  midodrine 20 milliGRAM(s) Oral every 8 hours  norepinephrine Infusion 0.05 MICROgram(s)/kG/Min IV Continuous <Continuous>      albuterol/ipratropium for Nebulization 3 milliLiter(s) Nebulizer every 6 hours    acetaminophen     Tablet .. 650 milliGRAM(s) Oral every 6 hours PRN  melatonin 3 milliGRAM(s) Oral at bedtime PRN      apixaban 2.5 milliGRAM(s) Oral every 12 hours  clopidogrel Tablet 75 milliGRAM(s) Oral daily      tamsulosin 0.4 milliGRAM(s) Oral at bedtime    atorvastatin 80 milliGRAM(s) Oral at bedtime  hydrocortisone sodium succinate Injectable 100 milliGRAM(s) IV Push every 8 hours  insulin lispro (ADMELOG) corrective regimen sliding scale   SubCutaneous three times a day before meals          ## O/E:  ICU Vital Signs Last 24 Hrs  T(C): 36.1 (26 Jun 2025 04:00), Max: 37.3 (25 Jun 2025 19:16)  T(F): 97 (26 Jun 2025 04:00), Max: 99.1 (25 Jun 2025 19:16)  HR: 77 (26 Jun 2025 13:00) (71 - 92)  BP: 115/57 (26 Jun 2025 13:00) (98/49 - 144/58)  BP(mean): 75 (26 Jun 2025 13:00) (62 - 96)  ABP: --  ABP(mean): --  RR: 17 (26 Jun 2025 13:00) (12 - 28)  SpO2: 97% (26 Jun 2025 13:00) (97% - 100%)    O2 Parameters below as of 26 Jun 2025 11:05  Patient On (Oxygen Delivery Method): room air          I&O's Summary    25 Jun 2025 07:01  -  26 Jun 2025 07:00  --------------------------------------------------------  IN: 232.6 mL / OUT: 670 mL / NET: -437.4 mL    26 Jun 2025 07:01  -  26 Jun 2025 13:36  --------------------------------------------------------  IN: 20 mL / OUT: 310 mL / NET: -290 mL        Gen: lying comfortably in bed in no apparent distress  HEENT: PERRL, EOMI  Resp: CTA B/L no c/r/w  CVS: S1S2 no m/r/g  Abd: soft NT/ND +BS  Ext: no c/c; 1+ edema  Neuro: A&Ox3    ## Code status:  Goals of care discussion: [x] yes [ ] no  [x] full code  [ ] DNR  [ ] DNI  [ ] JENNIE

## 2025-06-26 NOTE — PROGRESS NOTE ADULT - SUBJECTIVE AND OBJECTIVE BOX
Seen in ICU    Vital Signs Last 24 Hrs  T(C): 36.1 (06-26-25 @ 19:00), Max: 36.1 (06-26-25 @ 04:00)  T(F): 97 (06-26-25 @ 19:00), Max: 97 (06-26-25 @ 04:00)  HR: 79 (06-26-25 @ 22:00) (63 - 88)  BP: 137/67 (06-26-25 @ 22:00) (98/49 - 139/70)  BP(mean): 88 (06-26-25 @ 22:00) (62 - 96)  RR: 13 (06-26-25 @ 22:00) (11 - 21)  SpO2: 98% (06-26-25 @ 22:00) (95% - 100%)    I&O's Detail    25 Jun 2025 07:01  -  26 Jun 2025 07:00  --------------------------------------------------------  IN:    IV PiggyBack: 200 mL    Norepinephrine: 32.6 mL  Total IN: 232.6 mL    OUT:    Indwelling Catheter - Urethral (mL): 670 mL  Total OUT: 670 mL    26 Jun 2025 07:01  -  26 Jun 2025 23:23  --------------------------------------------------------  IN:    Bumetanide: 70 mL  Total IN: 70 mL    OUT:    Indwelling Catheter - Urethral (mL): 1410 mL  Total OUT: 1410 mL    Lungs decreased BS at bases   Heart S1S2  Abd soft ND  Extremities sm edema                        7.4    11.24 )-----------( 152      ( 26 Jun 2025 03:40 )             21.5     26 Jun 2025 14:49    136    |  106    |  116    ----------------------------<  240    4.4     |  17     |  7.51     Ca    7.8        26 Jun 2025 14:49  Phos  8.9       26 Jun 2025 14:49  Mg     2.9       26 Jun 2025 14:49    TPro  6.8    /  Alb  1.9    /  TBili  0.6    /  DBili  x      /  AST  18     /  ALT  50     /  AlkPhos  153    26 Jun 2025 03:40    LIVER FUNCTIONS - ( 26 Jun 2025 03:40 )  Alb: 1.9 g/dL / Pro: 6.8 gm/dL / ALK PHOS: 153 U/L / ALT: 50 U/L / AST: 18 U/L / GGT: x           acetaminophen     Tablet .. 650 milliGRAM(s) Oral every 6 hours PRN  albuterol/ipratropium for Nebulization 3 milliLiter(s) Nebulizer every 6 hours  apixaban 2.5 milliGRAM(s) Oral every 12 hours  atorvastatin 80 milliGRAM(s) Oral at bedtime  bumetanide Infusion 1 mG/Hr IV Continuous <Continuous>  calcitriol   Capsule 0.25 MICROGram(s) Oral daily  chlorhexidine 2% Cloths 1 Application(s) Topical <User Schedule>  clopidogrel Tablet 75 milliGRAM(s) Oral daily  ferrous    sulfate 325 milliGRAM(s) Oral daily  hydrocortisone sodium succinate Injectable 100 milliGRAM(s) IV Push every 8 hours  insulin lispro (ADMELOG) corrective regimen sliding scale   SubCutaneous three times a day before meals  melatonin 3 milliGRAM(s) Oral at bedtime PRN  midodrine 20 milliGRAM(s) Oral every 8 hours  tamsulosin 0.4 milliGRAM(s) Oral at bedtime    Assessment/Plan:    CM, EF 30-35, mod MR, TR, RI  Borderline BP on Midodrine   Hemodynamic TAYLOR/CKD 3 (Cr 2.16 - 6/19/25)  Good UO w/Bumex qtt  Cr is slightly lower today  Avoid nephrotoxins   Avoid hypotension  Consider holding Bumex qtt and following spontaneous UO  No indications for RRT at this time  Hopefully renal fx will stabilize     [phone]

## 2025-06-26 NOTE — PROGRESS NOTE ADULT - ASSESSMENT
85-year-old male with past medical history of hypertension, stage IV CKD, CVA, type 2 diabetes, A-fib, iron deficiency anemia, bradycardia status post pacemaker, recent NSTEMI, ischemic cardiomyopathy who presented to the ED from [facility] due to left-sided chest pain. Found with elevated troponin(downtrending), sepsis likely 2/2 pna.  CT 1. Small focal airspace opacity in the right upper lobe with associated   cavitation and scattered patchy groundglass opacities throughout the   lungs with a predominance in the right upper lobe likely infectious in   etiology.  2. Small bilateral pleural effusions with adjacent compressive   atelectasis.  last admission was for UTI and we treated with ceftriaxone   would give broader coverage  He is MRSA pcr positive and there is higher likelihood that he has a MRSA pneumonia   PAtient on oxygen and coughing during my exam     6/23: in ICU, low BP, on pressors, can continue antibiotics, monitor creatinine and urine output   6/24: remains on pressors and low kidney output, will finish antibiotics on renally dosed zosyn   6/26: remains in CCU, episode of hypothermia yesterday, RA, WBC about the same 11.24, Cr 7.64 but Urine output with some improvement, BCs NGTD, abx stopped today.     Plan:  stop abx   monitor creatinine and appreciate nephrology input   follow blood cultures until final   Streptococcus pneumonia na legionella urine ag negative   good control of his HR and his Afib   monitor ins and outs and reynolds management per CCU    discussed with Dr. Pickens  discussed with CCU team

## 2025-06-26 NOTE — PROGRESS NOTE ADULT - SUBJECTIVE AND OBJECTIVE BOX
JOSEFINA KUMAR  MRN-[id number]  85y (1940)    Follow Up:  PNA    Interval History: The pt was seen and examined earlier in CCU, not in acute distress, pt is awake, complains of being tired, able to state his name and birthday, knows he is in the hospital, unable to state current year or president. Pt had episodes of hypothermia yesterday, WBC is about the same 11.24.     PAST MEDICAL & SURGICAL HISTORY:  Type 2 diabetes mellitus without complication      CVA (cerebral vascular accident)      Non-ST elevation (NSTEMI) myocardial infarction      CAD (coronary artery disease)      CKD (chronic kidney disease), stage III      HTN (hypertension)      HLD (hyperlipidemia)      Status post placement of implantable loop recorder      H/O carpal tunnel repair          ROS:  limited, pt denies chest pain or abdominal pain   [ ] Unobtainable because:  [x ] All other systems negative    Constitutional: no fever, no chills  Head: no trauma  Eyes: no vision changes, no eye pain  ENT:  no sore throat, no rhinorrhea  Cardiovascular:  no chest pain, no palpitation  Respiratory:  no SOB, no cough  GI:  no abd pain, no vomiting, no diarrhea  urinary: no dysuria, no hematuria, no flank pain  musculoskeletal:  no joint pain, no joint swelling  skin:  no rash  neurology:  no headache, no seizure, no change in mental status  psych: no anxiety, no depression         Allergies  No Known Allergies        ANTIMICROBIALS:      OTHER MEDS:  acetaminophen     Tablet .. 650 milliGRAM(s) Oral every 6 hours PRN  albuterol/ipratropium for Nebulization 3 milliLiter(s) Nebulizer every 6 hours  apixaban 2.5 milliGRAM(s) Oral every 12 hours  atorvastatin 80 milliGRAM(s) Oral at bedtime  bumetanide Infusion 1 mG/Hr IV Continuous <Continuous>  calcitriol   Capsule 0.25 MICROGram(s) Oral daily  chlorhexidine 2% Cloths 1 Application(s) Topical <User Schedule>  clopidogrel Tablet 75 milliGRAM(s) Oral daily  ferrous    sulfate 325 milliGRAM(s) Oral daily  hydrocortisone sodium succinate Injectable 100 milliGRAM(s) IV Push every 8 hours  insulin lispro (ADMELOG) corrective regimen sliding scale   SubCutaneous three times a day before meals  melatonin 3 milliGRAM(s) Oral at bedtime PRN  midodrine 20 milliGRAM(s) Oral every 8 hours  tamsulosin 0.4 milliGRAM(s) Oral at bedtime      Vital Signs Last 24 Hrs  T(C): 35.8 (26 Jun 2025 15:00), Max: 37.3 (25 Jun 2025 19:16)  T(F): 96.5 (26 Jun 2025 15:00), Max: 99.1 (25 Jun 2025 19:16)  HR: 66 (26 Jun 2025 16:00) (63 - 90)  BP: 121/62 (26 Jun 2025 16:00) (98/49 - 144/58)  BP(mean): 81 (26 Jun 2025 16:00) (62 - 96)  RR: 13 (26 Jun 2025 16:00) (12 - 21)  SpO2: 100% (26 Jun 2025 16:00) (95% - 100%)    Parameters below as of 26 Jun 2025 12:49  Patient On (Oxygen Delivery Method): room air        Physical Exam:    General:    NAD,  non toxic, on room air   Head: atraumatic, normocephalic  Eye: normal sclera and conjunctiva  ENT:    no oral lesions, neck supple  Cardio:     regular S1, S2,  no murmur  Respiratory:  diminished BS b/l,    no wheezing  abd:     soft,   BS +,   no tenderness  :   no CVAT,  no suprapubic tenderness, +reynolds   Musculoskeletal:   no joint swelling,   no edema  vascular: no central lines, +PIV   Skin:    no rash  Neurologic:     no focal deficit  psych: normal affect    WBC Count: 11.24 K/uL (06-26 @ 03:40)  WBC Count: 11.76 K/uL (06-25 @ 03:20)  WBC Count: 10.64 K/uL (06-24 @ 04:34)  WBC Count: 7.33 K/uL (06-23 @ 00:35)  WBC Count: 8.03 K/uL (06-22 @ 07:20)  WBC Count: 8.71 K/uL (06-20 @ 06:45)                            7.4    11.24 )-----------( 152      ( 26 Jun 2025 03:40 )             21.5       06-26    136  |  106  |  116[H]  ----------------------------<  240[H]  4.4   |  17[L]  |  7.51[H]    Ca    7.8[L]      26 Jun 2025 14:49  Phos  8.9     06-26  Mg     2.9     06-26    TPro  6.8  /  Alb  1.9[L]  /  TBili  0.6  /  DBili  x   /  AST  18  /  ALT  50  /  AlkPhos  153[H]  06-26      Urinalysis Basic - ( 26 Jun 2025 14:49 )    Color: x / Appearance: x / SG: x / pH: x  Gluc: 240 mg/dL / Ketone: x  / Bili: x / Urobili: x   Blood: x / Protein: x / Nitrite: x   Leuk Esterase: x / RBC: x / WBC x   Sq Epi: x / Non Sq Epi: x / Bacteria: x        Creatinine Trend: 7.51<--, 7.64<--, 7.20<--, 6.64<--, 6.00<--, 5.81<--      MICROBIOLOGY:  v  Blood Blood-Peripheral  06-18-25   No growth at 5 days  --  --      RADIOLOGY:

## 2025-06-26 NOTE — PROGRESS NOTE ADULT - ASSESSMENT
85M PMH CKD (Stage IV), H/O CVA, DM2, AF (on apixaban), HFrEF (21% s/p PPM a/w moderate MR) initially p/w possible multifocal pneumonia, with progressively worsening TAYLOR and now hypotensive. Transferred to ICU  - Now off pressors, c/w midodrine and add stress-dose steroids  - N/V: ?uremia, on Zofran PRN  - Urine output now increased (40–45cc/hr) after Bumex (never started on gtt). Continue to monitor and can start gtt if urine output decreases once more. Na 137.  - c/w empiric antibiotics, currently no positive cultures  - c/w apixaban

## 2025-06-27 LAB
ALBUMIN SERPL ELPH-MCNC: 1.9 G/DL — LOW (ref 3.3–5)
ALP SERPL-CCNC: 134 U/L — HIGH (ref 40–120)
ALT FLD-CCNC: 45 U/L — SIGNIFICANT CHANGE UP (ref 12–78)
ANION GAP SERPL CALC-SCNC: 13 MMOL/L — SIGNIFICANT CHANGE UP (ref 5–17)
ANION GAP SERPL CALC-SCNC: 14 MMOL/L — SIGNIFICANT CHANGE UP (ref 5–17)
AST SERPL-CCNC: 18 U/L — SIGNIFICANT CHANGE UP (ref 15–37)
BILIRUB SERPL-MCNC: 0.4 MG/DL — SIGNIFICANT CHANGE UP (ref 0.2–1.2)
BUN SERPL-MCNC: 123 MG/DL — HIGH (ref 7–23)
BUN SERPL-MCNC: 137 MG/DL — HIGH (ref 7–23)
CALCIUM SERPL-MCNC: 8 MG/DL — LOW (ref 8.5–10.1)
CALCIUM SERPL-MCNC: 8.3 MG/DL — LOW (ref 8.5–10.1)
CHLORIDE SERPL-SCNC: 108 MMOL/L — SIGNIFICANT CHANGE UP (ref 96–108)
CHLORIDE SERPL-SCNC: 108 MMOL/L — SIGNIFICANT CHANGE UP (ref 96–108)
CO2 SERPL-SCNC: 19 MMOL/L — LOW (ref 22–31)
CO2 SERPL-SCNC: 22 MMOL/L — SIGNIFICANT CHANGE UP (ref 22–31)
CREAT SERPL-MCNC: 6.72 MG/DL — HIGH (ref 0.5–1.3)
CREAT SERPL-MCNC: 7.19 MG/DL — HIGH (ref 0.5–1.3)
EGFR: 7 ML/MIN/1.73M2 — LOW
EGFR: 7 ML/MIN/1.73M2 — LOW
EGFR: 8 ML/MIN/1.73M2 — LOW
EGFR: 8 ML/MIN/1.73M2 — LOW
GLUCOSE BLDC GLUCOMTR-MCNC: 246 MG/DL — HIGH (ref 70–99)
GLUCOSE BLDC GLUCOMTR-MCNC: 297 MG/DL — HIGH (ref 70–99)
GLUCOSE BLDC GLUCOMTR-MCNC: 300 MG/DL — HIGH (ref 70–99)
GLUCOSE BLDC GLUCOMTR-MCNC: 348 MG/DL — HIGH (ref 70–99)
GLUCOSE SERPL-MCNC: 229 MG/DL — HIGH (ref 70–99)
GLUCOSE SERPL-MCNC: 294 MG/DL — HIGH (ref 70–99)
HCT VFR BLD CALC: 20.6 % — CRITICAL LOW (ref 39–50)
HGB BLD-MCNC: 7.2 G/DL — LOW (ref 13–17)
MAGNESIUM SERPL-MCNC: 2.8 MG/DL — HIGH (ref 1.6–2.6)
MAGNESIUM SERPL-MCNC: 2.9 MG/DL — HIGH (ref 1.6–2.6)
MCHC RBC-ENTMCNC: 28.8 PG — SIGNIFICANT CHANGE UP (ref 27–34)
MCHC RBC-ENTMCNC: 35 G/DL — SIGNIFICANT CHANGE UP (ref 32–36)
MCV RBC AUTO: 82.4 FL — SIGNIFICANT CHANGE UP (ref 80–100)
NRBC BLD AUTO-RTO: 0 /100 WBCS — SIGNIFICANT CHANGE UP (ref 0–0)
PHOSPHATE SERPL-MCNC: 7.8 MG/DL — HIGH (ref 2.5–4.5)
PHOSPHATE SERPL-MCNC: 8.8 MG/DL — HIGH (ref 2.5–4.5)
PLATELET # BLD AUTO: 145 K/UL — LOW (ref 150–400)
POTASSIUM SERPL-MCNC: 3.5 MMOL/L — SIGNIFICANT CHANGE UP (ref 3.5–5.3)
POTASSIUM SERPL-MCNC: 4.2 MMOL/L — SIGNIFICANT CHANGE UP (ref 3.5–5.3)
POTASSIUM SERPL-SCNC: 3.5 MMOL/L — SIGNIFICANT CHANGE UP (ref 3.5–5.3)
POTASSIUM SERPL-SCNC: 4.2 MMOL/L — SIGNIFICANT CHANGE UP (ref 3.5–5.3)
PROT SERPL-MCNC: 7 GM/DL — SIGNIFICANT CHANGE UP (ref 6–8.3)
RBC # BLD: 2.5 M/UL — LOW (ref 4.2–5.8)
RBC # FLD: 13.9 % — SIGNIFICANT CHANGE UP (ref 10.3–14.5)
SODIUM SERPL-SCNC: 141 MMOL/L — SIGNIFICANT CHANGE UP (ref 135–145)
SODIUM SERPL-SCNC: 143 MMOL/L — SIGNIFICANT CHANGE UP (ref 135–145)
WBC # BLD: 11.27 K/UL — HIGH (ref 3.8–10.5)
WBC # FLD AUTO: 11.27 K/UL — HIGH (ref 3.8–10.5)

## 2025-06-27 PROCEDURE — G0545: CPT

## 2025-06-27 PROCEDURE — 99232 SBSQ HOSP IP/OBS MODERATE 35: CPT

## 2025-06-27 PROCEDURE — 99291 CRITICAL CARE FIRST HOUR: CPT

## 2025-06-27 RX ORDER — INSULIN GLARGINE-YFGN 100 [IU]/ML
10 INJECTION, SOLUTION SUBCUTANEOUS AT BEDTIME
Refills: 0 | Status: DISCONTINUED | OUTPATIENT
Start: 2025-06-27 | End: 2025-06-28

## 2025-06-27 RX ORDER — IPRATROPIUM BROMIDE AND ALBUTEROL SULFATE .5; 2.5 MG/3ML; MG/3ML
3 SOLUTION RESPIRATORY (INHALATION) EVERY 6 HOURS
Refills: 0 | Status: DISCONTINUED | OUTPATIENT
Start: 2025-06-27 | End: 2025-07-07

## 2025-06-27 RX ORDER — HYDROCORTISONE 20 MG
100 TABLET ORAL EVERY 12 HOURS
Refills: 0 | Status: COMPLETED | OUTPATIENT
Start: 2025-06-27 | End: 2025-06-29

## 2025-06-27 RX ORDER — BUMETANIDE 1 MG/1
1 TABLET ORAL
Refills: 0 | Status: DISCONTINUED | OUTPATIENT
Start: 2025-06-27 | End: 2025-06-29

## 2025-06-27 RX ORDER — MIDODRINE HYDROCHLORIDE 5 MG/1
20 TABLET ORAL EVERY 8 HOURS
Refills: 0 | Status: DISCONTINUED | OUTPATIENT
Start: 2025-06-27 | End: 2025-06-28

## 2025-06-27 RX ORDER — HYDROCORTISONE 20 MG
TABLET ORAL
Refills: 0 | Status: COMPLETED | OUTPATIENT
Start: 2025-06-27 | End: 2025-06-30

## 2025-06-27 RX ORDER — HYDROCORTISONE 20 MG
100 TABLET ORAL DAILY
Refills: 0 | Status: COMPLETED | OUTPATIENT
Start: 2025-06-29 | End: 2025-06-30

## 2025-06-27 RX ADMIN — ATORVASTATIN CALCIUM 80 MILLIGRAM(S): 80 TABLET, FILM COATED ORAL at 22:34

## 2025-06-27 RX ADMIN — IPRATROPIUM BROMIDE AND ALBUTEROL SULFATE 3 MILLILITER(S): .5; 2.5 SOLUTION RESPIRATORY (INHALATION) at 00:19

## 2025-06-27 RX ADMIN — CALCITRIOL 0.25 MICROGRAM(S): 0.5 CAPSULE, GELATIN COATED ORAL at 11:34

## 2025-06-27 RX ADMIN — INSULIN LISPRO 3: 100 INJECTION, SOLUTION INTRAVENOUS; SUBCUTANEOUS at 16:22

## 2025-06-27 RX ADMIN — Medication 100 MILLIGRAM(S): at 17:30

## 2025-06-27 RX ADMIN — APIXABAN 2.5 MILLIGRAM(S): 5 TABLET, FILM COATED ORAL at 17:31

## 2025-06-27 RX ADMIN — Medication 1 APPLICATION(S): at 05:55

## 2025-06-27 RX ADMIN — APIXABAN 2.5 MILLIGRAM(S): 5 TABLET, FILM COATED ORAL at 05:54

## 2025-06-27 RX ADMIN — Medication 325 MILLIGRAM(S): at 11:34

## 2025-06-27 RX ADMIN — INSULIN LISPRO 2: 100 INJECTION, SOLUTION INTRAVENOUS; SUBCUTANEOUS at 07:57

## 2025-06-27 RX ADMIN — INSULIN LISPRO 3: 100 INJECTION, SOLUTION INTRAVENOUS; SUBCUTANEOUS at 11:33

## 2025-06-27 RX ADMIN — CLOPIDOGREL BISULFATE 75 MILLIGRAM(S): 75 TABLET, FILM COATED ORAL at 11:34

## 2025-06-27 RX ADMIN — IPRATROPIUM BROMIDE AND ALBUTEROL SULFATE 3 MILLILITER(S): .5; 2.5 SOLUTION RESPIRATORY (INHALATION) at 05:16

## 2025-06-27 RX ADMIN — MIDODRINE HYDROCHLORIDE 20 MILLIGRAM(S): 5 TABLET ORAL at 05:54

## 2025-06-27 RX ADMIN — Medication 100 MILLIGRAM(S): at 05:54

## 2025-06-27 RX ADMIN — TAMSULOSIN HYDROCHLORIDE 0.4 MILLIGRAM(S): 0.4 CAPSULE ORAL at 22:34

## 2025-06-27 RX ADMIN — INSULIN GLARGINE-YFGN 10 UNIT(S): 100 INJECTION, SOLUTION SUBCUTANEOUS at 22:35

## 2025-06-27 RX ADMIN — BUMETANIDE 1 MILLIGRAM(S): 1 TABLET ORAL at 13:03

## 2025-06-27 NOTE — PROGRESS NOTE ADULT - ASSESSMENT
85-year-old male with hypertension, stage IV CKD, CVA, type 2 diabetes, A-fib on anticoagulation, bradycardia status post pacemaker and history of ischemic cardiomyopathy originally admitted for acute hypoxic respiratory failure and severe septic shock secondary to multifocal pneumonia.  Hospital course further complicated with acute on chronic kidney injury.    Plan  Neuro: Patient is awake and alert.  No focal neurological deficits  Cardiovascular: Patient in resolved septic shock.  Currently hemodynamically stable.  Continue midodrine 20 mg 3 times daily.  Maintain MAP greater than 65.  Pulmonary: Patient saturating appropriate on room air.  Maintain O2 saturation above 90%.  GI: P.o. diet as tolerated.  Monitor for bowel movements.  Renal: Patient with acute on chronic kidney injury secondary to ATN from septic shock.  Previously on a Bumex drip but now with urinary output.  Will transition to Bumex 1 mg twice daily.  Continue follow-up nephrology recommendations.  Monitor electrolytes and replete as needed.  Strict I's and O's  ID: Patient completed a course of antibiotics for multifocal pneumonia  Endo: Currently on Lantus 10 units at bedtime.  Insulin sliding scale.  Fingersticks before meals and at bedtime  Heme: Continue with apixaban for full dose anticoagulation for A-fib.  Ethics: Patient's full code  Dispo: Patient is stable for transfer to the general medical floors

## 2025-06-27 NOTE — PROGRESS NOTE ADULT - SUBJECTIVE AND OBJECTIVE BOX
CHIEF COMPLAINT:    Interval Events: Patient is awake and alert. C/o pain in the lower ext.     REVIEW OF SYSTEMS:  CONSTITUTIONAL: No fever, chills, night sweats, or fatigue  EYES: No eye pain, visual disturbances, or discharge  ENMT:  No difficulty hearing, tinnitus, vertigo; No sinus or throat pain  NECK: No pain or stiffness  BREASTS: No pain, masses, or nipple discharge  RESPIRATORY: No cough, wheezing, or hemoptysis; No shortness of breath  CARDIOVASCULAR: No chest pain, palpitations, dizziness, or leg swelling  GASTROINTESTINAL: No abdominal or epigastric pain. No nausea, vomiting, or hematemesis; No diarrhea or constipation. No melena or hematochezia.  GENITOURINARY: No dysuria, frequency, hematuria, or incontinence  NEUROLOGICAL: No headaches, memory loss, loss of strength, numbness, or tremors  SKIN: No itching, burning, rashes, or lesions   LYMPH NODES: No enlarged glands  ENDOCRINE: No heat or cold intolerance; No hair loss  MUSCULOSKELETAL: No joint pain or swelling; No muscle, back, or extremity pain  PSYCHIATRIC: No depression, anxiety, mood swings, or difficulty sleeping  HEME/LYMPH: No easy bruising, or bleeding gums  ALLERY AND IMMUNOLOGIC: No hives or eczema          OBJECTIVE:  ICU Vital Signs Last 24 Hrs  T(C): 35.9 (27 Jun 2025 07:45), Max: 36.1 (26 Jun 2025 19:00)  T(F): 96.6 (27 Jun 2025 07:45), Max: 97 (26 Jun 2025 19:00)  HR: 75 (27 Jun 2025 11:00) (63 - 88)  BP: 143/67 (27 Jun 2025 11:00) (112/55 - 162/56)  BP(mean): 84 (27 Jun 2025 11:00) (71 - 99)  ABP: --  ABP(mean): --  RR: 16 (27 Jun 2025 11:00) (11 - 23)  SpO2: 100% (27 Jun 2025 11:00) (95% - 100%)    O2 Parameters below as of 27 Jun 2025 08:00  Patient On (Oxygen Delivery Method): room air              06-26 @ 07:01  -  06-27 @ 07:00  --------------------------------------------------------  IN: 115 mL / OUT: 2585 mL / NET: -2470 mL    06-27 @ 07:01  - 06-27 @ 11:42  --------------------------------------------------------  IN: 10 mL / OUT: 600 mL / NET: -590 mL      CAPILLARY BLOOD GLUCOSE      POCT Blood Glucose.: 300 mg/dL (27 Jun 2025 11:20)      PHYSICAL EXAM:  GENERAL: NAD, well-groomed, well-developed  EYES: EOMI, PERRLA, conjunctiva and sclera clear  ENMT: No tonsillar erythema, exudates, or enlargement; Moist mucous membranes, Good dentition, No lesions  NECK: Supple, No JVD, Normal thyroid  CHEST/LUNG: Clear to auscultation bilaterally; No rales, rhonchi, wheezing, or rubs  HEART: Regular rate and rhythm; No murmurs, rubs, or gallops  ABDOMEN: Soft, Nontender, Nondistended; Bowel sounds present  VASCULAR:  2+ Peripheral Pulses, No clubbing, cyanosis, or edema  LYMPH: No lymphadenopathy noted  SKIN: No rashes or lesions  NERVOUS SYSTEM:  Alert & Oriented X3, Good concentration    LINES:    HOSPITAL MEDICATIONS:  apixaban 2.5 milliGRAM(s) Oral every 12 hours  clopidogrel Tablet 75 milliGRAM(s) Oral daily      bumetanide 1 milliGRAM(s) Oral two times a day  midodrine 20 milliGRAM(s) Oral every 8 hours    atorvastatin 80 milliGRAM(s) Oral at bedtime  hydrocortisone sodium succinate Injectable 100 milliGRAM(s) IV Push every 12 hours  hydrocortisone sodium succinate Injectable   IV Push   insulin glargine Injectable (LANTUS) 10 Unit(s) SubCutaneous at bedtime  insulin lispro (ADMELOG) corrective regimen sliding scale   SubCutaneous three times a day before meals    albuterol/ipratropium for Nebulization 3 milliLiter(s) Nebulizer every 6 hours PRN    acetaminophen     Tablet .. 650 milliGRAM(s) Oral every 6 hours PRN  melatonin 3 milliGRAM(s) Oral at bedtime PRN        tamsulosin 0.4 milliGRAM(s) Oral at bedtime    calcitriol   Capsule 0.25 MICROGram(s) Oral daily  ferrous    sulfate 325 milliGRAM(s) Oral daily      chlorhexidine 2% Cloths 1 Application(s) Topical <User Schedule>        LABS:                        7.2    11.27 )-----------( 145      ( 27 Jun 2025 03:15 )             20.6     Hgb Trend: 7.2<--, 7.4<--, 8.2<--, 9.2<--, 7.9<--  06-27    141  |  108  |  123[H]  ----------------------------<  229[H]  4.2   |  19[L]  |  7.19[H]    Ca    8.0[L]      27 Jun 2025 03:15  Phos  8.8     06-27  Mg     2.8     06-27    TPro  7.0  /  Alb  1.9[L]  /  TBili  0.4  /  DBili  x   /  AST  18  /  ALT  45  /  AlkPhos  134[H]  06-27    Creatinine Trend: 7.19<--, 7.51<--, 7.64<--, 7.20<--, 6.64<--, 6.00<--    Urinalysis Basic - ( 27 Jun 2025 03:15 )    Color: x / Appearance: x / SG: x / pH: x  Gluc: 229 mg/dL / Ketone: x  / Bili: x / Urobili: x   Blood: x / Protein: x / Nitrite: x   Leuk Esterase: x / RBC: x / WBC x   Sq Epi: x / Non Sq Epi: x / Bacteria: x            MICROBIOLOGY:     RADIOLOGY:  [ ] Reviewed and interpreted by me    EKG:

## 2025-06-27 NOTE — PROGRESS NOTE ADULT - SUBJECTIVE AND OBJECTIVE BOX
JOSEFINA KUMAR  MRN-[id number]  85y (1940)    Follow Up:  PNA    Interval History: The pt was seen and examined earlier in CCU, not in acute distress, no new complaints, awake and alert, able to answer simple questions, denies any discomfort. Pt is afebrile, RA, WBC about the same 11.27.    PAST MEDICAL & SURGICAL HISTORY:  Type 2 diabetes mellitus without complication      CVA (cerebral vascular accident)      Non-ST elevation (NSTEMI) myocardial infarction      CAD (coronary artery disease)      CKD (chronic kidney disease), stage III      HTN (hypertension)      HLD (hyperlipidemia)      Status post placement of implantable loop recorder      H/O carpal tunnel repair          ROS:    [ ] Unobtainable because:  [x ] All other systems negative    Constitutional: no fever, no chills  Head: no trauma  Eyes: no vision changes, no eye pain  ENT:  no sore throat, no rhinorrhea  Cardiovascular:  no chest pain, no palpitation  Respiratory:  no SOB, no cough  GI:  no abd pain, no vomiting, no diarrhea  urinary: no dysuria, no hematuria, no flank pain  musculoskeletal:  no joint pain, no joint swelling  skin:  no rash  neurology:  no headache, no seizure, no change in mental status  psych: no anxiety, no depression         Allergies  No Known Allergies        ANTIMICROBIALS:      OTHER MEDS:  acetaminophen     Tablet .. 650 milliGRAM(s) Oral every 6 hours PRN  albuterol/ipratropium for Nebulization 3 milliLiter(s) Nebulizer every 6 hours PRN  apixaban 2.5 milliGRAM(s) Oral every 12 hours  atorvastatin 80 milliGRAM(s) Oral at bedtime  bumetanide 1 milliGRAM(s) Oral two times a day  calcitriol   Capsule 0.25 MICROGram(s) Oral daily  chlorhexidine 2% Cloths 1 Application(s) Topical <User Schedule>  clopidogrel Tablet 75 milliGRAM(s) Oral daily  ferrous    sulfate 325 milliGRAM(s) Oral daily  hydrocortisone sodium succinate Injectable 100 milliGRAM(s) IV Push every 12 hours  hydrocortisone sodium succinate Injectable   IV Push   insulin glargine Injectable (LANTUS) 10 Unit(s) SubCutaneous at bedtime  insulin lispro (ADMELOG) corrective regimen sliding scale   SubCutaneous three times a day before meals  melatonin 3 milliGRAM(s) Oral at bedtime PRN  midodrine 20 milliGRAM(s) Oral every 8 hours  tamsulosin 0.4 milliGRAM(s) Oral at bedtime      Vital Signs Last 24 Hrs  T(C): 35.9 (27 Jun 2025 07:45), Max: 36.1 (26 Jun 2025 19:00)  T(F): 96.6 (27 Jun 2025 07:45), Max: 97 (26 Jun 2025 19:00)  HR: 72 (27 Jun 2025 12:00) (63 - 88)  BP: 132/58 (27 Jun 2025 12:00) (112/55 - 162/56)  BP(mean): 81 (27 Jun 2025 12:00) (71 - 99)  RR: 16 (27 Jun 2025 12:00) (11 - 23)  SpO2: 97% (27 Jun 2025 12:00) (95% - 100%)    Parameters below as of 27 Jun 2025 12:00  Patient On (Oxygen Delivery Method): room air        Physical Exam:  General:    NAD,  non toxic, on room air   Head: atraumatic, normocephalic  Eye: normal sclera and conjunctiva  ENT:    no oral lesions, neck supple  Cardio:     regular S1, S2,  no murmur  Respiratory:  diminished BS b/l,    no wheezing  abd:     soft,   BS +,   no tenderness  :   no CVAT,  no suprapubic tenderness, +reynolds   Musculoskeletal:   no joint swelling,   no edema  vascular: no central lines, +PIV   Skin:    no rash  Neurologic:     no focal deficit  psych: normal affect    WBC Count: 11.27 K/uL (06-27 @ 03:15)  WBC Count: 11.24 K/uL (06-26 @ 03:40)  WBC Count: 11.76 K/uL (06-25 @ 03:20)  WBC Count: 10.64 K/uL (06-24 @ 04:34)  WBC Count: 7.33 K/uL (06-23 @ 00:35)  WBC Count: 8.03 K/uL (06-22 @ 07:20)                            7.2    11.27 )-----------( 145      ( 27 Jun 2025 03:15 )             20.6       06-27    141  |  108  |  123[H]  ----------------------------<  229[H]  4.2   |  19[L]  |  7.19[H]    Ca    8.0[L]      27 Jun 2025 03:15  Phos  8.8     06-27  Mg     2.8     06-27    TPro  7.0  /  Alb  1.9[L]  /  TBili  0.4  /  DBili  x   /  AST  18  /  ALT  45  /  AlkPhos  134[H]  06-27      Urinalysis Basic - ( 27 Jun 2025 03:15 )    Color: x / Appearance: x / SG: x / pH: x  Gluc: 229 mg/dL / Ketone: x  / Bili: x / Urobili: x   Blood: x / Protein: x / Nitrite: x   Leuk Esterase: x / RBC: x / WBC x   Sq Epi: x / Non Sq Epi: x / Bacteria: x        Creatinine Trend: 7.19<--, 7.51<--, 7.64<--, 7.20<--, 6.64<--, 6.00<--      MICROBIOLOGY:  v  Blood Blood-Peripheral  06-18-25   No growth at 5 days  --  --      RADIOLOGY:

## 2025-06-27 NOTE — PROGRESS NOTE ADULT - ASSESSMENT
85-year-old male with past medical history of hypertension, stage IV CKD, CVA, type 2 diabetes, A-fib, iron deficiency anemia, bradycardia status post pacemaker, recent NSTEMI, ischemic cardiomyopathy who presented to the ED from [facility] due to left-sided chest pain. Found with elevated troponin(downtrending), sepsis likely 2/2 pna.  CT 1. Small focal airspace opacity in the right upper lobe with associated   cavitation and scattered patchy groundglass opacities throughout the   lungs with a predominance in the right upper lobe likely infectious in   etiology.  2. Small bilateral pleural effusions with adjacent compressive   atelectasis.  last admission was for UTI and we treated with ceftriaxone   would give broader coverage  He is MRSA pcr positive and there is higher likelihood that he has a MRSA pneumonia   PAtient on oxygen and coughing during my exam     6/23: in ICU, low BP, on pressors, can continue antibiotics, monitor creatinine and urine output   6/24: remains on pressors and low kidney output, will finish antibiotics on renally dosed zosyn   6/26: remains in CCU, episode of hypothermia yesterday, RA, WBC about the same 11.24, Cr 7.64 but Urine output with some improvement, BCs NGTD, abx stopped today.   6/27: remains in CCU, no fever, RA, WBC is mild 11.27,  Cr slightly better 7.19, urine output is improving, BCs NGTD, pt is being monitored off abx.     Plan:  continue monitoring off abx   follow all culture data   Streptococcus pneumonia na legionella urine ag negative   good control of his HR and his Afib   monitor ins and outs and reynolds management per CCU  monitor pt's renal function     discussed with Dr. Pickens  discussed with CCU team

## 2025-06-27 NOTE — PROGRESS NOTE ADULT - SUBJECTIVE AND OBJECTIVE BOX
Subjective: good UO on BID oral Bumex. Remains on stress steroids, high dose Midodrine.       MEDICATIONS  (STANDING):  apixaban 2.5 milliGRAM(s) Oral every 12 hours  atorvastatin 80 milliGRAM(s) Oral at bedtime  bumetanide 1 milliGRAM(s) Oral two times a day  calcitriol   Capsule 0.25 MICROGram(s) Oral daily  chlorhexidine 2% Cloths 1 Application(s) Topical <User Schedule>  clopidogrel Tablet 75 milliGRAM(s) Oral daily  ferrous    sulfate 325 milliGRAM(s) Oral daily  hydrocortisone sodium succinate Injectable   IV Push   hydrocortisone sodium succinate Injectable 100 milliGRAM(s) IV Push every 12 hours  insulin glargine Injectable (LANTUS) 10 Unit(s) SubCutaneous at bedtime  insulin lispro (ADMELOG) corrective regimen sliding scale   SubCutaneous three times a day before meals  midodrine 20 milliGRAM(s) Oral every 8 hours  tamsulosin 0.4 milliGRAM(s) Oral at bedtime    MEDICATIONS  (PRN):  acetaminophen     Tablet .. 650 milliGRAM(s) Oral every 6 hours PRN Temp greater or equal to 38C (100.4F), Mild Pain (1 - 3)  albuterol/ipratropium for Nebulization 3 milliLiter(s) Nebulizer every 6 hours PRN Shortness of Breath and/or Wheezing  melatonin 3 milliGRAM(s) Oral at bedtime PRN Insomnia          T(C): 35.7 (06-27-25 @ 12:00), Max: 36.1 (06-26-25 @ 19:00)  HR: 71 (06-27-25 @ 14:00) (66 - 88)  BP: 135/64 (06-27-25 @ 14:00) (117/56 - 162/56)  RR: 17 (06-27-25 @ 14:00) (11 - 23)  SpO2: 98% (06-27-25 @ 14:00) (95% - 100%)  Wt(kg): --        I&O's Detail    26 Jun 2025 07:01  -  27 Jun 2025 07:00  --------------------------------------------------------  IN:    Bumetanide: 115 mL  Total IN: 115 mL    OUT:    Indwelling Catheter - Urethral (mL): 2585 mL  Total OUT: 2585 mL    Total NET: -2470 mL      27 Jun 2025 07:01  -  27 Jun 2025 14:35  --------------------------------------------------------  IN:    Bumetanide: 10 mL    Oral Fluid: 240 mL  Total IN: 250 mL    OUT:    Indwelling Catheter - Urethral (mL): 840 mL  Total OUT: 840 mL    Total NET: -590 mL               PHYSICAL EXAM:    CHEST/LUNG: Clear  HEART: S1S2  ABDOMEN: Soft, Nontender, Nondistended; Bowel sounds present  EXTREMITIES:  some edema.         LABS:  CBC Full  -  ( 27 Jun 2025 03:15 )  WBC Count : 11.27 K/uL  RBC Count : 2.50 M/uL  Hemoglobin : 7.2 g/dL  Hematocrit : 20.6 %  Platelet Count - Automated : 145 K/uL  Mean Cell Volume : 82.4 fl  Mean Cell Hemoglobin : 28.8 pg  Mean Cell Hemoglobin Concentration : 35.0 g/dL  Auto Neutrophil # : x  Auto Lymphocyte # : x  Auto Monocyte # : x  Auto Eosinophil # : x  Auto Basophil # : x  Auto Neutrophil % : x  Auto Lymphocyte % : x  Auto Monocyte % : x  Auto Eosinophil % : x  Auto Basophil % : x    06-27    141  |  108  |  123[H]  ----------------------------<  229[H]  4.2   |  19[L]  |  7.19[H]    Ca    8.0[L]      27 Jun 2025 03:15  Phos  8.8     06-27  Mg     2.8     06-27    TPro  7.0  /  Alb  1.9[L]  /  TBili  0.4  /  DBili  x   /  AST  18  /  ALT  45  /  AlkPhos  134[H]  06-27        Impression/Recommendations:    CM, EF 30-35, mod MR, TR, OR  Borderline BP, on Midodrine   Hemodynamic TAYLOR/CKD 3 (Cr 2.16 - 6/19/25)  Good UO  Cr is slightly lower today  Avoid nephrotoxins   Avoid hypotension  No indications for RRT at this time  Hopefully renal fx will cont to improve

## 2025-06-27 NOTE — DIETITIAN NUTRITION RISK NOTIFICATION - TREATMENT: THE FOLLOWING DIET HAS BEEN RECOMMENDED
Diet, Pureed:   Consistent Carbohydrate {Evening Snack}  Mildly Thick Liquids (MILDTHICKLIQS)  No Concentrated Potassium  Low Sodium  No Concentrated Phosphorus (06-25-25 @ 13:54) [Active]

## 2025-06-27 NOTE — CHART NOTE - NSCHARTNOTEFT_GEN_A_CORE
Assessment:  Pt seen in CCU unit, alert to name when seen, c garbled speech, ? mental status.  Per chart review, pt adm to CCU for persistent hypotension, c septic shock, pneumonia, TAYLOR on CKD, HF, c small bilateral pleural effusion, atelectasias, no indication for RRT at present noted by Nephrologist.  Pt adm c sepsis, atypical chest pain, multifocal pneumonia.  PMH include CKD4, CVA, DM Type 2(glipizide outpatient medication review noted), Afib, iron deficiency anemia, NSTEMI, ischemic cardiomyopathy, presented to ED from [facility].    Factors impacting intake: [ ] none [ ] nausea  [ ] vomiting [ ] diarrhea [ ] constipation  [ x]chewing problems [x ] swallowing issues; 06/20, puree c mildly thick liquids recommended per SLP swallow evaluation  [ x] other: acute illness, AMS    Diet Prescription: Diet, Pureed:   Consistent Carbohydrate {Evening Snack}  Mildly Thick Liquids (MILDTHICKLIQS)  No Concentrated Potassium  Low Sodium  No Concentrated Phosphorus (06-25-25 @ 13:54)    Intake: <50% nutrition needs > 5 days    Current Weight: 06/27, 56.2 kg, 06/26, 58.3 kg, 07/20, ? wt. 75.4 kg, 06/19, 64.8 kg(drug dose/adm wt.), wt. fluctuations c wt. loss of 8.6 kg since 06/19(drug dose/adm wt) noted(some of the wt. loss likely from diuretic therapy)  % Weight Change: 13.7% in 1 week (>5% in 1 week)    Nutrition focused physical exam conducted; Subcutaneous fat Exam;  [  Moderate ]  Orbital fat pads region,  [ -appeared to be edentulous ]Buccal fat region,  [ Unable, edema noted ]triceps region, [  -]ribs region.  Muscle Exam; [ Moderate   ]temples region, [ Moderate  ]clavicle region, [ Moderate  ]shoulder region, [-  ]Scapula region, [ Unable, edema noted ]Interosseous region, [ Unable  ]thigh region, [ Unable, c DVT prevention stocking on  ]Calf region      Pertinent Medications: MEDICATIONS  (STANDING):  apixaban 2.5 milliGRAM(s) Oral every 12 hours  atorvastatin 80 milliGRAM(s) Oral at bedtime  bumetanide 1 milliGRAM(s) Oral two times a day  calcitriol   Capsule 0.25 MICROGram(s) Oral daily  chlorhexidine 2% Cloths 1 Application(s) Topical <User Schedule>  clopidogrel Tablet 75 milliGRAM(s) Oral daily  ferrous    sulfate 325 milliGRAM(s) Oral daily  hydrocortisone sodium succinate Injectable   IV Push   hydrocortisone sodium succinate Injectable 100 milliGRAM(s) IV Push every 12 hours  insulin lispro (ADMELOG) corrective regimen sliding scale   SubCutaneous three times a day before meals  midodrine 20 milliGRAM(s) Oral every 8 hours  tamsulosin 0.4 milliGRAM(s) Oral at bedtime    MEDICATIONS  (PRN):  acetaminophen     Tablet .. 650 milliGRAM(s) Oral every 6 hours PRN Temp greater or equal to 38C (100.4F), Mild Pain (1 - 3)  albuterol/ipratropium for Nebulization 3 milliLiter(s) Nebulizer every 6 hours PRN Shortness of Breath and/or Wheezing  melatonin 3 milliGRAM(s) Oral at bedtime PRN Insomnia    Pertinent Labs: 06-27 Na141 mmol/L Glu 229 mg/dL[H] K+ 4.2 mmol/L Cr  7.19 mg/dL[H]  mg/dL[H] 06-27 Phos 8.8 mg/dL[H] 06-27 Alb 1.9 g/dL[L]06-27 ALT 45 U/L AST 18 U/L Alkaline Phosphatase 134 U/L[H]   CAPILLARY BLOOD GLUCOSE      POCT Blood Glucose.: 300 mg/dL (27 Jun 2025 11:20)  POCT Blood Glucose.: 246 mg/dL (27 Jun 2025 07:56)  POCT Blood Glucose.: 232 mg/dL (26 Jun 2025 16:13)  POCT Blood Glucose.: 231 mg/dL (26 Jun 2025 11:42)    Skin:   Pressure injury x 1  1. 06/22, left heel; suspected deep tissue injury     Estimated Needs:   [ x] no change since previous assessment(06/20)  [ ] recalculated:     Previous Nutrition Diagnosis # 1 (06/20)  Inadequate Oral Intake  Etiology	Acute Illness (Sepsis)  Signs/Symptoms: Pt reports poor PO intake x 3 days  Goal/Expected Outcome: Pt to consume 75% or greater of estimated nutrient needs for duration of hospital course  Nutrition diagnosis [x] ongoing, being addressed & replaced by in New Diagnosis below     Previous Nutrition Diagnosis  #2 (06/20)	  Increased Nutrient Needs: Kcal and Protein  Etiology	Increased physiological demand  Signs/Symptoms: Pressure Injury  Goal/Expected Outcome: Pt to consume 75% or greater of estimated nutrient needs for duration of hospital course  Nutrition diagnosis [x] ongoing, being addressed & replaced by in New Diagnosis below       New Nutrition Diagnosis:(06/27)  [ x] Malnutrition; severe malnutrition in context of acute illness   Related to: inadequate protein-energy intake in setting of septic shock, TAYLOR, pneumonia, altered swallow  As evidenced by: <50% nutrition needs> 5 days, physical findings of moderate fat/muscle loss  Goal: pt to consume >75% of meals     Interventions:   Recommend  [x] Continue c current diet regimen, encourage oral intake as tolerated( no liquid protein supplement recommended due to elevated renal indices)  [ ] Change Diet To:  [ ] Nutrition Supplement  [ ] Nutrition Support  [x ] Other(no multivitamin due to renal indices, consider Nephro-[place] daily)    Monitoring and Evaluation:   [x ] PO intake [ x ] Tolerance to diet prescription [ x ] weights [ x ] labs[ x ] follow up per protocol  [ ] other: Assessment:  Pt seen in CCU unit, alert to name when seen, c garbled speech, ? mental status.  Per chart review, pt adm to CCU for persistent hypotension, c septic shock, pneumonia, TAYLOR on CKD, HF, c small bilateral pleural effusion, atelectasias, no indication for RRT at present noted by Nephrologist.  Pt adm c sepsis, atypical chest pain, multifocal pneumonia.  PMH include CKD4, CVA, DM Type 2(glipizide outpatient medication review noted), Afib, iron deficiency anemia, NSTEMI, ischemic cardiomyopathy, presented to ED from [facility].    Factors impacting intake: [ ] none [ ] nausea  [ ] vomiting [ ] diarrhea [ ] constipation  [ x]chewing problems [x ] swallowing issues; 06/20, puree c mildly thick liquids recommended per SLP swallow evaluation  [ x] other: acute illness, AMS    Diet Prescription: Diet, Pureed:   Consistent Carbohydrate {Evening Snack}  Mildly Thick Liquids (MILDTHICKLIQS)  No Concentrated Potassium  Low Sodium  No Concentrated Phosphorus (06-25-25 @ 13:54)    Intake: <50% nutrition needs > 5 days    Current Weight: 06/27, 56.2 kg, 06/26, 58.3 kg, 07/20, ? wt. 75.4 kg, 06/19, 64.8 kg(drug dose/adm wt.), wt. fluctuations c wt. loss of 8.6 kg since 06/19(drug dose/adm wt) noted(some of the wt. loss likely from diuretic therapy)  % Weight Change: 13.7% in 1 week (>5% in 1 week)    Nutrition focused physical exam conducted; Subcutaneous fat Exam;  [  Moderate ]  Orbital fat pads region,  [ -appeared to be edentulous ]Buccal fat region,  [ Unable, edema noted ]triceps region, [  -]ribs region.  Muscle Exam; [ Moderate   ]temples region, [ Moderate  ]clavicle region, [ Moderate  ]shoulder region, [-  ]Scapula region, [ Unable, edema noted ]Interosseous region, [ Unable  ]thigh region, [ Unable, c DVT prevention stocking on  ]Calf region      Pertinent Medications: MEDICATIONS  (STANDING):  apixaban 2.5 milliGRAM(s) Oral every 12 hours  atorvastatin 80 milliGRAM(s) Oral at bedtime  bumetanide 1 milliGRAM(s) Oral two times a day  calcitriol   Capsule 0.25 MICROGram(s) Oral daily  chlorhexidine 2% Cloths 1 Application(s) Topical <User Schedule>  clopidogrel Tablet 75 milliGRAM(s) Oral daily  ferrous    sulfate 325 milliGRAM(s) Oral daily  hydrocortisone sodium succinate Injectable   IV Push   hydrocortisone sodium succinate Injectable 100 milliGRAM(s) IV Push every 12 hours  insulin lispro (ADMELOG) corrective regimen sliding scale   SubCutaneous three times a day before meals  midodrine 20 milliGRAM(s) Oral every 8 hours  tamsulosin 0.4 milliGRAM(s) Oral at bedtime    MEDICATIONS  (PRN):  acetaminophen     Tablet .. 650 milliGRAM(s) Oral every 6 hours PRN Temp greater or equal to 38C (100.4F), Mild Pain (1 - 3)  albuterol/ipratropium for Nebulization 3 milliLiter(s) Nebulizer every 6 hours PRN Shortness of Breath and/or Wheezing  melatonin 3 milliGRAM(s) Oral at bedtime PRN Insomnia    Pertinent Labs: 06-27 Na141 mmol/L Glu 229 mg/dL[H] K+ 4.2 mmol/L Cr  7.19 mg/dL[H]  mg/dL[H] 06-27 Phos 8.8 mg/dL[H] 06-27 Alb 1.9 g/dL[L]06-27 ALT 45 U/L AST 18 U/L Alkaline Phosphatase 134 U/L[H]   CAPILLARY BLOOD GLUCOSE      POCT Blood Glucose.: 300 mg/dL (27 Jun 2025 11:20)  POCT Blood Glucose.: 246 mg/dL (27 Jun 2025 07:56)  POCT Blood Glucose.: 232 mg/dL (26 Jun 2025 16:13)  POCT Blood Glucose.: 231 mg/dL (26 Jun 2025 11:42)    Skin:   Pressure injury x 1  1. 06/22, left heel; suspected deep tissue injury     Estimated Needs:   [ x] no change since previous assessment(06/20)  [ ] recalculated:     Previous Nutrition Diagnosis # 1 (06/20)  Inadequate Oral Intake  Etiology	Acute Illness (Sepsis)  Signs/Symptoms: Pt reports poor PO intake x 3 days  Goal/Expected Outcome: Pt to consume 75% or greater of estimated nutrient needs for duration of hospital course  Nutrition diagnosis [x] ongoing, being addressed & replaced by in New Diagnosis below     Previous Nutrition Diagnosis  #2 (06/20)	  Increased Nutrient Needs: Kcal and Protein  Etiology	Increased physiological demand  Signs/Symptoms: Pressure Injury  Goal/Expected Outcome: Pt to consume 75% or greater of estimated nutrient needs for duration of hospital course  Nutrition diagnosis [x] ongoing, being addressed & replaced by in New Diagnosis below       New Nutrition Diagnosis:(06/27)  [ x] Malnutrition; severe malnutrition in context of acute illness   Related to: inadequate protein-energy intake in setting of septic shock, TAYLOR, pneumonia, altered swallow  As evidenced by: <50% nutrition needs> 5 days, physical findings of moderate fat/muscle loss  Goal: pt to consume >75% of meals     Interventions:   Recommend  [x] Continue c current diet regimen, encourage oral intake as tolerated( no liquid protein supplement recommended due to elevated renal indices)  [ ] Change Diet To:  [ ] Nutrition Supplement  [ ] Nutrition Support  [x ] Other: consider Nephro-[place] daily     Monitoring and Evaluation:   [x ] PO intake [ x ] Tolerance to diet prescription [ x ] weights [ x ] labs[ x ] follow up per protocol  [ ] other: Assessment:  Pt seen in CCU unit, alert to name when seen, c garbled speech, ? mental status.  Per chart review, pt adm to CCU for persistent hypotension, c septic shock, pneumonia, TAYLOR on CKD, HF, c small bilateral pleural effusion, atelectasias, no indication for RRT at present noted by Nephrologist.  Pt adm c sepsis, atypical chest pain, multifocal pneumonia.  PMH include CKD4, CVA, DM Type 2(glipizide outpatient medication review noted), Afib, iron deficiency anemia, NSTEMI, ischemic cardiomyopathy, presented to ED from [facility].    Factors impacting intake: [ ] none [ ] nausea  [ ] vomiting [ ] diarrhea [ ] constipation  [ x]chewing problems [x ] swallowing issues; 06/20, puree c mildly thick liquids recommended per SLP swallow evaluation  [ x] other: acute illness, AMS    Diet Prescription: Diet, Pureed:   Consistent Carbohydrate {Evening Snack}  Mildly Thick Liquids (MILDTHICKLIQS)  No Concentrated Potassium  Low Sodium  No Concentrated Phosphorus (06-25-25 @ 13:54)    Intake: <50% nutrition needs > 5 days    Current Weight: 06/27, 56.2 kg, 06/26, 58.3 kg, 07/20, ? wt. 75.4 kg, 06/19, 64.8 kg(drug dose/adm wt.), wt. fluctuations c wt. loss of 8.6 kg since 06/19(drug dose/adm wt) noted(some of the wt. loss likely from diuretic therapy)  % Weight Change: 13.7% in 1 week (>5% in 1 week)    Nutrition focused physical exam conducted; Subcutaneous fat Exam;  [  Moderate ]  Orbital fat pads region,  [ -appeared to be edentulous ]Buccal fat region,  [ Unable, edema noted ]triceps region, [  -]ribs region.  Muscle Exam; [ Moderate   ]temples region, [ Moderate  ]clavicle region, [ Moderate  ]shoulder region, [-  ]Scapula region, [ Unable, edema noted ]Interosseous region, [ Unable  ]thigh region, [ Unable, c DVT prevention stocking on  ]Calf region      Pertinent Medications: MEDICATIONS  (STANDING):  apixaban 2.5 milliGRAM(s) Oral every 12 hours  atorvastatin 80 milliGRAM(s) Oral at bedtime  bumetanide 1 milliGRAM(s) Oral two times a day  calcitriol   Capsule 0.25 MICROGram(s) Oral daily  chlorhexidine 2% Cloths 1 Application(s) Topical <User Schedule>  clopidogrel Tablet 75 milliGRAM(s) Oral daily  ferrous    sulfate 325 milliGRAM(s) Oral daily  hydrocortisone sodium succinate Injectable   IV Push   hydrocortisone sodium succinate Injectable 100 milliGRAM(s) IV Push every 12 hours  insulin lispro (ADMELOG) corrective regimen sliding scale   SubCutaneous three times a day before meals  midodrine 20 milliGRAM(s) Oral every 8 hours  tamsulosin 0.4 milliGRAM(s) Oral at bedtime    MEDICATIONS  (PRN):  acetaminophen     Tablet .. 650 milliGRAM(s) Oral every 6 hours PRN Temp greater or equal to 38C (100.4F), Mild Pain (1 - 3)  albuterol/ipratropium for Nebulization 3 milliLiter(s) Nebulizer every 6 hours PRN Shortness of Breath and/or Wheezing  melatonin 3 milliGRAM(s) Oral at bedtime PRN Insomnia    Pertinent Labs: 06-27 Na141 mmol/L Glu 229 mg/dL[H] K+ 4.2 mmol/L Cr  7.19 mg/dL[H]  mg/dL[H] 06-27 Phos 8.8 mg/dL[H] 06-27 Alb 1.9 g/dL[L]06-27 ALT 45 U/L AST 18 U/L Alkaline Phosphatase 134 U/L[H]   CAPILLARY BLOOD GLUCOSE      POCT Blood Glucose.: 300 mg/dL (27 Jun 2025 11:20)  POCT Blood Glucose.: 246 mg/dL (27 Jun 2025 07:56)  POCT Blood Glucose.: 232 mg/dL (26 Jun 2025 16:13)  POCT Blood Glucose.: 231 mg/dL (26 Jun 2025 11:42)    Skin:   Pressure injury x 1  1. 06/22, left heel; suspected deep tissue injury     Estimated Needs:   [ x] no change since previous assessment(06/20)  [ ] recalculated:     Previous Nutrition Diagnosis # 1 (06/20)  Inadequate Oral Intake  Etiology	Acute Illness (Sepsis)  Signs/Symptoms: Pt reports poor PO intake x 3 days  Goal/Expected Outcome: Pt to consume 75% or greater of estimated nutrient needs for duration of hospital course  Nutrition diagnosis [x] ongoing, being addressed & replaced by in New Diagnosis below     Previous Nutrition Diagnosis  #2 (06/20)	  Increased Nutrient Needs: Kcal and Protein  Etiology	Increased physiological demand  Signs/Symptoms: Pressure Injury  Goal/Expected Outcome: Pt to consume 75% or greater of estimated nutrient needs for duration of hospital course  Nutrition diagnosis [x] ongoing, being addressed & replaced by in New Diagnosis below       New Nutrition Diagnosis:(06/27)  [ x] Malnutrition; severe malnutrition in context of acute illness   Related to: inadequate protein-energy intake in setting of septic shock, TAYLOR, pneumonia, altered swallow  As evidenced by: <50% nutrition needs> 5 days, physical findings of moderate fat/muscle loss, >5% wt. loss in 1 week  Goal: pt to consume >75% of meals     Interventions:   Recommend  [x] Continue c current diet regimen, encourage oral intake as tolerated( no liquid protein supplement recommended due to elevated renal indices)  [ ] Change Diet To:  [ ] Nutrition Supplement  [ ] Nutrition Support  [x ] Other: consider Nephro-[place] daily     Monitoring and Evaluation:   [x ] PO intake [ x ] Tolerance to diet prescription [ x ] weights [ x ] labs[ x ] follow up per protocol  [ ] other:

## 2025-06-27 NOTE — DIETITIAN NUTRITION RISK NOTIFICATION - LOSS OF SUBCUTANEOUS FAT
3/9/2022              Estrellita Perry        1941 [address] [address]         To Whom It May Concern,      Colin Walters was seen for an appointment for nurse visit for first Hepatitis B administration. She will return for second Hepatitis B vaccine in 2 months, and third dose will be administered in 4 months after second Hepatitis B vaccine. If you have any questions or concerns please call the clinic.  Thank you      Sincerely,        Chris Gastelum MD  [address] [place] [id number]  [phone]        Document electronically generated by:  Meka Rivera
Orbital...

## 2025-06-27 NOTE — PROGRESS NOTE ADULT - NS ATTEND AMEND GEN_ALL_CORE FT
I have reviewed all pertinent clinical information and agree with the NP's note.  Any new labs, recent cultures, new imaging (if applicable) and vitals have been reviewed today.  All necessary adjustments to management have been made.  Agree with the above assessment and plan.    continue monitoring off abx   follow all culture data   Streptococcus pneumonia na legionella urine ag negative   good control of his HR and his Afib   monitor ins and outs and reynolds management per CCU  monitor pt's renal function       Delmer Pickens, DO  Chief, Infectious Disease at [facility]  Reachable via Microsoft Teams or ID office: [phone]  Weekdays: After 5pm, please call [phone] for all inquiries and new consults  Weekends: Message on-call infectious disease physician via teams (puneet Haas)
I have reviewed all pertinent clinical information and agree with the NP's note.  Any new labs, recent cultures, new imaging (if applicable) and vitals have been reviewed today.  All necessary adjustments to management have been made.  Agree with the above assessment and plan.    stop abx   monitor creatinine and appreciate nephrology input   follow blood cultures until final   Streptococcus pneumonia  legionella urine ag negative   good control of his HR and his Afib   monitor ins and outs and reynolds management per CCU    Discussed plan with patients primary team.    Delmer Pickens DO  Chief, Infectious Disease at [facility]  Reachable via Microsoft Teams or ID office: [phone]  Weekdays: After 5pm, please call [phone] for all inquiries and new consults  Weekends: Message on-call infectious disease physician via teams (puneet Haas)

## 2025-06-27 NOTE — DIETITIAN NUTRITION RISK NOTIFICATION - MALNUTRITION EVALUATION AS DEMONSTRATED BY (ADULTS > 20 YEARS OF AGE)
Inadequate energy intake.../Loss of subcutaneous fat.../Loss of muscle... Weight loss.../Inadequate energy intake.../Loss of subcutaneous fat.../Loss of muscle...

## 2025-06-28 LAB
ALBUMIN SERPL ELPH-MCNC: 1.9 G/DL — LOW (ref 3.3–5)
ALP SERPL-CCNC: 113 U/L — SIGNIFICANT CHANGE UP (ref 40–120)
ALT FLD-CCNC: 39 U/L — SIGNIFICANT CHANGE UP (ref 12–78)
ANION GAP SERPL CALC-SCNC: 12 MMOL/L — SIGNIFICANT CHANGE UP (ref 5–17)
AST SERPL-CCNC: 12 U/L — LOW (ref 15–37)
BASOPHILS # BLD AUTO: 0.04 K/UL — SIGNIFICANT CHANGE UP (ref 0–0.2)
BASOPHILS NFR BLD AUTO: 0.4 % — SIGNIFICANT CHANGE UP (ref 0–2)
BILIRUB SERPL-MCNC: 0.5 MG/DL — SIGNIFICANT CHANGE UP (ref 0.2–1.2)
BLD GP AB SCN SERPL QL: SIGNIFICANT CHANGE UP
BUN SERPL-MCNC: 128 MG/DL — HIGH (ref 7–23)
BURR CELLS BLD QL SMEAR: PRESENT — SIGNIFICANT CHANGE UP
CALCIUM SERPL-MCNC: 7.8 MG/DL — LOW (ref 8.5–10.1)
CHLORIDE SERPL-SCNC: 112 MMOL/L — HIGH (ref 96–108)
CO2 SERPL-SCNC: 21 MMOL/L — LOW (ref 22–31)
CREAT SERPL-MCNC: 5.99 MG/DL — HIGH (ref 0.5–1.3)
EGFR: 9 ML/MIN/1.73M2 — LOW
EGFR: 9 ML/MIN/1.73M2 — LOW
EOSINOPHIL # BLD AUTO: 0.03 K/UL — SIGNIFICANT CHANGE UP (ref 0–0.5)
EOSINOPHIL NFR BLD AUTO: 0.3 % — SIGNIFICANT CHANGE UP (ref 0–6)
GLUCOSE BLDC GLUCOMTR-MCNC: 221 MG/DL — HIGH (ref 70–99)
GLUCOSE BLDC GLUCOMTR-MCNC: 272 MG/DL — HIGH (ref 70–99)
GLUCOSE BLDC GLUCOMTR-MCNC: 314 MG/DL — HIGH (ref 70–99)
GLUCOSE BLDC GLUCOMTR-MCNC: 322 MG/DL — HIGH (ref 70–99)
GLUCOSE BLDC GLUCOMTR-MCNC: 344 MG/DL — HIGH (ref 70–99)
GLUCOSE SERPL-MCNC: 333 MG/DL — HIGH (ref 70–99)
HCT VFR BLD CALC: 19 % — CRITICAL LOW (ref 39–50)
HCT VFR BLD CALC: 19.1 % — CRITICAL LOW (ref 39–50)
HGB BLD-MCNC: 6.8 G/DL — CRITICAL LOW (ref 13–17)
HGB BLD-MCNC: 6.8 G/DL — CRITICAL LOW (ref 13–17)
HYPOCHROMIA BLD QL: SLIGHT — SIGNIFICANT CHANGE UP
HYPOSEGMENTATION: PRESENT — SIGNIFICANT CHANGE UP
IMM GRANULOCYTES NFR BLD AUTO: 6.1 % — HIGH (ref 0–0.9)
LYMPHOCYTES # BLD AUTO: 1.55 K/UL — SIGNIFICANT CHANGE UP (ref 1–3.3)
LYMPHOCYTES # BLD AUTO: 15.4 % — SIGNIFICANT CHANGE UP (ref 13–44)
MAGNESIUM SERPL-MCNC: 2.7 MG/DL — HIGH (ref 1.6–2.6)
MANUAL SMEAR VERIFICATION: SIGNIFICANT CHANGE UP
MCHC RBC-ENTMCNC: 29.6 PG — SIGNIFICANT CHANGE UP (ref 27–34)
MCHC RBC-ENTMCNC: 29.7 PG — SIGNIFICANT CHANGE UP (ref 27–34)
MCHC RBC-ENTMCNC: 35.6 G/DL — SIGNIFICANT CHANGE UP (ref 32–36)
MCHC RBC-ENTMCNC: 35.8 G/DL — SIGNIFICANT CHANGE UP (ref 32–36)
MCV RBC AUTO: 82.6 FL — SIGNIFICANT CHANGE UP (ref 80–100)
MCV RBC AUTO: 83.4 FL — SIGNIFICANT CHANGE UP (ref 80–100)
MICROCYTES BLD QL: SLIGHT — SIGNIFICANT CHANGE UP
MONOCYTES # BLD AUTO: 1.04 K/UL — HIGH (ref 0–0.9)
MONOCYTES NFR BLD AUTO: 10.3 % — SIGNIFICANT CHANGE UP (ref 2–14)
NEUTROPHILS # BLD AUTO: 6.81 K/UL — SIGNIFICANT CHANGE UP (ref 1.8–7.4)
NEUTROPHILS NFR BLD AUTO: 67.5 % — SIGNIFICANT CHANGE UP (ref 43–77)
NRBC BLD AUTO-RTO: 0 /100 WBCS — SIGNIFICANT CHANGE UP (ref 0–0)
NRBC BLD AUTO-RTO: 0 /100 WBCS — SIGNIFICANT CHANGE UP (ref 0–0)
PHOSPHATE SERPL-MCNC: 6.8 MG/DL — HIGH (ref 2.5–4.5)
PLAT MORPH BLD: NORMAL — SIGNIFICANT CHANGE UP
PLATELET # BLD AUTO: 139 K/UL — LOW (ref 150–400)
PLATELET # BLD AUTO: 143 K/UL — LOW (ref 150–400)
POTASSIUM SERPL-MCNC: 3 MMOL/L — LOW (ref 3.5–5.3)
POTASSIUM SERPL-SCNC: 3 MMOL/L — LOW (ref 3.5–5.3)
PROT SERPL-MCNC: 6.7 GM/DL — SIGNIFICANT CHANGE UP (ref 6–8.3)
RBC # BLD: 2.29 M/UL — LOW (ref 4.2–5.8)
RBC # BLD: 2.3 M/UL — LOW (ref 4.2–5.8)
RBC # FLD: 14.1 % — SIGNIFICANT CHANGE UP (ref 10.3–14.5)
RBC # FLD: 14.2 % — SIGNIFICANT CHANGE UP (ref 10.3–14.5)
RBC BLD AUTO: ABNORMAL
SODIUM SERPL-SCNC: 145 MMOL/L — SIGNIFICANT CHANGE UP (ref 135–145)
WBC # BLD: 10.08 K/UL — SIGNIFICANT CHANGE UP (ref 3.8–10.5)
WBC # BLD: 10.61 K/UL — HIGH (ref 3.8–10.5)
WBC # FLD AUTO: 10.08 K/UL — SIGNIFICANT CHANGE UP (ref 3.8–10.5)
WBC # FLD AUTO: 10.61 K/UL — HIGH (ref 3.8–10.5)

## 2025-06-28 PROCEDURE — 99291 CRITICAL CARE FIRST HOUR: CPT

## 2025-06-28 RX ORDER — INSULIN LISPRO 100 U/ML
3 INJECTION, SOLUTION INTRAVENOUS; SUBCUTANEOUS
Refills: 0 | Status: DISCONTINUED | OUTPATIENT
Start: 2025-06-28 | End: 2025-07-07

## 2025-06-28 RX ORDER — INSULIN GLARGINE-YFGN 100 [IU]/ML
18 INJECTION, SOLUTION SUBCUTANEOUS AT BEDTIME
Refills: 0 | Status: DISCONTINUED | OUTPATIENT
Start: 2025-06-28 | End: 2025-07-04

## 2025-06-28 RX ADMIN — APIXABAN 2.5 MILLIGRAM(S): 5 TABLET, FILM COATED ORAL at 06:50

## 2025-06-28 RX ADMIN — Medication 1 APPLICATION(S): at 07:42

## 2025-06-28 RX ADMIN — ATORVASTATIN CALCIUM 80 MILLIGRAM(S): 80 TABLET, FILM COATED ORAL at 21:30

## 2025-06-28 RX ADMIN — Medication 325 MILLIGRAM(S): at 12:12

## 2025-06-28 RX ADMIN — INSULIN LISPRO 3 UNIT(S): 100 INJECTION, SOLUTION INTRAVENOUS; SUBCUTANEOUS at 11:50

## 2025-06-28 RX ADMIN — Medication 40 MILLIEQUIVALENT(S): at 12:10

## 2025-06-28 RX ADMIN — Medication 100 MILLIGRAM(S): at 06:50

## 2025-06-28 RX ADMIN — INSULIN LISPRO 4: 100 INJECTION, SOLUTION INTRAVENOUS; SUBCUTANEOUS at 11:49

## 2025-06-28 RX ADMIN — BUMETANIDE 1 MILLIGRAM(S): 1 TABLET ORAL at 13:45

## 2025-06-28 RX ADMIN — INSULIN LISPRO 4: 100 INJECTION, SOLUTION INTRAVENOUS; SUBCUTANEOUS at 07:43

## 2025-06-28 RX ADMIN — INSULIN LISPRO 3: 100 INJECTION, SOLUTION INTRAVENOUS; SUBCUTANEOUS at 16:35

## 2025-06-28 RX ADMIN — Medication 100 MILLIGRAM(S): at 17:43

## 2025-06-28 RX ADMIN — INSULIN LISPRO 3 UNIT(S): 100 INJECTION, SOLUTION INTRAVENOUS; SUBCUTANEOUS at 16:35

## 2025-06-28 RX ADMIN — BUMETANIDE 1 MILLIGRAM(S): 1 TABLET ORAL at 06:50

## 2025-06-28 RX ADMIN — INSULIN GLARGINE-YFGN 18 UNIT(S): 100 INJECTION, SOLUTION SUBCUTANEOUS at 21:31

## 2025-06-28 RX ADMIN — TAMSULOSIN HYDROCHLORIDE 0.4 MILLIGRAM(S): 0.4 CAPSULE ORAL at 21:30

## 2025-06-28 RX ADMIN — CLOPIDOGREL BISULFATE 75 MILLIGRAM(S): 75 TABLET, FILM COATED ORAL at 12:12

## 2025-06-28 RX ADMIN — APIXABAN 2.5 MILLIGRAM(S): 5 TABLET, FILM COATED ORAL at 17:43

## 2025-06-28 RX ADMIN — CALCITRIOL 0.25 MICROGRAM(S): 0.5 CAPSULE, GELATIN COATED ORAL at 12:12

## 2025-06-28 NOTE — PROGRESS NOTE ADULT - SUBJECTIVE AND OBJECTIVE BOX
CHIEF COMPLAINT:    Interval Events: Patient is doing well. no complaints at this time     REVIEW OF SYSTEMS:  CONSTITUTIONAL: No fever, chills, night sweats, or fatigue  EYES: No eye pain, visual disturbances, or discharge  ENMT:  No difficulty hearing, tinnitus, vertigo; No sinus or throat pain  NECK: No pain or stiffness  BREASTS: No pain, masses, or nipple discharge  RESPIRATORY: No cough, wheezing, or hemoptysis; No shortness of breath  CARDIOVASCULAR: No chest pain, palpitations, dizziness, or leg swelling  GASTROINTESTINAL: No abdominal or epigastric pain. No nausea, vomiting, or hematemesis; No diarrhea or constipation. No melena or hematochezia.  GENITOURINARY: No dysuria, frequency, hematuria, or incontinence  NEUROLOGICAL: No headaches, memory loss, loss of strength, numbness, or tremors  SKIN: No itching, burning, rashes, or lesions   LYMPH NODES: No enlarged glands  ENDOCRINE: No heat or cold intolerance; No hair loss  MUSCULOSKELETAL: No joint pain or swelling; No muscle, back, or extremity pain  PSYCHIATRIC: No depression, anxiety, mood swings, or difficulty sleeping  HEME/LYMPH: No easy bruising, or bleeding gums  ALLERY AND IMMUNOLOGIC: No hives or eczema        OBJECTIVE:  ICU Vital Signs Last 24 Hrs  T(C): 35.6 (28 Jun 2025 10:05), Max: 35.8 (27 Jun 2025 15:00)  T(F): 96 (28 Jun 2025 10:05), Max: 96.4 (27 Jun 2025 15:00)  HR: 81 (28 Jun 2025 10:05) (66 - 86)  BP: 136/62 (28 Jun 2025 10:05) (115/55 - 150/77)  BP(mean): 84 (28 Jun 2025 10:05) (74 - 111)  ABP: --  ABP(mean): --  RR: 13 (28 Jun 2025 10:05) (10 - 20)  SpO2: 100% (28 Jun 2025 10:05) (97% - 100%)    O2 Parameters below as of 28 Jun 2025 10:05  Patient On (Oxygen Delivery Method): room air              06-27 @ 07:01  -  06-28 @ 07:00  --------------------------------------------------------  IN: 370 mL / OUT: 3425 mL / NET: -3055 mL    06-28 @ 07:01  - 06-28 @ 10:33  --------------------------------------------------------  IN: 0 mL / OUT: 325 mL / NET: -325 mL      CAPILLARY BLOOD GLUCOSE      POCT Blood Glucose.: 322 mg/dL (28 Jun 2025 07:40)      PHYSICAL EXAM:  GENERAL: NAD, well-groomed, well-developed  HEAD:  Atraumatic, Normocephalic  EYES: EOMI, PERRLA, conjunctiva and sclera clear  ENMT: No tonsillar erythema, exudates, or enlargement; Moist mucous membranes, Good dentition, No lesions  NECK: Supple, No JVD, Normal thyroid  CHEST/LUNG: Clear to auscultation bilaterally; No rales, rhonchi, wheezing, or rubs  HEART: Regular rate and rhythm; No murmurs, rubs, or gallops  ABDOMEN: Soft, Nontender, Nondistended; Bowel sounds present  VASCULAR:  2+ Peripheral Pulses, No clubbing, cyanosis, or edema  LYMPH: No lymphadenopathy noted  SKIN: No rashes or lesions  NERVOUS SYSTEM:  Alert & Oriented X3, Good concentration  LINES:    HOSPITAL MEDICATIONS:  apixaban 2.5 milliGRAM(s) Oral every 12 hours  clopidogrel Tablet 75 milliGRAM(s) Oral daily      bumetanide 1 milliGRAM(s) Oral two times a day    atorvastatin 80 milliGRAM(s) Oral at bedtime  hydrocortisone sodium succinate Injectable 100 milliGRAM(s) IV Push every 12 hours  hydrocortisone sodium succinate Injectable   IV Push   insulin glargine Injectable (LANTUS) 18 Unit(s) SubCutaneous at bedtime  insulin lispro (ADMELOG) corrective regimen sliding scale   SubCutaneous three times a day before meals  insulin lispro Injectable (ADMELOG) 3 Unit(s) SubCutaneous three times a day before meals    albuterol/ipratropium for Nebulization 3 milliLiter(s) Nebulizer every 6 hours PRN    acetaminophen     Tablet .. 650 milliGRAM(s) Oral every 6 hours PRN  melatonin 3 milliGRAM(s) Oral at bedtime PRN        tamsulosin 0.4 milliGRAM(s) Oral at bedtime    calcitriol   Capsule 0.25 MICROGram(s) Oral daily  ferrous    sulfate 325 milliGRAM(s) Oral daily  potassium chloride    Tablet ER 40 milliEquivalent(s) Oral once      chlorhexidine 2% Cloths 1 Application(s) Topical <User Schedule>        LABS:                        6.8    10.61 )-----------( 143      ( 28 Jun 2025 06:05 )             19.1     Hgb Trend: 6.8<--, 6.8<--, 7.2<--, 7.4<--, 8.2<--  06-28    145  |  112[H]  |  128[H]  ----------------------------<  333[H]  3.0[L]   |  21[L]  |  5.99[H]    Ca    7.8[L]      28 Jun 2025 04:00  Phos  6.8     06-28  Mg     2.7     06-28    TPro  6.7  /  Alb  1.9[L]  /  TBili  0.5  /  DBili  x   /  AST  12[L]  /  ALT  39  /  AlkPhos  113  06-28    Creatinine Trend: 5.99<--, 6.72<--, 7.19<--, 7.51<--, 7.64<--, 7.20<--    Urinalysis Basic - ( 28 Jun 2025 04:00 )    Color: x / Appearance: x / SG: x / pH: x  Gluc: 333 mg/dL / Ketone: x  / Bili: x / Urobili: x   Blood: x / Protein: x / Nitrite: x   Leuk Esterase: x / RBC: x / WBC x   Sq Epi: x / Non Sq Epi: x / Bacteria: x            MICROBIOLOGY:     RADIOLOGY:  [ ] Reviewed and interpreted by me    EKG:

## 2025-06-28 NOTE — CHART NOTE - NSCHARTNOTEFT_GEN_A_CORE
MICU DOWN GRADE NOTE      Patient is a 85y old  Male who presents with a chief complaint of Sepsis likely 2/2 pna (27 Jun 2025 14:33)      HPI:  85-year-old male with past medical history of hypertension, stage IV CKD, CVA, type 2 diabetes, A-fib, iron deficiency anemia, bradycardia status post pacemaker, recent NSTEMI, ischemic cardiomyopathy who presented to the ED from [facility] due to left-sided chest pain. He denies any fevers, chills, coughing, heart palpitations, shortness of breath, or other complaints.   On presentation, the patient was hypothermic 92.7 otherwise stable vital signs. EKG with paced rhythm. Labs notable for WBC 2.2, hemoglobin 9.6, troponin 488, potassium 5.4, creatinine 2.6 (around baseline), lactate 1.1, proBNP 7,166, UA negative for UTI, COVID/flu negative. CT chest showed small focal airspace opacity in the right upper lobe with associated cavitation and scattered patchy groundglass opacities throughout the   lungs with a predominance in the right upper lobe likely infectious in etiology. Small bilateral pleural effusions with adjacent compressive atelectasis.   (19 Jun 2025 03:46)      INTERVAL HPI/OVERNIGHT EVENTS: Hgb 6.8 this am likely 2/2 sepsis/TAYLOR. Pending 1 unit PRBC today 6/28. A&O, HD stable. Denies any new or worsening complaints.         REVIEW OF SYSTEMS:  All negative unless listed within interval HPI     MEDICATIONS:  acetaminophen     Tablet .. 650 milliGRAM(s) Oral every 6 hours PRN  albuterol/ipratropium for Nebulization 3 milliLiter(s) Nebulizer every 6 hours PRN  apixaban 2.5 milliGRAM(s) Oral every 12 hours  atorvastatin 80 milliGRAM(s) Oral at bedtime  bumetanide 1 milliGRAM(s) Oral two times a day  calcitriol   Capsule 0.25 MICROGram(s) Oral daily  chlorhexidine 2% Cloths 1 Application(s) Topical <User Schedule>  clopidogrel Tablet 75 milliGRAM(s) Oral daily  ferrous    sulfate 325 milliGRAM(s) Oral daily  hydrocortisone sodium succinate Injectable   IV Push   hydrocortisone sodium succinate Injectable 100 milliGRAM(s) IV Push every 12 hours  insulin glargine Injectable (LANTUS) 18 Unit(s) SubCutaneous at bedtime  insulin lispro (ADMELOG) corrective regimen sliding scale   SubCutaneous three times a day before meals  insulin lispro Injectable (ADMELOG) 3 Unit(s) SubCutaneous three times a day before meals  melatonin 3 milliGRAM(s) Oral at bedtime PRN  potassium chloride    Tablet ER 40 milliEquivalent(s) Oral once  tamsulosin 0.4 milliGRAM(s) Oral at bedtime      T(C): 35.6 (06-28-25 @ 10:05), Max: 35.8 (06-27-25 @ 15:00)  HR: 81 (06-28-25 @ 10:05) (66 - 86)  BP: 136/62 (06-28-25 @ 10:05) (115/55 - 150/77)  RR: 13 (06-28-25 @ 10:05) (10 - 20)  SpO2: 100% (06-28-25 @ 10:05) (97% - 100%)  Wt(kg): --Vital Signs Last 24 Hrs  T(C): 35.6 (28 Jun 2025 10:05), Max: 35.8 (27 Jun 2025 15:00)  T(F): 96 (28 Jun 2025 10:05), Max: 96.4 (27 Jun 2025 15:00)  HR: 81 (28 Jun 2025 10:05) (66 - 86)  BP: 136/62 (28 Jun 2025 10:05) (115/55 - 150/77)  BP(mean): 84 (28 Jun 2025 10:05) (74 - 111)  RR: 13 (28 Jun 2025 10:05) (10 - 20)  SpO2: 100% (28 Jun 2025 10:05) (97% - 100%)    Parameters below as of 28 Jun 2025 10:05  Patient On (Oxygen Delivery Method): room air        Consultant(s) Notes Reviewed:  [x ] YES  [ ] NO  Care Discussed with Consultants/Other Providers [ x] YES  [ ] NO    LABS:                        6.8    10.61 )-----------( 143      ( 28 Jun 2025 06:05 )             19.1     06-28    145  |  112[H]  |  128[H]  ----------------------------<  333[H]  3.0[L]   |  21[L]  |  5.99[H]    Ca    7.8[L]      28 Jun 2025 04:00  Phos  6.8     06-28  Mg     2.7     06-28    TPro  6.7  /  Alb  1.9[L]  /  TBili  0.5  /  DBili  x   /  AST  12[L]  /  ALT  39  /  AlkPhos  113  06-28      Urinalysis Basic - ( 28 Jun 2025 04:00 )    Color: x / Appearance: x / SG: x / pH: x  Gluc: 333 mg/dL / Ketone: x  / Bili: x / Urobili: x   Blood: x / Protein: x / Nitrite: x   Leuk Esterase: x / RBC: x / WBC x   Sq Epi: x / Non Sq Epi: x / Bacteria: x      CAPILLARY BLOOD GLUCOSE      POCT Blood Glucose.: 322 mg/dL (28 Jun 2025 07:40)  POCT Blood Glucose.: 344 mg/dL (28 Jun 2025 05:37)  POCT Blood Glucose.: 348 mg/dL (27 Jun 2025 22:28)  POCT Blood Glucose.: 297 mg/dL (27 Jun 2025 16:19)  POCT Blood Glucose.: 300 mg/dL (27 Jun 2025 11:20)        Urinalysis Basic - ( 28 Jun 2025 04:00 )    Color: x / Appearance: x / SG: x / pH: x  Gluc: 333 mg/dL / Ketone: x  / Bili: x / Urobili: x   Blood: x / Protein: x / Nitrite: x   Leuk Esterase: x / RBC: x / WBC x   Sq Epi: x / Non Sq Epi: x / Bacteria: x        RADIOLOGY & ADDITIONAL TESTS:    Imaging Personally Reviewed:  [x ] YES  [ ] NO      Assessment/ To Follow up:    85-year-old male with hypertension, stage IV CKD, CVA, type 2 diabetes, A-fib on anticoagulation, bradycardia status post pacemaker and history of ischemic cardiomyopathy originally admitted for acute hypoxic respiratory failure and severe septic shock secondary to multifocal pneumonia.  Hospital course further complicated with acute on chronic kidney injury.    Plan  Neuro: Patient is awake and alert.  No focal neurological deficits  Cardiovascular: Patient in resolved septic shock.  Currently hemodynamically stable, Midodrine D/C'd with stable BPs.  Maintain MAP greater than 65.  Pulmonary: Patient saturating appropriate on room air.  Maintain O2 saturation above 90%.  GI: P.O. diet as tolerated.  Monitor for bowel movements.  Renal: Patient with acute on chronic kidney injury secondary to ATN from septic shock.  Previously on a Bumex drip but now with urinary output, Now transitioned to PO Bumex 1 mg twice daily.  Continue follow-up nephrology recommendations.  Monitor electrolytes and replete as needed. monitor I's and O's  ID: Patient completed a course of antibiotics for multifocal pneumonia  Endo: Currently on Lantus 10 units at bedtime.  Insulin sliding scale.  Fingersticks before meals and at bedtime  Heme: Continue with apixaban for full dose anticoagulation for A-fib. 6/28 Hgb 6.8. no signs of active bleeding, no melena or hematochezia. anemia likely 2/2 sepsis and prior TAYLOR, pending 1 PRBC administration.   Ethics: Patient's full code. PT recommending VITALIY placement.   Dispo: Patient is stable nd no onger requires ICU level of care. Stable for transfer to the general medical floors.  Plan discussed with ICU MD Luna and signed out with hospitalist MD -- ICU DOWN GRADE NOTE      Patient is a 85y old  Male who presents with a chief complaint of Sepsis likely 2/2 pna (27 Jun 2025 14:33)      HPI:  85-year-old male with past medical history of hypertension, stage IV CKD, CVA, type 2 diabetes, A-fib, iron deficiency anemia, bradycardia status post pacemaker, recent NSTEMI, ischemic cardiomyopathy who presented to the ED from [facility] due to left-sided chest pain. He denies any fevers, chills, coughing, heart palpitations, shortness of breath, or other complaints.   On presentation, the patient was hypothermic 92.7 otherwise stable vital signs. EKG with paced rhythm. Labs notable for WBC 2.2, hemoglobin 9.6, troponin 488, potassium 5.4, creatinine 2.6 (around baseline), lactate 1.1, proBNP 7,166, UA negative for UTI, COVID/flu negative. CT chest showed small focal airspace opacity in the right upper lobe with associated cavitation and scattered patchy groundglass opacities throughout the   lungs with a predominance in the right upper lobe likely infectious in etiology. Small bilateral pleural effusions with adjacent compressive atelectasis.   (19 Jun 2025 03:46)      INTERVAL HPI/OVERNIGHT EVENTS: Hgb 6.8 this am likely 2/2 sepsis/TAYLOR. Pending 1 unit PRBC today 6/28. A&O, HD stable. Denies any new or worsening complaints.         REVIEW OF SYSTEMS:  All negative unless listed within interval HPI     MEDICATIONS:  acetaminophen     Tablet .. 650 milliGRAM(s) Oral every 6 hours PRN  albuterol/ipratropium for Nebulization 3 milliLiter(s) Nebulizer every 6 hours PRN  apixaban 2.5 milliGRAM(s) Oral every 12 hours  atorvastatin 80 milliGRAM(s) Oral at bedtime  bumetanide 1 milliGRAM(s) Oral two times a day  calcitriol   Capsule 0.25 MICROGram(s) Oral daily  chlorhexidine 2% Cloths 1 Application(s) Topical <User Schedule>  clopidogrel Tablet 75 milliGRAM(s) Oral daily  ferrous    sulfate 325 milliGRAM(s) Oral daily  hydrocortisone sodium succinate Injectable   IV Push   hydrocortisone sodium succinate Injectable 100 milliGRAM(s) IV Push every 12 hours  insulin glargine Injectable (LANTUS) 18 Unit(s) SubCutaneous at bedtime  insulin lispro (ADMELOG) corrective regimen sliding scale   SubCutaneous three times a day before meals  insulin lispro Injectable (ADMELOG) 3 Unit(s) SubCutaneous three times a day before meals  melatonin 3 milliGRAM(s) Oral at bedtime PRN  potassium chloride    Tablet ER 40 milliEquivalent(s) Oral once  tamsulosin 0.4 milliGRAM(s) Oral at bedtime      T(C): 35.6 (06-28-25 @ 10:05), Max: 35.8 (06-27-25 @ 15:00)  HR: 81 (06-28-25 @ 10:05) (66 - 86)  BP: 136/62 (06-28-25 @ 10:05) (115/55 - 150/77)  RR: 13 (06-28-25 @ 10:05) (10 - 20)  SpO2: 100% (06-28-25 @ 10:05) (97% - 100%)  Wt(kg): --Vital Signs Last 24 Hrs  T(C): 35.6 (28 Jun 2025 10:05), Max: 35.8 (27 Jun 2025 15:00)  T(F): 96 (28 Jun 2025 10:05), Max: 96.4 (27 Jun 2025 15:00)  HR: 81 (28 Jun 2025 10:05) (66 - 86)  BP: 136/62 (28 Jun 2025 10:05) (115/55 - 150/77)  BP(mean): 84 (28 Jun 2025 10:05) (74 - 111)  RR: 13 (28 Jun 2025 10:05) (10 - 20)  SpO2: 100% (28 Jun 2025 10:05) (97% - 100%)    Parameters below as of 28 Jun 2025 10:05  Patient On (Oxygen Delivery Method): room air        Consultant(s) Notes Reviewed:  [x ] YES  [ ] NO  Care Discussed with Consultants/Other Providers [ x] YES  [ ] NO    LABS:                        6.8    10.61 )-----------( 143      ( 28 Jun 2025 06:05 )             19.1     06-28    145  |  112[H]  |  128[H]  ----------------------------<  333[H]  3.0[L]   |  21[L]  |  5.99[H]    Ca    7.8[L]      28 Jun 2025 04:00  Phos  6.8     06-28  Mg     2.7     06-28    TPro  6.7  /  Alb  1.9[L]  /  TBili  0.5  /  DBili  x   /  AST  12[L]  /  ALT  39  /  AlkPhos  113  06-28      Urinalysis Basic - ( 28 Jun 2025 04:00 )    Color: x / Appearance: x / SG: x / pH: x  Gluc: 333 mg/dL / Ketone: x  / Bili: x / Urobili: x   Blood: x / Protein: x / Nitrite: x   Leuk Esterase: x / RBC: x / WBC x   Sq Epi: x / Non Sq Epi: x / Bacteria: x      CAPILLARY BLOOD GLUCOSE      POCT Blood Glucose.: 322 mg/dL (28 Jun 2025 07:40)  POCT Blood Glucose.: 344 mg/dL (28 Jun 2025 05:37)  POCT Blood Glucose.: 348 mg/dL (27 Jun 2025 22:28)  POCT Blood Glucose.: 297 mg/dL (27 Jun 2025 16:19)  POCT Blood Glucose.: 300 mg/dL (27 Jun 2025 11:20)        Urinalysis Basic - ( 28 Jun 2025 04:00 )    Color: x / Appearance: x / SG: x / pH: x  Gluc: 333 mg/dL / Ketone: x  / Bili: x / Urobili: x   Blood: x / Protein: x / Nitrite: x   Leuk Esterase: x / RBC: x / WBC x   Sq Epi: x / Non Sq Epi: x / Bacteria: x        RADIOLOGY & ADDITIONAL TESTS:    Imaging Personally Reviewed:  [x ] YES  [ ] NO      Assessment/ To Follow up:    85-year-old male with hypertension, stage IV CKD, CVA, type 2 diabetes, A-fib on anticoagulation, bradycardia status post pacemaker and history of ischemic cardiomyopathy originally admitted for acute hypoxic respiratory failure and severe septic shock secondary to multifocal pneumonia.  Hospital course further complicated with acute on chronic kidney injury.    Plan  Neuro: Patient is awake and alert.  No focal neurological deficits  Cardiovascular: Patient in resolved septic shock.  Currently hemodynamically stable, Midodrine D/C'd with stable BPs.  Maintain MAP greater than 65.  Pulmonary: Patient saturating appropriate on room air.  Maintain O2 saturation above 90%.  GI: P.O. diet as tolerated.  Monitor for bowel movements.  Renal: Patient with acute on chronic kidney injury secondary to ATN from septic shock.  Previously on a Bumex drip but now with urinary output, Now transitioned to PO Bumex 1 mg twice daily.  Continue follow-up nephrology recommendations.  Monitor electrolytes and replete as needed. monitor I's and O's  ID: Patient completed a course of antibiotics for multifocal pneumonia  Endo: Currently on Lantus 10 units at bedtime.  Insulin sliding scale.  Fingersticks before meals and at bedtime  Heme: Continue with apixaban for full dose anticoagulation for A-fib. 6/28 Hgb 6.8. no signs of active bleeding, no melena or hematochezia. anemia likely 2/2 sepsis and prior TAYLOR, pending 1 PRBC administration.   Ethics: Patient's full code. PT recommending VITALIY placement.   Dispo: Patient is stable nd no onger requires ICU level of care. Stable for transfer to the general medical floors.  Plan discussed with ICU MD Luna and signed out with hospitalist MD Tucker

## 2025-06-28 NOTE — PROGRESS NOTE ADULT - SUBJECTIVE AND OBJECTIVE BOX
No distress    Vital Signs Last 24 Hrs  T(C): 35.4 (06-28-25 @ 22:29), Max: 35.8 (06-27-25 @ 23:22)  T(F): 95.8 (06-28-25 @ 22:29), Max: 96.4 (06-27-25 @ 23:22)  HR: 71 (06-28-25 @ 22:29) (71 - 86)  BP: 153/71 (06-28-25 @ 22:29) (115/55 - 153/71)  BP(mean): 105 (06-28-25 @ 20:00) (74 - 105)  RR: 18 (06-28-25 @ 22:29) (10 - 18)  SpO2: 97% (06-28-25 @ 22:29) (97% - 100%)    I&O's Detail    27 Jun 2025 07:01  -  28 Jun 2025 07:00  --------------------------------------------------------  IN:    Bumetanide: 10 mL    Oral Fluid: 360 mL  Total IN: 370 mL    OUT:    Indwelling Catheter - Urethral (mL): 3425 mL  Total OUT: 3425 mL    28 Jun 2025 07:01  -  28 Jun 2025 23:02  --------------------------------------------------------  OUT:    Indwelling Catheter - Urethral (mL): 825 mL    Voided (mL): 475 mL  Total OUT: 1300 mL    Lungs decreased BS at bases   Heart S1S2  Abd soft ND  Extremities sm edema                                6.8    10.61 )-----------( 143      ( 28 Jun 2025 06:05 )             19.1     28 Jun 2025 04:00    145    |  112    |  128    ----------------------------<  333    3.0     |  21     |  5.99     Ca    7.8        28 Jun 2025 04:00  Phos  6.8       28 Jun 2025 04:00  Mg     2.7       28 Jun 2025 04:00    TPro  6.7    /  Alb  1.9    /  TBili  0.5    /  DBili  x      /  AST  12     /  ALT  39     /  AlkPhos  113    28 Jun 2025 04:00    LIVER FUNCTIONS - ( 28 Jun 2025 04:00 )  Alb: 1.9 g/dL / Pro: 6.7 gm/dL / ALK PHOS: 113 U/L / ALT: 39 U/L / AST: 12 U/L / GGT: x           acetaminophen     Tablet .. 650 milliGRAM(s) Oral every 6 hours PRN  albuterol/ipratropium for Nebulization 3 milliLiter(s) Nebulizer every 6 hours PRN  apixaban 2.5 milliGRAM(s) Oral every 12 hours  atorvastatin 80 milliGRAM(s) Oral at bedtime  bumetanide 1 milliGRAM(s) Oral two times a day  calcitriol   Capsule 0.25 MICROGram(s) Oral daily  chlorhexidine 2% Cloths 1 Application(s) Topical <User Schedule>  clopidogrel Tablet 75 milliGRAM(s) Oral daily  ferrous    sulfate 325 milliGRAM(s) Oral daily  hydrocortisone sodium succinate Injectable 100 milliGRAM(s) IV Push every 12 hours  hydrocortisone sodium succinate Injectable   IV Push   insulin glargine Injectable (LANTUS) 18 Unit(s) SubCutaneous at bedtime  insulin lispro (ADMELOG) corrective regimen sliding scale   SubCutaneous three times a day before meals  insulin lispro Injectable (ADMELOG) 3 Unit(s) SubCutaneous three times a day before meals  melatonin 3 milliGRAM(s) Oral at bedtime PRN  tamsulosin 0.4 milliGRAM(s) Oral at bedtime    Assessment/Plan:    CM, EF 30-35, mod MR, TR, VA  Borderline BP on Midodrine   Hemodynamic TAYLOR/CKD 3 (Cr 2.16 - 6/19/25)  Good UO  Cr is improving   Avoid nephrotoxins   Consider holding Bumex and following spontaneous UO  S/p 1u PRBCs  F/u CBC, BMP    [phone]

## 2025-06-28 NOTE — PROGRESS NOTE ADULT - ASSESSMENT
85-year-old male with hypertension, stage IV CKD, CVA, type 2 diabetes, A-fib on anticoagulation, bradycardia status post pacemaker and history of ischemic cardiomyopathy originally admitted for acute hypoxic respiratory failure and severe septic shock secondary to multifocal pneumonia.  Hospital course further complicated with acute on chronic kidney injury.    Plan  Neuro: Patient is awake and alert.  No focal neurological deficits  Cardiovascular: Patient in resolved septic shock.  Currently hemodynamically stable.  Discontinue midodrine 20 mg 3 times daily.  Maintain MAP greater than 65.  Pulmonary: Patient saturating appropriate on room air.  Maintain O2 saturation above 90%.  GI: P.o. diet as tolerated.  Monitor for bowel movements.  Renal: Patient with acute on chronic kidney injury secondary to ATN from septic shock.  Previously on a Bumex drip but now with urinary output.  Will transition to Bumex 1 mg twice daily.  Continue follow-up nephrology recommendations.  Monitor electrolytes and replete as needed.  Strict I's and O's  ID: Patient completed a course of antibiotics for multifocal pneumonia  Endo: Increase lantus to 18U QHS and 3 units pre-meal.  Insulin sliding scale.  Fingersticks before meals and at bedtime  Heme: Continue with apixaban for full dose anticoagulation for A-fib. will transfuse 1u of PRBC   Ethics: Patient's full code  Dispo: Patient is stable for transfer to the general medical floors

## 2025-06-29 LAB
ALBUMIN SERPL ELPH-MCNC: 2.1 G/DL — LOW (ref 3.3–5)
ALP SERPL-CCNC: 117 U/L — SIGNIFICANT CHANGE UP (ref 40–120)
ALT FLD-CCNC: 40 U/L — SIGNIFICANT CHANGE UP (ref 12–78)
ANION GAP SERPL CALC-SCNC: 13 MMOL/L — SIGNIFICANT CHANGE UP (ref 5–17)
ANION GAP SERPL CALC-SCNC: 9 MMOL/L — SIGNIFICANT CHANGE UP (ref 5–17)
AST SERPL-CCNC: 19 U/L — SIGNIFICANT CHANGE UP (ref 15–37)
BASOPHILS # BLD AUTO: 0 K/UL — SIGNIFICANT CHANGE UP (ref 0–0.2)
BASOPHILS NFR BLD AUTO: 0 % — SIGNIFICANT CHANGE UP (ref 0–2)
BILIRUB SERPL-MCNC: 0.7 MG/DL — SIGNIFICANT CHANGE UP (ref 0.2–1.2)
BUN SERPL-MCNC: 117 MG/DL — HIGH (ref 7–23)
BUN SERPL-MCNC: 126 MG/DL — HIGH (ref 7–23)
CALCIUM SERPL-MCNC: 8.2 MG/DL — LOW (ref 8.5–10.1)
CALCIUM SERPL-MCNC: 8.3 MG/DL — LOW (ref 8.5–10.1)
CHLORIDE SERPL-SCNC: 115 MMOL/L — HIGH (ref 96–108)
CHLORIDE SERPL-SCNC: 115 MMOL/L — HIGH (ref 96–108)
CO2 SERPL-SCNC: 21 MMOL/L — LOW (ref 22–31)
CO2 SERPL-SCNC: 26 MMOL/L — SIGNIFICANT CHANGE UP (ref 22–31)
CREAT SERPL-MCNC: 4.3 MG/DL — HIGH (ref 0.5–1.3)
CREAT SERPL-MCNC: 4.65 MG/DL — HIGH (ref 0.5–1.3)
EGFR: 12 ML/MIN/1.73M2 — LOW
EGFR: 12 ML/MIN/1.73M2 — LOW
EGFR: 13 ML/MIN/1.73M2 — LOW
EGFR: 13 ML/MIN/1.73M2 — LOW
EOSINOPHIL # BLD AUTO: 0.23 K/UL — SIGNIFICANT CHANGE UP (ref 0–0.5)
EOSINOPHIL NFR BLD AUTO: 2 % — SIGNIFICANT CHANGE UP (ref 0–6)
GLUCOSE BLDC GLUCOMTR-MCNC: 178 MG/DL — HIGH (ref 70–99)
GLUCOSE BLDC GLUCOMTR-MCNC: 194 MG/DL — HIGH (ref 70–99)
GLUCOSE BLDC GLUCOMTR-MCNC: 223 MG/DL — HIGH (ref 70–99)
GLUCOSE BLDC GLUCOMTR-MCNC: 268 MG/DL — HIGH (ref 70–99)
GLUCOSE BLDC GLUCOMTR-MCNC: 319 MG/DL — HIGH (ref 70–99)
GLUCOSE SERPL-MCNC: 173 MG/DL — HIGH (ref 70–99)
GLUCOSE SERPL-MCNC: 200 MG/DL — HIGH (ref 70–99)
HCT VFR BLD CALC: 25.3 % — LOW (ref 39–50)
HGB BLD-MCNC: 9.1 G/DL — LOW (ref 13–17)
LYMPHOCYTES # BLD AUTO: 1.72 K/UL — SIGNIFICANT CHANGE UP (ref 1–3.3)
LYMPHOCYTES # BLD AUTO: 15 % — SIGNIFICANT CHANGE UP (ref 13–44)
MAGNESIUM SERPL-MCNC: 2.3 MG/DL — SIGNIFICANT CHANGE UP (ref 1.6–2.6)
MAGNESIUM SERPL-MCNC: 2.4 MG/DL — SIGNIFICANT CHANGE UP (ref 1.6–2.6)
MANUAL SMEAR VERIFICATION: SIGNIFICANT CHANGE UP
MCHC RBC-ENTMCNC: 29.4 PG — SIGNIFICANT CHANGE UP (ref 27–34)
MCHC RBC-ENTMCNC: 36 G/DL — SIGNIFICANT CHANGE UP (ref 32–36)
MCV RBC AUTO: 81.6 FL — SIGNIFICANT CHANGE UP (ref 80–100)
MONOCYTES # BLD AUTO: 0.23 K/UL — SIGNIFICANT CHANGE UP (ref 0–0.9)
MONOCYTES NFR BLD AUTO: 2 % — SIGNIFICANT CHANGE UP (ref 2–14)
MYELOCYTES NFR BLD: 3 % — HIGH (ref 0–0)
NEUTROPHILS # BLD AUTO: 8.95 K/UL — HIGH (ref 1.8–7.4)
NEUTROPHILS NFR BLD AUTO: 78 % — HIGH (ref 43–77)
NRBC # BLD: 0 /100 WBCS — SIGNIFICANT CHANGE UP (ref 0–0)
NRBC BLD AUTO-RTO: SIGNIFICANT CHANGE UP /100 WBCS (ref 0–0)
NRBC BLD-RTO: 0 /100 WBCS — SIGNIFICANT CHANGE UP (ref 0–0)
PHOSPHATE SERPL-MCNC: 4.4 MG/DL — SIGNIFICANT CHANGE UP (ref 2.5–4.5)
PHOSPHATE SERPL-MCNC: 5.3 MG/DL — HIGH (ref 2.5–4.5)
PLAT MORPH BLD: NORMAL — SIGNIFICANT CHANGE UP
PLATELET # BLD AUTO: 176 K/UL — SIGNIFICANT CHANGE UP (ref 150–400)
POTASSIUM SERPL-MCNC: 2.8 MMOL/L — CRITICAL LOW (ref 3.5–5.3)
POTASSIUM SERPL-MCNC: 3.5 MMOL/L — SIGNIFICANT CHANGE UP (ref 3.5–5.3)
POTASSIUM SERPL-SCNC: 2.8 MMOL/L — CRITICAL LOW (ref 3.5–5.3)
POTASSIUM SERPL-SCNC: 3.5 MMOL/L — SIGNIFICANT CHANGE UP (ref 3.5–5.3)
PROT SERPL-MCNC: 7.2 GM/DL — SIGNIFICANT CHANGE UP (ref 6–8.3)
RBC # BLD: 3.1 M/UL — LOW (ref 4.2–5.8)
RBC # FLD: 13.8 % — SIGNIFICANT CHANGE UP (ref 10.3–14.5)
RBC BLD AUTO: NORMAL — SIGNIFICANT CHANGE UP
SODIUM SERPL-SCNC: 149 MMOL/L — HIGH (ref 135–145)
SODIUM SERPL-SCNC: 150 MMOL/L — HIGH (ref 135–145)
WBC # BLD: 11.48 K/UL — HIGH (ref 3.8–10.5)
WBC # FLD AUTO: 11.48 K/UL — HIGH (ref 3.8–10.5)

## 2025-06-29 PROCEDURE — 99233 SBSQ HOSP IP/OBS HIGH 50: CPT

## 2025-06-29 RX ORDER — SODIUM CHLORIDE 9 G/1000ML
1000 INJECTION, SOLUTION INTRAVENOUS
Refills: 0 | Status: DISCONTINUED | OUTPATIENT
Start: 2025-06-29 | End: 2025-06-30

## 2025-06-29 RX ORDER — METOPROLOL SUCCINATE 50 MG/1
25 TABLET, EXTENDED RELEASE ORAL
Refills: 0 | Status: DISCONTINUED | OUTPATIENT
Start: 2025-06-29 | End: 2025-06-30

## 2025-06-29 RX ORDER — SODIUM CHLORIDE 9 G/1000ML
1000 INJECTION, SOLUTION INTRAVENOUS
Refills: 0 | Status: DISCONTINUED | OUTPATIENT
Start: 2025-06-29 | End: 2025-06-29

## 2025-06-29 RX ADMIN — INSULIN LISPRO 3 UNIT(S): 100 INJECTION, SOLUTION INTRAVENOUS; SUBCUTANEOUS at 16:07

## 2025-06-29 RX ADMIN — Medication 100 MILLIGRAM(S): at 05:54

## 2025-06-29 RX ADMIN — INSULIN LISPRO 1: 100 INJECTION, SOLUTION INTRAVENOUS; SUBCUTANEOUS at 08:11

## 2025-06-29 RX ADMIN — INSULIN GLARGINE-YFGN 18 UNIT(S): 100 INJECTION, SOLUTION SUBCUTANEOUS at 23:00

## 2025-06-29 RX ADMIN — SODIUM CHLORIDE 75 MILLILITER(S): 9 INJECTION, SOLUTION INTRAVENOUS at 16:07

## 2025-06-29 RX ADMIN — Medication 100 MILLIEQUIVALENT(S): at 09:04

## 2025-06-29 RX ADMIN — INSULIN LISPRO 3 UNIT(S): 100 INJECTION, SOLUTION INTRAVENOUS; SUBCUTANEOUS at 11:53

## 2025-06-29 RX ADMIN — TAMSULOSIN HYDROCHLORIDE 0.4 MILLIGRAM(S): 0.4 CAPSULE ORAL at 23:00

## 2025-06-29 RX ADMIN — Medication 40 MILLIEQUIVALENT(S): at 09:44

## 2025-06-29 RX ADMIN — METOPROLOL SUCCINATE 25 MILLIGRAM(S): 50 TABLET, EXTENDED RELEASE ORAL at 17:21

## 2025-06-29 RX ADMIN — Medication 1 APPLICATION(S): at 06:39

## 2025-06-29 RX ADMIN — Medication 40 MILLIEQUIVALENT(S): at 13:05

## 2025-06-29 RX ADMIN — APIXABAN 2.5 MILLIGRAM(S): 5 TABLET, FILM COATED ORAL at 05:55

## 2025-06-29 RX ADMIN — INSULIN LISPRO 2: 100 INJECTION, SOLUTION INTRAVENOUS; SUBCUTANEOUS at 11:53

## 2025-06-29 RX ADMIN — INSULIN LISPRO 1: 100 INJECTION, SOLUTION INTRAVENOUS; SUBCUTANEOUS at 16:06

## 2025-06-29 RX ADMIN — ATORVASTATIN CALCIUM 80 MILLIGRAM(S): 80 TABLET, FILM COATED ORAL at 23:00

## 2025-06-29 RX ADMIN — SODIUM CHLORIDE 75 MILLILITER(S): 9 INJECTION, SOLUTION INTRAVENOUS at 23:09

## 2025-06-29 RX ADMIN — SODIUM CHLORIDE 75 MILLILITER(S): 9 INJECTION, SOLUTION INTRAVENOUS at 23:49

## 2025-06-29 RX ADMIN — Medication 325 MILLIGRAM(S): at 11:52

## 2025-06-29 RX ADMIN — CALCITRIOL 0.25 MICROGRAM(S): 0.5 CAPSULE, GELATIN COATED ORAL at 11:54

## 2025-06-29 RX ADMIN — APIXABAN 2.5 MILLIGRAM(S): 5 TABLET, FILM COATED ORAL at 17:21

## 2025-06-29 RX ADMIN — Medication 40 MILLIEQUIVALENT(S): at 17:21

## 2025-06-29 RX ADMIN — SODIUM CHLORIDE 75 MILLILITER(S): 9 INJECTION, SOLUTION INTRAVENOUS at 09:56

## 2025-06-29 RX ADMIN — INSULIN LISPRO 3 UNIT(S): 100 INJECTION, SOLUTION INTRAVENOUS; SUBCUTANEOUS at 08:12

## 2025-06-29 RX ADMIN — CLOPIDOGREL BISULFATE 75 MILLIGRAM(S): 75 TABLET, FILM COATED ORAL at 11:55

## 2025-06-29 RX ADMIN — BUMETANIDE 1 MILLIGRAM(S): 1 TABLET ORAL at 06:39

## 2025-06-29 NOTE — CONSULT NOTE ADULT - SUBJECTIVE AND OBJECTIVE BOX
Date of Admission: 6/19/2025    CHIEF COMPLAINT: Chest pain    HISTORY OF PRESENT ILLNESS:  Briefly, this is an 85-year-old male with past medical history of CAD, s/p CABG 2019, PAF, s/p recent NSTEMI, prior PCI, ischemic cardiomyopathy, hypertension, stage IV CKD, CVA, type 2 diabetes,  iron deficiency anemia, bradycardia status post MIcra pacemaker 5/2025, who is admitted with sepsis/pneumonia. Course complicated byt CHF exacerbation required ICU stay this admission, transferred to the floor 6/28/25.  Currently, he reports no chest pain or shortness of breath.          Allergies  No Known Allergies        MEDICATIONS:  apixaban 2.5 milliGRAM(s) Oral every 12 hours  clopidogrel Tablet 75 milliGRAM(s) Oral daily  albuterol/ipratropium for Nebulization 3 milliLiter(s) Nebulizer every 6 hours PRN  acetaminophen     Tablet .. 650 milliGRAM(s) Oral every 6 hours PRN  melatonin 3 milliGRAM(s) Oral at bedtime PRN  atorvastatin 80 milliGRAM(s) Oral at bedtime  hydrocortisone sodium succinate Injectable   IV Push   hydrocortisone sodium succinate Injectable 100 milliGRAM(s) IV Push daily  insulin glargine Injectable (LANTUS) 18 Unit(s) SubCutaneous at bedtime  insulin lispro (ADMELOG) corrective regimen sliding scale   SubCutaneous three times a day before meals  insulin lispro Injectable (ADMELOG) 3 Unit(s) SubCutaneous three times a day before meals  calcitriol   Capsule 0.25 MICROGram(s) Oral daily  chlorhexidine 2% Cloths 1 Application(s) Topical <User Schedule>  dextrose 5% + sodium chloride 0.9%. 1000 milliLiter(s) IV Continuous <Continuous>  ferrous    sulfate 325 milliGRAM(s) Oral daily  tamsulosin 0.4 milliGRAM(s) Oral at bedtime      PAST MEDICAL & SURGICAL HISTORY:  Type 2 diabetes mellitus without complication  CVA (cerebral vascular accident)  Non-ST elevation (NSTEMI) myocardial infarction  CAD (coronary artery disease)  CKD (chronic kidney disease), stage III  HTN (hypertension)  HLD (hyperlipidemia)      Status post placement of implantable loop recorder  H/O carpal tunnel repair  S/p Micra PPM          FAMILY HISTORY:  Family history of heart disease  Family history of uterine cancer (Sibling)        SOCIAL HISTORY:    [ x] Non-smoker       REVIEW OF SYSTEMS:  General: no fatigue/malaise, weight loss/gain.  Skin: no rashes.  Ophthalmologic: no blurred vision, no loss of vision. 	  ENT: no sore throat, rhinorrhea, sinus congestion.  Respiratory: no SOB, cough or wheeze.  Gastrointestinal:  no N/V/D, no melena/hematemesis/hematochezia.  Genitourinary: no dysuria/hesitancy or hematuria.  Musculoskeletal: no myalgias or arthralgias.  Neurological: no changes in vision or hearing, no lightheadedness/dizziness, no syncope/near syncope	  Psychiatric: no unusual stress/anxiety.   Hematology/Lymphatics: no unusual bleeding, bruising and no lymphadenopathy.  Endocrine: no unusual thirst.   All others negative except as stated above and in HPI.    PHYSICAL EXAM:  T(C): 36.4 (06-29-25 @ 12:17), Max: 36.4 (06-29-25 @ 11:05)  HR: 77 (06-29-25 @ 12:17) (71 - 83)  BP: 162/71 (06-29-25 @ 12:17) (143/68 - 163/83)  RR: 18 (06-29-25 @ 12:17) (14 - 18)  SpO2: 98% (06-29-25 @ 12:17) (96% - 100%)  Wt(kg): --  I&O's Summary    28 Jun 2025 07:01  -  29 Jun 2025 07:00  --------------------------------------------------------  IN: 0 mL / OUT: 2200 mL / NET: -2200 mL        Appearance: Elderly Black male,  in bed, comfortable	  HEENT:   Normal oral mucosa, PERRL, EOMI	  Lymphatic: No lymphadenopathy  Cardiovascular: + healed sternotomy, RRR Normal S1 S2, No JVD, No murmurs, No edema  Respiratory: Decreased BS left base, otherwise lungs clear to auscultation	  Psychiatry: A & O x 2  Gastrointestinal:  Soft, Non-tender, + BS	  Skin: No rashes, No ecchymoses, No cyanosis	  Neurologic: Non-focal  Extremities: Normal range of motion, No clubbing, cyanosis or edema  Vascular: Peripheral pulses palpable 2+ bilaterally        LABS:	 	    CBC Full  -  ( 29 Jun 2025 06:05 )  WBC Count : 11.48 K/uL  Hemoglobin : 9.1 g/dL  Hematocrit : 25.3 %  Platelet Count - Automated : 176 K/uL  Mean Cell Volume : 81.6 fl  Mean Cell Hemoglobin : 29.4 pg  Mean Cell Hemoglobin Concentration : 36.0 g/dL  Auto Neutrophil # : x  Auto Lymphocyte # : x  Auto Monocyte # : x  Auto Eosinophil # : x  Auto Basophil # : x  Auto Neutrophil % : x  Auto Lymphocyte % : x  Auto Monocyte % : x  Auto Eosinophil % : x  Auto Basophil % : x    06-29    149[H]  |  115[H]  |  117[H]  ----------------------------<  200[H]  3.5   |  21[L]  |  4.30[H]  06-29    150[H]  |  115[H]  |  126[H]  ----------------------------<  173[H]  2.8[LL]   |  26  |  4.65[H]    Ca    8.3[L]      29 Jun 2025 14:53  Ca    8.2[L]      29 Jun 2025 06:05  Phos  4.4     06-29  Phos  5.3     06-29  Mg     2.4     06-29  Mg     2.3     06-29    TPro  7.2  /  Alb  2.1[L]  /  TBili  0.7  /  DBili  x   /  AST  19  /  ALT  40  /  AlkPhos  117  06-29  TPro  6.7  /  Alb  1.9[L]  /  TBili  0.5  /  DBili  x   /  AST  12[L]  /  ALT  39  /  AlkPhos  113  06-28                 TELEMETRY: Atrial sensed V-paced at 60-70's bpm    	    PREVIOUS DIAGNOSTIC TESTING:    [x ] Echocardiogram:   1. Technically very difficult image quality.   2. Left ventricular systolic function is moderately decreased with an ejection fraction visually estimated at 30 to 35 %. Global left ventricular hypokinesis.   3. Left atrium is normal in size.   4. Normal right ventricular cavity size and mildly reduced right ventricular systolic function.   5. There is mild calcification of the mitral valve annulus.   6. At least moderate mitral regurgitation.   7. Trileaflet aortic valve with reduced systolic excursion. There is moderate calcification of the aortic valve leaflets. Mild aortic stenosis.   8. Moderate to severe tricuspid regurgitation.   9. Moderate pulmonic regurgitation.  10. No pericardial effusion seen.       [x ] Stress Test:   Obtained from OSH Record --  Pharmacologic NM stress test 4/23/2025  Stress ECG: Occasional PVCs, ECG negative for ischemia  Perfusion: There is a medium to large, predominantly fixed, myocardial  perfusion defect in the infero-lateral wall consistent with prior infarct  Stress function: Stress ejection fraction is 40% 	  	  ASSESSMENT/PLAN:

## 2025-06-29 NOTE — PROGRESS NOTE ADULT - SUBJECTIVE AND OBJECTIVE BOX
No distress    Vital Signs Last 24 Hrs  T(C): 36.3 (06-29-25 @ 16:00), Max: 36.4 (06-29-25 @ 11:05)  T(F): 97.3 (06-29-25 @ 16:00), Max: 97.5 (06-29-25 @ 11:05)  HR: 79 (06-29-25 @ 16:00) (73 - 79)  BP: 167/74 (06-29-25 @ 16:00) (153/66 - 167/74)  RR: 18 (06-29-25 @ 16:00) (17 - 18)  SpO2: 99% (06-29-25 @ 16:00) (96% - 100%)    I&O's Detail    28 Jun 2025 07:01  -  29 Jun 2025 07:00  --------------------------------------------------------  OUT:    Indwelling Catheter - Urethral (mL): 825 mL    Voided (mL): 1375 mL  Total OUT: 2200 mL    Lungs decreased BS at bases   Heart S1S2  Abd soft ND  Extremities no edema                                         9.1    11.48 )-----------( 176      ( 29 Jun 2025 06:05 )             25.3     29 Jun 2025 14:53    149    |  115    |  117    ----------------------------<  200    3.5     |  21     |  4.30     Ca    8.3        29 Jun 2025 14:53  Phos  4.4       29 Jun 2025 14:53  Mg     2.4       29 Jun 2025 14:53    TPro  7.2    /  Alb  2.1    /  TBili  0.7    /  DBili  x      /  AST  19     /  ALT  40     /  AlkPhos  117    29 Jun 2025 06:05    LIVER FUNCTIONS - ( 29 Jun 2025 06:05 )  Alb: 2.1 g/dL / Pro: 7.2 gm/dL / ALK PHOS: 117 U/L / ALT: 40 U/L / AST: 19 U/L / GGT: x           acetaminophen     Tablet .. 650 milliGRAM(s) Oral every 6 hours PRN  albuterol/ipratropium for Nebulization 3 milliLiter(s) Nebulizer every 6 hours PRN  apixaban 2.5 milliGRAM(s) Oral every 12 hours  atorvastatin 80 milliGRAM(s) Oral at bedtime  calcitriol   Capsule 0.25 MICROGram(s) Oral daily  chlorhexidine 2% Cloths 1 Application(s) Topical <User Schedule>  clopidogrel Tablet 75 milliGRAM(s) Oral daily  dextrose 5% + sodium chloride 0.9%. 1000 milliLiter(s) IV Continuous <Continuous>  ferrous    sulfate 325 milliGRAM(s) Oral daily  hydrocortisone sodium succinate Injectable   IV Push   hydrocortisone sodium succinate Injectable 100 milliGRAM(s) IV Push daily  insulin glargine Injectable (LANTUS) 18 Unit(s) SubCutaneous at bedtime  insulin lispro (ADMELOG) corrective regimen sliding scale   SubCutaneous three times a day before meals  insulin lispro Injectable (ADMELOG) 3 Unit(s) SubCutaneous three times a day before meals  melatonin 3 milliGRAM(s) Oral at bedtime PRN  metoprolol tartrate 25 milliGRAM(s) Oral two times a day  tamsulosin 0.4 milliGRAM(s) Oral at bedtime    Assessment/Plan:    CM, EF 30-35, mod MR, TR, CT  Hemodynamic TAYLOR/CKD 3 (Cr 2.16 - 6/19/25)  Good UO  Cr is improving   Avoid nephrotoxins   Hypernatremia, D5W IV  Glu control  Hold diuretics   F/u CBC, BMP, UO    [phone]

## 2025-06-29 NOTE — PROGRESS NOTE ADULT - ASSESSMENT
85-year-old male with past medical history of hypertension, stage IV CKD, CVA, type 2 diabetes, A-fib, iron deficiency anemia, bradycardia status post pacemaker, recent NSTEMI, ischemic cardiomyopathy who presented to the ED from [facility] due to left-sided chest pain. Admitted for sepsis 2/2 PNA and CHF exacerbation required ICU stay this admission, transferred to the floor 6/28/25.     #Sepsis 2/2 PNA POA   S/p vasoactive requirement in the ICU. ID following, completed course of Zosyn.   Now on RA and HDS. Completing stress dose steroid taper but ICU recommendations.   Bcx NGTD >48 hr, legionella/strep negative     #ADHF  #ICM   #HFrEF (EF 30-35%)  Pro-BNP on admission 7166. S/p bumex gtt in the ICU  Cont PO bumex 1mg BID. Was on GDMT although discontinued due to worsening renal function and shock.   Will re-consult cardiology to assist in resumption of GDMT now that clinical status has improved     #type 2 NSTEMI   Elevated troponin this admission. Cardiology saw earlier in admission, suspect due to sepsis and poor clearance given renal function.   Cont PTA DOAC and plavix     #Afib   S/p Micra pacemaker.   Cont eliquis     #DM  A1c 7.8. Monitor blood glucose while on steroids.   Cont SSI and 18u glargine, 3u lispro    #TAYLOR on CKD stage IV  Baseline Cr 2-2.4, Cr peaked at 7.2 this admission. Suspect TAYLOR is multifactorial, cardio-renal and ATN component.   Renal consult   Strict I/O    #Hypernatremia   Na 150 this AM, will consult renal as above. Might be due to over diuresis.     #Hypokalemia   Replete as needed     #AIDA  Baseline Hb ~10. S/p 1unit pRBC 6/28, Hb 6.8-->9.1.   No s/s bleeding, unclear if 6.8 was true given >1g/dl improvement with only 1u pRBC  Daily CBC    #Hx of CVA  Cont plavix, eliquis     ppx: PTA eliquis  85-year-old male with past medical history of hypertension, stage IV CKD, CVA, type 2 diabetes, A-fib, iron deficiency anemia, bradycardia status post pacemaker, recent NSTEMI, ischemic cardiomyopathy who presented to the ED from [facility] due to left-sided chest pain. Admitted for sepsis 2/2 PNA and CHF exacerbation required ICU stay this admission, transferred to the floor 6/28/25.     #Sepsis 2/2 PNA POA   S/p vasoactive requirement in the ICU. ID following, completed course of Zosyn.   Now on RA and HDS. Completing stress dose steroid taper but ICU recommendations.   Bcx NGTD >48 hr, legionella/strep negative     #ADHF  #ICM   #HFrEF (EF 30-35%)  Pro-BNP on admission 7166. S/p bumex gtt in the ICU  Cont PO bumex 1mg BID. Was on GDMT although discontinued due to worsening renal function and shock.   Will re-consult cardiology to assist in resumption of GDMT now that clinical status has improved     #type 2 NSTEMI   Elevated troponin this admission. Cardiology saw earlier in admission, suspect due to sepsis and poor clearance given renal function.   Cont PTA DOAC and plavix     #Afib   S/p Micra pacemaker.   Cont eliquis     #DM  A1c 7.8. Monitor blood glucose while on steroids.   Cont SSI and 18u glargine, 3u lispro    #TAYLOR on CKD stage IV  Baseline Cr 2-2.4, Cr peaked at 7.2 this admission. Suspect TAYLOR is multifactorial, cardio-renal and ATN component.   F/u Renal consult   Strict I/O    #Hypernatremia   Na 150 this AM, will consult renal as above. Might be due to over diuresis.   Start D5W per renal     #Hypokalemia   Replete as needed     #AIDA  Baseline Hb ~10. S/p 1unit pRBC 6/28, Hb 6.8-->9.1.   No s/s bleeding, unclear if 6.8 was true given >1g/dl improvement with only 1u pRBC  Daily CBC    #Hx of CVA  Cont plavix, eliquis     ppx: PTA eliquis  85-year-old male with past medical history of hypertension, stage IV CKD, CVA, type 2 diabetes, A-fib, iron deficiency anemia, bradycardia status post pacemaker, recent NSTEMI, ischemic cardiomyopathy who presented to the ED from [facility] due to left-sided chest pain. Admitted for sepsis 2/2 PNA and CHF exacerbation required ICU stay this admission, transferred to the floor 6/28/25.     #Sepsis 2/2 PNA POA   S/p vasoactive requirement in the ICU. ID following, completed course of Zosyn.   Now on RA and HDS. Completing stress dose steroid taper but ICU recommendations.   Bcx NGTD >48 hr, legionella/strep negative     #ADHF  #ICM   #HFrEF (EF 30-35%)  Pro-BNP on admission 7166. S/p bumex gtt in the ICU  Cont PO bumex 1mg BID. Was on GDMT although discontinued due to worsening renal function and shock.   Will re-consult cardiology to assist in resumption of GDMT now that clinical status has improved     #type 2 NSTEMI   Elevated troponin this admission. Cardiology saw earlier in admission, suspect due to sepsis and poor clearance given renal function.   Cont PTA DOAC and plavix     #Afib   #Sick sinus syndrome   S/p Micra pacemaker.   Cont eliquis   resume metop 25 BID per cardiology note    #DM  A1c 7.8. Monitor blood glucose while on steroids.   Cont SSI and 18u glargine, 3u lispro    #TAYLOR on CKD stage IV  Baseline Cr 2-2.4, Cr peaked at 7.2 this admission. Suspect TAYLOR is multifactorial, cardio-renal and ATN component.   F/u Renal consult   Strict I/O    #Hypernatremia   Na 150 this AM, will consult renal as above. Might be due to over diuresis.   Start D5W per renal     #Hypokalemia   Replete as needed     #AIDA  Baseline Hb ~10. S/p 1unit pRBC 6/28, Hb 6.8-->9.1.   No s/s bleeding, unclear if 6.8 was true given >1g/dl improvement with only 1u pRBC  Daily CBC    #Hx of CVA  Cont plavix, eliquis     ppx: PTA eliquis

## 2025-06-29 NOTE — CONSULT NOTE ADULT - CONSULT REASON
HFrEF Sepsis ICD
pneumonia, septic shock, HFrEF, CKD, GOC
?PNA
Hypotension
Cardiomyopathy
TAYLOR CKD 3  CHF

## 2025-06-29 NOTE — PROGRESS NOTE ADULT - SUBJECTIVE AND OBJECTIVE BOX
Patient is a 85y old  Male who presents with a chief complaint of Sepsis likely 2/2 pna (28 Jun 2025 23:02)      INTERVAL HPI/OVERNIGHT EVENTS:  hypothermic overnight BP stable on RA  seen and examined at bedside     MEDICATIONS  (STANDING):  apixaban 2.5 milliGRAM(s) Oral every 12 hours  atorvastatin 80 milliGRAM(s) Oral at bedtime  bumetanide 1 milliGRAM(s) Oral two times a day  calcitriol   Capsule 0.25 MICROGram(s) Oral daily  chlorhexidine 2% Cloths 1 Application(s) Topical <User Schedule>  clopidogrel Tablet 75 milliGRAM(s) Oral daily  ferrous    sulfate 325 milliGRAM(s) Oral daily  hydrocortisone sodium succinate Injectable   IV Push   hydrocortisone sodium succinate Injectable 100 milliGRAM(s) IV Push daily  insulin glargine Injectable (LANTUS) 18 Unit(s) SubCutaneous at bedtime  insulin lispro (ADMELOG) corrective regimen sliding scale   SubCutaneous three times a day before meals  insulin lispro Injectable (ADMELOG) 3 Unit(s) SubCutaneous three times a day before meals  potassium chloride  10 mEq/100 mL IVPB 10 milliEquivalent(s) IV Intermittent every 1 hour  tamsulosin 0.4 milliGRAM(s) Oral at bedtime    MEDICATIONS  (PRN):  acetaminophen     Tablet .. 650 milliGRAM(s) Oral every 6 hours PRN Temp greater or equal to 38C (100.4F), Mild Pain (1 - 3)  albuterol/ipratropium for Nebulization 3 milliLiter(s) Nebulizer every 6 hours PRN Shortness of Breath and/or Wheezing  melatonin 3 milliGRAM(s) Oral at bedtime PRN Insomnia      Allergies    No Known Allergies    Intolerances        REVIEW OF SYSTEMS:  CONSTITUTIONAL: No fever, weight loss, or fatigue  EYES: No eye pain, visual disturbances, or discharge  ENMT:  No difficulty hearing, tinnitus, vertigo; No sinus or throat pain  NECK: No pain or stiffness  BREASTS: No pain, masses, or nipple discharge  RESPIRATORY: No cough, wheezing, chills or hemoptysis; No shortness of breath  CARDIOVASCULAR: No chest pain, palpitations, dizziness, or leg swelling  GASTROINTESTINAL: No abdominal or epigastric pain. No nausea, vomiting, or hematemesis; No diarrhea or constipation. No melena or hematochezia.  GENITOURINARY: No dysuria, frequency, hematuria, or incontinence  NEUROLOGICAL: No headaches, memory loss, loss of strength, numbness, or tremors  SKIN: No itching, burning, rashes, or lesions   LYMPH NODES: No enlarged glands  ENDOCRINE: No heat or cold intolerance; No hair loss  MUSCULOSKELETAL: No joint pain or swelling; No muscle, back, or extremity pain  PSYCHIATRIC: No depression, anxiety, mood swings, or difficulty sleeping  HEME/LYMPH: No easy bruising, or bleeding gums  ALLERGY AND IMMUNOLOGIC: No hives or eczema    Vital Signs Last 24 Hrs  T(C): 36.3 (29 Jun 2025 05:45), Max: 36.3 (29 Jun 2025 00:25)  T(F): 97.4 (29 Jun 2025 05:45), Max: 97.4 (29 Jun 2025 05:45)  HR: 76 (29 Jun 2025 05:45) (71 - 83)  BP: 163/83 (29 Jun 2025 05:45) (133/62 - 163/83)  BP(mean): 105 (28 Jun 2025 20:00) (83 - 105)  RR: 18 (29 Jun 2025 05:45) (13 - 18)  SpO2: 96% (29 Jun 2025 05:45) (96% - 100%)    Parameters below as of 29 Jun 2025 05:45  Patient On (Oxygen Delivery Method): room air        PHYSICAL EXAM:  GENERAL: NAD, well-groomed, well-developed  HEAD:  Atraumatic, Normocephalic  EYES: EOMI, sclera non-icteric  ENMT:  Moist mucous membranes  NERVOUS SYSTEM:  Alert & Oriented X3, Good concentration answers questions appropriately   CHEST/LUNG: Clear to percussion bilaterally; No rales, rhonchi, wheezing, or rubs  HEART: Regular rate and rhythm  ABDOMEN: Soft, Nontender, Nondistended  EXTREMITIES: no LE edema  SKIN: warm dry       LABS:                        9.1    11.48 )-----------( 176      ( 29 Jun 2025 06:05 )             25.3     06-29    150[H]  |  115[H]  |  126[H]  ----------------------------<  173[H]  2.8[LL]   |  26  |  4.65[H]    Ca    8.2[L]      29 Jun 2025 06:05  Phos  5.3     06-29  Mg     2.3     06-29    TPro  7.2  /  Alb  2.1[L]  /  TBili  0.7  /  DBili  x   /  AST  19  /  ALT  40  /  AlkPhos  117  06-29      Urinalysis Basic - ( 29 Jun 2025 06:05 )    Color: x / Appearance: x / SG: x / pH: x  Gluc: 173 mg/dL / Ketone: x  / Bili: x / Urobili: x   Blood: x / Protein: x / Nitrite: x   Leuk Esterase: x / RBC: x / WBC x   Sq Epi: x / Non Sq Epi: x / Bacteria: x      CAPILLARY BLOOD GLUCOSE      POCT Blood Glucose.: 178 mg/dL (29 Jun 2025 07:58)  POCT Blood Glucose.: 221 mg/dL (28 Jun 2025 21:06)  POCT Blood Glucose.: 272 mg/dL (28 Jun 2025 16:33)  POCT Blood Glucose.: 314 mg/dL (28 Jun 2025 11:47)      RADIOLOGY & ADDITIONAL TESTS:    Imaging Personally Reviewed:  [ X] YES  [ ] NO    Consultant(s) Notes Reviewed:  [ X] YES  [ ] NO    Care Discussed with Consultants/Other Providers [X ] YES  [ ] NO Patient is a 85y old  Male who presents with a chief complaint of Sepsis likely 2/2 pna (28 Jun 2025 23:02)      INTERVAL HPI/OVERNIGHT EVENTS:  hypothermic overnight BP stable on RA  seen and examined at bedside   "I feel like a lion"    MEDICATIONS  (STANDING):  apixaban 2.5 milliGRAM(s) Oral every 12 hours  atorvastatin 80 milliGRAM(s) Oral at bedtime  bumetanide 1 milliGRAM(s) Oral two times a day  calcitriol   Capsule 0.25 MICROGram(s) Oral daily  chlorhexidine 2% Cloths 1 Application(s) Topical <User Schedule>  clopidogrel Tablet 75 milliGRAM(s) Oral daily  ferrous    sulfate 325 milliGRAM(s) Oral daily  hydrocortisone sodium succinate Injectable   IV Push   hydrocortisone sodium succinate Injectable 100 milliGRAM(s) IV Push daily  insulin glargine Injectable (LANTUS) 18 Unit(s) SubCutaneous at bedtime  insulin lispro (ADMELOG) corrective regimen sliding scale   SubCutaneous three times a day before meals  insulin lispro Injectable (ADMELOG) 3 Unit(s) SubCutaneous three times a day before meals  potassium chloride  10 mEq/100 mL IVPB 10 milliEquivalent(s) IV Intermittent every 1 hour  tamsulosin 0.4 milliGRAM(s) Oral at bedtime    MEDICATIONS  (PRN):  acetaminophen     Tablet .. 650 milliGRAM(s) Oral every 6 hours PRN Temp greater or equal to 38C (100.4F), Mild Pain (1 - 3)  albuterol/ipratropium for Nebulization 3 milliLiter(s) Nebulizer every 6 hours PRN Shortness of Breath and/or Wheezing  melatonin 3 milliGRAM(s) Oral at bedtime PRN Insomnia      Allergies    No Known Allergies    Intolerances        REVIEW OF SYSTEMS:  no CP SOB abdominal pain LE edema     Vital Signs Last 24 Hrs  T(C): 36.3 (29 Jun 2025 05:45), Max: 36.3 (29 Jun 2025 00:25)  T(F): 97.4 (29 Jun 2025 05:45), Max: 97.4 (29 Jun 2025 05:45)  HR: 76 (29 Jun 2025 05:45) (71 - 83)  BP: 163/83 (29 Jun 2025 05:45) (133/62 - 163/83)  BP(mean): 105 (28 Jun 2025 20:00) (83 - 105)  RR: 18 (29 Jun 2025 05:45) (13 - 18)  SpO2: 96% (29 Jun 2025 05:45) (96% - 100%)    Parameters below as of 29 Jun 2025 05:45  Patient On (Oxygen Delivery Method): room air        PHYSICAL EXAM:  GENERAL: NAD, well-groomed, well-developed  HEAD:  Atraumatic, Normocephalic  EYES: EOMI, sclera non-icteric  ENMT:  Moist mucous membranes  NERVOUS SYSTEM:  Alert & Oriented X3, Good concentration answers questions appropriately   CHEST/LUNG: Clear to percussion bilaterally; No rales, rhonchi, wheezing, or rubs  HEART: Regular rate and rhythm  ABDOMEN: Soft, Nontender, Nondistended  EXTREMITIES: no LE edema  SKIN: warm dry       LABS:                        9.1    11.48 )-----------( 176      ( 29 Jun 2025 06:05 )             25.3     06-29    150[H]  |  115[H]  |  126[H]  ----------------------------<  173[H]  2.8[LL]   |  26  |  4.65[H]    Ca    8.2[L]      29 Jun 2025 06:05  Phos  5.3     06-29  Mg     2.3     06-29    TPro  7.2  /  Alb  2.1[L]  /  TBili  0.7  /  DBili  x   /  AST  19  /  ALT  40  /  AlkPhos  117  06-29      Urinalysis Basic - ( 29 Jun 2025 06:05 )    Color: x / Appearance: x / SG: x / pH: x  Gluc: 173 mg/dL / Ketone: x  / Bili: x / Urobili: x   Blood: x / Protein: x / Nitrite: x   Leuk Esterase: x / RBC: x / WBC x   Sq Epi: x / Non Sq Epi: x / Bacteria: x      CAPILLARY BLOOD GLUCOSE      POCT Blood Glucose.: 178 mg/dL (29 Jun 2025 07:58)  POCT Blood Glucose.: 221 mg/dL (28 Jun 2025 21:06)  POCT Blood Glucose.: 272 mg/dL (28 Jun 2025 16:33)  POCT Blood Glucose.: 314 mg/dL (28 Jun 2025 11:47)      RADIOLOGY & ADDITIONAL TESTS:    Imaging Personally Reviewed:  [ X] YES  [ ] NO    Consultant(s) Notes Reviewed:  [ X] YES  [ ] NO    Care Discussed with Consultants/Other Providers [X ] YES  [ ] NO

## 2025-06-29 NOTE — CONSULT NOTE ADULT - TIME BILLING
reviewing prior records, current chart, patient interview, physical examination, clinical decision making and documentation.
Evaluation of patient, review of medical records and collaboration with care team and family

## 2025-06-29 NOTE — CONSULT NOTE ADULT - REASON FOR ADMISSION
Sepsis likely 2/2 pna

## 2025-06-29 NOTE — CONSULT NOTE ADULT - ASSESSMENT
HFpEF  Ischemic cardiomyopathy  May restart Metoprolol tartrate 25 mg BID  Not candidate for ARB/ACE-I or ARNI due to advanced CKD    Coronary artery disease, s/p CABG  S/p recent hospitalization with NSTEMI, deemed not candidate for intervention.   Continue Clopidogrel 75 mg daily  Continue Atorvastatin 80 mg daily  Aggressive glycemic control    HTN  Restart Metoprolol as above    Atrial fibrillation  Sick sinus syndrome, s/p Micra PPM  Continue Apixaban 2.5 mg BID    Cardiology team to follow

## 2025-06-30 LAB
ANION GAP SERPL CALC-SCNC: 9 MMOL/L — SIGNIFICANT CHANGE UP (ref 5–17)
BUN SERPL-MCNC: 108 MG/DL — HIGH (ref 7–23)
CALCIUM SERPL-MCNC: 9.6 MG/DL — SIGNIFICANT CHANGE UP (ref 8.5–10.1)
CHLORIDE SERPL-SCNC: 119 MMOL/L — HIGH (ref 96–108)
CO2 SERPL-SCNC: 24 MMOL/L — SIGNIFICANT CHANGE UP (ref 22–31)
CREAT SERPL-MCNC: 3.66 MG/DL — HIGH (ref 0.5–1.3)
EGFR: 16 ML/MIN/1.73M2 — LOW
EGFR: 16 ML/MIN/1.73M2 — LOW
GLUCOSE BLDC GLUCOMTR-MCNC: 136 MG/DL — HIGH (ref 70–99)
GLUCOSE BLDC GLUCOMTR-MCNC: 162 MG/DL — HIGH (ref 70–99)
GLUCOSE BLDC GLUCOMTR-MCNC: 169 MG/DL — HIGH (ref 70–99)
GLUCOSE BLDC GLUCOMTR-MCNC: 171 MG/DL — HIGH (ref 70–99)
GLUCOSE SERPL-MCNC: 184 MG/DL — HIGH (ref 70–99)
HCT VFR BLD CALC: 28 % — LOW (ref 39–50)
HGB BLD-MCNC: 9.7 G/DL — LOW (ref 13–17)
MAGNESIUM SERPL-MCNC: 2.3 MG/DL — SIGNIFICANT CHANGE UP (ref 1.6–2.6)
MCHC RBC-ENTMCNC: 29.8 PG — SIGNIFICANT CHANGE UP (ref 27–34)
MCHC RBC-ENTMCNC: 34.6 G/DL — SIGNIFICANT CHANGE UP (ref 32–36)
MCV RBC AUTO: 85.9 FL — SIGNIFICANT CHANGE UP (ref 80–100)
NRBC BLD AUTO-RTO: 0 /100 WBCS — SIGNIFICANT CHANGE UP (ref 0–0)
PHOSPHATE SERPL-MCNC: 3.1 MG/DL — SIGNIFICANT CHANGE UP (ref 2.5–4.5)
PLATELET # BLD AUTO: 224 K/UL — SIGNIFICANT CHANGE UP (ref 150–400)
POTASSIUM SERPL-MCNC: 3.6 MMOL/L — SIGNIFICANT CHANGE UP (ref 3.5–5.3)
POTASSIUM SERPL-SCNC: 3.6 MMOL/L — SIGNIFICANT CHANGE UP (ref 3.5–5.3)
RBC # BLD: 3.26 M/UL — LOW (ref 4.2–5.8)
RBC # FLD: 14.9 % — HIGH (ref 10.3–14.5)
SODIUM SERPL-SCNC: 152 MMOL/L — HIGH (ref 135–145)
WBC # BLD: 14.96 K/UL — HIGH (ref 3.8–10.5)
WBC # FLD AUTO: 14.96 K/UL — HIGH (ref 3.8–10.5)

## 2025-06-30 PROCEDURE — 99232 SBSQ HOSP IP/OBS MODERATE 35: CPT

## 2025-06-30 RX ORDER — METOPROLOL SUCCINATE 50 MG/1
75 TABLET, EXTENDED RELEASE ORAL DAILY
Refills: 0 | Status: DISCONTINUED | OUTPATIENT
Start: 2025-06-30 | End: 2025-07-07

## 2025-06-30 RX ORDER — QUETIAPINE FUMARATE 25 MG/1
25 TABLET ORAL ONCE
Refills: 0 | Status: COMPLETED | OUTPATIENT
Start: 2025-06-30 | End: 2025-06-30

## 2025-06-30 RX ADMIN — INSULIN LISPRO 3 UNIT(S): 100 INJECTION, SOLUTION INTRAVENOUS; SUBCUTANEOUS at 11:53

## 2025-06-30 RX ADMIN — INSULIN LISPRO 3 UNIT(S): 100 INJECTION, SOLUTION INTRAVENOUS; SUBCUTANEOUS at 08:24

## 2025-06-30 RX ADMIN — CLOPIDOGREL BISULFATE 75 MILLIGRAM(S): 75 TABLET, FILM COATED ORAL at 11:21

## 2025-06-30 RX ADMIN — Medication 650 MILLIGRAM(S): at 21:55

## 2025-06-30 RX ADMIN — Medication 1 APPLICATION(S): at 05:58

## 2025-06-30 RX ADMIN — APIXABAN 2.5 MILLIGRAM(S): 5 TABLET, FILM COATED ORAL at 17:00

## 2025-06-30 RX ADMIN — Medication 325 MILLIGRAM(S): at 11:21

## 2025-06-30 RX ADMIN — Medication 40 MILLIEQUIVALENT(S): at 11:26

## 2025-06-30 RX ADMIN — METOPROLOL SUCCINATE 75 MILLIGRAM(S): 50 TABLET, EXTENDED RELEASE ORAL at 14:24

## 2025-06-30 RX ADMIN — Medication 3 MILLIGRAM(S): at 21:55

## 2025-06-30 RX ADMIN — Medication 100 MILLIGRAM(S): at 05:58

## 2025-06-30 RX ADMIN — QUETIAPINE FUMARATE 25 MILLIGRAM(S): 25 TABLET ORAL at 15:40

## 2025-06-30 RX ADMIN — TAMSULOSIN HYDROCHLORIDE 0.4 MILLIGRAM(S): 0.4 CAPSULE ORAL at 21:56

## 2025-06-30 RX ADMIN — METOPROLOL SUCCINATE 25 MILLIGRAM(S): 50 TABLET, EXTENDED RELEASE ORAL at 05:54

## 2025-06-30 RX ADMIN — INSULIN LISPRO 1: 100 INJECTION, SOLUTION INTRAVENOUS; SUBCUTANEOUS at 08:23

## 2025-06-30 RX ADMIN — ATORVASTATIN CALCIUM 80 MILLIGRAM(S): 80 TABLET, FILM COATED ORAL at 21:56

## 2025-06-30 RX ADMIN — INSULIN LISPRO 1: 100 INJECTION, SOLUTION INTRAVENOUS; SUBCUTANEOUS at 11:53

## 2025-06-30 RX ADMIN — INSULIN GLARGINE-YFGN 18 UNIT(S): 100 INJECTION, SOLUTION SUBCUTANEOUS at 22:04

## 2025-06-30 RX ADMIN — APIXABAN 2.5 MILLIGRAM(S): 5 TABLET, FILM COATED ORAL at 05:54

## 2025-06-30 RX ADMIN — CALCITRIOL 0.25 MICROGRAM(S): 0.5 CAPSULE, GELATIN COATED ORAL at 11:21

## 2025-06-30 NOTE — PROGRESS NOTE ADULT - SUBJECTIVE AND OBJECTIVE BOX
Patient is a 85y old  Male who presents with a chief complaint of Sepsis likely 2/2 pna (30 Jun 2025 09:42)      Subjective: No new complaints. Denies HA, lightheadedness, dizziness, CP, palpitation, SOB, abdominal pain, and N/V.        MEDICATIONS  (STANDING):  apixaban 2.5 milliGRAM(s) Oral every 12 hours  atorvastatin 80 milliGRAM(s) Oral at bedtime  calcitriol   Capsule 0.25 MICROGram(s) Oral daily  chlorhexidine 2% Cloths 1 Application(s) Topical <User Schedule>  clopidogrel Tablet 75 milliGRAM(s) Oral daily  dextrose 5%. 1000 milliLiter(s) (75 mL/Hr) IV Continuous <Continuous>  ferrous    sulfate 325 milliGRAM(s) Oral daily  insulin glargine Injectable (LANTUS) 18 Unit(s) SubCutaneous at bedtime  insulin lispro (ADMELOG) corrective regimen sliding scale   SubCutaneous three times a day before meals  insulin lispro Injectable (ADMELOG) 3 Unit(s) SubCutaneous three times a day before meals  metOLazone 5 milliGRAM(s) Oral daily  metoprolol tartrate 25 milliGRAM(s) Oral two times a day  tamsulosin 0.4 milliGRAM(s) Oral at bedtime    MEDICATIONS  (PRN):  acetaminophen     Tablet .. 650 milliGRAM(s) Oral every 6 hours PRN Temp greater or equal to 38C (100.4F), Mild Pain (1 - 3)  albuterol/ipratropium for Nebulization 3 milliLiter(s) Nebulizer every 6 hours PRN Shortness of Breath and/or Wheezing  melatonin 3 milliGRAM(s) Oral at bedtime PRN Insomnia      Vital Signs Last 24 Hrs  T(C): 36.3 (30 Jun 2025 08:00), Max: 36.4 (29 Jun 2025 12:17)  T(F): 97.4 (30 Jun 2025 08:00), Max: 97.5 (29 Jun 2025 12:17)  HR: 70 (30 Jun 2025 08:00) (70 - 79)  BP: 163/77 (30 Jun 2025 08:00) (152/76 - 167/74)  BP(mean): --  RR: 18 (30 Jun 2025 08:00) (18 - 18)  SpO2: 100% (30 Jun 2025 08:00) (98% - 100%)    Parameters below as of 30 Jun 2025 04:29  Patient On (Oxygen Delivery Method): room air      I&O's Detail      Physical Exam:  GENERAL: Lying in bed, in NAD  HEART: S1S2 RRR; No murmurs, rubs, or gallops appreciated  PULMONARY: CTABL, normal respiratory effort.  No rales, wheezing, or rhonchi appreciated bilaterally. No use of accessory muscles  ABDOMEN: + Bowel sounds present, soft, NDNT  EXTREMITIES:  Warm, well -perfused, no pedal edema, distal pulses present    TELEMETRY: non-compliant w/ tele                            9.7    14.96 )-----------( 224      ( 30 Jun 2025 08:00 )             28.0       06-30    152[H]  |  119[H]  |  108[H]  ----------------------------<  184[H]  3.6   |  24  |  3.66[H]    Ca    9.6      30 Jun 2025 08:00  Phos  3.1     06-30  Mg     2.3     06-30    TPro  7.2  /  Alb  2.1[L]  /  TBili  0.7  /  DBili  x   /  AST  19  /  ALT  40  /  AlkPhos  117  06-29

## 2025-06-30 NOTE — PROGRESS NOTE ADULT - ASSESSMENT
85-year-old male with past medical history of CAD, s/p CABG 2019, PAF, s/p recent NSTEMI, prior PCI, ischemic cardiomyopathy, hypertension, stage IV CKD, CVA, type 2 diabetes,  iron deficiency anemia, bradycardia status post MIcra pacemaker 5/2025, who is admitted with sepsis/pneumonia. Course complicated byt CHF exacerbation required ICU stay this admission, transferred to the floor 6/28/25.  Currently, he reports no chest pain or shortness of breath.      Patient seen at [facility] on previous admission 5/30/25 by cardiology team for chest pain and elevated troponin, but cardiac cath deferred at the time due to worsening renal function, discharged on Plavix and Eliquis.    TTE: 6/23/25 LVEF 30-35%, mod MR, mild AS, mod-severe TR, mod ME    1) Sepsis/ PNA  2) Acute on Chronic HFrEF/ Ischemic CM/ CAD s/p CABG w/ recent hospitalization with NSTEMI, deemed not candidate for intervention.   3) SSS s/p Micra (Dr. Galeano, 5/5/25)  4) CKD  5) Paroxysmal A.Fib    Recommendations:  - patient clinically euvolemic, feels better, no active chest pain  - Monitor electrolytes and replete K to 4 and Mg to 2  - daily weight, monitor strict Intake and Output  - Continue Eliquis for thromboembolic ppx given that patient has a [id number] score of at least 6  - c/w Plavix 75mg  - GDMT limited in view of advanced CKD, change BB and increase dose to Toprol XL 75mg w/ parameters  - Continue care per primary team   85-year-old male with past medical history of CAD, s/p CABG 2019, PAF, s/p recent NSTEMI, prior PCI, ischemic cardiomyopathy, hypertension, stage IV CKD, CVA, type 2 diabetes,  iron deficiency anemia, bradycardia status post MIcra pacemaker 5/2025, who is admitted with sepsis/pneumonia. Course complicated byt CHF exacerbation required ICU stay this admission, transferred to the floor 6/28/25.  Currently, he reports no chest pain or shortness of breath.      Patient seen at [facility] on previous admission 5/30/25 by cardiology team for chest pain and elevated troponin, but cardiac cath deferred at the time due to worsening renal function, discharged on Plavix and Eliquis.    TTE: 6/23/25 LVEF 30-35%, mod MR, mild AS, mod-severe TR, mod UT    1) Sepsis/ PNA  2) Acute on Chronic HFrEF/ Ischemic CM/ CAD s/p CABG w/ recent hospitalization with NSTEMI, deemed not candidate for intervention.   3) SSS s/p Micra (Dr. Galeano, 5/5/25)  4) CKD  5) Paroxysmal A.Fib    Recommendations:  - patient clinically euvolemic, feels better, no active chest pain  - Monitor electrolytes and replete K to 4 and Mg to 2  - daily weight, monitor strict Intake and Output  - Continue Eliquis for thromboembolic ppx given that patient has a [id number] score of at least 6  - c/w Plavix 75mg  - GDMT limited in view of advanced CKD, change BB and increase dose to Toprol XL 75mg w/ parameters  - Continue care per primary team  - will sign off, re-consult as needed

## 2025-06-30 NOTE — PROGRESS NOTE ADULT - ASSESSMENT
85-year-old male with past medical history of hypertension, stage IV CKD, CVA, type 2 diabetes, A-fib, iron deficiency anemia, bradycardia status post pacemaker, recent NSTEMI, ischemic cardiomyopathy who presented to the ED from [facility] due to left-sided chest pain. Admitted for sepsis 2/2 PNA and CHF exacerbation required ICU stay this admission, transferred to the floor 6/28/25.     #Sepsis 2/2 PNA POA   S/p vasoactive requirement in the ICU. ID following, completed course of Zosyn.   Now on RA and HDS. Completing stress dose steroid taper but ICU recommendations.   Bcx NGTD >48 hr, legionella/strep negative     #ADHF  #ICM   #HFrEF (EF 30-35%)  Pro-BNP on admission 7166. S/p bumex gtt in the ICU  Bumex held due to overiduresis per renal recommendations. Was on GDMT although discontinued due to worsening renal function and shock.   Will re-consult cardiology to assist in resumption of GDMT now that clinical status has improved-->resume metop 25 BID    #type 2 NSTEMI   Elevated troponin this admission. Cardiology saw earlier in admission, suspect due to sepsis and poor clearance given renal function.   Cont PTA DOAC and plavix     #Afib   #Sick sinus syndrome   S/p Micra pacemaker.   Cont eliquis   resume metop 25 BID per cardiology note    #DM  A1c 7.8. Monitor blood glucose while on steroids.   Cont SSI and 18u glargine, 3u lispro    #TAYLOR on CKD stage IV  Baseline Cr 2-2.4, Cr peaked at 7.2 this admission. Suspect TAYLOR is multifactorial, cardio-renal and ATN component.   F/u Renal consult -->hold diuresis, cont IVF with D5W Cr improving   Strict I/O    #Hypernatremia   Suspect due to overdiuresis   Start D5W per renal     #Hypokalemia   Replete as needed     #AIDA  Baseline Hb ~10. S/p 1unit pRBC 6/28, Hb 6.8-->9.1.   No s/s bleeding, unclear if 6.8 was true given >1g/dl improvement with only 1u pRBC  Daily CBC    #Hx of CVA  Cont plavix, eliquis     ppx: PTA eliquis  85-year-old male with past medical history of hypertension, stage IV CKD, CVA, type 2 diabetes, A-fib, iron deficiency anemia, bradycardia status post pacemaker, recent NSTEMI, ischemic cardiomyopathy who [facility]. Admitted for sepsis 2/2 PNA and CHF exacerbation required ICU stay this admission, transferred to the floor 6/28/25.     #Sepsis 2/2 PNA POA   S/p vasoactive requirement in the ICU. ID following, completed course of Zosyn.   Now on RA and HDS. Completing stress dose steroid taper but ICU recommendations.   Bcx NGTD >48 hr, legionella/strep negative     #ADHF  #ICM   #HFrEF (EF 30-35%)  Pro-BNP on admission 7166. S/p bumex gtt in the ICU  Bumex held due to overiduresis per renal recommendations. Was on GDMT although discontinued due to worsening renal function and shock.   Will re-consult cardiology to assist in resumption of GDMT now that clinical status has improved-->resume metop 25 BID    #type 2 NSTEMI   Elevated troponin this admission. Cardiology saw earlier in admission, suspect due to sepsis and poor clearance given renal function.   Cont PTA DOAC and plavix     #Afib   #Sick sinus syndrome   S/p Micra pacemaker.   Cont eliquis   resume metop 25 BID per cardiology note    #DM  A1c 7.8. Monitor blood glucose while on steroids.   Cont SSI and 18u glargine, 3u lispro    #TAYLOR on CKD stage IV  Baseline Cr 2-2.4, Cr peaked at 7.2 this admission. Suspect TAYLOR is multifactorial, cardio-renal and ATN component.   F/u Renal consult -->hold diuresis, cont IVF with D5W Cr improving   Strict I/O    #Hypernatremia   Suspect due to overdiuresis   Start D5W per renal     #Hypokalemia   Replete as needed     #AIDA  Baseline Hb ~10. S/p 1unit pRBC 6/28, Hb 6.8-->9.1.   No s/s bleeding, unclear if 6.8 was true given >1g/dl improvement with only 1u pRBC  Daily CBC    #Hx of CVA  Cont plavix, eliquis     #dysphagia   SLP evaluated 6/20 recommended puree diet. Patient wants to advance diet. Will re-consult to see if diet can be advanced.   F/u recs     ppx: PTA eliquis

## 2025-06-30 NOTE — PROGRESS NOTE ADULT - SUBJECTIVE AND OBJECTIVE BOX
Patient is a 85y old  Male who presents with a chief complaint of Sepsis likely 2/2 pna (29 Jun 2025 23:10)      INTERVAL HPI/OVERNIGHT EVENTS:  afebrile overnight NAEO VSS  seen and examined at bedside     MEDICATIONS  (STANDING):  apixaban 2.5 milliGRAM(s) Oral every 12 hours  atorvastatin 80 milliGRAM(s) Oral at bedtime  calcitriol   Capsule 0.25 MICROGram(s) Oral daily  chlorhexidine 2% Cloths 1 Application(s) Topical <User Schedule>  clopidogrel Tablet 75 milliGRAM(s) Oral daily  dextrose 5%. 1000 milliLiter(s) (75 mL/Hr) IV Continuous <Continuous>  ferrous    sulfate 325 milliGRAM(s) Oral daily  insulin glargine Injectable (LANTUS) 18 Unit(s) SubCutaneous at bedtime  insulin lispro (ADMELOG) corrective regimen sliding scale   SubCutaneous three times a day before meals  insulin lispro Injectable (ADMELOG) 3 Unit(s) SubCutaneous three times a day before meals  metoprolol tartrate 25 milliGRAM(s) Oral two times a day  potassium chloride    Tablet ER 10 milliEquivalent(s) Oral once  tamsulosin 0.4 milliGRAM(s) Oral at bedtime    MEDICATIONS  (PRN):  acetaminophen     Tablet .. 650 milliGRAM(s) Oral every 6 hours PRN Temp greater or equal to 38C (100.4F), Mild Pain (1 - 3)  albuterol/ipratropium for Nebulization 3 milliLiter(s) Nebulizer every 6 hours PRN Shortness of Breath and/or Wheezing  melatonin 3 milliGRAM(s) Oral at bedtime PRN Insomnia      Allergies    No Known Allergies    Intolerances        REVIEW OF SYSTEMS:  CONSTITUTIONAL: No fever, weight loss, or fatigue  EYES: No eye pain, visual disturbances, or discharge  ENMT:  No difficulty hearing, tinnitus, vertigo; No sinus or throat pain  NECK: No pain or stiffness  BREASTS: No pain, masses, or nipple discharge  RESPIRATORY: No cough, wheezing, chills or hemoptysis; No shortness of breath  CARDIOVASCULAR: No chest pain, palpitations, dizziness, or leg swelling  GASTROINTESTINAL: No abdominal or epigastric pain. No nausea, vomiting, or hematemesis; No diarrhea or constipation. No melena or hematochezia.  GENITOURINARY: No dysuria, frequency, hematuria, or incontinence  NEUROLOGICAL: No headaches, memory loss, loss of strength, numbness, or tremors  SKIN: No itching, burning, rashes, or lesions   LYMPH NODES: No enlarged glands  ENDOCRINE: No heat or cold intolerance; No hair loss  MUSCULOSKELETAL: No joint pain or swelling; No muscle, back, or extremity pain  PSYCHIATRIC: No depression, anxiety, mood swings, or difficulty sleeping  HEME/LYMPH: No easy bruising, or bleeding gums  ALLERGY AND IMMUNOLOGIC: No hives or eczema    Vital Signs Last 24 Hrs  T(C): 36.3 (30 Jun 2025 08:00), Max: 36.4 (29 Jun 2025 11:05)  T(F): 97.4 (30 Jun 2025 08:00), Max: 97.5 (29 Jun 2025 11:05)  HR: 70 (30 Jun 2025 08:00) (70 - 79)  BP: 163/77 (30 Jun 2025 08:00) (152/76 - 167/74)  BP(mean): --  RR: 18 (30 Jun 2025 08:00) (17 - 18)  SpO2: 100% (30 Jun 2025 08:00) (98% - 100%)    Parameters below as of 30 Jun 2025 04:29  Patient On (Oxygen Delivery Method): room air        PHYSICAL EXAM:  GENERAL: NAD, well-groomed, well-developed  HEAD:  Atraumatic, Normocephalic  EYES: EOMI, sclera non-icteric  ENMT:  Moist mucous membranes  NERVOUS SYSTEM: name location situation answers questions appropriately   CHEST/LUNG: Clear to percussion bilaterally; No rales, rhonchi, wheezing, or rubs  HEART: Regular rate and rhythm  ABDOMEN: Soft, Nontender, Nondistended  EXTREMITIES: no LE edema  SKIN: warm dry     LABS:                        9.7    14.96 )-----------( 224      ( 30 Jun 2025 08:00 )             28.0     06-30    152[H]  |  119[H]  |  108[H]  ----------------------------<  184[H]  3.6   |  24  |  3.66[H]    Ca    9.6      30 Jun 2025 08:00  Phos  3.1     06-30  Mg     2.3     06-30    TPro  7.2  /  Alb  2.1[L]  /  TBili  0.7  /  DBili  x   /  AST  19  /  ALT  40  /  AlkPhos  117  06-29      Urinalysis Basic - ( 30 Jun 2025 08:00 )    Color: x / Appearance: x / SG: x / pH: x  Gluc: 184 mg/dL / Ketone: x  / Bili: x / Urobili: x   Blood: x / Protein: x / Nitrite: x   Leuk Esterase: x / RBC: x / WBC x   Sq Epi: x / Non Sq Epi: x / Bacteria: x      CAPILLARY BLOOD GLUCOSE      POCT Blood Glucose.: 171 mg/dL (30 Jun 2025 08:12)  POCT Blood Glucose.: 268 mg/dL (29 Jun 2025 22:55)  POCT Blood Glucose.: 319 mg/dL (29 Jun 2025 21:24)  POCT Blood Glucose.: 194 mg/dL (29 Jun 2025 16:01)  POCT Blood Glucose.: 223 mg/dL (29 Jun 2025 11:10)      RADIOLOGY & ADDITIONAL TESTS:    Imaging Personally Reviewed:  [ X] YES  [ ] NO    Consultant(s) Notes Reviewed:  [ X] YES  [ ] NO    Care Discussed with Consultants/Other Providers [X ] YES  [ ] NO Patient is a 85y old  Male who presents with a chief complaint of Sepsis likely 2/2 pna (29 Jun 2025 23:10)      INTERVAL HPI/OVERNIGHT EVENTS:  afebrile overnight NAEO VSS  seen and examined at bedside           MEDICATIONS  (STANDING):  apixaban 2.5 milliGRAM(s) Oral every 12 hours  atorvastatin 80 milliGRAM(s) Oral at bedtime  calcitriol   Capsule 0.25 MICROGram(s) Oral daily  chlorhexidine 2% Cloths 1 Application(s) Topical <User Schedule>  clopidogrel Tablet 75 milliGRAM(s) Oral daily  dextrose 5%. 1000 milliLiter(s) (75 mL/Hr) IV Continuous <Continuous>  ferrous    sulfate 325 milliGRAM(s) Oral daily  insulin glargine Injectable (LANTUS) 18 Unit(s) SubCutaneous at bedtime  insulin lispro (ADMELOG) corrective regimen sliding scale   SubCutaneous three times a day before meals  insulin lispro Injectable (ADMELOG) 3 Unit(s) SubCutaneous three times a day before meals  metoprolol tartrate 25 milliGRAM(s) Oral two times a day  potassium chloride    Tablet ER 10 milliEquivalent(s) Oral once  tamsulosin 0.4 milliGRAM(s) Oral at bedtime    MEDICATIONS  (PRN):  acetaminophen     Tablet .. 650 milliGRAM(s) Oral every 6 hours PRN Temp greater or equal to 38C (100.4F), Mild Pain (1 - 3)  albuterol/ipratropium for Nebulization 3 milliLiter(s) Nebulizer every 6 hours PRN Shortness of Breath and/or Wheezing  melatonin 3 milliGRAM(s) Oral at bedtime PRN Insomnia      Allergies    No Known Allergies    Intolerances        REVIEW OF SYSTEMS:  CONSTITUTIONAL: No fever, weight loss, or fatigue  EYES: No eye pain, visual disturbances, or discharge  ENMT:  No difficulty hearing, tinnitus, vertigo; No sinus or throat pain  NECK: No pain or stiffness  BREASTS: No pain, masses, or nipple discharge  RESPIRATORY: No cough, wheezing, chills or hemoptysis; No shortness of breath  CARDIOVASCULAR: No chest pain, palpitations, dizziness, or leg swelling  GASTROINTESTINAL: No abdominal or epigastric pain. No nausea, vomiting, or hematemesis; No diarrhea or constipation. No melena or hematochezia.  GENITOURINARY: No dysuria, frequency, hematuria, or incontinence  NEUROLOGICAL: No headaches, memory loss, loss of strength, numbness, or tremors  SKIN: No itching, burning, rashes, or lesions   LYMPH NODES: No enlarged glands  ENDOCRINE: No heat or cold intolerance; No hair loss  MUSCULOSKELETAL: No joint pain or swelling; No muscle, back, or extremity pain  PSYCHIATRIC: No depression, anxiety, mood swings, or difficulty sleeping  HEME/LYMPH: No easy bruising, or bleeding gums  ALLERGY AND IMMUNOLOGIC: No hives or eczema    Vital Signs Last 24 Hrs  T(C): 36.3 (30 Jun 2025 08:00), Max: 36.4 (29 Jun 2025 11:05)  T(F): 97.4 (30 Jun 2025 08:00), Max: 97.5 (29 Jun 2025 11:05)  HR: 70 (30 Jun 2025 08:00) (70 - 79)  BP: 163/77 (30 Jun 2025 08:00) (152/76 - 167/74)  BP(mean): --  RR: 18 (30 Jun 2025 08:00) (17 - 18)  SpO2: 100% (30 Jun 2025 08:00) (98% - 100%)    Parameters below as of 30 Jun 2025 04:29  Patient On (Oxygen Delivery Method): room air        PHYSICAL EXAM:  GENERAL: NAD, well-groomed, well-developed  HEAD:  Atraumatic, Normocephalic  EYES: EOMI, sclera non-icteric  ENMT:  Moist mucous membranes  NERVOUS SYSTEM: name location situation answers questions appropriately   CHEST/LUNG: Clear to percussion bilaterally; No rales, rhonchi, wheezing, or rubs  HEART: Regular rate and rhythm  ABDOMEN: Soft, Nontender, Nondistended  EXTREMITIES: no LE edema  SKIN: warm dry     LABS:                        9.7    14.96 )-----------( 224      ( 30 Jun 2025 08:00 )             28.0     06-30    152[H]  |  119[H]  |  108[H]  ----------------------------<  184[H]  3.6   |  24  |  3.66[H]    Ca    9.6      30 Jun 2025 08:00  Phos  3.1     06-30  Mg     2.3     06-30    TPro  7.2  /  Alb  2.1[L]  /  TBili  0.7  /  DBili  x   /  AST  19  /  ALT  40  /  AlkPhos  117  06-29      Urinalysis Basic - ( 30 Jun 2025 08:00 )    Color: x / Appearance: x / SG: x / pH: x  Gluc: 184 mg/dL / Ketone: x  / Bili: x / Urobili: x   Blood: x / Protein: x / Nitrite: x   Leuk Esterase: x / RBC: x / WBC x   Sq Epi: x / Non Sq Epi: x / Bacteria: x      CAPILLARY BLOOD GLUCOSE      POCT Blood Glucose.: 171 mg/dL (30 Jun 2025 08:12)  POCT Blood Glucose.: 268 mg/dL (29 Jun 2025 22:55)  POCT Blood Glucose.: 319 mg/dL (29 Jun 2025 21:24)  POCT Blood Glucose.: 194 mg/dL (29 Jun 2025 16:01)  POCT Blood Glucose.: 223 mg/dL (29 Jun 2025 11:10)      RADIOLOGY & ADDITIONAL TESTS:    Imaging Personally Reviewed:  [ X] YES  [ ] NO    Consultant(s) Notes Reviewed:  [ X] YES  [ ] NO    Care Discussed with Consultants/Other Providers [X ] YES  [ ] NO Patient is a 85y old  Male who presents with a chief complaint of Sepsis likely 2/2 pna (29 Jun 2025 23:10)      INTERVAL HPI/OVERNIGHT EVENTS:  afebrile overnight SEGUNDOEO VSS  seen and examined at bedside, wants to advance diet does not like puree diet       MEDICATIONS  (STANDING):  apixaban 2.5 milliGRAM(s) Oral every 12 hours  atorvastatin 80 milliGRAM(s) Oral at bedtime  calcitriol   Capsule 0.25 MICROGram(s) Oral daily  chlorhexidine 2% Cloths 1 Application(s) Topical <User Schedule>  clopidogrel Tablet 75 milliGRAM(s) Oral daily  dextrose 5%. 1000 milliLiter(s) (75 mL/Hr) IV Continuous <Continuous>  ferrous    sulfate 325 milliGRAM(s) Oral daily  insulin glargine Injectable (LANTUS) 18 Unit(s) SubCutaneous at bedtime  insulin lispro (ADMELOG) corrective regimen sliding scale   SubCutaneous three times a day before meals  insulin lispro Injectable (ADMELOG) 3 Unit(s) SubCutaneous three times a day before meals  metoprolol tartrate 25 milliGRAM(s) Oral two times a day  potassium chloride    Tablet ER 10 milliEquivalent(s) Oral once  tamsulosin 0.4 milliGRAM(s) Oral at bedtime    MEDICATIONS  (PRN):  acetaminophen     Tablet .. 650 milliGRAM(s) Oral every 6 hours PRN Temp greater or equal to 38C (100.4F), Mild Pain (1 - 3)  albuterol/ipratropium for Nebulization 3 milliLiter(s) Nebulizer every 6 hours PRN Shortness of Breath and/or Wheezing  melatonin 3 milliGRAM(s) Oral at bedtime PRN Insomnia      Allergies    No Known Allergies    Intolerances        REVIEW OF SYSTEMS:  no cP sOb abdominal pain     Vital Signs Last 24 Hrs  T(C): 36.3 (30 Jun 2025 08:00), Max: 36.4 (29 Jun 2025 11:05)  T(F): 97.4 (30 Jun 2025 08:00), Max: 97.5 (29 Jun 2025 11:05)  HR: 70 (30 Jun 2025 08:00) (70 - 79)  BP: 163/77 (30 Jun 2025 08:00) (152/76 - 167/74)  BP(mean): --  RR: 18 (30 Jun 2025 08:00) (17 - 18)  SpO2: 100% (30 Jun 2025 08:00) (98% - 100%)    Parameters below as of 30 Jun 2025 04:29  Patient On (Oxygen Delivery Method): room air        PHYSICAL EXAM:  GENERAL: NAD, well-groomed, well-developed  HEAD:  Atraumatic, Normocephalic  EYES: EOMI, sclera non-icteric  ENMT:  Moist mucous membranes  NERVOUS SYSTEM: name location situation answers questions appropriately   CHEST/LUNG: Clear to percussion bilaterally; No rales, rhonchi, wheezing, or rubs  HEART: Regular rate and rhythm  ABDOMEN: Soft, Nontender, Nondistended  EXTREMITIES: no LE edema  SKIN: warm dry     LABS:                        9.7    14.96 )-----------( 224      ( 30 Jun 2025 08:00 )             28.0     06-30    152[H]  |  119[H]  |  108[H]  ----------------------------<  184[H]  3.6   |  24  |  3.66[H]    Ca    9.6      30 Jun 2025 08:00  Phos  3.1     06-30  Mg     2.3     06-30    TPro  7.2  /  Alb  2.1[L]  /  TBili  0.7  /  DBili  x   /  AST  19  /  ALT  40  /  AlkPhos  117  06-29      Urinalysis Basic - ( 30 Jun 2025 08:00 )    Color: x / Appearance: x / SG: x / pH: x  Gluc: 184 mg/dL / Ketone: x  / Bili: x / Urobili: x   Blood: x / Protein: x / Nitrite: x   Leuk Esterase: x / RBC: x / WBC x   Sq Epi: x / Non Sq Epi: x / Bacteria: x      CAPILLARY BLOOD GLUCOSE      POCT Blood Glucose.: 171 mg/dL (30 Jun 2025 08:12)  POCT Blood Glucose.: 268 mg/dL (29 Jun 2025 22:55)  POCT Blood Glucose.: 319 mg/dL (29 Jun 2025 21:24)  POCT Blood Glucose.: 194 mg/dL (29 Jun 2025 16:01)  POCT Blood Glucose.: 223 mg/dL (29 Jun 2025 11:10)      RADIOLOGY & ADDITIONAL TESTS:    Imaging Personally Reviewed:  [ X] YES  [ ] NO    Consultant(s) Notes Reviewed:  [ X] YES  [ ] NO    Care Discussed with Consultants/Other Providers [X ] YES  [ ] NO

## 2025-06-30 NOTE — PROGRESS NOTE ADULT - SUBJECTIVE AND OBJECTIVE BOX
[facility] NEPHROLOGY SERVICES, [facility]  NEPHROLOGY AND HYPERTENSION  [address]  [address]  [place] [address]  [phone]    MD COLTON WORLEY, MD JD MORRISSEY MD CHRISTOPHER CAPUTO, MD EDWARD BOVER, MD          Patient events noted    MEDICATIONS  (STANDING):  apixaban 2.5 milliGRAM(s) Oral every 12 hours  atorvastatin 80 milliGRAM(s) Oral at bedtime  calcitriol   Capsule 0.25 MICROGram(s) Oral daily  chlorhexidine 2% Cloths 1 Application(s) Topical <User Schedule>  clopidogrel Tablet 75 milliGRAM(s) Oral daily  ferrous    sulfate 325 milliGRAM(s) Oral daily  insulin glargine Injectable (LANTUS) 18 Unit(s) SubCutaneous at bedtime  insulin lispro (ADMELOG) corrective regimen sliding scale   SubCutaneous three times a day before meals  insulin lispro Injectable (ADMELOG) 3 Unit(s) SubCutaneous three times a day before meals  metOLazone 5 milliGRAM(s) Oral daily  metoprolol succinate ER 75 milliGRAM(s) Oral daily  tamsulosin 0.4 milliGRAM(s) Oral at bedtime    MEDICATIONS  (PRN):  acetaminophen     Tablet .. 650 milliGRAM(s) Oral every 6 hours PRN Temp greater or equal to 38C (100.4F), Mild Pain (1 - 3)  albuterol/ipratropium for Nebulization 3 milliLiter(s) Nebulizer every 6 hours PRN Shortness of Breath and/or Wheezing  melatonin 3 milliGRAM(s) Oral at bedtime PRN Insomnia      06-30-25 @ 07:01  -  06-30-25 @ 18:41  --------------------------------------------------------  IN: 120 mL / OUT: 0 mL / NET: 120 mL      PHYSICAL EXAM:      T(C): 36.1 (06-30-25 @ 16:00), Max: 36.4 (06-29-25 @ 23:47)  HR: 78 (06-30-25 @ 16:00) (70 - 78)  BP: 148/85 (06-30-25 @ 16:00) (148/85 - 176/87)  RR: 18 (06-30-25 @ 16:00) (18 - 18)  SpO2: 97% (06-30-25 @ 16:00) (95% - 100%)  Wt(kg): --  Lungs clear  Heart S1S2  Abd soft NT ND  Extremities:   tr edema                                    9.7    14.96 )-----------( 224      ( 30 Jun 2025 08:00 )             28.0     06-30    152[H]  |  119[H]  |  108[H]  ----------------------------<  184[H]  3.6   |  24  |  3.66[H]    Ca    9.6      30 Jun 2025 08:00  Phos  3.1     06-30  Mg     2.3     06-30    TPro  7.2  /  Alb  2.1[L]  /  TBili  0.7  /  DBili  x   /  AST  19  /  ALT  40  /  AlkPhos  117  06-29      LIVER FUNCTIONS - ( 29 Jun 2025 06:05 )  Alb: 2.1 g/dL / Pro: 7.2 gm/dL / ALK PHOS: 117 U/L / ALT: 40 U/L / AST: 19 U/L / GGT: x           Creatinine Trend: 3.66<--, 4.30<--, 4.65<--, 5.99<--, 6.72<--, 7.19<--      Assessment    CM, EF 30-35, mod MR, TR, WA  Hemodynamic TAYLOR/CKD 3 (Cr 2.16 - 6/19/25)  Good UO  Cr is improving   Avoid nephrotoxins     Plan  Hypernatremia, D5W IV if access available   Add distal diuretic   Glu control  F/u CBC, BMP, UO        Yuriy Guillermo MD

## 2025-07-01 LAB
ANION GAP SERPL CALC-SCNC: 6 MMOL/L — SIGNIFICANT CHANGE UP (ref 5–17)
BUN SERPL-MCNC: 100 MG/DL — HIGH (ref 7–23)
CALCIUM SERPL-MCNC: 9.5 MG/DL — SIGNIFICANT CHANGE UP (ref 8.5–10.1)
CHLORIDE SERPL-SCNC: 123 MMOL/L — HIGH (ref 96–108)
CO2 SERPL-SCNC: 27 MMOL/L — SIGNIFICANT CHANGE UP (ref 22–31)
CREAT SERPL-MCNC: 3.13 MG/DL — HIGH (ref 0.5–1.3)
EGFR: 19 ML/MIN/1.73M2 — LOW
EGFR: 19 ML/MIN/1.73M2 — LOW
GLUCOSE BLDC GLUCOMTR-MCNC: 126 MG/DL — HIGH (ref 70–99)
GLUCOSE BLDC GLUCOMTR-MCNC: 180 MG/DL — HIGH (ref 70–99)
GLUCOSE BLDC GLUCOMTR-MCNC: 327 MG/DL — HIGH (ref 70–99)
GLUCOSE BLDC GLUCOMTR-MCNC: 91 MG/DL — SIGNIFICANT CHANGE UP (ref 70–99)
GLUCOSE SERPL-MCNC: 143 MG/DL — HIGH (ref 70–99)
HCT VFR BLD CALC: 27.8 % — LOW (ref 39–50)
HGB BLD-MCNC: 9.5 G/DL — LOW (ref 13–17)
MAGNESIUM SERPL-MCNC: 2.2 MG/DL — SIGNIFICANT CHANGE UP (ref 1.6–2.6)
MCHC RBC-ENTMCNC: 29.3 PG — SIGNIFICANT CHANGE UP (ref 27–34)
MCHC RBC-ENTMCNC: 34.2 G/DL — SIGNIFICANT CHANGE UP (ref 32–36)
MCV RBC AUTO: 85.8 FL — SIGNIFICANT CHANGE UP (ref 80–100)
NRBC BLD AUTO-RTO: 0 /100 WBCS — SIGNIFICANT CHANGE UP (ref 0–0)
PHOSPHATE SERPL-MCNC: 3.6 MG/DL — SIGNIFICANT CHANGE UP (ref 2.5–4.5)
PLATELET # BLD AUTO: 216 K/UL — SIGNIFICANT CHANGE UP (ref 150–400)
POTASSIUM SERPL-MCNC: 3.6 MMOL/L — SIGNIFICANT CHANGE UP (ref 3.5–5.3)
POTASSIUM SERPL-SCNC: 3.6 MMOL/L — SIGNIFICANT CHANGE UP (ref 3.5–5.3)
RBC # BLD: 3.24 M/UL — LOW (ref 4.2–5.8)
RBC # FLD: 14.8 % — HIGH (ref 10.3–14.5)
SODIUM SERPL-SCNC: 156 MMOL/L — HIGH (ref 135–145)
WBC # BLD: 15.35 K/UL — HIGH (ref 3.8–10.5)
WBC # FLD AUTO: 15.35 K/UL — HIGH (ref 3.8–10.5)

## 2025-07-01 PROCEDURE — 99233 SBSQ HOSP IP/OBS HIGH 50: CPT

## 2025-07-01 RX ORDER — INSULIN LISPRO 100 U/ML
4 INJECTION, SOLUTION INTRAVENOUS; SUBCUTANEOUS ONCE
Refills: 0 | Status: COMPLETED | OUTPATIENT
Start: 2025-07-01 | End: 2025-07-01

## 2025-07-01 RX ORDER — SODIUM CHLORIDE 9 G/1000ML
1000 INJECTION, SOLUTION INTRAVENOUS
Refills: 0 | Status: DISCONTINUED | OUTPATIENT
Start: 2025-07-01 | End: 2025-07-05

## 2025-07-01 RX ADMIN — INSULIN LISPRO 1: 100 INJECTION, SOLUTION INTRAVENOUS; SUBCUTANEOUS at 08:16

## 2025-07-01 RX ADMIN — SODIUM CHLORIDE 75 MILLILITER(S): 9 INJECTION, SOLUTION INTRAVENOUS at 14:36

## 2025-07-01 RX ADMIN — CALCITRIOL 0.25 MICROGRAM(S): 0.5 CAPSULE, GELATIN COATED ORAL at 12:07

## 2025-07-01 RX ADMIN — SODIUM CHLORIDE 75 MILLILITER(S): 9 INJECTION, SOLUTION INTRAVENOUS at 21:50

## 2025-07-01 RX ADMIN — INSULIN GLARGINE-YFGN 18 UNIT(S): 100 INJECTION, SOLUTION SUBCUTANEOUS at 21:49

## 2025-07-01 RX ADMIN — ATORVASTATIN CALCIUM 80 MILLIGRAM(S): 80 TABLET, FILM COATED ORAL at 21:49

## 2025-07-01 RX ADMIN — Medication 1 APPLICATION(S): at 06:34

## 2025-07-01 RX ADMIN — INSULIN LISPRO 3 UNIT(S): 100 INJECTION, SOLUTION INTRAVENOUS; SUBCUTANEOUS at 12:08

## 2025-07-01 RX ADMIN — CLOPIDOGREL BISULFATE 75 MILLIGRAM(S): 75 TABLET, FILM COATED ORAL at 12:07

## 2025-07-01 RX ADMIN — TAMSULOSIN HYDROCHLORIDE 0.4 MILLIGRAM(S): 0.4 CAPSULE ORAL at 21:50

## 2025-07-01 RX ADMIN — Medication 325 MILLIGRAM(S): at 12:07

## 2025-07-01 RX ADMIN — Medication 20 MILLIEQUIVALENT(S): at 18:04

## 2025-07-01 RX ADMIN — INSULIN LISPRO 4 UNIT(S): 100 INJECTION, SOLUTION INTRAVENOUS; SUBCUTANEOUS at 21:49

## 2025-07-01 RX ADMIN — INSULIN LISPRO 3 UNIT(S): 100 INJECTION, SOLUTION INTRAVENOUS; SUBCUTANEOUS at 08:17

## 2025-07-01 RX ADMIN — METOPROLOL SUCCINATE 75 MILLIGRAM(S): 50 TABLET, EXTENDED RELEASE ORAL at 06:33

## 2025-07-01 RX ADMIN — APIXABAN 2.5 MILLIGRAM(S): 5 TABLET, FILM COATED ORAL at 18:04

## 2025-07-01 RX ADMIN — APIXABAN 2.5 MILLIGRAM(S): 5 TABLET, FILM COATED ORAL at 06:34

## 2025-07-01 NOTE — PROGRESS NOTE ADULT - ASSESSMENT
85-year-old male with past medical history of hypertension, stage IV CKD, CVA, type 2 diabetes, A-fib, iron deficiency anemia, bradycardia status post pacemaker, recent NSTEMI, ischemic cardiomyopathy who presented to the ED from [facility] due to left-sided chest pain. Admitted for sepsis 2/2 PNA and CHF exacerbation required ICU stay this admission, transferred to the floor 6/28/25.     #Sepsis 2/2 PNA POA   S/p vasoactive requirement in the ICU. ID following, completed course of Zosyn.   Now on RA and HDS. Completing stress dose steroid taper but ICU recommendations.   Bcx NGTD >48 hr, legionella/strep negative     #ADHF  #ICM   #HFrEF (EF 30-35%)  Pro-BNP on admission 7166. S/p bumex gtt in the ICU  Bumex held due to overiduresis per renal recommendations. Was on GDMT although discontinued due to worsening renal function and shock.   Will re-consult cardiology to assist in resumption of GDMT now that clinical status has improved-->start toprol 75 daily and parameters, cardiology signed off   GDMT limited due to renal function    #type 2 NSTEMI   Elevated troponin this admission. Cardiology saw earlier in admission, suspect due to sepsis and poor clearance given renal function.   Cont PTA DOAC and plavix     #Afib   #Sick sinus syndrome   S/p Micra pacemaker.   Cont eliquis   resume metop 25 BID per cardiology note    #DM  A1c 7.8. Monitor blood glucose while on steroids.   Cont SSI and 18u glargine, 3u lispro    #TAYLOR on CKD stage IV  Baseline Cr 2-2.4, Cr peaked at 7.2 this admission. Suspect TAYLOR is multifactorial, cardio-renal and ATN component.   Cont D5W and distal diuretic   Cr improving   Strict I/O    #Hypernatremia   Suspect due to overdiuresis   Start D5W per renal   Renal started distal diuretic 6/30    #Hypokalemia   Replete as needed     #AIDA  Baseline Hb ~10. S/p 1unit pRBC 6/28, Hb 6.8-->9.1.   No s/s bleeding, unclear if 6.8 was true given >1g/dl improvement with only 1u pRBC  Daily CBC    #Hx of CVA  Cont plavix, eliquis     #dysphagia   SLP evaluated 6/20 recommended puree diet. Patient wants to advance diet. Will re-consult to see if diet can be advanced.   F/u recs     ppx: PTA eliquis     dispo: VITALIY pending the above  85-year-old male with past medical history of hypertension, stage IV CKD, CVA, type 2 diabetes, A-fib, iron deficiency anemia, bradycardia status post pacemaker, recent NSTEMI, ischemic cardiomyopathy who presented to the ED from [facility] due to left-sided chest pain. Admitted for sepsis 2/2 PNA and CHF exacerbation required ICU stay this admission, transferred to the floor 6/28/25.     #Sepsis 2/2 PNA POA   S/p vasoactive requirement in the ICU. ID following, completed course of Zosyn.   Now on RA and HDS. Completing stress dose steroid taper but ICU recommendations.   Bcx NGTD >48 hr, legionella/strep negative     #Delirium   #Metabolic encephalopathy   Daughter describes intermittent episodes of confusion suspect 2/2 delirium. Patient oriented yesterday but today's exam more c/w hypoactive delirium  Also suspect metabolic derangements affecting mental status, D5W as below for hypernatremia.     #ADHF  #ICM   #HFrEF (EF 30-35%)  Pro-BNP on admission 7166. S/p bumex gtt in the ICU  Bumex held due to overiduresis per renal recommendations. Was on GDMT although discontinued due to worsening renal function and shock.   Will re-consult cardiology to assist in resumption of GDMT now that clinical status has improved-->start toprol 75 daily and parameters, cardiology signed off   GDMT limited due to renal function    #type 2 NSTEMI   Elevated troponin this admission. Cardiology saw earlier in admission, suspect due to sepsis and poor clearance given renal function.   Cont PTA DOAC and plavix     #Afib   #Sick sinus syndrome   S/p Micra pacemaker.   Cont eliquis   resume metop 75 XL daily per cardiology note    #DM  A1c 7.8. Monitor blood glucose while on steroids.   Cont SSI and 18u glargine, 3u lispro    #TAYLOR on CKD stage IV  Baseline Cr 2-2.4, Cr peaked at 7.2 this admission. Suspect TAYLOR is multifactorial, cardio-renal and ATN component.   Cont D5W and distal diuretic   Cr improving   Strict I/O    #Hypernatremia   Suspect due to overdiuresis   Start D5W per renal   Renal started distal diuretic 6/30    #Hypokalemia   Replete as needed     #AIDA  Baseline Hb ~10. S/p 1unit pRBC 6/28, Hb 6.8-->9.1.   No s/s bleeding, unclear if 6.8 was true given >1g/dl improvement with only 1u pRBC  Daily CBC    #Hx of CVA  Cont plavix, eliquis     #dysphagia   SLP evaluated 6/20 recommended puree diet. Patient wants to advance diet. Will re-consult to see if diet can be advanced.   F/u recs     ppx: PTA eliquis     dispo: VITALIY pending the above

## 2025-07-01 NOTE — PROGRESS NOTE ADULT - SUBJECTIVE AND OBJECTIVE BOX
Patient is a 85y old  Male who presents with a chief complaint of Sepsis likely 2/2 pna (30 Jun 2025 18:41)      INTERVAL HPI/OVERNIGHT EVENTS:  afebrile overnight vitals stable   Na 156 this AM will resume D5W  seen and examined at bedside     MEDICATIONS  (STANDING):  apixaban 2.5 milliGRAM(s) Oral every 12 hours  atorvastatin 80 milliGRAM(s) Oral at bedtime  calcitriol   Capsule 0.25 MICROGram(s) Oral daily  chlorhexidine 2% Cloths 1 Application(s) Topical <User Schedule>  clopidogrel Tablet 75 milliGRAM(s) Oral daily  dextrose 5%. 1000 milliLiter(s) (75 mL/Hr) IV Continuous <Continuous>  ferrous    sulfate 325 milliGRAM(s) Oral daily  insulin glargine Injectable (LANTUS) 18 Unit(s) SubCutaneous at bedtime  insulin lispro (ADMELOG) corrective regimen sliding scale   SubCutaneous three times a day before meals  insulin lispro Injectable (ADMELOG) 3 Unit(s) SubCutaneous three times a day before meals  metOLazone 5 milliGRAM(s) Oral daily  metoprolol succinate ER 75 milliGRAM(s) Oral daily  potassium chloride    Tablet ER 20 milliEquivalent(s) Oral once  tamsulosin 0.4 milliGRAM(s) Oral at bedtime    MEDICATIONS  (PRN):  acetaminophen     Tablet .. 650 milliGRAM(s) Oral every 6 hours PRN Temp greater or equal to 38C (100.4F), Mild Pain (1 - 3)  albuterol/ipratropium for Nebulization 3 milliLiter(s) Nebulizer every 6 hours PRN Shortness of Breath and/or Wheezing  melatonin 3 milliGRAM(s) Oral at bedtime PRN Insomnia      Allergies    No Known Allergies    Intolerances        REVIEW OF SYSTEMS:  CONSTITUTIONAL: No fever, weight loss, or fatigue  EYES: No eye pain, visual disturbances, or discharge  ENMT:  No difficulty hearing, tinnitus, vertigo; No sinus or throat pain  NECK: No pain or stiffness  BREASTS: No pain, masses, or nipple discharge  RESPIRATORY: No cough, wheezing, chills or hemoptysis; No shortness of breath  CARDIOVASCULAR: No chest pain, palpitations, dizziness, or leg swelling  GASTROINTESTINAL: No abdominal or epigastric pain. No nausea, vomiting, or hematemesis; No diarrhea or constipation. No melena or hematochezia.  GENITOURINARY: No dysuria, frequency, hematuria, or incontinence  NEUROLOGICAL: No headaches, memory loss, loss of strength, numbness, or tremors  SKIN: No itching, burning, rashes, or lesions   LYMPH NODES: No enlarged glands  ENDOCRINE: No heat or cold intolerance; No hair loss  MUSCULOSKELETAL: No joint pain or swelling; No muscle, back, or extremity pain  PSYCHIATRIC: No depression, anxiety, mood swings, or difficulty sleeping  HEME/LYMPH: No easy bruising, or bleeding gums  ALLERGY AND IMMUNOLOGIC: No hives or eczema    Vital Signs Last 24 Hrs  T(C): 37 (01 Jul 2025 04:00), Max: 37 (30 Jun 2025 23:00)  T(F): 98.6 (01 Jul 2025 04:00), Max: 98.6 (30 Jun 2025 23:00)  HR: 73 (01 Jul 2025 04:00) (73 - 78)  BP: 158/69 (01 Jul 2025 04:00) (145/67 - 176/87)  BP(mean): --  RR: 18 (01 Jul 2025 04:00) (18 - 18)  SpO2: 96% (01 Jul 2025 04:00) (95% - 98%)    Parameters below as of 01 Jul 2025 04:00  Patient On (Oxygen Delivery Method): room air        PHYSICAL EXAM:  GENERAL: NAD, well-groomed, well-developed  HEAD:  Atraumatic, Normocephalic  EYES: EOMI, sclera non-icteric  ENMT:  Moist mucous membranes  NERVOUS SYSTEM: name location situation answers questions appropriately   CHEST/LUNG: Clear to percussion bilaterally; No rales, rhonchi, wheezing, or rubs  HEART: Regular rate and rhythm  ABDOMEN: Soft, Nontender, Nondistended  EXTREMITIES: no LE edema  SKIN: warm dry     LABS:                        9.5    15.35 )-----------( 216      ( 01 Jul 2025 07:00 )             27.8     07-01    156[H]  |  123[H]  |  100[H]  ----------------------------<  143[H]  3.6   |  27  |  3.13[H]    Ca    9.5      01 Jul 2025 07:00  Phos  3.6     07-01  Mg     2.2     07-01        Urinalysis Basic - ( 01 Jul 2025 07:00 )    Color: x / Appearance: x / SG: x / pH: x  Gluc: 143 mg/dL / Ketone: x  / Bili: x / Urobili: x   Blood: x / Protein: x / Nitrite: x   Leuk Esterase: x / RBC: x / WBC x   Sq Epi: x / Non Sq Epi: x / Bacteria: x      CAPILLARY BLOOD GLUCOSE      POCT Blood Glucose.: 180 mg/dL (01 Jul 2025 08:08)  POCT Blood Glucose.: 169 mg/dL (30 Jun 2025 21:59)  POCT Blood Glucose.: 136 mg/dL (30 Jun 2025 16:30)  POCT Blood Glucose.: 162 mg/dL (30 Jun 2025 11:37)      RADIOLOGY & ADDITIONAL TESTS:    Imaging Personally Reviewed:  [ X] YES  [ ] NO    Consultant(s) Notes Reviewed:  [ X] YES  [ ] NO    Care Discussed with Consultants/Other Providers [X ] YES  [ ] NO Patient is a 85y old  Male who presents with a chief complaint of Sepsis likely 2/2 pna (30 Jun 2025 18:41)      INTERVAL HPI/OVERNIGHT EVENTS:  afebrile overnight vitals stable   Na 156 this AM will resume D5W. Per primary RN patient removed IV so he did not received fluids overnight   seen and examined at bedside, daughter updated at bedside     MEDICATIONS  (STANDING):  apixaban 2.5 milliGRAM(s) Oral every 12 hours  atorvastatin 80 milliGRAM(s) Oral at bedtime  calcitriol   Capsule 0.25 MICROGram(s) Oral daily  chlorhexidine 2% Cloths 1 Application(s) Topical <User Schedule>  clopidogrel Tablet 75 milliGRAM(s) Oral daily  dextrose 5%. 1000 milliLiter(s) (75 mL/Hr) IV Continuous <Continuous>  ferrous    sulfate 325 milliGRAM(s) Oral daily  insulin glargine Injectable (LANTUS) 18 Unit(s) SubCutaneous at bedtime  insulin lispro (ADMELOG) corrective regimen sliding scale   SubCutaneous three times a day before meals  insulin lispro Injectable (ADMELOG) 3 Unit(s) SubCutaneous three times a day before meals  metOLazone 5 milliGRAM(s) Oral daily  metoprolol succinate ER 75 milliGRAM(s) Oral daily  potassium chloride    Tablet ER 20 milliEquivalent(s) Oral once  tamsulosin 0.4 milliGRAM(s) Oral at bedtime    MEDICATIONS  (PRN):  acetaminophen     Tablet .. 650 milliGRAM(s) Oral every 6 hours PRN Temp greater or equal to 38C (100.4F), Mild Pain (1 - 3)  albuterol/ipratropium for Nebulization 3 milliLiter(s) Nebulizer every 6 hours PRN Shortness of Breath and/or Wheezing  melatonin 3 milliGRAM(s) Oral at bedtime PRN Insomnia      Allergies    No Known Allergies    Intolerances        REVIEW OF SYSTEMS:  limited due to mental status, denies pain     Vital Signs Last 24 Hrs  T(C): 37 (01 Jul 2025 04:00), Max: 37 (30 Jun 2025 23:00)  T(F): 98.6 (01 Jul 2025 04:00), Max: 98.6 (30 Jun 2025 23:00)  HR: 73 (01 Jul 2025 04:00) (73 - 78)  BP: 158/69 (01 Jul 2025 04:00) (145/67 - 176/87)  BP(mean): --  RR: 18 (01 Jul 2025 04:00) (18 - 18)  SpO2: 96% (01 Jul 2025 04:00) (95% - 98%)    Parameters below as of 01 Jul 2025 04:00  Patient On (Oxygen Delivery Method): room air        PHYSICAL EXAM:  GENERAL: NAD, well-groomed, well-developed  HEAD:  Atraumatic, Normocephalic  EYES: EOMI, sclera non-icteric  ENMT:  Moist mucous membranes  NERVOUS SYSTEM: name more tired today than yesterday, answers questions appropriately   CHEST/LUNG: Clear to percussion bilaterally; No rales, rhonchi, wheezing, or rubs  HEART: Regular rate and rhythm  ABDOMEN: Soft, Nontender, Nondistended  EXTREMITIES: no LE edema  SKIN: warm dry     LABS:                        9.5    15.35 )-----------( 216      ( 01 Jul 2025 07:00 )             27.8     07-01    156[H]  |  123[H]  |  100[H]  ----------------------------<  143[H]  3.6   |  27  |  3.13[H]    Ca    9.5      01 Jul 2025 07:00  Phos  3.6     07-01  Mg     2.2     07-01        Urinalysis Basic - ( 01 Jul 2025 07:00 )    Color: x / Appearance: x / SG: x / pH: x  Gluc: 143 mg/dL / Ketone: x  / Bili: x / Urobili: x   Blood: x / Protein: x / Nitrite: x   Leuk Esterase: x / RBC: x / WBC x   Sq Epi: x / Non Sq Epi: x / Bacteria: x      CAPILLARY BLOOD GLUCOSE      POCT Blood Glucose.: 180 mg/dL (01 Jul 2025 08:08)  POCT Blood Glucose.: 169 mg/dL (30 Jun 2025 21:59)  POCT Blood Glucose.: 136 mg/dL (30 Jun 2025 16:30)  POCT Blood Glucose.: 162 mg/dL (30 Jun 2025 11:37)      RADIOLOGY & ADDITIONAL TESTS:    Imaging Personally Reviewed:  [ X] YES  [ ] NO    Consultant(s) Notes Reviewed:  [ X] YES  [ ] NO    Care Discussed with Consultants/Other Providers [X ] YES  [ ] NO

## 2025-07-01 NOTE — PROGRESS NOTE ADULT - SUBJECTIVE AND OBJECTIVE BOX
[facility] NEPHROLOGY SERVICES, [facility]  NEPHROLOGY AND HYPERTENSION  [address]  [address]  [place] [address]  [phone]    MD COLTON WORLEY MD YELENA ROSENBERG, MD BINNY KOSHY, MD CHRISTOPHER CAPUTO, MD EDWARD BOVER, MD          Patient events noted    MEDICATIONS  (STANDING):  apixaban 2.5 milliGRAM(s) Oral every 12 hours  atorvastatin 80 milliGRAM(s) Oral at bedtime  calcitriol   Capsule 0.25 MICROGram(s) Oral daily  chlorhexidine 2% Cloths 1 Application(s) Topical <User Schedule>  clopidogrel Tablet 75 milliGRAM(s) Oral daily  dextrose 5%. 1000 milliLiter(s) (75 mL/Hr) IV Continuous <Continuous>  ferrous    sulfate 325 milliGRAM(s) Oral daily  insulin glargine Injectable (LANTUS) 18 Unit(s) SubCutaneous at bedtime  insulin lispro (ADMELOG) corrective regimen sliding scale   SubCutaneous three times a day before meals  insulin lispro Injectable (ADMELOG) 3 Unit(s) SubCutaneous three times a day before meals  metOLazone 5 milliGRAM(s) Oral daily  metoprolol succinate ER 75 milliGRAM(s) Oral daily  tamsulosin 0.4 milliGRAM(s) Oral at bedtime    MEDICATIONS  (PRN):  acetaminophen     Tablet .. 650 milliGRAM(s) Oral every 6 hours PRN Temp greater or equal to 38C (100.4F), Mild Pain (1 - 3)  albuterol/ipratropium for Nebulization 3 milliLiter(s) Nebulizer every 6 hours PRN Shortness of Breath and/or Wheezing  melatonin 3 milliGRAM(s) Oral at bedtime PRN Insomnia      06-30-25 @ 07:01  -  07-01-25 @ 07:00  --------------------------------------------------------  IN: 240 mL / OUT: 0 mL / NET: 240 mL    07-01-25 @ 07:01 - 07-01-25 @ 19:51  --------------------------------------------------------  IN: 420 mL / OUT: 0 mL / NET: 420 mL      PHYSICAL EXAM:      T(C): 37 (07-01-25 @ 16:39), Max: 37 (06-30-25 @ 23:00)  HR: 73 (07-01-25 @ 16:39) (70 - 77)  BP: 152/91 (07-01-25 @ 16:39) (129/79 - 158/69)  RR: 17 (07-01-25 @ 16:39) (17 - 18)  SpO2: 99% (07-01-25 @ 16:39) (96% - 99%)  Wt(kg): --  Lungs clear  Heart S1S2  Abd soft NT ND  Extremities:   1 edema                                    9.5    15.35 )-----------( 216      ( 01 Jul 2025 07:00 )             27.8     07-01    156[H]  |  123[H]  |  100[H]  ----------------------------<  143[H]  3.6   |  27  |  3.13[H]    Ca    9.5      01 Jul 2025 07:00  Phos  3.6     07-01  Mg     2.2     07-01          Creatinine Trend: 3.13<--, 3.66<--, 4.30<--, 4.65<--, 5.99<--, 6.72<--        Assessment    CM, EF 30-35, mod MR, TR, DC  Hemodynamic TAYLOR/CKD 3 (Cr 2.16 - 6/19/25)  Good UO  Cr is improving   Avoid nephrotoxins     Plan  Hypernatremia, D5W IV   Adding distal diuretic   Glu control  F/u CBC, BMP, UO        Yuriy Guillermo MD

## 2025-07-01 NOTE — PROGRESS NOTE ADULT - TIME BILLING
Reviewed images and labs. Clinical decision making, changes in medication regimen. Frequent bedside visits. Discussion with patient. This excludes any time spent on separate procedures or teaching.
Lab test review, Radiology Review, Vitals review, Consultant review and discussion, Physical examination, IDR, Assessment and plan; Plan discussion with patient

## 2025-07-02 LAB
ANION GAP SERPL CALC-SCNC: 6 MMOL/L — SIGNIFICANT CHANGE UP (ref 5–17)
BUN SERPL-MCNC: 95 MG/DL — HIGH (ref 7–23)
CALCIUM SERPL-MCNC: 8.8 MG/DL — SIGNIFICANT CHANGE UP (ref 8.5–10.1)
CHLORIDE SERPL-SCNC: 121 MMOL/L — HIGH (ref 96–108)
CO2 SERPL-SCNC: 27 MMOL/L — SIGNIFICANT CHANGE UP (ref 22–31)
CREAT SERPL-MCNC: 2.86 MG/DL — HIGH (ref 0.5–1.3)
EGFR: 21 ML/MIN/1.73M2 — LOW
EGFR: 21 ML/MIN/1.73M2 — LOW
GLUCOSE BLDC GLUCOMTR-MCNC: 132 MG/DL — HIGH (ref 70–99)
GLUCOSE BLDC GLUCOMTR-MCNC: 186 MG/DL — HIGH (ref 70–99)
GLUCOSE BLDC GLUCOMTR-MCNC: 189 MG/DL — HIGH (ref 70–99)
GLUCOSE BLDC GLUCOMTR-MCNC: 208 MG/DL — HIGH (ref 70–99)
GLUCOSE BLDC GLUCOMTR-MCNC: 240 MG/DL — HIGH (ref 70–99)
GLUCOSE BLDC GLUCOMTR-MCNC: 26 MG/DL — CRITICAL LOW (ref 70–99)
GLUCOSE BLDC GLUCOMTR-MCNC: 27 MG/DL — CRITICAL LOW (ref 70–99)
GLUCOSE SERPL-MCNC: 91 MG/DL — SIGNIFICANT CHANGE UP (ref 70–99)
HCT VFR BLD CALC: 25.9 % — LOW (ref 39–50)
HGB BLD-MCNC: 8.6 G/DL — LOW (ref 13–17)
MAGNESIUM SERPL-MCNC: 2.1 MG/DL — SIGNIFICANT CHANGE UP (ref 1.6–2.6)
MCHC RBC-ENTMCNC: 28.7 PG — SIGNIFICANT CHANGE UP (ref 27–34)
MCHC RBC-ENTMCNC: 33.2 G/DL — SIGNIFICANT CHANGE UP (ref 32–36)
MCV RBC AUTO: 86.3 FL — SIGNIFICANT CHANGE UP (ref 80–100)
NRBC BLD AUTO-RTO: 0 /100 WBCS — SIGNIFICANT CHANGE UP (ref 0–0)
PHOSPHATE SERPL-MCNC: 3.5 MG/DL — SIGNIFICANT CHANGE UP (ref 2.5–4.5)
PLATELET # BLD AUTO: 224 K/UL — SIGNIFICANT CHANGE UP (ref 150–400)
POTASSIUM SERPL-MCNC: 3.6 MMOL/L — SIGNIFICANT CHANGE UP (ref 3.5–5.3)
POTASSIUM SERPL-SCNC: 3.6 MMOL/L — SIGNIFICANT CHANGE UP (ref 3.5–5.3)
RBC # BLD: 3 M/UL — LOW (ref 4.2–5.8)
RBC # FLD: 15.2 % — HIGH (ref 10.3–14.5)
SODIUM SERPL-SCNC: 154 MMOL/L — HIGH (ref 135–145)
WBC # BLD: 12.72 K/UL — HIGH (ref 3.8–10.5)
WBC # FLD AUTO: 12.72 K/UL — HIGH (ref 3.8–10.5)

## 2025-07-02 PROCEDURE — 99232 SBSQ HOSP IP/OBS MODERATE 35: CPT

## 2025-07-02 RX ORDER — DEXTROSE 50 % IN WATER 50 %
25 SYRINGE (ML) INTRAVENOUS ONCE
Refills: 0 | Status: DISCONTINUED | OUTPATIENT
Start: 2025-07-02 | End: 2025-07-07

## 2025-07-02 RX ORDER — DEXTROSE 50 % IN WATER 50 %
25 SYRINGE (ML) INTRAVENOUS ONCE
Refills: 0 | Status: COMPLETED | OUTPATIENT
Start: 2025-07-02 | End: 2025-07-02

## 2025-07-02 RX ORDER — DEXTROSE 50 % IN WATER 50 %
15 SYRINGE (ML) INTRAVENOUS ONCE
Refills: 0 | Status: DISCONTINUED | OUTPATIENT
Start: 2025-07-02 | End: 2025-07-07

## 2025-07-02 RX ORDER — GLUCAGON 3 MG/1
1 POWDER NASAL ONCE
Refills: 0 | Status: DISCONTINUED | OUTPATIENT
Start: 2025-07-02 | End: 2025-07-07

## 2025-07-02 RX ORDER — SODIUM CHLORIDE 9 G/1000ML
1000 INJECTION, SOLUTION INTRAVENOUS
Refills: 0 | Status: DISCONTINUED | OUTPATIENT
Start: 2025-07-02 | End: 2025-07-07

## 2025-07-02 RX ORDER — DEXTROSE 50 % IN WATER 50 %
12.5 SYRINGE (ML) INTRAVENOUS ONCE
Refills: 0 | Status: DISCONTINUED | OUTPATIENT
Start: 2025-07-02 | End: 2025-07-07

## 2025-07-02 RX ADMIN — TAMSULOSIN HYDROCHLORIDE 0.4 MILLIGRAM(S): 0.4 CAPSULE ORAL at 21:44

## 2025-07-02 RX ADMIN — Medication 325 MILLIGRAM(S): at 12:22

## 2025-07-02 RX ADMIN — Medication 25 GRAM(S): at 16:30

## 2025-07-02 RX ADMIN — INSULIN GLARGINE-YFGN 18 UNIT(S): 100 INJECTION, SOLUTION SUBCUTANEOUS at 21:44

## 2025-07-02 RX ADMIN — ATORVASTATIN CALCIUM 80 MILLIGRAM(S): 80 TABLET, FILM COATED ORAL at 21:44

## 2025-07-02 RX ADMIN — CLOPIDOGREL BISULFATE 75 MILLIGRAM(S): 75 TABLET, FILM COATED ORAL at 12:22

## 2025-07-02 RX ADMIN — INSULIN LISPRO 1: 100 INJECTION, SOLUTION INTRAVENOUS; SUBCUTANEOUS at 12:23

## 2025-07-02 RX ADMIN — CALCITRIOL 0.25 MICROGRAM(S): 0.5 CAPSULE, GELATIN COATED ORAL at 12:22

## 2025-07-02 RX ADMIN — INSULIN LISPRO 3 UNIT(S): 100 INJECTION, SOLUTION INTRAVENOUS; SUBCUTANEOUS at 12:23

## 2025-07-02 RX ADMIN — Medication 1 APPLICATION(S): at 06:07

## 2025-07-02 RX ADMIN — SODIUM CHLORIDE 75 MILLILITER(S): 9 INJECTION, SOLUTION INTRAVENOUS at 16:30

## 2025-07-02 RX ADMIN — SODIUM CHLORIDE 75 MILLILITER(S): 9 INJECTION, SOLUTION INTRAVENOUS at 21:44

## 2025-07-02 RX ADMIN — SODIUM CHLORIDE 75 MILLILITER(S): 9 INJECTION, SOLUTION INTRAVENOUS at 03:31

## 2025-07-02 RX ADMIN — METOPROLOL SUCCINATE 75 MILLIGRAM(S): 50 TABLET, EXTENDED RELEASE ORAL at 06:02

## 2025-07-02 RX ADMIN — APIXABAN 2.5 MILLIGRAM(S): 5 TABLET, FILM COATED ORAL at 06:02

## 2025-07-02 RX ADMIN — APIXABAN 2.5 MILLIGRAM(S): 5 TABLET, FILM COATED ORAL at 17:31

## 2025-07-02 NOTE — SWALLOW BEDSIDE ASSESSMENT ADULT - SWALLOW EVAL: DIAGNOSIS
pt presented with oropharyngeal phases of swallow deemed WFL for easy to chew/ thin liquid. oral phase marked by adequate oral containment, functional bolus manipulation and mastication for easy to chew, adequate anterior to posterior transfer and oral clearance. pharyngeal stage marked by initiation of the pharyngeal swallow and hyolaryngeal excursion upon palpation without evidence of impaired airway protection
Although assessment limited d/t overt cough after thin liquids, pt presented with oropharyngeal dysphagia for puree, moderate thick liquid, mild thick liquid and thin liquid. oral phase marked by adequate oral opening/acceptance of po trial, adequate labial seal/utensil stripping, adequate oral containment, adequate bolus manipulation /formation for puree, adequate anterior posterior transport with good oral clearance. Pharyngeal phase marked by overt cough with thin liquids. +initiation of pharyngeal swallow trigger with laryngeal excursion to palpation.

## 2025-07-02 NOTE — PROGRESS NOTE ADULT - SUBJECTIVE AND OBJECTIVE BOX
Patient is a 85y old  Male who presents with a chief complaint of Sepsis likely 2/2 pna (01 Jul 2025 19:51)      INTERVAL HPI/OVERNIGHT EVENTS:    MEDICATIONS  (STANDING):  apixaban 2.5 milliGRAM(s) Oral every 12 hours  atorvastatin 80 milliGRAM(s) Oral at bedtime  calcitriol   Capsule 0.25 MICROGram(s) Oral daily  chlorhexidine 2% Cloths 1 Application(s) Topical <User Schedule>  clopidogrel Tablet 75 milliGRAM(s) Oral daily  dextrose 5%. 1000 milliLiter(s) (75 mL/Hr) IV Continuous <Continuous>  dextrose 5%. 1000 milliLiter(s) (50 mL/Hr) IV Continuous <Continuous>  dextrose 5%. 1000 milliLiter(s) (100 mL/Hr) IV Continuous <Continuous>  dextrose 50% Injectable 25 Gram(s) IV Push once  dextrose 50% Injectable 12.5 Gram(s) IV Push once  dextrose 50% Injectable 25 Gram(s) IV Push once  dextrose Oral Gel 15 Gram(s) Oral once  ferrous    sulfate 325 milliGRAM(s) Oral daily  glucagon  Injectable 1 milliGRAM(s) IntraMuscular once  insulin glargine Injectable (LANTUS) 18 Unit(s) SubCutaneous at bedtime  insulin lispro (ADMELOG) corrective regimen sliding scale   SubCutaneous three times a day before meals  insulin lispro Injectable (ADMELOG) 3 Unit(s) SubCutaneous three times a day before meals  metOLazone 5 milliGRAM(s) Oral daily  metoprolol succinate ER 75 milliGRAM(s) Oral daily  tamsulosin 0.4 milliGRAM(s) Oral at bedtime    MEDICATIONS  (PRN):  acetaminophen     Tablet .. 650 milliGRAM(s) Oral every 6 hours PRN Temp greater or equal to 38C (100.4F), Mild Pain (1 - 3)  albuterol/ipratropium for Nebulization 3 milliLiter(s) Nebulizer every 6 hours PRN Shortness of Breath and/or Wheezing  melatonin 3 milliGRAM(s) Oral at bedtime PRN Insomnia      Allergies    No Known Allergies    Intolerances        Vital Signs Last 24 Hrs  T(C): 36.3 (02 Jul 2025 20:46), Max: 36.8 (01 Jul 2025 23:00)  T(F): 97.3 (02 Jul 2025 20:46), Max: 98.2 (01 Jul 2025 23:00)  HR: 80 (02 Jul 2025 20:46) (69 - 86)  BP: 148/78 (02 Jul 2025 20:46) (114/54 - 148/78)  BP(mean): --  RR: 16 (02 Jul 2025 20:46) (16 - 18)  SpO2: 99% (02 Jul 2025 20:46) (95% - 99%)    Parameters below as of 02 Jul 2025 20:46  Patient On (Oxygen Delivery Method): room air        PHYSICAL EXAM:  GENERAL: NAD, well-groomed, well-developed  HEAD:  Atraumatic, Normocephalic  EYES: EOMI, PERRLA, conjunctiva and sclera clear  ENMT: No tonsillar erythema, exudates, or enlargement; Moist mucous membranes, Good dentition, No lesions  NECK: Supple, No JVD, Normal thyroid  NERVOUS SYSTEM:  Alert & Oriented X3, Good concentration; Motor Strength 5/5 B/L upper and lower extremities; DTRs 2+ intact and symmetric  CHEST/LUNG: Clear to auscultation bilaterally; No rales, rhonchi, wheezing, or rubs  HEART: Regular rate and rhythm; No murmurs, rubs, or gallops  ABDOMEN: Soft, Nontender, Nondistended; Bowel sounds present  EXTREMITIES:  2+ Peripheral Pulses, No clubbing, cyanosis, or edema  LYMPH: No lymphadenopathy noted  SKIN: No rashes or lesions    LABS:                        8.6    12.72 )-----------( 224      ( 02 Jul 2025 05:40 )             25.9     07-02    154[H]  |  121[H]  |  95[H]  ----------------------------<  91  3.6   |  27  |  2.86[H]    Ca    8.8      02 Jul 2025 05:40  Phos  3.5     07-02  Mg     2.1     07-02        Urinalysis Basic - ( 02 Jul 2025 05:40 )    Color: x / Appearance: x / SG: x / pH: x  Gluc: 91 mg/dL / Ketone: x  / Bili: x / Urobili: x   Blood: x / Protein: x / Nitrite: x   Leuk Esterase: x / RBC: x / WBC x   Sq Epi: x / Non Sq Epi: x / Bacteria: x      CAPILLARY BLOOD GLUCOSE      POCT Blood Glucose.: 189 mg/dL (02 Jul 2025 17:04)  POCT Blood Glucose.: 27 mg/dL (02 Jul 2025 16:13)  POCT Blood Glucose.: 26 mg/dL (02 Jul 2025 16:11)  POCT Blood Glucose.: 186 mg/dL (02 Jul 2025 11:32)  POCT Blood Glucose.: 208 mg/dL (02 Jul 2025 11:16)  POCT Blood Glucose.: 132 mg/dL (02 Jul 2025 08:15)  POCT Blood Glucose.: 327 mg/dL (01 Jul 2025 21:16)      RADIOLOGY & ADDITIONAL TESTS:    07-01-25 @ 07:01  -  07-02-25 @ 07:00  --------------------------------------------------------  IN:    Oral Fluid: 420 mL  Total IN: 420 mL    OUT:  Total OUT: 0 mL    Total NET: 420 mL      07-02-25 @ 07:01  -  07-02-25 @ 21:01  --------------------------------------------------------  IN:    Oral Fluid: 200 mL  Total IN: 200 mL    OUT:  Total OUT: 0 mL    Total NET: 200 mL

## 2025-07-02 NOTE — PROVIDER CONTACT NOTE (CRITICAL VALUE NOTIFICATION) - PERSON GIVING RESULT:
Lab, Kristel Archuleta
Rosalind nunes
Ana Laura Ballard from lab
Blue Martínez
Hugo Nicole
Diana Suazo RN

## 2025-07-02 NOTE — PROGRESS NOTE ADULT - ASSESSMENT
85-year-old male with past medical history of hypertension, stage IV CKD, CVA, type 2 diabetes, A-fib, iron deficiency anemia, bradycardia status post pacemaker, recent NSTEMI, ischemic cardiomyopathy who presented to the ED from [facility] due to left-sided chest pain. Admitted for sepsis 2/2 PNA and CHF exacerbation required ICU stay this admission, transferred to the floor 6/28/25.     #Sepsis 2/2 PNA POA   S/p vasoactive requirement in the ICU. ID following, completed course of Zosyn.   Now on RA and HDS. Completing stress dose steroid taper but ICU recommendations.   Bcx NGTD >48 hr, legionella/strep negative     #Delirium   #Metabolic encephalopathy   Daughter describes intermittent episodes of confusion suspect 2/2 delirium. Patient oriented yesterday but today's exam more c/w hypoactive delirium  Also suspect metabolic derangements affecting mental status, D5W as below for hypernatremia.     #ADHF  #ICM   #HFrEF (EF 30-35%)  Pro-BNP on admission 7166. S/p bumex gtt in the ICU  Bumex held due to overiduresis per renal recommendations. Was on GDMT although discontinued due to worsening renal function and shock.   Will re-consult cardiology to assist in resumption of GDMT now that clinical status has improved-->start toprol 75 daily and parameters, cardiology signed off   GDMT limited due to renal function    #type 2 NSTEMI   Elevated troponin this admission. Cardiology saw earlier in admission, suspect due to sepsis and poor clearance given renal function.   Cont PTA DOAC and plavix     #Afib   #Sick sinus syndrome   S/p Micra pacemaker.   Cont eliquis   resume metop 75 XL daily per cardiology note    #DM  A1c 7.8. Monitor blood glucose while on steroids.   Cont SSI and 18u glargine, 3u lispro    #TAYLOR on CKD stage IV  Baseline Cr 2-2.4, Cr peaked at 7.2 this admission. Suspect TAYLOR is multifactorial, cardio-renal and ATN component.   Cont D5W and distal diuretic   Cr improving   Strict I/O    #Hypernatremia   Suspect due to overdiuresis   Start D5W per renal   Renal started distal diuretic 6/30    #Hypokalemia   Replete as needed     #AIDA  Baseline Hb ~10. S/p 1unit pRBC 6/28, Hb 6.8-->9.1.   No s/s bleeding, unclear if 6.8 was true given >1g/dl improvement with only 1u pRBC  Daily CBC    #Hx of CVA  Cont plavix, eliquis     #dysphagia   SLP evaluated 6/20 recommended puree diet. Patient wants to advance diet. Will re-consult to see if diet can be advanced.   F/u recs     ppx: PTA eliquis     dispo: VITALIY pending the above  85 year-old male with past medical history of hypertension, stage IV CKD, CVA, type 2 diabetes, A-fib, iron deficiency anemia, bradycardia status post pacemaker, recent NSTEMI, ischemic cardiomyopathy who presented to the ED from [facility] due to left-sided chest pain and was admitted for sepsis due to PNA and CHF exacerbation required ICU stay this admission, transferred to the floor 6/28/25.     Hypernatremia   - suspect due to overdiuresis   - Started D5W per renal      TAYLOR on CKD stage IV  - Baseline Cr 2-2.4, Cr peaked at 7.64 this admission  - suspect TAYLOR is multifactorial, cardio-renal and ATN component     Sepsis due to PNA POA   - s/p vasoactive requirement in the ICU  - completed course of Zosyn as per ID  - completed stress dose steroid taper as per ICU recommendations  - Bcx negative  - legionella/strep negative     Delirium w/ Metabolic encephalopathy   - Daughter describes intermittent episodes of confusion suspect due to delirium  - also suspect metabolic derangements affecting mental status, D5W as below for hypernatremia.     #ADHF  #ICM   #HFrEF (EF 30-35%)  Pro-BNP on admission 7166. S/p bumex gtt in the ICU  Bumex held due to overiduresis per renal recommendations. Was on GDMT although discontinued due to worsening renal function and shock.   Will re-consult cardiology to assist in resumption of GDMT now that clinical status has improved-->start toprol 75 daily and parameters, cardiology signed off   GDMT limited due to renal function    #type 2 NSTEMI   Elevated troponin this admission. Cardiology saw earlier in admission, suspect due to sepsis and poor clearance given renal function.   Cont PTA DOAC and plavix     #Afib   #Sick sinus syndrome   S/p Micra pacemaker.   Cont eliquis   resume metop 75 XL daily per cardiology note    #DM  A1c 7.8. Monitor blood glucose while on steroids.   Cont SSI and 18u glargine, 3u lispro        IADA  - baseline Hb ~10  - s/p 1unit pRBC 6/28, Hb 6.8-->9.1.   - No s/s bleeding, unclear if 6.8 was true given >1g/dl improvement with only 1u pRBC    Hx of CVA  - c/w Plavix & Eliquis     Dysphagia   - SLP evaluated 6/20 recommended puree diet      Prophylaxis:  DVT: Eliquis   GI:      dispo: VITALIY pending the above

## 2025-07-02 NOTE — SWALLOW BEDSIDE ASSESSMENT ADULT - PHARYNGEAL PHASE
impulsive large bolus size/ consecutive sip swallow and noted burping/Within functional limits Within functional limits

## 2025-07-02 NOTE — SWALLOW BEDSIDE ASSESSMENT ADULT - COMMENTS
pt seen bedside seated upright for breakfast on RA and alert. pt tearful/frustrated, difficult to redirect to task he verbalized wants and was able to follow directives for feeding.    per charting clinical bedside swallow eval completed 6/20 with recommendations for puree/mild thick liquid. see report for details.    CXR 6/24/2025 INTERPRETATION: Exam: XR CHEST URGENT clinical history: sob Mild progression of bilateral airspace disease with increase in pleural effusions. IMPRESSION: Mild progression of disease. This could represent pulmonary   vascular congestion versus pneumonia pt seen bedside seated upright for breakfast on RA and alert. pt tearful/frustrated, difficult to redirect to task he verbalized wants and was able to follow directives for feeding.    per charting clinical bedside swallow eval completed 6/20 with recommendations for puree/mild thick liquid. see report for details.    CXR 6/24/2025 INTERPRETATION: Exam: XR CHEST URGENT clinical history: sob Mild progression of bilateral airspace disease with increase in pleural effusions. IMPRESSION: Mild progression of disease. This could represent pulmonary vascular congestion versus pneumonia

## 2025-07-02 NOTE — PROVIDER CONTACT NOTE (OTHER) - ACTION/TREATMENT ORDERED:
Zofran ordered, and given. No further intervention at this time.
finger stick rechecked 189, patient is eating, no complaints, stable condition. KODI Fisher informed.
baltazar site with marker, discontinue PIV, monitor site. as per PA, unable to give antidote because it passed the 4 hour baltazar.

## 2025-07-02 NOTE — PROVIDER CONTACT NOTE (CRITICAL VALUE NOTIFICATION) - ACTION/TREATMENT ORDERED:
gave cranberry juice, critical result PA informed awaiting new orders, will continue to monitor
Repeat labs to be done CBC and Type and screen
Waiting on type and screen results, then patient will receive PRBCs.

## 2025-07-02 NOTE — SWALLOW BEDSIDE ASSESSMENT ADULT - SWALLOW EVAL: RECOMMENDED FEEDING/EATING TECHNIQUES
allow for swallow between intakes/maintain upright posture during/after eating for 30 mins/oral hygiene/small sips/bites
allow for swallow between intakes/alternate food with liquid/check mouth frequently for oral residue/pocketing/crush medication (when feasible)/maintain upright posture during/after eating for 30 mins/oral hygiene/small sips/bites

## 2025-07-02 NOTE — SWALLOW BEDSIDE ASSESSMENT ADULT - SLP PERTINENT HISTORY OF CURRENT PROBLEM
pt admitted to MICU for Sepsis likely 2/2 pna. hospitalist noted 7/1 "85-year-old male with past medical history of hypertension, stage IV CKD, CVA, type 2 diabetes, A-fib, iron deficiency anemia, bradycardia status post pacemaker, recent NSTEMI, ischemic cardiomyopathy who presented to the ED from [facility] due to left-sided chest pain. Admitted for sepsis 2/2 PNA and CHF exacerbation required ICU stay this admission, transferred to the floor 6/28/25." pt admitted to MICU for Sepsis likely 2/2 pna. hospitalist note 7/1 "85-year-old male with past medical history of hypertension, stage IV CKD, CVA, type 2 diabetes, A-fib, iron deficiency anemia, bradycardia status post pacemaker, recent NSTEMI, ischemic cardiomyopathy who presented to the ED from [facility] due to left-sided chest pain. Admitted for sepsis 2/2 PNA and CHF exacerbation required ICU stay this admission, transferred to the floor 6/28/25."

## 2025-07-02 NOTE — PROGRESS NOTE ADULT - SUBJECTIVE AND OBJECTIVE BOX
[facility] NEPHROLOGY SERVICES, [facility]  NEPHROLOGY AND HYPERTENSION  [address]  [address]  [place] [address]  [phone]    MD COLTON WORLEY MD YELENA ROSENBERG, MD BINNY KOSHY, MD CHRISTOPHER CAPUTO, MD EDWARD BOVER, MD          Patient events noted  No distress    MEDICATIONS  (STANDING):  apixaban 2.5 milliGRAM(s) Oral every 12 hours  atorvastatin 80 milliGRAM(s) Oral at bedtime  calcitriol   Capsule 0.25 MICROGram(s) Oral daily  chlorhexidine 2% Cloths 1 Application(s) Topical <User Schedule>  clopidogrel Tablet 75 milliGRAM(s) Oral daily  dextrose 5%. 1000 milliLiter(s) (75 mL/Hr) IV Continuous <Continuous>  dextrose 5%. 1000 milliLiter(s) (50 mL/Hr) IV Continuous <Continuous>  dextrose 5%. 1000 milliLiter(s) (100 mL/Hr) IV Continuous <Continuous>  dextrose 50% Injectable 25 Gram(s) IV Push once  dextrose 50% Injectable 12.5 Gram(s) IV Push once  dextrose 50% Injectable 25 Gram(s) IV Push once  dextrose Oral Gel 15 Gram(s) Oral once  ferrous    sulfate 325 milliGRAM(s) Oral daily  glucagon  Injectable 1 milliGRAM(s) IntraMuscular once  insulin glargine Injectable (LANTUS) 18 Unit(s) SubCutaneous at bedtime  insulin lispro (ADMELOG) corrective regimen sliding scale   SubCutaneous three times a day before meals  insulin lispro Injectable (ADMELOG) 3 Unit(s) SubCutaneous three times a day before meals  metOLazone 5 milliGRAM(s) Oral daily  metoprolol succinate ER 75 milliGRAM(s) Oral daily  tamsulosin 0.4 milliGRAM(s) Oral at bedtime    MEDICATIONS  (PRN):  acetaminophen     Tablet .. 650 milliGRAM(s) Oral every 6 hours PRN Temp greater or equal to 38C (100.4F), Mild Pain (1 - 3)  albuterol/ipratropium for Nebulization 3 milliLiter(s) Nebulizer every 6 hours PRN Shortness of Breath and/or Wheezing  melatonin 3 milliGRAM(s) Oral at bedtime PRN Insomnia      07-01-25 @ 07:01  -  07-02-25 @ 07:00  --------------------------------------------------------  IN: 420 mL / OUT: 0 mL / NET: 420 mL    07-02-25 @ 07:01  -  07-02-25 @ 22:44  --------------------------------------------------------  IN: 200 mL / OUT: 0 mL / NET: 200 mL      PHYSICAL EXAM:      T(C): 36.3 (07-02-25 @ 20:46), Max: 36.8 (07-01-25 @ 23:00)  HR: 80 (07-02-25 @ 20:46) (69 - 86)  BP: 148/78 (07-02-25 @ 20:46) (114/54 - 148/78)  RR: 16 (07-02-25 @ 20:46) (16 - 18)  SpO2: 99% (07-02-25 @ 20:46) (95% - 99%)  Wt(kg): --  Lungs clear  Heart S1S2  Abd soft NT ND  Extremities:   tr edema                                    8.6    12.72 )-----------( 224      ( 02 Jul 2025 05:40 )             25.9     07-02    154[H]  |  121[H]  |  95[H]  ----------------------------<  91  3.6   |  27  |  2.86[H]    Ca    8.8      02 Jul 2025 05:40  Phos  3.5     07-02  Mg     2.1     07-02          Creatinine Trend: 2.86<--, 3.13<--, 3.66<--, 4.30<--, 4.65<--, 5.99<--    Assessment    CM, EF 30-35, mod MR, TR, SC  Hemodynamic TAYLOR/CKD 3 (Cr 2.16 - 6/19/25)  Good UO  Cr is improving   Avoid nephrotoxins     Plan  Hypernatremia, D5W IV   Adding distal diuretic   Glu control  F/u CBC, BMP, UO      Yuriy Guillermo MD

## 2025-07-02 NOTE — SWALLOW BEDSIDE ASSESSMENT ADULT - H & P REVIEW
yes
"85-year-old male with past medical history of hypertension, stage IV CKD, CVA, type 2 diabetes, A-fib, iron deficiency anemia, bradycardia status post pacemaker, recent NSTEMI, ischemic cardiomyopathy who presented to the ED from [facility] due to left-sided chest pain. He denies any fevers, chills, coughing, heart palpitations, shortness of breath, or other complaints."/yes

## 2025-07-02 NOTE — SWALLOW BEDSIDE ASSESSMENT ADULT - ADDITIONAL RECOMMENDATIONS
Short term goals:  1. pt will tolerate recommended textures without s/sx of aspiration.   2. pt/family education regarding oral care /aspiration precautions and will demonstrate understanding and carryover.
Short term Goals pt will tolerate recommended textures without s/sx of aspiration.   pt/ family education regarding oral care /safe swallowing guidelines and will demonstrate understanding and carryover.

## 2025-07-02 NOTE — PROVIDER CONTACT NOTE (CRITICAL VALUE NOTIFICATION) - SITUATION
A/o 3 confused, stable on RA ; fingerstick done twice , pt is asymptomatic
results taken by KHARI Bhatti
Patient admitted for sepsis, atypical chest pain, multifocal PNA

## 2025-07-02 NOTE — SWALLOW BEDSIDE ASSESSMENT ADULT - SWALLOW EVAL: ORAL MUSCULATURE
informal observation- adequate for speech/generally intact/unable to assess due to poor participation/comprehension
generally intact

## 2025-07-03 LAB
ALBUMIN SERPL ELPH-MCNC: 2 G/DL — LOW (ref 3.3–5)
ALP SERPL-CCNC: 155 U/L — HIGH (ref 40–120)
ALT FLD-CCNC: 90 U/L — HIGH (ref 12–78)
ANION GAP SERPL CALC-SCNC: 4 MMOL/L — LOW (ref 5–17)
AST SERPL-CCNC: 41 U/L — HIGH (ref 15–37)
BILIRUB SERPL-MCNC: 0.6 MG/DL — SIGNIFICANT CHANGE UP (ref 0.2–1.2)
BUN SERPL-MCNC: 78 MG/DL — HIGH (ref 7–23)
CALCIUM SERPL-MCNC: 8.2 MG/DL — LOW (ref 8.5–10.1)
CHLORIDE SERPL-SCNC: 116 MMOL/L — HIGH (ref 96–108)
CO2 SERPL-SCNC: 29 MMOL/L — SIGNIFICANT CHANGE UP (ref 22–31)
CREAT SERPL-MCNC: 2.55 MG/DL — HIGH (ref 0.5–1.3)
EGFR: 24 ML/MIN/1.73M2 — LOW
EGFR: 24 ML/MIN/1.73M2 — LOW
GLUCOSE BLDC GLUCOMTR-MCNC: 129 MG/DL — HIGH (ref 70–99)
GLUCOSE BLDC GLUCOMTR-MCNC: 137 MG/DL — HIGH (ref 70–99)
GLUCOSE BLDC GLUCOMTR-MCNC: 139 MG/DL — HIGH (ref 70–99)
GLUCOSE BLDC GLUCOMTR-MCNC: 291 MG/DL — HIGH (ref 70–99)
GLUCOSE SERPL-MCNC: 138 MG/DL — HIGH (ref 70–99)
HCT VFR BLD CALC: 25.4 % — LOW (ref 39–50)
HGB BLD-MCNC: 8.5 G/DL — LOW (ref 13–17)
MAGNESIUM SERPL-MCNC: 2 MG/DL — SIGNIFICANT CHANGE UP (ref 1.6–2.6)
MCHC RBC-ENTMCNC: 29.5 PG — SIGNIFICANT CHANGE UP (ref 27–34)
MCHC RBC-ENTMCNC: 33.5 G/DL — SIGNIFICANT CHANGE UP (ref 32–36)
MCV RBC AUTO: 88.2 FL — SIGNIFICANT CHANGE UP (ref 80–100)
NRBC BLD AUTO-RTO: 0 /100 WBCS — SIGNIFICANT CHANGE UP (ref 0–0)
PHOSPHATE SERPL-MCNC: 3.2 MG/DL — SIGNIFICANT CHANGE UP (ref 2.5–4.5)
PLATELET # BLD AUTO: 221 K/UL — SIGNIFICANT CHANGE UP (ref 150–400)
POTASSIUM SERPL-MCNC: 3.8 MMOL/L — SIGNIFICANT CHANGE UP (ref 3.5–5.3)
POTASSIUM SERPL-SCNC: 3.8 MMOL/L — SIGNIFICANT CHANGE UP (ref 3.5–5.3)
PROT SERPL-MCNC: 6.4 GM/DL — SIGNIFICANT CHANGE UP (ref 6–8.3)
RBC # BLD: 2.88 M/UL — LOW (ref 4.2–5.8)
RBC # FLD: 15.6 % — HIGH (ref 10.3–14.5)
SODIUM SERPL-SCNC: 149 MMOL/L — HIGH (ref 135–145)
WBC # BLD: 12.67 K/UL — HIGH (ref 3.8–10.5)
WBC # FLD AUTO: 12.67 K/UL — HIGH (ref 3.8–10.5)

## 2025-07-03 PROCEDURE — 99232 SBSQ HOSP IP/OBS MODERATE 35: CPT

## 2025-07-03 RX ADMIN — CLOPIDOGREL BISULFATE 75 MILLIGRAM(S): 75 TABLET, FILM COATED ORAL at 11:06

## 2025-07-03 RX ADMIN — CALCITRIOL 0.25 MICROGRAM(S): 0.5 CAPSULE, GELATIN COATED ORAL at 11:06

## 2025-07-03 RX ADMIN — Medication 3 MILLIGRAM(S): at 21:35

## 2025-07-03 RX ADMIN — APIXABAN 2.5 MILLIGRAM(S): 5 TABLET, FILM COATED ORAL at 05:29

## 2025-07-03 RX ADMIN — APIXABAN 2.5 MILLIGRAM(S): 5 TABLET, FILM COATED ORAL at 17:08

## 2025-07-03 RX ADMIN — Medication 1 APPLICATION(S): at 05:31

## 2025-07-03 RX ADMIN — SODIUM CHLORIDE 75 MILLILITER(S): 9 INJECTION, SOLUTION INTRAVENOUS at 11:06

## 2025-07-03 RX ADMIN — ATORVASTATIN CALCIUM 80 MILLIGRAM(S): 80 TABLET, FILM COATED ORAL at 21:40

## 2025-07-03 RX ADMIN — METOPROLOL SUCCINATE 75 MILLIGRAM(S): 50 TABLET, EXTENDED RELEASE ORAL at 05:29

## 2025-07-03 RX ADMIN — TAMSULOSIN HYDROCHLORIDE 0.4 MILLIGRAM(S): 0.4 CAPSULE ORAL at 21:35

## 2025-07-03 RX ADMIN — INSULIN GLARGINE-YFGN 18 UNIT(S): 100 INJECTION, SOLUTION SUBCUTANEOUS at 21:36

## 2025-07-03 RX ADMIN — INSULIN LISPRO 3: 100 INJECTION, SOLUTION INTRAVENOUS; SUBCUTANEOUS at 17:07

## 2025-07-03 RX ADMIN — SODIUM CHLORIDE 75 MILLILITER(S): 9 INJECTION, SOLUTION INTRAVENOUS at 21:41

## 2025-07-03 RX ADMIN — Medication 325 MILLIGRAM(S): at 11:06

## 2025-07-03 RX ADMIN — INSULIN LISPRO 3 UNIT(S): 100 INJECTION, SOLUTION INTRAVENOUS; SUBCUTANEOUS at 17:07

## 2025-07-03 NOTE — PROGRESS NOTE ADULT - SUBJECTIVE AND OBJECTIVE BOX
Patient is a 85y old  Male who presents with a chief complaint of Sepsis likely 2/2 pna (02 Jul 2025 22:44)      INTERVAL HPI/OVERNIGHT EVENTS:    MEDICATIONS  (STANDING):  apixaban 2.5 milliGRAM(s) Oral every 12 hours  atorvastatin 80 milliGRAM(s) Oral at bedtime  calcitriol   Capsule 0.25 MICROGram(s) Oral daily  chlorhexidine 2% Cloths 1 Application(s) Topical <User Schedule>  clopidogrel Tablet 75 milliGRAM(s) Oral daily  dextrose 5%. 1000 milliLiter(s) (75 mL/Hr) IV Continuous <Continuous>  dextrose 5%. 1000 milliLiter(s) (100 mL/Hr) IV Continuous <Continuous>  dextrose 5%. 1000 milliLiter(s) (50 mL/Hr) IV Continuous <Continuous>  dextrose 50% Injectable 25 Gram(s) IV Push once  dextrose 50% Injectable 12.5 Gram(s) IV Push once  dextrose 50% Injectable 25 Gram(s) IV Push once  dextrose Oral Gel 15 Gram(s) Oral once  ferrous    sulfate 325 milliGRAM(s) Oral daily  glucagon  Injectable 1 milliGRAM(s) IntraMuscular once  insulin glargine Injectable (LANTUS) 18 Unit(s) SubCutaneous at bedtime  insulin lispro (ADMELOG) corrective regimen sliding scale   SubCutaneous three times a day before meals  insulin lispro Injectable (ADMELOG) 3 Unit(s) SubCutaneous three times a day before meals  metOLazone 5 milliGRAM(s) Oral daily  metoprolol succinate ER 75 milliGRAM(s) Oral daily  tamsulosin 0.4 milliGRAM(s) Oral at bedtime    MEDICATIONS  (PRN):  acetaminophen     Tablet .. 650 milliGRAM(s) Oral every 6 hours PRN Temp greater or equal to 38C (100.4F), Mild Pain (1 - 3)  albuterol/ipratropium for Nebulization 3 milliLiter(s) Nebulizer every 6 hours PRN Shortness of Breath and/or Wheezing  melatonin 3 milliGRAM(s) Oral at bedtime PRN Insomnia      Allergies    No Known Allergies    Intolerances        Vital Signs Last 24 Hrs  T(C): 36.2 (03 Jul 2025 16:06), Max: 36.7 (03 Jul 2025 04:00)  T(F): 97.1 (03 Jul 2025 16:06), Max: 98 (03 Jul 2025 04:00)  HR: 77 (03 Jul 2025 16:06) (69 - 78)  BP: 147/69 (03 Jul 2025 16:06) (136/71 - 151/74)  BP(mean): --  RR: 17 (03 Jul 2025 16:06) (17 - 20)  SpO2: 98% (03 Jul 2025 16:06) (96% - 98%)    Parameters below as of 03 Jul 2025 11:23  Patient On (Oxygen Delivery Method): room air        PHYSICAL EXAM:  GENERAL: NAD, well-groomed, well-developed  HEAD:  Atraumatic, Normocephalic  EYES: EOMI, PERRLA, conjunctiva and sclera clear  ENMT: No tonsillar erythema, exudates, or enlargement; Moist mucous membranes, Good dentition, No lesions  NECK: Supple, No JVD, Normal thyroid  NERVOUS SYSTEM:  Alert & Oriented X3, Good concentration; Motor Strength 5/5 B/L upper and lower extremities; DTRs 2+ intact and symmetric  CHEST/LUNG: Clear to auscultation bilaterally; No rales, rhonchi, wheezing, or rubs  HEART: Regular rate and rhythm; No murmurs, rubs, or gallops  ABDOMEN: Soft, Nontender, Nondistended; Bowel sounds present  EXTREMITIES:  2+ Peripheral Pulses, No clubbing, cyanosis, or edema  LYMPH: No lymphadenopathy noted  SKIN: No rashes or lesions    LABS:                        8.5    12.67 )-----------( 221      ( 03 Jul 2025 06:42 )             25.4     07-03    149[H]  |  116[H]  |  78[H]  ----------------------------<  138[H]  3.8   |  29  |  2.55[H]    Ca    8.2[L]      03 Jul 2025 06:42  Phos  3.2     07-03  Mg     2.0     07-03    TPro  6.4  /  Alb  2.0[L]  /  TBili  0.6  /  DBili  x   /  AST  41[H]  /  ALT  90[H]  /  AlkPhos  155[H]  07-03      Urinalysis Basic - ( 03 Jul 2025 06:42 )    Color: x / Appearance: x / SG: x / pH: x  Gluc: 138 mg/dL / Ketone: x  / Bili: x / Urobili: x   Blood: x / Protein: x / Nitrite: x   Leuk Esterase: x / RBC: x / WBC x   Sq Epi: x / Non Sq Epi: x / Bacteria: x      CAPILLARY BLOOD GLUCOSE      POCT Blood Glucose.: 129 mg/dL (03 Jul 2025 21:17)  POCT Blood Glucose.: 291 mg/dL (03 Jul 2025 16:43)  POCT Blood Glucose.: 137 mg/dL (03 Jul 2025 11:23)  POCT Blood Glucose.: 139 mg/dL (03 Jul 2025 07:46)      RADIOLOGY & ADDITIONAL TESTS:    07-02-25 @ 07:01  -  07-03-25 @ 07:00  --------------------------------------------------------  IN:    dextrose 5%: 900 mL    Oral Fluid: 200 mL  Total IN: 1100 mL    OUT:  Total OUT: 0 mL    Total NET: 1100 mL

## 2025-07-03 NOTE — PROGRESS NOTE ADULT - ASSESSMENT
85 year-old male with past medical history of hypertension, stage IV CKD, CVA, type 2 diabetes, A-fib, iron deficiency anemia, bradycardia status post pacemaker, recent NSTEMI, ischemic cardiomyopathy who presented to the ED from [facility] due to left-sided chest pain and was admitted for sepsis due to PNA and CHF exacerbation required ICU stay this admission, transferred to the floor 6/28/25.     Hypernatremia   - suspect due to overdiuresis   - Started D5W per renal      TAYLOR on CKD stage IV  - Baseline Cr 2-2.4, Cr peaked at 7.64 this admission  - suspect TAYLOR is multifactorial, cardio-renal and ATN component     Sepsis due to PNA POA   - s/p vasoactive requirement in the ICU  - completed course of Zosyn as per ID  - completed stress dose steroid taper as per ICU recommendations  - Bcx negative  - legionella/strep negative     Delirium w/ Metabolic encephalopathy   - Daughter describes intermittent episodes of confusion suspect due to delirium  - also suspect metabolic derangements affecting mental status, D5W as below for hypernatremia.     #ADHF  #ICM   #HFrEF (EF 30-35%)  Pro-BNP on admission 7166. S/p bumex gtt in the ICU  Bumex held due to overiduresis per renal recommendations. Was on GDMT although discontinued due to worsening renal function and shock.   Will re-consult cardiology to assist in resumption of GDMT now that clinical status has improved-->start toprol 75 daily and parameters, cardiology signed off   GDMT limited due to renal function    #type 2 NSTEMI   Elevated troponin this admission. Cardiology saw earlier in admission, suspect due to sepsis and poor clearance given renal function.   Cont PTA DOAC and plavix     #Afib   #Sick sinus syndrome   S/p Micra pacemaker.   Cont eliquis   resume metop 75 XL daily per cardiology note    #DM  A1c 7.8. Monitor blood glucose while on steroids.   Cont SSI and 18u glargine, 3u lispro        AIDA  - baseline Hb ~10  - s/p 1unit pRBC 6/28, Hb 6.8-->9.1.   - No s/s bleeding, unclear if 6.8 was true given >1g/dl improvement with only 1u pRBC    Hx of CVA  - c/w Plavix & Eliquis     Dysphagia   - SLP evaluated 6/20 recommended puree diet      Prophylaxis:  DVT: Eliquis   GI:      dispo: VITALIY pending the above

## 2025-07-04 LAB
ALBUMIN SERPL ELPH-MCNC: 1.8 G/DL — LOW (ref 3.3–5)
ALP SERPL-CCNC: 145 U/L — HIGH (ref 40–120)
ALT FLD-CCNC: 67 U/L — SIGNIFICANT CHANGE UP (ref 12–78)
ANION GAP SERPL CALC-SCNC: 7 MMOL/L — SIGNIFICANT CHANGE UP (ref 5–17)
AST SERPL-CCNC: 29 U/L — SIGNIFICANT CHANGE UP (ref 15–37)
BILIRUB SERPL-MCNC: 0.5 MG/DL — SIGNIFICANT CHANGE UP (ref 0.2–1.2)
BUN SERPL-MCNC: 69 MG/DL — HIGH (ref 7–23)
CALCIUM SERPL-MCNC: 7.1 MG/DL — LOW (ref 8.5–10.1)
CHLORIDE SERPL-SCNC: 111 MMOL/L — HIGH (ref 96–108)
CO2 SERPL-SCNC: 26 MMOL/L — SIGNIFICANT CHANGE UP (ref 22–31)
CREAT SERPL-MCNC: 2.58 MG/DL — HIGH (ref 0.5–1.3)
EGFR: 24 ML/MIN/1.73M2 — LOW
EGFR: 24 ML/MIN/1.73M2 — LOW
GLUCOSE BLDC GLUCOMTR-MCNC: 135 MG/DL — HIGH (ref 70–99)
GLUCOSE BLDC GLUCOMTR-MCNC: 176 MG/DL — HIGH (ref 70–99)
GLUCOSE BLDC GLUCOMTR-MCNC: 210 MG/DL — HIGH (ref 70–99)
GLUCOSE BLDC GLUCOMTR-MCNC: 289 MG/DL — HIGH (ref 70–99)
GLUCOSE BLDC GLUCOMTR-MCNC: 41 MG/DL — CRITICAL LOW (ref 70–99)
GLUCOSE BLDC GLUCOMTR-MCNC: 54 MG/DL — CRITICAL LOW (ref 70–99)
GLUCOSE SERPL-MCNC: 133 MG/DL — HIGH (ref 70–99)
HCT VFR BLD CALC: 24 % — LOW (ref 39–50)
HGB BLD-MCNC: 8.2 G/DL — LOW (ref 13–17)
MAGNESIUM SERPL-MCNC: 1.7 MG/DL — SIGNIFICANT CHANGE UP (ref 1.6–2.6)
MCHC RBC-ENTMCNC: 29.7 PG — SIGNIFICANT CHANGE UP (ref 27–34)
MCHC RBC-ENTMCNC: 34.2 G/DL — SIGNIFICANT CHANGE UP (ref 32–36)
MCV RBC AUTO: 87 FL — SIGNIFICANT CHANGE UP (ref 80–100)
NRBC BLD AUTO-RTO: 0 /100 WBCS — SIGNIFICANT CHANGE UP (ref 0–0)
PHOSPHATE SERPL-MCNC: 3.3 MG/DL — SIGNIFICANT CHANGE UP (ref 2.5–4.5)
PLATELET # BLD AUTO: 193 K/UL — SIGNIFICANT CHANGE UP (ref 150–400)
POTASSIUM SERPL-MCNC: 3.9 MMOL/L — SIGNIFICANT CHANGE UP (ref 3.5–5.3)
POTASSIUM SERPL-SCNC: 3.9 MMOL/L — SIGNIFICANT CHANGE UP (ref 3.5–5.3)
PROT SERPL-MCNC: 5.9 GM/DL — LOW (ref 6–8.3)
RBC # BLD: 2.76 M/UL — LOW (ref 4.2–5.8)
RBC # FLD: 15.8 % — HIGH (ref 10.3–14.5)
SODIUM SERPL-SCNC: 144 MMOL/L — SIGNIFICANT CHANGE UP (ref 135–145)
WBC # BLD: 10.89 K/UL — HIGH (ref 3.8–10.5)
WBC # FLD AUTO: 10.89 K/UL — HIGH (ref 3.8–10.5)

## 2025-07-04 PROCEDURE — 99232 SBSQ HOSP IP/OBS MODERATE 35: CPT

## 2025-07-04 RX ORDER — DEXTROSE 50 % IN WATER 50 %
12.5 SYRINGE (ML) INTRAVENOUS ONCE
Refills: 0 | Status: COMPLETED | OUTPATIENT
Start: 2025-07-04 | End: 2025-07-04

## 2025-07-04 RX ORDER — INSULIN GLARGINE-YFGN 100 [IU]/ML
10 INJECTION, SOLUTION SUBCUTANEOUS AT BEDTIME
Refills: 0 | Status: DISCONTINUED | OUTPATIENT
Start: 2025-07-04 | End: 2025-07-07

## 2025-07-04 RX ADMIN — METOPROLOL SUCCINATE 75 MILLIGRAM(S): 50 TABLET, EXTENDED RELEASE ORAL at 05:50

## 2025-07-04 RX ADMIN — Medication 1 APPLICATION(S): at 05:52

## 2025-07-04 RX ADMIN — CALCITRIOL 0.25 MICROGRAM(S): 0.5 CAPSULE, GELATIN COATED ORAL at 11:35

## 2025-07-04 RX ADMIN — IPRATROPIUM BROMIDE AND ALBUTEROL SULFATE 3 MILLILITER(S): .5; 2.5 SOLUTION RESPIRATORY (INHALATION) at 22:04

## 2025-07-04 RX ADMIN — APIXABAN 2.5 MILLIGRAM(S): 5 TABLET, FILM COATED ORAL at 05:50

## 2025-07-04 RX ADMIN — SODIUM CHLORIDE 75 MILLILITER(S): 9 INJECTION, SOLUTION INTRAVENOUS at 11:35

## 2025-07-04 RX ADMIN — CLOPIDOGREL BISULFATE 75 MILLIGRAM(S): 75 TABLET, FILM COATED ORAL at 11:35

## 2025-07-04 RX ADMIN — TAMSULOSIN HYDROCHLORIDE 0.4 MILLIGRAM(S): 0.4 CAPSULE ORAL at 21:30

## 2025-07-04 RX ADMIN — INSULIN LISPRO 2: 100 INJECTION, SOLUTION INTRAVENOUS; SUBCUTANEOUS at 11:36

## 2025-07-04 RX ADMIN — INSULIN LISPRO 3 UNIT(S): 100 INJECTION, SOLUTION INTRAVENOUS; SUBCUTANEOUS at 11:36

## 2025-07-04 RX ADMIN — INSULIN GLARGINE-YFGN 10 UNIT(S): 100 INJECTION, SOLUTION SUBCUTANEOUS at 21:30

## 2025-07-04 RX ADMIN — INSULIN LISPRO 3 UNIT(S): 100 INJECTION, SOLUTION INTRAVENOUS; SUBCUTANEOUS at 08:31

## 2025-07-04 RX ADMIN — Medication 325 MILLIGRAM(S): at 11:35

## 2025-07-04 RX ADMIN — SODIUM CHLORIDE 75 MILLILITER(S): 9 INJECTION, SOLUTION INTRAVENOUS at 00:42

## 2025-07-04 RX ADMIN — Medication 12.5 GRAM(S): at 16:36

## 2025-07-04 RX ADMIN — APIXABAN 2.5 MILLIGRAM(S): 5 TABLET, FILM COATED ORAL at 18:12

## 2025-07-04 RX ADMIN — ATORVASTATIN CALCIUM 80 MILLIGRAM(S): 80 TABLET, FILM COATED ORAL at 21:30

## 2025-07-04 RX ADMIN — SODIUM CHLORIDE 75 MILLILITER(S): 9 INJECTION, SOLUTION INTRAVENOUS at 21:30

## 2025-07-04 NOTE — PROGRESS NOTE ADULT - ASSESSMENT
85 year-old male with past medical history of hypertension, stage IV CKD, CVA, type 2 diabetes, A-fib, iron deficiency anemia, bradycardia status post pacemaker, recent NSTEMI, ischemic cardiomyopathy who presented to the ED from [facility] due to left-sided chest pain and was admitted for sepsis due to PNA and CHF exacerbation required ICU stay this admission, transferred to the floor 6/28/25.     Hypernatremia   - suspect due to overdiuresis   - Started D5W per renal      TAYLOR on CKD stage IV  - Baseline Cr 2-2.4, Cr peaked at 7.64 this admission  - suspect TAYLOR is multifactorial, cardio-renal and ATN component     Sepsis due to PNA POA   - s/p vasoactive requirement in the ICU  - completed course of Zosyn as per ID  - completed stress dose steroid taper as per ICU recommendations  - Bcx negative  - legionella/strep negative     Delirium w/ Metabolic encephalopathy   - Daughter describes intermittent episodes of confusion suspect due to delirium  - also suspect metabolic derangements affecting mental status, D5W as below for hypernatremia.     #ADHF  #ICM   #HFrEF (EF 30-35%)  Pro-BNP on admission 7166. S/p bumex gtt in the ICU  Bumex held due to overiduresis per renal recommendations. Was on GDMT although discontinued due to worsening renal function and shock.   Will re-consult cardiology to assist in resumption of GDMT now that clinical status has improved-->start toprol 75 daily and parameters, cardiology signed off   GDMT limited due to renal function    #type 2 NSTEMI   Elevated troponin this admission. Cardiology saw earlier in admission, suspect due to sepsis and poor clearance given renal function.   Cont PTA DOAC and plavix     #Afib   #Sick sinus syndrome   S/p Micra pacemaker.   Cont eliquis   resume metop 75 XL daily per cardiology note    #DM  A1c 7.8. Monitor blood glucose while on steroids.   Cont SSI and 18u glargine, 3u lispro        AIDA  - baseline Hb ~10  - s/p 1unit pRBC 6/28, Hb 6.8-->9.1.   - No s/s bleeding, unclear if 6.8 was true given >1g/dl improvement with only 1u pRBC    Hx of CVA  - c/w Plavix & Eliquis     Dysphagia   - SLP evaluated 6/20 recommended puree diet      Prophylaxis:  DVT: Eliquis   GI:      dispo: VITALIY pending the above

## 2025-07-04 NOTE — PROGRESS NOTE ADULT - SUBJECTIVE AND OBJECTIVE BOX
Patient is a 85y old  Male who presents with a chief complaint of Sepsis likely 2/2 pna (03 Jul 2025 21:33)      INTERVAL HPI/OVERNIGHT EVENTS:    MEDICATIONS  (STANDING):  apixaban 2.5 milliGRAM(s) Oral every 12 hours  atorvastatin 80 milliGRAM(s) Oral at bedtime  calcitriol   Capsule 0.25 MICROGram(s) Oral daily  chlorhexidine 2% Cloths 1 Application(s) Topical <User Schedule>  clopidogrel Tablet 75 milliGRAM(s) Oral daily  dextrose 5%. 1000 milliLiter(s) (100 mL/Hr) IV Continuous <Continuous>  dextrose 5%. 1000 milliLiter(s) (75 mL/Hr) IV Continuous <Continuous>  dextrose 5%. 1000 milliLiter(s) (50 mL/Hr) IV Continuous <Continuous>  dextrose 50% Injectable 25 Gram(s) IV Push once  dextrose 50% Injectable 12.5 Gram(s) IV Push once  dextrose 50% Injectable 25 Gram(s) IV Push once  dextrose Oral Gel 15 Gram(s) Oral once  ferrous    sulfate 325 milliGRAM(s) Oral daily  glucagon  Injectable 1 milliGRAM(s) IntraMuscular once  insulin glargine Injectable (LANTUS) 18 Unit(s) SubCutaneous at bedtime  insulin lispro (ADMELOG) corrective regimen sliding scale   SubCutaneous three times a day before meals  insulin lispro Injectable (ADMELOG) 3 Unit(s) SubCutaneous three times a day before meals  metOLazone 5 milliGRAM(s) Oral daily  metoprolol succinate ER 75 milliGRAM(s) Oral daily  tamsulosin 0.4 milliGRAM(s) Oral at bedtime    MEDICATIONS  (PRN):  acetaminophen     Tablet .. 650 milliGRAM(s) Oral every 6 hours PRN Temp greater or equal to 38C (100.4F), Mild Pain (1 - 3)  albuterol/ipratropium for Nebulization 3 milliLiter(s) Nebulizer every 6 hours PRN Shortness of Breath and/or Wheezing  melatonin 3 milliGRAM(s) Oral at bedtime PRN Insomnia      Allergies    No Known Allergies    Intolerances        Vital Signs Last 24 Hrs  T(C): 36.4 (04 Jul 2025 16:24), Max: 36.4 (03 Jul 2025 23:31)  T(F): 97.5 (04 Jul 2025 16:24), Max: 97.6 (04 Jul 2025 05:12)  HR: 71 (04 Jul 2025 16:24) (67 - 87)  BP: 130/70 (04 Jul 2025 16:24) (118/64 - 132/62)  BP(mean): --  RR: 18 (04 Jul 2025 16:24) (17 - 18)  SpO2: 95% (04 Jul 2025 16:24) (95% - 99%)    Parameters below as of 04 Jul 2025 16:24  Patient On (Oxygen Delivery Method): room air        PHYSICAL EXAM:  GENERAL: NAD, well-groomed, well-developed  HEAD:  Atraumatic, Normocephalic  EYES: EOMI, PERRLA, conjunctiva and sclera clear  ENMT: No tonsillar erythema, exudates, or enlargement; Moist mucous membranes, Good dentition, No lesions  NECK: Supple, No JVD, Normal thyroid  NERVOUS SYSTEM:  Alert & Oriented X3, Good concentration; Motor Strength 5/5 B/L upper and lower extremities; DTRs 2+ intact and symmetric  CHEST/LUNG: Clear to auscultation bilaterally; No rales, rhonchi, wheezing, or rubs  HEART: Regular rate and rhythm; No murmurs, rubs, or gallops  ABDOMEN: Soft, Nontender, Nondistended; Bowel sounds present  EXTREMITIES:  2+ Peripheral Pulses, No clubbing, cyanosis, or edema  LYMPH: No lymphadenopathy noted  SKIN: No rashes or lesions    LABS:                        8.2    10.89 )-----------( 193      ( 04 Jul 2025 08:13 )             24.0     07-04    144  |  111[H]  |  69[H]  ----------------------------<  133[H]  3.9   |  26  |  2.58[H]    Ca    7.1[L]      04 Jul 2025 08:13  Phos  3.3     07-04  Mg     1.7     07-04    TPro  5.9[L]  /  Alb  1.8[L]  /  TBili  0.5  /  DBili  x   /  AST  29  /  ALT  67  /  AlkPhos  145[H]  07-04      Urinalysis Basic - ( 04 Jul 2025 08:13 )    Color: x / Appearance: x / SG: x / pH: x  Gluc: 133 mg/dL / Ketone: x  / Bili: x / Urobili: x   Blood: x / Protein: x / Nitrite: x   Leuk Esterase: x / RBC: x / WBC x   Sq Epi: x / Non Sq Epi: x / Bacteria: x      CAPILLARY BLOOD GLUCOSE      POCT Blood Glucose.: 176 mg/dL (04 Jul 2025 16:45)  POCT Blood Glucose.: 54 mg/dL (04 Jul 2025 16:25)  POCT Blood Glucose.: 41 mg/dL (04 Jul 2025 16:12)  POCT Blood Glucose.: 210 mg/dL (04 Jul 2025 11:12)  POCT Blood Glucose.: 135 mg/dL (04 Jul 2025 08:05)  POCT Blood Glucose.: 129 mg/dL (03 Jul 2025 21:17)      RADIOLOGY & ADDITIONAL TESTS:    07-03-25 @ 07:01  -  07-04-25 @ 07:00  --------------------------------------------------------  IN:    dextrose 5%: 900 mL  Total IN: 900 mL    OUT:    Voided (mL): 650 mL  Total OUT: 650 mL    Total NET: 250 mL      07-04-25 @ 07:01  -  07-04-25 @ 18:58  --------------------------------------------------------  IN:    dextrose 5%: 900 mL  Total IN: 900 mL    OUT:  Total OUT: 0 mL    Total NET: 900 mL

## 2025-07-05 LAB
ALBUMIN SERPL ELPH-MCNC: 1.8 G/DL — LOW (ref 3.3–5)
ALP SERPL-CCNC: 207 U/L — HIGH (ref 40–120)
ALT FLD-CCNC: 102 U/L — HIGH (ref 12–78)
ANION GAP SERPL CALC-SCNC: 8 MMOL/L — SIGNIFICANT CHANGE UP (ref 5–17)
AST SERPL-CCNC: 71 U/L — HIGH (ref 15–37)
BILIRUB SERPL-MCNC: 0.5 MG/DL — SIGNIFICANT CHANGE UP (ref 0.2–1.2)
BUN SERPL-MCNC: 71 MG/DL — HIGH (ref 7–23)
CALCIUM SERPL-MCNC: 7.4 MG/DL — LOW (ref 8.5–10.1)
CHLORIDE SERPL-SCNC: 106 MMOL/L — SIGNIFICANT CHANGE UP (ref 96–108)
CO2 SERPL-SCNC: 25 MMOL/L — SIGNIFICANT CHANGE UP (ref 22–31)
CREAT SERPL-MCNC: 2.78 MG/DL — HIGH (ref 0.5–1.3)
EGFR: 22 ML/MIN/1.73M2 — LOW
EGFR: 22 ML/MIN/1.73M2 — LOW
GLUCOSE BLDC GLUCOMTR-MCNC: 130 MG/DL — HIGH (ref 70–99)
GLUCOSE BLDC GLUCOMTR-MCNC: 134 MG/DL — HIGH (ref 70–99)
GLUCOSE BLDC GLUCOMTR-MCNC: 275 MG/DL — HIGH (ref 70–99)
GLUCOSE BLDC GLUCOMTR-MCNC: 315 MG/DL — HIGH (ref 70–99)
GLUCOSE SERPL-MCNC: 305 MG/DL — HIGH (ref 70–99)
HCT VFR BLD CALC: 23.5 % — LOW (ref 39–50)
HGB BLD-MCNC: 8.1 G/DL — LOW (ref 13–17)
MAGNESIUM SERPL-MCNC: 1.8 MG/DL — SIGNIFICANT CHANGE UP (ref 1.6–2.6)
MCHC RBC-ENTMCNC: 29.9 PG — SIGNIFICANT CHANGE UP (ref 27–34)
MCHC RBC-ENTMCNC: 34.5 G/DL — SIGNIFICANT CHANGE UP (ref 32–36)
MCV RBC AUTO: 86.7 FL — SIGNIFICANT CHANGE UP (ref 80–100)
NRBC BLD AUTO-RTO: 0 /100 WBCS — SIGNIFICANT CHANGE UP (ref 0–0)
PHOSPHATE SERPL-MCNC: 3.5 MG/DL — SIGNIFICANT CHANGE UP (ref 2.5–4.5)
PLATELET # BLD AUTO: 209 K/UL — SIGNIFICANT CHANGE UP (ref 150–400)
POTASSIUM SERPL-MCNC: 3.9 MMOL/L — SIGNIFICANT CHANGE UP (ref 3.5–5.3)
POTASSIUM SERPL-SCNC: 3.9 MMOL/L — SIGNIFICANT CHANGE UP (ref 3.5–5.3)
PROT SERPL-MCNC: 6 GM/DL — SIGNIFICANT CHANGE UP (ref 6–8.3)
RBC # BLD: 2.71 M/UL — LOW (ref 4.2–5.8)
RBC # FLD: 15.5 % — HIGH (ref 10.3–14.5)
SODIUM SERPL-SCNC: 139 MMOL/L — SIGNIFICANT CHANGE UP (ref 135–145)
WBC # BLD: 8.3 K/UL — SIGNIFICANT CHANGE UP (ref 3.8–10.5)
WBC # FLD AUTO: 8.3 K/UL — SIGNIFICANT CHANGE UP (ref 3.8–10.5)

## 2025-07-05 PROCEDURE — 99232 SBSQ HOSP IP/OBS MODERATE 35: CPT

## 2025-07-05 RX ADMIN — APIXABAN 2.5 MILLIGRAM(S): 5 TABLET, FILM COATED ORAL at 17:07

## 2025-07-05 RX ADMIN — INSULIN GLARGINE-YFGN 10 UNIT(S): 100 INJECTION, SOLUTION SUBCUTANEOUS at 21:09

## 2025-07-05 RX ADMIN — INSULIN LISPRO 3 UNIT(S): 100 INJECTION, SOLUTION INTRAVENOUS; SUBCUTANEOUS at 10:56

## 2025-07-05 RX ADMIN — ATORVASTATIN CALCIUM 80 MILLIGRAM(S): 80 TABLET, FILM COATED ORAL at 21:10

## 2025-07-05 RX ADMIN — INSULIN LISPRO 3 UNIT(S): 100 INJECTION, SOLUTION INTRAVENOUS; SUBCUTANEOUS at 08:42

## 2025-07-05 RX ADMIN — INSULIN LISPRO 4: 100 INJECTION, SOLUTION INTRAVENOUS; SUBCUTANEOUS at 08:41

## 2025-07-05 RX ADMIN — INSULIN LISPRO 3 UNIT(S): 100 INJECTION, SOLUTION INTRAVENOUS; SUBCUTANEOUS at 16:51

## 2025-07-05 RX ADMIN — Medication 3 MILLIGRAM(S): at 21:11

## 2025-07-05 RX ADMIN — Medication 1 APPLICATION(S): at 05:19

## 2025-07-05 RX ADMIN — METOPROLOL SUCCINATE 75 MILLIGRAM(S): 50 TABLET, EXTENDED RELEASE ORAL at 05:19

## 2025-07-05 RX ADMIN — TAMSULOSIN HYDROCHLORIDE 0.4 MILLIGRAM(S): 0.4 CAPSULE ORAL at 21:11

## 2025-07-05 RX ADMIN — CLOPIDOGREL BISULFATE 75 MILLIGRAM(S): 75 TABLET, FILM COATED ORAL at 11:26

## 2025-07-05 RX ADMIN — INSULIN LISPRO 3: 100 INJECTION, SOLUTION INTRAVENOUS; SUBCUTANEOUS at 10:56

## 2025-07-05 RX ADMIN — Medication 325 MILLIGRAM(S): at 11:26

## 2025-07-05 RX ADMIN — APIXABAN 2.5 MILLIGRAM(S): 5 TABLET, FILM COATED ORAL at 05:19

## 2025-07-05 RX ADMIN — CALCITRIOL 0.25 MICROGRAM(S): 0.5 CAPSULE, GELATIN COATED ORAL at 11:26

## 2025-07-05 RX ADMIN — SODIUM CHLORIDE 75 MILLILITER(S): 9 INJECTION, SOLUTION INTRAVENOUS at 05:19

## 2025-07-05 NOTE — PROGRESS NOTE ADULT - SUBJECTIVE AND OBJECTIVE BOX
Patient is a 85y old  Male who presents with a chief complaint of Sepsis likely 2/2 pna (04 Jul 2025 18:58)      INTERVAL HPI/OVERNIGHT EVENTS:    MEDICATIONS  (STANDING):  apixaban 2.5 milliGRAM(s) Oral every 12 hours  atorvastatin 80 milliGRAM(s) Oral at bedtime  calcitriol   Capsule 0.25 MICROGram(s) Oral daily  chlorhexidine 2% Cloths 1 Application(s) Topical <User Schedule>  clopidogrel Tablet 75 milliGRAM(s) Oral daily  dextrose 5%. 1000 milliLiter(s) (50 mL/Hr) IV Continuous <Continuous>  dextrose 5%. 1000 milliLiter(s) (100 mL/Hr) IV Continuous <Continuous>  dextrose 50% Injectable 25 Gram(s) IV Push once  dextrose 50% Injectable 12.5 Gram(s) IV Push once  dextrose 50% Injectable 25 Gram(s) IV Push once  dextrose Oral Gel 15 Gram(s) Oral once  ferrous    sulfate 325 milliGRAM(s) Oral daily  glucagon  Injectable 1 milliGRAM(s) IntraMuscular once  insulin glargine Injectable (LANTUS) 10 Unit(s) SubCutaneous at bedtime  insulin lispro (ADMELOG) corrective regimen sliding scale   SubCutaneous three times a day before meals  insulin lispro Injectable (ADMELOG) 3 Unit(s) SubCutaneous three times a day before meals  metoprolol succinate ER 75 milliGRAM(s) Oral daily  tamsulosin 0.4 milliGRAM(s) Oral at bedtime    MEDICATIONS  (PRN):  acetaminophen     Tablet .. 650 milliGRAM(s) Oral every 6 hours PRN Temp greater or equal to 38C (100.4F), Mild Pain (1 - 3)  albuterol/ipratropium for Nebulization 3 milliLiter(s) Nebulizer every 6 hours PRN Shortness of Breath and/or Wheezing  melatonin 3 milliGRAM(s) Oral at bedtime PRN Insomnia      Allergies    No Known Allergies    Intolerances        Vital Signs Last 24 Hrs  T(C): 36.6 (05 Jul 2025 16:46), Max: 36.7 (04 Jul 2025 23:38)  T(F): 97.9 (05 Jul 2025 16:46), Max: 98.1 (04 Jul 2025 23:38)  HR: 71 (05 Jul 2025 21:13) (70 - 95)  BP: 120/70 (05 Jul 2025 21:13) (104/62 - 131/73)  BP(mean): --  RR: 15 (05 Jul 2025 16:46) (15 - 19)  SpO2: 100% (05 Jul 2025 16:46) (96% - 100%)    Parameters below as of 05 Jul 2025 07:00  Patient On (Oxygen Delivery Method): room air        PHYSICAL EXAM:  GENERAL: NAD, well-groomed, well-developed  HEAD:  Atraumatic, Normocephalic  EYES: EOMI, PERRLA, conjunctiva and sclera clear  ENMT: No tonsillar erythema, exudates, or enlargement; Moist mucous membranes, Good dentition, No lesions  NECK: Supple, No JVD, Normal thyroid  NERVOUS SYSTEM:  Alert & Oriented X3, Good concentration; Motor Strength 5/5 B/L upper and lower extremities; DTRs 2+ intact and symmetric  CHEST/LUNG: Clear to auscultation bilaterally; No rales, rhonchi, wheezing, or rubs  HEART: Regular rate and rhythm; No murmurs, rubs, or gallops  ABDOMEN: Soft, Nontender, Nondistended; Bowel sounds present  EXTREMITIES:  2+ Peripheral Pulses, No clubbing, cyanosis, or edema  LYMPH: No lymphadenopathy noted  SKIN: No rashes or lesions    LABS:                        8.1    8.30  )-----------( 209      ( 05 Jul 2025 08:14 )             23.5     07-05    139  |  106  |  71[H]  ----------------------------<  305[H]  3.9   |  25  |  2.78[H]    Ca    7.4[L]      05 Jul 2025 08:14  Phos  3.5     07-05  Mg     1.8     07-05    TPro  6.0  /  Alb  1.8[L]  /  TBili  0.5  /  DBili  x   /  AST  71[H]  /  ALT  102[H]  /  AlkPhos  207[H]  07-05      Urinalysis Basic - ( 05 Jul 2025 08:14 )    Color: x / Appearance: x / SG: x / pH: x  Gluc: 305 mg/dL / Ketone: x  / Bili: x / Urobili: x   Blood: x / Protein: x / Nitrite: x   Leuk Esterase: x / RBC: x / WBC x   Sq Epi: x / Non Sq Epi: x / Bacteria: x      CAPILLARY BLOOD GLUCOSE      POCT Blood Glucose.: 134 mg/dL (05 Jul 2025 21:08)  POCT Blood Glucose.: 130 mg/dL (05 Jul 2025 16:35)  POCT Blood Glucose.: 275 mg/dL (05 Jul 2025 10:54)  POCT Blood Glucose.: 315 mg/dL (05 Jul 2025 08:15)      RADIOLOGY & ADDITIONAL TESTS:    07-04-25 @ 07:01  -  07-05-25 @ 07:00  --------------------------------------------------------  IN:    dextrose 5%: 900 mL  Total IN: 900 mL    OUT:    Voided (mL): 700 mL  Total OUT: 700 mL    Total NET: 200 mL      07-05-25 @ 07:01  -  07-05-25 @ 21:18  --------------------------------------------------------  IN:    Oral Fluid: 400 mL  Total IN: 400 mL    OUT:  Total OUT: 0 mL    Total NET: 400 mL       85 year-old male with past medical history of hypertension, stage IV CKD, CVA, type 2 diabetes, A-fib, iron deficiency anemia, bradycardia status post pacemaker, recent NSTEMI, ischemic cardiomyopathy who presented to the ED from [facility] due to left-sided chest pain and was admitted for sepsis due to PNA on 6/19. RRT was called for hypotension on 6/23 and the patient was transferred to the ICU on pressors and required diuresis w/ bumex for acute on chronic CHF. The patient was also started on stress dose steroids. He came off pressors and was  transferred to the floor 6/28/25. His hospital course was further complicated by hypernatremia post diuresis. He is lying in bed in NAD.    MEDICATIONS  (STANDING):  apixaban 2.5 milliGRAM(s) Oral every 12 hours  atorvastatin 80 milliGRAM(s) Oral at bedtime  calcitriol   Capsule 0.25 MICROGram(s) Oral daily  chlorhexidine 2% Cloths 1 Application(s) Topical <User Schedule>  clopidogrel Tablet 75 milliGRAM(s) Oral daily  dextrose 5%. 1000 milliLiter(s) (50 mL/Hr) IV Continuous <Continuous>  dextrose 5%. 1000 milliLiter(s) (100 mL/Hr) IV Continuous <Continuous>  dextrose 50% Injectable 25 Gram(s) IV Push once  dextrose 50% Injectable 12.5 Gram(s) IV Push once  dextrose 50% Injectable 25 Gram(s) IV Push once  dextrose Oral Gel 15 Gram(s) Oral once  ferrous    sulfate 325 milliGRAM(s) Oral daily  glucagon  Injectable 1 milliGRAM(s) IntraMuscular once  insulin glargine Injectable (LANTUS) 10 Unit(s) SubCutaneous at bedtime  insulin lispro (ADMELOG) corrective regimen sliding scale   SubCutaneous three times a day before meals  insulin lispro Injectable (ADMELOG) 3 Unit(s) SubCutaneous three times a day before meals  metoprolol succinate ER 75 milliGRAM(s) Oral daily  tamsulosin 0.4 milliGRAM(s) Oral at bedtime    MEDICATIONS  (PRN):  acetaminophen     Tablet .. 650 milliGRAM(s) Oral every 6 hours PRN Temp greater or equal to 38C (100.4F), Mild Pain (1 - 3)  albuterol/ipratropium for Nebulization 3 milliLiter(s) Nebulizer every 6 hours PRN Shortness of Breath and/or Wheezing  melatonin 3 milliGRAM(s) Oral at bedtime PRN Insomnia      Allergies    No Known Allergies    Intolerances        Vital Signs Last 24 Hrs  T(C): 36.6 (05 Jul 2025 16:46), Max: 36.7 (04 Jul 2025 23:38)  T(F): 97.9 (05 Jul 2025 16:46), Max: 98.1 (04 Jul 2025 23:38)  HR: 71 (05 Jul 2025 21:13) (70 - 95)  BP: 120/70 (05 Jul 2025 21:13) (104/62 - 131/73)   RR: 15 (05 Jul 2025 16:46) (15 - 19)  SpO2: 100% (05 Jul 2025 16:46) (96% - 100%)    Parameters below as of 05 Jul 2025 07:00  Patient On (Oxygen Delivery Method): room air        PHYSICAL EXAM:  GENERAL: NAD   NERVOUS SYSTEM: Alert    CHEST/LUNG: Clear to auscultation bilaterally; No rales, rhonchi, wheezing, or rubs  HEART: Regular rate and rhythm; No murmurs, rubs, or gallops  ABDOMEN: Soft, Nontender, Nondistended; Bowel sounds present  EXTREMITIES:  No clubbing, cyanosis, or edema       LABS:                        8.1    8.30  )-----------( 209      ( 05 Jul 2025 08:14 )             23.5     07-05    139  |  106  |  71[H]  ----------------------------<  305[H]  3.9   |  25  |  2.78[H]    Ca    7.4[L]      05 Jul 2025 08:14  Phos  3.5     07-05  Mg     1.8     07-05    TPro  6.0  /  Alb  1.8[L]  /  TBili  0.5  /  DBili  x   /  AST  71[H]  /  ALT  102[H]  /  AlkPhos  207[H]  07-05      Urinalysis Basic - ( 05 Jul 2025 08:14 )    Color: x / Appearance: x / SG: x / pH: x  Gluc: 305 mg/dL / Ketone: x  / Bili: x / Urobili: x   Blood: x / Protein: x / Nitrite: x   Leuk Esterase: x / RBC: x / WBC x   Sq Epi: x / Non Sq Epi: x / Bacteria: x      CAPILLARY BLOOD GLUCOSE      POCT Blood Glucose.: 134 mg/dL (05 Jul 2025 21:08)  POCT Blood Glucose.: 130 mg/dL (05 Jul 2025 16:35)  POCT Blood Glucose.: 275 mg/dL (05 Jul 2025 10:54)  POCT Blood Glucose.: 315 mg/dL (05 Jul 2025 08:15)      RADIOLOGY & ADDITIONAL TESTS:    07-04-25 @ 07:01  -  07-05-25 @ 07:00  --------------------------------------------------------  IN:    dextrose 5%: 900 mL  Total IN: 900 mL    OUT:    Voided (mL): 700 mL  Total OUT: 700 mL    Total NET: 200 mL      07-05-25 @ 07:01  -  07-05-25 @ 21:18  --------------------------------------------------------  IN:    Oral Fluid: 400 mL  Total IN: 400 mL    OUT:  Total OUT: 0 mL    Total NET: 400 mL

## 2025-07-05 NOTE — PROGRESS NOTE ADULT - ASSESSMENT
85 year-old male with past medical history of hypertension, stage IV CKD, CVA, type 2 diabetes, A-fib, iron deficiency anemia, bradycardia status post pacemaker, recent NSTEMI, ischemic cardiomyopathy who presented to the ED from [facility] due to left-sided chest pain and was admitted for sepsis due to PNA and CHF exacerbation required ICU stay this admission, transferred to the floor 6/28/25.     Hypernatremia   - suspect due to overdiuresis   - Started D5W per renal      TAYLOR on CKD stage IV  - Baseline Cr 2-2.4, Cr peaked at 7.64 this admission  - suspect TAYLOR is multifactorial, cardio-renal and ATN component     Sepsis due to PNA POA   - s/p vasoactive requirement in the ICU  - completed course of Zosyn as per ID  - completed stress dose steroid taper as per ICU recommendations  - Bcx negative  - legionella/strep negative     Delirium w/ Metabolic encephalopathy   - Daughter describes intermittent episodes of confusion suspect due to delirium  - also suspect metabolic derangements affecting mental status, D5W as below for hypernatremia.     #ADHF  #ICM   #HFrEF (EF 30-35%)  Pro-BNP on admission 7166. S/p bumex gtt in the ICU  Bumex held due to overiduresis per renal recommendations. Was on GDMT although discontinued due to worsening renal function and shock.   Will re-consult cardiology to assist in resumption of GDMT now that clinical status has improved-->start toprol 75 daily and parameters, cardiology signed off   GDMT limited due to renal function    #type 2 NSTEMI   Elevated troponin this admission. Cardiology saw earlier in admission, suspect due to sepsis and poor clearance given renal function.   Cont PTA DOAC and plavix     #Afib   #Sick sinus syndrome   S/p Micra pacemaker.   Cont eliquis   resume metop 75 XL daily per cardiology note    #DM  A1c 7.8. Monitor blood glucose while on steroids.   Cont SSI and 18u glargine, 3u lispro        AIDA  - baseline Hb ~10  - s/p 1unit pRBC 6/28, Hb 6.8-->9.1.   - No s/s bleeding, unclear if 6.8 was true given >1g/dl improvement with only 1u pRBC    Hx of CVA  - c/w Plavix & Eliquis     Dysphagia   - SLP evaluated 6/20 recommended puree diet      Prophylaxis:  DVT: Eliquis   GI:      dispo: VTIALIY pending the above    85 year-old male with past medical history of hypertension, stage IV CKD, CVA, type 2 diabetes, A-fib, iron deficiency anemia, bradycardia status post pacemaker, recent NSTEMI, ischemic cardiomyopathy who presented to the ED from [facility] due to left-sided chest pain and was admitted for sepsis due to PNA on 6/19. RRT was called for hypotension on 6/23 and the patient was transferred to the ICU on pressors and required diuresis w/ bumex for acute on chronic CHF. The patient was also started on stress dose steroids. He came off pressors and was  transferred to the floor 6/28/25. His hospital course was further complicated by hypernatremia post diuresis.     Hypernatremia   - suspect due to diuresis   - resolved w/ D5W per renal      TAYLOR on CKD stage IV  - Baseline Cr 2-2.4, Cr peaked at 7.64 this admission  - suspect TAYLOR is multifactorial, cardio-renal and ATN component     Sepsis due to PNA POA   - CT chest w/ a small focal airspace opacity in the right upper lobe with associated cavitation and scattered patchy groundglass opacities throughout the lungs with a predominance in the right upper lobe likely infectious in etiology and small bilateral pleural effusions with adjacent compressive atelectasis  - s/p vasoactive requirement in the ICU  - completed course of Zosyn & Vanc as per ID  - completed stress dose steroid taper as per ICU recommendations  - Bcx negative  - legionella/strep negative     Delirium w/ Metabolic encephalopathy   - Daughter describes intermittent episodes of confusion suspect due to delirium  - also suspect metabolic derangements affecting mental status, D5W as below for hypernatremia.     Chronic HFrEF (EF 30-35%) w/ ICM  - Pro-BNP on admission 7166. S/p bumex gtt in the ICU  - Echo showed EF 30-35% w/ global left ventricular hypokinesis, at least moderate mitral regurgitation, trileaflet aortic valve with reduced systolic excursion, mild aortic stenosis, mModerate to severe tricuspid regurgitation and moderate pulmonic regurgitation  - Bumex held due to diuresis per renal recommendations. Was on GDMT although discontinued due to worsening renal function and shock.   - as per cardiology, patient is on Toprol, cardiology signed off   - GDMT limited due to renal function    type 2 NSTEMI   - Elevated troponin this admission  -  Cardiology saw earlier in admission, suspect due to sepsis and poor clearance given renal function.   - c/w Plavix       A. fib w/ sick sinus syndrome s/p Micra pacemaker.   - c/w Eliquis & metoprolol      DM II  - Hgb A1c 7.8   - c/w SSI, glargine & lispro     AIDA  - baseline Hb ~10  - s/p 1unit pRBC 6/28, Hb 6.8-->9.1.   - No s/s bleeding, unclear if 6.8 was true given >1g/dl improvement with only 1u pRBC  - c/w ferrous sulfate    Hx of CVA  - c/w Plavix & Eliquis     Dysphagia   - SLP evaluated 6/20 recommended puree diet      BPH  - c/w Flomax    HLD  - c/w Lipitor     Prophylaxis:  DVT: Eliquis   GI:  PO diet    dispo: VITALIY pending the above

## 2025-07-06 LAB
GLUCOSE BLDC GLUCOMTR-MCNC: 175 MG/DL — HIGH (ref 70–99)
GLUCOSE BLDC GLUCOMTR-MCNC: 261 MG/DL — HIGH (ref 70–99)
GLUCOSE BLDC GLUCOMTR-MCNC: 98 MG/DL — SIGNIFICANT CHANGE UP (ref 70–99)
GLUCOSE BLDC GLUCOMTR-MCNC: 99 MG/DL — SIGNIFICANT CHANGE UP (ref 70–99)

## 2025-07-06 PROCEDURE — 99232 SBSQ HOSP IP/OBS MODERATE 35: CPT

## 2025-07-06 RX ADMIN — APIXABAN 2.5 MILLIGRAM(S): 5 TABLET, FILM COATED ORAL at 05:12

## 2025-07-06 RX ADMIN — ATORVASTATIN CALCIUM 80 MILLIGRAM(S): 80 TABLET, FILM COATED ORAL at 21:51

## 2025-07-06 RX ADMIN — METOPROLOL SUCCINATE 75 MILLIGRAM(S): 50 TABLET, EXTENDED RELEASE ORAL at 05:12

## 2025-07-06 RX ADMIN — INSULIN LISPRO 3: 100 INJECTION, SOLUTION INTRAVENOUS; SUBCUTANEOUS at 17:32

## 2025-07-06 RX ADMIN — Medication 325 MILLIGRAM(S): at 12:31

## 2025-07-06 RX ADMIN — Medication 1 APPLICATION(S): at 05:12

## 2025-07-06 RX ADMIN — APIXABAN 2.5 MILLIGRAM(S): 5 TABLET, FILM COATED ORAL at 17:33

## 2025-07-06 RX ADMIN — INSULIN GLARGINE-YFGN 10 UNIT(S): 100 INJECTION, SOLUTION SUBCUTANEOUS at 21:51

## 2025-07-06 RX ADMIN — CLOPIDOGREL BISULFATE 75 MILLIGRAM(S): 75 TABLET, FILM COATED ORAL at 12:31

## 2025-07-06 RX ADMIN — TAMSULOSIN HYDROCHLORIDE 0.4 MILLIGRAM(S): 0.4 CAPSULE ORAL at 21:51

## 2025-07-06 RX ADMIN — CALCITRIOL 0.25 MICROGRAM(S): 0.5 CAPSULE, GELATIN COATED ORAL at 12:31

## 2025-07-06 NOTE — PROGRESS NOTE ADULT - SUBJECTIVE AND OBJECTIVE BOX
85 year-old male with past medical history of hypertension, stage IV CKD, CVA, type 2 diabetes, A-fib, iron deficiency anemia, bradycardia status post pacemaker, recent NSTEMI, ischemic cardiomyopathy who presented to the ED from [facility] due to left-sided chest pain and was admitted for sepsis due to PNA on 6/19. RRT was called for hypotension on 6/23 and the patient was transferred to the ICU on pressors and required diuresis w/ bumex for acute on chronic CHF. The patient was also started on stress dose steroids. He came off pressors and was  transferred to the floor 6/28/25. His hospital course was further complicated by hypernatremia post diuresis. He is lying in bed in NAD.     MEDICATIONS  (STANDING):  apixaban 2.5 milliGRAM(s) Oral every 12 hours  atorvastatin 80 milliGRAM(s) Oral at bedtime  calcitriol   Capsule 0.25 MICROGram(s) Oral daily  chlorhexidine 2% Cloths 1 Application(s) Topical <User Schedule>  clopidogrel Tablet 75 milliGRAM(s) Oral daily  dextrose 5%. 1000 milliLiter(s) (50 mL/Hr) IV Continuous <Continuous>  dextrose 5%. 1000 milliLiter(s) (100 mL/Hr) IV Continuous <Continuous>  dextrose 50% Injectable 25 Gram(s) IV Push once  dextrose 50% Injectable 12.5 Gram(s) IV Push once  dextrose 50% Injectable 25 Gram(s) IV Push once  dextrose Oral Gel 15 Gram(s) Oral once  ferrous    sulfate 325 milliGRAM(s) Oral daily  glucagon  Injectable 1 milliGRAM(s) IntraMuscular once  insulin glargine Injectable (LANTUS) 10 Unit(s) SubCutaneous at bedtime  insulin lispro (ADMELOG) corrective regimen sliding scale   SubCutaneous three times a day before meals  insulin lispro Injectable (ADMELOG) 3 Unit(s) SubCutaneous three times a day before meals  metoprolol succinate ER 75 milliGRAM(s) Oral daily  tamsulosin 0.4 milliGRAM(s) Oral at bedtime    MEDICATIONS  (PRN):  acetaminophen     Tablet .. 650 milliGRAM(s) Oral every 6 hours PRN Temp greater or equal to 38C (100.4F), Mild Pain (1 - 3)  albuterol/ipratropium for Nebulization 3 milliLiter(s) Nebulizer every 6 hours PRN Shortness of Breath and/or Wheezing  melatonin 3 milliGRAM(s) Oral at bedtime PRN Insomnia      Allergies    No Known Allergies    Intolerances        Vital Signs Last 24 Hrs  T(C): 36.3 (06 Jul 2025 17:00), Max: 36.7 (06 Jul 2025 11:55)  T(F): 97.3 (06 Jul 2025 17:00), Max: 98 (06 Jul 2025 11:55)  HR: 81 (06 Jul 2025 17:00) (67 - 81)  BP: 115/68 (06 Jul 2025 17:00) (100/56 - 115/68)  BP(mean): --  RR: 18 (06 Jul 2025 17:00) (17 - 18)  SpO2: 99% (06 Jul 2025 17:00) (98% - 100%)    Parameters below as of 06 Jul 2025 11:55  Patient On (Oxygen Delivery Method): room air        PHYSICAL EXAM:  GENERAL: NAD   NERVOUS SYSTEM: Alert    CHEST/LUNG: Clear to auscultation bilaterally; No rales, rhonchi, wheezing, or rubs  HEART: Regular rate and rhythm; No murmurs, rubs, or gallops  ABDOMEN: Soft, Nontender, Nondistended; Bowel sounds present  EXTREMITIES:  No clubbing, cyanosis, or edema    LABS:                        8.1    8.30  )-----------( 209      ( 05 Jul 2025 08:14 )             23.5     07-05    139  |  106  |  71[H]  ----------------------------<  305[H]  3.9   |  25  |  2.78[H]    Ca    7.4[L]      05 Jul 2025 08:14  Phos  3.5     07-05  Mg     1.8     07-05    TPro  6.0  /  Alb  1.8[L]  /  TBili  0.5  /  DBili  x   /  AST  71[H]  /  ALT  102[H]  /  AlkPhos  207[H]  07-05      Urinalysis Basic - ( 05 Jul 2025 08:14 )    Color: x / Appearance: x / SG: x / pH: x  Gluc: 305 mg/dL / Ketone: x  / Bili: x / Urobili: x   Blood: x / Protein: x / Nitrite: x   Leuk Esterase: x / RBC: x / WBC x   Sq Epi: x / Non Sq Epi: x / Bacteria: x       POCT Blood Glucose.: 175 mg/dL (06 Jul 2025 21:49)  POCT Blood Glucose.: 261 mg/dL (06 Jul 2025 16:38)  POCT Blood Glucose.: 99 mg/dL (06 Jul 2025 11:28)  POCT Blood Glucose.: 98 mg/dL (06 Jul 2025 08:15)      RADIOLOGY & ADDITIONAL TESTS:    07-05-25 @ 07:01  -  07-06-25 @ 07:00  --------------------------------------------------------  IN:    Oral Fluid: 400 mL  Total IN: 400 mL    OUT:  Total OUT: 0 mL    Total NET: 400 mL      07-06-25 @ 07:01  -  07-06-25 @ 21:54  --------------------------------------------------------  IN:  Total IN: 0 mL    OUT:    Voided (mL): 700 mL  Total OUT: 700 mL    Total NET: -700 mL

## 2025-07-06 NOTE — PROGRESS NOTE ADULT - NUTRITIONAL ASSESSMENT
This patient has been assessed with a concern for Malnutrition and has been determined to have a diagnosis/diagnoses of Severe protein-calorie malnutrition.    This patient is being managed with:   Diet Easy to Chew-  Entered: Jul 2 2025 11:12AM  
This patient has been assessed with a concern for Malnutrition and has been determined to have a diagnosis/diagnoses of Severe protein-calorie malnutrition.    This patient is being managed with:   Diet Pureed-  Consistent Carbohydrate {Evening Snack}  DASH/TLC {Sodium & Cholesterol Restricted}  Mildly Thick Liquids (MILDTHICKLIQS)  No Concentrated Phosphorus  Entered: Jun 29 2025  9:33AM  
This patient has been assessed with a concern for Malnutrition and has been determined to have a diagnosis/diagnoses of Severe protein-calorie malnutrition.    This patient is being managed with:   Diet Easy to Chew-  Entered: Jul 2 2025 11:12AM  
This patient has been assessed with a concern for Malnutrition and has been determined to have a diagnosis/diagnoses of Severe protein-calorie malnutrition.    This patient is being managed with:   Diet Pureed-  Consistent Carbohydrate {Evening Snack}  DASH/TLC {Sodium & Cholesterol Restricted}  Mildly Thick Liquids (MILDTHICKLIQS)  No Concentrated Phosphorus  Entered: Jun 29 2025  9:33AM

## 2025-07-06 NOTE — PROGRESS NOTE ADULT - SUBJECTIVE AND OBJECTIVE BOX
[facility] NEPHROLOGY SERVICES, [facility]  NEPHROLOGY AND HYPERTENSION  [address]  [address]  [place] [address]  [phone]    MD COLTON WORLEY MD YELENA ROSENBERG, MD BINNY KOSHY, MD CHRISTOPHER CAPUTO, MD EDWARD BOVER, MD          Patient events noted    MEDICATIONS  (STANDING):  apixaban 2.5 milliGRAM(s) Oral every 12 hours  atorvastatin 80 milliGRAM(s) Oral at bedtime  calcitriol   Capsule 0.25 MICROGram(s) Oral daily  chlorhexidine 2% Cloths 1 Application(s) Topical <User Schedule>  clopidogrel Tablet 75 milliGRAM(s) Oral daily  dextrose 5%. 1000 milliLiter(s) (50 mL/Hr) IV Continuous <Continuous>  dextrose 5%. 1000 milliLiter(s) (100 mL/Hr) IV Continuous <Continuous>  dextrose 50% Injectable 25 Gram(s) IV Push once  dextrose 50% Injectable 12.5 Gram(s) IV Push once  dextrose 50% Injectable 25 Gram(s) IV Push once  dextrose Oral Gel 15 Gram(s) Oral once  ferrous    sulfate 325 milliGRAM(s) Oral daily  glucagon  Injectable 1 milliGRAM(s) IntraMuscular once  insulin glargine Injectable (LANTUS) 10 Unit(s) SubCutaneous at bedtime  insulin lispro (ADMELOG) corrective regimen sliding scale   SubCutaneous three times a day before meals  insulin lispro Injectable (ADMELOG) 3 Unit(s) SubCutaneous three times a day before meals  metoprolol succinate ER 75 milliGRAM(s) Oral daily  tamsulosin 0.4 milliGRAM(s) Oral at bedtime    MEDICATIONS  (PRN):  acetaminophen     Tablet .. 650 milliGRAM(s) Oral every 6 hours PRN Temp greater or equal to 38C (100.4F), Mild Pain (1 - 3)  albuterol/ipratropium for Nebulization 3 milliLiter(s) Nebulizer every 6 hours PRN Shortness of Breath and/or Wheezing  melatonin 3 milliGRAM(s) Oral at bedtime PRN Insomnia      07-05-25 @ 07:01  -  07-06-25 @ 07:00  --------------------------------------------------------  IN: 400 mL / OUT: 0 mL / NET: 400 mL      PHYSICAL EXAM:      T(C): 36.3 (07-06-25 @ 17:00), Max: 36.7 (07-06-25 @ 11:55)  HR: 81 (07-06-25 @ 17:00) (67 - 81)  BP: 115/68 (07-06-25 @ 17:00) (100/56 - 120/70)  RR: 18 (07-06-25 @ 17:00) (17 - 19)  SpO2: 99% (07-06-25 @ 17:00) (98% - 100%)  Wt(kg): --  Lungs clear  Heart S1S2  Abd soft NT ND  Extremities:   tr edema                                    8.1    8.30  )-----------( 209      ( 05 Jul 2025 08:14 )             23.5     07-05    139  |  106  |  71[H]  ----------------------------<  305[H]  3.9   |  25  |  2.78[H]    Ca    7.4[L]  corrected to 9    05 Jul 2025 08:14  Phos  3.5     07-05  Mg     1.8     07-05    TPro  6.0  /  Alb  1.8[L]  /  TBili  0.5  /  DBili  x   /  AST  71[H]  /  ALT  102[H]  /  AlkPhos  207[H]  07-05      LIVER FUNCTIONS - ( 05 Jul 2025 08:14 )  Alb: 1.8 g/dL / Pro: 6.0 gm/dL / ALK PHOS: 207 U/L / ALT: 102 U/L / AST: 71 U/L / GGT: x           Creatinine Trend: 2.78<--, 2.58<--, 2.55<--, 2.86<--, 3.13<--, 3.66<--      Assessment    CM, EF 30-35, mod MR, TR, PA  Hemodynamic TAYLOR/CKD 3 (Cr 2.16 - 6/19/25)  Good UO  Cr is improving   Avoid nephrotoxins     Plan    Glu control  Stable from renal view    Yuriy Guillermo MD

## 2025-07-06 NOTE — PROGRESS NOTE ADULT - ASSESSMENT
85 year-old male with past medical history of hypertension, stage IV CKD, CVA, type 2 diabetes, A-fib, iron deficiency anemia, bradycardia status post pacemaker, recent NSTEMI, ischemic cardiomyopathy who presented to the ED from [facility] due to left-sided chest pain and was admitted for sepsis due to PNA on 6/19. RRT was called for hypotension on 6/23 and the patient was transferred to the ICU on pressors and required diuresis w/ bumex for acute on chronic CHF. The patient was also started on stress dose steroids. He came off pressors and was  transferred to the floor 6/28/25. His hospital course was further complicated by hypernatremia post diuresis.     Hypernatremia   - suspect due to diuresis   - resolved w/ D5W per renal      TAYLOR on CKD stage IV  - Baseline Cr 2-2.4, Cr peaked at 7.64 this admission  - suspect TAYLOR is multifactorial, cardio-renal and ATN component     Sepsis due to PNA POA   - CT chest w/ a small focal airspace opacity in the right upper lobe with associated cavitation and scattered patchy groundglass opacities throughout the lungs with a predominance in the right upper lobe likely infectious in etiology and small bilateral pleural effusions with adjacent compressive atelectasis  - s/p vasoactive requirement in the ICU  - completed course of Zosyn & Vanc as per ID  - completed stress dose steroid taper as per ICU recommendations  - Bcx negative  - legionella/strep negative     Delirium w/ Metabolic encephalopathy   - Daughter describes intermittent episodes of confusion suspect due to delirium  - also suspect metabolic derangements affecting mental status, D5W as below for hypernatremia.     Chronic HFrEF (EF 30-35%) w/ ICM  - Pro-BNP on admission 7166. S/p bumex gtt in the ICU  - Echo showed EF 30-35% w/ global left ventricular hypokinesis, at least moderate mitral regurgitation, trileaflet aortic valve with reduced systolic excursion, mild aortic stenosis, mModerate to severe tricuspid regurgitation and moderate pulmonic regurgitation  - Bumex held due to diuresis per renal recommendations. Was on GDMT although discontinued due to worsening renal function and shock.   - as per cardiology, patient is on Toprol, cardiology signed off   - GDMT limited due to renal function    type 2 NSTEMI   - Elevated troponin this admission  -  Cardiology saw earlier in admission, suspect due to sepsis and poor clearance given renal function.   - c/w Plavix       A. fib w/ sick sinus syndrome s/p Micra pacemaker.   - c/w Eliquis & metoprolol      DM II  - Hgb A1c 7.8   - c/w SSI, glargine & lispro     AIDA  - baseline Hb ~10  - s/p 1unit pRBC 6/28, Hb 6.8-->9.1.   - No s/s bleeding, unclear if 6.8 was true given >1g/dl improvement with only 1u pRBC  - c/w ferrous sulfate    Hx of CVA  - c/w Plavix & Eliquis     Dysphagia   - SLP evaluated 6/20 recommended puree diet      BPH  - c/w Flomax    HLD  - c/w Lipitor     Prophylaxis:  DVT: Eliquis   GI:  PO diet    dispo: VITALIY pending the above

## 2025-07-07 ENCOUNTER — TRANSCRIPTION ENCOUNTER (OUTPATIENT)
Age: 85
End: 2025-07-07

## 2025-07-07 VITALS
HEART RATE: 96 BPM | DIASTOLIC BLOOD PRESSURE: 62 MMHG | SYSTOLIC BLOOD PRESSURE: 102 MMHG | OXYGEN SATURATION: 96 % | TEMPERATURE: 98 F | RESPIRATION RATE: 17 BRPM

## 2025-07-07 LAB
ALBUMIN SERPL ELPH-MCNC: 1.9 G/DL — LOW (ref 3.3–5)
ALP SERPL-CCNC: 200 U/L — HIGH (ref 40–120)
ALT FLD-CCNC: 85 U/L — HIGH (ref 12–78)
ANION GAP SERPL CALC-SCNC: 5 MMOL/L — SIGNIFICANT CHANGE UP (ref 5–17)
AST SERPL-CCNC: 37 U/L — SIGNIFICANT CHANGE UP (ref 15–37)
BILIRUB SERPL-MCNC: 0.5 MG/DL — SIGNIFICANT CHANGE UP (ref 0.2–1.2)
BUN SERPL-MCNC: 70 MG/DL — HIGH (ref 7–23)
CALCIUM SERPL-MCNC: 8 MG/DL — LOW (ref 8.5–10.1)
CHLORIDE SERPL-SCNC: 111 MMOL/L — HIGH (ref 96–108)
CO2 SERPL-SCNC: 28 MMOL/L — SIGNIFICANT CHANGE UP (ref 22–31)
CREAT SERPL-MCNC: 3 MG/DL — HIGH (ref 0.5–1.3)
EGFR: 20 ML/MIN/1.73M2 — LOW
EGFR: 20 ML/MIN/1.73M2 — LOW
GLUCOSE BLDC GLUCOMTR-MCNC: 200 MG/DL — HIGH (ref 70–99)
GLUCOSE BLDC GLUCOMTR-MCNC: 224 MG/DL — HIGH (ref 70–99)
GLUCOSE BLDC GLUCOMTR-MCNC: 57 MG/DL — LOW (ref 70–99)
GLUCOSE BLDC GLUCOMTR-MCNC: 65 MG/DL — LOW (ref 70–99)
GLUCOSE BLDC GLUCOMTR-MCNC: 74 MG/DL — SIGNIFICANT CHANGE UP (ref 70–99)
GLUCOSE SERPL-MCNC: 67 MG/DL — LOW (ref 70–99)
MAGNESIUM SERPL-MCNC: 2.1 MG/DL — SIGNIFICANT CHANGE UP (ref 1.6–2.6)
PHOSPHATE SERPL-MCNC: 3.3 MG/DL — SIGNIFICANT CHANGE UP (ref 2.5–4.5)
POTASSIUM SERPL-MCNC: 3.9 MMOL/L — SIGNIFICANT CHANGE UP (ref 3.5–5.3)
POTASSIUM SERPL-SCNC: 3.9 MMOL/L — SIGNIFICANT CHANGE UP (ref 3.5–5.3)
PROT SERPL-MCNC: 6.1 GM/DL — SIGNIFICANT CHANGE UP (ref 6–8.3)
SODIUM SERPL-SCNC: 144 MMOL/L — SIGNIFICANT CHANGE UP (ref 135–145)

## 2025-07-07 PROCEDURE — 99239 HOSP IP/OBS DSCHRG MGMT >30: CPT

## 2025-07-07 RX ORDER — CLOPIDOGREL BISULFATE 75 MG/1
1 TABLET, FILM COATED ORAL
Qty: 30 | Refills: 0
Start: 2025-07-07 | End: 2025-08-05

## 2025-07-07 RX ORDER — METOPROLOL SUCCINATE 50 MG/1
1 TABLET, EXTENDED RELEASE ORAL
Qty: 30 | Refills: 0
Start: 2025-07-07 | End: 2025-08-05

## 2025-07-07 RX ORDER — ATORVASTATIN CALCIUM 80 MG/1
1 TABLET, FILM COATED ORAL
Qty: 30 | Refills: 0
Start: 2025-07-07 | End: 2025-08-05

## 2025-07-07 RX ORDER — FERROUS SULFATE 137(45) MG
1 TABLET, EXTENDED RELEASE ORAL
Qty: 30 | Refills: 0
Start: 2025-07-07 | End: 2025-08-05

## 2025-07-07 RX ORDER — DEXTROSE 50 % IN WATER 50 %
25 SYRINGE (ML) INTRAVENOUS ONCE
Refills: 0 | Status: COMPLETED | OUTPATIENT
Start: 2025-07-07 | End: 2025-07-07

## 2025-07-07 RX ORDER — APIXABAN 5 MG/1
1 TABLET, FILM COATED ORAL
Qty: 60 | Refills: 0
Start: 2025-07-07 | End: 2025-08-05

## 2025-07-07 RX ADMIN — CALCITRIOL 0.25 MICROGRAM(S): 0.5 CAPSULE, GELATIN COATED ORAL at 12:18

## 2025-07-07 RX ADMIN — Medication 3 MILLIGRAM(S): at 01:36

## 2025-07-07 RX ADMIN — APIXABAN 2.5 MILLIGRAM(S): 5 TABLET, FILM COATED ORAL at 17:20

## 2025-07-07 RX ADMIN — METOPROLOL SUCCINATE 75 MILLIGRAM(S): 50 TABLET, EXTENDED RELEASE ORAL at 05:54

## 2025-07-07 RX ADMIN — Medication 25 GRAM(S): at 12:18

## 2025-07-07 RX ADMIN — INSULIN LISPRO 1: 100 INJECTION, SOLUTION INTRAVENOUS; SUBCUTANEOUS at 17:19

## 2025-07-07 RX ADMIN — Medication 325 MILLIGRAM(S): at 12:18

## 2025-07-07 RX ADMIN — CLOPIDOGREL BISULFATE 75 MILLIGRAM(S): 75 TABLET, FILM COATED ORAL at 12:18

## 2025-07-07 RX ADMIN — APIXABAN 2.5 MILLIGRAM(S): 5 TABLET, FILM COATED ORAL at 05:53

## 2025-07-07 RX ADMIN — Medication 1 APPLICATION(S): at 05:54

## 2025-07-07 NOTE — CHART NOTE - NSCHARTNOTEFT_GEN_A_CORE
Rolling walker:  Patient needs rolling walker for ADLs in the home due to difficulty walking, decreased strength, decreased balance.

## 2025-07-07 NOTE — DISCHARGE NOTE PROVIDER - NSDCMRMEDTOKEN_GEN_ALL_CORE_FT
apixaban 2.5 mg oral tablet: 1 tab(s) orally 2 times a day  atorvastatin 80 mg oral tablet: 1 tab(s) orally once a day (at bedtime)  calcitriol 0.25 mcg oral capsule: 1 cap(s) orally once a day  clopidogrel 75 mg oral tablet: 1 tab(s) orally once a day  FeroSul 325 mg (65 mg elemental iron) oral tablet: 1 tab(s) orally once a day  glipiZIDE 10 mg oral tablet: 1 tab(s) orally once a day (at bedtime)  metoprolol succinate 25 mg oral tablet, extended release: 1 tab(s) orally once a day  tamsulosin 0.4 mg oral capsule: 1 cap(s) orally once a day (at bedtime)   alcohol swabs: Apply topically to affected area 4 times a day  atorvastatin 80 mg oral tablet: 1 tab(s) orally once a day  calcitriol 0.25 mcg oral capsule: 1 cap(s) orally once a day  clopidogrel 75 mg oral tablet: 1 tab(s) orally once a day  Eliquis 2.5 mg oral tablet: 1 tab(s) orally 2 times a day  ferrous sulfate 325 mg (65 mg elemental iron) oral tablet: 1 tab(s) orally once a day  furosemide 40 mg oral tablet: 1 tab(s) orally once a day  glucometer (per patient&#x27;s insurance): Test blood sugars four times a day. Dispense #1 glucometer.  glucose tablets: Follow instructions on bottle when sugar is low.  hydrALAZINE 50 mg oral tablet: 1 tab(s) orally every 8 hours  isosorbide dinitrate 30 mg oral tablet: 1 tab(s) orally 3 times a day  lancets: 1 application subcutaneously 4 times a day  tamsulosin 0.4 mg oral capsule: 1 cap(s) orally once a day  test strips (per patient&#x27;s insurance): 1 application subcutaneously 4 times a day. ** Compatible with patient&#x27;s glucometer **  Toprol-XL 25 mg oral tablet, extended release: 1 tab(s) orally once a day  Trulicity Pen 0.75 mg/0.5 mL subcutaneous solution: 0.75 milligram(s) subcutaneously once a week

## 2025-07-07 NOTE — DISCHARGE NOTE PROVIDER - CARE PROVIDERS DIRECT ADDRESSES
,DirectAddress_Unknown ,DirectAddress_Unknown,[email or web address],DirectAddress_Unknown,[email or web address]

## 2025-07-07 NOTE — DISCHARGE NOTE PROVIDER - DETAILS OF MALNUTRITION DIAGNOSIS/DIAGNOSES
This patient has been assessed with a concern for Malnutrition and was treated during this hospitalization for the following Nutrition diagnosis/diagnoses:     -  06/27/2025: Severe protein-calorie malnutrition

## 2025-07-07 NOTE — DISCHARGE NOTE NURSING/CASE MANAGEMENT/SOCIAL WORK - PATIENT PORTAL LINK FT
You can access the FollowMyHealth Patient Portal offered by [facility] by registering at the following website: [email or web address]. By joining [facility]’s FollowMyHealth portal, you will also be able to view your health information using other applications (apps) compatible with our system.

## 2025-07-07 NOTE — DISCHARGE NOTE PROVIDER - NSDCCPCAREPLAN_GEN_ALL_CORE_FT
PRINCIPAL DISCHARGE DIAGNOSIS  Diagnosis: Other specified sepsis  Assessment and Plan of Treatment:       SECONDARY DISCHARGE DIAGNOSES  Diagnosis: Atypical chest pain  Assessment and Plan of Treatment:     Diagnosis: Multifocal pneumonia  Assessment and Plan of Treatment:     Diagnosis: Elevated troponin  Assessment and Plan of Treatment:

## 2025-07-07 NOTE — DISCHARGE NOTE PROVIDER - CARE PROVIDER_API CALL
Your PCP,   Phone: (   )    -  Fax: (   )    -  Follow Up Time:    Your PCP,   Phone: (   )    -  Fax: (   )    -  Follow Up Time: 1 week    Yuriy Guillermo  Nephrology  [address], [address] [place] [id number]  Phone: [phone]  Fax: [phone]  Follow Up Time: 1 week   Yuriy Guillermo  Nephrology  [address], [address]  [place] [address]  Phone: [phone]  Fax: [phone]  Follow Up Time: 1 week    Your PCP,   Phone: (   )    -  Fax: (   )    -  Follow Up Time: 1 week    Libia Harley  Cardiovascular Disease  [address], [address]  [place] [address]  Phone: [phone]  Fax: [phone]  Follow Up Time: 1 week

## 2025-07-07 NOTE — DISCHARGE NOTE NURSING/CASE MANAGEMENT/SOCIAL WORK - NSDCPEFALRISK_GEN_ALL_CORE
For information on Fall & Injury Prevention, visit: [email or web address] OR  [email or web address] OR  https://www.cdc.gov/steadi/patient.html

## 2025-07-07 NOTE — DISCHARGE NOTE PROVIDER - HOSPITAL COURSE
85 year-old male with past medical history of hypertension, stage IV CKD, CVA, type 2 diabetes, A-fib, iron deficiency anemia, bradycardia status post pacemaker, recent NSTEMI, ischemic cardiomyopathy who presented to the ED from [facility] due to left-sided chest pain and was admitted for sepsis due to PNA on 6/19. CT chest w/ a small focal airspace opacity in the right upper lobe with associated cavitation and scattered patchy groundglass opacities throughout the lungs with a predominance in the right upper lobe likely infectious in etiology and small bilateral pleural effusions with adjacent compressive atelectasis. Pt completed course of Zosyn & Vanc as per ID. RRT was called for hypotension on 6/23 and the patient was transferred to the ICU on pressors and required diuresis w/ bumex for acute on chronic CHF. The patient was also started on stress dose steroids. He came off pressors and was transferred to the floor 6/28/25. His hospital course was further complicated by hypernatremia post diuresis that resolved w/ D5W. Of note, he also had TAYLOR on CKD stage IV that is multifactorial, cardio-renal and ATN component and has improved.        Delirium w/ Metabolic encephalopathy   - Daughter describes intermittent episodes of confusion suspect due to delirium  - also suspect metabolic derangements affecting mental status, D5W as below for hypernatremia.     Chronic HFrEF (EF 30-35%) w/ ICM  - Pro-BNP on admission 7166. S/p bumex gtt in the ICU  - Echo showed EF 30-35% w/ global left ventricular hypokinesis, at least moderate mitral regurgitation, trileaflet aortic valve with reduced systolic excursion, mild aortic stenosis, mModerate to severe tricuspid regurgitation and moderate pulmonic regurgitation  - Bumex held due to diuresis per renal recommendations. Was on GDMT although discontinued due to worsening renal function and shock.   - as per cardiology, patient is on Toprol, cardiology signed off   - GDMT limited due to renal function    type 2 NSTEMI   - Elevated troponin this admission  -  Cardiology saw earlier in admission, suspect due to sepsis and poor clearance given renal function.   - c/w Plavix       A. fib w/ sick sinus syndrome s/p Micra pacemaker.   - c/w Eliquis & metoprolol      DM II  - Hgb A1c 7.8   - c/w SSI, glargine & lispro     AIDA  - baseline Hb ~10  - s/p 1unit pRBC 6/28, Hb 6.8-->9.1.   - No s/s bleeding, unclear if 6.8 was true given >1g/dl improvement with only 1u pRBC  - c/w ferrous sulfate    Hx of CVA  - c/w Plavix & Eliquis     Dysphagia   - SLP evaluated 6/20 recommended puree diet     85 year-old male with past medical history of hypertension, stage IV CKD, CVA, type 2 diabetes, A-fib, iron deficiency anemia, bradycardia status post pacemaker, recent NSTEMI, ischemic cardiomyopathy who presented to the ED from [facility] due to left-sided chest pain and was admitted for sepsis due to PNA on 6/19. CT chest w/ a small focal airspace opacity in the right upper lobe with associated cavitation and scattered patchy groundglass opacities throughout the lungs with a predominance in the right upper lobe likely infectious in etiology and small bilateral pleural effusions with adjacent compressive atelectasis. Pt completed course of Zosyn & Vanc as per ID. RRT was called for hypotension on 6/23 and the patient was transferred to the ICU on pressors and required diuresis w/ bumex for acute on chronic CHF. The patient was also started on stress dose steroids. He came off pressors and was transferred to the floor 6/28/25. His hospital course was further complicated by hypernatremia post diuresis that resolved w/ D5W. Of note, he also had TAYLOR on CKD stage IV that is multifactorial, cardio-renal and ATN component and has improved. Her Bumex was stopped due to TAYLOR. Pt also had delirium w/ Metabolic encephalopathy that has improved. She also had AIDA and was given a unit pRBC on 6/28.     Discharge time: 43 minutes     PHYSICAL EXAM:  GENERAL: NAD   NERVOUS SYSTEM: Alert    CHEST/LUNG: Clear to auscultation bilaterally; No rales, rhonchi, wheezing, or rubs  HEART: Regular rate and rhythm; No murmurs, rubs, or gallops  ABDOMEN: Soft, Nontender, Nondistended; Bowel sounds present  EXTREMITIES:  No clubbing, cyanosis, or edema           85 year-old male with past medical history of hypertension, stage IV CKD, CVA, type 2 diabetes, A-fib, iron deficiency anemia, bradycardia status post pacemaker, recent NSTEMI, ischemic cardiomyopathy who presented to the ED from [facility] due to left-sided chest pain and was admitted for sepsis due to PNA on 6/19. CT chest w/ a small focal airspace opacity in the right upper lobe with associated cavitation and scattered patchy groundglass opacities throughout the lungs with a predominance in the right upper lobe likely infectious in etiology and small bilateral pleural effusions with adjacent compressive atelectasis. Pt completed course of Zosyn & Vanc as per ID. RRT was called for hypotension on 6/23 and the patient was transferred to the ICU on pressors and required diuresis w/ bumex for acute on chronic CHF. The patient was also started on stress dose steroids. He came off pressors and was transferred to the floor 6/28/25. His hospital course was further complicated by hypernatremia post diuresis that resolved w/ D5W. Of note, he also had TAYLOR on CKD stage IV that is multifactorial, cardio-renal and ATN component and has improved. Her Bumex was stopped due to TAYLOR. Pt also had delirium w/ Metabolic encephalopathy that has improved. She also had AIDA and was given a unit pRBC on 6/28. Pt recommended VITALIY, but the patient's family have elected to take her home with [facility] care as per SW.     Discharge time: 43 minutes     PHYSICAL EXAM:  GENERAL: NAD   NERVOUS SYSTEM: Alert    CHEST/LUNG: Clear to auscultation bilaterally; No rales, rhonchi, wheezing, or rubs  HEART: Regular rate and rhythm; No murmurs, rubs, or gallops  ABDOMEN: Soft, Nontender, Nondistended; Bowel sounds present  EXTREMITIES:  No clubbing, cyanosis, or edema

## 2025-07-07 NOTE — DISCHARGE NOTE PROVIDER - NSDCFUSCHEDAPPT_GEN_ALL_CORE_FT
[facility] Physician [facility] [phone]  Scheduled Appointment: 08/04/2025     [facility]  ELECTROPH [phone]  Scheduled Appointment: 08/04/2025    [facility] [address]  Scheduled Appointment: 08/12/2025

## 2025-07-07 NOTE — PROVIDER CONTACT NOTE (HYPOGLYCEMIA EVENT) - NS PROVIDER CONTACT BACKGROUND-HYPO
Age: 85y    Gender: Male    POCT Blood Glucose:  57 mg/dL (07-07-25 @ 12:12)  65 mg/dL (07-07-25 @ 12:11)  74 mg/dL (07-07-25 @ 08:04)  175 mg/dL (07-06-25 @ 21:49)  261 mg/dL (07-06-25 @ 16:38)      eMAR:atorvastatin   80 milliGRAM(s) Oral (07-06-25 @ 21:51)    insulin glargine Injectable (LANTUS)   10 Unit(s) SubCutaneous (07-06-25 @ 21:51)    insulin lispro (ADMELOG) corrective regimen sliding scale   3 Unit(s) SubCutaneous (07-06-25 @ 17:32)

## 2025-07-07 NOTE — DISCHARGE NOTE NURSING/CASE MANAGEMENT/SOCIAL WORK - NSDCFUADDAPPT_GEN_ALL_CORE_FT
APPTS ARE READY TO BE MADE: [X] YES    Best Family or Patient Contact (if needed):    Additional Information about above appointments (if needed):    1: Nephro	  2: Cardio  3: PCP    Other comments or requests:

## 2025-07-07 NOTE — PROGRESS NOTE ADULT - SUBJECTIVE AND OBJECTIVE BOX
[facility] NEPHROLOGY SERVICES, [facility]  NEPHROLOGY AND HYPERTENSION  [address]  [address]  [place] [address]  [phone]    MD COLTON WORLEY MD YELENA ROSENBERG, MD BINNY KOSHY, MD CHRISTOPHER CAPUTO, MD EDWARD BOVER, MD          Patient events noted  Feels well     MEDICATIONS  (STANDING):  apixaban 2.5 milliGRAM(s) Oral every 12 hours  atorvastatin 80 milliGRAM(s) Oral at bedtime  calcitriol   Capsule 0.25 MICROGram(s) Oral daily  chlorhexidine 2% Cloths 1 Application(s) Topical <User Schedule>  clopidogrel Tablet 75 milliGRAM(s) Oral daily  dextrose 5%. 1000 milliLiter(s) (50 mL/Hr) IV Continuous <Continuous>  dextrose 5%. 1000 milliLiter(s) (100 mL/Hr) IV Continuous <Continuous>  dextrose 50% Injectable 25 Gram(s) IV Push once  dextrose 50% Injectable 12.5 Gram(s) IV Push once  dextrose 50% Injectable 25 Gram(s) IV Push once  dextrose Oral Gel 15 Gram(s) Oral once  ferrous    sulfate 325 milliGRAM(s) Oral daily  glucagon  Injectable 1 milliGRAM(s) IntraMuscular once  insulin glargine Injectable (LANTUS) 10 Unit(s) SubCutaneous at bedtime  insulin lispro (ADMELOG) corrective regimen sliding scale   SubCutaneous three times a day before meals  metoprolol succinate ER 75 milliGRAM(s) Oral daily  tamsulosin 0.4 milliGRAM(s) Oral at bedtime    MEDICATIONS  (PRN):  acetaminophen     Tablet .. 650 milliGRAM(s) Oral every 6 hours PRN Temp greater or equal to 38C (100.4F), Mild Pain (1 - 3)  albuterol/ipratropium for Nebulization 3 milliLiter(s) Nebulizer every 6 hours PRN Shortness of Breath and/or Wheezing  melatonin 3 milliGRAM(s) Oral at bedtime PRN Insomnia      07-06-25 @ 07:01  -  07-07-25 @ 07:00  --------------------------------------------------------  IN: 0 mL / OUT: 1100 mL / NET: -1100 mL    07-07-25 @ 07:01  -  07-07-25 @ 22:46  --------------------------------------------------------  IN: 0 mL / OUT: 550 mL / NET: -550 mL      PHYSICAL EXAM:      T(C): 36.5 (07-07-25 @ 17:12), Max: 36.8 (07-07-25 @ 05:26)  HR: 96 (07-07-25 @ 17:12) (63 - 96)  BP: 102/62 (07-07-25 @ 17:12) (102/62 - 105/72)  RR: 17 (07-07-25 @ 17:12) (17 - 18)  SpO2: 96% (07-07-25 @ 17:12) (96% - 98%)  Wt(kg): --  Lungs clear  Heart S1S2  Abd soft NT ND  Extremities:   tr edema                07-07    144  |  111[H]  |  70[H]  ----------------------------<  67[L]  3.9   |  28  |  3.00[H]    Ca    8.0[L]      07 Jul 2025 07:41  Phos  3.3     07-07  Mg     2.1     07-07    TPro  6.1  /  Alb  1.9[L]  /  TBili  0.5  /  DBili  x   /  AST  37  /  ALT  85[H]  /  AlkPhos  200[H]  07-07      LIVER FUNCTIONS - ( 07 Jul 2025 07:41 )  Alb: 1.9 g/dL / Pro: 6.1 gm/dL / ALK PHOS: 200 U/L / ALT: 85 U/L / AST: 37 U/L / GGT: x           Creatinine Trend: 3.00<--, 2.78<--, 2.58<--, 2.55<--, 2.86<--, 3.13<--            Assessment    CM, EF 30-35, mod MR, TR, ID  Hemodynamic TAYLOR/CKD 3 (Cr 2.16 - 6/19/25)  Good UO  Avoid nephrotoxins     Plan  Stable from renal view  Discharge to rehab

## 2025-07-07 NOTE — PROGRESS NOTE ADULT - PROVIDER SPECIALTY LIST ADULT
"Patient of Dr. Rea, last seen 9/10/2024, called in stating he is having blood in his urine. He states this started two days ago. He states he has gone to the bathroom 6-7 times today and he has blood in his urine with every episode. He does notice he is passing urine more frequently. He denies fever, chills, or pain. I ordered a UA and culture and faxed to [facility] lab at [phone]. I reviewed ED precautions and encouraged water intake. Please advise.    Reason for Disposition   All other patients with blood in urine  (Exception: Could be normal menstrual bleeding.)    Answer Assessment - Initial Assessment Questions  1. COLOR of URINE: \"Describe the color of the urine.\"  (e.g., tea-colored, pink, red, bloody) \"Do you have blood clots in your urine?\" (e.g., none, pea, grape, small coin)      Bright red, denies blood clots    2. ONSET: \"When did the bleeding start?\"       Two days ago    3. EPISODES: \"How many times has there been blood in the urine?\" or \"How many times today?\"      6-7 times today    4. PAIN with URINATION: \"Is there any pain with passing your urine?\" If Yes, ask: \"How bad is the pain?\"  (Scale 1-10; or mild, moderate, severe)      Denies    5. FEVER: \"Do you have a fever?\" If Yes, ask: \"What is your temperature, how was it measured, and when did it start?\"      Denies    6. ASSOCIATED SYMPTOMS: \"Are you passing urine more frequently than usual?\"      Yes    7. OTHER SYMPTOMS: \"Do you have any other symptoms?\" (e.g., back/flank pain, abdomen pain, vomiting)      Denies    Protocols used: Urine - Blood In-Adult-OH    "
Bactrim resent to [facility] on [address], [place].    
Call placed to pt and spoke with him. Informed him of medication that was sent to the pharmacy.   Pt is aware and will    
Called [facility].   is still pending.  Results will be faxed to [facility] at [phone]  
Critical Care
Hospitalist
Infectious Disease
Left VM informing patient of positive urine culture result and the abx given should be effective in clearing out the infection. I reminded patient of 12/2 appt with Dr. Rea and to do his urine culture prior to appt. Left office #[phone].   
Nephrology
Patient called in stating he went to [facility] pharmacy and they do not have his prescription for bactrim. Will need to be resent. Please notify patient once done. Please advise.   
Please call patient and advise him that I will send in a course of antibiotics due to their ongoing symptoms.  Please advise the patient to undergo the urine studies prior to initiating therapy with the antibiotics.  Based off the results of the urinalysis and urine culture we may need to adjust the antibiotic based off of sensitivities and resistances.  I sent in a 7-day course of Bactrim to the patient's pharmacy.  Additionally, Dr. Rea noted that the patient should undergo urine culture 10 days prior to his cystoscopy in the office which is scheduled for 12/2/2024.  I placed a urine culture ordered to be completed prior to that appointment.  
[facility] called to have orders faxed to [phone].    Faxed  and  order.   
Spoke to patient.  He had just returned from the lab for his urine tests.  I informed the patient we sent an abx to [facility] for him to start ASAP.  I did inform him once we get urine test results we may, or may not, have to change his abx.  Patient understood.  I reminded patient to get urine culture done 10 days prior to his cysto appt on 12/2.  Order is in chart.  Patient understood.   
Critical Care
Critical Care
Hospitalist
Infectious Disease
Nephrology
Cardiology
Cardiology
Critical Care
Hospitalist
Hospitalist
Infectious Disease
Nephrology
Cardiology
Critical Care
Infectious Disease
Nephrology
Nephrology
Hospitalist
Hospitalist
Critical Care
Hospitalist

## 2025-07-07 NOTE — DISCHARGE NOTE PROVIDER - NSDCFUADDAPPT_GEN_ALL_CORE_FT
APPTS ARE READY TO BE MADE: [X] YES    Best Family or Patient Contact (if needed):    Additional Information about above appointments (if needed):    1: Nephro	  2: Cardio  3: PCP    Other comments or requests:    APPTS ARE READY TO BE MADE: [X] YES    Best Family or Patient Contact (if needed):    Additional Information about above appointments (if needed):    1: Nephro	  2: Cardio  3: PCP    Other comments or requests:    Patient advised they did not want to proceed with scheduling appointments with the providers on their referrals. They will coordinate care on their own.

## 2025-07-07 NOTE — DISCHARGE NOTE PROVIDER - PROVIDER TOKENS
FREE:[LAST:[Your PCP],PHONE:[(   )    -],FAX:[(   )    -]] FREE:[LAST:[Your PCP],PHONE:[(   )    -],FAX:[(   )    -],FOLLOWUP:[1 week]],PROVIDER:[TOKEN:[[id number]],FOLLOWUP:[1 week]] PROVIDER:[TOKEN:[[id number]],FOLLOWUP:[1 week]],FREE:[LAST:[Your PCP],PHONE:[(   )    -],FAX:[(   )    -],FOLLOWUP:[1 week]],PROVIDER:[TOKEN:[[id number]],FOLLOWUP:[1 week]]

## 2025-07-07 NOTE — DISCHARGE NOTE NURSING/CASE MANAGEMENT/SOCIAL WORK - FINANCIAL ASSISTANCE
[facility] provides services at a reduced cost to those who are determined to be eligible through [facility]’s financial assistance program. Information regarding [facility]’s financial assistance program can be found by going to [email or web address] or by calling [phone].

## 2025-07-07 NOTE — CHART NOTE - NSCHARTNOTEFT_GEN_A_CORE
Assessment: 85 year old male admitted with pmhx of HTN, stage IV CKD, CVA, type 2 diabetes, A-fib, iron deficiency anemia, bradycardia status post pacemaker, recent NSTEMI, ischemic cardiomyopathy. Pt presented to ED with left-sided chest pain. Pt admitted with sepsis likely secondary to pneumonia. RRT was called for hypotension on 6/23 and the patient was transferred to the ICU on pressors and required diuresis w/ bumex for acute on chronic CHF. The patient was also started on stress dose steroids. He came off pressors and was  transferred to the floor 6/28/25. His hospital course was further complicated by hypernatremia post diuresis and TAYLOR on CKD (improving renal function per nephrology note on 07/06).  (06/20) Swallow evaluation completed with SLP recommendations of puree/mildly thick liquids  (07/02) Swallow evaluation completed with SLP recommendations of easy to chew/ thin liquid    Pt visited at bedside. Pt agitated at the time of visit. RD observed breakfast tray untouched at bedside. Per care coordination noted, pt pending discharge     Factors impacting intake: [ ] none [ ] nausea  [ ] vomiting [ ] diarrhea [ ] constipation  [X]chewing problems [X] swallowing issues  [X] other: Acute illness/ Mental status     Diet Prescription: Diet, Easy to Chew (07-02-25 @ 11:13) [Active]    Intake:  Varying PO intake noted throughout admission, mostly 26-50% PO intake per flow sheet     Admission wt: (06/19) 64.8 kg   Current Weight: (07/04) 58.3 kg   % Weight Change: ~10% wt loss x 2 weeks - most likely due to fluid shifts, pt noted with edema per flow sheets earlier in admission   Edema: (06/28 - most recent documentation in EMR) 2+ Edema: Generalized     Pertinent Medications: MEDICATIONS  (STANDING):  apixaban 2.5 milliGRAM(s) Oral every 12 hours  atorvastatin 80 milliGRAM(s) Oral at bedtime  calcitriol   Capsule 0.25 MICROGram(s) Oral daily  chlorhexidine 2% Cloths 1 Application(s) Topical <User Schedule>  clopidogrel Tablet 75 milliGRAM(s) Oral daily  dextrose 5%. 1000 milliLiter(s) (50 mL/Hr) IV Continuous <Continuous>  dextrose 5%. 1000 milliLiter(s) (100 mL/Hr) IV Continuous <Continuous>  dextrose 50% Injectable 25 Gram(s) IV Push once  dextrose 50% Injectable 12.5 Gram(s) IV Push once  dextrose 50% Injectable 25 Gram(s) IV Push once  dextrose Oral Gel 15 Gram(s) Oral once  ferrous    sulfate 325 milliGRAM(s) Oral daily  glucagon  Injectable 1 milliGRAM(s) IntraMuscular once  insulin glargine Injectable (LANTUS) 10 Unit(s) SubCutaneous at bedtime  insulin lispro (ADMELOG) corrective regimen sliding scale   SubCutaneous three times a day before meals  metoprolol succinate ER 75 milliGRAM(s) Oral daily  tamsulosin 0.4 milliGRAM(s) Oral at bedtime    MEDICATIONS  (PRN):  acetaminophen     Tablet .. 650 milliGRAM(s) Oral every 6 hours PRN Temp greater or equal to 38C (100.4F), Mild Pain (1 - 3)  albuterol/ipratropium for Nebulization 3 milliLiter(s) Nebulizer every 6 hours PRN Shortness of Breath and/or Wheezing  melatonin 3 milliGRAM(s) Oral at bedtime PRN Insomnia    Pertinent Labs: 07-07 Na144 mmol/L Glu 67 mg/dL[L] K+ 3.9 mmol/L Cr  3.00 mg/dL[H] BUN 70 mg/dL[H] 07-07 Phos 3.3 mg/dL 07-07 Alb 1.9 g/dL[L]     CAPILLARY BLOOD GLUCOSE      POCT Blood Glucose.: 224 mg/dL (07 Jul 2025 13:03)  POCT Blood Glucose.: 57 mg/dL (07 Jul 2025 12:12)  POCT Blood Glucose.: 65 mg/dL (07 Jul 2025 12:11)  POCT Blood Glucose.: 74 mg/dL (07 Jul 2025 08:04)  POCT Blood Glucose.: 175 mg/dL (06 Jul 2025 21:49)  POCT Blood Glucose.: 261 mg/dL (06 Jul 2025 16:38)    Skin:   Pressure Injury 1: DTI, L Heel     Estimated Needs:   [X] no change since previous assessment (06/20)  [ ] recalculated:     Previous Nutrition Diagnosis:   [X] Malnutrition; severe malnutrition in context of acute illness   Etiology: Related to: inadequate protein-energy intake in setting of septic shock, TAYLOR, pneumonia, altered swallow  Signs/ Symptoms: As evidenced by: <50% nutrition needs> 5 days, physical findings of moderate fat/muscle loss, >5% wt. loss in 1 week  Goal: pt to consume >75% of meals (not being met)    Nutrition Diagnosis is [X] ongoing  [ ] resolved [ ] not applicable     New Nutrition Diagnosis: [X] not applicable     Interventions:   Recommend  [X] Continue Easy to Chew texture as ordered and as tolerated  [X] Recommend adding Consistent Carbohydrate diet with evening snack   [X] Recommend Glucerna x 2/day (provides 440 kcal, 20 g protein)   [X] Recommend multivitamin to optimize micronutrient intake     Monitoring and Evaluation:   [X] PO intake [ x ] Tolerance to diet prescription [ x ] weights [ x ] labs[ x ] follow up per protocol  [ ] other:

## 2025-07-08 ENCOUNTER — INPATIENT (INPATIENT)
Facility: HOSPITAL | Age: 85
LOS: 13 days | Discharge: HOME HEALTH | End: 2025-07-22
Attending: INTERNAL MEDICINE | Admitting: INTERNAL MEDICINE
Payer: MEDICARE

## 2025-07-08 VITALS
TEMPERATURE: 98 F | RESPIRATION RATE: 16 BRPM | HEIGHT: 65 IN | SYSTOLIC BLOOD PRESSURE: 95 MMHG | HEART RATE: 62 BPM | DIASTOLIC BLOOD PRESSURE: 55 MMHG | OXYGEN SATURATION: 97 %

## 2025-07-08 DIAGNOSIS — I48.91 UNSPECIFIED ATRIAL FIBRILLATION: ICD-10-CM

## 2025-07-08 DIAGNOSIS — I25.10 ATHEROSCLEROTIC HEART DISEASE OF NATIVE CORONARY ARTERY WITHOUT ANGINA PECTORIS: ICD-10-CM

## 2025-07-08 DIAGNOSIS — R79.89 OTHER SPECIFIED ABNORMAL FINDINGS OF BLOOD CHEMISTRY: ICD-10-CM

## 2025-07-08 DIAGNOSIS — Z98.890 OTHER SPECIFIED POSTPROCEDURAL STATES: Chronic | ICD-10-CM

## 2025-07-08 DIAGNOSIS — I63.9 CEREBRAL INFARCTION, UNSPECIFIED: ICD-10-CM

## 2025-07-08 DIAGNOSIS — I10 ESSENTIAL (PRIMARY) HYPERTENSION: ICD-10-CM

## 2025-07-08 DIAGNOSIS — A41.9 SEPSIS, UNSPECIFIED ORGANISM: ICD-10-CM

## 2025-07-08 DIAGNOSIS — E27.40 UNSPECIFIED ADRENOCORTICAL INSUFFICIENCY: ICD-10-CM

## 2025-07-08 DIAGNOSIS — R74.8 ABNORMAL LEVELS OF OTHER SERUM ENZYMES: ICD-10-CM

## 2025-07-08 DIAGNOSIS — I50.9 HEART FAILURE, UNSPECIFIED: ICD-10-CM

## 2025-07-08 DIAGNOSIS — R65.10 SYSTEMIC INFLAMMATORY RESPONSE SYNDROME (SIRS) OF NON-INFECTIOUS ORIGIN WITHOUT ACUTE ORGAN DYSFUNCTION: ICD-10-CM

## 2025-07-08 DIAGNOSIS — Z29.9 ENCOUNTER FOR PROPHYLACTIC MEASURES, UNSPECIFIED: ICD-10-CM

## 2025-07-08 DIAGNOSIS — I50.22 CHRONIC SYSTOLIC (CONGESTIVE) HEART FAILURE: ICD-10-CM

## 2025-07-08 DIAGNOSIS — E16.2 HYPOGLYCEMIA, UNSPECIFIED: ICD-10-CM

## 2025-07-08 DIAGNOSIS — N17.9 ACUTE KIDNEY FAILURE, UNSPECIFIED: ICD-10-CM

## 2025-07-08 DIAGNOSIS — I50.23 ACUTE ON CHRONIC SYSTOLIC (CONGESTIVE) HEART FAILURE: ICD-10-CM

## 2025-07-08 DIAGNOSIS — D50.9 IRON DEFICIENCY ANEMIA, UNSPECIFIED: ICD-10-CM

## 2025-07-08 DIAGNOSIS — J96.01 ACUTE RESPIRATORY FAILURE WITH HYPOXIA: ICD-10-CM

## 2025-07-08 DIAGNOSIS — N18.4 CHRONIC KIDNEY DISEASE, STAGE 4 (SEVERE): ICD-10-CM

## 2025-07-08 DIAGNOSIS — Z95.818 PRESENCE OF OTHER CARDIAC IMPLANTS AND GRAFTS: Chronic | ICD-10-CM

## 2025-07-08 DIAGNOSIS — E11.9 TYPE 2 DIABETES MELLITUS WITHOUT COMPLICATIONS: ICD-10-CM

## 2025-07-08 DIAGNOSIS — Z95.1 PRESENCE OF AORTOCORONARY BYPASS GRAFT: Chronic | ICD-10-CM

## 2025-07-08 DIAGNOSIS — Z95.0 PRESENCE OF CARDIAC PACEMAKER: Chronic | ICD-10-CM

## 2025-07-08 LAB
ADD ON TEST-SPECIMEN IN LAB: SIGNIFICANT CHANGE UP
ALBUMIN SERPL ELPH-MCNC: 2.6 G/DL — LOW (ref 3.3–5)
ALP SERPL-CCNC: 445 U/L — HIGH (ref 40–120)
ALT FLD-CCNC: 298 U/L — HIGH (ref 4–41)
ANION GAP SERPL CALC-SCNC: 13 MMOL/L — SIGNIFICANT CHANGE UP (ref 7–14)
APTT BLD: 36.9 SEC — HIGH (ref 26.1–36.8)
AST SERPL-CCNC: 388 U/L — HIGH (ref 4–40)
BASOPHILS # BLD AUTO: 0.1 K/UL — SIGNIFICANT CHANGE UP (ref 0–0.2)
BASOPHILS NFR BLD AUTO: 1.3 % — SIGNIFICANT CHANGE UP (ref 0–2)
BILIRUB SERPL-MCNC: 0.5 MG/DL — SIGNIFICANT CHANGE UP (ref 0.2–1.2)
BLOOD GAS VENOUS COMPREHENSIVE RESULT: SIGNIFICANT CHANGE UP
BUN SERPL-MCNC: 70 MG/DL — HIGH (ref 7–23)
CALCIUM SERPL-MCNC: 8 MG/DL — LOW (ref 8.4–10.5)
CHLORIDE SERPL-SCNC: 108 MMOL/L — HIGH (ref 98–107)
CO2 SERPL-SCNC: 22 MMOL/L — SIGNIFICANT CHANGE UP (ref 22–31)
CREAT SERPL-MCNC: 2.99 MG/DL — HIGH (ref 0.5–1.3)
EGFR: 20 ML/MIN/1.73M2 — LOW
EGFR: 20 ML/MIN/1.73M2 — LOW
EOSINOPHIL # BLD AUTO: 1.11 K/UL — HIGH (ref 0–0.5)
EOSINOPHIL NFR BLD AUTO: 15 % — HIGH (ref 0–6)
FLUAV AG NPH QL: SIGNIFICANT CHANGE UP
FLUBV AG NPH QL: SIGNIFICANT CHANGE UP
GLUCOSE BLDC GLUCOMTR-MCNC: 138 MG/DL — HIGH (ref 70–99)
GLUCOSE BLDC GLUCOMTR-MCNC: 204 MG/DL — HIGH (ref 70–99)
GLUCOSE SERPL-MCNC: 49 MG/DL — CRITICAL LOW (ref 70–99)
HCT VFR BLD CALC: 25.1 % — LOW (ref 39–50)
HGB BLD-MCNC: 8.4 G/DL — LOW (ref 13–17)
IMM GRANULOCYTES # BLD AUTO: 0.02 K/UL — SIGNIFICANT CHANGE UP (ref 0–0.07)
IMM GRANULOCYTES NFR BLD AUTO: 0.3 % — SIGNIFICANT CHANGE UP (ref 0–0.9)
INR BLD: 1.43 RATIO — HIGH (ref 0.85–1.16)
LACTATE SERPL-SCNC: 1.2 MMOL/L — SIGNIFICANT CHANGE UP (ref 0.5–2)
LYMPHOCYTES # BLD AUTO: 1.13 K/UL — SIGNIFICANT CHANGE UP (ref 1–3.3)
LYMPHOCYTES NFR BLD AUTO: 15.2 % — SIGNIFICANT CHANGE UP (ref 13–44)
MCHC RBC-ENTMCNC: 29.5 PG — SIGNIFICANT CHANGE UP (ref 27–34)
MCHC RBC-ENTMCNC: 33.5 G/DL — SIGNIFICANT CHANGE UP (ref 32–36)
MCV RBC AUTO: 88.1 FL — SIGNIFICANT CHANGE UP (ref 80–100)
MONOCYTES # BLD AUTO: 0.54 K/UL — SIGNIFICANT CHANGE UP (ref 0–0.9)
MONOCYTES NFR BLD AUTO: 7.3 % — SIGNIFICANT CHANGE UP (ref 2–14)
NEUTROPHILS # BLD AUTO: 4.52 K/UL — SIGNIFICANT CHANGE UP (ref 1.8–7.4)
NEUTROPHILS NFR BLD AUTO: 60.9 % — SIGNIFICANT CHANGE UP (ref 43–77)
NRBC # BLD AUTO: 0 K/UL — SIGNIFICANT CHANGE UP (ref 0–0)
NRBC # FLD: 0 K/UL — SIGNIFICANT CHANGE UP (ref 0–0)
NRBC BLD AUTO-RTO: 0 /100 WBCS — SIGNIFICANT CHANGE UP (ref 0–0)
NT-PROBNP SERPL-SCNC: HIGH PG/ML
PLATELET # BLD AUTO: 289 K/UL — SIGNIFICANT CHANGE UP (ref 150–400)
PMV BLD: 12.9 FL — SIGNIFICANT CHANGE UP (ref 7–13)
POTASSIUM SERPL-MCNC: 4.6 MMOL/L — SIGNIFICANT CHANGE UP (ref 3.5–5.3)
POTASSIUM SERPL-SCNC: 4.6 MMOL/L — SIGNIFICANT CHANGE UP (ref 3.5–5.3)
PROCALCITONIN SERPL-MCNC: 0.26 NG/ML — HIGH (ref 0.02–0.1)
PROT SERPL-MCNC: 6.4 G/DL — SIGNIFICANT CHANGE UP (ref 6–8.3)
PROTHROM AB SERPL-ACNC: 16.5 SEC — HIGH (ref 9.9–13.4)
RBC # BLD: 2.85 M/UL — LOW (ref 4.2–5.8)
RBC # FLD: 16 % — HIGH (ref 10.3–14.5)
RSV RNA NPH QL NAA+NON-PROBE: SIGNIFICANT CHANGE UP
SARS-COV-2 RNA SPEC QL NAA+PROBE: SIGNIFICANT CHANGE UP
SODIUM SERPL-SCNC: 143 MMOL/L — SIGNIFICANT CHANGE UP (ref 135–145)
SOURCE RESPIRATORY: SIGNIFICANT CHANGE UP
TROPONIN T, HIGH SENSITIVITY RESULT: 70 NG/L — CRITICAL HIGH
TROPONIN T, HIGH SENSITIVITY RESULT: 72 NG/L — CRITICAL HIGH
WBC # BLD: 7.42 K/UL — SIGNIFICANT CHANGE UP (ref 3.8–10.5)
WBC # FLD AUTO: 7.42 K/UL — SIGNIFICANT CHANGE UP (ref 3.8–10.5)

## 2025-07-08 PROCEDURE — 71045 X-RAY EXAM CHEST 1 VIEW: CPT | Mod: 26

## 2025-07-08 PROCEDURE — 99285 EMERGENCY DEPT VISIT HI MDM: CPT

## 2025-07-08 PROCEDURE — 99223 1ST HOSP IP/OBS HIGH 75: CPT | Mod: GC

## 2025-07-08 RX ORDER — SODIUM CHLORIDE 9 G/1000ML
1000 INJECTION, SOLUTION INTRAVENOUS
Refills: 0 | Status: DISCONTINUED | OUTPATIENT
Start: 2025-07-08 | End: 2025-07-22

## 2025-07-08 RX ORDER — DEXTROSE 50 % IN WATER 50 %
25 SYRINGE (ML) INTRAVENOUS ONCE
Refills: 0 | Status: DISCONTINUED | OUTPATIENT
Start: 2025-07-08 | End: 2025-07-22

## 2025-07-08 RX ORDER — INSULIN LISPRO 100 U/ML
INJECTION, SOLUTION INTRAVENOUS; SUBCUTANEOUS AT BEDTIME
Refills: 0 | Status: DISCONTINUED | OUTPATIENT
Start: 2025-07-08 | End: 2025-07-22

## 2025-07-08 RX ORDER — GLUCAGON 3 MG/1
1 POWDER NASAL ONCE
Refills: 0 | Status: DISCONTINUED | OUTPATIENT
Start: 2025-07-08 | End: 2025-07-22

## 2025-07-08 RX ORDER — APIXABAN 5 MG/1
2.5 TABLET, FILM COATED ORAL
Refills: 0 | Status: DISCONTINUED | OUTPATIENT
Start: 2025-07-09 | End: 2025-07-22

## 2025-07-08 RX ORDER — DEXTROSE 50 % IN WATER 50 %
15 SYRINGE (ML) INTRAVENOUS ONCE
Refills: 0 | Status: DISCONTINUED | OUTPATIENT
Start: 2025-07-08 | End: 2025-07-22

## 2025-07-08 RX ORDER — VANCOMYCIN HCL IN 5 % DEXTROSE 1.5G/250ML
1000 PLASTIC BAG, INJECTION (ML) INTRAVENOUS ONCE
Refills: 0 | Status: DISCONTINUED | OUTPATIENT
Start: 2025-07-08 | End: 2025-07-08

## 2025-07-08 RX ORDER — HYDROCORTISONE 20 MG
50 TABLET ORAL EVERY 6 HOURS
Refills: 0 | Status: COMPLETED | OUTPATIENT
Start: 2025-07-08 | End: 2025-07-09

## 2025-07-08 RX ORDER — FUROSEMIDE 10 MG/ML
60 INJECTION INTRAMUSCULAR; INTRAVENOUS ONCE
Refills: 0 | Status: COMPLETED | OUTPATIENT
Start: 2025-07-08 | End: 2025-07-08

## 2025-07-08 RX ORDER — TAMSULOSIN HYDROCHLORIDE 0.4 MG/1
0.4 CAPSULE ORAL AT BEDTIME
Refills: 0 | Status: DISCONTINUED | OUTPATIENT
Start: 2025-07-08 | End: 2025-07-22

## 2025-07-08 RX ORDER — PIPERACILLIN-TAZO-DEXTROSE,ISO 3.375G/5
3.38 IV SOLUTION, PIGGYBACK PREMIX FROZEN(ML) INTRAVENOUS EVERY 12 HOURS
Refills: 0 | Status: DISCONTINUED | OUTPATIENT
Start: 2025-07-09 | End: 2025-07-11

## 2025-07-08 RX ORDER — DEXTROSE 50 % IN WATER 50 %
12.5 SYRINGE (ML) INTRAVENOUS ONCE
Refills: 0 | Status: DISCONTINUED | OUTPATIENT
Start: 2025-07-08 | End: 2025-07-22

## 2025-07-08 RX ORDER — CALCITRIOL 0.5 UG/1
0.25 CAPSULE, GELATIN COATED ORAL DAILY
Refills: 0 | Status: DISCONTINUED | OUTPATIENT
Start: 2025-07-08 | End: 2025-07-22

## 2025-07-08 RX ORDER — INSULIN LISPRO 100 U/ML
INJECTION, SOLUTION INTRAVENOUS; SUBCUTANEOUS
Refills: 0 | Status: DISCONTINUED | OUTPATIENT
Start: 2025-07-08 | End: 2025-07-22

## 2025-07-08 RX ORDER — VANCOMYCIN HCL IN 5 % DEXTROSE 1.5G/250ML
1000 PLASTIC BAG, INJECTION (ML) INTRAVENOUS ONCE
Refills: 0 | Status: COMPLETED | OUTPATIENT
Start: 2025-07-08 | End: 2025-07-08

## 2025-07-08 RX ORDER — DEXTROSE 50 % IN WATER 50 %
50 SYRINGE (ML) INTRAVENOUS ONCE
Refills: 0 | Status: COMPLETED | OUTPATIENT
Start: 2025-07-08 | End: 2025-07-08

## 2025-07-08 RX ORDER — CLOPIDOGREL BISULFATE 75 MG/1
75 TABLET, FILM COATED ORAL DAILY
Refills: 0 | Status: DISCONTINUED | OUTPATIENT
Start: 2025-07-09 | End: 2025-07-22

## 2025-07-08 RX ORDER — PIPERACILLIN-TAZO-DEXTROSE,ISO 3.375G/5
3.38 IV SOLUTION, PIGGYBACK PREMIX FROZEN(ML) INTRAVENOUS ONCE
Refills: 0 | Status: COMPLETED | OUTPATIENT
Start: 2025-07-08 | End: 2025-07-08

## 2025-07-08 RX ORDER — HYDROCORTISONE 20 MG
100 TABLET ORAL ONCE
Refills: 0 | Status: COMPLETED | OUTPATIENT
Start: 2025-07-08 | End: 2025-07-08

## 2025-07-08 RX ADMIN — Medication 250 MILLIGRAM(S): at 23:32

## 2025-07-08 RX ADMIN — Medication 200 GRAM(S): at 22:51

## 2025-07-08 RX ADMIN — Medication 50 MILLILITER(S): at 15:00

## 2025-07-08 RX ADMIN — Medication 100 MILLIGRAM(S): at 15:28

## 2025-07-08 RX ADMIN — FUROSEMIDE 60 MILLIGRAM(S): 10 INJECTION INTRAMUSCULAR; INTRAVENOUS at 22:46

## 2025-07-08 RX ADMIN — Medication 50 MILLIGRAM(S): at 22:17

## 2025-07-09 ENCOUNTER — RESULT REVIEW (OUTPATIENT)
Age: 85
End: 2025-07-09

## 2025-07-09 DIAGNOSIS — Z71.89 OTHER SPECIFIED COUNSELING: ICD-10-CM

## 2025-07-09 PROBLEM — N18.3 CHRONIC KIDNEY DISEASE, STAGE 3 (MODERATE): Chronic | Status: INACTIVE | Noted: 2019-07-25 | Resolved: 2025-07-08

## 2025-07-09 LAB
ACANTHOCYTES BLD QL SMEAR: SLIGHT — SIGNIFICANT CHANGE UP
ALBUMIN SERPL ELPH-MCNC: 2.8 G/DL — LOW (ref 3.3–5)
ALP SERPL-CCNC: 357 U/L — HIGH (ref 40–120)
ALT FLD-CCNC: 218 U/L — HIGH (ref 4–41)
ANION GAP SERPL CALC-SCNC: 19 MMOL/L — HIGH (ref 7–14)
ANISOCYTOSIS BLD QL: ABNORMAL
APPEARANCE UR: CLEAR — SIGNIFICANT CHANGE UP
AST SERPL-CCNC: 102 U/L — HIGH (ref 4–40)
BACTERIA # UR AUTO: NEGATIVE /HPF — SIGNIFICANT CHANGE UP
BASOPHILS # BLD AUTO: 0.02 K/UL — SIGNIFICANT CHANGE UP (ref 0–0.2)
BASOPHILS # BLD MANUAL: 0 K/UL — SIGNIFICANT CHANGE UP (ref 0–0.2)
BASOPHILS NFR BLD AUTO: 0.4 % — SIGNIFICANT CHANGE UP (ref 0–2)
BASOPHILS NFR BLD MANUAL: 0 % — SIGNIFICANT CHANGE UP (ref 0–2)
BILIRUB SERPL-MCNC: 0.4 MG/DL — SIGNIFICANT CHANGE UP (ref 0.2–1.2)
BILIRUB UR-MCNC: NEGATIVE — SIGNIFICANT CHANGE UP
BUN SERPL-MCNC: 77 MG/DL — HIGH (ref 7–23)
CALCIUM SERPL-MCNC: 8.1 MG/DL — LOW (ref 8.4–10.5)
CAST: 1 /LPF — SIGNIFICANT CHANGE UP (ref 0–4)
CHLORIDE SERPL-SCNC: 99 MMOL/L — SIGNIFICANT CHANGE UP (ref 98–107)
CO2 SERPL-SCNC: 18 MMOL/L — LOW (ref 22–31)
COLOR SPEC: YELLOW — SIGNIFICANT CHANGE UP
CREAT ?TM UR-MCNC: 60 MG/DL — SIGNIFICANT CHANGE UP
CREAT SERPL-MCNC: 3.08 MG/DL — HIGH (ref 0.5–1.3)
DIFF PNL FLD: NEGATIVE — SIGNIFICANT CHANGE UP
EGFR: 19 ML/MIN/1.73M2 — LOW
EGFR: 19 ML/MIN/1.73M2 — LOW
EOSINOPHIL # BLD AUTO: 0 K/UL — SIGNIFICANT CHANGE UP (ref 0–0.5)
EOSINOPHIL # BLD MANUAL: 0 K/UL — SIGNIFICANT CHANGE UP (ref 0–0.5)
EOSINOPHIL NFR BLD AUTO: 0 % — SIGNIFICANT CHANGE UP (ref 0–6)
EOSINOPHIL NFR BLD MANUAL: 0 % — SIGNIFICANT CHANGE UP (ref 0–6)
FERRITIN SERPL-MCNC: 211 NG/ML — SIGNIFICANT CHANGE UP (ref 30–400)
GIANT PLATELETS BLD QL SMEAR: PRESENT
GLUCOSE BLDC GLUCOMTR-MCNC: 351 MG/DL — HIGH (ref 70–99)
GLUCOSE BLDC GLUCOMTR-MCNC: 399 MG/DL — HIGH (ref 70–99)
GLUCOSE BLDC GLUCOMTR-MCNC: 401 MG/DL — HIGH (ref 70–99)
GLUCOSE BLDC GLUCOMTR-MCNC: 411 MG/DL — HIGH (ref 70–99)
GLUCOSE BLDC GLUCOMTR-MCNC: 419 MG/DL — HIGH (ref 70–99)
GLUCOSE BLDC GLUCOMTR-MCNC: 425 MG/DL — HIGH (ref 70–99)
GLUCOSE BLDC GLUCOMTR-MCNC: 431 MG/DL — HIGH (ref 70–99)
GLUCOSE SERPL-MCNC: 404 MG/DL — HIGH (ref 70–99)
GLUCOSE UR QL: NEGATIVE MG/DL — SIGNIFICANT CHANGE UP
HAV IGM SER-ACNC: SIGNIFICANT CHANGE UP
HBV CORE IGM SER-ACNC: SIGNIFICANT CHANGE UP
HBV SURFACE AG SER-ACNC: SIGNIFICANT CHANGE UP
HCT VFR BLD CALC: 25 % — LOW (ref 39–50)
HCV AB S/CO SERPL IA: 0.15 S/CO — SIGNIFICANT CHANGE UP (ref 0–0.79)
HCV AB SERPL-IMP: SIGNIFICANT CHANGE UP
HGB BLD-MCNC: 8.2 G/DL — LOW (ref 13–17)
IMM GRANULOCYTES # BLD AUTO: 0.03 K/UL — SIGNIFICANT CHANGE UP (ref 0–0.07)
IMM GRANULOCYTES NFR BLD AUTO: 0.5 % — SIGNIFICANT CHANGE UP (ref 0–0.9)
IRON SATN MFR SERPL: 23 % — SIGNIFICANT CHANGE UP (ref 14–50)
IRON SATN MFR SERPL: 52 UG/DL — SIGNIFICANT CHANGE UP (ref 45–165)
KETONES UR QL: ABNORMAL MG/DL
LEUKOCYTE ESTERASE UR-ACNC: NEGATIVE — SIGNIFICANT CHANGE UP
LYMPHOCYTES # BLD AUTO: 0.99 K/UL — LOW (ref 1–3.3)
LYMPHOCYTES # BLD MANUAL: 0.88 K/UL — LOW (ref 1–3.3)
LYMPHOCYTES NFR BLD AUTO: 17.6 % — SIGNIFICANT CHANGE UP (ref 13–44)
LYMPHOCYTES NFR BLD MANUAL: 15.7 % — SIGNIFICANT CHANGE UP (ref 13–44)
MAGNESIUM SERPL-MCNC: 2 MG/DL — SIGNIFICANT CHANGE UP (ref 1.6–2.6)
MANUAL NEUTROPHIL BANDS #: 0.05 K/UL — SIGNIFICANT CHANGE UP (ref 0–0.84)
MANUAL REACTIVE LYMPHOCYTES #: 0.05 K/UL — SIGNIFICANT CHANGE UP (ref 0–0.63)
MANUAL SMEAR VERIFICATION: SIGNIFICANT CHANGE UP
MCHC RBC-ENTMCNC: 28.3 PG — SIGNIFICANT CHANGE UP (ref 27–34)
MCHC RBC-ENTMCNC: 32.8 G/DL — SIGNIFICANT CHANGE UP (ref 32–36)
MCV RBC AUTO: 86.2 FL — SIGNIFICANT CHANGE UP (ref 80–100)
MICROCYTES BLD QL: SLIGHT — SIGNIFICANT CHANGE UP
MONOCYTES # BLD AUTO: 0.21 K/UL — SIGNIFICANT CHANGE UP (ref 0–0.9)
MONOCYTES # BLD MANUAL: 0.1 K/UL — SIGNIFICANT CHANGE UP (ref 0–0.9)
MONOCYTES NFR BLD AUTO: 3.7 % — SIGNIFICANT CHANGE UP (ref 2–14)
MONOCYTES NFR BLD MANUAL: 1.7 % — LOW (ref 2–14)
NEUTROPHILS # BLD AUTO: 4.36 K/UL — SIGNIFICANT CHANGE UP (ref 1.8–7.4)
NEUTROPHILS # BLD MANUAL: 4.53 K/UL — SIGNIFICANT CHANGE UP (ref 1.8–7.4)
NEUTROPHILS NFR BLD AUTO: 77.8 % — HIGH (ref 43–77)
NEUTROPHILS NFR BLD MANUAL: 80.8 % — HIGH (ref 43–77)
NEUTS BAND # BLD: 0.9 % — SIGNIFICANT CHANGE UP (ref 0–8)
NEUTS BAND NFR BLD: 0.9 % — SIGNIFICANT CHANGE UP (ref 0–8)
NITRITE UR-MCNC: NEGATIVE — SIGNIFICANT CHANGE UP
NRBC # BLD AUTO: 0 K/UL — SIGNIFICANT CHANGE UP (ref 0–0)
NRBC # FLD: 0 K/UL — SIGNIFICANT CHANGE UP (ref 0–0)
NRBC BLD AUTO-RTO: 0 /100 WBCS — SIGNIFICANT CHANGE UP (ref 0–0)
OSMOLALITY UR: 348 MOSM/KG — SIGNIFICANT CHANGE UP (ref 50–1200)
PH UR: 6 — SIGNIFICANT CHANGE UP (ref 5–8)
PHOSPHATE SERPL-MCNC: 4.7 MG/DL — HIGH (ref 2.5–4.5)
PLAT MORPH BLD: ABNORMAL
PLATELET # BLD AUTO: 307 K/UL — SIGNIFICANT CHANGE UP (ref 150–400)
PLATELET COUNT - ESTIMATE: NORMAL — SIGNIFICANT CHANGE UP
PMV BLD: 12.9 FL — SIGNIFICANT CHANGE UP (ref 7–13)
POIKILOCYTOSIS BLD QL AUTO: SLIGHT — SIGNIFICANT CHANGE UP
POTASSIUM SERPL-MCNC: 4.7 MMOL/L — SIGNIFICANT CHANGE UP (ref 3.5–5.3)
POTASSIUM SERPL-SCNC: 4.7 MMOL/L — SIGNIFICANT CHANGE UP (ref 3.5–5.3)
POTASSIUM UR-SCNC: 52.5 MMOL/L — SIGNIFICANT CHANGE UP
PROT ?TM UR-MCNC: 59 MG/DL — SIGNIFICANT CHANGE UP
PROT SERPL-MCNC: 6.8 G/DL — SIGNIFICANT CHANGE UP (ref 6–8.3)
PROT UR-MCNC: 100 MG/DL
PROT/CREAT UR-RTO: 1 RATIO — HIGH (ref 0–0.2)
RBC # BLD: 2.9 M/UL — LOW (ref 4.2–5.8)
RBC # FLD: 16 % — HIGH (ref 10.3–14.5)
RBC BLD AUTO: SIGNIFICANT CHANGE UP
RBC CASTS # UR COMP ASSIST: 0 /HPF — SIGNIFICANT CHANGE UP (ref 0–4)
SCHISTOCYTES BLD QL AUTO: SLIGHT — SIGNIFICANT CHANGE UP
SODIUM SERPL-SCNC: 136 MMOL/L — SIGNIFICANT CHANGE UP (ref 135–145)
SODIUM UR-SCNC: 47 MMOL/L — SIGNIFICANT CHANGE UP
SP GR SPEC: 1.01 — SIGNIFICANT CHANGE UP (ref 1–1.03)
SQUAMOUS # UR AUTO: 0 /HPF — SIGNIFICANT CHANGE UP (ref 0–5)
TARGETS BLD QL SMEAR: SLIGHT — SIGNIFICANT CHANGE UP
TIBC SERPL-MCNC: 224 UG/DL — SIGNIFICANT CHANGE UP (ref 220–430)
UIBC SERPL-MCNC: 172 UG/DL — SIGNIFICANT CHANGE UP (ref 110–370)
UROBILINOGEN FLD QL: 0.2 MG/DL — SIGNIFICANT CHANGE UP (ref 0.2–1)
UUN UR-MCNC: 420.7 MG/DL — SIGNIFICANT CHANGE UP
VANCOMYCIN TROUGH SERPL-MCNC: 9.8 UG/ML — LOW (ref 10–20)
VARIANT LYMPHS # BLD: 0.9 % — SIGNIFICANT CHANGE UP (ref 0–6)
VARIANT LYMPHS NFR BLD MANUAL: 0.9 % — SIGNIFICANT CHANGE UP (ref 0–6)
WBC # BLD: 5.61 K/UL — SIGNIFICANT CHANGE UP (ref 3.8–10.5)
WBC # FLD AUTO: 5.61 K/UL — SIGNIFICANT CHANGE UP (ref 3.8–10.5)
WBC UR QL: 0 /HPF — SIGNIFICANT CHANGE UP (ref 0–5)

## 2025-07-09 PROCEDURE — 93308 TTE F-UP OR LMTD: CPT | Mod: 26

## 2025-07-09 PROCEDURE — 93975 VASCULAR STUDY: CPT | Mod: 26

## 2025-07-09 PROCEDURE — 99233 SBSQ HOSP IP/OBS HIGH 50: CPT

## 2025-07-09 PROCEDURE — 76705 ECHO EXAM OF ABDOMEN: CPT | Mod: 26,59

## 2025-07-09 PROCEDURE — 71250 CT THORAX DX C-: CPT | Mod: 26

## 2025-07-09 RX ORDER — METOPROLOL SUCCINATE 50 MG/1
12.5 TABLET, EXTENDED RELEASE ORAL EVERY 12 HOURS
Refills: 0 | Status: DISCONTINUED | OUTPATIENT
Start: 2025-07-09 | End: 2025-07-14

## 2025-07-09 RX ORDER — INSULIN GLARGINE-YFGN 100 [IU]/ML
6 INJECTION, SOLUTION SUBCUTANEOUS AT BEDTIME
Refills: 0 | Status: DISCONTINUED | OUTPATIENT
Start: 2025-07-09 | End: 2025-07-09

## 2025-07-09 RX ORDER — HYDROCORTISONE 20 MG
50 TABLET ORAL EVERY 8 HOURS
Refills: 0 | Status: DISCONTINUED | OUTPATIENT
Start: 2025-07-10 | End: 2025-07-10

## 2025-07-09 RX ORDER — INSULIN LISPRO 100 U/ML
3 INJECTION, SOLUTION INTRAVENOUS; SUBCUTANEOUS
Refills: 0 | Status: DISCONTINUED | OUTPATIENT
Start: 2025-07-09 | End: 2025-07-10

## 2025-07-09 RX ORDER — INSULIN GLARGINE-YFGN 100 [IU]/ML
8 INJECTION, SOLUTION SUBCUTANEOUS AT BEDTIME
Refills: 0 | Status: DISCONTINUED | OUTPATIENT
Start: 2025-07-09 | End: 2025-07-10

## 2025-07-09 RX ORDER — INSULIN LISPRO 100 U/ML
2 INJECTION, SOLUTION INTRAVENOUS; SUBCUTANEOUS
Refills: 0 | Status: DISCONTINUED | OUTPATIENT
Start: 2025-07-09 | End: 2025-07-09

## 2025-07-09 RX ADMIN — INSULIN LISPRO 6: 100 INJECTION, SOLUTION INTRAVENOUS; SUBCUTANEOUS at 17:25

## 2025-07-09 RX ADMIN — METOPROLOL SUCCINATE 12.5 MILLIGRAM(S): 50 TABLET, EXTENDED RELEASE ORAL at 17:39

## 2025-07-09 RX ADMIN — Medication 25 GRAM(S): at 05:45

## 2025-07-09 RX ADMIN — Medication 50 MILLIGRAM(S): at 17:36

## 2025-07-09 RX ADMIN — APIXABAN 2.5 MILLIGRAM(S): 5 TABLET, FILM COATED ORAL at 05:40

## 2025-07-09 RX ADMIN — INSULIN LISPRO 5: 100 INJECTION, SOLUTION INTRAVENOUS; SUBCUTANEOUS at 08:15

## 2025-07-09 RX ADMIN — TAMSULOSIN HYDROCHLORIDE 0.4 MILLIGRAM(S): 0.4 CAPSULE ORAL at 21:56

## 2025-07-09 RX ADMIN — METOPROLOL SUCCINATE 12.5 MILLIGRAM(S): 50 TABLET, EXTENDED RELEASE ORAL at 07:56

## 2025-07-09 RX ADMIN — CALCITRIOL 0.25 MICROGRAM(S): 0.5 CAPSULE, GELATIN COATED ORAL at 11:31

## 2025-07-09 RX ADMIN — Medication 50 MILLIGRAM(S): at 09:50

## 2025-07-09 RX ADMIN — INSULIN LISPRO 3: 100 INJECTION, SOLUTION INTRAVENOUS; SUBCUTANEOUS at 21:59

## 2025-07-09 RX ADMIN — Medication 50 MILLIGRAM(S): at 04:14

## 2025-07-09 RX ADMIN — INSULIN GLARGINE-YFGN 8 UNIT(S): 100 INJECTION, SOLUTION SUBCUTANEOUS at 22:26

## 2025-07-09 RX ADMIN — INSULIN LISPRO 6: 100 INJECTION, SOLUTION INTRAVENOUS; SUBCUTANEOUS at 12:25

## 2025-07-09 RX ADMIN — Medication 1 APPLICATION(S): at 11:36

## 2025-07-09 RX ADMIN — Medication 25 GRAM(S): at 17:38

## 2025-07-09 RX ADMIN — CLOPIDOGREL BISULFATE 75 MILLIGRAM(S): 75 TABLET, FILM COATED ORAL at 11:31

## 2025-07-09 RX ADMIN — INSULIN LISPRO 2 UNIT(S): 100 INJECTION, SOLUTION INTRAVENOUS; SUBCUTANEOUS at 17:28

## 2025-07-09 RX ADMIN — APIXABAN 2.5 MILLIGRAM(S): 5 TABLET, FILM COATED ORAL at 17:31

## 2025-07-10 LAB
ALBUMIN SERPL ELPH-MCNC: 2.9 G/DL — LOW (ref 3.3–5)
ALP SERPL-CCNC: 308 U/L — HIGH (ref 40–120)
ALT FLD-CCNC: 195 U/L — HIGH (ref 4–41)
ANION GAP SERPL CALC-SCNC: 16 MMOL/L — HIGH (ref 7–14)
AST SERPL-CCNC: 70 U/L — HIGH (ref 4–40)
BILIRUB SERPL-MCNC: 0.4 MG/DL — SIGNIFICANT CHANGE UP (ref 0.2–1.2)
BUN SERPL-MCNC: 86 MG/DL — HIGH (ref 7–23)
CALCIUM SERPL-MCNC: 8.3 MG/DL — LOW (ref 8.4–10.5)
CHLORIDE SERPL-SCNC: 101 MMOL/L — SIGNIFICANT CHANGE UP (ref 98–107)
CO2 SERPL-SCNC: 21 MMOL/L — LOW (ref 22–31)
CREAT SERPL-MCNC: 3.35 MG/DL — HIGH (ref 0.5–1.3)
EGFR: 18 ML/MIN/1.73M2 — LOW
EGFR: 18 ML/MIN/1.73M2 — LOW
GLUCOSE BLDC GLUCOMTR-MCNC: 182 MG/DL — HIGH (ref 70–99)
GLUCOSE BLDC GLUCOMTR-MCNC: 240 MG/DL — HIGH (ref 70–99)
GLUCOSE BLDC GLUCOMTR-MCNC: 282 MG/DL — HIGH (ref 70–99)
GLUCOSE BLDC GLUCOMTR-MCNC: 288 MG/DL — HIGH (ref 70–99)
GLUCOSE SERPL-MCNC: 277 MG/DL — HIGH (ref 70–99)
HCT VFR BLD CALC: 25.6 % — LOW (ref 39–50)
HGB BLD-MCNC: 8.4 G/DL — LOW (ref 13–17)
MAGNESIUM SERPL-MCNC: 2.1 MG/DL — SIGNIFICANT CHANGE UP (ref 1.6–2.6)
MCHC RBC-ENTMCNC: 28.2 PG — SIGNIFICANT CHANGE UP (ref 27–34)
MCHC RBC-ENTMCNC: 32.8 G/DL — SIGNIFICANT CHANGE UP (ref 32–36)
MCV RBC AUTO: 85.9 FL — SIGNIFICANT CHANGE UP (ref 80–100)
NRBC # BLD AUTO: 0 K/UL — SIGNIFICANT CHANGE UP (ref 0–0)
NRBC # FLD: 0 K/UL — SIGNIFICANT CHANGE UP (ref 0–0)
NRBC BLD AUTO-RTO: 0 /100 WBCS — SIGNIFICANT CHANGE UP (ref 0–0)
PHOSPHATE SERPL-MCNC: 4.5 MG/DL — SIGNIFICANT CHANGE UP (ref 2.5–4.5)
PLATELET # BLD AUTO: 280 K/UL — SIGNIFICANT CHANGE UP (ref 150–400)
PMV BLD: 13.2 FL — HIGH (ref 7–13)
POTASSIUM SERPL-MCNC: 4.3 MMOL/L — SIGNIFICANT CHANGE UP (ref 3.5–5.3)
POTASSIUM SERPL-SCNC: 4.3 MMOL/L — SIGNIFICANT CHANGE UP (ref 3.5–5.3)
PROT SERPL-MCNC: 6.9 G/DL — SIGNIFICANT CHANGE UP (ref 6–8.3)
RBC # BLD: 2.98 M/UL — LOW (ref 4.2–5.8)
RBC # FLD: 16 % — HIGH (ref 10.3–14.5)
SODIUM SERPL-SCNC: 138 MMOL/L — SIGNIFICANT CHANGE UP (ref 135–145)
WBC # BLD: 8.52 K/UL — SIGNIFICANT CHANGE UP (ref 3.8–10.5)
WBC # FLD AUTO: 8.52 K/UL — SIGNIFICANT CHANGE UP (ref 3.8–10.5)

## 2025-07-10 PROCEDURE — 99222 1ST HOSP IP/OBS MODERATE 55: CPT

## 2025-07-10 PROCEDURE — 99233 SBSQ HOSP IP/OBS HIGH 50: CPT

## 2025-07-10 RX ORDER — HYDROCORTISONE 20 MG
50 TABLET ORAL ONCE
Refills: 0 | Status: DISCONTINUED | OUTPATIENT
Start: 2025-07-10 | End: 2025-07-10

## 2025-07-10 RX ORDER — HYDROCORTISONE 20 MG
50 TABLET ORAL EVERY 12 HOURS
Refills: 0 | Status: DISCONTINUED | OUTPATIENT
Start: 2025-07-10 | End: 2025-07-10

## 2025-07-10 RX ORDER — INSULIN GLARGINE-YFGN 100 [IU]/ML
13 INJECTION, SOLUTION SUBCUTANEOUS AT BEDTIME
Refills: 0 | Status: DISCONTINUED | OUTPATIENT
Start: 2025-07-10 | End: 2025-07-11

## 2025-07-10 RX ORDER — FUROSEMIDE 10 MG/ML
60 INJECTION INTRAMUSCULAR; INTRAVENOUS ONCE
Refills: 0 | Status: COMPLETED | OUTPATIENT
Start: 2025-07-10 | End: 2025-07-10

## 2025-07-10 RX ORDER — INSULIN LISPRO 100 U/ML
6 INJECTION, SOLUTION INTRAVENOUS; SUBCUTANEOUS
Refills: 0 | Status: DISCONTINUED | OUTPATIENT
Start: 2025-07-10 | End: 2025-07-11

## 2025-07-10 RX ADMIN — INSULIN LISPRO 3 UNIT(S): 100 INJECTION, SOLUTION INTRAVENOUS; SUBCUTANEOUS at 09:05

## 2025-07-10 RX ADMIN — INSULIN GLARGINE-YFGN 8 UNIT(S): 100 INJECTION, SOLUTION SUBCUTANEOUS at 21:16

## 2025-07-10 RX ADMIN — INSULIN LISPRO 3: 100 INJECTION, SOLUTION INTRAVENOUS; SUBCUTANEOUS at 09:05

## 2025-07-10 RX ADMIN — FUROSEMIDE 60 MILLIGRAM(S): 10 INJECTION INTRAMUSCULAR; INTRAVENOUS at 12:49

## 2025-07-10 RX ADMIN — INSULIN LISPRO 1: 100 INJECTION, SOLUTION INTRAVENOUS; SUBCUTANEOUS at 21:15

## 2025-07-10 RX ADMIN — INSULIN LISPRO 2: 100 INJECTION, SOLUTION INTRAVENOUS; SUBCUTANEOUS at 12:30

## 2025-07-10 RX ADMIN — INSULIN LISPRO 3 UNIT(S): 100 INJECTION, SOLUTION INTRAVENOUS; SUBCUTANEOUS at 17:43

## 2025-07-10 RX ADMIN — TAMSULOSIN HYDROCHLORIDE 0.4 MILLIGRAM(S): 0.4 CAPSULE ORAL at 21:16

## 2025-07-10 RX ADMIN — INSULIN LISPRO 3 UNIT(S): 100 INJECTION, SOLUTION INTRAVENOUS; SUBCUTANEOUS at 12:30

## 2025-07-10 RX ADMIN — CALCITRIOL 0.25 MICROGRAM(S): 0.5 CAPSULE, GELATIN COATED ORAL at 12:07

## 2025-07-10 RX ADMIN — CLOPIDOGREL BISULFATE 75 MILLIGRAM(S): 75 TABLET, FILM COATED ORAL at 12:07

## 2025-07-10 RX ADMIN — Medication 50 MILLIGRAM(S): at 12:06

## 2025-07-10 RX ADMIN — Medication 25 GRAM(S): at 06:12

## 2025-07-10 RX ADMIN — Medication 50 MILLIGRAM(S): at 00:31

## 2025-07-10 RX ADMIN — METOPROLOL SUCCINATE 12.5 MILLIGRAM(S): 50 TABLET, EXTENDED RELEASE ORAL at 17:20

## 2025-07-10 RX ADMIN — Medication 1 APPLICATION(S): at 12:14

## 2025-07-10 RX ADMIN — APIXABAN 2.5 MILLIGRAM(S): 5 TABLET, FILM COATED ORAL at 06:12

## 2025-07-10 RX ADMIN — APIXABAN 2.5 MILLIGRAM(S): 5 TABLET, FILM COATED ORAL at 17:19

## 2025-07-10 RX ADMIN — METOPROLOL SUCCINATE 12.5 MILLIGRAM(S): 50 TABLET, EXTENDED RELEASE ORAL at 06:19

## 2025-07-10 RX ADMIN — INSULIN LISPRO 1: 100 INJECTION, SOLUTION INTRAVENOUS; SUBCUTANEOUS at 17:42

## 2025-07-10 RX ADMIN — Medication 25 GRAM(S): at 17:18

## 2025-07-11 DIAGNOSIS — E78.5 HYPERLIPIDEMIA, UNSPECIFIED: ICD-10-CM

## 2025-07-11 DIAGNOSIS — T68.XXXA HYPOTHERMIA, INITIAL ENCOUNTER: ICD-10-CM

## 2025-07-11 LAB
ADD ON TEST-SPECIMEN IN LAB: SIGNIFICANT CHANGE UP
ADD ON TEST-SPECIMEN IN LAB: SIGNIFICANT CHANGE UP
ALBUMIN SERPL ELPH-MCNC: 2.9 G/DL — LOW (ref 3.3–5)
ALBUMIN SERPL ELPH-MCNC: 3 G/DL — LOW (ref 3.3–5)
ALP SERPL-CCNC: 238 U/L — HIGH (ref 40–120)
ALP SERPL-CCNC: 251 U/L — HIGH (ref 40–120)
ALT FLD-CCNC: 188 U/L — HIGH (ref 4–41)
ALT FLD-CCNC: 201 U/L — HIGH (ref 4–41)
ANION GAP SERPL CALC-SCNC: 13 MMOL/L — SIGNIFICANT CHANGE UP (ref 7–14)
ANION GAP SERPL CALC-SCNC: 14 MMOL/L — SIGNIFICANT CHANGE UP (ref 7–14)
APPEARANCE UR: CLEAR — SIGNIFICANT CHANGE UP
APTT BLD: 34.2 SEC — SIGNIFICANT CHANGE UP (ref 26.1–36.8)
AST SERPL-CCNC: 62 U/L — HIGH (ref 4–40)
AST SERPL-CCNC: 73 U/L — HIGH (ref 4–40)
BACTERIA # UR AUTO: NEGATIVE /HPF — SIGNIFICANT CHANGE UP
BASOPHILS # BLD AUTO: 0.14 K/UL — SIGNIFICANT CHANGE UP (ref 0–0.2)
BASOPHILS NFR BLD AUTO: 1.6 % — SIGNIFICANT CHANGE UP (ref 0–2)
BILIRUB SERPL-MCNC: 0.4 MG/DL — SIGNIFICANT CHANGE UP (ref 0.2–1.2)
BILIRUB SERPL-MCNC: 0.5 MG/DL — SIGNIFICANT CHANGE UP (ref 0.2–1.2)
BILIRUB UR-MCNC: NEGATIVE — SIGNIFICANT CHANGE UP
BLD GP AB SCN SERPL QL: NEGATIVE — SIGNIFICANT CHANGE UP
BUN SERPL-MCNC: 85 MG/DL — HIGH (ref 7–23)
BUN SERPL-MCNC: 86 MG/DL — HIGH (ref 7–23)
CALCIUM SERPL-MCNC: 8.5 MG/DL — SIGNIFICANT CHANGE UP (ref 8.4–10.5)
CALCIUM SERPL-MCNC: 8.6 MG/DL — SIGNIFICANT CHANGE UP (ref 8.4–10.5)
CAST: 1 /LPF — SIGNIFICANT CHANGE UP (ref 0–4)
CHLORIDE SERPL-SCNC: 103 MMOL/L — SIGNIFICANT CHANGE UP (ref 98–107)
CHLORIDE SERPL-SCNC: 103 MMOL/L — SIGNIFICANT CHANGE UP (ref 98–107)
CO2 SERPL-SCNC: 25 MMOL/L — SIGNIFICANT CHANGE UP (ref 22–31)
CO2 SERPL-SCNC: 26 MMOL/L — SIGNIFICANT CHANGE UP (ref 22–31)
COLOR SPEC: YELLOW — SIGNIFICANT CHANGE UP
CORTIS AM PEAK SERPL-MCNC: 11.4 UG/DL — SIGNIFICANT CHANGE UP (ref 6–18.4)
CREAT SERPL-MCNC: 3.13 MG/DL — HIGH (ref 0.5–1.3)
CREAT SERPL-MCNC: 3.16 MG/DL — HIGH (ref 0.5–1.3)
DIFF PNL FLD: NEGATIVE — SIGNIFICANT CHANGE UP
EGFR: 19 ML/MIN/1.73M2 — LOW
EOSINOPHIL # BLD AUTO: 1.06 K/UL — HIGH (ref 0–0.5)
EOSINOPHIL NFR BLD AUTO: 12.3 % — HIGH (ref 0–6)
GAS PNL BLDV: SIGNIFICANT CHANGE UP
GLUCOSE BLDC GLUCOMTR-MCNC: 104 MG/DL — HIGH (ref 70–99)
GLUCOSE BLDC GLUCOMTR-MCNC: 181 MG/DL — HIGH (ref 70–99)
GLUCOSE BLDC GLUCOMTR-MCNC: 196 MG/DL — HIGH (ref 70–99)
GLUCOSE BLDC GLUCOMTR-MCNC: 204 MG/DL — HIGH (ref 70–99)
GLUCOSE BLDC GLUCOMTR-MCNC: 91 MG/DL — SIGNIFICANT CHANGE UP (ref 70–99)
GLUCOSE SERPL-MCNC: 172 MG/DL — HIGH (ref 70–99)
GLUCOSE SERPL-MCNC: 176 MG/DL — HIGH (ref 70–99)
GLUCOSE UR QL: NEGATIVE MG/DL — SIGNIFICANT CHANGE UP
HCT VFR BLD CALC: 25 % — LOW (ref 39–50)
HGB BLD-MCNC: 8.6 G/DL — LOW (ref 13–17)
IMM GRANULOCYTES # BLD AUTO: 0.03 K/UL — SIGNIFICANT CHANGE UP (ref 0–0.07)
IMM GRANULOCYTES NFR BLD AUTO: 0.3 % — SIGNIFICANT CHANGE UP (ref 0–0.9)
INR BLD: 1.4 RATIO — HIGH (ref 0.85–1.16)
KETONES UR QL: NEGATIVE MG/DL — SIGNIFICANT CHANGE UP
LEUKOCYTE ESTERASE UR-ACNC: NEGATIVE — SIGNIFICANT CHANGE UP
LYMPHOCYTES # BLD AUTO: 1.47 K/UL — SIGNIFICANT CHANGE UP (ref 1–3.3)
LYMPHOCYTES NFR BLD AUTO: 17 % — SIGNIFICANT CHANGE UP (ref 13–44)
MAGNESIUM SERPL-MCNC: 2 MG/DL — SIGNIFICANT CHANGE UP (ref 1.6–2.6)
MAGNESIUM SERPL-MCNC: 2 MG/DL — SIGNIFICANT CHANGE UP (ref 1.6–2.6)
MCHC RBC-ENTMCNC: 29 PG — SIGNIFICANT CHANGE UP (ref 27–34)
MCHC RBC-ENTMCNC: 34.4 G/DL — SIGNIFICANT CHANGE UP (ref 32–36)
MCV RBC AUTO: 84.2 FL — SIGNIFICANT CHANGE UP (ref 80–100)
MONOCYTES # BLD AUTO: 0.3 K/UL — SIGNIFICANT CHANGE UP (ref 0–0.9)
MONOCYTES NFR BLD AUTO: 3.5 % — SIGNIFICANT CHANGE UP (ref 2–14)
MRSA PCR RESULT.: SIGNIFICANT CHANGE UP
NEUTROPHILS # BLD AUTO: 5.64 K/UL — SIGNIFICANT CHANGE UP (ref 1.8–7.4)
NEUTROPHILS NFR BLD AUTO: 65.3 % — SIGNIFICANT CHANGE UP (ref 43–77)
NITRITE UR-MCNC: NEGATIVE — SIGNIFICANT CHANGE UP
NRBC # BLD AUTO: 0 K/UL — SIGNIFICANT CHANGE UP (ref 0–0)
NRBC # FLD: 0 K/UL — SIGNIFICANT CHANGE UP (ref 0–0)
NRBC BLD AUTO-RTO: 0 /100 WBCS — SIGNIFICANT CHANGE UP (ref 0–0)
PH UR: 6 — SIGNIFICANT CHANGE UP (ref 5–8)
PHOSPHATE SERPL-MCNC: 4.1 MG/DL — SIGNIFICANT CHANGE UP (ref 2.5–4.5)
PHOSPHATE SERPL-MCNC: 4.5 MG/DL — SIGNIFICANT CHANGE UP (ref 2.5–4.5)
PLATELET # BLD AUTO: 285 K/UL — SIGNIFICANT CHANGE UP (ref 150–400)
PMV BLD: 12.8 FL — SIGNIFICANT CHANGE UP (ref 7–13)
POTASSIUM SERPL-MCNC: 3.2 MMOL/L — LOW (ref 3.5–5.3)
POTASSIUM SERPL-MCNC: 3.5 MMOL/L — SIGNIFICANT CHANGE UP (ref 3.5–5.3)
POTASSIUM SERPL-SCNC: 3.2 MMOL/L — LOW (ref 3.5–5.3)
POTASSIUM SERPL-SCNC: 3.5 MMOL/L — SIGNIFICANT CHANGE UP (ref 3.5–5.3)
PROT SERPL-MCNC: 6.7 G/DL — SIGNIFICANT CHANGE UP (ref 6–8.3)
PROT SERPL-MCNC: 6.9 G/DL — SIGNIFICANT CHANGE UP (ref 6–8.3)
PROT UR-MCNC: 100 MG/DL
PROTHROM AB SERPL-ACNC: 16.6 SEC — HIGH (ref 9.9–13.4)
RBC # BLD: 2.97 M/UL — LOW (ref 4.2–5.8)
RBC # FLD: 16.3 % — HIGH (ref 10.3–14.5)
RBC CASTS # UR COMP ASSIST: 0 /HPF — SIGNIFICANT CHANGE UP (ref 0–4)
RH IG SCN BLD-IMP: POSITIVE — SIGNIFICANT CHANGE UP
S AUREUS DNA NOSE QL NAA+PROBE: SIGNIFICANT CHANGE UP
SODIUM SERPL-SCNC: 142 MMOL/L — SIGNIFICANT CHANGE UP (ref 135–145)
SODIUM SERPL-SCNC: 142 MMOL/L — SIGNIFICANT CHANGE UP (ref 135–145)
SP GR SPEC: 1.01 — SIGNIFICANT CHANGE UP (ref 1–1.03)
SQUAMOUS # UR AUTO: 0 /HPF — SIGNIFICANT CHANGE UP (ref 0–5)
T4 AB SER-ACNC: 6.07 UG/DL — SIGNIFICANT CHANGE UP (ref 5.1–13)
T4 FREE SERPL-MCNC: 1.4 NG/DL — SIGNIFICANT CHANGE UP (ref 0.9–1.8)
T4 FREE+ TSH PNL SERPL: 2.38 UIU/ML — SIGNIFICANT CHANGE UP (ref 0.27–4.2)
TSH SERPL-MCNC: 2.38 UIU/ML — SIGNIFICANT CHANGE UP (ref 0.27–4.2)
UROBILINOGEN FLD QL: 0.2 MG/DL — SIGNIFICANT CHANGE UP (ref 0.2–1)
WBC # BLD: 8.64 K/UL — SIGNIFICANT CHANGE UP (ref 3.8–10.5)
WBC # FLD AUTO: 8.64 K/UL — SIGNIFICANT CHANGE UP (ref 3.8–10.5)
WBC UR QL: 1 /HPF — SIGNIFICANT CHANGE UP (ref 0–5)

## 2025-07-11 PROCEDURE — 99223 1ST HOSP IP/OBS HIGH 75: CPT

## 2025-07-11 PROCEDURE — 93010 ELECTROCARDIOGRAM REPORT: CPT | Mod: 76

## 2025-07-11 PROCEDURE — 99291 CRITICAL CARE FIRST HOUR: CPT

## 2025-07-11 PROCEDURE — 70450 CT HEAD/BRAIN W/O DYE: CPT | Mod: 26

## 2025-07-11 PROCEDURE — 99232 SBSQ HOSP IP/OBS MODERATE 35: CPT

## 2025-07-11 RX ORDER — VANCOMYCIN HCL IN 5 % DEXTROSE 1.5G/250ML
1000 PLASTIC BAG, INJECTION (ML) INTRAVENOUS ONCE
Refills: 0 | Status: COMPLETED | OUTPATIENT
Start: 2025-07-11 | End: 2025-07-11

## 2025-07-11 RX ORDER — POLYETHYLENE GLYCOL 3350 17 G/17G
17 POWDER, FOR SOLUTION ORAL
Refills: 0 | Status: DISCONTINUED | OUTPATIENT
Start: 2025-07-11 | End: 2025-07-22

## 2025-07-11 RX ORDER — PIPERACILLIN-TAZO-DEXTROSE,ISO 3.375G/5
3.38 IV SOLUTION, PIGGYBACK PREMIX FROZEN(ML) INTRAVENOUS ONCE
Refills: 0 | Status: DISCONTINUED | OUTPATIENT
Start: 2025-07-11 | End: 2025-07-11

## 2025-07-11 RX ORDER — INSULIN GLARGINE-YFGN 100 [IU]/ML
10 INJECTION, SOLUTION SUBCUTANEOUS AT BEDTIME
Refills: 0 | Status: DISCONTINUED | OUTPATIENT
Start: 2025-07-11 | End: 2025-07-11

## 2025-07-11 RX ORDER — PIPERACILLIN-TAZO-DEXTROSE,ISO 3.375G/5
3.38 IV SOLUTION, PIGGYBACK PREMIX FROZEN(ML) INTRAVENOUS ONCE
Refills: 0 | Status: COMPLETED | OUTPATIENT
Start: 2025-07-11 | End: 2025-07-11

## 2025-07-11 RX ORDER — INSULIN LISPRO 100 U/ML
5 INJECTION, SOLUTION INTRAVENOUS; SUBCUTANEOUS
Refills: 0 | Status: DISCONTINUED | OUTPATIENT
Start: 2025-07-11 | End: 2025-07-12

## 2025-07-11 RX ORDER — INSULIN GLARGINE-YFGN 100 [IU]/ML
6 INJECTION, SOLUTION SUBCUTANEOUS AT BEDTIME
Refills: 0 | Status: DISCONTINUED | OUTPATIENT
Start: 2025-07-11 | End: 2025-07-11

## 2025-07-11 RX ORDER — INSULIN GLARGINE-YFGN 100 [IU]/ML
10 INJECTION, SOLUTION SUBCUTANEOUS AT BEDTIME
Refills: 0 | Status: DISCONTINUED | OUTPATIENT
Start: 2025-07-11 | End: 2025-07-12

## 2025-07-11 RX ORDER — INSULIN LISPRO 100 U/ML
3 INJECTION, SOLUTION INTRAVENOUS; SUBCUTANEOUS
Refills: 0 | Status: DISCONTINUED | OUTPATIENT
Start: 2025-07-11 | End: 2025-07-11

## 2025-07-11 RX ORDER — PIPERACILLIN-TAZO-DEXTROSE,ISO 3.375G/5
3.38 IV SOLUTION, PIGGYBACK PREMIX FROZEN(ML) INTRAVENOUS EVERY 12 HOURS
Refills: 0 | Status: COMPLETED | OUTPATIENT
Start: 2025-07-11 | End: 2025-07-18

## 2025-07-11 RX ORDER — FUROSEMIDE 10 MG/ML
60 INJECTION INTRAMUSCULAR; INTRAVENOUS EVERY 12 HOURS
Refills: 0 | Status: DISCONTINUED | OUTPATIENT
Start: 2025-07-11 | End: 2025-07-14

## 2025-07-11 RX ADMIN — Medication 25 GRAM(S): at 23:10

## 2025-07-11 RX ADMIN — CLOPIDOGREL BISULFATE 75 MILLIGRAM(S): 75 TABLET, FILM COATED ORAL at 12:12

## 2025-07-11 RX ADMIN — METOPROLOL SUCCINATE 12.5 MILLIGRAM(S): 50 TABLET, EXTENDED RELEASE ORAL at 05:18

## 2025-07-11 RX ADMIN — Medication 25 GRAM(S): at 05:18

## 2025-07-11 RX ADMIN — METOPROLOL SUCCINATE 12.5 MILLIGRAM(S): 50 TABLET, EXTENDED RELEASE ORAL at 18:13

## 2025-07-11 RX ADMIN — INSULIN LISPRO 1: 100 INJECTION, SOLUTION INTRAVENOUS; SUBCUTANEOUS at 09:02

## 2025-07-11 RX ADMIN — INSULIN LISPRO 6 UNIT(S): 100 INJECTION, SOLUTION INTRAVENOUS; SUBCUTANEOUS at 09:03

## 2025-07-11 RX ADMIN — APIXABAN 2.5 MILLIGRAM(S): 5 TABLET, FILM COATED ORAL at 18:13

## 2025-07-11 RX ADMIN — TAMSULOSIN HYDROCHLORIDE 0.4 MILLIGRAM(S): 0.4 CAPSULE ORAL at 21:32

## 2025-07-11 RX ADMIN — FUROSEMIDE 60 MILLIGRAM(S): 10 INJECTION INTRAMUSCULAR; INTRAVENOUS at 12:14

## 2025-07-11 RX ADMIN — INSULIN LISPRO 5 UNIT(S): 100 INJECTION, SOLUTION INTRAVENOUS; SUBCUTANEOUS at 18:11

## 2025-07-11 RX ADMIN — Medication 40 MILLIEQUIVALENT(S): at 15:47

## 2025-07-11 RX ADMIN — APIXABAN 2.5 MILLIGRAM(S): 5 TABLET, FILM COATED ORAL at 05:17

## 2025-07-11 RX ADMIN — Medication 200 GRAM(S): at 14:49

## 2025-07-11 RX ADMIN — INSULIN LISPRO 3 UNIT(S): 100 INJECTION, SOLUTION INTRAVENOUS; SUBCUTANEOUS at 12:13

## 2025-07-11 RX ADMIN — Medication 250 MILLIGRAM(S): at 14:52

## 2025-07-11 RX ADMIN — Medication 1 APPLICATION(S): at 12:11

## 2025-07-11 RX ADMIN — POLYETHYLENE GLYCOL 3350 17 GRAM(S): 17 POWDER, FOR SOLUTION ORAL at 14:52

## 2025-07-11 RX ADMIN — INSULIN LISPRO 1: 100 INJECTION, SOLUTION INTRAVENOUS; SUBCUTANEOUS at 12:13

## 2025-07-11 RX ADMIN — CALCITRIOL 0.25 MICROGRAM(S): 0.5 CAPSULE, GELATIN COATED ORAL at 12:11

## 2025-07-11 RX ADMIN — FUROSEMIDE 60 MILLIGRAM(S): 10 INJECTION INTRAMUSCULAR; INTRAVENOUS at 18:18

## 2025-07-12 LAB
ACTH SER-ACNC: 10.8 PG/ML — SIGNIFICANT CHANGE UP (ref 7.2–63.3)
ALBUMIN SERPL ELPH-MCNC: 2.6 G/DL — LOW (ref 3.3–5)
ALP SERPL-CCNC: 212 U/L — HIGH (ref 40–120)
ALT FLD-CCNC: 153 U/L — HIGH (ref 4–41)
ANION GAP SERPL CALC-SCNC: 14 MMOL/L — SIGNIFICANT CHANGE UP (ref 7–14)
AST SERPL-CCNC: 44 U/L — HIGH (ref 4–40)
BILIRUB SERPL-MCNC: 0.7 MG/DL — SIGNIFICANT CHANGE UP (ref 0.2–1.2)
BUN SERPL-MCNC: 82 MG/DL — HIGH (ref 7–23)
CALCIUM SERPL-MCNC: 8.3 MG/DL — LOW (ref 8.4–10.5)
CHLORIDE SERPL-SCNC: 103 MMOL/L — SIGNIFICANT CHANGE UP (ref 98–107)
CO2 SERPL-SCNC: 24 MMOL/L — SIGNIFICANT CHANGE UP (ref 22–31)
CREAT SERPL-MCNC: 3.3 MG/DL — HIGH (ref 0.5–1.3)
EGFR: 18 ML/MIN/1.73M2 — LOW
EGFR: 18 ML/MIN/1.73M2 — LOW
GLUCOSE BLDC GLUCOMTR-MCNC: 101 MG/DL — HIGH (ref 70–99)
GLUCOSE BLDC GLUCOMTR-MCNC: 212 MG/DL — HIGH (ref 70–99)
GLUCOSE BLDC GLUCOMTR-MCNC: 249 MG/DL — HIGH (ref 70–99)
GLUCOSE BLDC GLUCOMTR-MCNC: 87 MG/DL — SIGNIFICANT CHANGE UP (ref 70–99)
GLUCOSE BLDC GLUCOMTR-MCNC: 87 MG/DL — SIGNIFICANT CHANGE UP (ref 70–99)
GLUCOSE SERPL-MCNC: 85 MG/DL — SIGNIFICANT CHANGE UP (ref 70–99)
HCT VFR BLD CALC: 25.4 % — LOW (ref 39–50)
HGB BLD-MCNC: 8.5 G/DL — LOW (ref 13–17)
MAGNESIUM SERPL-MCNC: 1.7 MG/DL — SIGNIFICANT CHANGE UP (ref 1.6–2.6)
MCHC RBC-ENTMCNC: 28.4 PG — SIGNIFICANT CHANGE UP (ref 27–34)
MCHC RBC-ENTMCNC: 33.5 G/DL — SIGNIFICANT CHANGE UP (ref 32–36)
MCV RBC AUTO: 84.9 FL — SIGNIFICANT CHANGE UP (ref 80–100)
NRBC # BLD AUTO: 0 K/UL — SIGNIFICANT CHANGE UP (ref 0–0)
NRBC # FLD: 0 K/UL — SIGNIFICANT CHANGE UP (ref 0–0)
NRBC BLD AUTO-RTO: 0 /100 WBCS — SIGNIFICANT CHANGE UP (ref 0–0)
PHOSPHATE SERPL-MCNC: 3.5 MG/DL — SIGNIFICANT CHANGE UP (ref 2.5–4.5)
PLATELET # BLD AUTO: 305 K/UL — SIGNIFICANT CHANGE UP (ref 150–400)
PMV BLD: 12.1 FL — SIGNIFICANT CHANGE UP (ref 7–13)
POTASSIUM SERPL-MCNC: 3.8 MMOL/L — SIGNIFICANT CHANGE UP (ref 3.5–5.3)
POTASSIUM SERPL-SCNC: 3.8 MMOL/L — SIGNIFICANT CHANGE UP (ref 3.5–5.3)
PROT SERPL-MCNC: 6.2 G/DL — SIGNIFICANT CHANGE UP (ref 6–8.3)
RBC # BLD: 2.99 M/UL — LOW (ref 4.2–5.8)
RBC # FLD: 16.1 % — HIGH (ref 10.3–14.5)
SODIUM SERPL-SCNC: 141 MMOL/L — SIGNIFICANT CHANGE UP (ref 135–145)
WBC # BLD: 9.05 K/UL — SIGNIFICANT CHANGE UP (ref 3.8–10.5)
WBC # FLD AUTO: 9.05 K/UL — SIGNIFICANT CHANGE UP (ref 3.8–10.5)

## 2025-07-12 PROCEDURE — 99232 SBSQ HOSP IP/OBS MODERATE 35: CPT

## 2025-07-12 PROCEDURE — 99233 SBSQ HOSP IP/OBS HIGH 50: CPT

## 2025-07-12 PROCEDURE — 71045 X-RAY EXAM CHEST 1 VIEW: CPT | Mod: 26

## 2025-07-12 RX ORDER — INSULIN LISPRO 100 U/ML
3 INJECTION, SOLUTION INTRAVENOUS; SUBCUTANEOUS
Refills: 0 | Status: DISCONTINUED | OUTPATIENT
Start: 2025-07-12 | End: 2025-07-13

## 2025-07-12 RX ORDER — ISOSORBDIE DINITRATE 30 MG/1
10 TABLET ORAL THREE TIMES A DAY
Refills: 0 | Status: DISCONTINUED | OUTPATIENT
Start: 2025-07-12 | End: 2025-07-12

## 2025-07-12 RX ORDER — INSULIN GLARGINE-YFGN 100 [IU]/ML
7 INJECTION, SOLUTION SUBCUTANEOUS AT BEDTIME
Refills: 0 | Status: DISCONTINUED | OUTPATIENT
Start: 2025-07-12 | End: 2025-07-16

## 2025-07-12 RX ORDER — ISOSORBDIE DINITRATE 30 MG/1
20 TABLET ORAL THREE TIMES A DAY
Refills: 0 | Status: DISCONTINUED | OUTPATIENT
Start: 2025-07-12 | End: 2025-07-14

## 2025-07-12 RX ADMIN — Medication 20 MILLIGRAM(S): at 14:06

## 2025-07-12 RX ADMIN — ISOSORBDIE DINITRATE 20 MILLIGRAM(S): 30 TABLET ORAL at 20:34

## 2025-07-12 RX ADMIN — Medication 40 MILLIEQUIVALENT(S): at 08:44

## 2025-07-12 RX ADMIN — Medication 20 MILLIGRAM(S): at 22:10

## 2025-07-12 RX ADMIN — Medication 25 GRAM(S): at 22:08

## 2025-07-12 RX ADMIN — ISOSORBDIE DINITRATE 10 MILLIGRAM(S): 30 TABLET ORAL at 11:26

## 2025-07-12 RX ADMIN — Medication 25 GRAM(S): at 11:27

## 2025-07-12 RX ADMIN — CALCITRIOL 0.25 MICROGRAM(S): 0.5 CAPSULE, GELATIN COATED ORAL at 11:20

## 2025-07-12 RX ADMIN — METOPROLOL SUCCINATE 12.5 MILLIGRAM(S): 50 TABLET, EXTENDED RELEASE ORAL at 05:14

## 2025-07-12 RX ADMIN — APIXABAN 2.5 MILLIGRAM(S): 5 TABLET, FILM COATED ORAL at 05:14

## 2025-07-12 RX ADMIN — CLOPIDOGREL BISULFATE 75 MILLIGRAM(S): 75 TABLET, FILM COATED ORAL at 11:19

## 2025-07-12 RX ADMIN — INSULIN LISPRO 3 UNIT(S): 100 INJECTION, SOLUTION INTRAVENOUS; SUBCUTANEOUS at 16:27

## 2025-07-12 RX ADMIN — Medication 20 MILLIEQUIVALENT(S): at 08:44

## 2025-07-12 RX ADMIN — FUROSEMIDE 60 MILLIGRAM(S): 10 INJECTION INTRAMUSCULAR; INTRAVENOUS at 05:15

## 2025-07-12 RX ADMIN — METOPROLOL SUCCINATE 12.5 MILLIGRAM(S): 50 TABLET, EXTENDED RELEASE ORAL at 17:16

## 2025-07-12 RX ADMIN — TAMSULOSIN HYDROCHLORIDE 0.4 MILLIGRAM(S): 0.4 CAPSULE ORAL at 22:09

## 2025-07-12 RX ADMIN — INSULIN GLARGINE-YFGN 7 UNIT(S): 100 INJECTION, SOLUTION SUBCUTANEOUS at 22:04

## 2025-07-12 RX ADMIN — INSULIN LISPRO 2: 100 INJECTION, SOLUTION INTRAVENOUS; SUBCUTANEOUS at 16:26

## 2025-07-12 RX ADMIN — INSULIN LISPRO 5 UNIT(S): 100 INJECTION, SOLUTION INTRAVENOUS; SUBCUTANEOUS at 08:37

## 2025-07-12 RX ADMIN — FUROSEMIDE 60 MILLIGRAM(S): 10 INJECTION INTRAMUSCULAR; INTRAVENOUS at 17:16

## 2025-07-12 RX ADMIN — Medication 1 APPLICATION(S): at 11:19

## 2025-07-12 RX ADMIN — APIXABAN 2.5 MILLIGRAM(S): 5 TABLET, FILM COATED ORAL at 17:16

## 2025-07-12 RX ADMIN — INSULIN LISPRO 3 UNIT(S): 100 INJECTION, SOLUTION INTRAVENOUS; SUBCUTANEOUS at 11:25

## 2025-07-13 LAB
ADD ON TEST-SPECIMEN IN LAB: SIGNIFICANT CHANGE UP
ALBUMIN SERPL ELPH-MCNC: 2.7 G/DL — LOW (ref 3.3–5)
ALBUMIN SERPL ELPH-MCNC: 2.7 G/DL — LOW (ref 3.3–5)
ALP SERPL-CCNC: 182 U/L — HIGH (ref 40–120)
ALP SERPL-CCNC: 187 U/L — HIGH (ref 40–120)
ALT FLD-CCNC: 100 U/L — HIGH (ref 4–41)
ALT FLD-CCNC: 108 U/L — HIGH (ref 4–41)
ANION GAP SERPL CALC-SCNC: 12 MMOL/L — SIGNIFICANT CHANGE UP (ref 7–14)
ANION GAP SERPL CALC-SCNC: 14 MMOL/L — SIGNIFICANT CHANGE UP (ref 7–14)
AST SERPL-CCNC: 27 U/L — SIGNIFICANT CHANGE UP (ref 4–40)
AST SERPL-CCNC: 27 U/L — SIGNIFICANT CHANGE UP (ref 4–40)
BILIRUB DIRECT SERPL-MCNC: 0.2 MG/DL — SIGNIFICANT CHANGE UP (ref 0–0.3)
BILIRUB INDIRECT FLD-MCNC: 0.3 MG/DL — SIGNIFICANT CHANGE UP (ref 0–1)
BILIRUB SERPL-MCNC: 0.4 MG/DL — SIGNIFICANT CHANGE UP (ref 0.2–1.2)
BILIRUB SERPL-MCNC: 0.5 MG/DL — SIGNIFICANT CHANGE UP (ref 0.2–1.2)
BLD GP AB SCN SERPL QL: NEGATIVE — SIGNIFICANT CHANGE UP
BUN SERPL-MCNC: 69 MG/DL — HIGH (ref 7–23)
BUN SERPL-MCNC: 70 MG/DL — HIGH (ref 7–23)
CALCIUM SERPL-MCNC: 8.3 MG/DL — LOW (ref 8.4–10.5)
CALCIUM SERPL-MCNC: 8.5 MG/DL — SIGNIFICANT CHANGE UP (ref 8.4–10.5)
CHLORIDE SERPL-SCNC: 102 MMOL/L — SIGNIFICANT CHANGE UP (ref 98–107)
CHLORIDE SERPL-SCNC: 102 MMOL/L — SIGNIFICANT CHANGE UP (ref 98–107)
CO2 SERPL-SCNC: 25 MMOL/L — SIGNIFICANT CHANGE UP (ref 22–31)
CO2 SERPL-SCNC: 26 MMOL/L — SIGNIFICANT CHANGE UP (ref 22–31)
CREAT SERPL-MCNC: 3.12 MG/DL — HIGH (ref 0.5–1.3)
CREAT SERPL-MCNC: 3.16 MG/DL — HIGH (ref 0.5–1.3)
CULTURE RESULTS: SIGNIFICANT CHANGE UP
CULTURE RESULTS: SIGNIFICANT CHANGE UP
EGFR: 19 ML/MIN/1.73M2 — LOW
GAS PNL BLDV: SIGNIFICANT CHANGE UP
GLUCOSE BLDC GLUCOMTR-MCNC: 126 MG/DL — HIGH (ref 70–99)
GLUCOSE BLDC GLUCOMTR-MCNC: 264 MG/DL — HIGH (ref 70–99)
GLUCOSE BLDC GLUCOMTR-MCNC: 294 MG/DL — HIGH (ref 70–99)
GLUCOSE BLDC GLUCOMTR-MCNC: 298 MG/DL — HIGH (ref 70–99)
GLUCOSE BLDC GLUCOMTR-MCNC: 324 MG/DL — HIGH (ref 70–99)
GLUCOSE BLDC GLUCOMTR-MCNC: 335 MG/DL — HIGH (ref 70–99)
GLUCOSE SERPL-MCNC: 145 MG/DL — HIGH (ref 70–99)
GLUCOSE SERPL-MCNC: 319 MG/DL — HIGH (ref 70–99)
HCT VFR BLD CALC: 24.1 % — LOW (ref 39–50)
HCT VFR BLD CALC: 27 % — LOW (ref 39–50)
HGB BLD-MCNC: 8.2 G/DL — LOW (ref 13–17)
HGB BLD-MCNC: 9 G/DL — LOW (ref 13–17)
LACTATE SERPL-SCNC: 2.1 MMOL/L — HIGH (ref 0.5–2)
MAGNESIUM SERPL-MCNC: 1.7 MG/DL — SIGNIFICANT CHANGE UP (ref 1.6–2.6)
MCHC RBC-ENTMCNC: 28.6 PG — SIGNIFICANT CHANGE UP (ref 27–34)
MCHC RBC-ENTMCNC: 29.1 PG — SIGNIFICANT CHANGE UP (ref 27–34)
MCHC RBC-ENTMCNC: 33.3 G/DL — SIGNIFICANT CHANGE UP (ref 32–36)
MCHC RBC-ENTMCNC: 34 G/DL — SIGNIFICANT CHANGE UP (ref 32–36)
MCV RBC AUTO: 85.5 FL — SIGNIFICANT CHANGE UP (ref 80–100)
MCV RBC AUTO: 85.7 FL — SIGNIFICANT CHANGE UP (ref 80–100)
NRBC # BLD AUTO: 0 K/UL — SIGNIFICANT CHANGE UP (ref 0–0)
NRBC # BLD AUTO: 0 K/UL — SIGNIFICANT CHANGE UP (ref 0–0)
NRBC # FLD: 0 K/UL — SIGNIFICANT CHANGE UP (ref 0–0)
NRBC # FLD: 0 K/UL — SIGNIFICANT CHANGE UP (ref 0–0)
NRBC BLD AUTO-RTO: 0 /100 WBCS — SIGNIFICANT CHANGE UP (ref 0–0)
NRBC BLD AUTO-RTO: 0 /100 WBCS — SIGNIFICANT CHANGE UP (ref 0–0)
PHOSPHATE SERPL-MCNC: 3 MG/DL — SIGNIFICANT CHANGE UP (ref 2.5–4.5)
PLATELET # BLD AUTO: 270 K/UL — SIGNIFICANT CHANGE UP (ref 150–400)
PLATELET # BLD AUTO: 288 K/UL — SIGNIFICANT CHANGE UP (ref 150–400)
PMV BLD: 13 FL — SIGNIFICANT CHANGE UP (ref 7–13)
PMV BLD: 13.1 FL — HIGH (ref 7–13)
POTASSIUM SERPL-MCNC: 3.6 MMOL/L — SIGNIFICANT CHANGE UP (ref 3.5–5.3)
POTASSIUM SERPL-MCNC: 4.8 MMOL/L — SIGNIFICANT CHANGE UP (ref 3.5–5.3)
POTASSIUM SERPL-SCNC: 3.6 MMOL/L — SIGNIFICANT CHANGE UP (ref 3.5–5.3)
POTASSIUM SERPL-SCNC: 4.8 MMOL/L — SIGNIFICANT CHANGE UP (ref 3.5–5.3)
PROCALCITONIN SERPL-MCNC: 0.21 NG/ML — HIGH (ref 0.02–0.1)
PROT SERPL-MCNC: 6.4 G/DL — SIGNIFICANT CHANGE UP (ref 6–8.3)
PROT SERPL-MCNC: 6.6 G/DL — SIGNIFICANT CHANGE UP (ref 6–8.3)
RBC # BLD: 2.82 M/UL — LOW (ref 4.2–5.8)
RBC # BLD: 3.15 M/UL — LOW (ref 4.2–5.8)
RBC # FLD: 16.6 % — HIGH (ref 10.3–14.5)
RBC # FLD: 16.7 % — HIGH (ref 10.3–14.5)
RH IG SCN BLD-IMP: POSITIVE — SIGNIFICANT CHANGE UP
SODIUM SERPL-SCNC: 140 MMOL/L — SIGNIFICANT CHANGE UP (ref 135–145)
SODIUM SERPL-SCNC: 141 MMOL/L — SIGNIFICANT CHANGE UP (ref 135–145)
SPECIMEN SOURCE: SIGNIFICANT CHANGE UP
SPECIMEN SOURCE: SIGNIFICANT CHANGE UP
WBC # BLD: 6.33 K/UL — SIGNIFICANT CHANGE UP (ref 3.8–10.5)
WBC # BLD: 7.14 K/UL — SIGNIFICANT CHANGE UP (ref 3.8–10.5)
WBC # FLD AUTO: 6.33 K/UL — SIGNIFICANT CHANGE UP (ref 3.8–10.5)
WBC # FLD AUTO: 7.14 K/UL — SIGNIFICANT CHANGE UP (ref 3.8–10.5)

## 2025-07-13 PROCEDURE — 99233 SBSQ HOSP IP/OBS HIGH 50: CPT

## 2025-07-13 PROCEDURE — 71250 CT THORAX DX C-: CPT | Mod: 26

## 2025-07-13 PROCEDURE — 70450 CT HEAD/BRAIN W/O DYE: CPT | Mod: 26

## 2025-07-13 PROCEDURE — 74176 CT ABD & PELVIS W/O CONTRAST: CPT | Mod: 26

## 2025-07-13 RX ORDER — INSULIN LISPRO 100 U/ML
4 INJECTION, SOLUTION INTRAVENOUS; SUBCUTANEOUS
Refills: 0 | Status: DISCONTINUED | OUTPATIENT
Start: 2025-07-13 | End: 2025-07-14

## 2025-07-13 RX ADMIN — Medication 20 MILLIGRAM(S): at 05:52

## 2025-07-13 RX ADMIN — POLYETHYLENE GLYCOL 3350 17 GRAM(S): 17 POWDER, FOR SOLUTION ORAL at 05:50

## 2025-07-13 RX ADMIN — FUROSEMIDE 60 MILLIGRAM(S): 10 INJECTION INTRAMUSCULAR; INTRAVENOUS at 17:49

## 2025-07-13 RX ADMIN — INSULIN LISPRO 3 UNIT(S): 100 INJECTION, SOLUTION INTRAVENOUS; SUBCUTANEOUS at 08:16

## 2025-07-13 RX ADMIN — FUROSEMIDE 60 MILLIGRAM(S): 10 INJECTION INTRAMUSCULAR; INTRAVENOUS at 05:48

## 2025-07-13 RX ADMIN — METOPROLOL SUCCINATE 12.5 MILLIGRAM(S): 50 TABLET, EXTENDED RELEASE ORAL at 17:42

## 2025-07-13 RX ADMIN — ISOSORBDIE DINITRATE 20 MILLIGRAM(S): 30 TABLET ORAL at 21:02

## 2025-07-13 RX ADMIN — Medication 25 GRAM(S): at 22:19

## 2025-07-13 RX ADMIN — INSULIN GLARGINE-YFGN 7 UNIT(S): 100 INJECTION, SOLUTION SUBCUTANEOUS at 21:02

## 2025-07-13 RX ADMIN — Medication 1 APPLICATION(S): at 11:52

## 2025-07-13 RX ADMIN — APIXABAN 2.5 MILLIGRAM(S): 5 TABLET, FILM COATED ORAL at 05:46

## 2025-07-13 RX ADMIN — INSULIN LISPRO 3: 100 INJECTION, SOLUTION INTRAVENOUS; SUBCUTANEOUS at 16:50

## 2025-07-13 RX ADMIN — APIXABAN 2.5 MILLIGRAM(S): 5 TABLET, FILM COATED ORAL at 17:42

## 2025-07-13 RX ADMIN — TAMSULOSIN HYDROCHLORIDE 0.4 MILLIGRAM(S): 0.4 CAPSULE ORAL at 21:02

## 2025-07-13 RX ADMIN — Medication 25 GRAM(S): at 11:52

## 2025-07-13 RX ADMIN — INSULIN LISPRO 1: 100 INJECTION, SOLUTION INTRAVENOUS; SUBCUTANEOUS at 21:11

## 2025-07-13 RX ADMIN — Medication 20 MILLIGRAM(S): at 21:03

## 2025-07-13 RX ADMIN — ISOSORBDIE DINITRATE 20 MILLIGRAM(S): 30 TABLET ORAL at 05:52

## 2025-07-13 RX ADMIN — INSULIN LISPRO 3: 100 INJECTION, SOLUTION INTRAVENOUS; SUBCUTANEOUS at 11:53

## 2025-07-13 RX ADMIN — METOPROLOL SUCCINATE 12.5 MILLIGRAM(S): 50 TABLET, EXTENDED RELEASE ORAL at 05:46

## 2025-07-14 DIAGNOSIS — N17.0 ACUTE KIDNEY FAILURE WITH TUBULAR NECROSIS: ICD-10-CM

## 2025-07-14 DIAGNOSIS — I25.10 ATHEROSCLEROTIC HEART DISEASE OF NATIVE CORONARY ARTERY WITHOUT ANGINA PECTORIS: ICD-10-CM

## 2025-07-14 DIAGNOSIS — I49.5 SICK SINUS SYNDROME: ICD-10-CM

## 2025-07-14 DIAGNOSIS — E43 UNSPECIFIED SEVERE PROTEIN-CALORIE MALNUTRITION: ICD-10-CM

## 2025-07-14 DIAGNOSIS — Z11.52 ENCOUNTER FOR SCREENING FOR COVID-19: ICD-10-CM

## 2025-07-14 DIAGNOSIS — E11.65 TYPE 2 DIABETES MELLITUS WITH HYPERGLYCEMIA: ICD-10-CM

## 2025-07-14 DIAGNOSIS — I95.9 HYPOTENSION, UNSPECIFIED: ICD-10-CM

## 2025-07-14 DIAGNOSIS — Z95.1 PRESENCE OF AORTOCORONARY BYPASS GRAFT: ICD-10-CM

## 2025-07-14 DIAGNOSIS — I50.23 ACUTE ON CHRONIC SYSTOLIC (CONGESTIVE) HEART FAILURE: ICD-10-CM

## 2025-07-14 DIAGNOSIS — E87.0 HYPEROSMOLALITY AND HYPERNATREMIA: ICD-10-CM

## 2025-07-14 DIAGNOSIS — Z80.49 FAMILY HISTORY OF MALIGNANT NEOPLASM OF OTHER GENITAL ORGANS: ICD-10-CM

## 2025-07-14 DIAGNOSIS — D72.819 DECREASED WHITE BLOOD CELL COUNT, UNSPECIFIED: ICD-10-CM

## 2025-07-14 DIAGNOSIS — J98.4 OTHER DISORDERS OF LUNG: ICD-10-CM

## 2025-07-14 DIAGNOSIS — A41.02 SEPSIS DUE TO METHICILLIN RESISTANT STAPHYLOCOCCUS AUREUS: ICD-10-CM

## 2025-07-14 DIAGNOSIS — R68.0 HYPOTHERMIA, NOT ASSOCIATED WITH LOW ENVIRONMENTAL TEMPERATURE: ICD-10-CM

## 2025-07-14 DIAGNOSIS — E11.22 TYPE 2 DIABETES MELLITUS WITH DIABETIC CHRONIC KIDNEY DISEASE: ICD-10-CM

## 2025-07-14 DIAGNOSIS — I13.0 HYPERTENSIVE HEART AND CHRONIC KIDNEY DISEASE WITH HEART FAILURE AND STAGE 1 THROUGH STAGE 4 CHRONIC KIDNEY DISEASE, OR UNSPECIFIED CHRONIC KIDNEY DISEASE: ICD-10-CM

## 2025-07-14 DIAGNOSIS — A41.9 SEPSIS, UNSPECIFIED ORGANISM: ICD-10-CM

## 2025-07-14 DIAGNOSIS — Z87.891 PERSONAL HISTORY OF NICOTINE DEPENDENCE: ICD-10-CM

## 2025-07-14 DIAGNOSIS — J90 PLEURAL EFFUSION, NOT ELSEWHERE CLASSIFIED: ICD-10-CM

## 2025-07-14 DIAGNOSIS — Z79.02 LONG TERM (CURRENT) USE OF ANTITHROMBOTICS/ANTIPLATELETS: ICD-10-CM

## 2025-07-14 DIAGNOSIS — Z86.73 PERSONAL HISTORY OF TRANSIENT ISCHEMIC ATTACK (TIA), AND CEREBRAL INFARCTION WITHOUT RESIDUAL DEFICITS: ICD-10-CM

## 2025-07-14 DIAGNOSIS — J98.11 ATELECTASIS: ICD-10-CM

## 2025-07-14 DIAGNOSIS — R65.21 SEVERE SEPSIS WITH SEPTIC SHOCK: ICD-10-CM

## 2025-07-14 DIAGNOSIS — G93.41 METABOLIC ENCEPHALOPATHY: ICD-10-CM

## 2025-07-14 DIAGNOSIS — I25.2 OLD MYOCARDIAL INFARCTION: ICD-10-CM

## 2025-07-14 DIAGNOSIS — I21.A1 MYOCARDIAL INFARCTION TYPE 2: ICD-10-CM

## 2025-07-14 DIAGNOSIS — Z95.0 PRESENCE OF CARDIAC PACEMAKER: ICD-10-CM

## 2025-07-14 DIAGNOSIS — F05 DELIRIUM DUE TO KNOWN PHYSIOLOGICAL CONDITION: ICD-10-CM

## 2025-07-14 DIAGNOSIS — I48.0 PAROXYSMAL ATRIAL FIBRILLATION: ICD-10-CM

## 2025-07-14 DIAGNOSIS — D50.9 IRON DEFICIENCY ANEMIA, UNSPECIFIED: ICD-10-CM

## 2025-07-14 DIAGNOSIS — N40.0 BENIGN PROSTATIC HYPERPLASIA WITHOUT LOWER URINARY TRACT SYMPTOMS: ICD-10-CM

## 2025-07-14 DIAGNOSIS — E87.5 HYPERKALEMIA: ICD-10-CM

## 2025-07-14 DIAGNOSIS — I25.5 ISCHEMIC CARDIOMYOPATHY: ICD-10-CM

## 2025-07-14 DIAGNOSIS — R60.0 LOCALIZED EDEMA: ICD-10-CM

## 2025-07-14 DIAGNOSIS — Z95.818 PRESENCE OF OTHER CARDIAC IMPLANTS AND GRAFTS: ICD-10-CM

## 2025-07-14 DIAGNOSIS — N18.4 CHRONIC KIDNEY DISEASE, STAGE 4 (SEVERE): ICD-10-CM

## 2025-07-14 DIAGNOSIS — R79.89 OTHER SPECIFIED ABNORMAL FINDINGS OF BLOOD CHEMISTRY: ICD-10-CM

## 2025-07-14 DIAGNOSIS — J15.212 PNEUMONIA DUE TO METHICILLIN RESISTANT STAPHYLOCOCCUS AUREUS: ICD-10-CM

## 2025-07-14 DIAGNOSIS — Z82.49 FAMILY HISTORY OF ISCHEMIC HEART DISEASE AND OTHER DISEASES OF THE CIRCULATORY SYSTEM: ICD-10-CM

## 2025-07-14 DIAGNOSIS — E78.5 HYPERLIPIDEMIA, UNSPECIFIED: ICD-10-CM

## 2025-07-14 DIAGNOSIS — Z51.5 ENCOUNTER FOR PALLIATIVE CARE: ICD-10-CM

## 2025-07-14 LAB
ALBUMIN SERPL ELPH-MCNC: 2.7 G/DL — LOW (ref 3.3–5)
ALP SERPL-CCNC: 165 U/L — HIGH (ref 40–120)
ALT FLD-CCNC: 74 U/L — HIGH (ref 4–41)
ANION GAP SERPL CALC-SCNC: 12 MMOL/L — SIGNIFICANT CHANGE UP (ref 7–14)
AST SERPL-CCNC: 16 U/L — SIGNIFICANT CHANGE UP (ref 4–40)
BILIRUB DIRECT SERPL-MCNC: <0.2 MG/DL — SIGNIFICANT CHANGE UP (ref 0–0.3)
BILIRUB INDIRECT FLD-MCNC: >0.2 MG/DL — SIGNIFICANT CHANGE UP (ref 0–1)
BILIRUB SERPL-MCNC: 0.4 MG/DL — SIGNIFICANT CHANGE UP (ref 0.2–1.2)
BILIRUB SERPL-MCNC: 0.4 MG/DL — SIGNIFICANT CHANGE UP (ref 0.2–1.2)
BUN SERPL-MCNC: 56 MG/DL — HIGH (ref 7–23)
CALCIUM SERPL-MCNC: 8.5 MG/DL — SIGNIFICANT CHANGE UP (ref 8.4–10.5)
CHLORIDE SERPL-SCNC: 103 MMOL/L — SIGNIFICANT CHANGE UP (ref 98–107)
CO2 SERPL-SCNC: 28 MMOL/L — SIGNIFICANT CHANGE UP (ref 22–31)
CORTIS AM PEAK SERPL-MCNC: 12.4 UG/DL — SIGNIFICANT CHANGE UP (ref 6–18.4)
CREAT SERPL-MCNC: 3.13 MG/DL — HIGH (ref 0.5–1.3)
EGFR: 19 ML/MIN/1.73M2 — LOW
EGFR: 19 ML/MIN/1.73M2 — LOW
GLUCOSE BLDC GLUCOMTR-MCNC: 119 MG/DL — HIGH (ref 70–99)
GLUCOSE BLDC GLUCOMTR-MCNC: 137 MG/DL — HIGH (ref 70–99)
GLUCOSE BLDC GLUCOMTR-MCNC: 138 MG/DL — HIGH (ref 70–99)
GLUCOSE BLDC GLUCOMTR-MCNC: 212 MG/DL — HIGH (ref 70–99)
GLUCOSE BLDC GLUCOMTR-MCNC: 327 MG/DL — HIGH (ref 70–99)
GLUCOSE BLDC GLUCOMTR-MCNC: 93 MG/DL — SIGNIFICANT CHANGE UP (ref 70–99)
GLUCOSE SERPL-MCNC: 70 MG/DL — SIGNIFICANT CHANGE UP (ref 70–99)
HCT VFR BLD CALC: 24.6 % — LOW (ref 39–50)
HGB BLD-MCNC: 8.5 G/DL — LOW (ref 13–17)
MAGNESIUM SERPL-MCNC: 1.7 MG/DL — SIGNIFICANT CHANGE UP (ref 1.6–2.6)
MCHC RBC-ENTMCNC: 29.3 PG — SIGNIFICANT CHANGE UP (ref 27–34)
MCHC RBC-ENTMCNC: 34.6 G/DL — SIGNIFICANT CHANGE UP (ref 32–36)
MCV RBC AUTO: 84.8 FL — SIGNIFICANT CHANGE UP (ref 80–100)
NRBC # BLD AUTO: 0 K/UL — SIGNIFICANT CHANGE UP (ref 0–0)
NRBC # FLD: 0 K/UL — SIGNIFICANT CHANGE UP (ref 0–0)
NRBC BLD AUTO-RTO: 0 /100 WBCS — SIGNIFICANT CHANGE UP (ref 0–0)
PHOSPHATE SERPL-MCNC: 4.1 MG/DL — SIGNIFICANT CHANGE UP (ref 2.5–4.5)
PLATELET # BLD AUTO: 249 K/UL — SIGNIFICANT CHANGE UP (ref 150–400)
PMV BLD: 11.9 FL — SIGNIFICANT CHANGE UP (ref 7–13)
POTASSIUM SERPL-MCNC: 3.7 MMOL/L — SIGNIFICANT CHANGE UP (ref 3.5–5.3)
POTASSIUM SERPL-SCNC: 3.7 MMOL/L — SIGNIFICANT CHANGE UP (ref 3.5–5.3)
PROT SERPL-MCNC: 6.5 G/DL — SIGNIFICANT CHANGE UP (ref 6–8.3)
RBC # BLD: 2.9 M/UL — LOW (ref 4.2–5.8)
RBC # FLD: 16.8 % — HIGH (ref 10.3–14.5)
SODIUM SERPL-SCNC: 143 MMOL/L — SIGNIFICANT CHANGE UP (ref 135–145)
WBC # BLD: 7.36 K/UL — SIGNIFICANT CHANGE UP (ref 3.8–10.5)
WBC # FLD AUTO: 7.36 K/UL — SIGNIFICANT CHANGE UP (ref 3.8–10.5)

## 2025-07-14 PROCEDURE — 99233 SBSQ HOSP IP/OBS HIGH 50: CPT

## 2025-07-14 PROCEDURE — 99222 1ST HOSP IP/OBS MODERATE 55: CPT

## 2025-07-14 PROCEDURE — 99233 SBSQ HOSP IP/OBS HIGH 50: CPT | Mod: GC

## 2025-07-14 RX ORDER — INSULIN LISPRO 100 U/ML
3 INJECTION, SOLUTION INTRAVENOUS; SUBCUTANEOUS
Refills: 0 | Status: DISCONTINUED | OUTPATIENT
Start: 2025-07-14 | End: 2025-07-17

## 2025-07-14 RX ORDER — FUROSEMIDE 10 MG/ML
60 INJECTION INTRAMUSCULAR; INTRAVENOUS DAILY
Refills: 0 | Status: DISCONTINUED | OUTPATIENT
Start: 2025-07-14 | End: 2025-07-14

## 2025-07-14 RX ORDER — INSULIN LISPRO 100 U/ML
4 INJECTION, SOLUTION INTRAVENOUS; SUBCUTANEOUS
Refills: 0 | Status: DISCONTINUED | OUTPATIENT
Start: 2025-07-14 | End: 2025-07-17

## 2025-07-14 RX ORDER — ISOSORBDIE DINITRATE 30 MG/1
30 TABLET ORAL THREE TIMES A DAY
Refills: 0 | Status: DISCONTINUED | OUTPATIENT
Start: 2025-07-14 | End: 2025-07-22

## 2025-07-14 RX ORDER — METOPROLOL SUCCINATE 50 MG/1
25 TABLET, EXTENDED RELEASE ORAL DAILY
Refills: 0 | Status: DISCONTINUED | OUTPATIENT
Start: 2025-07-15 | End: 2025-07-15

## 2025-07-14 RX ORDER — INSULIN LISPRO 100 U/ML
4 INJECTION, SOLUTION INTRAVENOUS; SUBCUTANEOUS
Refills: 0 | Status: DISCONTINUED | OUTPATIENT
Start: 2025-07-14 | End: 2025-07-16

## 2025-07-14 RX ORDER — FUROSEMIDE 10 MG/ML
60 INJECTION INTRAMUSCULAR; INTRAVENOUS DAILY
Refills: 0 | Status: DISCONTINUED | OUTPATIENT
Start: 2025-07-15 | End: 2025-07-15

## 2025-07-14 RX ORDER — METOPROLOL SUCCINATE 50 MG/1
12.5 TABLET, EXTENDED RELEASE ORAL ONCE
Refills: 0 | Status: DISCONTINUED | OUTPATIENT
Start: 2025-07-14 | End: 2025-07-14

## 2025-07-14 RX ORDER — ISOSORBDIE DINITRATE 30 MG/1
30 TABLET ORAL THREE TIMES A DAY
Refills: 0 | Status: DISCONTINUED | OUTPATIENT
Start: 2025-07-14 | End: 2025-07-14

## 2025-07-14 RX ADMIN — INSULIN LISPRO 4: 100 INJECTION, SOLUTION INTRAVENOUS; SUBCUTANEOUS at 08:34

## 2025-07-14 RX ADMIN — ISOSORBDIE DINITRATE 20 MILLIGRAM(S): 30 TABLET ORAL at 05:24

## 2025-07-14 RX ADMIN — CALCITRIOL 0.25 MICROGRAM(S): 0.5 CAPSULE, GELATIN COATED ORAL at 12:22

## 2025-07-14 RX ADMIN — ISOSORBDIE DINITRATE 30 MILLIGRAM(S): 30 TABLET ORAL at 17:04

## 2025-07-14 RX ADMIN — Medication 25 GRAM(S): at 12:15

## 2025-07-14 RX ADMIN — Medication 50 MILLIGRAM(S): at 22:16

## 2025-07-14 RX ADMIN — INSULIN GLARGINE-YFGN 7 UNIT(S): 100 INJECTION, SOLUTION SUBCUTANEOUS at 22:19

## 2025-07-14 RX ADMIN — POLYETHYLENE GLYCOL 3350 17 GRAM(S): 17 POWDER, FOR SOLUTION ORAL at 05:23

## 2025-07-14 RX ADMIN — Medication 1 APPLICATION(S): at 12:27

## 2025-07-14 RX ADMIN — APIXABAN 2.5 MILLIGRAM(S): 5 TABLET, FILM COATED ORAL at 05:24

## 2025-07-14 RX ADMIN — Medication 20 MILLIGRAM(S): at 05:25

## 2025-07-14 RX ADMIN — TAMSULOSIN HYDROCHLORIDE 0.4 MILLIGRAM(S): 0.4 CAPSULE ORAL at 22:16

## 2025-07-14 RX ADMIN — INSULIN LISPRO 4 UNIT(S): 100 INJECTION, SOLUTION INTRAVENOUS; SUBCUTANEOUS at 17:02

## 2025-07-14 RX ADMIN — METOPROLOL SUCCINATE 12.5 MILLIGRAM(S): 50 TABLET, EXTENDED RELEASE ORAL at 05:25

## 2025-07-14 RX ADMIN — APIXABAN 2.5 MILLIGRAM(S): 5 TABLET, FILM COATED ORAL at 17:05

## 2025-07-14 RX ADMIN — INSULIN LISPRO 4 UNIT(S): 100 INJECTION, SOLUTION INTRAVENOUS; SUBCUTANEOUS at 12:24

## 2025-07-14 RX ADMIN — ISOSORBDIE DINITRATE 30 MILLIGRAM(S): 30 TABLET ORAL at 12:23

## 2025-07-14 RX ADMIN — INSULIN LISPRO 4 UNIT(S): 100 INJECTION, SOLUTION INTRAVENOUS; SUBCUTANEOUS at 08:35

## 2025-07-14 RX ADMIN — FUROSEMIDE 60 MILLIGRAM(S): 10 INJECTION INTRAMUSCULAR; INTRAVENOUS at 05:24

## 2025-07-14 RX ADMIN — POLYETHYLENE GLYCOL 3350 17 GRAM(S): 17 POWDER, FOR SOLUTION ORAL at 17:05

## 2025-07-14 RX ADMIN — CLOPIDOGREL BISULFATE 75 MILLIGRAM(S): 75 TABLET, FILM COATED ORAL at 12:27

## 2025-07-15 ENCOUNTER — RESULT REVIEW (OUTPATIENT)
Age: 85
End: 2025-07-15

## 2025-07-15 DIAGNOSIS — E16.2 HYPOGLYCEMIA, UNSPECIFIED: ICD-10-CM

## 2025-07-15 LAB
AMMONIA BLD-MCNC: 17 UMOL/L — SIGNIFICANT CHANGE UP (ref 11–55)
ANION GAP SERPL CALC-SCNC: 14 MMOL/L — SIGNIFICANT CHANGE UP (ref 7–14)
BUN SERPL-MCNC: 49 MG/DL — HIGH (ref 7–23)
CALCIUM SERPL-MCNC: 8.2 MG/DL — LOW (ref 8.4–10.5)
CHLORIDE SERPL-SCNC: 104 MMOL/L — SIGNIFICANT CHANGE UP (ref 98–107)
CO2 SERPL-SCNC: 26 MMOL/L — SIGNIFICANT CHANGE UP (ref 22–31)
CREAT SERPL-MCNC: 2.85 MG/DL — HIGH (ref 0.5–1.3)
EGFR: 21 ML/MIN/1.73M2 — LOW
EGFR: 21 ML/MIN/1.73M2 — LOW
GLUCOSE BLDC GLUCOMTR-MCNC: 120 MG/DL — HIGH (ref 70–99)
GLUCOSE BLDC GLUCOMTR-MCNC: 122 MG/DL — HIGH (ref 70–99)
GLUCOSE BLDC GLUCOMTR-MCNC: 160 MG/DL — HIGH (ref 70–99)
GLUCOSE BLDC GLUCOMTR-MCNC: 254 MG/DL — HIGH (ref 70–99)
GLUCOSE SERPL-MCNC: 111 MG/DL — HIGH (ref 70–99)
HCT VFR BLD CALC: 26.9 % — LOW (ref 39–50)
HGB BLD-MCNC: 9 G/DL — LOW (ref 13–17)
MAGNESIUM SERPL-MCNC: 1.6 MG/DL — SIGNIFICANT CHANGE UP (ref 1.6–2.6)
MCHC RBC-ENTMCNC: 28.8 PG — SIGNIFICANT CHANGE UP (ref 27–34)
MCHC RBC-ENTMCNC: 33.5 G/DL — SIGNIFICANT CHANGE UP (ref 32–36)
MCV RBC AUTO: 86.2 FL — SIGNIFICANT CHANGE UP (ref 80–100)
NRBC # BLD AUTO: 0 K/UL — SIGNIFICANT CHANGE UP (ref 0–0)
NRBC # FLD: 0 K/UL — SIGNIFICANT CHANGE UP (ref 0–0)
NRBC BLD AUTO-RTO: 0 /100 WBCS — SIGNIFICANT CHANGE UP (ref 0–0)
PHOSPHATE SERPL-MCNC: 3.3 MG/DL — SIGNIFICANT CHANGE UP (ref 2.5–4.5)
PLATELET # BLD AUTO: 259 K/UL — SIGNIFICANT CHANGE UP (ref 150–400)
PMV BLD: 12.7 FL — SIGNIFICANT CHANGE UP (ref 7–13)
POTASSIUM SERPL-MCNC: 3.9 MMOL/L — SIGNIFICANT CHANGE UP (ref 3.5–5.3)
POTASSIUM SERPL-SCNC: 3.9 MMOL/L — SIGNIFICANT CHANGE UP (ref 3.5–5.3)
RBC # BLD: 3.12 M/UL — LOW (ref 4.2–5.8)
RBC # FLD: 16.9 % — HIGH (ref 10.3–14.5)
SODIUM SERPL-SCNC: 144 MMOL/L — SIGNIFICANT CHANGE UP (ref 135–145)
WBC # BLD: 7.41 K/UL — SIGNIFICANT CHANGE UP (ref 3.8–10.5)
WBC # FLD AUTO: 7.41 K/UL — SIGNIFICANT CHANGE UP (ref 3.8–10.5)

## 2025-07-15 PROCEDURE — 99233 SBSQ HOSP IP/OBS HIGH 50: CPT

## 2025-07-15 PROCEDURE — 99222 1ST HOSP IP/OBS MODERATE 55: CPT

## 2025-07-15 PROCEDURE — 99232 SBSQ HOSP IP/OBS MODERATE 35: CPT

## 2025-07-15 PROCEDURE — 93308 TTE F-UP OR LMTD: CPT | Mod: 26,GC

## 2025-07-15 PROCEDURE — 95720 EEG PHY/QHP EA INCR W/VEEG: CPT

## 2025-07-15 RX ORDER — METOPROLOL SUCCINATE 50 MG/1
25 TABLET, EXTENDED RELEASE ORAL DAILY
Refills: 0 | Status: DISCONTINUED | OUTPATIENT
Start: 2025-07-15 | End: 2025-07-15

## 2025-07-15 RX ORDER — METOPROLOL SUCCINATE 50 MG/1
25 TABLET, EXTENDED RELEASE ORAL DAILY
Refills: 0 | Status: DISCONTINUED | OUTPATIENT
Start: 2025-07-15 | End: 2025-07-22

## 2025-07-15 RX ADMIN — METOPROLOL SUCCINATE 25 MILLIGRAM(S): 50 TABLET, EXTENDED RELEASE ORAL at 11:32

## 2025-07-15 RX ADMIN — INSULIN LISPRO 3 UNIT(S): 100 INJECTION, SOLUTION INTRAVENOUS; SUBCUTANEOUS at 12:00

## 2025-07-15 RX ADMIN — ISOSORBDIE DINITRATE 30 MILLIGRAM(S): 30 TABLET ORAL at 05:32

## 2025-07-15 RX ADMIN — CALCITRIOL 0.25 MICROGRAM(S): 0.5 CAPSULE, GELATIN COATED ORAL at 11:32

## 2025-07-15 RX ADMIN — INSULIN LISPRO 3: 100 INJECTION, SOLUTION INTRAVENOUS; SUBCUTANEOUS at 12:00

## 2025-07-15 RX ADMIN — INSULIN GLARGINE-YFGN 7 UNIT(S): 100 INJECTION, SOLUTION SUBCUTANEOUS at 22:37

## 2025-07-15 RX ADMIN — ISOSORBDIE DINITRATE 30 MILLIGRAM(S): 30 TABLET ORAL at 11:32

## 2025-07-15 RX ADMIN — Medication 1 APPLICATION(S): at 11:26

## 2025-07-15 RX ADMIN — ISOSORBDIE DINITRATE 30 MILLIGRAM(S): 30 TABLET ORAL at 17:27

## 2025-07-15 RX ADMIN — TAMSULOSIN HYDROCHLORIDE 0.4 MILLIGRAM(S): 0.4 CAPSULE ORAL at 22:38

## 2025-07-15 RX ADMIN — INSULIN LISPRO 4 UNIT(S): 100 INJECTION, SOLUTION INTRAVENOUS; SUBCUTANEOUS at 08:12

## 2025-07-15 RX ADMIN — Medication 50 MILLIGRAM(S): at 22:37

## 2025-07-15 RX ADMIN — Medication 25 GRAM(S): at 00:29

## 2025-07-15 RX ADMIN — APIXABAN 2.5 MILLIGRAM(S): 5 TABLET, FILM COATED ORAL at 05:32

## 2025-07-15 RX ADMIN — FUROSEMIDE 60 MILLIGRAM(S): 10 INJECTION INTRAMUSCULAR; INTRAVENOUS at 05:31

## 2025-07-15 RX ADMIN — INSULIN LISPRO 4 UNIT(S): 100 INJECTION, SOLUTION INTRAVENOUS; SUBCUTANEOUS at 17:25

## 2025-07-15 RX ADMIN — Medication 50 MILLIGRAM(S): at 13:49

## 2025-07-15 RX ADMIN — POLYETHYLENE GLYCOL 3350 17 GRAM(S): 17 POWDER, FOR SOLUTION ORAL at 17:27

## 2025-07-15 RX ADMIN — APIXABAN 2.5 MILLIGRAM(S): 5 TABLET, FILM COATED ORAL at 17:26

## 2025-07-15 RX ADMIN — CLOPIDOGREL BISULFATE 75 MILLIGRAM(S): 75 TABLET, FILM COATED ORAL at 11:32

## 2025-07-15 RX ADMIN — Medication 50 MILLIGRAM(S): at 05:49

## 2025-07-15 RX ADMIN — Medication 25 GRAM(S): at 11:31

## 2025-07-15 RX ADMIN — POLYETHYLENE GLYCOL 3350 17 GRAM(S): 17 POWDER, FOR SOLUTION ORAL at 05:32

## 2025-07-16 ENCOUNTER — TRANSCRIPTION ENCOUNTER (OUTPATIENT)
Age: 85
End: 2025-07-16

## 2025-07-16 DIAGNOSIS — B99.9 UNSPECIFIED INFECTIOUS DISEASE: ICD-10-CM

## 2025-07-16 LAB
ANION GAP SERPL CALC-SCNC: 12 MMOL/L — SIGNIFICANT CHANGE UP (ref 7–14)
BUN SERPL-MCNC: 45 MG/DL — HIGH (ref 7–23)
CALCIUM SERPL-MCNC: 8.5 MG/DL — SIGNIFICANT CHANGE UP (ref 8.4–10.5)
CHLORIDE SERPL-SCNC: 104 MMOL/L — SIGNIFICANT CHANGE UP (ref 98–107)
CO2 SERPL-SCNC: 25 MMOL/L — SIGNIFICANT CHANGE UP (ref 22–31)
CREAT SERPL-MCNC: 3.05 MG/DL — HIGH (ref 0.5–1.3)
CULTURE RESULTS: SIGNIFICANT CHANGE UP
EGFR: 20 ML/MIN/1.73M2 — LOW
EGFR: 20 ML/MIN/1.73M2 — LOW
GLUCOSE BLDC GLUCOMTR-MCNC: 103 MG/DL — HIGH (ref 70–99)
GLUCOSE BLDC GLUCOMTR-MCNC: 240 MG/DL — HIGH (ref 70–99)
GLUCOSE BLDC GLUCOMTR-MCNC: 263 MG/DL — HIGH (ref 70–99)
GLUCOSE BLDC GLUCOMTR-MCNC: 300 MG/DL — HIGH (ref 70–99)
GLUCOSE SERPL-MCNC: 92 MG/DL — SIGNIFICANT CHANGE UP (ref 70–99)
HCT VFR BLD CALC: 28.1 % — LOW (ref 39–50)
HGB BLD-MCNC: 9.5 G/DL — LOW (ref 13–17)
MAGNESIUM SERPL-MCNC: 1.7 MG/DL — SIGNIFICANT CHANGE UP (ref 1.6–2.6)
MCHC RBC-ENTMCNC: 29.4 PG — SIGNIFICANT CHANGE UP (ref 27–34)
MCHC RBC-ENTMCNC: 33.8 G/DL — SIGNIFICANT CHANGE UP (ref 32–36)
MCV RBC AUTO: 87 FL — SIGNIFICANT CHANGE UP (ref 80–100)
NRBC # BLD AUTO: 0 K/UL — SIGNIFICANT CHANGE UP (ref 0–0)
NRBC # FLD: 0 K/UL — SIGNIFICANT CHANGE UP (ref 0–0)
NRBC BLD AUTO-RTO: 0 /100 WBCS — SIGNIFICANT CHANGE UP (ref 0–0)
PHOSPHATE SERPL-MCNC: 3.5 MG/DL — SIGNIFICANT CHANGE UP (ref 2.5–4.5)
PLATELET # BLD AUTO: 222 K/UL — SIGNIFICANT CHANGE UP (ref 150–400)
PMV BLD: 12.5 FL — SIGNIFICANT CHANGE UP (ref 7–13)
POTASSIUM SERPL-MCNC: 4 MMOL/L — SIGNIFICANT CHANGE UP (ref 3.5–5.3)
POTASSIUM SERPL-SCNC: 4 MMOL/L — SIGNIFICANT CHANGE UP (ref 3.5–5.3)
RBC # BLD: 3.23 M/UL — LOW (ref 4.2–5.8)
RBC # FLD: 16.4 % — HIGH (ref 10.3–14.5)
SODIUM SERPL-SCNC: 141 MMOL/L — SIGNIFICANT CHANGE UP (ref 135–145)
SPECIMEN SOURCE: SIGNIFICANT CHANGE UP
WBC # BLD: 7.58 K/UL — SIGNIFICANT CHANGE UP (ref 3.8–10.5)
WBC # FLD AUTO: 7.58 K/UL — SIGNIFICANT CHANGE UP (ref 3.8–10.5)

## 2025-07-16 PROCEDURE — 99232 SBSQ HOSP IP/OBS MODERATE 35: CPT

## 2025-07-16 PROCEDURE — 99233 SBSQ HOSP IP/OBS HIGH 50: CPT

## 2025-07-16 RX ORDER — INSULIN LISPRO 100 U/ML
5 INJECTION, SOLUTION INTRAVENOUS; SUBCUTANEOUS
Refills: 0 | Status: DISCONTINUED | OUTPATIENT
Start: 2025-07-16 | End: 2025-07-19

## 2025-07-16 RX ORDER — INSULIN GLARGINE-YFGN 100 [IU]/ML
6 INJECTION, SOLUTION SUBCUTANEOUS AT BEDTIME
Refills: 0 | Status: DISCONTINUED | OUTPATIENT
Start: 2025-07-16 | End: 2025-07-17

## 2025-07-16 RX ADMIN — ISOSORBDIE DINITRATE 30 MILLIGRAM(S): 30 TABLET ORAL at 23:47

## 2025-07-16 RX ADMIN — INSULIN GLARGINE-YFGN 6 UNIT(S): 100 INJECTION, SOLUTION SUBCUTANEOUS at 21:34

## 2025-07-16 RX ADMIN — ISOSORBDIE DINITRATE 30 MILLIGRAM(S): 30 TABLET ORAL at 15:09

## 2025-07-16 RX ADMIN — TAMSULOSIN HYDROCHLORIDE 0.4 MILLIGRAM(S): 0.4 CAPSULE ORAL at 21:35

## 2025-07-16 RX ADMIN — METOPROLOL SUCCINATE 25 MILLIGRAM(S): 50 TABLET, EXTENDED RELEASE ORAL at 07:39

## 2025-07-16 RX ADMIN — Medication 25 GRAM(S): at 01:46

## 2025-07-16 RX ADMIN — CLOPIDOGREL BISULFATE 75 MILLIGRAM(S): 75 TABLET, FILM COATED ORAL at 11:38

## 2025-07-16 RX ADMIN — Medication 50 MILLIGRAM(S): at 15:10

## 2025-07-16 RX ADMIN — APIXABAN 2.5 MILLIGRAM(S): 5 TABLET, FILM COATED ORAL at 17:07

## 2025-07-16 RX ADMIN — ISOSORBDIE DINITRATE 30 MILLIGRAM(S): 30 TABLET ORAL at 07:38

## 2025-07-16 RX ADMIN — Medication 25 GRAM(S): at 23:48

## 2025-07-16 RX ADMIN — APIXABAN 2.5 MILLIGRAM(S): 5 TABLET, FILM COATED ORAL at 07:38

## 2025-07-16 RX ADMIN — CALCITRIOL 0.25 MICROGRAM(S): 0.5 CAPSULE, GELATIN COATED ORAL at 11:38

## 2025-07-16 RX ADMIN — INSULIN LISPRO 3: 100 INJECTION, SOLUTION INTRAVENOUS; SUBCUTANEOUS at 17:12

## 2025-07-16 RX ADMIN — POLYETHYLENE GLYCOL 3350 17 GRAM(S): 17 POWDER, FOR SOLUTION ORAL at 17:07

## 2025-07-16 RX ADMIN — INSULIN LISPRO 4 UNIT(S): 100 INJECTION, SOLUTION INTRAVENOUS; SUBCUTANEOUS at 17:13

## 2025-07-16 RX ADMIN — Medication 50 MILLIGRAM(S): at 07:39

## 2025-07-16 RX ADMIN — Medication 50 MILLIGRAM(S): at 21:40

## 2025-07-16 RX ADMIN — INSULIN LISPRO 4 UNIT(S): 100 INJECTION, SOLUTION INTRAVENOUS; SUBCUTANEOUS at 08:37

## 2025-07-16 RX ADMIN — Medication 25 GRAM(S): at 11:46

## 2025-07-16 RX ADMIN — INSULIN LISPRO 3 UNIT(S): 100 INJECTION, SOLUTION INTRAVENOUS; SUBCUTANEOUS at 12:07

## 2025-07-16 RX ADMIN — INSULIN LISPRO 3: 100 INJECTION, SOLUTION INTRAVENOUS; SUBCUTANEOUS at 12:06

## 2025-07-16 RX ADMIN — Medication 1 APPLICATION(S): at 11:38

## 2025-07-17 ENCOUNTER — TRANSCRIPTION ENCOUNTER (OUTPATIENT)
Age: 85
End: 2025-07-17

## 2025-07-17 LAB
ANION GAP SERPL CALC-SCNC: 13 MMOL/L — SIGNIFICANT CHANGE UP (ref 7–14)
BUN SERPL-MCNC: 46 MG/DL — HIGH (ref 7–23)
CALCIUM SERPL-MCNC: 8.1 MG/DL — LOW (ref 8.4–10.5)
CHLORIDE SERPL-SCNC: 105 MMOL/L — SIGNIFICANT CHANGE UP (ref 98–107)
CO2 SERPL-SCNC: 21 MMOL/L — LOW (ref 22–31)
CREAT SERPL-MCNC: 3.11 MG/DL — HIGH (ref 0.5–1.3)
EGFR: 19 ML/MIN/1.73M2 — LOW
EGFR: 19 ML/MIN/1.73M2 — LOW
GLUCOSE BLDC GLUCOMTR-MCNC: 128 MG/DL — HIGH (ref 70–99)
GLUCOSE BLDC GLUCOMTR-MCNC: 154 MG/DL — HIGH (ref 70–99)
GLUCOSE BLDC GLUCOMTR-MCNC: 169 MG/DL — HIGH (ref 70–99)
GLUCOSE BLDC GLUCOMTR-MCNC: 274 MG/DL — HIGH (ref 70–99)
GLUCOSE SERPL-MCNC: 196 MG/DL — HIGH (ref 70–99)
HCT VFR BLD CALC: 25.3 % — LOW (ref 39–50)
HGB BLD-MCNC: 8.4 G/DL — LOW (ref 13–17)
MAGNESIUM SERPL-MCNC: 1.8 MG/DL — SIGNIFICANT CHANGE UP (ref 1.6–2.6)
MCHC RBC-ENTMCNC: 29.3 PG — SIGNIFICANT CHANGE UP (ref 27–34)
MCHC RBC-ENTMCNC: 33.2 G/DL — SIGNIFICANT CHANGE UP (ref 32–36)
MCV RBC AUTO: 88.2 FL — SIGNIFICANT CHANGE UP (ref 80–100)
NRBC # BLD AUTO: 0 K/UL — SIGNIFICANT CHANGE UP (ref 0–0)
NRBC # FLD: 0 K/UL — SIGNIFICANT CHANGE UP (ref 0–0)
NRBC BLD AUTO-RTO: 0 /100 WBCS — SIGNIFICANT CHANGE UP (ref 0–0)
PHOSPHATE SERPL-MCNC: 3.2 MG/DL — SIGNIFICANT CHANGE UP (ref 2.5–4.5)
PLATELET # BLD AUTO: 180 K/UL — SIGNIFICANT CHANGE UP (ref 150–400)
PMV BLD: 12.1 FL — SIGNIFICANT CHANGE UP (ref 7–13)
POTASSIUM SERPL-MCNC: 3.9 MMOL/L — SIGNIFICANT CHANGE UP (ref 3.5–5.3)
POTASSIUM SERPL-SCNC: 3.9 MMOL/L — SIGNIFICANT CHANGE UP (ref 3.5–5.3)
RBC # BLD: 2.87 M/UL — LOW (ref 4.2–5.8)
RBC # FLD: 16.2 % — HIGH (ref 10.3–14.5)
SODIUM SERPL-SCNC: 139 MMOL/L — SIGNIFICANT CHANGE UP (ref 135–145)
WBC # BLD: 6.76 K/UL — SIGNIFICANT CHANGE UP (ref 3.8–10.5)
WBC # FLD AUTO: 6.76 K/UL — SIGNIFICANT CHANGE UP (ref 3.8–10.5)

## 2025-07-17 PROCEDURE — 99232 SBSQ HOSP IP/OBS MODERATE 35: CPT

## 2025-07-17 RX ORDER — INSULIN LISPRO 100 U/ML
6 INJECTION, SOLUTION INTRAVENOUS; SUBCUTANEOUS
Refills: 0 | Status: DISCONTINUED | OUTPATIENT
Start: 2025-07-17 | End: 2025-07-19

## 2025-07-17 RX ORDER — INSULIN LISPRO 100 U/ML
5 INJECTION, SOLUTION INTRAVENOUS; SUBCUTANEOUS
Refills: 0 | Status: DISCONTINUED | OUTPATIENT
Start: 2025-07-17 | End: 2025-07-19

## 2025-07-17 RX ORDER — INSULIN GLARGINE-YFGN 100 [IU]/ML
6 INJECTION, SOLUTION SUBCUTANEOUS AT BEDTIME
Refills: 0 | Status: DISCONTINUED | OUTPATIENT
Start: 2025-07-17 | End: 2025-07-20

## 2025-07-17 RX ORDER — INSULIN GLARGINE-YFGN 100 [IU]/ML
7 INJECTION, SOLUTION SUBCUTANEOUS AT BEDTIME
Refills: 0 | Status: DISCONTINUED | OUTPATIENT
Start: 2025-07-17 | End: 2025-07-17

## 2025-07-17 RX ORDER — DULAGLUTIDE 4.5 MG/.5ML
0.75 INJECTION, SOLUTION SUBCUTANEOUS
Qty: 1 | Refills: 3
Start: 2025-07-17

## 2025-07-17 RX ADMIN — Medication 50 MILLIGRAM(S): at 16:23

## 2025-07-17 RX ADMIN — Medication 50 MILLIGRAM(S): at 21:23

## 2025-07-17 RX ADMIN — Medication 1 APPLICATION(S): at 11:16

## 2025-07-17 RX ADMIN — Medication 25 GRAM(S): at 11:19

## 2025-07-17 RX ADMIN — ISOSORBDIE DINITRATE 30 MILLIGRAM(S): 30 TABLET ORAL at 11:12

## 2025-07-17 RX ADMIN — INSULIN LISPRO 3: 100 INJECTION, SOLUTION INTRAVENOUS; SUBCUTANEOUS at 17:26

## 2025-07-17 RX ADMIN — CLOPIDOGREL BISULFATE 75 MILLIGRAM(S): 75 TABLET, FILM COATED ORAL at 11:12

## 2025-07-17 RX ADMIN — CALCITRIOL 0.25 MICROGRAM(S): 0.5 CAPSULE, GELATIN COATED ORAL at 11:13

## 2025-07-17 RX ADMIN — APIXABAN 2.5 MILLIGRAM(S): 5 TABLET, FILM COATED ORAL at 05:06

## 2025-07-17 RX ADMIN — APIXABAN 2.5 MILLIGRAM(S): 5 TABLET, FILM COATED ORAL at 17:12

## 2025-07-17 RX ADMIN — ISOSORBDIE DINITRATE 30 MILLIGRAM(S): 30 TABLET ORAL at 17:12

## 2025-07-17 RX ADMIN — TAMSULOSIN HYDROCHLORIDE 0.4 MILLIGRAM(S): 0.4 CAPSULE ORAL at 21:23

## 2025-07-17 RX ADMIN — POLYETHYLENE GLYCOL 3350 17 GRAM(S): 17 POWDER, FOR SOLUTION ORAL at 17:11

## 2025-07-17 RX ADMIN — INSULIN LISPRO 5 UNIT(S): 100 INJECTION, SOLUTION INTRAVENOUS; SUBCUTANEOUS at 08:18

## 2025-07-17 RX ADMIN — INSULIN LISPRO 5 UNIT(S): 100 INJECTION, SOLUTION INTRAVENOUS; SUBCUTANEOUS at 17:26

## 2025-07-17 RX ADMIN — ISOSORBDIE DINITRATE 30 MILLIGRAM(S): 30 TABLET ORAL at 05:06

## 2025-07-17 RX ADMIN — INSULIN GLARGINE-YFGN 6 UNIT(S): 100 INJECTION, SOLUTION SUBCUTANEOUS at 21:23

## 2025-07-17 RX ADMIN — Medication 50 MILLIGRAM(S): at 05:06

## 2025-07-17 RX ADMIN — INSULIN LISPRO 1: 100 INJECTION, SOLUTION INTRAVENOUS; SUBCUTANEOUS at 08:17

## 2025-07-17 RX ADMIN — METOPROLOL SUCCINATE 25 MILLIGRAM(S): 50 TABLET, EXTENDED RELEASE ORAL at 05:07

## 2025-07-18 LAB
GLUCOSE BLDC GLUCOMTR-MCNC: 104 MG/DL — HIGH (ref 70–99)
GLUCOSE BLDC GLUCOMTR-MCNC: 157 MG/DL — HIGH (ref 70–99)
GLUCOSE BLDC GLUCOMTR-MCNC: 187 MG/DL — HIGH (ref 70–99)
GLUCOSE BLDC GLUCOMTR-MCNC: 197 MG/DL — HIGH (ref 70–99)
GLUCOSE BLDC GLUCOMTR-MCNC: 250 MG/DL — HIGH (ref 70–99)
GLUCOSE BLDC GLUCOMTR-MCNC: 59 MG/DL — LOW (ref 70–99)
GLUCOSE BLDC GLUCOMTR-MCNC: 63 MG/DL — LOW (ref 70–99)
HCT VFR BLD CALC: 24.7 % — LOW (ref 39–50)
HGB BLD-MCNC: 8.2 G/DL — LOW (ref 13–17)
MCHC RBC-ENTMCNC: 28.9 PG — SIGNIFICANT CHANGE UP (ref 27–34)
MCHC RBC-ENTMCNC: 33.2 G/DL — SIGNIFICANT CHANGE UP (ref 32–36)
MCV RBC AUTO: 87 FL — SIGNIFICANT CHANGE UP (ref 80–100)
NRBC # BLD AUTO: 0 K/UL — SIGNIFICANT CHANGE UP (ref 0–0)
NRBC # FLD: 0 K/UL — SIGNIFICANT CHANGE UP (ref 0–0)
NRBC BLD AUTO-RTO: 0 /100 WBCS — SIGNIFICANT CHANGE UP (ref 0–0)
PLATELET # BLD AUTO: 162 K/UL — SIGNIFICANT CHANGE UP (ref 150–400)
PMV BLD: 12.7 FL — SIGNIFICANT CHANGE UP (ref 7–13)
RBC # BLD: 2.84 M/UL — LOW (ref 4.2–5.8)
RBC # FLD: 16.1 % — HIGH (ref 10.3–14.5)
WBC # BLD: 6.3 K/UL — SIGNIFICANT CHANGE UP (ref 3.8–10.5)
WBC # FLD AUTO: 6.3 K/UL — SIGNIFICANT CHANGE UP (ref 3.8–10.5)

## 2025-07-18 PROCEDURE — 99232 SBSQ HOSP IP/OBS MODERATE 35: CPT

## 2025-07-18 PROCEDURE — 71250 CT THORAX DX C-: CPT | Mod: 26

## 2025-07-18 RX ADMIN — ISOSORBDIE DINITRATE 30 MILLIGRAM(S): 30 TABLET ORAL at 05:04

## 2025-07-18 RX ADMIN — POLYETHYLENE GLYCOL 3350 17 GRAM(S): 17 POWDER, FOR SOLUTION ORAL at 05:06

## 2025-07-18 RX ADMIN — ISOSORBDIE DINITRATE 30 MILLIGRAM(S): 30 TABLET ORAL at 22:09

## 2025-07-18 RX ADMIN — APIXABAN 2.5 MILLIGRAM(S): 5 TABLET, FILM COATED ORAL at 05:04

## 2025-07-18 RX ADMIN — Medication 1 APPLICATION(S): at 11:51

## 2025-07-18 RX ADMIN — INSULIN LISPRO 5 UNIT(S): 100 INJECTION, SOLUTION INTRAVENOUS; SUBCUTANEOUS at 17:39

## 2025-07-18 RX ADMIN — INSULIN GLARGINE-YFGN 6 UNIT(S): 100 INJECTION, SOLUTION SUBCUTANEOUS at 22:08

## 2025-07-18 RX ADMIN — APIXABAN 2.5 MILLIGRAM(S): 5 TABLET, FILM COATED ORAL at 17:35

## 2025-07-18 RX ADMIN — METOPROLOL SUCCINATE 25 MILLIGRAM(S): 50 TABLET, EXTENDED RELEASE ORAL at 05:05

## 2025-07-18 RX ADMIN — Medication 50 MILLIGRAM(S): at 22:09

## 2025-07-18 RX ADMIN — INSULIN LISPRO 1: 100 INJECTION, SOLUTION INTRAVENOUS; SUBCUTANEOUS at 12:39

## 2025-07-18 RX ADMIN — Medication 25 GRAM(S): at 01:01

## 2025-07-18 RX ADMIN — INSULIN LISPRO 6 UNIT(S): 100 INJECTION, SOLUTION INTRAVENOUS; SUBCUTANEOUS at 12:39

## 2025-07-18 RX ADMIN — INSULIN LISPRO 5 UNIT(S): 100 INJECTION, SOLUTION INTRAVENOUS; SUBCUTANEOUS at 08:40

## 2025-07-18 RX ADMIN — Medication 50 MILLIGRAM(S): at 05:05

## 2025-07-18 RX ADMIN — Medication 50 MILLIGRAM(S): at 14:00

## 2025-07-18 RX ADMIN — TAMSULOSIN HYDROCHLORIDE 0.4 MILLIGRAM(S): 0.4 CAPSULE ORAL at 22:09

## 2025-07-18 RX ADMIN — Medication 25 GRAM(S): at 11:54

## 2025-07-18 RX ADMIN — CLOPIDOGREL BISULFATE 75 MILLIGRAM(S): 75 TABLET, FILM COATED ORAL at 11:52

## 2025-07-18 RX ADMIN — ISOSORBDIE DINITRATE 30 MILLIGRAM(S): 30 TABLET ORAL at 11:53

## 2025-07-18 RX ADMIN — CALCITRIOL 0.25 MICROGRAM(S): 0.5 CAPSULE, GELATIN COATED ORAL at 11:53

## 2025-07-18 RX ADMIN — INSULIN LISPRO 1: 100 INJECTION, SOLUTION INTRAVENOUS; SUBCUTANEOUS at 08:39

## 2025-07-19 LAB
AMMONIA BLD-MCNC: 12 UMOL/L — SIGNIFICANT CHANGE UP (ref 11–55)
ANION GAP SERPL CALC-SCNC: 12 MMOL/L — SIGNIFICANT CHANGE UP (ref 7–14)
BUN SERPL-MCNC: 46 MG/DL — HIGH (ref 7–23)
CALCIUM SERPL-MCNC: 8.6 MG/DL — SIGNIFICANT CHANGE UP (ref 8.4–10.5)
CHLORIDE SERPL-SCNC: 108 MMOL/L — HIGH (ref 98–107)
CO2 SERPL-SCNC: 21 MMOL/L — LOW (ref 22–31)
CREAT SERPL-MCNC: 3.15 MG/DL — HIGH (ref 0.5–1.3)
EGFR: 19 ML/MIN/1.73M2 — LOW
EGFR: 19 ML/MIN/1.73M2 — LOW
GLUCOSE BLDC GLUCOMTR-MCNC: 105 MG/DL — HIGH (ref 70–99)
GLUCOSE BLDC GLUCOMTR-MCNC: 108 MG/DL — HIGH (ref 70–99)
GLUCOSE BLDC GLUCOMTR-MCNC: 154 MG/DL — HIGH (ref 70–99)
GLUCOSE BLDC GLUCOMTR-MCNC: 177 MG/DL — HIGH (ref 70–99)
GLUCOSE SERPL-MCNC: 139 MG/DL — HIGH (ref 70–99)
HCT VFR BLD CALC: 25.5 % — LOW (ref 39–50)
HGB BLD-MCNC: 8.5 G/DL — LOW (ref 13–17)
MAGNESIUM SERPL-MCNC: 2 MG/DL — SIGNIFICANT CHANGE UP (ref 1.6–2.6)
MCHC RBC-ENTMCNC: 29.1 PG — SIGNIFICANT CHANGE UP (ref 27–34)
MCHC RBC-ENTMCNC: 33.3 G/DL — SIGNIFICANT CHANGE UP (ref 32–36)
MCV RBC AUTO: 87.3 FL — SIGNIFICANT CHANGE UP (ref 80–100)
NRBC # BLD AUTO: 0 K/UL — SIGNIFICANT CHANGE UP (ref 0–0)
NRBC # FLD: 0 K/UL — SIGNIFICANT CHANGE UP (ref 0–0)
NRBC BLD AUTO-RTO: 0 /100 WBCS — SIGNIFICANT CHANGE UP (ref 0–0)
PHOSPHATE SERPL-MCNC: 3.5 MG/DL — SIGNIFICANT CHANGE UP (ref 2.5–4.5)
PLATELET # BLD AUTO: 141 K/UL — LOW (ref 150–400)
PMV BLD: 11.7 FL — SIGNIFICANT CHANGE UP (ref 7–13)
POTASSIUM SERPL-MCNC: 4.3 MMOL/L — SIGNIFICANT CHANGE UP (ref 3.5–5.3)
POTASSIUM SERPL-SCNC: 4.3 MMOL/L — SIGNIFICANT CHANGE UP (ref 3.5–5.3)
RBC # BLD: 2.92 M/UL — LOW (ref 4.2–5.8)
RBC # FLD: 16.2 % — HIGH (ref 10.3–14.5)
SODIUM SERPL-SCNC: 141 MMOL/L — SIGNIFICANT CHANGE UP (ref 135–145)
WBC # BLD: 7.5 K/UL — SIGNIFICANT CHANGE UP (ref 3.8–10.5)
WBC # FLD AUTO: 7.5 K/UL — SIGNIFICANT CHANGE UP (ref 3.8–10.5)

## 2025-07-19 PROCEDURE — 99232 SBSQ HOSP IP/OBS MODERATE 35: CPT

## 2025-07-19 RX ORDER — INSULIN LISPRO 100 U/ML
5 INJECTION, SOLUTION INTRAVENOUS; SUBCUTANEOUS
Refills: 0 | Status: DISCONTINUED | OUTPATIENT
Start: 2025-07-19 | End: 2025-07-22

## 2025-07-19 RX ORDER — FUROSEMIDE 10 MG/ML
60 INJECTION INTRAMUSCULAR; INTRAVENOUS DAILY
Refills: 0 | Status: DISCONTINUED | OUTPATIENT
Start: 2025-07-19 | End: 2025-07-22

## 2025-07-19 RX ADMIN — Medication 50 MILLIGRAM(S): at 21:27

## 2025-07-19 RX ADMIN — CALCITRIOL 0.25 MICROGRAM(S): 0.5 CAPSULE, GELATIN COATED ORAL at 13:03

## 2025-07-19 RX ADMIN — APIXABAN 2.5 MILLIGRAM(S): 5 TABLET, FILM COATED ORAL at 05:08

## 2025-07-19 RX ADMIN — ISOSORBDIE DINITRATE 30 MILLIGRAM(S): 30 TABLET ORAL at 21:27

## 2025-07-19 RX ADMIN — INSULIN LISPRO 1: 100 INJECTION, SOLUTION INTRAVENOUS; SUBCUTANEOUS at 11:55

## 2025-07-19 RX ADMIN — INSULIN LISPRO 5 UNIT(S): 100 INJECTION, SOLUTION INTRAVENOUS; SUBCUTANEOUS at 17:03

## 2025-07-19 RX ADMIN — CLOPIDOGREL BISULFATE 75 MILLIGRAM(S): 75 TABLET, FILM COATED ORAL at 13:03

## 2025-07-19 RX ADMIN — POLYETHYLENE GLYCOL 3350 17 GRAM(S): 17 POWDER, FOR SOLUTION ORAL at 17:40

## 2025-07-19 RX ADMIN — Medication 50 MILLIGRAM(S): at 05:08

## 2025-07-19 RX ADMIN — Medication 1 APPLICATION(S): at 13:04

## 2025-07-19 RX ADMIN — INSULIN LISPRO 5 UNIT(S): 100 INJECTION, SOLUTION INTRAVENOUS; SUBCUTANEOUS at 11:57

## 2025-07-19 RX ADMIN — INSULIN LISPRO 5 UNIT(S): 100 INJECTION, SOLUTION INTRAVENOUS; SUBCUTANEOUS at 07:43

## 2025-07-19 RX ADMIN — TAMSULOSIN HYDROCHLORIDE 0.4 MILLIGRAM(S): 0.4 CAPSULE ORAL at 21:27

## 2025-07-19 RX ADMIN — INSULIN GLARGINE-YFGN 6 UNIT(S): 100 INJECTION, SOLUTION SUBCUTANEOUS at 21:27

## 2025-07-19 RX ADMIN — POLYETHYLENE GLYCOL 3350 17 GRAM(S): 17 POWDER, FOR SOLUTION ORAL at 05:08

## 2025-07-19 RX ADMIN — ISOSORBDIE DINITRATE 30 MILLIGRAM(S): 30 TABLET ORAL at 13:03

## 2025-07-19 RX ADMIN — APIXABAN 2.5 MILLIGRAM(S): 5 TABLET, FILM COATED ORAL at 17:40

## 2025-07-19 RX ADMIN — ISOSORBDIE DINITRATE 30 MILLIGRAM(S): 30 TABLET ORAL at 05:08

## 2025-07-19 RX ADMIN — Medication 50 MILLIGRAM(S): at 15:12

## 2025-07-19 RX ADMIN — METOPROLOL SUCCINATE 25 MILLIGRAM(S): 50 TABLET, EXTENDED RELEASE ORAL at 05:08

## 2025-07-20 LAB
ANION GAP SERPL CALC-SCNC: 11 MMOL/L — SIGNIFICANT CHANGE UP (ref 7–14)
BUN SERPL-MCNC: 51 MG/DL — HIGH (ref 7–23)
CALCIUM SERPL-MCNC: 8.9 MG/DL — SIGNIFICANT CHANGE UP (ref 8.4–10.5)
CHLORIDE SERPL-SCNC: 109 MMOL/L — HIGH (ref 98–107)
CO2 SERPL-SCNC: 21 MMOL/L — LOW (ref 22–31)
CREAT SERPL-MCNC: 3.19 MG/DL — HIGH (ref 0.5–1.3)
EGFR: 19 ML/MIN/1.73M2 — LOW
EGFR: 19 ML/MIN/1.73M2 — LOW
GLUCOSE BLDC GLUCOMTR-MCNC: 100 MG/DL — HIGH (ref 70–99)
GLUCOSE BLDC GLUCOMTR-MCNC: 167 MG/DL — HIGH (ref 70–99)
GLUCOSE BLDC GLUCOMTR-MCNC: 258 MG/DL — HIGH (ref 70–99)
GLUCOSE BLDC GLUCOMTR-MCNC: 262 MG/DL — HIGH (ref 70–99)
GLUCOSE BLDC GLUCOMTR-MCNC: 88 MG/DL — SIGNIFICANT CHANGE UP (ref 70–99)
GLUCOSE SERPL-MCNC: 165 MG/DL — HIGH (ref 70–99)
HCT VFR BLD CALC: 25 % — LOW (ref 39–50)
HGB BLD-MCNC: 8.3 G/DL — LOW (ref 13–17)
MAGNESIUM SERPL-MCNC: 2.3 MG/DL — SIGNIFICANT CHANGE UP (ref 1.6–2.6)
MCHC RBC-ENTMCNC: 29.1 PG — SIGNIFICANT CHANGE UP (ref 27–34)
MCHC RBC-ENTMCNC: 33.2 G/DL — SIGNIFICANT CHANGE UP (ref 32–36)
MCV RBC AUTO: 87.7 FL — SIGNIFICANT CHANGE UP (ref 80–100)
NRBC # BLD AUTO: 0 K/UL — SIGNIFICANT CHANGE UP (ref 0–0)
NRBC # FLD: 0 K/UL — SIGNIFICANT CHANGE UP (ref 0–0)
NRBC BLD AUTO-RTO: 0 /100 WBCS — SIGNIFICANT CHANGE UP (ref 0–0)
PHOSPHATE SERPL-MCNC: 3.6 MG/DL — SIGNIFICANT CHANGE UP (ref 2.5–4.5)
PLATELET # BLD AUTO: 147 K/UL — LOW (ref 150–400)
PMV BLD: 12.8 FL — SIGNIFICANT CHANGE UP (ref 7–13)
POTASSIUM SERPL-MCNC: 4.9 MMOL/L — SIGNIFICANT CHANGE UP (ref 3.5–5.3)
POTASSIUM SERPL-SCNC: 4.9 MMOL/L — SIGNIFICANT CHANGE UP (ref 3.5–5.3)
RBC # BLD: 2.85 M/UL — LOW (ref 4.2–5.8)
RBC # FLD: 16.2 % — HIGH (ref 10.3–14.5)
SODIUM SERPL-SCNC: 141 MMOL/L — SIGNIFICANT CHANGE UP (ref 135–145)
WBC # BLD: 7.22 K/UL — SIGNIFICANT CHANGE UP (ref 3.8–10.5)
WBC # FLD AUTO: 7.22 K/UL — SIGNIFICANT CHANGE UP (ref 3.8–10.5)

## 2025-07-20 PROCEDURE — 99232 SBSQ HOSP IP/OBS MODERATE 35: CPT

## 2025-07-20 PROCEDURE — 71045 X-RAY EXAM CHEST 1 VIEW: CPT | Mod: 26

## 2025-07-20 RX ORDER — FUROSEMIDE 10 MG/ML
60 INJECTION INTRAMUSCULAR; INTRAVENOUS ONCE
Refills: 0 | Status: COMPLETED | OUTPATIENT
Start: 2025-07-20 | End: 2025-07-20

## 2025-07-20 RX ORDER — ISOSORBDIE DINITRATE 30 MG/1
1 TABLET ORAL
Qty: 90 | Refills: 0
Start: 2025-07-20 | End: 2025-08-18

## 2025-07-20 RX ORDER — CLOPIDOGREL BISULFATE 75 MG/1
1 TABLET, FILM COATED ORAL
Refills: 0 | DISCHARGE

## 2025-07-20 RX ORDER — ISOSORBDIE DINITRATE 30 MG/1
1 TABLET ORAL
Qty: 90 | Refills: 0 | DISCHARGE
Start: 2025-07-20 | End: 2025-08-18

## 2025-07-20 RX ORDER — METOPROLOL SUCCINATE 50 MG/1
1 TABLET, EXTENDED RELEASE ORAL
Refills: 0 | DISCHARGE

## 2025-07-20 RX ORDER — ISOPROPYL ALCOHOL, BENZOCAINE .7; .06 ML/ML; ML/ML
0 SWAB TOPICAL
Qty: 100 | Refills: 1
Start: 2025-07-20

## 2025-07-20 RX ORDER — ATORVASTATIN CALCIUM 80 MG/1
1 TABLET, FILM COATED ORAL
Refills: 0 | DISCHARGE

## 2025-07-20 RX ORDER — FERROUS SULFATE 137(45) MG
1 TABLET, EXTENDED RELEASE ORAL
Refills: 0 | DISCHARGE

## 2025-07-20 RX ORDER — FUROSEMIDE 10 MG/ML
3 INJECTION INTRAMUSCULAR; INTRAVENOUS
Qty: 90 | Refills: 0
Start: 2025-07-20 | End: 2025-08-18

## 2025-07-20 RX ORDER — INSULIN GLARGINE-YFGN 100 [IU]/ML
5 INJECTION, SOLUTION SUBCUTANEOUS AT BEDTIME
Refills: 0 | Status: DISCONTINUED | OUTPATIENT
Start: 2025-07-20 | End: 2025-07-21

## 2025-07-20 RX ORDER — APIXABAN 5 MG/1
1 TABLET, FILM COATED ORAL
Refills: 0 | DISCHARGE

## 2025-07-20 RX ADMIN — METOPROLOL SUCCINATE 25 MILLIGRAM(S): 50 TABLET, EXTENDED RELEASE ORAL at 05:39

## 2025-07-20 RX ADMIN — ISOSORBDIE DINITRATE 30 MILLIGRAM(S): 30 TABLET ORAL at 23:09

## 2025-07-20 RX ADMIN — INSULIN LISPRO 1: 100 INJECTION, SOLUTION INTRAVENOUS; SUBCUTANEOUS at 17:13

## 2025-07-20 RX ADMIN — Medication 50 MILLIGRAM(S): at 23:06

## 2025-07-20 RX ADMIN — TAMSULOSIN HYDROCHLORIDE 0.4 MILLIGRAM(S): 0.4 CAPSULE ORAL at 23:07

## 2025-07-20 RX ADMIN — INSULIN LISPRO 3: 100 INJECTION, SOLUTION INTRAVENOUS; SUBCUTANEOUS at 11:50

## 2025-07-20 RX ADMIN — INSULIN LISPRO 5 UNIT(S): 100 INJECTION, SOLUTION INTRAVENOUS; SUBCUTANEOUS at 11:50

## 2025-07-20 RX ADMIN — INSULIN GLARGINE-YFGN 5 UNIT(S): 100 INJECTION, SOLUTION SUBCUTANEOUS at 23:07

## 2025-07-20 RX ADMIN — Medication 1 APPLICATION(S): at 11:45

## 2025-07-20 RX ADMIN — INSULIN LISPRO 5 UNIT(S): 100 INJECTION, SOLUTION INTRAVENOUS; SUBCUTANEOUS at 17:14

## 2025-07-20 RX ADMIN — Medication 50 MILLIGRAM(S): at 05:39

## 2025-07-20 RX ADMIN — ISOSORBDIE DINITRATE 30 MILLIGRAM(S): 30 TABLET ORAL at 14:04

## 2025-07-20 RX ADMIN — Medication 50 MILLIGRAM(S): at 14:04

## 2025-07-20 RX ADMIN — FUROSEMIDE 60 MILLIGRAM(S): 10 INJECTION INTRAMUSCULAR; INTRAVENOUS at 14:55

## 2025-07-20 RX ADMIN — ISOSORBDIE DINITRATE 30 MILLIGRAM(S): 30 TABLET ORAL at 05:39

## 2025-07-20 RX ADMIN — CLOPIDOGREL BISULFATE 75 MILLIGRAM(S): 75 TABLET, FILM COATED ORAL at 11:49

## 2025-07-20 RX ADMIN — APIXABAN 2.5 MILLIGRAM(S): 5 TABLET, FILM COATED ORAL at 05:39

## 2025-07-20 RX ADMIN — CALCITRIOL 0.25 MICROGRAM(S): 0.5 CAPSULE, GELATIN COATED ORAL at 11:48

## 2025-07-20 RX ADMIN — FUROSEMIDE 60 MILLIGRAM(S): 10 INJECTION INTRAMUSCULAR; INTRAVENOUS at 05:40

## 2025-07-20 RX ADMIN — APIXABAN 2.5 MILLIGRAM(S): 5 TABLET, FILM COATED ORAL at 17:13

## 2025-07-20 RX ADMIN — POLYETHYLENE GLYCOL 3350 17 GRAM(S): 17 POWDER, FOR SOLUTION ORAL at 05:40

## 2025-07-21 LAB
GLUCOSE BLDC GLUCOMTR-MCNC: 160 MG/DL — HIGH (ref 70–99)
GLUCOSE BLDC GLUCOMTR-MCNC: 175 MG/DL — HIGH (ref 70–99)
GLUCOSE BLDC GLUCOMTR-MCNC: 180 MG/DL — HIGH (ref 70–99)
GLUCOSE BLDC GLUCOMTR-MCNC: 216 MG/DL — HIGH (ref 70–99)
GLUCOSE BLDC GLUCOMTR-MCNC: 93 MG/DL — SIGNIFICANT CHANGE UP (ref 70–99)

## 2025-07-21 PROCEDURE — 99232 SBSQ HOSP IP/OBS MODERATE 35: CPT

## 2025-07-21 PROCEDURE — 99233 SBSQ HOSP IP/OBS HIGH 50: CPT

## 2025-07-21 RX ORDER — MAGNESIUM, ALUMINUM HYDROXIDE 200-200 MG
30 TABLET,CHEWABLE ORAL EVERY 4 HOURS
Refills: 0 | Status: DISCONTINUED | OUTPATIENT
Start: 2025-07-21 | End: 2025-07-22

## 2025-07-21 RX ORDER — INSULIN GLARGINE-YFGN 100 [IU]/ML
4 INJECTION, SOLUTION SUBCUTANEOUS AT BEDTIME
Refills: 0 | Status: DISCONTINUED | OUTPATIENT
Start: 2025-07-21 | End: 2025-07-22

## 2025-07-21 RX ADMIN — INSULIN LISPRO 2: 100 INJECTION, SOLUTION INTRAVENOUS; SUBCUTANEOUS at 17:20

## 2025-07-21 RX ADMIN — Medication 50 MILLIGRAM(S): at 13:01

## 2025-07-21 RX ADMIN — ISOSORBDIE DINITRATE 30 MILLIGRAM(S): 30 TABLET ORAL at 17:17

## 2025-07-21 RX ADMIN — INSULIN LISPRO 5 UNIT(S): 100 INJECTION, SOLUTION INTRAVENOUS; SUBCUTANEOUS at 17:20

## 2025-07-21 RX ADMIN — METOPROLOL SUCCINATE 25 MILLIGRAM(S): 50 TABLET, EXTENDED RELEASE ORAL at 05:59

## 2025-07-21 RX ADMIN — INSULIN GLARGINE-YFGN 4 UNIT(S): 100 INJECTION, SOLUTION SUBCUTANEOUS at 21:43

## 2025-07-21 RX ADMIN — INSULIN LISPRO 5 UNIT(S): 100 INJECTION, SOLUTION INTRAVENOUS; SUBCUTANEOUS at 13:04

## 2025-07-21 RX ADMIN — CLOPIDOGREL BISULFATE 75 MILLIGRAM(S): 75 TABLET, FILM COATED ORAL at 11:51

## 2025-07-21 RX ADMIN — Medication 50 MILLIGRAM(S): at 21:43

## 2025-07-21 RX ADMIN — TAMSULOSIN HYDROCHLORIDE 0.4 MILLIGRAM(S): 0.4 CAPSULE ORAL at 21:43

## 2025-07-21 RX ADMIN — Medication 50 MILLIGRAM(S): at 05:47

## 2025-07-21 RX ADMIN — APIXABAN 2.5 MILLIGRAM(S): 5 TABLET, FILM COATED ORAL at 17:17

## 2025-07-21 RX ADMIN — INSULIN LISPRO 5 UNIT(S): 100 INJECTION, SOLUTION INTRAVENOUS; SUBCUTANEOUS at 09:11

## 2025-07-21 RX ADMIN — CALCITRIOL 0.25 MICROGRAM(S): 0.5 CAPSULE, GELATIN COATED ORAL at 11:51

## 2025-07-21 RX ADMIN — ISOSORBDIE DINITRATE 30 MILLIGRAM(S): 30 TABLET ORAL at 11:51

## 2025-07-21 RX ADMIN — POLYETHYLENE GLYCOL 3350 17 GRAM(S): 17 POWDER, FOR SOLUTION ORAL at 05:46

## 2025-07-21 RX ADMIN — FUROSEMIDE 60 MILLIGRAM(S): 10 INJECTION INTRAMUSCULAR; INTRAVENOUS at 05:47

## 2025-07-21 RX ADMIN — ISOSORBDIE DINITRATE 30 MILLIGRAM(S): 30 TABLET ORAL at 05:47

## 2025-07-21 RX ADMIN — Medication 1 APPLICATION(S): at 13:00

## 2025-07-21 RX ADMIN — INSULIN LISPRO 1: 100 INJECTION, SOLUTION INTRAVENOUS; SUBCUTANEOUS at 13:03

## 2025-07-21 RX ADMIN — APIXABAN 2.5 MILLIGRAM(S): 5 TABLET, FILM COATED ORAL at 05:47

## 2025-07-22 VITALS
TEMPERATURE: 98 F | RESPIRATION RATE: 18 BRPM | DIASTOLIC BLOOD PRESSURE: 60 MMHG | HEART RATE: 67 BPM | SYSTOLIC BLOOD PRESSURE: 131 MMHG | OXYGEN SATURATION: 96 %

## 2025-07-22 LAB
ALBUMIN SERPL ELPH-MCNC: 3.2 G/DL — LOW (ref 3.3–5)
ALP SERPL-CCNC: 126 U/L — HIGH (ref 40–120)
ALT FLD-CCNC: 37 U/L — SIGNIFICANT CHANGE UP (ref 4–41)
ANION GAP SERPL CALC-SCNC: 12 MMOL/L — SIGNIFICANT CHANGE UP (ref 7–14)
ANISOCYTOSIS BLD QL: SLIGHT — SIGNIFICANT CHANGE UP
AST SERPL-CCNC: 25 U/L — SIGNIFICANT CHANGE UP (ref 4–40)
BASOPHILS # BLD AUTO: 0.16 K/UL — SIGNIFICANT CHANGE UP (ref 0–0.2)
BASOPHILS # BLD MANUAL: 0.2 K/UL — SIGNIFICANT CHANGE UP (ref 0–0.2)
BASOPHILS NFR BLD AUTO: 2.2 % — HIGH (ref 0–2)
BASOPHILS NFR BLD MANUAL: 2.8 % — HIGH (ref 0–2)
BILIRUB DIRECT SERPL-MCNC: <0.2 MG/DL — SIGNIFICANT CHANGE UP (ref 0–0.3)
BILIRUB INDIRECT FLD-MCNC: >0.2 MG/DL — SIGNIFICANT CHANGE UP (ref 0–1)
BILIRUB SERPL-MCNC: 0.4 MG/DL — SIGNIFICANT CHANGE UP (ref 0.2–1.2)
BUN SERPL-MCNC: 50 MG/DL — HIGH (ref 7–23)
CALCIUM SERPL-MCNC: 8.8 MG/DL — SIGNIFICANT CHANGE UP (ref 8.4–10.5)
CHLORIDE SERPL-SCNC: 109 MMOL/L — HIGH (ref 98–107)
CO2 SERPL-SCNC: 20 MMOL/L — LOW (ref 22–31)
CREAT SERPL-MCNC: 3.31 MG/DL — HIGH (ref 0.5–1.3)
EGFR: 18 ML/MIN/1.73M2 — LOW
EGFR: 18 ML/MIN/1.73M2 — LOW
EOSINOPHIL # BLD AUTO: 2.24 K/UL — HIGH (ref 0–0.5)
EOSINOPHIL # BLD MANUAL: 2.07 K/UL — HIGH (ref 0–0.5)
EOSINOPHIL NFR BLD AUTO: 31.1 % — HIGH (ref 0–6)
EOSINOPHIL NFR BLD MANUAL: 28.7 % — HIGH (ref 0–6)
GIANT PLATELETS BLD QL SMEAR: PRESENT
GLUCOSE BLDC GLUCOMTR-MCNC: 115 MG/DL — HIGH (ref 70–99)
GLUCOSE BLDC GLUCOMTR-MCNC: 135 MG/DL — HIGH (ref 70–99)
GLUCOSE BLDC GLUCOMTR-MCNC: 192 MG/DL — HIGH (ref 70–99)
GLUCOSE BLDC GLUCOMTR-MCNC: 61 MG/DL — LOW (ref 70–99)
GLUCOSE BLDC GLUCOMTR-MCNC: 62 MG/DL — LOW (ref 70–99)
GLUCOSE BLDC GLUCOMTR-MCNC: 94 MG/DL — SIGNIFICANT CHANGE UP (ref 70–99)
GLUCOSE SERPL-MCNC: 61 MG/DL — LOW (ref 70–99)
HCT VFR BLD CALC: 26.9 % — LOW (ref 39–50)
HGB BLD-MCNC: 9.1 G/DL — LOW (ref 13–17)
IMM GRANULOCYTES # BLD AUTO: 0.01 K/UL — SIGNIFICANT CHANGE UP (ref 0–0.07)
IMM GRANULOCYTES NFR BLD AUTO: 0.1 % — SIGNIFICANT CHANGE UP (ref 0–0.9)
LYMPHOCYTES # BLD AUTO: 1.86 K/UL — SIGNIFICANT CHANGE UP (ref 1–3.3)
LYMPHOCYTES # BLD MANUAL: 1.86 K/UL — SIGNIFICANT CHANGE UP (ref 1–3.3)
LYMPHOCYTES NFR BLD AUTO: 25.8 % — SIGNIFICANT CHANGE UP (ref 13–44)
LYMPHOCYTES NFR BLD MANUAL: 25.9 % — SIGNIFICANT CHANGE UP (ref 13–44)
MAGNESIUM SERPL-MCNC: 2.1 MG/DL — SIGNIFICANT CHANGE UP (ref 1.6–2.6)
MANUAL PLASMA CELLS #: 0.14 K/UL — HIGH (ref 0–0)
MANUAL PLASMA CELLS %: 1.9 % — HIGH (ref 0–0)
MANUAL REACTIVE LYMPHOCYTES #: 0.06 K/UL — SIGNIFICANT CHANGE UP (ref 0–0.63)
MCHC RBC-ENTMCNC: 29.4 PG — SIGNIFICANT CHANGE UP (ref 27–34)
MCHC RBC-ENTMCNC: 33.8 G/DL — SIGNIFICANT CHANGE UP (ref 32–36)
MCV RBC AUTO: 87.1 FL — SIGNIFICANT CHANGE UP (ref 80–100)
MONOCYTES # BLD AUTO: 0.73 K/UL — SIGNIFICANT CHANGE UP (ref 0–0.9)
MONOCYTES # BLD MANUAL: 0.27 K/UL — SIGNIFICANT CHANGE UP (ref 0–0.9)
MONOCYTES NFR BLD AUTO: 10.1 % — SIGNIFICANT CHANGE UP (ref 2–14)
MONOCYTES NFR BLD MANUAL: 3.7 % — SIGNIFICANT CHANGE UP (ref 2–14)
NEUTROPHILS # BLD AUTO: 2.2 K/UL — SIGNIFICANT CHANGE UP (ref 1.8–7.4)
NEUTROPHILS # BLD MANUAL: 2.6 K/UL — SIGNIFICANT CHANGE UP (ref 1.8–7.4)
NEUTROPHILS NFR BLD AUTO: 30.7 % — LOW (ref 43–77)
NEUTROPHILS NFR BLD MANUAL: 36.1 % — LOW (ref 43–77)
NRBC # BLD AUTO: 0 K/UL — SIGNIFICANT CHANGE UP (ref 0–0)
NRBC # FLD: 0 K/UL — SIGNIFICANT CHANGE UP (ref 0–0)
NRBC BLD AUTO-RTO: 0 /100 WBCS — SIGNIFICANT CHANGE UP (ref 0–0)
PHOSPHATE SERPL-MCNC: 3.8 MG/DL — SIGNIFICANT CHANGE UP (ref 2.5–4.5)
PLAT MORPH BLD: NORMAL — SIGNIFICANT CHANGE UP
PLATELET # BLD AUTO: 171 K/UL — SIGNIFICANT CHANGE UP (ref 150–400)
PLATELET COUNT - ESTIMATE: NORMAL — SIGNIFICANT CHANGE UP
PMV BLD: 12.7 FL — SIGNIFICANT CHANGE UP (ref 7–13)
POTASSIUM SERPL-MCNC: 4.7 MMOL/L — SIGNIFICANT CHANGE UP (ref 3.5–5.3)
POTASSIUM SERPL-SCNC: 4.7 MMOL/L — SIGNIFICANT CHANGE UP (ref 3.5–5.3)
PROT SERPL-MCNC: 7.2 G/DL — SIGNIFICANT CHANGE UP (ref 6–8.3)
RBC # BLD: 3.09 M/UL — LOW (ref 4.2–5.8)
RBC # FLD: 16.4 % — HIGH (ref 10.3–14.5)
RBC BLD AUTO: NORMAL — SIGNIFICANT CHANGE UP
SMUDGE CELLS # BLD: PRESENT
SODIUM SERPL-SCNC: 141 MMOL/L — SIGNIFICANT CHANGE UP (ref 135–145)
VARIANT LYMPHS # BLD: 0.9 % — SIGNIFICANT CHANGE UP (ref 0–6)
VARIANT LYMPHS NFR BLD MANUAL: 0.9 % — SIGNIFICANT CHANGE UP (ref 0–6)
WBC # BLD: 7.2 K/UL — SIGNIFICANT CHANGE UP (ref 3.8–10.5)
WBC # FLD AUTO: 7.2 K/UL — SIGNIFICANT CHANGE UP (ref 3.8–10.5)

## 2025-07-22 PROCEDURE — 99232 SBSQ HOSP IP/OBS MODERATE 35: CPT

## 2025-07-22 PROCEDURE — 99239 HOSP IP/OBS DSCHRG MGMT >30: CPT

## 2025-07-22 RX ORDER — ATORVASTATIN CALCIUM 80 MG/1
1 TABLET, FILM COATED ORAL
Qty: 0 | Refills: 0 | DISCHARGE
Start: 2025-07-22

## 2025-07-22 RX ORDER — FUROSEMIDE 10 MG/ML
1 INJECTION INTRAMUSCULAR; INTRAVENOUS
Qty: 30 | Refills: 0
Start: 2025-07-22 | End: 2025-08-20

## 2025-07-22 RX ORDER — METOPROLOL SUCCINATE 50 MG/1
1 TABLET, EXTENDED RELEASE ORAL
Qty: 0 | Refills: 0 | DISCHARGE
Start: 2025-07-22

## 2025-07-22 RX ORDER — APIXABAN 5 MG/1
1 TABLET, FILM COATED ORAL
Qty: 0 | Refills: 0 | DISCHARGE
Start: 2025-07-22

## 2025-07-22 RX ORDER — FERROUS SULFATE 137(45) MG
1 TABLET, EXTENDED RELEASE ORAL
Qty: 0 | Refills: 0 | DISCHARGE
Start: 2025-07-22

## 2025-07-22 RX ORDER — INSULIN GLARGINE-YFGN 100 [IU]/ML
2 INJECTION, SOLUTION SUBCUTANEOUS AT BEDTIME
Refills: 0 | Status: DISCONTINUED | OUTPATIENT
Start: 2025-07-22 | End: 2025-07-22

## 2025-07-22 RX ORDER — CLOPIDOGREL BISULFATE 75 MG/1
1 TABLET, FILM COATED ORAL
Qty: 0 | Refills: 0 | DISCHARGE
Start: 2025-07-22

## 2025-07-22 RX ADMIN — INSULIN LISPRO 5 UNIT(S): 100 INJECTION, SOLUTION INTRAVENOUS; SUBCUTANEOUS at 18:20

## 2025-07-22 RX ADMIN — METOPROLOL SUCCINATE 25 MILLIGRAM(S): 50 TABLET, EXTENDED RELEASE ORAL at 06:22

## 2025-07-22 RX ADMIN — ISOSORBDIE DINITRATE 30 MILLIGRAM(S): 30 TABLET ORAL at 12:08

## 2025-07-22 RX ADMIN — POLYETHYLENE GLYCOL 3350 17 GRAM(S): 17 POWDER, FOR SOLUTION ORAL at 18:21

## 2025-07-22 RX ADMIN — CLOPIDOGREL BISULFATE 75 MILLIGRAM(S): 75 TABLET, FILM COATED ORAL at 12:08

## 2025-07-22 RX ADMIN — ISOSORBDIE DINITRATE 30 MILLIGRAM(S): 30 TABLET ORAL at 06:16

## 2025-07-22 RX ADMIN — INSULIN LISPRO 5 UNIT(S): 100 INJECTION, SOLUTION INTRAVENOUS; SUBCUTANEOUS at 08:11

## 2025-07-22 RX ADMIN — ISOSORBDIE DINITRATE 30 MILLIGRAM(S): 30 TABLET ORAL at 18:21

## 2025-07-22 RX ADMIN — CALCITRIOL 0.25 MICROGRAM(S): 0.5 CAPSULE, GELATIN COATED ORAL at 12:09

## 2025-07-22 RX ADMIN — Medication 1 APPLICATION(S): at 12:09

## 2025-07-22 RX ADMIN — APIXABAN 2.5 MILLIGRAM(S): 5 TABLET, FILM COATED ORAL at 18:21

## 2025-07-22 RX ADMIN — FUROSEMIDE 60 MILLIGRAM(S): 10 INJECTION INTRAMUSCULAR; INTRAVENOUS at 06:23

## 2025-07-22 RX ADMIN — APIXABAN 2.5 MILLIGRAM(S): 5 TABLET, FILM COATED ORAL at 06:16

## 2025-07-22 RX ADMIN — Medication 50 MILLIGRAM(S): at 06:16

## 2025-07-22 RX ADMIN — Medication 50 MILLIGRAM(S): at 13:57

## 2025-07-22 RX ADMIN — INSULIN LISPRO 5 UNIT(S): 100 INJECTION, SOLUTION INTRAVENOUS; SUBCUTANEOUS at 12:08

## 2025-07-23 PROBLEM — N18.4 CHRONIC KIDNEY DISEASE, STAGE 4 (SEVERE): Chronic | Status: ACTIVE | Noted: 2025-07-08

## 2025-07-23 PROBLEM — I48.91 UNSPECIFIED ATRIAL FIBRILLATION: Chronic | Status: ACTIVE | Noted: 2025-07-08

## 2025-07-23 PROBLEM — Z95.0 PRESENCE OF CARDIAC PACEMAKER: Chronic | Status: ACTIVE | Noted: 2025-07-08

## 2025-07-23 PROBLEM — D50.9 IRON DEFICIENCY ANEMIA, UNSPECIFIED: Chronic | Status: ACTIVE | Noted: 2025-07-08

## 2025-07-23 RX ORDER — METOPROLOL SUCCINATE 50 MG/1
1 TABLET, EXTENDED RELEASE ORAL
Qty: 30 | Refills: 0
Start: 2025-07-23 | End: 2025-08-21

## 2025-07-23 RX ORDER — CLOPIDOGREL BISULFATE 75 MG/1
1 TABLET, FILM COATED ORAL
Qty: 30 | Refills: 0
Start: 2025-07-23 | End: 2025-08-21

## 2025-07-23 RX ORDER — ATORVASTATIN CALCIUM 80 MG/1
1 TABLET, FILM COATED ORAL
Qty: 30 | Refills: 0
Start: 2025-07-23 | End: 2025-08-21

## 2025-07-23 RX ORDER — APIXABAN 5 MG/1
1 TABLET, FILM COATED ORAL
Qty: 60 | Refills: 0
Start: 2025-07-23 | End: 2025-08-21

## 2025-08-01 ENCOUNTER — INPATIENT (INPATIENT)
Facility: HOSPITAL | Age: 85
LOS: 3 days | Discharge: HOME HEALTH | End: 2025-08-05
Attending: INTERNAL MEDICINE | Admitting: INTERNAL MEDICINE
Payer: MEDICARE

## 2025-08-01 VITALS
DIASTOLIC BLOOD PRESSURE: 80 MMHG | RESPIRATION RATE: 20 BRPM | HEART RATE: 63 BPM | WEIGHT: 160.06 LBS | OXYGEN SATURATION: 100 % | TEMPERATURE: 98 F | SYSTOLIC BLOOD PRESSURE: 125 MMHG | HEIGHT: 63 IN

## 2025-08-01 DIAGNOSIS — Z95.0 PRESENCE OF CARDIAC PACEMAKER: Chronic | ICD-10-CM

## 2025-08-01 DIAGNOSIS — Z98.890 OTHER SPECIFIED POSTPROCEDURAL STATES: Chronic | ICD-10-CM

## 2025-08-01 DIAGNOSIS — Z95.818 PRESENCE OF OTHER CARDIAC IMPLANTS AND GRAFTS: Chronic | ICD-10-CM

## 2025-08-01 DIAGNOSIS — Z95.1 PRESENCE OF AORTOCORONARY BYPASS GRAFT: Chronic | ICD-10-CM

## 2025-08-01 LAB
ADD ON TEST-SPECIMEN IN LAB: SIGNIFICANT CHANGE UP
ALBUMIN SERPL ELPH-MCNC: 3.3 G/DL — SIGNIFICANT CHANGE UP (ref 3.3–5)
ALP SERPL-CCNC: 129 U/L — HIGH (ref 40–120)
ALT FLD-CCNC: 40 U/L — SIGNIFICANT CHANGE UP (ref 4–41)
ANION GAP SERPL CALC-SCNC: 13 MMOL/L — SIGNIFICANT CHANGE UP (ref 7–14)
APTT BLD: 32.8 SEC — SIGNIFICANT CHANGE UP (ref 26.1–36.8)
AST SERPL-CCNC: 37 U/L — SIGNIFICANT CHANGE UP (ref 4–40)
BASOPHILS # BLD AUTO: 0.09 K/UL — SIGNIFICANT CHANGE UP (ref 0–0.2)
BASOPHILS NFR BLD AUTO: 1.5 % — SIGNIFICANT CHANGE UP (ref 0–2)
BILIRUB SERPL-MCNC: 0.6 MG/DL — SIGNIFICANT CHANGE UP (ref 0.2–1.2)
BLOOD GAS VENOUS COMPREHENSIVE RESULT: SIGNIFICANT CHANGE UP
BUN SERPL-MCNC: 44 MG/DL — HIGH (ref 7–23)
CALCIUM SERPL-MCNC: 9.7 MG/DL — SIGNIFICANT CHANGE UP (ref 8.4–10.5)
CHLORIDE SERPL-SCNC: 108 MMOL/L — HIGH (ref 98–107)
CO2 SERPL-SCNC: 19 MMOL/L — LOW (ref 22–31)
CREAT SERPL-MCNC: 2.34 MG/DL — HIGH (ref 0.5–1.3)
EGFR: 27 ML/MIN/1.73M2 — LOW
EGFR: 27 ML/MIN/1.73M2 — LOW
EOSINOPHIL # BLD AUTO: 1.19 K/UL — HIGH (ref 0–0.5)
EOSINOPHIL NFR BLD AUTO: 19.2 % — HIGH (ref 0–6)
FLUAV AG NPH QL: SIGNIFICANT CHANGE UP
FLUBV AG NPH QL: SIGNIFICANT CHANGE UP
GLUCOSE SERPL-MCNC: 104 MG/DL — HIGH (ref 70–99)
HCT VFR BLD CALC: 30.2 % — LOW (ref 39–50)
HGB BLD-MCNC: 10.1 G/DL — LOW (ref 13–17)
IMM GRANULOCYTES # BLD AUTO: 0.04 K/UL — SIGNIFICANT CHANGE UP (ref 0–0.07)
IMM GRANULOCYTES NFR BLD AUTO: 0.6 % — SIGNIFICANT CHANGE UP (ref 0–0.9)
INR BLD: 1.11 RATIO — SIGNIFICANT CHANGE UP (ref 0.85–1.16)
LACTATE SERPL-SCNC: 1.7 MMOL/L — SIGNIFICANT CHANGE UP (ref 0.5–2)
LYMPHOCYTES # BLD AUTO: 1.6 K/UL — SIGNIFICANT CHANGE UP (ref 1–3.3)
LYMPHOCYTES NFR BLD AUTO: 25.8 % — SIGNIFICANT CHANGE UP (ref 13–44)
MCHC RBC-ENTMCNC: 30 PG — SIGNIFICANT CHANGE UP (ref 27–34)
MCHC RBC-ENTMCNC: 33.4 G/DL — SIGNIFICANT CHANGE UP (ref 32–36)
MCV RBC AUTO: 89.6 FL — SIGNIFICANT CHANGE UP (ref 80–100)
MONOCYTES # BLD AUTO: 0.51 K/UL — SIGNIFICANT CHANGE UP (ref 0–0.9)
MONOCYTES NFR BLD AUTO: 8.2 % — SIGNIFICANT CHANGE UP (ref 2–14)
NEUTROPHILS # BLD AUTO: 2.77 K/UL — SIGNIFICANT CHANGE UP (ref 1.8–7.4)
NEUTROPHILS NFR BLD AUTO: 44.7 % — SIGNIFICANT CHANGE UP (ref 43–77)
NRBC # BLD AUTO: 0 K/UL — SIGNIFICANT CHANGE UP (ref 0–0)
NRBC # FLD: 0 K/UL — SIGNIFICANT CHANGE UP (ref 0–0)
NRBC BLD AUTO-RTO: 0 /100 WBCS — SIGNIFICANT CHANGE UP (ref 0–0)
PLATELET # BLD AUTO: 148 K/UL — LOW (ref 150–400)
PMV BLD: 12.9 FL — SIGNIFICANT CHANGE UP (ref 7–13)
POTASSIUM SERPL-MCNC: 5.4 MMOL/L — HIGH (ref 3.5–5.3)
POTASSIUM SERPL-SCNC: 5.4 MMOL/L — HIGH (ref 3.5–5.3)
PROT SERPL-MCNC: 7.1 G/DL — SIGNIFICANT CHANGE UP (ref 6–8.3)
PROTHROM AB SERPL-ACNC: 13.2 SEC — SIGNIFICANT CHANGE UP (ref 9.9–13.4)
RBC # BLD: 3.37 M/UL — LOW (ref 4.2–5.8)
RBC # FLD: 16.7 % — HIGH (ref 10.3–14.5)
RSV RNA NPH QL NAA+NON-PROBE: SIGNIFICANT CHANGE UP
SARS-COV-2 RNA SPEC QL NAA+PROBE: SIGNIFICANT CHANGE UP
SODIUM SERPL-SCNC: 140 MMOL/L — SIGNIFICANT CHANGE UP (ref 135–145)
SOURCE RESPIRATORY: SIGNIFICANT CHANGE UP
WBC # BLD: 6.2 K/UL — SIGNIFICANT CHANGE UP (ref 3.8–10.5)
WBC # FLD AUTO: 6.2 K/UL — SIGNIFICANT CHANGE UP (ref 3.8–10.5)

## 2025-08-01 PROCEDURE — 70450 CT HEAD/BRAIN W/O DYE: CPT | Mod: 26

## 2025-08-01 PROCEDURE — 71250 CT THORAX DX C-: CPT | Mod: 26

## 2025-08-01 PROCEDURE — 73090 X-RAY EXAM OF FOREARM: CPT | Mod: 26,LT

## 2025-08-01 PROCEDURE — 71045 X-RAY EXAM CHEST 1 VIEW: CPT | Mod: 26

## 2025-08-01 PROCEDURE — 73030 X-RAY EXAM OF SHOULDER: CPT | Mod: 26,LT

## 2025-08-01 PROCEDURE — 99285 EMERGENCY DEPT VISIT HI MDM: CPT

## 2025-08-01 RX ORDER — ACETAMINOPHEN 500 MG/5ML
1000 LIQUID (ML) ORAL ONCE
Refills: 0 | Status: COMPLETED | OUTPATIENT
Start: 2025-08-01 | End: 2025-08-01

## 2025-08-01 RX ORDER — PIPERACILLIN-TAZO-DEXTROSE,ISO 3.375G/5
3.38 IV SOLUTION, PIGGYBACK PREMIX FROZEN(ML) INTRAVENOUS ONCE
Refills: 0 | Status: COMPLETED | OUTPATIENT
Start: 2025-08-01 | End: 2025-08-01

## 2025-08-01 RX ADMIN — Medication 400 MILLIGRAM(S): at 19:15

## 2025-08-01 RX ADMIN — Medication 200 GRAM(S): at 20:00

## 2025-08-02 DIAGNOSIS — D72.10 EOSINOPHILIA, UNSPECIFIED: ICD-10-CM

## 2025-08-02 DIAGNOSIS — J96.01 ACUTE RESPIRATORY FAILURE WITH HYPOXIA: ICD-10-CM

## 2025-08-02 DIAGNOSIS — E11.9 TYPE 2 DIABETES MELLITUS WITHOUT COMPLICATIONS: ICD-10-CM

## 2025-08-02 DIAGNOSIS — G92.8 OTHER TOXIC ENCEPHALOPATHY: ICD-10-CM

## 2025-08-02 DIAGNOSIS — I48.20 CHRONIC ATRIAL FIBRILLATION, UNSPECIFIED: ICD-10-CM

## 2025-08-02 DIAGNOSIS — I50.23 ACUTE ON CHRONIC SYSTOLIC (CONGESTIVE) HEART FAILURE: ICD-10-CM

## 2025-08-02 DIAGNOSIS — R41.82 ALTERED MENTAL STATUS, UNSPECIFIED: ICD-10-CM

## 2025-08-02 DIAGNOSIS — R63.8 OTHER SYMPTOMS AND SIGNS CONCERNING FOOD AND FLUID INTAKE: ICD-10-CM

## 2025-08-02 DIAGNOSIS — N39.0 URINARY TRACT INFECTION, SITE NOT SPECIFIED: ICD-10-CM

## 2025-08-02 LAB
ADD ON TEST-SPECIMEN IN LAB: SIGNIFICANT CHANGE UP
ALBUMIN SERPL ELPH-MCNC: 3.4 G/DL — SIGNIFICANT CHANGE UP (ref 3.3–5)
ALP SERPL-CCNC: 125 U/L — HIGH (ref 40–120)
ALT FLD-CCNC: 38 U/L — SIGNIFICANT CHANGE UP (ref 4–41)
ANION GAP SERPL CALC-SCNC: 13 MMOL/L — SIGNIFICANT CHANGE UP (ref 7–14)
APPEARANCE UR: CLEAR — SIGNIFICANT CHANGE UP
AST SERPL-CCNC: 26 U/L — SIGNIFICANT CHANGE UP (ref 4–40)
B PERT DNA SPEC QL NAA+PROBE: SIGNIFICANT CHANGE UP
B PERT+PARAPERT DNA PNL SPEC NAA+PROBE: SIGNIFICANT CHANGE UP
BACTERIA # UR AUTO: ABNORMAL /HPF
BILIRUB SERPL-MCNC: 0.4 MG/DL — SIGNIFICANT CHANGE UP (ref 0.2–1.2)
BILIRUB UR-MCNC: NEGATIVE — SIGNIFICANT CHANGE UP
BUN SERPL-MCNC: 43 MG/DL — HIGH (ref 7–23)
C PNEUM DNA SPEC QL NAA+PROBE: SIGNIFICANT CHANGE UP
C3 SERPL-MCNC: 78 MG/DL — LOW (ref 90–180)
C4 SERPL-MCNC: 11 MG/DL — SIGNIFICANT CHANGE UP (ref 10–40)
CALCIUM SERPL-MCNC: 9.4 MG/DL — SIGNIFICANT CHANGE UP (ref 8.4–10.5)
CAST: 6 /LPF — HIGH (ref 0–4)
CHLORIDE SERPL-SCNC: 106 MMOL/L — SIGNIFICANT CHANGE UP (ref 98–107)
CK MB BLD-MCNC: 5.5 % — HIGH (ref 0–2.5)
CK MB CFR SERPL CALC: 2.2 NG/ML — SIGNIFICANT CHANGE UP
CK SERPL-CCNC: 40 U/L — SIGNIFICANT CHANGE UP (ref 30–200)
CO2 SERPL-SCNC: 21 MMOL/L — LOW (ref 22–31)
COLOR SPEC: YELLOW — SIGNIFICANT CHANGE UP
CREAT SERPL-MCNC: 2.58 MG/DL — HIGH (ref 0.5–1.3)
DIFF PNL FLD: NEGATIVE — SIGNIFICANT CHANGE UP
EGFR: 24 ML/MIN/1.73M2 — LOW
EGFR: 24 ML/MIN/1.73M2 — LOW
FLUAV SUBTYP SPEC NAA+PROBE: SIGNIFICANT CHANGE UP
FLUBV RNA SPEC QL NAA+PROBE: SIGNIFICANT CHANGE UP
FOLATE SERPL-MCNC: 16 NG/ML — SIGNIFICANT CHANGE UP (ref 3.1–17.5)
GLUCOSE BLDC GLUCOMTR-MCNC: 122 MG/DL — HIGH (ref 70–99)
GLUCOSE BLDC GLUCOMTR-MCNC: 151 MG/DL — HIGH (ref 70–99)
GLUCOSE BLDC GLUCOMTR-MCNC: 82 MG/DL — SIGNIFICANT CHANGE UP (ref 70–99)
GLUCOSE BLDC GLUCOMTR-MCNC: 83 MG/DL — SIGNIFICANT CHANGE UP (ref 70–99)
GLUCOSE BLDC GLUCOMTR-MCNC: 86 MG/DL — SIGNIFICANT CHANGE UP (ref 70–99)
GLUCOSE BLDC GLUCOMTR-MCNC: 95 MG/DL — SIGNIFICANT CHANGE UP (ref 70–99)
GLUCOSE BLDC GLUCOMTR-MCNC: 98 MG/DL — SIGNIFICANT CHANGE UP (ref 70–99)
GLUCOSE SERPL-MCNC: 90 MG/DL — SIGNIFICANT CHANGE UP (ref 70–99)
GLUCOSE UR QL: NEGATIVE MG/DL — SIGNIFICANT CHANGE UP
HADV DNA SPEC QL NAA+PROBE: SIGNIFICANT CHANGE UP
HAV IGM SER-ACNC: SIGNIFICANT CHANGE UP
HBV CORE IGM SER-ACNC: SIGNIFICANT CHANGE UP
HBV SURFACE AG SER-ACNC: SIGNIFICANT CHANGE UP
HCOV 229E RNA SPEC QL NAA+PROBE: SIGNIFICANT CHANGE UP
HCOV HKU1 RNA SPEC QL NAA+PROBE: SIGNIFICANT CHANGE UP
HCOV NL63 RNA SPEC QL NAA+PROBE: SIGNIFICANT CHANGE UP
HCOV OC43 RNA SPEC QL NAA+PROBE: SIGNIFICANT CHANGE UP
HCV AB S/CO SERPL IA: 0.12 S/CO — SIGNIFICANT CHANGE UP (ref 0–0.79)
HCV AB SERPL-IMP: SIGNIFICANT CHANGE UP
HIV 1+2 AB+HIV1 P24 AG SERPL QL IA: SIGNIFICANT CHANGE UP
HMPV RNA SPEC QL NAA+PROBE: SIGNIFICANT CHANGE UP
HPIV1 RNA SPEC QL NAA+PROBE: SIGNIFICANT CHANGE UP
HPIV2 RNA SPEC QL NAA+PROBE: SIGNIFICANT CHANGE UP
HPIV3 RNA SPEC QL NAA+PROBE: SIGNIFICANT CHANGE UP
HPIV4 RNA SPEC QL NAA+PROBE: SIGNIFICANT CHANGE UP
KETONES UR QL: NEGATIVE MG/DL — SIGNIFICANT CHANGE UP
LEUKOCYTE ESTERASE UR-ACNC: ABNORMAL
M PNEUMO DNA SPEC QL NAA+PROBE: SIGNIFICANT CHANGE UP
MAGNESIUM SERPL-MCNC: 2.3 MG/DL — SIGNIFICANT CHANGE UP (ref 1.6–2.6)
NITRITE UR-MCNC: NEGATIVE — SIGNIFICANT CHANGE UP
PH UR: 6.5 — SIGNIFICANT CHANGE UP (ref 5–8)
PHOSPHATE SERPL-MCNC: 3.5 MG/DL — SIGNIFICANT CHANGE UP (ref 2.5–4.5)
POTASSIUM SERPL-MCNC: 4.7 MMOL/L — SIGNIFICANT CHANGE UP (ref 3.5–5.3)
POTASSIUM SERPL-SCNC: 4.7 MMOL/L — SIGNIFICANT CHANGE UP (ref 3.5–5.3)
PROT SERPL-MCNC: 7.1 G/DL — SIGNIFICANT CHANGE UP (ref 6–8.3)
PROT UR-MCNC: 300 MG/DL
RAPID RVP RESULT: SIGNIFICANT CHANGE UP
RBC CASTS # UR COMP ASSIST: 1 /HPF — SIGNIFICANT CHANGE UP (ref 0–4)
RSV RNA SPEC QL NAA+PROBE: SIGNIFICANT CHANGE UP
RV+EV RNA SPEC QL NAA+PROBE: SIGNIFICANT CHANGE UP
SARS-COV-2 RNA SPEC QL NAA+PROBE: SIGNIFICANT CHANGE UP
SODIUM SERPL-SCNC: 140 MMOL/L — SIGNIFICANT CHANGE UP (ref 135–145)
SP GR SPEC: 1.01 — SIGNIFICANT CHANGE UP (ref 1–1.03)
SQUAMOUS # UR AUTO: 1 /HPF — SIGNIFICANT CHANGE UP (ref 0–5)
TROPONIN T, HIGH SENSITIVITY RESULT: 38 NG/L — SIGNIFICANT CHANGE UP
TSH SERPL-MCNC: 2.42 UIU/ML — SIGNIFICANT CHANGE UP (ref 0.27–4.2)
UROBILINOGEN FLD QL: 0.2 MG/DL — SIGNIFICANT CHANGE UP (ref 0.2–1)
VIT B12 SERPL-MCNC: 1477 PG/ML — HIGH (ref 200–900)
WBC UR QL: 40 /HPF — HIGH (ref 0–5)

## 2025-08-02 PROCEDURE — 70450 CT HEAD/BRAIN W/O DYE: CPT | Mod: 26

## 2025-08-02 PROCEDURE — 99223 1ST HOSP IP/OBS HIGH 75: CPT

## 2025-08-02 RX ORDER — HEPARIN SODIUM 1000 [USP'U]/ML
3000 INJECTION INTRAVENOUS; SUBCUTANEOUS EVERY 6 HOURS
Refills: 0 | Status: DISCONTINUED | OUTPATIENT
Start: 2025-08-02 | End: 2025-08-02

## 2025-08-02 RX ORDER — AZITHROMYCIN 250 MG
500 CAPSULE ORAL ONCE
Refills: 0 | Status: COMPLETED | OUTPATIENT
Start: 2025-08-02 | End: 2025-08-02

## 2025-08-02 RX ORDER — GLUCAGON 3 MG/1
1 POWDER NASAL ONCE
Refills: 0 | Status: DISCONTINUED | OUTPATIENT
Start: 2025-08-02 | End: 2025-08-03

## 2025-08-02 RX ORDER — DEXTROSE 50 % IN WATER 50 %
25 SYRINGE (ML) INTRAVENOUS ONCE
Refills: 0 | Status: DISCONTINUED | OUTPATIENT
Start: 2025-08-02 | End: 2025-08-03

## 2025-08-02 RX ORDER — APIXABAN 5 MG/1
2.5 TABLET, FILM COATED ORAL
Refills: 0 | Status: DISCONTINUED | OUTPATIENT
Start: 2025-08-02 | End: 2025-08-05

## 2025-08-02 RX ORDER — HEPARIN SODIUM 1000 [USP'U]/ML
INJECTION INTRAVENOUS; SUBCUTANEOUS
Qty: 25000 | Refills: 0 | Status: DISCONTINUED | OUTPATIENT
Start: 2025-08-02 | End: 2025-08-02

## 2025-08-02 RX ORDER — SODIUM CHLORIDE 9 G/1000ML
1000 INJECTION, SOLUTION INTRAVENOUS
Refills: 0 | Status: DISCONTINUED | OUTPATIENT
Start: 2025-08-02 | End: 2025-08-03

## 2025-08-02 RX ORDER — INSULIN LISPRO 100 U/ML
INJECTION, SOLUTION INTRAVENOUS; SUBCUTANEOUS EVERY 6 HOURS
Refills: 0 | Status: DISCONTINUED | OUTPATIENT
Start: 2025-08-02 | End: 2025-08-03

## 2025-08-02 RX ORDER — DEXTROSE 50 % IN WATER 50 %
15 SYRINGE (ML) INTRAVENOUS ONCE
Refills: 0 | Status: DISCONTINUED | OUTPATIENT
Start: 2025-08-02 | End: 2025-08-03

## 2025-08-02 RX ORDER — FUROSEMIDE 10 MG/ML
20 INJECTION INTRAMUSCULAR; INTRAVENOUS EVERY 12 HOURS
Refills: 0 | Status: DISCONTINUED | OUTPATIENT
Start: 2025-08-02 | End: 2025-08-05

## 2025-08-02 RX ORDER — AZITHROMYCIN 250 MG
CAPSULE ORAL
Refills: 0 | Status: COMPLETED | OUTPATIENT
Start: 2025-08-02 | End: 2025-08-04

## 2025-08-02 RX ORDER — CALCITRIOL 0.5 UG/1
1 CAPSULE, GELATIN COATED ORAL
Refills: 0 | DISCHARGE

## 2025-08-02 RX ORDER — FUROSEMIDE 10 MG/ML
20 INJECTION INTRAMUSCULAR; INTRAVENOUS DAILY
Refills: 0 | Status: DISCONTINUED | OUTPATIENT
Start: 2025-08-02 | End: 2025-08-02

## 2025-08-02 RX ORDER — CEFTRIAXONE 500 MG/1
2000 INJECTION, POWDER, FOR SOLUTION INTRAMUSCULAR; INTRAVENOUS EVERY 24 HOURS
Refills: 0 | Status: DISCONTINUED | OUTPATIENT
Start: 2025-08-02 | End: 2025-08-05

## 2025-08-02 RX ORDER — AZITHROMYCIN 250 MG
500 CAPSULE ORAL EVERY 24 HOURS
Refills: 0 | Status: COMPLETED | OUTPATIENT
Start: 2025-08-03 | End: 2025-08-04

## 2025-08-02 RX ORDER — HEPARIN SODIUM 1000 [USP'U]/ML
6000 INJECTION INTRAVENOUS; SUBCUTANEOUS EVERY 6 HOURS
Refills: 0 | Status: DISCONTINUED | OUTPATIENT
Start: 2025-08-02 | End: 2025-08-02

## 2025-08-02 RX ORDER — DEXTROSE 50 % IN WATER 50 %
12.5 SYRINGE (ML) INTRAVENOUS ONCE
Refills: 0 | Status: DISCONTINUED | OUTPATIENT
Start: 2025-08-02 | End: 2025-08-03

## 2025-08-02 RX ADMIN — APIXABAN 2.5 MILLIGRAM(S): 5 TABLET, FILM COATED ORAL at 17:44

## 2025-08-02 RX ADMIN — Medication 250 MILLIGRAM(S): at 05:55

## 2025-08-02 RX ADMIN — HEPARIN SODIUM 1300 UNIT(S)/HR: 1000 INJECTION INTRAVENOUS; SUBCUTANEOUS at 03:45

## 2025-08-02 RX ADMIN — FUROSEMIDE 20 MILLIGRAM(S): 10 INJECTION INTRAMUSCULAR; INTRAVENOUS at 05:56

## 2025-08-02 RX ADMIN — INSULIN LISPRO 1: 100 INJECTION, SOLUTION INTRAVENOUS; SUBCUTANEOUS at 17:48

## 2025-08-02 RX ADMIN — CEFTRIAXONE 100 MILLIGRAM(S): 500 INJECTION, POWDER, FOR SOLUTION INTRAMUSCULAR; INTRAVENOUS at 04:27

## 2025-08-02 RX ADMIN — FUROSEMIDE 20 MILLIGRAM(S): 10 INJECTION INTRAMUSCULAR; INTRAVENOUS at 17:42

## 2025-08-02 RX ADMIN — HEPARIN SODIUM 1300 UNIT(S)/HR: 1000 INJECTION INTRAVENOUS; SUBCUTANEOUS at 07:22

## 2025-08-03 LAB
ALBUMIN SERPL ELPH-MCNC: 3.6 G/DL — SIGNIFICANT CHANGE UP (ref 3.3–5)
ALP SERPL-CCNC: 123 U/L — HIGH (ref 40–120)
ALT FLD-CCNC: 38 U/L — SIGNIFICANT CHANGE UP (ref 4–41)
ANION GAP SERPL CALC-SCNC: 13 MMOL/L — SIGNIFICANT CHANGE UP (ref 7–14)
AST SERPL-CCNC: 24 U/L — SIGNIFICANT CHANGE UP (ref 4–40)
BASOPHILS # BLD AUTO: 0.06 K/UL — SIGNIFICANT CHANGE UP (ref 0–0.2)
BASOPHILS NFR BLD AUTO: 1.2 % — SIGNIFICANT CHANGE UP (ref 0–2)
BILIRUB SERPL-MCNC: 0.5 MG/DL — SIGNIFICANT CHANGE UP (ref 0.2–1.2)
BUN SERPL-MCNC: 43 MG/DL — HIGH (ref 7–23)
CALCIUM SERPL-MCNC: 9.4 MG/DL — SIGNIFICANT CHANGE UP (ref 8.4–10.5)
CHLORIDE SERPL-SCNC: 106 MMOL/L — SIGNIFICANT CHANGE UP (ref 98–107)
CO2 SERPL-SCNC: 22 MMOL/L — SIGNIFICANT CHANGE UP (ref 22–31)
CREAT SERPL-MCNC: 2.46 MG/DL — HIGH (ref 0.5–1.3)
EGFR: 25 ML/MIN/1.73M2 — LOW
EGFR: 25 ML/MIN/1.73M2 — LOW
EOSINOPHIL # BLD AUTO: 0.78 K/UL — HIGH (ref 0–0.5)
EOSINOPHIL NFR BLD AUTO: 15.7 % — HIGH (ref 0–6)
GLUCOSE BLDC GLUCOMTR-MCNC: 103 MG/DL — HIGH (ref 70–99)
GLUCOSE BLDC GLUCOMTR-MCNC: 134 MG/DL — HIGH (ref 70–99)
GLUCOSE BLDC GLUCOMTR-MCNC: 164 MG/DL — HIGH (ref 70–99)
GLUCOSE BLDC GLUCOMTR-MCNC: 165 MG/DL — HIGH (ref 70–99)
GLUCOSE SERPL-MCNC: 108 MG/DL — HIGH (ref 70–99)
HCT VFR BLD CALC: 32.5 % — LOW (ref 39–50)
HGB BLD-MCNC: 10.8 G/DL — LOW (ref 13–17)
IMM GRANULOCYTES # BLD AUTO: 0.02 K/UL — SIGNIFICANT CHANGE UP (ref 0–0.07)
IMM GRANULOCYTES NFR BLD AUTO: 0.4 % — SIGNIFICANT CHANGE UP (ref 0–0.9)
LYMPHOCYTES # BLD AUTO: 1.97 K/UL — SIGNIFICANT CHANGE UP (ref 1–3.3)
LYMPHOCYTES NFR BLD AUTO: 39.6 % — SIGNIFICANT CHANGE UP (ref 13–44)
MAGNESIUM SERPL-MCNC: 2.2 MG/DL — SIGNIFICANT CHANGE UP (ref 1.6–2.6)
MCHC RBC-ENTMCNC: 29.4 PG — SIGNIFICANT CHANGE UP (ref 27–34)
MCHC RBC-ENTMCNC: 33.2 G/DL — SIGNIFICANT CHANGE UP (ref 32–36)
MCV RBC AUTO: 88.6 FL — SIGNIFICANT CHANGE UP (ref 80–100)
MONOCYTES # BLD AUTO: 0.52 K/UL — SIGNIFICANT CHANGE UP (ref 0–0.9)
MONOCYTES NFR BLD AUTO: 10.4 % — SIGNIFICANT CHANGE UP (ref 2–14)
NEUTROPHILS # BLD AUTO: 1.63 K/UL — LOW (ref 1.8–7.4)
NEUTROPHILS NFR BLD AUTO: 32.7 % — LOW (ref 43–77)
NRBC # BLD AUTO: 0 K/UL — SIGNIFICANT CHANGE UP (ref 0–0)
NRBC # FLD: 0 K/UL — SIGNIFICANT CHANGE UP (ref 0–0)
NRBC BLD AUTO-RTO: 0 /100 WBCS — SIGNIFICANT CHANGE UP (ref 0–0)
PHOSPHATE SERPL-MCNC: 3.6 MG/DL — SIGNIFICANT CHANGE UP (ref 2.5–4.5)
PLATELET # BLD AUTO: 141 K/UL — LOW (ref 150–400)
PMV BLD: 12.6 FL — SIGNIFICANT CHANGE UP (ref 7–13)
POTASSIUM SERPL-MCNC: 4.3 MMOL/L — SIGNIFICANT CHANGE UP (ref 3.5–5.3)
POTASSIUM SERPL-SCNC: 4.3 MMOL/L — SIGNIFICANT CHANGE UP (ref 3.5–5.3)
PROT SERPL-MCNC: 7.4 G/DL — SIGNIFICANT CHANGE UP (ref 6–8.3)
RBC # BLD: 3.67 M/UL — LOW (ref 4.2–5.8)
RBC # FLD: 16.6 % — HIGH (ref 10.3–14.5)
SODIUM SERPL-SCNC: 141 MMOL/L — SIGNIFICANT CHANGE UP (ref 135–145)
WBC # BLD: 4.98 K/UL — SIGNIFICANT CHANGE UP (ref 3.8–10.5)
WBC # FLD AUTO: 4.98 K/UL — SIGNIFICANT CHANGE UP (ref 3.8–10.5)

## 2025-08-03 PROCEDURE — 99233 SBSQ HOSP IP/OBS HIGH 50: CPT

## 2025-08-03 RX ORDER — DEXTROSE 50 % IN WATER 50 %
25 SYRINGE (ML) INTRAVENOUS ONCE
Refills: 0 | Status: DISCONTINUED | OUTPATIENT
Start: 2025-08-03 | End: 2025-08-05

## 2025-08-03 RX ORDER — GLUCAGON 3 MG/1
1 POWDER NASAL ONCE
Refills: 0 | Status: DISCONTINUED | OUTPATIENT
Start: 2025-08-03 | End: 2025-08-05

## 2025-08-03 RX ORDER — SODIUM CHLORIDE 9 G/1000ML
1000 INJECTION, SOLUTION INTRAVENOUS
Refills: 0 | Status: DISCONTINUED | OUTPATIENT
Start: 2025-08-03 | End: 2025-08-05

## 2025-08-03 RX ORDER — CLOPIDOGREL BISULFATE 75 MG/1
75 TABLET, FILM COATED ORAL DAILY
Refills: 0 | Status: DISCONTINUED | OUTPATIENT
Start: 2025-08-03 | End: 2025-08-05

## 2025-08-03 RX ORDER — ATORVASTATIN CALCIUM 80 MG/1
80 TABLET, FILM COATED ORAL AT BEDTIME
Refills: 0 | Status: DISCONTINUED | OUTPATIENT
Start: 2025-08-03 | End: 2025-08-05

## 2025-08-03 RX ORDER — INSULIN LISPRO 100 U/ML
INJECTION, SOLUTION INTRAVENOUS; SUBCUTANEOUS
Refills: 0 | Status: DISCONTINUED | OUTPATIENT
Start: 2025-08-03 | End: 2025-08-05

## 2025-08-03 RX ORDER — DEXTROSE 50 % IN WATER 50 %
15 SYRINGE (ML) INTRAVENOUS ONCE
Refills: 0 | Status: DISCONTINUED | OUTPATIENT
Start: 2025-08-03 | End: 2025-08-05

## 2025-08-03 RX ORDER — METOPROLOL SUCCINATE 50 MG/1
25 TABLET, EXTENDED RELEASE ORAL DAILY
Refills: 0 | Status: DISCONTINUED | OUTPATIENT
Start: 2025-08-03 | End: 2025-08-05

## 2025-08-03 RX ORDER — DEXTROSE 50 % IN WATER 50 %
12.5 SYRINGE (ML) INTRAVENOUS ONCE
Refills: 0 | Status: DISCONTINUED | OUTPATIENT
Start: 2025-08-03 | End: 2025-08-05

## 2025-08-03 RX ORDER — INSULIN LISPRO 100 U/ML
INJECTION, SOLUTION INTRAVENOUS; SUBCUTANEOUS AT BEDTIME
Refills: 0 | Status: DISCONTINUED | OUTPATIENT
Start: 2025-08-03 | End: 2025-08-05

## 2025-08-03 RX ADMIN — FUROSEMIDE 20 MILLIGRAM(S): 10 INJECTION INTRAMUSCULAR; INTRAVENOUS at 17:20

## 2025-08-03 RX ADMIN — FUROSEMIDE 20 MILLIGRAM(S): 10 INJECTION INTRAMUSCULAR; INTRAVENOUS at 06:05

## 2025-08-03 RX ADMIN — APIXABAN 2.5 MILLIGRAM(S): 5 TABLET, FILM COATED ORAL at 06:07

## 2025-08-03 RX ADMIN — CEFTRIAXONE 100 MILLIGRAM(S): 500 INJECTION, POWDER, FOR SOLUTION INTRAMUSCULAR; INTRAVENOUS at 02:31

## 2025-08-03 RX ADMIN — INSULIN LISPRO 1: 100 INJECTION, SOLUTION INTRAVENOUS; SUBCUTANEOUS at 12:20

## 2025-08-03 RX ADMIN — Medication 250 MILLIGRAM(S): at 02:31

## 2025-08-03 RX ADMIN — APIXABAN 2.5 MILLIGRAM(S): 5 TABLET, FILM COATED ORAL at 17:21

## 2025-08-03 RX ADMIN — ATORVASTATIN CALCIUM 80 MILLIGRAM(S): 80 TABLET, FILM COATED ORAL at 21:47

## 2025-08-04 ENCOUNTER — NON-APPOINTMENT (OUTPATIENT)
Age: 85
End: 2025-08-04

## 2025-08-04 ENCOUNTER — APPOINTMENT (OUTPATIENT)
Dept: ELECTROPHYSIOLOGY | Facility: CLINIC | Age: 85
End: 2025-08-04
Payer: MEDICARE

## 2025-08-04 LAB
GLUCOSE BLDC GLUCOMTR-MCNC: 103 MG/DL — HIGH (ref 70–99)
GLUCOSE BLDC GLUCOMTR-MCNC: 145 MG/DL — HIGH (ref 70–99)
GLUCOSE BLDC GLUCOMTR-MCNC: 93 MG/DL — SIGNIFICANT CHANGE UP (ref 70–99)
MRSA PCR RESULT.: DETECTED
S AUREUS DNA NOSE QL NAA+PROBE: DETECTED
STRONGYLOIDES AB SER-ACNC: NEGATIVE — SIGNIFICANT CHANGE UP

## 2025-08-04 PROCEDURE — 99233 SBSQ HOSP IP/OBS HIGH 50: CPT

## 2025-08-04 PROCEDURE — 93294 REM INTERROG EVL PM/LDLS PM: CPT

## 2025-08-04 PROCEDURE — 93296 REM INTERROG EVL PM/IDS: CPT

## 2025-08-04 RX ORDER — MUPIROCIN CALCIUM 20 MG/G
1 CREAM TOPICAL
Refills: 0 | Status: DISCONTINUED | OUTPATIENT
Start: 2025-08-04 | End: 2025-08-05

## 2025-08-04 RX ADMIN — APIXABAN 2.5 MILLIGRAM(S): 5 TABLET, FILM COATED ORAL at 17:28

## 2025-08-04 RX ADMIN — Medication 1 APPLICATION(S): at 12:05

## 2025-08-04 RX ADMIN — CEFTRIAXONE 100 MILLIGRAM(S): 500 INJECTION, POWDER, FOR SOLUTION INTRAMUSCULAR; INTRAVENOUS at 02:29

## 2025-08-04 RX ADMIN — METOPROLOL SUCCINATE 25 MILLIGRAM(S): 50 TABLET, EXTENDED RELEASE ORAL at 05:25

## 2025-08-04 RX ADMIN — Medication 250 MILLIGRAM(S): at 03:01

## 2025-08-04 RX ADMIN — FUROSEMIDE 20 MILLIGRAM(S): 10 INJECTION INTRAMUSCULAR; INTRAVENOUS at 05:25

## 2025-08-04 RX ADMIN — ATORVASTATIN CALCIUM 80 MILLIGRAM(S): 80 TABLET, FILM COATED ORAL at 22:01

## 2025-08-04 RX ADMIN — APIXABAN 2.5 MILLIGRAM(S): 5 TABLET, FILM COATED ORAL at 05:25

## 2025-08-04 RX ADMIN — FUROSEMIDE 20 MILLIGRAM(S): 10 INJECTION INTRAMUSCULAR; INTRAVENOUS at 17:30

## 2025-08-04 RX ADMIN — CLOPIDOGREL BISULFATE 75 MILLIGRAM(S): 75 TABLET, FILM COATED ORAL at 12:01

## 2025-08-05 ENCOUNTER — TRANSCRIPTION ENCOUNTER (OUTPATIENT)
Age: 85
End: 2025-08-05

## 2025-08-05 VITALS
SYSTOLIC BLOOD PRESSURE: 132 MMHG | DIASTOLIC BLOOD PRESSURE: 78 MMHG | RESPIRATION RATE: 18 BRPM | OXYGEN SATURATION: 100 % | HEART RATE: 67 BPM

## 2025-08-05 LAB
-  AMOXICILLIN/CLAVULANIC ACID: SIGNIFICANT CHANGE UP
-  AMPICILLIN/SULBACTAM: SIGNIFICANT CHANGE UP
-  AMPICILLIN: SIGNIFICANT CHANGE UP
-  AZTREONAM: SIGNIFICANT CHANGE UP
-  CEFAZOLIN: SIGNIFICANT CHANGE UP
-  CEFEPIME: SIGNIFICANT CHANGE UP
-  CEFOXITIN: SIGNIFICANT CHANGE UP
-  CEFTRIAXONE: SIGNIFICANT CHANGE UP
-  CEFUROXIME: SIGNIFICANT CHANGE UP
-  CIPROFLOXACIN: SIGNIFICANT CHANGE UP
-  ERTAPENEM: SIGNIFICANT CHANGE UP
-  GENTAMICIN: SIGNIFICANT CHANGE UP
-  IMIPENEM: SIGNIFICANT CHANGE UP
-  LEVOFLOXACIN: SIGNIFICANT CHANGE UP
-  MEROPENEM: SIGNIFICANT CHANGE UP
-  NITROFURANTOIN: SIGNIFICANT CHANGE UP
-  PIPERACILLIN/TAZOBACTAM: SIGNIFICANT CHANGE UP
-  TIGECYCLINE: SIGNIFICANT CHANGE UP
-  TOBRAMYCIN: SIGNIFICANT CHANGE UP
-  TRIMETHOPRIM/SULFAMETHOXAZOLE: SIGNIFICANT CHANGE UP
ANION GAP SERPL CALC-SCNC: 13 MMOL/L — SIGNIFICANT CHANGE UP (ref 7–14)
ANISOCYTOSIS BLD QL: SLIGHT — SIGNIFICANT CHANGE UP
BASOPHILS # BLD AUTO: 0.08 K/UL — SIGNIFICANT CHANGE UP (ref 0–0.2)
BASOPHILS # BLD MANUAL: 0.08 K/UL — SIGNIFICANT CHANGE UP (ref 0–0.2)
BASOPHILS NFR BLD AUTO: 1.6 % — SIGNIFICANT CHANGE UP (ref 0–2)
BASOPHILS NFR BLD MANUAL: 1.7 % — SIGNIFICANT CHANGE UP (ref 0–2)
BUN SERPL-MCNC: 39 MG/DL — HIGH (ref 7–23)
CALCIUM SERPL-MCNC: 9 MG/DL — SIGNIFICANT CHANGE UP (ref 8.4–10.5)
CHLORIDE SERPL-SCNC: 105 MMOL/L — SIGNIFICANT CHANGE UP (ref 98–107)
CO2 SERPL-SCNC: 20 MMOL/L — LOW (ref 22–31)
CREAT SERPL-MCNC: 2.07 MG/DL — HIGH (ref 0.5–1.3)
CULTURE RESULTS: ABNORMAL
EGFR: 31 ML/MIN/1.73M2 — LOW
EGFR: 31 ML/MIN/1.73M2 — LOW
EOSINOPHIL # BLD AUTO: 0.8 K/UL — HIGH (ref 0–0.5)
EOSINOPHIL # BLD MANUAL: 1.19 K/UL — HIGH (ref 0–0.5)
EOSINOPHIL NFR BLD AUTO: 16.3 % — HIGH (ref 0–6)
EOSINOPHIL NFR BLD MANUAL: 24.2 % — HIGH (ref 0–6)
GIANT PLATELETS BLD QL SMEAR: PRESENT
GLUCOSE BLDC GLUCOMTR-MCNC: 102 MG/DL — HIGH (ref 70–99)
GLUCOSE BLDC GLUCOMTR-MCNC: 110 MG/DL — HIGH (ref 70–99)
GLUCOSE BLDC GLUCOMTR-MCNC: 91 MG/DL — SIGNIFICANT CHANGE UP (ref 70–99)
GLUCOSE SERPL-MCNC: 184 MG/DL — HIGH (ref 70–99)
HCT VFR BLD CALC: 38.6 % — LOW (ref 39–50)
HGB BLD-MCNC: 12.8 G/DL — LOW (ref 13–17)
HISTONE AB SER-ACNC: 0.4 UNITS — SIGNIFICANT CHANGE UP (ref 0–0.9)
IMM GRANULOCYTES # BLD AUTO: 0.04 K/UL — SIGNIFICANT CHANGE UP (ref 0–0.07)
IMM GRANULOCYTES NFR BLD AUTO: 0.8 % — SIGNIFICANT CHANGE UP (ref 0–0.9)
LYMPHOCYTES # BLD AUTO: 1.53 K/UL — SIGNIFICANT CHANGE UP (ref 1–3.3)
LYMPHOCYTES # BLD MANUAL: 1.39 K/UL — SIGNIFICANT CHANGE UP (ref 1–3.3)
LYMPHOCYTES NFR BLD AUTO: 31.2 % — SIGNIFICANT CHANGE UP (ref 13–44)
LYMPHOCYTES NFR BLD MANUAL: 28.3 % — SIGNIFICANT CHANGE UP (ref 13–44)
MACROCYTES BLD QL: SLIGHT — SIGNIFICANT CHANGE UP
MAGNESIUM SERPL-MCNC: 2.1 MG/DL — SIGNIFICANT CHANGE UP (ref 1.6–2.6)
MANUAL REACTIVE LYMPHOCYTES #: 0.04 K/UL — SIGNIFICANT CHANGE UP (ref 0–0.63)
MCHC RBC-ENTMCNC: 30 PG — SIGNIFICANT CHANGE UP (ref 27–34)
MCHC RBC-ENTMCNC: 33.2 G/DL — SIGNIFICANT CHANGE UP (ref 32–36)
MCV RBC AUTO: 90.6 FL — SIGNIFICANT CHANGE UP (ref 80–100)
METHOD TYPE: SIGNIFICANT CHANGE UP
MONOCYTES # BLD AUTO: 0.66 K/UL — SIGNIFICANT CHANGE UP (ref 0–0.9)
MONOCYTES # BLD MANUAL: 0.37 K/UL — SIGNIFICANT CHANGE UP (ref 0–0.9)
MONOCYTES NFR BLD AUTO: 13.4 % — SIGNIFICANT CHANGE UP (ref 2–14)
MONOCYTES NFR BLD MANUAL: 7.5 % — SIGNIFICANT CHANGE UP (ref 2–14)
NEUTROPHILS # BLD AUTO: 1.8 K/UL — SIGNIFICANT CHANGE UP (ref 1.8–7.4)
NEUTROPHILS # BLD MANUAL: 1.84 K/UL — SIGNIFICANT CHANGE UP (ref 1.8–7.4)
NEUTROPHILS NFR BLD AUTO: 36.7 % — LOW (ref 43–77)
NEUTROPHILS NFR BLD MANUAL: 37.5 % — LOW (ref 43–77)
NRBC # BLD AUTO: 0 K/UL — SIGNIFICANT CHANGE UP (ref 0–0)
NRBC # FLD: 0 K/UL — SIGNIFICANT CHANGE UP (ref 0–0)
NRBC BLD AUTO-RTO: 0 /100 WBCS — SIGNIFICANT CHANGE UP (ref 0–0)
ORGANISM # SPEC MICROSCOPIC CNT: ABNORMAL
ORGANISM # SPEC MICROSCOPIC CNT: ABNORMAL
PHOSPHATE SERPL-MCNC: 4 MG/DL — SIGNIFICANT CHANGE UP (ref 2.5–4.5)
PLAT MORPH BLD: NORMAL — SIGNIFICANT CHANGE UP
PLATELET # BLD AUTO: 136 K/UL — LOW (ref 150–400)
PLATELET COUNT - ESTIMATE: ABNORMAL
PMV BLD: 12.8 FL — SIGNIFICANT CHANGE UP (ref 7–13)
POIKILOCYTOSIS BLD QL AUTO: SLIGHT — SIGNIFICANT CHANGE UP
POLYCHROMASIA BLD QL SMEAR: SLIGHT — SIGNIFICANT CHANGE UP
POTASSIUM SERPL-MCNC: 4.4 MMOL/L — SIGNIFICANT CHANGE UP (ref 3.5–5.3)
POTASSIUM SERPL-SCNC: 4.4 MMOL/L — SIGNIFICANT CHANGE UP (ref 3.5–5.3)
RBC # BLD: 4.26 M/UL — SIGNIFICANT CHANGE UP (ref 4.2–5.8)
RBC # FLD: 16.7 % — HIGH (ref 10.3–14.5)
RBC BLD AUTO: ABNORMAL
SODIUM SERPL-SCNC: 138 MMOL/L — SIGNIFICANT CHANGE UP (ref 135–145)
SPECIMEN SOURCE: SIGNIFICANT CHANGE UP
VARIANT LYMPHS # BLD: 0.8 % — SIGNIFICANT CHANGE UP (ref 0–6)
VARIANT LYMPHS NFR BLD MANUAL: 0.8 % — SIGNIFICANT CHANGE UP (ref 0–6)
WBC # BLD: 4.91 K/UL — SIGNIFICANT CHANGE UP (ref 3.8–10.5)
WBC # FLD AUTO: 4.91 K/UL — SIGNIFICANT CHANGE UP (ref 3.8–10.5)

## 2025-08-05 RX ORDER — METOPROLOL SUCCINATE 50 MG/1
1 TABLET, EXTENDED RELEASE ORAL
Qty: 30 | Refills: 0
Start: 2025-08-05 | End: 2025-09-03

## 2025-08-05 RX ORDER — APIXABAN 5 MG/1
1 TABLET, FILM COATED ORAL
Qty: 60 | Refills: 0
Start: 2025-08-05 | End: 2025-09-03

## 2025-08-05 RX ORDER — FUROSEMIDE 10 MG/ML
1 INJECTION INTRAMUSCULAR; INTRAVENOUS
Qty: 30 | Refills: 0
Start: 2025-08-05 | End: 2025-09-03

## 2025-08-05 RX ORDER — ATORVASTATIN CALCIUM 80 MG/1
1 TABLET, FILM COATED ORAL
Qty: 30 | Refills: 0
Start: 2025-08-05 | End: 2025-09-03

## 2025-08-05 RX ORDER — TAMSULOSIN HYDROCHLORIDE 0.4 MG/1
1 CAPSULE ORAL
Refills: 0 | DISCHARGE

## 2025-08-05 RX ORDER — CIPROFLOXACIN HCL 250 MG
1 TABLET ORAL
Qty: 3 | Refills: 0
Start: 2025-08-05 | End: 2025-08-07

## 2025-08-05 RX ORDER — FUROSEMIDE 10 MG/ML
40 INJECTION INTRAMUSCULAR; INTRAVENOUS DAILY
Refills: 0 | Status: DISCONTINUED | OUTPATIENT
Start: 2025-08-05 | End: 2025-08-05

## 2025-08-05 RX ORDER — CLOPIDOGREL BISULFATE 75 MG/1
1 TABLET, FILM COATED ORAL
Qty: 30 | Refills: 0
Start: 2025-08-05 | End: 2025-09-03

## 2025-08-05 RX ADMIN — FUROSEMIDE 20 MILLIGRAM(S): 10 INJECTION INTRAMUSCULAR; INTRAVENOUS at 06:12

## 2025-08-05 RX ADMIN — APIXABAN 2.5 MILLIGRAM(S): 5 TABLET, FILM COATED ORAL at 06:12

## 2025-08-05 RX ADMIN — MUPIROCIN CALCIUM 1 APPLICATION(S): 20 CREAM TOPICAL at 06:13

## 2025-08-05 RX ADMIN — CLOPIDOGREL BISULFATE 75 MILLIGRAM(S): 75 TABLET, FILM COATED ORAL at 11:32

## 2025-08-05 RX ADMIN — MUPIROCIN CALCIUM 1 APPLICATION(S): 20 CREAM TOPICAL at 17:55

## 2025-08-05 RX ADMIN — APIXABAN 2.5 MILLIGRAM(S): 5 TABLET, FILM COATED ORAL at 17:56

## 2025-08-05 RX ADMIN — Medication 1 APPLICATION(S): at 11:33

## 2025-08-05 RX ADMIN — CEFTRIAXONE 100 MILLIGRAM(S): 500 INJECTION, POWDER, FOR SOLUTION INTRAMUSCULAR; INTRAVENOUS at 03:25

## 2025-08-05 RX ADMIN — FUROSEMIDE 40 MILLIGRAM(S): 10 INJECTION INTRAMUSCULAR; INTRAVENOUS at 17:57

## 2025-08-05 RX ADMIN — METOPROLOL SUCCINATE 25 MILLIGRAM(S): 50 TABLET, EXTENDED RELEASE ORAL at 06:12

## 2025-08-06 LAB
ANA TITR SER: NEGATIVE — SIGNIFICANT CHANGE UP
CULTURE RESULTS: SIGNIFICANT CHANGE UP
CULTURE RESULTS: SIGNIFICANT CHANGE UP
SPECIMEN SOURCE: SIGNIFICANT CHANGE UP
SPECIMEN SOURCE: SIGNIFICANT CHANGE UP

## 2025-08-12 ENCOUNTER — APPOINTMENT (OUTPATIENT)
Dept: ENDOCRINOLOGY | Facility: CLINIC | Age: 85
End: 2025-08-12
Payer: MEDICARE

## 2025-08-12 DIAGNOSIS — E11.9 TYPE 2 DIABETES MELLITUS W/OUT COMPLICATIONS: ICD-10-CM

## 2025-08-12 PROCEDURE — G0108 DIAB MANAGE TRN  PER INDIV: CPT

## 2025-09-09 ENCOUNTER — APPOINTMENT (OUTPATIENT)
Dept: UROLOGY | Facility: CLINIC | Age: 85
End: 2025-09-09
Payer: MEDICARE

## 2025-09-09 ENCOUNTER — NON-APPOINTMENT (OUTPATIENT)
Age: 85
End: 2025-09-09

## 2025-09-09 VITALS
TEMPERATURE: 97.2 F | SYSTOLIC BLOOD PRESSURE: 126 MMHG | HEIGHT: 62 IN | DIASTOLIC BLOOD PRESSURE: 73 MMHG | WEIGHT: 129 LBS | BODY MASS INDEX: 23.74 KG/M2 | OXYGEN SATURATION: 97 % | HEART RATE: 66 BPM

## 2025-09-09 DIAGNOSIS — N40.1 BENIGN PROSTATIC HYPERPLASIA WITH LOWER URINARY TRACT SYMPMS: ICD-10-CM

## 2025-09-09 DIAGNOSIS — N13.8 BENIGN PROSTATIC HYPERPLASIA WITH LOWER URINARY TRACT SYMPMS: ICD-10-CM

## 2025-09-09 DIAGNOSIS — R39.15 URGENCY OF URINATION: ICD-10-CM

## 2025-09-09 PROCEDURE — 51798 US URINE CAPACITY MEASURE: CPT

## 2025-09-09 PROCEDURE — 99203 OFFICE O/P NEW LOW 30 MIN: CPT | Mod: 25

## 2025-09-10 LAB
APPEARANCE: CLEAR
BACTERIA: NEGATIVE /HPF
BILIRUBIN URINE: NEGATIVE
BLOOD URINE: ABNORMAL
CAST: 12 /LPF
COLOR: YELLOW
EPITHELIAL CELLS: 1 /HPF
GLUCOSE QUALITATIVE U: NEGATIVE MG/DL
HYALINE CASTS: PRESENT
KETONES URINE: NEGATIVE MG/DL
LEUKOCYTE ESTERASE URINE: NEGATIVE
MICROSCOPIC-UA: NORMAL
NITRITE URINE: NEGATIVE
PH URINE: 6
PROTEIN URINE: >=1000 MG/DL
RED BLOOD CELLS URINE: 0 /HPF
REVIEW: NORMAL
SPECIFIC GRAVITY URINE: 1.01
UROBILINOGEN URINE: 0.2 MG/DL
WHITE BLOOD CELLS URINE: 1 /HPF

## 2025-09-11 LAB — BACTERIA UR CULT: NORMAL
